# Patient Record
Sex: FEMALE | Race: WHITE | NOT HISPANIC OR LATINO | URBAN - METROPOLITAN AREA
[De-identification: names, ages, dates, MRNs, and addresses within clinical notes are randomized per-mention and may not be internally consistent; named-entity substitution may affect disease eponyms.]

---

## 2022-05-12 ENCOUNTER — INPATIENT (INPATIENT)
Facility: HOSPITAL | Age: 37
LOS: 7 days | Discharge: NURSING, REHAB OR LONG-TERM CARE | DRG: 178 | End: 2022-05-20
Attending: STUDENT IN AN ORGANIZED HEALTH CARE EDUCATION/TRAINING PROGRAM | Admitting: FAMILY MEDICINE
Payer: COMMERCIAL

## 2022-05-12 VITALS
HEIGHT: 62 IN | HEART RATE: 124 BPM | OXYGEN SATURATION: 97 % | DIASTOLIC BLOOD PRESSURE: 58 MMHG | RESPIRATION RATE: 20 BRPM | TEMPERATURE: 98 F | WEIGHT: 293 LBS | SYSTOLIC BLOOD PRESSURE: 102 MMHG

## 2022-05-12 LAB
ALBUMIN SERPL ELPH-MCNC: 2.7 G/DL — LOW (ref 3.3–5.2)
ALP SERPL-CCNC: 127 U/L — HIGH (ref 40–120)
ALT FLD-CCNC: 6 U/L — SIGNIFICANT CHANGE UP
ANION GAP SERPL CALC-SCNC: 17 MMOL/L — SIGNIFICANT CHANGE UP (ref 5–17)
AST SERPL-CCNC: 12 U/L — SIGNIFICANT CHANGE UP
BASOPHILS # BLD AUTO: 0.1 K/UL — SIGNIFICANT CHANGE UP (ref 0–0.2)
BASOPHILS NFR BLD AUTO: 0.9 % — SIGNIFICANT CHANGE UP (ref 0–2)
BILIRUB SERPL-MCNC: 1.3 MG/DL — SIGNIFICANT CHANGE UP (ref 0.4–2)
BUN SERPL-MCNC: 43.1 MG/DL — HIGH (ref 8–20)
CALCIUM SERPL-MCNC: 8.6 MG/DL — SIGNIFICANT CHANGE UP (ref 8.6–10.2)
CHLORIDE SERPL-SCNC: 90 MMOL/L — LOW (ref 98–107)
CO2 SERPL-SCNC: 25 MMOL/L — SIGNIFICANT CHANGE UP (ref 22–29)
CREAT SERPL-MCNC: 1.27 MG/DL — SIGNIFICANT CHANGE UP (ref 0.5–1.3)
EGFR: 57 ML/MIN/1.73M2 — LOW
EOSINOPHIL # BLD AUTO: 0.19 K/UL — SIGNIFICANT CHANGE UP (ref 0–0.5)
EOSINOPHIL NFR BLD AUTO: 1.7 % — SIGNIFICANT CHANGE UP (ref 0–6)
FLUAV AG NPH QL: SIGNIFICANT CHANGE UP
FLUBV AG NPH QL: SIGNIFICANT CHANGE UP
GLUCOSE SERPL-MCNC: 119 MG/DL — HIGH (ref 70–99)
HCG SERPL-ACNC: <4 MIU/ML — SIGNIFICANT CHANGE UP
HCT VFR BLD CALC: 32.8 % — LOW (ref 34.5–45)
HGB BLD-MCNC: 9.8 G/DL — LOW (ref 11.5–15.5)
LIDOCAIN IGE QN: 37 U/L — SIGNIFICANT CHANGE UP (ref 22–51)
LYMPHOCYTES # BLD AUTO: 0.48 K/UL — LOW (ref 1–3.3)
LYMPHOCYTES # BLD AUTO: 4.3 % — LOW (ref 13–44)
MCHC RBC-ENTMCNC: 23.7 PG — LOW (ref 27–34)
MCHC RBC-ENTMCNC: 29.9 GM/DL — LOW (ref 32–36)
MCV RBC AUTO: 79.2 FL — LOW (ref 80–100)
MONOCYTES # BLD AUTO: 0.29 K/UL — SIGNIFICANT CHANGE UP (ref 0–0.9)
MONOCYTES NFR BLD AUTO: 2.6 % — SIGNIFICANT CHANGE UP (ref 2–14)
NEUTROPHILS # BLD AUTO: 9.84 K/UL — HIGH (ref 1.8–7.4)
NEUTROPHILS NFR BLD AUTO: 87.8 % — HIGH (ref 43–77)
PLATELET # BLD AUTO: 453 K/UL — HIGH (ref 150–400)
POTASSIUM SERPL-MCNC: 3.6 MMOL/L — SIGNIFICANT CHANGE UP (ref 3.5–5.3)
POTASSIUM SERPL-SCNC: 3.6 MMOL/L — SIGNIFICANT CHANGE UP (ref 3.5–5.3)
PROT SERPL-MCNC: 7 G/DL — SIGNIFICANT CHANGE UP (ref 6.6–8.7)
RBC # BLD: 4.14 M/UL — SIGNIFICANT CHANGE UP (ref 3.8–5.2)
RBC # FLD: 17.6 % — HIGH (ref 10.3–14.5)
RSV RNA NPH QL NAA+NON-PROBE: SIGNIFICANT CHANGE UP
SARS-COV-2 RNA SPEC QL NAA+PROBE: DETECTED
SODIUM SERPL-SCNC: 132 MMOL/L — LOW (ref 135–145)
WBC # BLD: 11.09 K/UL — HIGH (ref 3.8–10.5)
WBC # FLD AUTO: 11.09 K/UL — HIGH (ref 3.8–10.5)

## 2022-05-12 PROCEDURE — 74177 CT ABD & PELVIS W/CONTRAST: CPT | Mod: 26,MA

## 2022-05-12 PROCEDURE — 99285 EMERGENCY DEPT VISIT HI MDM: CPT

## 2022-05-12 PROCEDURE — 71045 X-RAY EXAM CHEST 1 VIEW: CPT | Mod: 26

## 2022-05-12 RX ORDER — MORPHINE SULFATE 50 MG/1
4 CAPSULE, EXTENDED RELEASE ORAL ONCE
Refills: 0 | Status: DISCONTINUED | OUTPATIENT
Start: 2022-05-12 | End: 2022-05-12

## 2022-05-12 RX ORDER — SODIUM CHLORIDE 9 MG/ML
1000 INJECTION INTRAMUSCULAR; INTRAVENOUS; SUBCUTANEOUS ONCE
Refills: 0 | Status: COMPLETED | OUTPATIENT
Start: 2022-05-12 | End: 2022-05-12

## 2022-05-12 RX ADMIN — MORPHINE SULFATE 4 MILLIGRAM(S): 50 CAPSULE, EXTENDED RELEASE ORAL at 15:04

## 2022-05-12 RX ADMIN — SODIUM CHLORIDE 1000 MILLILITER(S): 9 INJECTION INTRAMUSCULAR; INTRAVENOUS; SUBCUTANEOUS at 16:33

## 2022-05-12 RX ADMIN — MORPHINE SULFATE 4 MILLIGRAM(S): 50 CAPSULE, EXTENDED RELEASE ORAL at 20:56

## 2022-05-12 RX ADMIN — SODIUM CHLORIDE 1000 MILLILITER(S): 9 INJECTION INTRAMUSCULAR; INTRAVENOUS; SUBCUTANEOUS at 15:04

## 2022-05-12 RX ADMIN — MORPHINE SULFATE 4 MILLIGRAM(S): 50 CAPSULE, EXTENDED RELEASE ORAL at 21:11

## 2022-05-12 NOTE — ED PROVIDER NOTE - PHYSICAL EXAMINATION
***GEN - NAD; well appearing; A+O x3 ***HEAD - NC/AT ***EYES/NOSE - PERRL, EOMI, mucous membranes moist, no discharge ***THROAT: Oral cavity and pharynx normal. No inflammation, swelling, exudate, or lesions.  ***NECK: Neck supple, non-tender without lymphadenopathy, no masses, no thyromegaly.   ***PULMONARY - CTA b/l, symmetric breath sounds. ***CARDIAC -s1s2, tachycardic, no M,G,R  ***ABDOMEN - obese soft tender areas of induration along abdominal pannus. fungal rash underneath abdominal pannus +BS,    ***EXTREMITIES - symmetric pulses, 2+ dp, capillary refill < 2 seconds,  no edema   ***NEUROLOGIC - alert, oriented, motor sensory intact all 4 extremities  , ***PSYCH - insight and judgment nl, memory nl, affect nl, thought nl
36.6

## 2022-05-12 NOTE — ED PROVIDER NOTE - PROGRESS NOTE DETAILS
In my clinical opinion the benefits of obtaining CT with IV contrast without premedicating the patient outweigh the risks due to pt stating the allergic reaction she gets is only a rash. JK - CT without any acute findings. CBC, CMP, lipase WNL. Patient found to be covid-19 positive. PT consulted for weakness and difficulty ambulating; upon ambulation patient HR elevated to 160s and oxygen saturation dropped to 88%; patient asymptomatic, denies CP/SOB. VSS at rest. CXR WNL. Patient ready and agreeable to admission to medicine for covid-19 and hypoxia. Currently stable.

## 2022-05-12 NOTE — ED PROVIDER NOTE - OBJECTIVE STATEMENT
36 F hx dvt/PE on eliquis asthma anxiety disorder p/w weakness,  abdominal pain progressively worsening unable to eat, nausea retching. feels tender painful masses in her abdominal skin, denies chest pain and shortness of breath

## 2022-05-12 NOTE — ED PROVIDER NOTE - CLINICAL SUMMARY MEDICAL DECISION MAKING FREE TEXT BOX
patient with weakness unable to get up from toilet today, worsening painful lumps to her abdomen , unable to eat,   will check labs ct a/p pain control reassess likely need admission

## 2022-05-12 NOTE — ED ADULT NURSE NOTE - CHIEF COMPLAINT QUOTE
pt c/o generalized weakness, decreased po intake, cough, and nausea x3 weeks, pt was dx with PNA 3 weeks ago, resp even and unlabored no distress noted,

## 2022-05-13 DIAGNOSIS — U07.1 COVID-19: ICD-10-CM

## 2022-05-13 LAB
ALBUMIN SERPL ELPH-MCNC: 2.6 G/DL — LOW (ref 3.3–5.2)
ALP SERPL-CCNC: 114 U/L — SIGNIFICANT CHANGE UP (ref 40–120)
ALT FLD-CCNC: <5 U/L — SIGNIFICANT CHANGE UP
ANION GAP SERPL CALC-SCNC: 17 MMOL/L — SIGNIFICANT CHANGE UP (ref 5–17)
APTT BLD: 35.3 SEC — SIGNIFICANT CHANGE UP (ref 27.5–35.5)
AST SERPL-CCNC: 12 U/L — SIGNIFICANT CHANGE UP
BILIRUB SERPL-MCNC: 1.1 MG/DL — SIGNIFICANT CHANGE UP (ref 0.4–2)
BUN SERPL-MCNC: 41.8 MG/DL — HIGH (ref 8–20)
CALCIUM SERPL-MCNC: 8.2 MG/DL — LOW (ref 8.6–10.2)
CHLORIDE SERPL-SCNC: 93 MMOL/L — LOW (ref 98–107)
CK SERPL-CCNC: 13 U/L — LOW (ref 25–170)
CO2 SERPL-SCNC: 22 MMOL/L — SIGNIFICANT CHANGE UP (ref 22–29)
CREAT SERPL-MCNC: 1.52 MG/DL — HIGH (ref 0.5–1.3)
D DIMER BLD IA.RAPID-MCNC: 171 NG/ML DDU — SIGNIFICANT CHANGE UP
EGFR: 46 ML/MIN/1.73M2 — LOW
FERRITIN SERPL-MCNC: 656 NG/ML — HIGH (ref 15–150)
GLUCOSE SERPL-MCNC: 143 MG/DL — HIGH (ref 70–99)
HCT VFR BLD CALC: 31.7 % — LOW (ref 34.5–45)
HGB BLD-MCNC: 9.3 G/DL — LOW (ref 11.5–15.5)
INR BLD: 1.7 RATIO — HIGH (ref 0.88–1.16)
MCHC RBC-ENTMCNC: 23.8 PG — LOW (ref 27–34)
MCHC RBC-ENTMCNC: 29.3 GM/DL — LOW (ref 32–36)
MCV RBC AUTO: 81.1 FL — SIGNIFICANT CHANGE UP (ref 80–100)
PLATELET # BLD AUTO: 472 K/UL — HIGH (ref 150–400)
POTASSIUM SERPL-MCNC: 3.5 MMOL/L — SIGNIFICANT CHANGE UP (ref 3.5–5.3)
POTASSIUM SERPL-SCNC: 3.5 MMOL/L — SIGNIFICANT CHANGE UP (ref 3.5–5.3)
PROT SERPL-MCNC: 6.6 G/DL — SIGNIFICANT CHANGE UP (ref 6.6–8.7)
PROTHROM AB SERPL-ACNC: 19.8 SEC — HIGH (ref 10.5–13.4)
RBC # BLD: 3.91 M/UL — SIGNIFICANT CHANGE UP (ref 3.8–5.2)
RBC # FLD: 18.1 % — HIGH (ref 10.3–14.5)
SODIUM SERPL-SCNC: 132 MMOL/L — LOW (ref 135–145)
WBC # BLD: 10 K/UL — SIGNIFICANT CHANGE UP (ref 3.8–10.5)
WBC # FLD AUTO: 10 K/UL — SIGNIFICANT CHANGE UP (ref 3.8–10.5)

## 2022-05-13 PROCEDURE — 99223 1ST HOSP IP/OBS HIGH 75: CPT

## 2022-05-13 RX ORDER — MONTELUKAST 4 MG/1
10 TABLET, CHEWABLE ORAL DAILY
Refills: 0 | Status: DISCONTINUED | OUTPATIENT
Start: 2022-05-13 | End: 2022-05-20

## 2022-05-13 RX ORDER — METOPROLOL TARTRATE 50 MG
25 TABLET ORAL EVERY 6 HOURS
Refills: 0 | Status: DISCONTINUED | OUTPATIENT
Start: 2022-05-13 | End: 2022-05-20

## 2022-05-13 RX ORDER — APIXABAN 2.5 MG/1
5 TABLET, FILM COATED ORAL EVERY 12 HOURS
Refills: 0 | Status: DISCONTINUED | OUTPATIENT
Start: 2022-05-13 | End: 2022-05-20

## 2022-05-13 RX ORDER — REMDESIVIR 5 MG/ML
100 INJECTION INTRAVENOUS EVERY 24 HOURS
Refills: 0 | Status: COMPLETED | OUTPATIENT
Start: 2022-05-14 | End: 2022-05-17

## 2022-05-13 RX ORDER — DEXAMETHASONE 0.5 MG/5ML
6 ELIXIR ORAL DAILY
Refills: 0 | Status: DISCONTINUED | OUTPATIENT
Start: 2022-05-13 | End: 2022-05-20

## 2022-05-13 RX ORDER — CHOLECALCIFEROL (VITAMIN D3) 125 MCG
1000 CAPSULE ORAL DAILY
Refills: 0 | Status: DISCONTINUED | OUTPATIENT
Start: 2022-05-13 | End: 2022-05-20

## 2022-05-13 RX ORDER — NYSTATIN CREAM 100000 [USP'U]/G
1 CREAM TOPICAL THREE TIMES A DAY
Refills: 0 | Status: COMPLETED | OUTPATIENT
Start: 2022-05-13 | End: 2022-05-20

## 2022-05-13 RX ORDER — ZINC SULFATE TAB 220 MG (50 MG ZINC EQUIVALENT) 220 (50 ZN) MG
220 TAB ORAL DAILY
Refills: 0 | Status: DISCONTINUED | OUTPATIENT
Start: 2022-05-13 | End: 2022-05-20

## 2022-05-13 RX ORDER — ALBUTEROL 90 UG/1
2 AEROSOL, METERED ORAL EVERY 6 HOURS
Refills: 0 | Status: DISCONTINUED | OUTPATIENT
Start: 2022-05-13 | End: 2022-05-20

## 2022-05-13 RX ORDER — ALPRAZOLAM 0.25 MG
0.25 TABLET ORAL AT BEDTIME
Refills: 0 | Status: DISCONTINUED | OUTPATIENT
Start: 2022-05-13 | End: 2022-05-20

## 2022-05-13 RX ORDER — SODIUM CHLORIDE 9 MG/ML
1000 INJECTION, SOLUTION INTRAVENOUS
Refills: 0 | Status: COMPLETED | OUTPATIENT
Start: 2022-05-13 | End: 2022-05-13

## 2022-05-13 RX ORDER — BUDESONIDE AND FORMOTEROL FUMARATE DIHYDRATE 160; 4.5 UG/1; UG/1
2 AEROSOL RESPIRATORY (INHALATION)
Refills: 0 | Status: DISCONTINUED | OUTPATIENT
Start: 2022-05-13 | End: 2022-05-20

## 2022-05-13 RX ORDER — FAMOTIDINE 10 MG/ML
20 INJECTION INTRAVENOUS
Refills: 0 | Status: DISCONTINUED | OUTPATIENT
Start: 2022-05-13 | End: 2022-05-20

## 2022-05-13 RX ORDER — ONDANSETRON 8 MG/1
4 TABLET, FILM COATED ORAL EVERY 6 HOURS
Refills: 0 | Status: DISCONTINUED | OUTPATIENT
Start: 2022-05-13 | End: 2022-05-20

## 2022-05-13 RX ORDER — OXYCODONE HYDROCHLORIDE 5 MG/1
5 TABLET ORAL EVERY 12 HOURS
Refills: 0 | Status: DISCONTINUED | OUTPATIENT
Start: 2022-05-13 | End: 2022-05-20

## 2022-05-13 RX ORDER — POTASSIUM CHLORIDE 20 MEQ
1 PACKET (EA) ORAL
Qty: 0 | Refills: 0 | DISCHARGE

## 2022-05-13 RX ORDER — REMDESIVIR 5 MG/ML
INJECTION INTRAVENOUS
Refills: 0 | Status: COMPLETED | OUTPATIENT
Start: 2022-05-13 | End: 2022-05-17

## 2022-05-13 RX ORDER — ACETAMINOPHEN 500 MG
650 TABLET ORAL EVERY 6 HOURS
Refills: 0 | Status: DISCONTINUED | OUTPATIENT
Start: 2022-05-13 | End: 2022-05-20

## 2022-05-13 RX ORDER — REMDESIVIR 5 MG/ML
200 INJECTION INTRAVENOUS EVERY 24 HOURS
Refills: 0 | Status: COMPLETED | OUTPATIENT
Start: 2022-05-13 | End: 2022-05-13

## 2022-05-13 RX ORDER — ASCORBIC ACID 60 MG
500 TABLET,CHEWABLE ORAL DAILY
Refills: 0 | Status: DISCONTINUED | OUTPATIENT
Start: 2022-05-13 | End: 2022-05-20

## 2022-05-13 RX ORDER — PANTOPRAZOLE SODIUM 20 MG/1
40 TABLET, DELAYED RELEASE ORAL DAILY
Refills: 0 | Status: DISCONTINUED | OUTPATIENT
Start: 2022-05-13 | End: 2022-05-13

## 2022-05-13 RX ORDER — POTASSIUM CHLORIDE 20 MEQ
40 PACKET (EA) ORAL ONCE
Refills: 0 | Status: COMPLETED | OUTPATIENT
Start: 2022-05-13 | End: 2022-05-13

## 2022-05-13 RX ORDER — KETOROLAC TROMETHAMINE 30 MG/ML
15 SYRINGE (ML) INJECTION ONCE
Refills: 0 | Status: DISCONTINUED | OUTPATIENT
Start: 2022-05-13 | End: 2022-05-13

## 2022-05-13 RX ADMIN — REMDESIVIR 500 MILLIGRAM(S): 5 INJECTION INTRAVENOUS at 13:47

## 2022-05-13 RX ADMIN — MONTELUKAST 10 MILLIGRAM(S): 4 TABLET, CHEWABLE ORAL at 18:03

## 2022-05-13 RX ADMIN — Medication 15 MILLIGRAM(S): at 03:41

## 2022-05-13 RX ADMIN — NYSTATIN CREAM 1 APPLICATION(S): 100000 CREAM TOPICAL at 23:31

## 2022-05-13 RX ADMIN — APIXABAN 5 MILLIGRAM(S): 2.5 TABLET, FILM COATED ORAL at 13:48

## 2022-05-13 RX ADMIN — Medication 15 MILLIGRAM(S): at 06:41

## 2022-05-13 RX ADMIN — BUDESONIDE AND FORMOTEROL FUMARATE DIHYDRATE 2 PUFF(S): 160; 4.5 AEROSOL RESPIRATORY (INHALATION) at 21:31

## 2022-05-13 RX ADMIN — OXYCODONE HYDROCHLORIDE 5 MILLIGRAM(S): 5 TABLET ORAL at 13:46

## 2022-05-13 RX ADMIN — Medication 0.25 MILLIGRAM(S): at 23:55

## 2022-05-13 RX ADMIN — FAMOTIDINE 20 MILLIGRAM(S): 10 INJECTION INTRAVENOUS at 18:03

## 2022-05-13 RX ADMIN — ZINC SULFATE TAB 220 MG (50 MG ZINC EQUIVALENT) 220 MILLIGRAM(S): 220 (50 ZN) TAB at 18:03

## 2022-05-13 RX ADMIN — SODIUM CHLORIDE 75 MILLILITER(S): 9 INJECTION, SOLUTION INTRAVENOUS at 13:53

## 2022-05-13 RX ADMIN — NYSTATIN CREAM 1 APPLICATION(S): 100000 CREAM TOPICAL at 18:04

## 2022-05-13 RX ADMIN — Medication 25 MILLIGRAM(S): at 13:47

## 2022-05-13 RX ADMIN — Medication 40 MILLIEQUIVALENT(S): at 13:47

## 2022-05-13 RX ADMIN — Medication 25 MILLIGRAM(S): at 18:04

## 2022-05-13 RX ADMIN — Medication 6 MILLIGRAM(S): at 13:49

## 2022-05-13 NOTE — H&P ADULT - NSHPLABSRESULTS_GEN_ALL_CORE
LABS:                        9.3    10.00 )-----------( 472      ( 13 May 2022 12:13 )             31.7     05-13    132<L>  |  93<L>  |  41.8<H>  ----------------------------<  143<H>  3.5   |  22.0  |  1.52<H>    Ca    8.2<L>      13 May 2022 12:13    TPro  6.6  /  Alb  2.6<L>  /  TBili  1.1  /  DBili  x   /  AST  12  /  ALT  <5  /  AlkPhos  114  05-13    PT/INR - ( 13 May 2022 12:13 )   PT: 19.8 sec;   INR: 1.70 ratio      PTT - ( 13 May 2022 12:13 )  PTT:35.3 sec  CARDIAC MARKERS ( 13 May 2022 12:13 )  x     / 0.04 ng/mL / 13 U/L / x     / x        Xray Chest 1 View- PORTABLE-Urgent (05.12.22 @ 15:53)     INTERPRETATION:  AP chest on May 12, 2022 at 3:34 PM. Patient is short of breath and has abdominal pain.    COMPARISON: None available.  Elevated diaphragms crowds the chest. Heart magnified by technique.  Heart obscures the left base.  No gross acute lung finding.    IMPRESSION: As above.    CT Abdomen and Pelvis w/ IV Cont (05.12.22 @ 23:40)     Splenomegaly.  No acute findings in the abdomen or pelvis.

## 2022-05-13 NOTE — ED ADULT NURSE REASSESSMENT NOTE - NS ED NURSE REASSESS COMMENT FT1
pt. assisted to commode with minimal difficulty, hygiene care provided. PT at bedside for evaluation

## 2022-05-13 NOTE — H&P ADULT - NSICDXFAMILYHX_GEN_ALL_CORE_FT
FAMILY HISTORY:  Father  Still living? Yes, Estimated age: Age Unknown  Family history of stroke, Age at diagnosis: Age Unknown    Mother  Still living? Yes, Estimated age: Age Unknown  Family history of colon cancer, Age at diagnosis: Age Unknown

## 2022-05-13 NOTE — PHYSICAL THERAPY INITIAL EVALUATION ADULT - ADDITIONAL COMMENTS
pt states she lives alone in a 1-story house with 7 steps to enter (+2 unreachable rails). uses RW for amb since rehab stay a few months ago. has shower chair. states she does not go out without someone to assist.

## 2022-05-13 NOTE — ED ADULT NURSE REASSESSMENT NOTE - NS ED NURSE REASSESS COMMENT FT1
Assumed care of pt at 0240 as stated in report from NOAH Blanc. Charting as noted. Patient A&O x4, pt denies CP/SOB. Updated on the plan of care. Call bell within reach, bed locked in lowest position. IV site flushed w/ NS. No redness, swelling or pain noted to site. No signs of acute distress noted, safety maintained.

## 2022-05-13 NOTE — H&P ADULT - NSHPPHYSICALEXAM_GEN_ALL_CORE
Vital Signs  T(C): 37 (13 May 2022 07:37), Max: 37 (13 May 2022 07:37)  T(F): 98.6 (13 May 2022 07:37), Max: 98.6 (13 May 2022 07:37)  HR: 112 (13 May 2022 11:35) (84 - 155)  BP: 123/72 (13 May 2022 07:37) (82/50 - 123/72)  RR: 20 (13 May 2022 11:35) (16 - 28)  SpO2: 96% (13 May 2022 11:35) (88% - 98%)  General: Young female sitting in bed comfortably. No acute distress  HEENT: EOMI. Clear conjunctivae. Moist mucus membrane  Neck: Supple.   Chest: Good air entry. No wheezing, rales or rhonchi.   Heart: S1 & S2 with tachycardia.   Abdomen: Obese. Soft. Multiple painful lipomas. No signs of infection. + BS. Fungal rash under abdominal folds.   Ext: Lymphedema. No calf tenderness. Chronic skin changes.   Neuro: AAO x 3. No focal deficit. No speech disorder.  Skin: Warm and Dry  Psychiatry: Normal mood and affect

## 2022-05-13 NOTE — H&P ADULT - NSHPSOCIALHISTORY_GEN_ALL_CORE
Works as a Director in a company.   Lives alone.  Uses walker at times.   Smoked cigarettes socially in her 20s.  Smokes marijuana socially, last use > 1 year ago.   No alcohol.

## 2022-05-13 NOTE — PHYSICAL THERAPY INITIAL EVALUATION ADULT - PERTINENT HX OF CURRENT PROBLEM, REHAB EVAL
36 year old female with history of Morbid Obesity, CHF, B/L LE DVT + PE on Eliquis, HTN, Asthma and Anxiety brought by EMS with generalized weakness.  +COVID

## 2022-05-13 NOTE — H&P ADULT - HISTORY OF PRESENT ILLNESS
INCOMPLETE 36 year old female with history of Morbid Obesity, CHF, B/L LE DVT + PE on Eliquis, HTN, Asthma and Anxiety brought by EMS with generalized weakness. As per patient, she was treated for bacterial pneumonia three weeks ago. Since then she has been feeling week but for the last few days it is getting worse. Yesterday, she went to bathroom and was unable to stand up from toilet bowl so her family called EMS. She is also complaining of nausea and her oral intake has decreased. She has cough for last few weeks and there is no change in it. She feels palpitations at times. Denies fever however she was exposed to COVID through her friend. She was vaccinated with Pfizer (2 doses) in May 2021.  She is also complaining of abdominal pain and has noticed lumps in her abdomen. CT Abdomen/Pelvis with splenomegaly but no acute findings.    In ER, she was monitored for > 20 hours. Initially did not require supplemental oxygen however when she started ambulating with PT, she became hypoxic and tachycardiac so ED Physician called for admission.

## 2022-05-13 NOTE — H&P ADULT - NSICDXPASTMEDICALHX_GEN_ALL_CORE_FT
PAST MEDICAL HISTORY:  Anxiety     Asthma     CHF (congestive heart failure)     DVT, lower extremity     Hypertension     Pulmonary embolism     Severe obesity (BMI >= 40)

## 2022-05-13 NOTE — ED ADULT NURSE REASSESSMENT NOTE - NS ED NURSE REASSESS COMMENT FT1
pt resting comfortably in bed showing no signs of respiratory distress or pain, pt calm and cooperative pt resting comfortably in bed showing no signs of respiratory distress or pain, pt calm and cooperative, pt has a BP of 82/50 and MD Borjas made aware, pt presents asymptomatic despite low BP

## 2022-05-13 NOTE — H&P ADULT - ASSESSMENT
INCOMPLETE 36 year old female with history of Morbid Obesity, CHF, B/L LE DVT + PE on Eliquis, HTN, Asthma and Anxiety brought by EMS with generalized weakness. As per patient, she was treated for bacterial pneumonia three weeks ago. Since then she has been feeling week but for the last few days it is getting worse. Yesterday, she went to bathroom and was unable to stand up from toilet bowl so her family called EMS. She is also complaining of nausea and her oral intake has decreased. She has cough for last few weeks and there is no change in it. She feels palpitations at times. Denies fever however she was exposed to COVID through her friend. She was vaccinated with Pfizer (2 doses) in May 2021.  She is also complaining of abdominal pain and has noticed lumps in her abdomen. CT Abdomen/Pelvis with splenomegaly but no acute findings.    In ER, she was monitored for > 20 hours. Initially did not require supplemental oxygen however when she started ambulating with PT, she became hypoxic and tachycardiac so ED Physician called for admission.     1) COVID-19  - Desatting on ambulation   - Start Remdesivir 200 mg x 1 then 100 mg x 4  - Start Dexamethasone 6 mg daily   - Albuterol PRN  - Incentive Spirometry  - Unable to prone     2) SARAH  - Unknown baseline  - Likely due to dehydration and IV contrast   - Gentle hydration   - Hold Metolazone   - Avoid nephrotoxic medications   - Monitor renal function     3) Generalized Weakness   - Likely due to COVID  - Encouraged for oral intake   - Plan as above (1)  - PT     4) History of B/L LE DVT / PE  - D-Dimer 171  - Resume Eliquis     5) Hyponatremia  - Mild   - Unknown baseline  - ? secondary to dehydration   - Monitor     6) Chronic Congestive Heart Failure  - Unknown systolic vs diastolic   - Hold Metolazone   - Resume Metoprolol     7) HTN  - Resume Metoprolol (will give in divided doses due to hypotension yesterday)    8) Asthma   - No exacerbation   - Symbicort and Albuterol (PRN)    9) Anxiety  - Continue Xanax and Paroxetine     10) Abdominal Pain   - Likely due to Panniculitis  - Will consider Surgery Consult     DVT Prophylaxis -- Patient is on Eliquis.    Dispo: Likely [facility] in 2-3 days.   36 year old female with history of Morbid Obesity, CHF, B/L LE DVT + PE on Eliquis, HTN, Asthma and Anxiety brought by EMS with generalized weakness. As per patient, she was treated for bacterial pneumonia three weeks ago. Since then she has been feeling week but for the last few days it is getting worse. Yesterday, she went to bathroom and was unable to stand up from toilet bowl so her family called EMS. She is also complaining of nausea and her oral intake has decreased. She has cough for last few weeks and there is no change in it. She feels palpitations at times. Denies fever however she was exposed to COVID through her friend. She was vaccinated with Pfizer (2 doses) in May 2021.  She is also complaining of abdominal pain and has noticed lumps in her abdomen. CT Abdomen/Pelvis with splenomegaly but no acute findings.    In ER, she was monitored for > 20 hours. Initially did not require supplemental oxygen however when she started ambulating with PT, she became hypoxic and tachycardiac so ED Physician called for admission.     1) COVID-19  - Desatting on ambulation   - Start Remdesivir 200 mg x 1 then 100 mg x 4  - Start Dexamethasone 6 mg daily   - Albuterol PRN  - Incentive Spirometry  - Unable to prone     2) SARAH  - Unknown baseline  - Likely due to dehydration and IV contrast   - Gentle hydration   - Hold Metolazone   - Avoid nephrotoxic medications   - Monitor renal function     3) Generalized Weakness   - Likely due to COVID  - Encouraged for oral intake   - Plan as above (1)  - PT     4) History of B/L LE DVT / PE  - D-Dimer 171  - Resume Eliquis     5) Hyponatremia  - Mild   - Unknown baseline  - ? secondary to dehydration   - Monitor     6) Chronic Congestive Heart Failure  - Unknown systolic vs diastolic   - Hold Metolazone   - Resume Metoprolol     7) HTN  - Resume Metoprolol (will give in divided doses due to hypotension yesterday)    8) Asthma   - No exacerbation   - Symbicort and Albuterol (PRN)    9) Anxiety  - Continue Xanax and Paroxetine     10) Abdominal Pain   - Secondary to Lipomas vs ? Panniculitis  - Will consider Surgery Consult     DVT Prophylaxis -- Patient is on Eliquis.    Dispo: Likely [facility] in 2-3 days.

## 2022-05-13 NOTE — ED ADULT NURSE REASSESSMENT NOTE - NS ED NURSE REASSESS COMMENT FT1
assumed patient care at this time. sleeping comfortably, awakens to voice, respirations even and unlabored, offering no complaints at this time. awaiting PT to eval for disposition

## 2022-05-14 LAB
ALBUMIN SERPL ELPH-MCNC: 2.6 G/DL — LOW (ref 3.3–5.2)
ALP SERPL-CCNC: 110 U/L — SIGNIFICANT CHANGE UP (ref 40–120)
ALT FLD-CCNC: <5 U/L — SIGNIFICANT CHANGE UP
ANION GAP SERPL CALC-SCNC: 20 MMOL/L — HIGH (ref 5–17)
AST SERPL-CCNC: 9 U/L — SIGNIFICANT CHANGE UP
BASOPHILS # BLD AUTO: 0.03 K/UL — SIGNIFICANT CHANGE UP (ref 0–0.2)
BASOPHILS NFR BLD AUTO: 0.3 % — SIGNIFICANT CHANGE UP (ref 0–2)
BILIRUB SERPL-MCNC: 0.7 MG/DL — SIGNIFICANT CHANGE UP (ref 0.4–2)
BUN SERPL-MCNC: 48.5 MG/DL — HIGH (ref 8–20)
CALCIUM SERPL-MCNC: 8.6 MG/DL — SIGNIFICANT CHANGE UP (ref 8.6–10.2)
CHLORIDE SERPL-SCNC: 94 MMOL/L — LOW (ref 98–107)
CO2 SERPL-SCNC: 22 MMOL/L — SIGNIFICANT CHANGE UP (ref 22–29)
CREAT SERPL-MCNC: 1.52 MG/DL — HIGH (ref 0.5–1.3)
EGFR: 46 ML/MIN/1.73M2 — LOW
EOSINOPHIL # BLD AUTO: 0.04 K/UL — SIGNIFICANT CHANGE UP (ref 0–0.5)
EOSINOPHIL NFR BLD AUTO: 0.4 % — SIGNIFICANT CHANGE UP (ref 0–6)
GLUCOSE SERPL-MCNC: 139 MG/DL — HIGH (ref 70–99)
HCT VFR BLD CALC: 30.4 % — LOW (ref 34.5–45)
HGB BLD-MCNC: 8.8 G/DL — LOW (ref 11.5–15.5)
IMM GRANULOCYTES NFR BLD AUTO: 1.6 % — HIGH (ref 0–1.5)
IRON SATN MFR SERPL: 23 % — SIGNIFICANT CHANGE UP (ref 14–50)
IRON SATN MFR SERPL: 44 UG/DL — SIGNIFICANT CHANGE UP (ref 37–145)
LYMPHOCYTES # BLD AUTO: 0.76 K/UL — LOW (ref 1–3.3)
LYMPHOCYTES # BLD AUTO: 8 % — LOW (ref 13–44)
MAGNESIUM SERPL-MCNC: 1.9 MG/DL — SIGNIFICANT CHANGE UP (ref 1.6–2.6)
MCHC RBC-ENTMCNC: 23.5 PG — LOW (ref 27–34)
MCHC RBC-ENTMCNC: 28.9 GM/DL — LOW (ref 32–36)
MCV RBC AUTO: 81.3 FL — SIGNIFICANT CHANGE UP (ref 80–100)
MONOCYTES # BLD AUTO: 0.57 K/UL — SIGNIFICANT CHANGE UP (ref 0–0.9)
MONOCYTES NFR BLD AUTO: 6 % — SIGNIFICANT CHANGE UP (ref 2–14)
NEUTROPHILS # BLD AUTO: 7.98 K/UL — HIGH (ref 1.8–7.4)
NEUTROPHILS NFR BLD AUTO: 83.7 % — HIGH (ref 43–77)
PLATELET # BLD AUTO: 472 K/UL — HIGH (ref 150–400)
POTASSIUM SERPL-MCNC: 4.1 MMOL/L — SIGNIFICANT CHANGE UP (ref 3.5–5.3)
POTASSIUM SERPL-SCNC: 4.1 MMOL/L — SIGNIFICANT CHANGE UP (ref 3.5–5.3)
PROT SERPL-MCNC: 6.6 G/DL — SIGNIFICANT CHANGE UP (ref 6.6–8.7)
RBC # BLD: 3.74 M/UL — LOW (ref 3.8–5.2)
RBC # FLD: 18 % — HIGH (ref 10.3–14.5)
SODIUM SERPL-SCNC: 136 MMOL/L — SIGNIFICANT CHANGE UP (ref 135–145)
TIBC SERPL-MCNC: 187 UG/DL — LOW (ref 220–430)
TRANSFERRIN SERPL-MCNC: 131 MG/DL — LOW (ref 192–382)
WBC # BLD: 9.53 K/UL — SIGNIFICANT CHANGE UP (ref 3.8–10.5)
WBC # FLD AUTO: 9.53 K/UL — SIGNIFICANT CHANGE UP (ref 3.8–10.5)

## 2022-05-14 PROCEDURE — 99232 SBSQ HOSP IP/OBS MODERATE 35: CPT

## 2022-05-14 RX ADMIN — NYSTATIN CREAM 1 APPLICATION(S): 100000 CREAM TOPICAL at 06:22

## 2022-05-14 RX ADMIN — NYSTATIN CREAM 1 APPLICATION(S): 100000 CREAM TOPICAL at 12:37

## 2022-05-14 RX ADMIN — NYSTATIN CREAM 1 APPLICATION(S): 100000 CREAM TOPICAL at 22:52

## 2022-05-14 RX ADMIN — BUDESONIDE AND FORMOTEROL FUMARATE DIHYDRATE 2 PUFF(S): 160; 4.5 AEROSOL RESPIRATORY (INHALATION) at 20:17

## 2022-05-14 RX ADMIN — APIXABAN 5 MILLIGRAM(S): 2.5 TABLET, FILM COATED ORAL at 17:38

## 2022-05-14 RX ADMIN — Medication 6 MILLIGRAM(S): at 06:21

## 2022-05-14 RX ADMIN — Medication 25 MILLIGRAM(S): at 06:22

## 2022-05-14 RX ADMIN — FAMOTIDINE 20 MILLIGRAM(S): 10 INJECTION INTRAVENOUS at 06:22

## 2022-05-14 RX ADMIN — FAMOTIDINE 20 MILLIGRAM(S): 10 INJECTION INTRAVENOUS at 17:38

## 2022-05-14 RX ADMIN — Medication 30 MILLIGRAM(S): at 22:48

## 2022-05-14 RX ADMIN — APIXABAN 5 MILLIGRAM(S): 2.5 TABLET, FILM COATED ORAL at 06:22

## 2022-05-14 RX ADMIN — ZINC SULFATE TAB 220 MG (50 MG ZINC EQUIVALENT) 220 MILLIGRAM(S): 220 (50 ZN) TAB at 12:40

## 2022-05-14 RX ADMIN — MONTELUKAST 10 MILLIGRAM(S): 4 TABLET, CHEWABLE ORAL at 12:37

## 2022-05-14 RX ADMIN — Medication 25 MILLIGRAM(S): at 12:39

## 2022-05-14 RX ADMIN — BUDESONIDE AND FORMOTEROL FUMARATE DIHYDRATE 2 PUFF(S): 160; 4.5 AEROSOL RESPIRATORY (INHALATION) at 07:43

## 2022-05-14 RX ADMIN — Medication 500 MILLIGRAM(S): at 12:40

## 2022-05-14 RX ADMIN — Medication 30 MILLIGRAM(S): at 00:03

## 2022-05-14 RX ADMIN — Medication 0.25 MILLIGRAM(S): at 22:49

## 2022-05-14 RX ADMIN — Medication 1000 UNIT(S): at 12:40

## 2022-05-14 RX ADMIN — Medication 25 MILLIGRAM(S): at 17:38

## 2022-05-14 RX ADMIN — REMDESIVIR 500 MILLIGRAM(S): 5 INJECTION INTRAVENOUS at 12:43

## 2022-05-15 PROCEDURE — 99232 SBSQ HOSP IP/OBS MODERATE 35: CPT

## 2022-05-15 RX ORDER — BENZOCAINE AND MENTHOL 5; 1 G/100ML; G/100ML
1 LIQUID ORAL ONCE
Refills: 0 | Status: COMPLETED | OUTPATIENT
Start: 2022-05-15 | End: 2022-05-19

## 2022-05-15 RX ADMIN — BUDESONIDE AND FORMOTEROL FUMARATE DIHYDRATE 2 PUFF(S): 160; 4.5 AEROSOL RESPIRATORY (INHALATION) at 21:43

## 2022-05-15 RX ADMIN — Medication 25 MILLIGRAM(S): at 06:08

## 2022-05-15 RX ADMIN — NYSTATIN CREAM 1 APPLICATION(S): 100000 CREAM TOPICAL at 21:49

## 2022-05-15 RX ADMIN — Medication 1000 UNIT(S): at 13:42

## 2022-05-15 RX ADMIN — OXYCODONE HYDROCHLORIDE 5 MILLIGRAM(S): 5 TABLET ORAL at 03:08

## 2022-05-15 RX ADMIN — APIXABAN 5 MILLIGRAM(S): 2.5 TABLET, FILM COATED ORAL at 06:10

## 2022-05-15 RX ADMIN — ZINC SULFATE TAB 220 MG (50 MG ZINC EQUIVALENT) 220 MILLIGRAM(S): 220 (50 ZN) TAB at 13:42

## 2022-05-15 RX ADMIN — Medication 25 MILLIGRAM(S): at 12:38

## 2022-05-15 RX ADMIN — APIXABAN 5 MILLIGRAM(S): 2.5 TABLET, FILM COATED ORAL at 17:37

## 2022-05-15 RX ADMIN — NYSTATIN CREAM 1 APPLICATION(S): 100000 CREAM TOPICAL at 13:41

## 2022-05-15 RX ADMIN — Medication 6 MILLIGRAM(S): at 06:08

## 2022-05-15 RX ADMIN — Medication 0.25 MILLIGRAM(S): at 21:49

## 2022-05-15 RX ADMIN — FAMOTIDINE 20 MILLIGRAM(S): 10 INJECTION INTRAVENOUS at 06:10

## 2022-05-15 RX ADMIN — Medication 500 MILLIGRAM(S): at 13:42

## 2022-05-15 RX ADMIN — MONTELUKAST 10 MILLIGRAM(S): 4 TABLET, CHEWABLE ORAL at 13:42

## 2022-05-15 RX ADMIN — REMDESIVIR 500 MILLIGRAM(S): 5 INJECTION INTRAVENOUS at 13:40

## 2022-05-15 RX ADMIN — Medication 25 MILLIGRAM(S): at 00:00

## 2022-05-15 RX ADMIN — Medication 30 MILLIGRAM(S): at 21:49

## 2022-05-15 RX ADMIN — NYSTATIN CREAM 1 APPLICATION(S): 100000 CREAM TOPICAL at 06:07

## 2022-05-15 RX ADMIN — Medication 25 MILLIGRAM(S): at 17:37

## 2022-05-15 RX ADMIN — FAMOTIDINE 20 MILLIGRAM(S): 10 INJECTION INTRAVENOUS at 17:37

## 2022-05-16 LAB
ALBUMIN SERPL ELPH-MCNC: 2.4 G/DL — LOW (ref 3.3–5.2)
ALP SERPL-CCNC: 84 U/L — SIGNIFICANT CHANGE UP (ref 40–120)
ALT FLD-CCNC: <5 U/L — SIGNIFICANT CHANGE UP
ANION GAP SERPL CALC-SCNC: 14 MMOL/L — SIGNIFICANT CHANGE UP (ref 5–17)
AST SERPL-CCNC: 8 U/L — SIGNIFICANT CHANGE UP
BILIRUB SERPL-MCNC: 0.3 MG/DL — LOW (ref 0.4–2)
BUN SERPL-MCNC: 43.7 MG/DL — HIGH (ref 8–20)
CALCIUM SERPL-MCNC: 9 MG/DL — SIGNIFICANT CHANGE UP (ref 8.6–10.2)
CHLORIDE SERPL-SCNC: 102 MMOL/L — SIGNIFICANT CHANGE UP (ref 98–107)
CO2 SERPL-SCNC: 25 MMOL/L — SIGNIFICANT CHANGE UP (ref 22–29)
CREAT SERPL-MCNC: 1.1 MG/DL — SIGNIFICANT CHANGE UP (ref 0.5–1.3)
EGFR: 68 ML/MIN/1.73M2 — SIGNIFICANT CHANGE UP
GLUCOSE SERPL-MCNC: 103 MG/DL — HIGH (ref 70–99)
HCT VFR BLD CALC: 30.7 % — LOW (ref 34.5–45)
HGB BLD-MCNC: 8.7 G/DL — LOW (ref 11.5–15.5)
MCHC RBC-ENTMCNC: 24.1 PG — LOW (ref 27–34)
MCHC RBC-ENTMCNC: 28.3 GM/DL — LOW (ref 32–36)
MCV RBC AUTO: 85 FL — SIGNIFICANT CHANGE UP (ref 80–100)
PLATELET # BLD AUTO: 523 K/UL — HIGH (ref 150–400)
POTASSIUM SERPL-MCNC: 3.8 MMOL/L — SIGNIFICANT CHANGE UP (ref 3.5–5.3)
POTASSIUM SERPL-SCNC: 3.8 MMOL/L — SIGNIFICANT CHANGE UP (ref 3.5–5.3)
PROT SERPL-MCNC: 6.3 G/DL — LOW (ref 6.6–8.7)
RBC # BLD: 3.61 M/UL — LOW (ref 3.8–5.2)
RBC # FLD: 18.6 % — HIGH (ref 10.3–14.5)
SODIUM SERPL-SCNC: 141 MMOL/L — SIGNIFICANT CHANGE UP (ref 135–145)
WBC # BLD: 8.77 K/UL — SIGNIFICANT CHANGE UP (ref 3.8–10.5)
WBC # FLD AUTO: 8.77 K/UL — SIGNIFICANT CHANGE UP (ref 3.8–10.5)

## 2022-05-16 PROCEDURE — 99232 SBSQ HOSP IP/OBS MODERATE 35: CPT

## 2022-05-16 RX ADMIN — Medication 6 MILLIGRAM(S): at 06:43

## 2022-05-16 RX ADMIN — Medication 0.25 MILLIGRAM(S): at 21:50

## 2022-05-16 RX ADMIN — NYSTATIN CREAM 1 APPLICATION(S): 100000 CREAM TOPICAL at 21:49

## 2022-05-16 RX ADMIN — Medication 1000 UNIT(S): at 11:30

## 2022-05-16 RX ADMIN — NYSTATIN CREAM 1 APPLICATION(S): 100000 CREAM TOPICAL at 06:44

## 2022-05-16 RX ADMIN — FAMOTIDINE 20 MILLIGRAM(S): 10 INJECTION INTRAVENOUS at 19:08

## 2022-05-16 RX ADMIN — REMDESIVIR 500 MILLIGRAM(S): 5 INJECTION INTRAVENOUS at 15:02

## 2022-05-16 RX ADMIN — APIXABAN 5 MILLIGRAM(S): 2.5 TABLET, FILM COATED ORAL at 19:08

## 2022-05-16 RX ADMIN — NYSTATIN CREAM 1 APPLICATION(S): 100000 CREAM TOPICAL at 14:53

## 2022-05-16 RX ADMIN — Medication 500 MILLIGRAM(S): at 11:30

## 2022-05-16 RX ADMIN — Medication 25 MILLIGRAM(S): at 06:42

## 2022-05-16 RX ADMIN — Medication 25 MILLIGRAM(S): at 11:31

## 2022-05-16 RX ADMIN — BUDESONIDE AND FORMOTEROL FUMARATE DIHYDRATE 2 PUFF(S): 160; 4.5 AEROSOL RESPIRATORY (INHALATION) at 21:49

## 2022-05-16 RX ADMIN — FAMOTIDINE 20 MILLIGRAM(S): 10 INJECTION INTRAVENOUS at 06:42

## 2022-05-16 RX ADMIN — MONTELUKAST 10 MILLIGRAM(S): 4 TABLET, CHEWABLE ORAL at 11:30

## 2022-05-16 RX ADMIN — Medication 30 MILLIGRAM(S): at 21:50

## 2022-05-16 RX ADMIN — ZINC SULFATE TAB 220 MG (50 MG ZINC EQUIVALENT) 220 MILLIGRAM(S): 220 (50 ZN) TAB at 11:30

## 2022-05-16 RX ADMIN — APIXABAN 5 MILLIGRAM(S): 2.5 TABLET, FILM COATED ORAL at 06:42

## 2022-05-17 LAB
ALBUMIN SERPL ELPH-MCNC: 2.3 G/DL — LOW (ref 3.3–5.2)
ALP SERPL-CCNC: 77 U/L — SIGNIFICANT CHANGE UP (ref 40–120)
ALT FLD-CCNC: <5 U/L — SIGNIFICANT CHANGE UP
ANION GAP SERPL CALC-SCNC: 15 MMOL/L — SIGNIFICANT CHANGE UP (ref 5–17)
AST SERPL-CCNC: 8 U/L — SIGNIFICANT CHANGE UP
BILIRUB SERPL-MCNC: 0.3 MG/DL — LOW (ref 0.4–2)
BUN SERPL-MCNC: 39.4 MG/DL — HIGH (ref 8–20)
CALCIUM SERPL-MCNC: 8.7 MG/DL — SIGNIFICANT CHANGE UP (ref 8.6–10.2)
CHLORIDE SERPL-SCNC: 102 MMOL/L — SIGNIFICANT CHANGE UP (ref 98–107)
CO2 SERPL-SCNC: 25 MMOL/L — SIGNIFICANT CHANGE UP (ref 22–29)
CREAT SERPL-MCNC: 0.91 MG/DL — SIGNIFICANT CHANGE UP (ref 0.5–1.3)
EGFR: 85 ML/MIN/1.73M2 — SIGNIFICANT CHANGE UP
GLUCOSE SERPL-MCNC: 128 MG/DL — HIGH (ref 70–99)
HCT VFR BLD CALC: 30.7 % — LOW (ref 34.5–45)
HGB BLD-MCNC: 8.7 G/DL — LOW (ref 11.5–15.5)
MCHC RBC-ENTMCNC: 24 PG — LOW (ref 27–34)
MCHC RBC-ENTMCNC: 28.3 GM/DL — LOW (ref 32–36)
MCV RBC AUTO: 84.6 FL — SIGNIFICANT CHANGE UP (ref 80–100)
PLATELET # BLD AUTO: 496 K/UL — HIGH (ref 150–400)
POTASSIUM SERPL-MCNC: 3.8 MMOL/L — SIGNIFICANT CHANGE UP (ref 3.5–5.3)
POTASSIUM SERPL-SCNC: 3.8 MMOL/L — SIGNIFICANT CHANGE UP (ref 3.5–5.3)
PROT SERPL-MCNC: 5.9 G/DL — LOW (ref 6.6–8.7)
RBC # BLD: 3.63 M/UL — LOW (ref 3.8–5.2)
RBC # FLD: 18.8 % — HIGH (ref 10.3–14.5)
SARS-COV-2 RNA SPEC QL NAA+PROBE: SIGNIFICANT CHANGE UP
SODIUM SERPL-SCNC: 141 MMOL/L — SIGNIFICANT CHANGE UP (ref 135–145)
WBC # BLD: 8.59 K/UL — SIGNIFICANT CHANGE UP (ref 3.8–10.5)
WBC # FLD AUTO: 8.59 K/UL — SIGNIFICANT CHANGE UP (ref 3.8–10.5)

## 2022-05-17 PROCEDURE — 99232 SBSQ HOSP IP/OBS MODERATE 35: CPT

## 2022-05-17 RX ADMIN — MONTELUKAST 10 MILLIGRAM(S): 4 TABLET, CHEWABLE ORAL at 12:09

## 2022-05-17 RX ADMIN — NYSTATIN CREAM 1 APPLICATION(S): 100000 CREAM TOPICAL at 07:05

## 2022-05-17 RX ADMIN — Medication 25 MILLIGRAM(S): at 07:03

## 2022-05-17 RX ADMIN — Medication 25 MILLIGRAM(S): at 12:09

## 2022-05-17 RX ADMIN — NYSTATIN CREAM 1 APPLICATION(S): 100000 CREAM TOPICAL at 22:28

## 2022-05-17 RX ADMIN — Medication 1000 UNIT(S): at 12:09

## 2022-05-17 RX ADMIN — Medication 500 MILLIGRAM(S): at 12:10

## 2022-05-17 RX ADMIN — Medication 6 MILLIGRAM(S): at 07:03

## 2022-05-17 RX ADMIN — FAMOTIDINE 20 MILLIGRAM(S): 10 INJECTION INTRAVENOUS at 17:54

## 2022-05-17 RX ADMIN — APIXABAN 5 MILLIGRAM(S): 2.5 TABLET, FILM COATED ORAL at 17:55

## 2022-05-17 RX ADMIN — ZINC SULFATE TAB 220 MG (50 MG ZINC EQUIVALENT) 220 MILLIGRAM(S): 220 (50 ZN) TAB at 12:09

## 2022-05-17 RX ADMIN — BUDESONIDE AND FORMOTEROL FUMARATE DIHYDRATE 2 PUFF(S): 160; 4.5 AEROSOL RESPIRATORY (INHALATION) at 22:28

## 2022-05-17 RX ADMIN — Medication 25 MILLIGRAM(S): at 18:00

## 2022-05-17 RX ADMIN — FAMOTIDINE 20 MILLIGRAM(S): 10 INJECTION INTRAVENOUS at 07:03

## 2022-05-17 RX ADMIN — Medication 25 MILLIGRAM(S): at 00:22

## 2022-05-17 RX ADMIN — Medication 0.25 MILLIGRAM(S): at 22:28

## 2022-05-17 RX ADMIN — REMDESIVIR 500 MILLIGRAM(S): 5 INJECTION INTRAVENOUS at 12:13

## 2022-05-17 RX ADMIN — NYSTATIN CREAM 1 APPLICATION(S): 100000 CREAM TOPICAL at 12:10

## 2022-05-17 RX ADMIN — APIXABAN 5 MILLIGRAM(S): 2.5 TABLET, FILM COATED ORAL at 07:03

## 2022-05-17 RX ADMIN — Medication 30 MILLIGRAM(S): at 22:28

## 2022-05-18 PROCEDURE — 99232 SBSQ HOSP IP/OBS MODERATE 35: CPT

## 2022-05-18 RX ADMIN — Medication 1000 UNIT(S): at 12:12

## 2022-05-18 RX ADMIN — FAMOTIDINE 20 MILLIGRAM(S): 10 INJECTION INTRAVENOUS at 06:01

## 2022-05-18 RX ADMIN — NYSTATIN CREAM 1 APPLICATION(S): 100000 CREAM TOPICAL at 12:13

## 2022-05-18 RX ADMIN — APIXABAN 5 MILLIGRAM(S): 2.5 TABLET, FILM COATED ORAL at 18:05

## 2022-05-18 RX ADMIN — APIXABAN 5 MILLIGRAM(S): 2.5 TABLET, FILM COATED ORAL at 06:01

## 2022-05-18 RX ADMIN — FAMOTIDINE 20 MILLIGRAM(S): 10 INJECTION INTRAVENOUS at 18:06

## 2022-05-18 RX ADMIN — Medication 650 MILLIGRAM(S): at 18:04

## 2022-05-18 RX ADMIN — Medication 25 MILLIGRAM(S): at 06:01

## 2022-05-18 RX ADMIN — MONTELUKAST 10 MILLIGRAM(S): 4 TABLET, CHEWABLE ORAL at 12:12

## 2022-05-18 RX ADMIN — Medication 0.25 MILLIGRAM(S): at 21:23

## 2022-05-18 RX ADMIN — Medication 30 MILLIGRAM(S): at 21:23

## 2022-05-18 RX ADMIN — ZINC SULFATE TAB 220 MG (50 MG ZINC EQUIVALENT) 220 MILLIGRAM(S): 220 (50 ZN) TAB at 12:12

## 2022-05-18 RX ADMIN — Medication 500 MILLIGRAM(S): at 12:13

## 2022-05-18 RX ADMIN — NYSTATIN CREAM 1 APPLICATION(S): 100000 CREAM TOPICAL at 06:01

## 2022-05-18 RX ADMIN — Medication 25 MILLIGRAM(S): at 18:05

## 2022-05-18 RX ADMIN — Medication 25 MILLIGRAM(S): at 12:11

## 2022-05-18 RX ADMIN — Medication 25 MILLIGRAM(S): at 00:12

## 2022-05-18 RX ADMIN — NYSTATIN CREAM 1 APPLICATION(S): 100000 CREAM TOPICAL at 21:23

## 2022-05-18 RX ADMIN — Medication 6 MILLIGRAM(S): at 06:01

## 2022-05-18 RX ADMIN — BUDESONIDE AND FORMOTEROL FUMARATE DIHYDRATE 2 PUFF(S): 160; 4.5 AEROSOL RESPIRATORY (INHALATION) at 21:23

## 2022-05-18 NOTE — DIETITIAN INITIAL EVALUATION ADULT - SIGNS/SYMPTOMS
as evidenced by covid+, CHF, hypoxia, chronic lymphedema as evidenced by poor po x 2 weeks, 1.6% weight loss since admission x 5 days

## 2022-05-18 NOTE — DIETITIAN INITIAL EVALUATION ADULT - ADD RECOMMEND
Encouraged weight loss, healthy diet. Rec Consistent CHO diet be added.  Continue Dash diet Encouraged weight loss, healthy diet. Rec Consistent CHO diet be added.  Continue Dash diet. Continue Vit C, znso4, Vit D. Rec MVI

## 2022-05-18 NOTE — DIETITIAN INITIAL EVALUATION ADULT - OTHER INFO
36 year old female with history of Morbid Obesity, CHF, B/L LE DVT + PE on Eliquis, HTN, Asthma and Anxiety brought by EMS with generalized weakness. As per patient, she was treated for bacterial pneumonia three weeks ago. Since then she has been feeling week but for the last few days it is getting worse. Yesterday, she went to bathroom and was unable to stand up from toilet bowl so her family called EMS. She is also complaining of nausea and her oral intake has decreased. She has cough for last few weeks and there is no change in it. She feels palpitations at times. Denies fever however she was exposed to COVID through her friend. She was vaccinated with Pfizer (2 doses) in May 2021. Pt with chronic lymphedema.

## 2022-05-18 NOTE — DIETITIAN INITIAL EVALUATION ADULT - PERTINENT MEDS FT
MEDICATIONS  (STANDING):  ALPRAZolam 0.25 milliGRAM(s) Oral at bedtime  apixaban 5 milliGRAM(s) Oral every 12 hours  ascorbic acid 500 milliGRAM(s) Oral daily  benzocaine 15 mG/menthol 3.6 mG Lozenge 1 Lozenge Oral once  budesonide 160 MICROgram(s)/formoterol 4.5 MICROgram(s) Inhaler 2 Puff(s) Inhalation two times a day  cholecalciferol 1000 Unit(s) Oral daily  dexAMETHasone  Injectable 6 milliGRAM(s) IV Push daily  famotidine    Tablet 20 milliGRAM(s) Oral two times a day  metoprolol tartrate 25 milliGRAM(s) Oral every 6 hours  montelukast 10 milliGRAM(s) Oral daily  nystatin Powder 1 Application(s) Topical three times a day  PARoxetine 30 milliGRAM(s) Oral at bedtime  zinc sulfate 220 milliGRAM(s) Oral daily    MEDICATIONS  (PRN):  acetaminophen     Tablet .. 650 milliGRAM(s) Oral every 6 hours PRN Temp greater or equal to 38C (100.4F), Mild Pain (1 - 3)  ALBUTerol    90 MICROgram(s) HFA Inhaler 2 Puff(s) Inhalation every 6 hours PRN Shortness of Breath and/or Wheezing  guaiFENesin Oral Liquid (Sugar-Free) 100 milliGRAM(s) Oral every 6 hours PRN Cough  ondansetron Injectable 4 milliGRAM(s) IV Push every 6 hours PRN Nausea and/or Vomiting  oxyCODONE    IR 5 milliGRAM(s) Oral every 12 hours PRN Severe Pain (7 - 10)

## 2022-05-18 NOTE — DIETITIAN INITIAL EVALUATION ADULT - ORAL INTAKE PTA/DIET HISTORY
Pt with improving po. Pt was nauseous with decreased po PTA and upon admission.  Pt with improving po. Pt was nauseous with decreased po PTA x 2 weeks. Pt reports 6# weight loss since admission with bedscale weight of 369# today. Pt follows low sodium diet at home.

## 2022-05-18 NOTE — DIETITIAN INITIAL EVALUATION ADULT - ETIOLOGY
related to increased physiological demand to promote healing related to inadequate energy intake in setting of covid +

## 2022-05-18 NOTE — DIETITIAN INITIAL EVALUATION ADULT - PERTINENT LABORATORY DATA
05-17    141  |  102  |  39.4<H>  ----------------------------<  128<H>  3.8   |  25.0  |  0.91    Ca    8.7      17 May 2022 07:05    TPro  5.9<L>  /  Alb  2.3<L>  /  TBili  0.3<L>  /  DBili  x   /  AST  8   /  ALT  <5  /  AlkPhos  77  05-17

## 2022-05-19 PROCEDURE — 99232 SBSQ HOSP IP/OBS MODERATE 35: CPT

## 2022-05-19 RX ADMIN — BUDESONIDE AND FORMOTEROL FUMARATE DIHYDRATE 2 PUFF(S): 160; 4.5 AEROSOL RESPIRATORY (INHALATION) at 22:01

## 2022-05-19 RX ADMIN — Medication 25 MILLIGRAM(S): at 23:17

## 2022-05-19 RX ADMIN — FAMOTIDINE 20 MILLIGRAM(S): 10 INJECTION INTRAVENOUS at 17:39

## 2022-05-19 RX ADMIN — BENZOCAINE AND MENTHOL 1 LOZENGE: 5; 1 LIQUID ORAL at 20:48

## 2022-05-19 RX ADMIN — NYSTATIN CREAM 1 APPLICATION(S): 100000 CREAM TOPICAL at 06:07

## 2022-05-19 RX ADMIN — NYSTATIN CREAM 1 APPLICATION(S): 100000 CREAM TOPICAL at 11:42

## 2022-05-19 RX ADMIN — Medication 6 MILLIGRAM(S): at 06:07

## 2022-05-19 RX ADMIN — Medication 1000 UNIT(S): at 11:38

## 2022-05-19 RX ADMIN — Medication 0.25 MILLIGRAM(S): at 22:02

## 2022-05-19 RX ADMIN — FAMOTIDINE 20 MILLIGRAM(S): 10 INJECTION INTRAVENOUS at 06:06

## 2022-05-19 RX ADMIN — MONTELUKAST 10 MILLIGRAM(S): 4 TABLET, CHEWABLE ORAL at 11:39

## 2022-05-19 RX ADMIN — ZINC SULFATE TAB 220 MG (50 MG ZINC EQUIVALENT) 220 MILLIGRAM(S): 220 (50 ZN) TAB at 11:33

## 2022-05-19 RX ADMIN — Medication 500 MILLIGRAM(S): at 11:34

## 2022-05-19 RX ADMIN — APIXABAN 5 MILLIGRAM(S): 2.5 TABLET, FILM COATED ORAL at 17:39

## 2022-05-19 RX ADMIN — NYSTATIN CREAM 1 APPLICATION(S): 100000 CREAM TOPICAL at 22:02

## 2022-05-19 RX ADMIN — Medication 25 MILLIGRAM(S): at 06:06

## 2022-05-19 RX ADMIN — Medication 30 MILLIGRAM(S): at 22:02

## 2022-05-19 RX ADMIN — BUDESONIDE AND FORMOTEROL FUMARATE DIHYDRATE 2 PUFF(S): 160; 4.5 AEROSOL RESPIRATORY (INHALATION) at 10:10

## 2022-05-19 RX ADMIN — APIXABAN 5 MILLIGRAM(S): 2.5 TABLET, FILM COATED ORAL at 06:07

## 2022-05-19 NOTE — PROGRESS NOTE ADULT - REASON FOR ADMISSION
Generalized Weakness

## 2022-05-19 NOTE — DISCHARGE NOTE PROVIDER - HOSPITAL COURSE
36 year old female with history of Morbid Obesity, CHF, B/L LE DVT + PE on Eliquis, HTN, Asthma and Anxiety brought by EMS with generalized weakness. As per patient, she was treated for bacterial pneumonia three weeks ago. Since then she has been feeling week but for the last few days it is getting worse. Yesterday, she went to bathroom and was unable to stand up from toilet bowl so her family called EMS. She is also complaining of nausea and her oral intake has decreased. She had cough for last few weeks and there is no change in it. Denies fever however she was exposed to COVID through her friend. She was vaccinated with Pfizer (2 doses) in May 2021. Pt also with c/o abdominal pain and has noticed lumps in her abdomen. CT Abdomen/Pelvis with splenomegaly but no acute findings.  Pt admitted to [facility] for COVID, requiring supplemental oxygen. Pt initiated on remdesivir and decadron with benefit. Pt was later weaned off O2, currently satting well on RA. Pt is now medically stable for discharge to [facility] pending auth.     All electrolyte abnormalities were monitored carefully and repleted as necessary during this hospitalization. At the time of discharge patient was hemodynamically stable and amenable to all terms of discharge. 36 year old female with history of Morbid Obesity, CHF, B/L LE DVT + PE on Eliquis, HTN, Asthma and Anxiety brought by EMS with generalized weakness. As per patient, she was treated for bacterial pneumonia three weeks ago. Since then she has been feeling week but for the last few days it is getting worse. Yesterday, she went to bathroom and was unable to stand up from toilet bowl so her family called EMS. She is also complaining of nausea and her oral intake has decreased. She had cough for last few weeks and there is no change in it. Denies fever however she was exposed to COVID through her friend. She was vaccinated with Pfizer (2 doses) in May 2021. Pt also with c/o abdominal pain and has noticed lumps in her abdomen. CT Abdomen/Pelvis with splenomegaly but no acute findings.  Pt admitted to [facility] for COVID, requiring supplemental oxygen. Pt initiated on remdesivir and decadron with benefit. Pt was later weaned off O2, currently satting well on RA. Pt is now medically stable for discharge to [facility].    All electrolyte abnormalities were monitored carefully and repleted as necessary during this hospitalization. At the time of discharge patient was hemodynamically stable and amenable to all terms of discharge. 26.3

## 2022-05-19 NOTE — PROGRESS NOTE ADULT - NUTRITIONAL ASSESSMENT
This patient has been assessed with a concern for Malnutrition and has been determined to have a diagnosis/diagnoses of Moderate protein-calorie malnutrition and Morbid obesity (BMI > 40).    This patient is being managed with:   Diet DASH/TLC-  Sodium & Cholesterol Restricted  Entered: May 13 2022 12:21PM

## 2022-05-19 NOTE — DISCHARGE NOTE PROVIDER - DETAILS OF MALNUTRITION DIAGNOSIS/DIAGNOSES
This patient has been assessed with a concern for Malnutrition and was treated during this hospitalization for the following Nutrition diagnosis/diagnoses:     -  05/18/2022: Moderate protein-calorie malnutrition   -  05/18/2022: Morbid obesity (BMI > 40)

## 2022-05-19 NOTE — DISCHARGE NOTE PROVIDER - NSDCMRMEDTOKEN_GEN_ALL_CORE_FT
albuterol 2.5 mg/3 mL (0.083%) inhalation solution: 3 milliliter(s) inhaled every 6 hours, As Needed  ALPRAZolam 0.25 mg oral tablet: 1 tab(s) orally once a day (at bedtime)  Breo Ellipta 200 mcg-25 mcg/inh inhalation powder: 1 puff(s) inhaled once a day  Eliquis 5 mg oral tablet: 1 tab(s) orally 2 times a day  famotidine 20 mg oral tablet: 1 tab(s) orally 2 times a day  metOLazone 10 mg oral tablet: 1 tab(s) orally once a day  metoprolol tartrate 100 mg oral tablet: 1 tab(s) orally once a day  montelukast 10 mg oral tablet: 1 tab(s) orally once a day  oxyCODONE 5 mg oral tablet: 1 tab(s) orally every 12 hours, As Needed  PARoxetine 30 mg oral tablet: 1 tab(s) orally once a day (at bedtime)  potassium chloride 20 mEq oral tablet, extended release: 2 tab(s) orally once a day   albuterol 2.5 mg/3 mL (0.083%) inhalation solution: 3 milliliter(s) inhaled every 6 hours, As Needed  ALPRAZolam 0.25 mg oral tablet: 1 tab(s) orally once a day (at bedtime)  ascorbic acid 500 mg oral tablet: 1 tab(s) orally once a day  Breo Ellipta 200 mcg-25 mcg/inh inhalation powder: 1 puff(s) inhaled once a day  cholecalciferol oral tablet: 1000 unit(s) orally once a day  famotidine 20 mg oral tablet: 1 tab(s) orally 2 times a day  metOLazone 10 mg oral tablet: 1 tab(s) orally once a day  metoprolol tartrate 100 mg oral tablet: 1 tab(s) orally once a day  montelukast 10 mg oral tablet: 1 tab(s) orally once a day  oxyCODONE 5 mg oral tablet: 1 tab(s) orally every 12 hours, As Needed  PARoxetine 30 mg oral tablet: 1 tab(s) orally once a day (at bedtime)  potassium chloride 20 mEq oral tablet, extended release: 2 tab(s) orally once a day  zinc sulfate 220 mg oral capsule: 1 cap(s) orally once a day

## 2022-05-19 NOTE — PROGRESS NOTE ADULT - ASSESSMENT
36 year old female with history of Morbid Obesity, CHF, B/L LE DVT + PE on Eliquis, HTN, Asthma and Anxiety brought by EMS with generalized weakness. As per patient, she was treated for bacterial pneumonia three weeks ago. Since then she has been feeling week but for the last few days it is getting worse. Yesterday, she went to bathroom and was unable to stand up from toilet bowl so her family called EMS. She is also complaining of nausea and her oral intake has decreased. She has cough for last few weeks and there is no change in it. She feels palpitations at times. Denies fever however she was exposed to COVID through her friend. She was vaccinated with Pfizer (2 doses) in May 2021.  She is also complaining of abdominal pain and has noticed lumps in her abdomen. CT Abdomen/Pelvis with splenomegaly but no acute findings.    In ER, she was monitored for > 20 hours. Initially did not require supplemental oxygen however when she started ambulating with PT, she became hypoxic and tachycardiac so ED Physician called for admission.     *COVID-19  Desatting on ambulation   Remdesivir 200 mg x 1 then 100 mg x 4  Dexamethasone 6 mg daily   Albuterol PRN  Incentive Spirometry  Unable to prone     *SARAH  Unknown baseline  Likely due to dehydration and IV contrast   Gentle hydration   Hold Metolazone   Avoid nephrotoxic medications   Monitor renal function     *Generalized Weakness   Likely due to COVID  Encouraged for oral intake   PT consult   pt agreeable to to [facility]     *History of B/L LE DVT / PE  D-Dimer 171  Resume Eliquis     *Hyponatremia  Mild   Unknown baseline  secondary to dehydration   Monitor     *Chronic Congestive Heart Failure  Unknown systolic vs diastolic   Hold Metolazone   Resume Metoprolol     *HTN  Resume Metoprolol (will give in divided doses due to hypotension yesterday)    *Asthma   No exacerbation   Symbicort and Albuterol (PRN)    *Anxiety  Continue Xanax and Paroxetine     *Abdominal Pain   CT abd neg   f/u Ultrasound of abdomen     DVT Prophylaxis -- Patient is on Eliquis.    Dispo likely [facility] tomorrow     Dispo: Likely [facility] in 2-3 days 
36 year old female with history of Morbid Obesity, CHF, B/L LE DVT + PE on Eliquis, HTN, Asthma and Anxiety brought by EMS with generalized weakness. As per patient, she was treated for bacterial pneumonia three weeks ago. Since then she has been feeling week but for the last few days it is getting worse. Yesterday, she went to bathroom and was unable to stand up from toilet bowl so her family called EMS. She is also complaining of nausea and her oral intake has decreased. She has cough for last few weeks and there is no change in it. She feels palpitations at times. Denies fever however she was exposed to COVID through her friend. She was vaccinated with Pfizer (2 doses) in May 2021.  She is also complaining of abdominal pain and has noticed lumps in her abdomen. CT Abdomen/Pelvis with splenomegaly but no acute findings.    In ER, she was monitored for > 20 hours. Initially did not require supplemental oxygen however when she started ambulating with PT, she became hypoxic and tachycardiac so ED Physician called for admission.     *COVID-19  Desatting on ambulation   Remdesivir 200 mg x 1 then 100 mg x 4  Dexamethasone 6 mg daily   Albuterol PRN  Incentive Spirometry  Unable to prone     *SARAH  Unknown baseline  Likely due to dehydration and IV contrast   Gentle hydration   Hold Metolazone   Avoid nephrotoxic medications   Monitor renal function     *Generalized Weakness   Likely due to COVID  Encouraged for oral intake   PT consult   pt agreeable to to [facility]     *History of B/L LE DVT / PE  D-Dimer 171  Resume Eliquis     *Hyponatremia  Mild   Unknown baseline  secondary to dehydration   Monitor     *Chronic Congestive Heart Failure  Unknown systolic vs diastolic   Hold Metolazone   Resume Metoprolol     *HTN  Resume Metoprolol (will give in divided doses due to hypotension yesterday)    *Asthma   No exacerbation   Symbicort and Albuterol (PRN)    *Anxiety  Continue Xanax and Paroxetine     *Abdominal Pain   CT abd neg   improving     DVT Prophylaxis -- Patient is on Eliquis.    Dispo: Likely [facility] in 2-3 days 
36 year old female with history of Morbid Obesity, CHF, B/L LE DVT + PE on Eliquis, HTN, Asthma and Anxiety brought by EMS with generalized weakness. As per patient, she was treated for bacterial pneumonia three weeks ago. Since then she has been feeling week but for the last few days it is getting worse. Yesterday, she went to bathroom and was unable to stand up from toilet bowl so her family called EMS. She is also complaining of nausea and her oral intake has decreased. She has cough for last few weeks and there is no change in it. She feels palpitations at times. Denies fever however she was exposed to COVID through her friend. She was vaccinated with Pfizer (2 doses) in May 2021.  She is also complaining of abdominal pain and has noticed lumps in her abdomen. CT Abdomen/Pelvis with splenomegaly but no acute findings.    In ER, she was monitored for > 20 hours. Initially did not require supplemental oxygen however when she started ambulating with PT, she became hypoxic and tachycardiac so ED Physician called for admission.     *COVID-19  Desatting on ambulation   Remdesivir 200 mg x 1 then 100 mg x 4  Dexamethasone 6 mg daily   Albuterol PRN  Incentive Spirometry  Unable to prone     *SARAH  Unknown baseline  Likely due to dehydration and IV contrast   Gentle hydration   Hold Metolazone   Avoid nephrotoxic medications   Monitor renal function     *Generalized Weakness   Likely due to COVID  Encouraged for oral intake   PT consult   pt agreeable to to [facility]     *History of B/L LE DVT / PE  D-Dimer 171  Resume Eliquis     *Hyponatremia  Mild   Unknown baseline  secondary to dehydration   Monitor     *Chronic Congestive Heart Failure  Unknown systolic vs diastolic   Hold Metolazone   Resume Metoprolol     *HTN  Resume Metoprolol (will give in divided doses due to hypotension yesterday)    *Asthma   No exacerbation   Symbicort and Albuterol (PRN)    *Anxiety  Continue Xanax and Paroxetine     *Abdominal Pain   CT abd neg   improving     DVT Prophylaxis -- Patient is on Eliquis.    Dispo: Likely [facility] in 2-3 days 
36 year old female with history of Morbid Obesity, CHF, B/L LE DVT + PE on Eliquis, HTN, Asthma and Anxiety brought by EMS with generalized weakness. As per patient, she was treated for bacterial pneumonia three weeks ago. Since then she has been feeling week but for the last few days it is getting worse. Yesterday, she went to bathroom and was unable to stand up from toilet bowl so her family called EMS. She is also complaining of nausea and her oral intake has decreased. She has cough for last few weeks and there is no change in it. She feels palpitations at times. Denies fever however she was exposed to COVID through her friend. She was vaccinated with Pfizer (2 doses) in May 2021.  She is also complaining of abdominal pain and has noticed lumps in her abdomen. CT Abdomen/Pelvis with splenomegaly but no acute findings.    In ER, she was monitored for > 20 hours. Initially did not require supplemental oxygen however when she started ambulating with PT, she became hypoxic and tachycardiac so ED Physician called for admission.     COVID-19  - Covid-19 isolation precautions  - Remdisivir, Decadron  - Albuterol PRN  - Incentive Spirometry  - Currently on RA    SARAH  - Resolved    Generalized Weakness   - Likely due to COVID  - Encouraged for oral intake   - PT consult - [facility]     History of B/L LE DVT / PE  - Resume Eliquis     Hyponatremia  - Resolved    Chronic Congestive Heart Failure  - Euvolemic  - Continue BB    HTN  - Resume Metoprolol (will give in divided doses due to hypotension yesterday)    Asthma   - No exacerbation   - Symbicort and Albuterol (PRN)    Anxiety  - Continue Xanax and Paroxetine     Abdominal Pain   - CT abd neg   - likely related to chronic lymphedema    DVT Prophylaxis - Eliquis    Medically optimized for discharge to [facility]
36 year old female with history of Morbid Obesity, CHF, B/L LE DVT + PE on Eliquis, HTN, Asthma and Anxiety brought by EMS with generalized weakness. As per patient, she was treated for bacterial pneumonia three weeks ago. Since then she has been feeling week but for the last few days it is getting worse. Yesterday, she went to bathroom and was unable to stand up from toilet bowl so her family called EMS. She is also complaining of nausea and her oral intake has decreased. She has cough for last few weeks and there is no change in it. She feels palpitations at times. Denies fever however she was exposed to COVID through her friend. She was vaccinated with Pfizer (2 doses) in May 2021.  She is also complaining of abdominal pain and has noticed lumps in her abdomen. CT Abdomen/Pelvis with splenomegaly but no acute findings.    In ER, she was monitored for > 20 hours. Initially did not require supplemental oxygen however when she started ambulating with PT, she became hypoxic and tachycardiac so ED Physician called for admission.     COVID-19  - Covid-19 isolation precautions  - Remdisivir, Decadron  - Albuterol PRN  - Incentive Spirometry  - Currently on RA    SARAH  - Resolved    Generalized Weakness   - Likely due to COVID  - Encouraged for oral intake   - PT consult - [facility]     History of B/L LE DVT / PE  - Resume Eliquis     Hyponatremia  - Resolved    Chronic Congestive Heart Failure  - Euvolemic  - Continue BB    HTN  - Resume Metoprolol (will give in divided doses due to hypotension yesterday)    Asthma   - No exacerbation   - Symbicort and Albuterol (PRN)    Anxiety  - Continue Xanax and Paroxetine     Abdominal Pain   - CT abd neg   - likely related to chronic lymphedema    DVT Prophylaxis - Eliquis    Medically optimized for discharge to [facility]  
36 year old female with history of Morbid Obesity, CHF, B/L LE DVT + PE on Eliquis, HTN, Asthma and Anxiety brought by EMS with generalized weakness. As per patient, she was treated for bacterial pneumonia three weeks ago. Since then she has been feeling week but for the last few days it is getting worse. Yesterday, she went to bathroom and was unable to stand up from toilet bowl so her family called EMS. She is also complaining of nausea and her oral intake has decreased. She has cough for last few weeks and there is no change in it. She feels palpitations at times. Denies fever however she was exposed to COVID through her friend. She was vaccinated with Pfizer (2 doses) in May 2021.  She is also complaining of abdominal pain and has noticed lumps in her abdomen. CT Abdomen/Pelvis with splenomegaly but no acute findings.    In ER, she was monitored for > 20 hours. Initially did not require supplemental oxygen however when she started ambulating with PT, she became hypoxic and tachycardiac so ED Physician called for admission.     COVID-19  - Covid-19 isolation precautions  - Remdisivir, Decadron  - Albuterol PRN  - Incentive Spirometry  - Currently on RA    SARAH  - Resolved    Generalized Weakness   - Likely due to COVID  - Encouraged for oral intake   - PT consult - [facility]     History of B/L LE DVT / PE  - Resume Eliquis     Hyponatremia  - Resolved    Chronic Congestive Heart Failure  - Euvolemic  - Continue BB    HTN  - Resume Metoprolol (will give in divided doses due to hypotension yesterday)    Asthma   - No exacerbation   - Symbicort and Albuterol (PRN)    Anxiety  - Continue Xanax and Paroxetine     Abdominal Pain   - CT abd neg   - likely related to chronic lymphedema    DVT Prophylaxis - Eliquis    Medically optimized for discharge to [facility]  
Fair

## 2022-05-19 NOTE — DISCHARGE NOTE PROVIDER - [id number]_ALL_CORE_FT
PRINCIPAL DISCHARGE DIAGNOSIS  Diagnosis: COVID-19  Assessment and Plan of Treatment: - Nebs as needed  - Supporitve care  - Follow up with PCP in 1 week        SECONDARY DISCHARGE DIAGNOSES  Diagnosis: History of DVT of lower extremity  Assessment and Plan of Treatment: - Resume Eliquis   - Follow up with PCP in 1 week    Diagnosis: Congestive heart failure (CHF)  Assessment and Plan of Treatment: - Continue metoprolol  - Follow up with PCP in 1 week

## 2022-05-19 NOTE — PROGRESS NOTE ADULT - SUBJECTIVE AND OBJECTIVE BOX
HPI   Pt is a 37yo F admitted to [facility] for weakness secondary to Covid  Pt was seen and examined at bedside. Pt states she is still feeling very weak. Abdomen pain has improved     Vital Signs Last 24 Hrs  T(C): 37.1 (14 May 2022 10:38), Max: 37.1 (14 May 2022 10:38)  T(F): 98.7 (14 May 2022 10:38), Max: 98.7 (14 May 2022 10:38)  HR: 60 (14 May 2022 12:38) (60 - 94)  BP: 119/76 (14 May 2022 12:38) (99/62 - 119/76)  BP(mean): --  RR: 18 (14 May 2022 10:38) (18 - 18)  SpO2: 99% (14 May 2022 10:38) (92% - 99%)    I&O's Summary      CAPILLARY BLOOD GLUCOSE          PHYSICAL EXAM:    Constitutional: morbid obese   HEENT: PERR, EOMI,    Neck: Soft and supple,   Respiratory: Breath sounds are clear bilaterally,   Cardiovascular: S1 and S2,   Gastrointestinal: Bowel Sounds present, soft,    Extremities: No peripheral edema  Vascular: 2+ peripheral pulses  Neurological: A/O x 3, no focal deficits    MEDICATIONS:  MEDICATIONS  (STANDING):  ALPRAZolam 0.25 milliGRAM(s) Oral at bedtime  apixaban 5 milliGRAM(s) Oral every 12 hours  ascorbic acid 500 milliGRAM(s) Oral daily  budesonide 160 MICROgram(s)/formoterol 4.5 MICROgram(s) Inhaler 2 Puff(s) Inhalation two times a day  cholecalciferol 1000 Unit(s) Oral daily  dexAMETHasone  Injectable 6 milliGRAM(s) IV Push daily  famotidine    Tablet 20 milliGRAM(s) Oral two times a day  metoprolol tartrate 25 milliGRAM(s) Oral every 6 hours  montelukast 10 milliGRAM(s) Oral daily  nystatin Powder 1 Application(s) Topical three times a day  PARoxetine 30 milliGRAM(s) Oral at bedtime  remdesivir  IVPB 100 milliGRAM(s) IV Intermittent every 24 hours  remdesivir  IVPB   IV Intermittent   zinc sulfate 220 milliGRAM(s) Oral daily      LABS: All Labs Reviewed:                        8.8    9.53  )-----------( 472      ( 14 May 2022 07:44 )             30.4     05-14    136  |  94<L>  |  48.5<H>  ----------------------------<  139<H>  4.1   |  22.0  |  1.52<H>    Ca    8.6      14 May 2022 07:44  Mg     1.9     05-14    TPro  6.6  /  Alb  2.6<L>  /  TBili  0.7  /  DBili  x   /  AST  9   /  ALT  <5  /  AlkPhos  110  05-14    PT/INR - ( 13 May 2022 12:13 )   PT: 19.8 sec;   INR: 1.70 ratio         PTT - ( 13 May 2022 12:13 )  PTT:35.3 sec  CARDIAC MARKERS ( 13 May 2022 12:13 )  x     / 0.04 ng/mL / 13 U/L / x     / x          Blood Culture:     RADIOLOGY/EKG:    DVT PPX:    ADVANCED DIRECTIVE:    DISPOSITION:
HPI   Pt is a 37yo F admitted to [facility] for weakness secondary to Covid  Pt was seen and examined at bedside. Pt states she is still feeling very weak.     Vital Signs Last 24 Hrs  T(C): 36.5 (15 May 2022 05:35), Max: 37.1 (14 May 2022 10:38)  T(F): 97.7 (15 May 2022 05:35), Max: 98.7 (14 May 2022 10:38)  HR: 68 (15 May 2022 05:35) (60 - 82)  BP: 97/65 (15 May 2022 05:35) (97/65 - 119/76)  BP(mean): --  RR: 16 (15 May 2022 05:35) (16 - 18)  SpO2: 98% (15 May 2022 05:35) (98% - 100%)    I&O's Summary      CAPILLARY BLOOD GLUCOSE          PHYSICAL EXAM:  Constitutional: morbid obese   HEENT: PERR, EOMI,    Neck: Soft and supple,   Respiratory: Breath sounds are clear bilaterally,   Cardiovascular: S1 and S2,   Gastrointestinal: Bowel Sounds present, soft,    Extremities: No peripheral edema  Vascular: 2+ peripheral pulses  Neurological: A/O x 3, no focal deficits      MEDICATIONS:  MEDICATIONS  (STANDING):  ALPRAZolam 0.25 milliGRAM(s) Oral at bedtime  apixaban 5 milliGRAM(s) Oral every 12 hours  ascorbic acid 500 milliGRAM(s) Oral daily  budesonide 160 MICROgram(s)/formoterol 4.5 MICROgram(s) Inhaler 2 Puff(s) Inhalation two times a day  cholecalciferol 1000 Unit(s) Oral daily  dexAMETHasone  Injectable 6 milliGRAM(s) IV Push daily  famotidine    Tablet 20 milliGRAM(s) Oral two times a day  metoprolol tartrate 25 milliGRAM(s) Oral every 6 hours  montelukast 10 milliGRAM(s) Oral daily  nystatin Powder 1 Application(s) Topical three times a day  PARoxetine 30 milliGRAM(s) Oral at bedtime  remdesivir  IVPB 100 milliGRAM(s) IV Intermittent every 24 hours  remdesivir  IVPB   IV Intermittent   zinc sulfate 220 milliGRAM(s) Oral daily      LABS: All Labs Reviewed:                        8.8    9.53  )-----------( 472      ( 14 May 2022 07:44 )             30.4     05-14    136  |  94<L>  |  48.5<H>  ----------------------------<  139<H>  4.1   |  22.0  |  1.52<H>    Ca    8.6      14 May 2022 07:44  Mg     1.9     05-14    TPro  6.6  /  Alb  2.6<L>  /  TBili  0.7  /  DBili  x   /  AST  9   /  ALT  <5  /  AlkPhos  110  05-14    PT/INR - ( 13 May 2022 12:13 )   PT: 19.8 sec;   INR: 1.70 ratio         PTT - ( 13 May 2022 12:13 )  PTT:35.3 sec  CARDIAC MARKERS ( 13 May 2022 12:13 )  x     / 0.04 ng/mL / 13 U/L / x     / x          Blood Culture:     RADIOLOGY/EKG:    DVT PPX:    ADVANCED DIRECTIVE:    DISPOSITION:
[facility] Division of Hospital Medicine  Clinton Carmona MD    Chief Complaint:  Patient is a 36y old  Female who presents with a chief complaint of Generalized Weakness (17 May 2022 10:50)      SUBJECTIVE / OVERNIGHT EVENTS:  Patient seen and examined at bedside. No acute events reported overnight. No new complaints.    Patient denies chest pain, SOB, abd pain, N/V, fever, chills, dysuria or any other complaints. All remainder ROS negative.     MEDICATIONS  (STANDING):  ALPRAZolam 0.25 milliGRAM(s) Oral at bedtime  apixaban 5 milliGRAM(s) Oral every 12 hours  ascorbic acid 500 milliGRAM(s) Oral daily  benzocaine 15 mG/menthol 3.6 mG Lozenge 1 Lozenge Oral once  budesonide 160 MICROgram(s)/formoterol 4.5 MICROgram(s) Inhaler 2 Puff(s) Inhalation two times a day  cholecalciferol 1000 Unit(s) Oral daily  dexAMETHasone  Injectable 6 milliGRAM(s) IV Push daily  famotidine    Tablet 20 milliGRAM(s) Oral two times a day  metoprolol tartrate 25 milliGRAM(s) Oral every 6 hours  montelukast 10 milliGRAM(s) Oral daily  nystatin Powder 1 Application(s) Topical three times a day  PARoxetine 30 milliGRAM(s) Oral at bedtime  zinc sulfate 220 milliGRAM(s) Oral daily    MEDICATIONS  (PRN):  acetaminophen     Tablet .. 650 milliGRAM(s) Oral every 6 hours PRN Temp greater or equal to 38C (100.4F), Mild Pain (1 - 3)  ALBUTerol    90 MICROgram(s) HFA Inhaler 2 Puff(s) Inhalation every 6 hours PRN Shortness of Breath and/or Wheezing  guaiFENesin Oral Liquid (Sugar-Free) 100 milliGRAM(s) Oral every 6 hours PRN Cough  ondansetron Injectable 4 milliGRAM(s) IV Push every 6 hours PRN Nausea and/or Vomiting  oxyCODONE    IR 5 milliGRAM(s) Oral every 12 hours PRN Severe Pain (7 - 10)        I&O's Summary      PHYSICAL EXAM:  Vital Signs Last 24 Hrs  T(C): 36.4 (18 May 2022 04:48), Max: 36.8 (17 May 2022 22:23)  T(F): 97.6 (18 May 2022 04:48), Max: 98.2 (17 May 2022 22:23)  HR: 68 (18 May 2022 04:48) (60 - 68)  BP: 126/73 (18 May 2022 04:48) (106/62 - 126/73)  BP(mean): --  RR: 18 (18 May 2022 04:48) (18 - 18)  SpO2: 98% (18 May 2022 04:48) (92% - 98%)        CONSTITUTIONAL: NAD  HEENT: NC/AT, PERRL, no JVD  RESPIRATORY: CTA bilaterally, normal effort  CARDIOVASCULAR: RRR, S1/S2+, no m/g/r  ABDOMEN: Nontender to palpation, normoactive bowel sounds, no rebound/guarding  MUSCULOSKELETAL: No edema, cyanosis or deformities.  PSYCH: Calm, affect appropriate.  NEUROLOGY: Awake, alert, no focal neurological deficits.   SKIN: No rashes; no palpable lesions  VASC: Distal pulses palpable    LABS:                        8.7    8.59  )-----------( 496      ( 17 May 2022 07:05 )             30.7     05-17    141  |  102  |  39.4<H>  ----------------------------<  128<H>  3.8   |  25.0  |  0.91    Ca    8.7      17 May 2022 07:05    TPro  5.9<L>  /  Alb  2.3<L>  /  TBili  0.3<L>  /  DBili  x   /  AST  8   /  ALT  <5  /  AlkPhos  77  05-17              CAPILLARY BLOOD GLUCOSE            RADIOLOGY & ADDITIONAL TESTS:  Results Reviewed:   Imaging Personally Reviewed:  Electrocardiogram Personally Reviewed:                                          
HPI   Pt is a 37yo F admitted to [facility] for weakness secondary to Covid  Pt was seen and examined at bedside. Pt states she is still feeling very weak. Complaints of superfical abd pain again     Vital Signs Last 24 Hrs  T(C): 36.5 (16 May 2022 10:50), Max: 37.1 (15 May 2022 21:45)  T(F): 97.7 (16 May 2022 10:50), Max: 98.7 (15 May 2022 21:45)  HR: 55 (16 May 2022 10:50) (55 - 71)  BP: 121/70 (16 May 2022 10:50) (103/62 - 121/70)  BP(mean): --  RR: 18 (16 May 2022 10:50) (16 - 19)  SpO2: 100% (16 May 2022 10:50) (93% - 100%)    I&O's Summary      CAPILLARY BLOOD GLUCOSE          PHYSICAL EXAM:    Constitutional: morbid obese   HEENT: PERR, EOMI,    Neck: Soft and supple,   Respiratory: Breath sounds are clear bilaterally,   Cardiovascular: S1 and S2,   Gastrointestinal: Bowel Sounds present, soft, multiple nodule noted superficial abd generalized with pain on palpation    Extremities: No peripheral edema  Vascular: 2+ peripheral pulses  Neurological: A/O x 3, no focal deficits    MEDICATIONS:  MEDICATIONS  (STANDING):  ALPRAZolam 0.25 milliGRAM(s) Oral at bedtime  apixaban 5 milliGRAM(s) Oral every 12 hours  ascorbic acid 500 milliGRAM(s) Oral daily  benzocaine 15 mG/menthol 3.6 mG Lozenge 1 Lozenge Oral once  budesonide 160 MICROgram(s)/formoterol 4.5 MICROgram(s) Inhaler 2 Puff(s) Inhalation two times a day  cholecalciferol 1000 Unit(s) Oral daily  dexAMETHasone  Injectable 6 milliGRAM(s) IV Push daily  famotidine    Tablet 20 milliGRAM(s) Oral two times a day  metoprolol tartrate 25 milliGRAM(s) Oral every 6 hours  montelukast 10 milliGRAM(s) Oral daily  nystatin Powder 1 Application(s) Topical three times a day  PARoxetine 30 milliGRAM(s) Oral at bedtime  remdesivir  IVPB 100 milliGRAM(s) IV Intermittent every 24 hours  remdesivir  IVPB   IV Intermittent   zinc sulfate 220 milliGRAM(s) Oral daily      LABS: All Labs Reviewed:                        8.7    8.77  )-----------( 523      ( 16 May 2022 07:13 )             30.7     05-16    141  |  102  |  43.7<H>  ----------------------------<  103<H>  3.8   |  25.0  |  1.10    Ca    9.0      16 May 2022 07:13    TPro  6.3<L>  /  Alb  2.4<L>  /  TBili  0.3<L>  /  DBili  x   /  AST  8   /  ALT  <5  /  AlkPhos  84  05-16          Blood Culture:     RADIOLOGY/EKG:    DVT PPX:    ADVANCED DIRECTIVE:    DISPOSITION:
[facility] Division of Hospital Medicine  Clinton Carmona MD    Chief Complaint:  Patient is a 36y old  Female who presents with a chief complaint of Generalized Weakness (16 May 2022 13:18)      SUBJECTIVE / OVERNIGHT EVENTS:  Patient seen and examined at bedside. No acute events reported overnight. No new complaints.    Patient denies chest pain, SOB, abd pain, N/V, fever, chills, dysuria or any other complaints. All remainder ROS negative.     MEDICATIONS  (STANDING):  ALPRAZolam 0.25 milliGRAM(s) Oral at bedtime  apixaban 5 milliGRAM(s) Oral every 12 hours  ascorbic acid 500 milliGRAM(s) Oral daily  benzocaine 15 mG/menthol 3.6 mG Lozenge 1 Lozenge Oral once  budesonide 160 MICROgram(s)/formoterol 4.5 MICROgram(s) Inhaler 2 Puff(s) Inhalation two times a day  cholecalciferol 1000 Unit(s) Oral daily  dexAMETHasone  Injectable 6 milliGRAM(s) IV Push daily  famotidine    Tablet 20 milliGRAM(s) Oral two times a day  metoprolol tartrate 25 milliGRAM(s) Oral every 6 hours  montelukast 10 milliGRAM(s) Oral daily  nystatin Powder 1 Application(s) Topical three times a day  PARoxetine 30 milliGRAM(s) Oral at bedtime  remdesivir  IVPB 100 milliGRAM(s) IV Intermittent every 24 hours  remdesivir  IVPB   IV Intermittent   zinc sulfate 220 milliGRAM(s) Oral daily    MEDICATIONS  (PRN):  acetaminophen     Tablet .. 650 milliGRAM(s) Oral every 6 hours PRN Temp greater or equal to 38C (100.4F), Mild Pain (1 - 3)  ALBUTerol    90 MICROgram(s) HFA Inhaler 2 Puff(s) Inhalation every 6 hours PRN Shortness of Breath and/or Wheezing  guaiFENesin Oral Liquid (Sugar-Free) 100 milliGRAM(s) Oral every 6 hours PRN Cough  ondansetron Injectable 4 milliGRAM(s) IV Push every 6 hours PRN Nausea and/or Vomiting  oxyCODONE    IR 5 milliGRAM(s) Oral every 12 hours PRN Severe Pain (7 - 10)        I&O's Summary      PHYSICAL EXAM:  Vital Signs Last 24 Hrs  T(C): 36.6 (17 May 2022 10:38), Max: 36.6 (17 May 2022 10:38)  T(F): 97.9 (17 May 2022 10:38), Max: 97.9 (17 May 2022 10:38)  HR: 61 (17 May 2022 10:38) (61 - 61)  BP: 107/66 (17 May 2022 10:38) (102/66 - 107/66)  BP(mean): --  RR: 18 (17 May 2022 10:38) (18 - 18)  SpO2: 95% (17 May 2022 10:38) (95% - 95%)      Constitutional: morbid obese, NAD  HEENT: PERR, EOMI,    Neck: Soft and supple,   Respiratory: Breath sounds are clear bilaterally,   Cardiovascular: S1 and S2,   Gastrointestinal: Bowel Sounds present, soft, multiple nodule noted superficial abd generalized with pain on palpation    Extremities: No peripheral edema  Vascular: 2+ peripheral pulses  Neurological: A/O x 3, no focal deficits    LABS:                        8.7    8.59  )-----------( 496      ( 17 May 2022 07:05 )             30.7     05-17    141  |  102  |  39.4<H>  ----------------------------<  128<H>  3.8   |  25.0  |  0.91    Ca    8.7      17 May 2022 07:05    TPro  5.9<L>  /  Alb  2.3<L>  /  TBili  0.3<L>  /  DBili  x   /  AST  8   /  ALT  <5  /  AlkPhos  77  05-17              CAPILLARY BLOOD GLUCOSE            RADIOLOGY & ADDITIONAL TESTS:  Results Reviewed:   Imaging Personally Reviewed:  Electrocardiogram Personally Reviewed:                                            
[facility] Division of Hospital Medicine  Clinton Carmona MD    Chief Complaint:  Patient is a 36y old  Female who presents with a chief complaint of Generalized Weakness (19 May 2022 10:50)      SUBJECTIVE / OVERNIGHT EVENTS:  Patient seen and examined at bedside. No acute events reported overnight. No new complaints.    Patient denies chest pain, SOB, abd pain, N/V, fever, chills, dysuria or any other complaints. All remainder ROS negative.     MEDICATIONS  (STANDING):  ALPRAZolam 0.25 milliGRAM(s) Oral at bedtime  apixaban 5 milliGRAM(s) Oral every 12 hours  ascorbic acid 500 milliGRAM(s) Oral daily  benzocaine 15 mG/menthol 3.6 mG Lozenge 1 Lozenge Oral once  budesonide 160 MICROgram(s)/formoterol 4.5 MICROgram(s) Inhaler 2 Puff(s) Inhalation two times a day  cholecalciferol 1000 Unit(s) Oral daily  dexAMETHasone  Injectable 6 milliGRAM(s) IV Push daily  famotidine    Tablet 20 milliGRAM(s) Oral two times a day  metoprolol tartrate 25 milliGRAM(s) Oral every 6 hours  montelukast 10 milliGRAM(s) Oral daily  nystatin Powder 1 Application(s) Topical three times a day  PARoxetine 30 milliGRAM(s) Oral at bedtime  zinc sulfate 220 milliGRAM(s) Oral daily    MEDICATIONS  (PRN):  acetaminophen     Tablet .. 650 milliGRAM(s) Oral every 6 hours PRN Temp greater or equal to 38C (100.4F), Mild Pain (1 - 3)  ALBUTerol    90 MICROgram(s) HFA Inhaler 2 Puff(s) Inhalation every 6 hours PRN Shortness of Breath and/or Wheezing  guaiFENesin Oral Liquid (Sugar-Free) 100 milliGRAM(s) Oral every 6 hours PRN Cough  ondansetron Injectable 4 milliGRAM(s) IV Push every 6 hours PRN Nausea and/or Vomiting  oxyCODONE    IR 5 milliGRAM(s) Oral every 12 hours PRN Severe Pain (7 - 10)        I&O's Summary      PHYSICAL EXAM:  Vital Signs Last 24 Hrs  T(C): 36.8 (19 May 2022 10:40), Max: 36.8 (19 May 2022 10:40)  T(F): 98.2 (19 May 2022 10:40), Max: 98.2 (19 May 2022 10:40)  HR: 51 (19 May 2022 10:40) (51 - 84)  BP: 100/65 (19 May 2022 10:40) (100/65 - 113/39)  BP(mean): --  RR: 18 (19 May 2022 10:40) (16 - 18)  SpO2: 95% (19 May 2022 10:40) (92% - 95%)        CONSTITUTIONAL: NAD  HEENT: NC/AT, PERRL, no JVD  RESPIRATORY: CTA bilaterally, normal effort  CARDIOVASCULAR: RRR, S1/S2+, no m/g/r  ABDOMEN: Nontender to palpation, normoactive bowel sounds, no rebound/guarding  MUSCULOSKELETAL: No edema, cyanosis or deformities.  PSYCH: Calm, affect appropriate.  NEUROLOGY: Awake, alert, no focal neurological deficits.   SKIN: No rashes; no palpable lesions  VASC: Distal pulses palpable    LABS:                    CAPILLARY BLOOD GLUCOSE            RADIOLOGY & ADDITIONAL TESTS:  Results Reviewed:   Imaging Personally Reviewed:  Electrocardiogram Personally Reviewed:

## 2022-05-19 NOTE — DISCHARGE NOTE PROVIDER - ATTENDING DISCHARGE PHYSICAL EXAMINATION:
Vital Signs Last 24 Hrs  T(F): 97.4 (20 May 2022 05:14), Max: 98.2 (19 May 2022 10:40)  HR: 59 (20 May 2022 05:14) (51 - 72)  BP: 114/67 (20 May 2022 05:14) (100/65 - 123/73)  RR: 18 (20 May 2022 05:14) (18 - 19)  SpO2: 93% (20 May 2022 05:14) (93% - 95%)    Physical Exam:  Constitutional: NAD  HEENT: NC/AT, PERRL, EOMI, trachea midline, no JVD  Respiratory: CTA bilaterally, symmetrical chest rise  Cardiovascular: RRR, no m/g/r  Gastrointestinal: Soft, NT/ND, BS+  Vascular: 2+ peripheral pulses  Neurological: A/O x 3, no focal neurological deficits  Psych: Fair mood/affect  Musculoskeletal: No edema, cyanosis, deformities. ROM normal  Skin: No obvious rash, lesions. No jaundice.

## 2022-05-20 PROCEDURE — 36415 COLL VENOUS BLD VENIPUNCTURE: CPT

## 2022-05-20 PROCEDURE — 96376 TX/PRO/DX INJ SAME DRUG ADON: CPT

## 2022-05-20 PROCEDURE — 87637 SARSCOV2&INF A&B&RSV AMP PRB: CPT

## 2022-05-20 PROCEDURE — 84702 CHORIONIC GONADOTROPIN TEST: CPT

## 2022-05-20 PROCEDURE — 74177 CT ABD & PELVIS W/CONTRAST: CPT | Mod: MA

## 2022-05-20 PROCEDURE — 85730 THROMBOPLASTIN TIME PARTIAL: CPT

## 2022-05-20 PROCEDURE — 96374 THER/PROPH/DIAG INJ IV PUSH: CPT

## 2022-05-20 PROCEDURE — 97163 PT EVAL HIGH COMPLEX 45 MIN: CPT

## 2022-05-20 PROCEDURE — 85610 PROTHROMBIN TIME: CPT

## 2022-05-20 PROCEDURE — 99239 HOSP IP/OBS DSCHRG MGMT >30: CPT

## 2022-05-20 PROCEDURE — 97116 GAIT TRAINING THERAPY: CPT

## 2022-05-20 PROCEDURE — 83540 ASSAY OF IRON: CPT

## 2022-05-20 PROCEDURE — 83550 IRON BINDING TEST: CPT

## 2022-05-20 PROCEDURE — U0003: CPT

## 2022-05-20 PROCEDURE — 83690 ASSAY OF LIPASE: CPT

## 2022-05-20 PROCEDURE — 94640 AIRWAY INHALATION TREATMENT: CPT

## 2022-05-20 PROCEDURE — 84484 ASSAY OF TROPONIN QUANT: CPT

## 2022-05-20 PROCEDURE — 85025 COMPLETE CBC W/AUTO DIFF WBC: CPT

## 2022-05-20 PROCEDURE — 85027 COMPLETE CBC AUTOMATED: CPT

## 2022-05-20 PROCEDURE — 83735 ASSAY OF MAGNESIUM: CPT

## 2022-05-20 PROCEDURE — 97530 THERAPEUTIC ACTIVITIES: CPT

## 2022-05-20 PROCEDURE — 71045 X-RAY EXAM CHEST 1 VIEW: CPT

## 2022-05-20 PROCEDURE — 99285 EMERGENCY DEPT VISIT HI MDM: CPT | Mod: 25

## 2022-05-20 PROCEDURE — 80053 COMPREHEN METABOLIC PANEL: CPT

## 2022-05-20 PROCEDURE — U0005: CPT

## 2022-05-20 PROCEDURE — 84466 ASSAY OF TRANSFERRIN: CPT

## 2022-05-20 PROCEDURE — 96375 TX/PRO/DX INJ NEW DRUG ADDON: CPT

## 2022-05-20 PROCEDURE — 82550 ASSAY OF CK (CPK): CPT

## 2022-05-20 PROCEDURE — 82728 ASSAY OF FERRITIN: CPT

## 2022-05-20 PROCEDURE — 85379 FIBRIN DEGRADATION QUANT: CPT

## 2022-05-20 RX ORDER — ASCORBIC ACID 60 MG
1 TABLET,CHEWABLE ORAL
Qty: 0 | Refills: 0 | DISCHARGE
Start: 2022-05-20

## 2022-05-20 RX ORDER — CHOLECALCIFEROL (VITAMIN D3) 125 MCG
1000 CAPSULE ORAL
Qty: 0 | Refills: 0 | DISCHARGE
Start: 2022-05-20

## 2022-05-20 RX ORDER — APIXABAN 2.5 MG/1
1 TABLET, FILM COATED ORAL
Qty: 0 | Refills: 0 | DISCHARGE

## 2022-05-20 RX ORDER — ZINC SULFATE TAB 220 MG (50 MG ZINC EQUIVALENT) 220 (50 ZN) MG
1 TAB ORAL
Qty: 0 | Refills: 0 | DISCHARGE
Start: 2022-05-20

## 2022-05-20 RX ADMIN — OXYCODONE HYDROCHLORIDE 5 MILLIGRAM(S): 5 TABLET ORAL at 01:37

## 2022-05-20 RX ADMIN — Medication 1000 UNIT(S): at 11:13

## 2022-05-20 RX ADMIN — ZINC SULFATE TAB 220 MG (50 MG ZINC EQUIVALENT) 220 MILLIGRAM(S): 220 (50 ZN) TAB at 11:13

## 2022-05-20 RX ADMIN — Medication 500 MILLIGRAM(S): at 11:13

## 2022-05-20 RX ADMIN — BUDESONIDE AND FORMOTEROL FUMARATE DIHYDRATE 2 PUFF(S): 160; 4.5 AEROSOL RESPIRATORY (INHALATION) at 09:30

## 2022-05-20 RX ADMIN — NYSTATIN CREAM 1 APPLICATION(S): 100000 CREAM TOPICAL at 05:44

## 2022-05-20 RX ADMIN — MONTELUKAST 10 MILLIGRAM(S): 4 TABLET, CHEWABLE ORAL at 11:13

## 2022-05-20 RX ADMIN — Medication 6 MILLIGRAM(S): at 05:43

## 2022-05-20 RX ADMIN — APIXABAN 5 MILLIGRAM(S): 2.5 TABLET, FILM COATED ORAL at 05:43

## 2022-05-20 RX ADMIN — OXYCODONE HYDROCHLORIDE 5 MILLIGRAM(S): 5 TABLET ORAL at 00:27

## 2022-05-20 RX ADMIN — FAMOTIDINE 20 MILLIGRAM(S): 10 INJECTION INTRAVENOUS at 05:43

## 2022-05-20 RX ADMIN — Medication 25 MILLIGRAM(S): at 05:43

## 2022-05-20 NOTE — DISCHARGE NOTE NURSING/CASE MANAGEMENT/SOCIAL WORK - PATIENT PORTAL LINK FT
You can access the FollowMyHealth Patient Portal offered by [facility] by registering at the following website: [email or web address]. By joining [employer]’s FollowMyHealth portal, you will also be able to view your health information using other applications (apps) compatible with our system.

## 2022-05-20 NOTE — DISCHARGE NOTE NURSING/CASE MANAGEMENT/SOCIAL WORK - NSDCPEFALRISK_GEN_ALL_CORE
For information on Fall & Injury Prevention, visit: [email or web address] OR  [email or web address] OR  https://www.cdc.gov/steadi/patient.html

## 2022-05-20 NOTE — PATIENT PROFILE ADULT - FALL HARM RISK - RISK INTERVENTIONS

## 2022-05-21 VITALS
RESPIRATION RATE: 18 BRPM | OXYGEN SATURATION: 95 % | SYSTOLIC BLOOD PRESSURE: 136 MMHG | DIASTOLIC BLOOD PRESSURE: 60 MMHG | HEART RATE: 94 BPM | TEMPERATURE: 98 F

## 2022-05-23 NOTE — ED PROVIDER NOTE - IV ALTEPLASE EXCL ABS HIDDEN
Hand in given to: Justin  Date:5/23/22  Reason for hand in: home visit for cm.  Date of next patient contact:n/a  Additional information: close case.     show

## 2022-09-26 NOTE — ED ADULT NURSE NOTE - NSFALLRSKINDICATORS_ED_ALL_ED
[employer] Counseling:  I discussed with the patient the risks of mycophenolate mofetil including but not limited to infection/immunosuppression, GI upset, hypokalemia, hypercholesterolemia, bone marrow suppression, lymphoproliferative disorders, malignancy, GI ulceration/bleed/perforation, colitis, interstitial lung disease, kidney failure, progressive multifocal leukoencephalopathy, and birth defects.  The patient understands that monitoring is required including a baseline creatinine and regular CBC testing. In addition, patient must alert us immediately if symptoms of infection or other concerning signs are noted. yes

## 2023-01-01 ENCOUNTER — INPATIENT (INPATIENT)
Facility: HOSPITAL | Age: 38
LOS: 9 days | Discharge: HOME | DRG: 291 | End: 2023-07-29
Attending: STUDENT IN AN ORGANIZED HEALTH CARE EDUCATION/TRAINING PROGRAM | Admitting: HOSPITALIST
Payer: COMMERCIAL

## 2023-01-01 ENCOUNTER — INPATIENT (INPATIENT)
Facility: HOSPITAL | Age: 38
LOS: 11 days | Discharge: ANOTHER HOSPITAL | DRG: 291 | End: 2023-10-30
Attending: INTERNAL MEDICINE | Admitting: STUDENT IN AN ORGANIZED HEALTH CARE EDUCATION/TRAINING PROGRAM
Payer: COMMERCIAL

## 2023-01-01 ENCOUNTER — INPATIENT (INPATIENT)
Facility: HOSPITAL | Age: 38
LOS: 7 days | Discharge: HOME | DRG: 291 | End: 2023-11-07
Attending: INTERNAL MEDICINE | Admitting: INTERNAL MEDICINE
Payer: COMMERCIAL

## 2023-01-01 VITALS
HEART RATE: 111 BPM | RESPIRATION RATE: 26 BRPM | TEMPERATURE: 98 F | OXYGEN SATURATION: 78 % | SYSTOLIC BLOOD PRESSURE: 137 MMHG | HEIGHT: 62 IN | WEIGHT: 293 LBS | DIASTOLIC BLOOD PRESSURE: 91 MMHG

## 2023-01-01 VITALS
HEART RATE: 75 BPM | DIASTOLIC BLOOD PRESSURE: 63 MMHG | OXYGEN SATURATION: 96 % | TEMPERATURE: 98 F | SYSTOLIC BLOOD PRESSURE: 118 MMHG | RESPIRATION RATE: 19 BRPM

## 2023-01-01 VITALS
RESPIRATION RATE: 14 BRPM | TEMPERATURE: 98 F | DIASTOLIC BLOOD PRESSURE: 67 MMHG | SYSTOLIC BLOOD PRESSURE: 140 MMHG | OXYGEN SATURATION: 95 % | HEART RATE: 85 BPM

## 2023-01-01 VITALS
OXYGEN SATURATION: 92 % | HEART RATE: 114 BPM | SYSTOLIC BLOOD PRESSURE: 128 MMHG | TEMPERATURE: 98 F | RESPIRATION RATE: 17 BRPM | DIASTOLIC BLOOD PRESSURE: 85 MMHG

## 2023-01-01 VITALS — OXYGEN SATURATION: 95 %

## 2023-01-01 DIAGNOSIS — M32.9 SYSTEMIC LUPUS ERYTHEMATOSUS, UNSPECIFIED: ICD-10-CM

## 2023-01-01 DIAGNOSIS — D69.6 THROMBOCYTOPENIA, UNSPECIFIED: ICD-10-CM

## 2023-01-01 DIAGNOSIS — I48.92 UNSPECIFIED ATRIAL FLUTTER: ICD-10-CM

## 2023-01-01 DIAGNOSIS — I27.20 PULMONARY HYPERTENSION, UNSPECIFIED: ICD-10-CM

## 2023-01-01 DIAGNOSIS — J96.01 ACUTE RESPIRATORY FAILURE WITH HYPOXIA: ICD-10-CM

## 2023-01-01 DIAGNOSIS — R74.01 ELEVATION OF LEVELS OF LIVER TRANSAMINASE LEVELS: ICD-10-CM

## 2023-01-01 DIAGNOSIS — N17.9 ACUTE KIDNEY FAILURE, UNSPECIFIED: ICD-10-CM

## 2023-01-01 DIAGNOSIS — I82.90 ACUTE EMBOLISM AND THROMBOSIS OF UNSPECIFIED VEIN: ICD-10-CM

## 2023-01-01 DIAGNOSIS — Z86.79 PERSONAL HISTORY OF OTHER DISEASES OF THE CIRCULATORY SYSTEM: ICD-10-CM

## 2023-01-01 DIAGNOSIS — E87.20 ACIDOSIS, UNSPECIFIED: ICD-10-CM

## 2023-01-01 DIAGNOSIS — F39 UNSPECIFIED MOOD [AFFECTIVE] DISORDER: ICD-10-CM

## 2023-01-01 DIAGNOSIS — R57.0 CARDIOGENIC SHOCK: ICD-10-CM

## 2023-01-01 DIAGNOSIS — E87.5 HYPERKALEMIA: ICD-10-CM

## 2023-01-01 DIAGNOSIS — T07.XXXA UNSPECIFIED MULTIPLE INJURIES, INITIAL ENCOUNTER: ICD-10-CM

## 2023-01-01 DIAGNOSIS — R73.9 HYPERGLYCEMIA, UNSPECIFIED: ICD-10-CM

## 2023-01-01 DIAGNOSIS — Z29.9 ENCOUNTER FOR PROPHYLACTIC MEASURES, UNSPECIFIED: ICD-10-CM

## 2023-01-01 DIAGNOSIS — R06.02 SHORTNESS OF BREATH: ICD-10-CM

## 2023-01-01 DIAGNOSIS — E87.3 ALKALOSIS: ICD-10-CM

## 2023-01-01 DIAGNOSIS — I26.99 OTHER PULMONARY EMBOLISM WITHOUT ACUTE COR PULMONALE: ICD-10-CM

## 2023-01-01 DIAGNOSIS — I50.33 ACUTE ON CHRONIC DIASTOLIC (CONGESTIVE) HEART FAILURE: ICD-10-CM

## 2023-01-01 DIAGNOSIS — I50.21 ACUTE SYSTOLIC (CONGESTIVE) HEART FAILURE: ICD-10-CM

## 2023-01-01 DIAGNOSIS — I50.9 HEART FAILURE, UNSPECIFIED: ICD-10-CM

## 2023-01-01 LAB
A1C WITH ESTIMATED AVERAGE GLUCOSE RESULT: 6 % — HIGH (ref 4–5.6)
A1C WITH ESTIMATED AVERAGE GLUCOSE RESULT: 6 % — HIGH (ref 4–5.6)
ALBUMIN SERPL ELPH-MCNC: 1.6 G/DL — LOW (ref 3.3–5)
ALBUMIN SERPL ELPH-MCNC: 1.6 G/DL — LOW (ref 3.3–5)
ALBUMIN SERPL ELPH-MCNC: 2 G/DL — LOW (ref 3.3–5)
ALBUMIN SERPL ELPH-MCNC: 2 G/DL — LOW (ref 3.3–5)
ALBUMIN SERPL ELPH-MCNC: 2.2 G/DL — LOW (ref 3.3–5)
ALBUMIN SERPL ELPH-MCNC: 2.2 G/DL — LOW (ref 3.3–5)
ALBUMIN SERPL ELPH-MCNC: 2.3 G/DL — LOW (ref 3.3–5.2)
ALBUMIN SERPL ELPH-MCNC: 2.5 G/DL — LOW (ref 3.3–5.2)
ALBUMIN SERPL ELPH-MCNC: 2.6 G/DL — LOW (ref 3.3–5.2)
ALBUMIN SERPL ELPH-MCNC: 2.7 G/DL — LOW (ref 3.3–5)
ALBUMIN SERPL ELPH-MCNC: 2.7 G/DL — LOW (ref 3.3–5)
ALBUMIN SERPL ELPH-MCNC: 2.7 G/DL — LOW (ref 3.3–5.2)
ALBUMIN SERPL ELPH-MCNC: 2.8 G/DL — LOW (ref 3.3–5.2)
ALBUMIN SERPL ELPH-MCNC: 2.9 G/DL — LOW (ref 3.3–5)
ALBUMIN SERPL ELPH-MCNC: 2.9 G/DL — LOW (ref 3.3–5)
ALBUMIN SERPL ELPH-MCNC: 2.9 G/DL — LOW (ref 3.3–5.2)
ALBUMIN SERPL ELPH-MCNC: 3 G/DL — LOW (ref 3.3–5)
ALBUMIN SERPL ELPH-MCNC: 3 G/DL — LOW (ref 3.3–5.2)
ALBUMIN SERPL ELPH-MCNC: 3.1 G/DL — LOW (ref 3.3–5)
ALBUMIN SERPL ELPH-MCNC: 3.1 G/DL — LOW (ref 3.3–5)
ALBUMIN SERPL ELPH-MCNC: 3.1 G/DL — LOW (ref 3.3–5.2)
ALBUMIN SERPL ELPH-MCNC: 3.2 G/DL — LOW (ref 3.3–5)
ALBUMIN SERPL ELPH-MCNC: 3.2 G/DL — LOW (ref 3.3–5.2)
ALBUMIN SERPL ELPH-MCNC: 3.2 G/DL — LOW (ref 3.3–5.2)
ALBUMIN SERPL ELPH-MCNC: 3.3 G/DL — SIGNIFICANT CHANGE UP (ref 3.3–5)
ALBUMIN SERPL ELPH-MCNC: 3.3 G/DL — SIGNIFICANT CHANGE UP (ref 3.3–5)
ALP SERPL-CCNC: 100 U/L — SIGNIFICANT CHANGE UP (ref 40–120)
ALP SERPL-CCNC: 100 U/L — SIGNIFICANT CHANGE UP (ref 40–120)
ALP SERPL-CCNC: 103 U/L — SIGNIFICANT CHANGE UP (ref 40–120)
ALP SERPL-CCNC: 103 U/L — SIGNIFICANT CHANGE UP (ref 40–120)
ALP SERPL-CCNC: 104 U/L — SIGNIFICANT CHANGE UP (ref 40–120)
ALP SERPL-CCNC: 104 U/L — SIGNIFICANT CHANGE UP (ref 40–120)
ALP SERPL-CCNC: 105 U/L — SIGNIFICANT CHANGE UP (ref 40–120)
ALP SERPL-CCNC: 105 U/L — SIGNIFICANT CHANGE UP (ref 40–120)
ALP SERPL-CCNC: 115 U/L — SIGNIFICANT CHANGE UP (ref 40–120)
ALP SERPL-CCNC: 115 U/L — SIGNIFICANT CHANGE UP (ref 40–120)
ALP SERPL-CCNC: 119 U/L — SIGNIFICANT CHANGE UP (ref 40–120)
ALP SERPL-CCNC: 119 U/L — SIGNIFICANT CHANGE UP (ref 40–120)
ALP SERPL-CCNC: 121 U/L — HIGH (ref 40–120)
ALP SERPL-CCNC: 121 U/L — HIGH (ref 40–120)
ALP SERPL-CCNC: 123 U/L — HIGH (ref 40–120)
ALP SERPL-CCNC: 123 U/L — HIGH (ref 40–120)
ALP SERPL-CCNC: 126 U/L — HIGH (ref 40–120)
ALP SERPL-CCNC: 126 U/L — HIGH (ref 40–120)
ALP SERPL-CCNC: 127 U/L — HIGH (ref 40–120)
ALP SERPL-CCNC: 127 U/L — HIGH (ref 40–120)
ALP SERPL-CCNC: 128 U/L — HIGH (ref 40–120)
ALP SERPL-CCNC: 129 U/L — HIGH (ref 40–120)
ALP SERPL-CCNC: 129 U/L — HIGH (ref 40–120)
ALP SERPL-CCNC: 131 U/L — HIGH (ref 40–120)
ALP SERPL-CCNC: 131 U/L — HIGH (ref 40–120)
ALP SERPL-CCNC: 133 U/L — HIGH (ref 40–120)
ALP SERPL-CCNC: 133 U/L — HIGH (ref 40–120)
ALP SERPL-CCNC: 134 U/L — HIGH (ref 40–120)
ALP SERPL-CCNC: 134 U/L — HIGH (ref 40–120)
ALP SERPL-CCNC: 135 U/L — HIGH (ref 40–120)
ALP SERPL-CCNC: 135 U/L — HIGH (ref 40–120)
ALP SERPL-CCNC: 136 U/L — HIGH (ref 40–120)
ALP SERPL-CCNC: 136 U/L — HIGH (ref 40–120)
ALP SERPL-CCNC: 137 U/L — HIGH (ref 40–120)
ALP SERPL-CCNC: 137 U/L — HIGH (ref 40–120)
ALP SERPL-CCNC: 139 U/L — HIGH (ref 40–120)
ALP SERPL-CCNC: 139 U/L — HIGH (ref 40–120)
ALP SERPL-CCNC: 143 U/L — HIGH (ref 40–120)
ALP SERPL-CCNC: 149 U/L — HIGH (ref 40–120)
ALP SERPL-CCNC: 151 U/L — HIGH (ref 40–120)
ALP SERPL-CCNC: 151 U/L — HIGH (ref 40–120)
ALP SERPL-CCNC: 156 U/L — HIGH (ref 40–120)
ALP SERPL-CCNC: 156 U/L — HIGH (ref 40–120)
ALP SERPL-CCNC: 160 U/L — HIGH (ref 40–120)
ALP SERPL-CCNC: 160 U/L — HIGH (ref 40–120)
ALP SERPL-CCNC: 163 U/L — HIGH (ref 40–120)
ALP SERPL-CCNC: 163 U/L — HIGH (ref 40–120)
ALP SERPL-CCNC: 165 U/L — HIGH (ref 40–120)
ALP SERPL-CCNC: 165 U/L — HIGH (ref 40–120)
ALP SERPL-CCNC: 67 U/L — SIGNIFICANT CHANGE UP (ref 40–120)
ALP SERPL-CCNC: 70 U/L — SIGNIFICANT CHANGE UP (ref 40–120)
ALP SERPL-CCNC: 71 U/L — SIGNIFICANT CHANGE UP (ref 40–120)
ALP SERPL-CCNC: 75 U/L — SIGNIFICANT CHANGE UP (ref 40–120)
ALP SERPL-CCNC: 75 U/L — SIGNIFICANT CHANGE UP (ref 40–120)
ALP SERPL-CCNC: 77 U/L — SIGNIFICANT CHANGE UP (ref 40–120)
ALP SERPL-CCNC: 77 U/L — SIGNIFICANT CHANGE UP (ref 40–120)
ALP SERPL-CCNC: 80 U/L — SIGNIFICANT CHANGE UP (ref 40–120)
ALP SERPL-CCNC: 80 U/L — SIGNIFICANT CHANGE UP (ref 40–120)
ALP SERPL-CCNC: 83 U/L — SIGNIFICANT CHANGE UP (ref 40–120)
ALP SERPL-CCNC: 86 U/L — SIGNIFICANT CHANGE UP (ref 40–120)
ALP SERPL-CCNC: 87 U/L — SIGNIFICANT CHANGE UP (ref 40–120)
ALP SERPL-CCNC: 87 U/L — SIGNIFICANT CHANGE UP (ref 40–120)
ALP SERPL-CCNC: 88 U/L — SIGNIFICANT CHANGE UP (ref 40–120)
ALP SERPL-CCNC: 90 U/L — SIGNIFICANT CHANGE UP (ref 40–120)
ALP SERPL-CCNC: 97 U/L — SIGNIFICANT CHANGE UP (ref 40–120)
ALP SERPL-CCNC: 97 U/L — SIGNIFICANT CHANGE UP (ref 40–120)
ALP SERPL-CCNC: 98 U/L — SIGNIFICANT CHANGE UP (ref 40–120)
ALP SERPL-CCNC: 98 U/L — SIGNIFICANT CHANGE UP (ref 40–120)
ALP SERPL-CCNC: 99 U/L — SIGNIFICANT CHANGE UP (ref 40–120)
ALP SERPL-CCNC: 99 U/L — SIGNIFICANT CHANGE UP (ref 40–120)
ALT FLD-CCNC: 10 U/L — SIGNIFICANT CHANGE UP
ALT FLD-CCNC: 10 U/L — SIGNIFICANT CHANGE UP
ALT FLD-CCNC: 102 U/L — HIGH
ALT FLD-CCNC: 102 U/L — HIGH
ALT FLD-CCNC: 1072 U/L — HIGH
ALT FLD-CCNC: 1072 U/L — HIGH
ALT FLD-CCNC: 1134 U/L — HIGH
ALT FLD-CCNC: 1134 U/L — HIGH
ALT FLD-CCNC: 1151 U/L — HIGH
ALT FLD-CCNC: 1151 U/L — HIGH
ALT FLD-CCNC: 1213 U/L — HIGH
ALT FLD-CCNC: 1213 U/L — HIGH
ALT FLD-CCNC: 123 U/L — HIGH (ref 10–45)
ALT FLD-CCNC: 123 U/L — HIGH (ref 10–45)
ALT FLD-CCNC: 124 U/L — HIGH
ALT FLD-CCNC: 124 U/L — HIGH
ALT FLD-CCNC: 1301 U/L — HIGH
ALT FLD-CCNC: 1301 U/L — HIGH
ALT FLD-CCNC: 1427 U/L — HIGH
ALT FLD-CCNC: 1427 U/L — HIGH
ALT FLD-CCNC: 1601 U/L — HIGH
ALT FLD-CCNC: 1601 U/L — HIGH
ALT FLD-CCNC: 1703 U/L — HIGH
ALT FLD-CCNC: 1703 U/L — HIGH
ALT FLD-CCNC: 171 U/L — HIGH (ref 10–45)
ALT FLD-CCNC: 171 U/L — HIGH (ref 10–45)
ALT FLD-CCNC: 1780 U/L — HIGH
ALT FLD-CCNC: 1780 U/L — HIGH
ALT FLD-CCNC: 1878 U/L — HIGH
ALT FLD-CCNC: 1878 U/L — HIGH
ALT FLD-CCNC: 212 U/L — HIGH (ref 10–45)
ALT FLD-CCNC: 212 U/L — HIGH (ref 10–45)
ALT FLD-CCNC: 2437 U/L — HIGH
ALT FLD-CCNC: 2437 U/L — HIGH
ALT FLD-CCNC: 2462 U/L — HIGH
ALT FLD-CCNC: 2462 U/L — HIGH
ALT FLD-CCNC: 250 U/L — HIGH (ref 10–45)
ALT FLD-CCNC: 250 U/L — HIGH (ref 10–45)
ALT FLD-CCNC: 2512 U/L — HIGH
ALT FLD-CCNC: 2512 U/L — HIGH
ALT FLD-CCNC: 2520 U/L — HIGH
ALT FLD-CCNC: 2520 U/L — HIGH
ALT FLD-CCNC: 2526 U/L — HIGH
ALT FLD-CCNC: 2526 U/L — HIGH
ALT FLD-CCNC: 282 U/L — HIGH (ref 10–45)
ALT FLD-CCNC: 282 U/L — HIGH (ref 10–45)
ALT FLD-CCNC: 305 U/L — HIGH
ALT FLD-CCNC: 305 U/L — HIGH
ALT FLD-CCNC: 311 U/L — HIGH
ALT FLD-CCNC: 311 U/L — HIGH
ALT FLD-CCNC: 3459 U/L — HIGH (ref 10–45)
ALT FLD-CCNC: 3459 U/L — HIGH (ref 10–45)
ALT FLD-CCNC: 445 U/L — HIGH
ALT FLD-CCNC: 445 U/L — HIGH
ALT FLD-CCNC: 506 U/L — HIGH (ref 10–45)
ALT FLD-CCNC: 506 U/L — HIGH (ref 10–45)
ALT FLD-CCNC: 52 U/L — HIGH (ref 10–45)
ALT FLD-CCNC: 52 U/L — HIGH (ref 10–45)
ALT FLD-CCNC: 5450 U/L — HIGH (ref 10–45)
ALT FLD-CCNC: 5450 U/L — HIGH (ref 10–45)
ALT FLD-CCNC: 584 U/L — HIGH
ALT FLD-CCNC: 584 U/L — HIGH
ALT FLD-CCNC: 6 U/L — SIGNIFICANT CHANGE UP
ALT FLD-CCNC: 616 U/L — HIGH
ALT FLD-CCNC: 616 U/L — HIGH
ALT FLD-CCNC: 67 U/L — HIGH (ref 10–45)
ALT FLD-CCNC: 67 U/L — HIGH (ref 10–45)
ALT FLD-CCNC: 674 U/L — HIGH
ALT FLD-CCNC: 674 U/L — HIGH
ALT FLD-CCNC: 7 U/L — SIGNIFICANT CHANGE UP
ALT FLD-CCNC: 718 U/L — HIGH
ALT FLD-CCNC: 718 U/L — HIGH
ALT FLD-CCNC: 723 U/L — HIGH
ALT FLD-CCNC: 723 U/L — HIGH
ALT FLD-CCNC: 794 U/L — HIGH
ALT FLD-CCNC: 794 U/L — HIGH
ALT FLD-CCNC: 8 U/L — SIGNIFICANT CHANGE UP
ALT FLD-CCNC: 8 U/L — SIGNIFICANT CHANGE UP
ALT FLD-CCNC: 866 U/L — HIGH
ALT FLD-CCNC: 866 U/L — HIGH
ALT FLD-CCNC: 90 U/L — HIGH (ref 10–45)
ALT FLD-CCNC: 90 U/L — HIGH (ref 10–45)
ALT FLD-CCNC: 944 U/L — HIGH
ALT FLD-CCNC: 944 U/L — HIGH
ALT FLD-CCNC: <5 U/L — SIGNIFICANT CHANGE UP
AMMONIA BLD-MCNC: 45 UMOL/L — SIGNIFICANT CHANGE UP (ref 11–55)
AMMONIA BLD-MCNC: 45 UMOL/L — SIGNIFICANT CHANGE UP (ref 11–55)
AMMONIA BLD-MCNC: 60 UMOL/L — HIGH (ref 11–55)
AMMONIA BLD-MCNC: 60 UMOL/L — HIGH (ref 11–55)
AMYLASE P1 CFR SERPL: 27 U/L — LOW (ref 36–128)
AMYLASE P1 CFR SERPL: 27 U/L — LOW (ref 36–128)
ANA PAT FLD IF-IMP: ABNORMAL
ANA PATTERN 2: ABNORMAL
ANA PATTERN 2: ABNORMAL
ANA TITR SER: ABNORMAL
ANION GAP SERPL CALC-SCNC: 10 MMOL/L — SIGNIFICANT CHANGE UP (ref 5–17)
ANION GAP SERPL CALC-SCNC: 11 MMOL/L — SIGNIFICANT CHANGE UP (ref 5–17)
ANION GAP SERPL CALC-SCNC: 12 MMOL/L — SIGNIFICANT CHANGE UP (ref 5–17)
ANION GAP SERPL CALC-SCNC: 13 MMOL/L — SIGNIFICANT CHANGE UP (ref 5–17)
ANION GAP SERPL CALC-SCNC: 14 MMOL/L — SIGNIFICANT CHANGE UP (ref 5–17)
ANION GAP SERPL CALC-SCNC: 15 MMOL/L — SIGNIFICANT CHANGE UP (ref 5–17)
ANION GAP SERPL CALC-SCNC: 16 MMOL/L — SIGNIFICANT CHANGE UP (ref 5–17)
ANION GAP SERPL CALC-SCNC: 17 MMOL/L — SIGNIFICANT CHANGE UP (ref 5–17)
ANION GAP SERPL CALC-SCNC: 18 MMOL/L — HIGH (ref 5–17)
ANION GAP SERPL CALC-SCNC: 19 MMOL/L — HIGH (ref 5–17)
ANION GAP SERPL CALC-SCNC: 20 MMOL/L — HIGH (ref 5–17)
ANION GAP SERPL CALC-SCNC: 35 MMOL/L — HIGH (ref 5–17)
ANION GAP SERPL CALC-SCNC: 37 MMOL/L — HIGH (ref 5–17)
ANION GAP SERPL CALC-SCNC: 37 MMOL/L — HIGH (ref 5–17)
ANION GAP SERPL CALC-SCNC: 41 MMOL/L — HIGH (ref 5–17)
ANION GAP SERPL CALC-SCNC: 41 MMOL/L — HIGH (ref 5–17)
ANION GAP SERPL CALC-SCNC: 7 MMOL/L — SIGNIFICANT CHANGE UP (ref 5–17)
ANION GAP SERPL CALC-SCNC: 8 MMOL/L — SIGNIFICANT CHANGE UP (ref 5–17)
ANION GAP SERPL CALC-SCNC: 9 MMOL/L — SIGNIFICANT CHANGE UP (ref 5–17)
ANISOCYTOSIS BLD QL: SIGNIFICANT CHANGE UP
ANISOCYTOSIS BLD QL: SIGNIFICANT CHANGE UP
ANISOCYTOSIS BLD QL: SLIGHT — SIGNIFICANT CHANGE UP
ANTI NUCLEAR FACTOR TITER 2: ABNORMAL
ANTI NUCLEAR FACTOR TITER 2: ABNORMAL
ANTI-RIBONUCLEAR PROTEIN: 0.4 AI — SIGNIFICANT CHANGE UP
ANTI-RIBONUCLEAR PROTEIN: 0.4 AI — SIGNIFICANT CHANGE UP
ANTI-RIBONUCLEAR PROTEIN: 0.5 AI — SIGNIFICANT CHANGE UP
APPEARANCE UR: ABNORMAL
APPEARANCE UR: ABNORMAL
APPEARANCE UR: CLEAR — SIGNIFICANT CHANGE UP
APPEARANCE UR: CLEAR — SIGNIFICANT CHANGE UP
APTT 50/50 2HOUR INCUB: 45.8 SEC — HIGH (ref 24.5–36.6)
APTT 50/50 2HOUR INCUB: 45.8 SEC — HIGH (ref 24.5–36.6)
APTT 50/50 2HOUR INCUB: 47 SEC — HIGH (ref 24.5–36.6)
APTT 50/50 2HOUR INCUB: 47 SEC — HIGH (ref 24.5–36.6)
APTT BLD: 100.1 SEC — HIGH (ref 24.5–35.6)
APTT BLD: 100.1 SEC — HIGH (ref 24.5–35.6)
APTT BLD: 102.7 SEC — HIGH (ref 24.5–35.6)
APTT BLD: 102.7 SEC — HIGH (ref 24.5–35.6)
APTT BLD: 105.7 SEC — HIGH (ref 24.5–35.6)
APTT BLD: 105.7 SEC — HIGH (ref 24.5–35.6)
APTT BLD: 106.3 SEC — HIGH (ref 24.5–35.6)
APTT BLD: 106.3 SEC — HIGH (ref 24.5–35.6)
APTT BLD: 108 SEC — HIGH (ref 24.5–35.6)
APTT BLD: 108 SEC — HIGH (ref 24.5–35.6)
APTT BLD: 110 SEC — HIGH (ref 24.5–35.6)
APTT BLD: 110 SEC — HIGH (ref 24.5–35.6)
APTT BLD: 142.3 SEC — CRITICAL HIGH (ref 24.5–35.6)
APTT BLD: 142.3 SEC — CRITICAL HIGH (ref 24.5–35.6)
APTT BLD: 30.3 SEC — SIGNIFICANT CHANGE UP (ref 24.5–35.6)
APTT BLD: 30.3 SEC — SIGNIFICANT CHANGE UP (ref 24.5–35.6)
APTT BLD: 33.7 SEC — SIGNIFICANT CHANGE UP (ref 24.5–35.6)
APTT BLD: 33.7 SEC — SIGNIFICANT CHANGE UP (ref 24.5–35.6)
APTT BLD: 34.3 SEC — SIGNIFICANT CHANGE UP (ref 27.5–35.5)
APTT BLD: 35.7 SEC — HIGH (ref 24.5–35.6)
APTT BLD: 35.7 SEC — HIGH (ref 24.5–35.6)
APTT BLD: 35.9 SEC — HIGH (ref 24.5–35.6)
APTT BLD: 35.9 SEC — HIGH (ref 24.5–35.6)
APTT BLD: 36.2 SEC — HIGH (ref 24.5–35.6)
APTT BLD: 36.2 SEC — HIGH (ref 24.5–35.6)
APTT BLD: 39.4 SEC — HIGH (ref 24.5–36.6)
APTT BLD: 39.4 SEC — HIGH (ref 24.5–36.6)
APTT BLD: 40.6 SEC — HIGH (ref 24.5–36.6)
APTT BLD: 40.6 SEC — HIGH (ref 24.5–36.6)
APTT BLD: 42.3 SEC — HIGH (ref 24.5–35.6)
APTT BLD: 42.3 SEC — HIGH (ref 24.5–35.6)
APTT BLD: 47.6 SEC — HIGH (ref 24.5–35.6)
APTT BLD: 47.6 SEC — HIGH (ref 24.5–35.6)
APTT BLD: 48.8 SEC — HIGH (ref 24.5–35.6)
APTT BLD: 48.8 SEC — HIGH (ref 24.5–35.6)
APTT BLD: 49.4 SEC — HIGH (ref 24.5–35.6)
APTT BLD: 49.4 SEC — HIGH (ref 24.5–35.6)
APTT BLD: 50.8 SEC — HIGH (ref 24.5–35.6)
APTT BLD: 50.8 SEC — HIGH (ref 24.5–35.6)
APTT BLD: 54.1 SEC — HIGH (ref 24.5–35.6)
APTT BLD: 54.1 SEC — HIGH (ref 24.5–35.6)
APTT BLD: 56.8 SEC — HIGH (ref 24.5–35.6)
APTT BLD: 56.8 SEC — HIGH (ref 24.5–35.6)
APTT BLD: 57.1 SEC — HIGH (ref 24.5–35.6)
APTT BLD: 57.1 SEC — HIGH (ref 24.5–35.6)
APTT BLD: 57.4 SEC — HIGH (ref 24.5–35.6)
APTT BLD: 57.4 SEC — HIGH (ref 24.5–35.6)
APTT BLD: 59.1 SEC — HIGH (ref 24.5–35.6)
APTT BLD: 59.1 SEC — HIGH (ref 24.5–35.6)
APTT BLD: 59.7 SEC — HIGH (ref 24.5–35.6)
APTT BLD: 59.7 SEC — HIGH (ref 24.5–35.6)
APTT BLD: 60.7 SEC — HIGH (ref 24.5–35.6)
APTT BLD: 60.7 SEC — HIGH (ref 24.5–35.6)
APTT BLD: 61.2 SEC — HIGH (ref 24.5–35.6)
APTT BLD: 61.2 SEC — HIGH (ref 24.5–35.6)
APTT BLD: 62.6 SEC — HIGH (ref 24.5–35.6)
APTT BLD: 62.6 SEC — HIGH (ref 24.5–35.6)
APTT BLD: 62.7 SEC — HIGH (ref 24.5–35.6)
APTT BLD: 62.7 SEC — HIGH (ref 24.5–35.6)
APTT BLD: 64.7 SEC — HIGH (ref 24.5–35.6)
APTT BLD: 64.7 SEC — HIGH (ref 24.5–35.6)
APTT BLD: 67.6 SEC — HIGH (ref 24.5–35.6)
APTT BLD: 67.6 SEC — HIGH (ref 24.5–35.6)
APTT BLD: 69.7 SEC — HIGH (ref 24.5–35.6)
APTT BLD: 69.7 SEC — HIGH (ref 24.5–35.6)
APTT BLD: 70.6 SEC — HIGH (ref 24.5–35.6)
APTT BLD: 70.6 SEC — HIGH (ref 24.5–35.6)
APTT BLD: 72.2 SEC — HIGH (ref 24.5–35.6)
APTT BLD: 72.2 SEC — HIGH (ref 24.5–35.6)
APTT BLD: 73.7 SEC — HIGH (ref 24.5–35.6)
APTT BLD: 73.7 SEC — HIGH (ref 24.5–35.6)
APTT BLD: 74.2 SEC — HIGH (ref 24.5–35.6)
APTT BLD: 74.2 SEC — HIGH (ref 24.5–35.6)
APTT BLD: 74.8 SEC — HIGH (ref 24.5–35.6)
APTT BLD: 74.8 SEC — HIGH (ref 24.5–35.6)
APTT BLD: 76.1 SEC — HIGH (ref 24.5–35.6)
APTT BLD: 76.1 SEC — HIGH (ref 24.5–35.6)
APTT BLD: 76.4 SEC — HIGH (ref 24.5–35.6)
APTT BLD: 76.4 SEC — HIGH (ref 24.5–35.6)
APTT BLD: 77.1 SEC — HIGH (ref 24.5–35.6)
APTT BLD: 77.1 SEC — HIGH (ref 24.5–35.6)
APTT BLD: 79.3 SEC — HIGH (ref 24.5–35.6)
APTT BLD: 79.3 SEC — HIGH (ref 24.5–35.6)
APTT BLD: 81 SEC — HIGH (ref 24.5–35.6)
APTT BLD: 81 SEC — HIGH (ref 24.5–35.6)
APTT BLD: 86.6 SEC — HIGH (ref 24.5–35.6)
APTT BLD: 86.6 SEC — HIGH (ref 24.5–35.6)
APTT BLD: 96.3 SEC — HIGH (ref 24.5–35.6)
APTT BLD: 96.3 SEC — HIGH (ref 24.5–35.6)
APTT BLD: 99.9 SEC — HIGH (ref 24.5–35.6)
APTT BLD: 99.9 SEC — HIGH (ref 24.5–35.6)
APTT BLD: >200 SEC — CRITICAL HIGH (ref 24.5–35.6)
APTT BLD: >200 SEC — CRITICAL HIGH (ref 24.5–35.6)
AST SERPL-CCNC: 110 U/L — HIGH
AST SERPL-CCNC: 110 U/L — HIGH
AST SERPL-CCNC: 1154 U/L — HIGH
AST SERPL-CCNC: 1154 U/L — HIGH
AST SERPL-CCNC: 121 U/L — HIGH
AST SERPL-CCNC: 121 U/L — HIGH
AST SERPL-CCNC: 1289 U/L — HIGH
AST SERPL-CCNC: 1289 U/L — HIGH
AST SERPL-CCNC: 136 U/L — HIGH
AST SERPL-CCNC: 136 U/L — HIGH
AST SERPL-CCNC: 14 U/L — SIGNIFICANT CHANGE UP
AST SERPL-CCNC: 15 U/L — SIGNIFICANT CHANGE UP
AST SERPL-CCNC: 15 U/L — SIGNIFICANT CHANGE UP
AST SERPL-CCNC: 1565 U/L — HIGH
AST SERPL-CCNC: 1565 U/L — HIGH
AST SERPL-CCNC: 16 U/L — SIGNIFICANT CHANGE UP
AST SERPL-CCNC: 16 U/L — SIGNIFICANT CHANGE UP
AST SERPL-CCNC: 160 U/L — HIGH
AST SERPL-CCNC: 160 U/L — HIGH
AST SERPL-CCNC: 1611 U/L — HIGH
AST SERPL-CCNC: 1611 U/L — HIGH
AST SERPL-CCNC: 17 U/L — SIGNIFICANT CHANGE UP
AST SERPL-CCNC: 184 U/L — HIGH (ref 10–40)
AST SERPL-CCNC: 184 U/L — HIGH (ref 10–40)
AST SERPL-CCNC: 19 U/L — SIGNIFICANT CHANGE UP (ref 10–40)
AST SERPL-CCNC: 199 U/L — HIGH
AST SERPL-CCNC: 199 U/L — HIGH
AST SERPL-CCNC: 23 U/L — SIGNIFICANT CHANGE UP
AST SERPL-CCNC: 23 U/L — SIGNIFICANT CHANGE UP
AST SERPL-CCNC: 23 U/L — SIGNIFICANT CHANGE UP (ref 10–40)
AST SERPL-CCNC: 230 U/L — HIGH
AST SERPL-CCNC: 230 U/L — HIGH
AST SERPL-CCNC: 25 U/L — SIGNIFICANT CHANGE UP
AST SERPL-CCNC: 2507 U/L — HIGH
AST SERPL-CCNC: 2507 U/L — HIGH
AST SERPL-CCNC: 2798 U/L — HIGH
AST SERPL-CCNC: 2798 U/L — HIGH
AST SERPL-CCNC: 2941 U/L — HIGH
AST SERPL-CCNC: 2941 U/L — HIGH
AST SERPL-CCNC: 3197 U/L — HIGH
AST SERPL-CCNC: 3197 U/L — HIGH
AST SERPL-CCNC: 327 U/L — HIGH
AST SERPL-CCNC: 327 U/L — HIGH
AST SERPL-CCNC: 3290 U/L — HIGH
AST SERPL-CCNC: 3290 U/L — HIGH
AST SERPL-CCNC: 33 U/L — HIGH
AST SERPL-CCNC: 33 U/L — HIGH
AST SERPL-CCNC: 33 U/L — SIGNIFICANT CHANGE UP (ref 10–40)
AST SERPL-CCNC: 33 U/L — SIGNIFICANT CHANGE UP (ref 10–40)
AST SERPL-CCNC: 34 U/L — SIGNIFICANT CHANGE UP (ref 10–40)
AST SERPL-CCNC: 3471 U/L — HIGH (ref 10–40)
AST SERPL-CCNC: 3471 U/L — HIGH (ref 10–40)
AST SERPL-CCNC: 36 U/L — HIGH
AST SERPL-CCNC: 36 U/L — HIGH
AST SERPL-CCNC: 392 U/L — HIGH
AST SERPL-CCNC: 392 U/L — HIGH
AST SERPL-CCNC: 470 U/L — HIGH
AST SERPL-CCNC: 470 U/L — HIGH
AST SERPL-CCNC: 483 U/L — HIGH (ref 10–40)
AST SERPL-CCNC: 483 U/L — HIGH (ref 10–40)
AST SERPL-CCNC: 52 U/L — HIGH
AST SERPL-CCNC: 52 U/L — HIGH
AST SERPL-CCNC: 53 U/L — HIGH
AST SERPL-CCNC: 53 U/L — HIGH
AST SERPL-CCNC: 594 U/L — HIGH
AST SERPL-CCNC: 594 U/L — HIGH
AST SERPL-CCNC: 681 U/L — HIGH
AST SERPL-CCNC: 681 U/L — HIGH
AST SERPL-CCNC: 6818 U/L — HIGH (ref 10–40)
AST SERPL-CCNC: 6818 U/L — HIGH (ref 10–40)
AST SERPL-CCNC: 76 U/L — HIGH
AST SERPL-CCNC: 76 U/L — HIGH
AST SERPL-CCNC: 79 U/L — HIGH
AST SERPL-CCNC: 79 U/L — HIGH
AST SERPL-CCNC: 863 U/L — HIGH
AST SERPL-CCNC: 863 U/L — HIGH
AST SERPL-CCNC: 91 U/L — HIGH
AST SERPL-CCNC: 91 U/L — HIGH
AUTO DIFF PNL BLD: ABNORMAL
AUTO DIFF PNL BLD: ABNORMAL
B2 GLYCOPROT1 AB SER QL: POSITIVE
B2 GLYCOPROT1 IGA SER QL: 5.4 SAU — SIGNIFICANT CHANGE UP
B2 GLYCOPROT1 IGG SER-ACNC: >150 SGU — HIGH
B2 GLYCOPROT1 IGM SER-ACNC: 6.7 SMU — SIGNIFICANT CHANGE UP
B2 GLYCOPROT1 IGM SER-ACNC: 6.7 SMU — SIGNIFICANT CHANGE UP
B2 GLYCOPROT1 IGM SER-ACNC: 7.4 SMU — SIGNIFICANT CHANGE UP
B2 GLYCOPROT1 IGM SER-ACNC: 7.4 SMU — SIGNIFICANT CHANGE UP
BACTERIA # UR AUTO: ABNORMAL
BASE EXCESS BLDA CALC-SCNC: -6.5 MMOL/L — LOW (ref -2–3)
BASE EXCESS BLDA CALC-SCNC: -6.5 MMOL/L — LOW (ref -2–3)
BASE EXCESS BLDV CALC-SCNC: -3.3 MMOL/L — LOW (ref -2–3)
BASE EXCESS BLDV CALC-SCNC: -3.3 MMOL/L — LOW (ref -2–3)
BASE EXCESS BLDV CALC-SCNC: -5.7 MMOL/L — LOW (ref -2–3)
BASE EXCESS BLDV CALC-SCNC: -5.7 MMOL/L — LOW (ref -2–3)
BASE EXCESS BLDV CALC-SCNC: 0.5 MMOL/L — SIGNIFICANT CHANGE UP (ref -2–3)
BASE EXCESS BLDV CALC-SCNC: 0.5 MMOL/L — SIGNIFICANT CHANGE UP (ref -2–3)
BASE EXCESS BLDV CALC-SCNC: 15.1 MMOL/L — HIGH (ref -2–3)
BASE EXCESS BLDV CALC-SCNC: 15.1 MMOL/L — HIGH (ref -2–3)
BASE EXCESS BLDV CALC-SCNC: 18 MMOL/L — HIGH (ref -2–3)
BASE EXCESS BLDV CALC-SCNC: 18 MMOL/L — HIGH (ref -2–3)
BASE EXCESS BLDV CALC-SCNC: 18.6 MMOL/L — HIGH (ref -2–3)
BASE EXCESS BLDV CALC-SCNC: 18.6 MMOL/L — HIGH (ref -2–3)
BASE EXCESS BLDV CALC-SCNC: 2.2 MMOL/L — SIGNIFICANT CHANGE UP (ref -2–3)
BASE EXCESS BLDV CALC-SCNC: 20.1 MMOL/L — HIGH (ref -2–3)
BASE EXCESS BLDV CALC-SCNC: 20.1 MMOL/L — HIGH (ref -2–3)
BASE EXCESS BLDV CALC-SCNC: 20.2 MMOL/L — HIGH (ref -2–3)
BASE EXCESS BLDV CALC-SCNC: 20.2 MMOL/L — HIGH (ref -2–3)
BASE EXCESS BLDV CALC-SCNC: 20.5 MMOL/L — HIGH (ref -2–3)
BASE EXCESS BLDV CALC-SCNC: 20.5 MMOL/L — HIGH (ref -2–3)
BASE EXCESS BLDV CALC-SCNC: 21.3 MMOL/L — HIGH (ref -2–3)
BASE EXCESS BLDV CALC-SCNC: 21.3 MMOL/L — HIGH (ref -2–3)
BASE EXCESS BLDV CALC-SCNC: 22 MMOL/L — HIGH (ref -2–3)
BASE EXCESS BLDV CALC-SCNC: 22 MMOL/L — HIGH (ref -2–3)
BASE EXCESS BLDV CALC-SCNC: 23 MMOL/L — HIGH (ref -2–3)
BASE EXCESS BLDV CALC-SCNC: 23 MMOL/L — HIGH (ref -2–3)
BASE EXCESS BLDV CALC-SCNC: 4.4 MMOL/L — HIGH (ref -2–3)
BASE EXCESS BLDV CALC-SCNC: 4.4 MMOL/L — HIGH (ref -2–3)
BASE EXCESS BLDV CALC-SCNC: 5.1 MMOL/L — HIGH (ref -2–3)
BASE EXCESS BLDV CALC-SCNC: 5.1 MMOL/L — HIGH (ref -2–3)
BASE EXCESS BLDV CALC-SCNC: 5.2 MMOL/L — HIGH (ref -2–3)
BASE EXCESS BLDV CALC-SCNC: 5.2 MMOL/L — HIGH (ref -2–3)
BASE EXCESS BLDV CALC-SCNC: 5.9 MMOL/L — HIGH (ref -2–3)
BASE EXCESS BLDV CALC-SCNC: 6.1 MMOL/L — HIGH (ref -2–3)
BASE EXCESS BLDV CALC-SCNC: 6.1 MMOL/L — HIGH (ref -2–3)
BASE EXCESS BLDV CALC-SCNC: 6.8 MMOL/L — HIGH (ref -2–3)
BASE EXCESS BLDV CALC-SCNC: 7 MMOL/L — HIGH (ref -2–3)
BASE EXCESS BLDV CALC-SCNC: 7 MMOL/L — HIGH (ref -2–3)
BASE EXCESS BLDV CALC-SCNC: 7.7 MMOL/L — HIGH (ref -2–3)
BASE EXCESS BLDV CALC-SCNC: 7.7 MMOL/L — HIGH (ref -2–3)
BASE EXCESS BLDV CALC-SCNC: 8.8 MMOL/L — HIGH (ref -2–3)
BASE EXCESS BLDV CALC-SCNC: 8.8 MMOL/L — HIGH (ref -2–3)
BASE EXCESS BLDV CALC-SCNC: 8.9 MMOL/L — HIGH (ref -2–3)
BASE EXCESS BLDV CALC-SCNC: 8.9 MMOL/L — HIGH (ref -2–3)
BASO STIPL BLD QL SMEAR: PRESENT — SIGNIFICANT CHANGE UP
BASO STIPL BLD QL SMEAR: PRESENT — SIGNIFICANT CHANGE UP
BASOPHILS # BLD AUTO: 0 K/UL — SIGNIFICANT CHANGE UP (ref 0–0.2)
BASOPHILS # BLD AUTO: 0.01 K/UL — SIGNIFICANT CHANGE UP (ref 0–0.2)
BASOPHILS # BLD AUTO: 0.02 K/UL — SIGNIFICANT CHANGE UP (ref 0–0.2)
BASOPHILS # BLD AUTO: 0.03 K/UL — SIGNIFICANT CHANGE UP (ref 0–0.2)
BASOPHILS # BLD AUTO: 0.03 K/UL — SIGNIFICANT CHANGE UP (ref 0–0.2)
BASOPHILS # BLD AUTO: 0.07 K/UL — SIGNIFICANT CHANGE UP (ref 0–0.2)
BASOPHILS # BLD AUTO: 0.07 K/UL — SIGNIFICANT CHANGE UP (ref 0–0.2)
BASOPHILS # BLD AUTO: 0.08 K/UL — SIGNIFICANT CHANGE UP (ref 0–0.2)
BASOPHILS # BLD AUTO: 0.08 K/UL — SIGNIFICANT CHANGE UP (ref 0–0.2)
BASOPHILS # BLD AUTO: 0.18 K/UL — SIGNIFICANT CHANGE UP (ref 0–0.2)
BASOPHILS # BLD AUTO: 0.18 K/UL — SIGNIFICANT CHANGE UP (ref 0–0.2)
BASOPHILS NFR BLD AUTO: 0 % — SIGNIFICANT CHANGE UP (ref 0–2)
BASOPHILS NFR BLD AUTO: 0.1 % — SIGNIFICANT CHANGE UP (ref 0–2)
BASOPHILS NFR BLD AUTO: 0.1 % — SIGNIFICANT CHANGE UP (ref 0–2)
BASOPHILS NFR BLD AUTO: 0.2 % — SIGNIFICANT CHANGE UP (ref 0–2)
BASOPHILS NFR BLD AUTO: 0.3 % — SIGNIFICANT CHANGE UP (ref 0–2)
BASOPHILS NFR BLD AUTO: 0.4 % — SIGNIFICANT CHANGE UP (ref 0–2)
BASOPHILS NFR BLD AUTO: 0.4 % — SIGNIFICANT CHANGE UP (ref 0–2)
BASOPHILS NFR BLD AUTO: 0.5 % — SIGNIFICANT CHANGE UP (ref 0–2)
BASOPHILS NFR BLD AUTO: 0.5 % — SIGNIFICANT CHANGE UP (ref 0–2)
BILIRUB DIRECT SERPL-MCNC: 0.9 MG/DL — HIGH (ref 0–0.3)
BILIRUB DIRECT SERPL-MCNC: 0.9 MG/DL — HIGH (ref 0–0.3)
BILIRUB INDIRECT FLD-MCNC: 1.2 MG/DL — HIGH (ref 0.2–1)
BILIRUB INDIRECT FLD-MCNC: 1.2 MG/DL — HIGH (ref 0.2–1)
BILIRUB SERPL-MCNC: 0.6 MG/DL — SIGNIFICANT CHANGE UP (ref 0.4–2)
BILIRUB SERPL-MCNC: 0.6 MG/DL — SIGNIFICANT CHANGE UP (ref 0.4–2)
BILIRUB SERPL-MCNC: 0.7 MG/DL — SIGNIFICANT CHANGE UP (ref 0.4–2)
BILIRUB SERPL-MCNC: 0.7 MG/DL — SIGNIFICANT CHANGE UP (ref 0.4–2)
BILIRUB SERPL-MCNC: 0.8 MG/DL — SIGNIFICANT CHANGE UP (ref 0.4–2)
BILIRUB SERPL-MCNC: 1.1 MG/DL — SIGNIFICANT CHANGE UP (ref 0.4–2)
BILIRUB SERPL-MCNC: 1.3 MG/DL — HIGH (ref 0.2–1.2)
BILIRUB SERPL-MCNC: 1.3 MG/DL — HIGH (ref 0.2–1.2)
BILIRUB SERPL-MCNC: 1.3 MG/DL — SIGNIFICANT CHANGE UP (ref 0.4–2)
BILIRUB SERPL-MCNC: 1.4 MG/DL — HIGH (ref 0.2–1.2)
BILIRUB SERPL-MCNC: 1.4 MG/DL — HIGH (ref 0.2–1.2)
BILIRUB SERPL-MCNC: 1.4 MG/DL — SIGNIFICANT CHANGE UP (ref 0.4–2)
BILIRUB SERPL-MCNC: 1.5 MG/DL — HIGH (ref 0.2–1.2)
BILIRUB SERPL-MCNC: 1.6 MG/DL — HIGH (ref 0.2–1.2)
BILIRUB SERPL-MCNC: 1.6 MG/DL — SIGNIFICANT CHANGE UP (ref 0.4–2)
BILIRUB SERPL-MCNC: 1.6 MG/DL — SIGNIFICANT CHANGE UP (ref 0.4–2)
BILIRUB SERPL-MCNC: 1.7 MG/DL — HIGH (ref 0.2–1.2)
BILIRUB SERPL-MCNC: 1.7 MG/DL — HIGH (ref 0.2–1.2)
BILIRUB SERPL-MCNC: 1.7 MG/DL — SIGNIFICANT CHANGE UP (ref 0.4–2)
BILIRUB SERPL-MCNC: 1.8 MG/DL — SIGNIFICANT CHANGE UP (ref 0.4–2)
BILIRUB SERPL-MCNC: 1.9 MG/DL — HIGH (ref 0.2–1.2)
BILIRUB SERPL-MCNC: 1.9 MG/DL — HIGH (ref 0.2–1.2)
BILIRUB SERPL-MCNC: 1.9 MG/DL — SIGNIFICANT CHANGE UP (ref 0.4–2)
BILIRUB SERPL-MCNC: 2 MG/DL — SIGNIFICANT CHANGE UP (ref 0.4–2)
BILIRUB SERPL-MCNC: 2.1 MG/DL — HIGH (ref 0.2–1.2)
BILIRUB SERPL-MCNC: 2.1 MG/DL — HIGH (ref 0.2–1.2)
BILIRUB SERPL-MCNC: 2.1 MG/DL — HIGH (ref 0.4–2)
BILIRUB SERPL-MCNC: 2.1 MG/DL — HIGH (ref 0.4–2)
BILIRUB SERPL-MCNC: 2.2 MG/DL — HIGH (ref 0.4–2)
BILIRUB SERPL-MCNC: 2.2 MG/DL — HIGH (ref 0.4–2)
BILIRUB SERPL-MCNC: 2.4 MG/DL — HIGH (ref 0.2–1.2)
BILIRUB SERPL-MCNC: 2.4 MG/DL — HIGH (ref 0.2–1.2)
BILIRUB SERPL-MCNC: 2.4 MG/DL — HIGH (ref 0.4–2)
BILIRUB SERPL-MCNC: 2.4 MG/DL — HIGH (ref 0.4–2)
BILIRUB SERPL-MCNC: 2.5 MG/DL — HIGH (ref 0.2–1.2)
BILIRUB SERPL-MCNC: 2.5 MG/DL — HIGH (ref 0.2–1.2)
BILIRUB SERPL-MCNC: 2.5 MG/DL — HIGH (ref 0.4–2)
BILIRUB SERPL-MCNC: 2.5 MG/DL — HIGH (ref 0.4–2)
BILIRUB SERPL-MCNC: 2.6 MG/DL — HIGH (ref 0.4–2)
BILIRUB SERPL-MCNC: 2.7 MG/DL — HIGH (ref 0.4–2)
BILIRUB SERPL-MCNC: 2.8 MG/DL — HIGH (ref 0.4–2)
BILIRUB SERPL-MCNC: 2.8 MG/DL — HIGH (ref 0.4–2)
BILIRUB UR-MCNC: NEGATIVE — SIGNIFICANT CHANGE UP
BLD GP AB SCN SERPL QL: NEGATIVE — SIGNIFICANT CHANGE UP
BLD GP AB SCN SERPL QL: NEGATIVE — SIGNIFICANT CHANGE UP
BLD GP AB SCN SERPL QL: SIGNIFICANT CHANGE UP
BLOOD GAS COMMENTS ARTERIAL: SIGNIFICANT CHANGE UP
BLOOD GAS COMMENTS ARTERIAL: SIGNIFICANT CHANGE UP
BLOOD GAS COMMENTS, VENOUS: SIGNIFICANT CHANGE UP
BLOOD GAS SOURCE: SIGNIFICANT CHANGE UP
BLOOD GAS SOURCE: SIGNIFICANT CHANGE UP
BUN SERPL-MCNC: 100 MG/DL — HIGH (ref 7–23)
BUN SERPL-MCNC: 100 MG/DL — HIGH (ref 7–23)
BUN SERPL-MCNC: 102.1 MG/DL — HIGH (ref 8–20)
BUN SERPL-MCNC: 102.1 MG/DL — HIGH (ref 8–20)
BUN SERPL-MCNC: 104 MG/DL — HIGH (ref 7–23)
BUN SERPL-MCNC: 104 MG/DL — HIGH (ref 7–23)
BUN SERPL-MCNC: 104.7 MG/DL — HIGH (ref 8–20)
BUN SERPL-MCNC: 104.7 MG/DL — HIGH (ref 8–20)
BUN SERPL-MCNC: 106 MG/DL — HIGH (ref 7–23)
BUN SERPL-MCNC: 106 MG/DL — HIGH (ref 7–23)
BUN SERPL-MCNC: 15.2 MG/DL — SIGNIFICANT CHANGE UP (ref 8–20)
BUN SERPL-MCNC: 16.5 MG/DL — SIGNIFICANT CHANGE UP (ref 8–20)
BUN SERPL-MCNC: 28 MG/DL — HIGH (ref 8–20)
BUN SERPL-MCNC: 32.6 MG/DL — HIGH (ref 8–20)
BUN SERPL-MCNC: 35.3 MG/DL — HIGH (ref 8–20)
BUN SERPL-MCNC: 35.3 MG/DL — HIGH (ref 8–20)
BUN SERPL-MCNC: 35.7 MG/DL — HIGH (ref 8–20)
BUN SERPL-MCNC: 35.9 MG/DL — HIGH (ref 8–20)
BUN SERPL-MCNC: 36.1 MG/DL — HIGH (ref 8–20)
BUN SERPL-MCNC: 36.9 MG/DL — HIGH (ref 8–20)
BUN SERPL-MCNC: 38.3 MG/DL — HIGH (ref 8–20)
BUN SERPL-MCNC: 38.5 MG/DL — HIGH (ref 8–20)
BUN SERPL-MCNC: 38.8 MG/DL — HIGH (ref 8–20)
BUN SERPL-MCNC: 42.2 MG/DL — HIGH (ref 8–20)
BUN SERPL-MCNC: 42.2 MG/DL — HIGH (ref 8–20)
BUN SERPL-MCNC: 44.3 MG/DL — HIGH (ref 8–20)
BUN SERPL-MCNC: 44.3 MG/DL — HIGH (ref 8–20)
BUN SERPL-MCNC: 44.4 MG/DL — HIGH (ref 8–20)
BUN SERPL-MCNC: 44.4 MG/DL — HIGH (ref 8–20)
BUN SERPL-MCNC: 44.5 MG/DL — HIGH (ref 8–20)
BUN SERPL-MCNC: 44.5 MG/DL — HIGH (ref 8–20)
BUN SERPL-MCNC: 49.1 MG/DL — HIGH (ref 8–20)
BUN SERPL-MCNC: 49.1 MG/DL — HIGH (ref 8–20)
BUN SERPL-MCNC: 49.9 MG/DL — HIGH (ref 8–20)
BUN SERPL-MCNC: 49.9 MG/DL — HIGH (ref 8–20)
BUN SERPL-MCNC: 51.1 MG/DL — HIGH (ref 8–20)
BUN SERPL-MCNC: 51.1 MG/DL — HIGH (ref 8–20)
BUN SERPL-MCNC: 55.1 MG/DL — HIGH (ref 8–20)
BUN SERPL-MCNC: 55.1 MG/DL — HIGH (ref 8–20)
BUN SERPL-MCNC: 56.1 MG/DL — HIGH (ref 8–20)
BUN SERPL-MCNC: 56.1 MG/DL — HIGH (ref 8–20)
BUN SERPL-MCNC: 58.3 MG/DL — HIGH (ref 8–20)
BUN SERPL-MCNC: 58.3 MG/DL — HIGH (ref 8–20)
BUN SERPL-MCNC: 60.4 MG/DL — HIGH (ref 8–20)
BUN SERPL-MCNC: 60.4 MG/DL — HIGH (ref 8–20)
BUN SERPL-MCNC: 63 MG/DL — HIGH (ref 7–23)
BUN SERPL-MCNC: 63 MG/DL — HIGH (ref 7–23)
BUN SERPL-MCNC: 63.9 MG/DL — HIGH (ref 8–20)
BUN SERPL-MCNC: 63.9 MG/DL — HIGH (ref 8–20)
BUN SERPL-MCNC: 65.6 MG/DL — HIGH (ref 8–20)
BUN SERPL-MCNC: 65.6 MG/DL — HIGH (ref 8–20)
BUN SERPL-MCNC: 66 MG/DL — HIGH (ref 7–23)
BUN SERPL-MCNC: 66 MG/DL — HIGH (ref 7–23)
BUN SERPL-MCNC: 66.1 MG/DL — HIGH (ref 8–20)
BUN SERPL-MCNC: 66.1 MG/DL — HIGH (ref 8–20)
BUN SERPL-MCNC: 67.3 MG/DL — HIGH (ref 8–20)
BUN SERPL-MCNC: 67.3 MG/DL — HIGH (ref 8–20)
BUN SERPL-MCNC: 68 MG/DL — HIGH (ref 7–23)
BUN SERPL-MCNC: 68 MG/DL — HIGH (ref 7–23)
BUN SERPL-MCNC: 69 MG/DL — HIGH (ref 7–23)
BUN SERPL-MCNC: 69 MG/DL — HIGH (ref 7–23)
BUN SERPL-MCNC: 71.7 MG/DL — HIGH (ref 8–20)
BUN SERPL-MCNC: 71.7 MG/DL — HIGH (ref 8–20)
BUN SERPL-MCNC: 74.7 MG/DL — HIGH (ref 8–20)
BUN SERPL-MCNC: 74.7 MG/DL — HIGH (ref 8–20)
BUN SERPL-MCNC: 74.9 MG/DL — HIGH (ref 8–20)
BUN SERPL-MCNC: 74.9 MG/DL — HIGH (ref 8–20)
BUN SERPL-MCNC: 75 MG/DL — HIGH (ref 7–23)
BUN SERPL-MCNC: 77.1 MG/DL — HIGH (ref 8–20)
BUN SERPL-MCNC: 77.1 MG/DL — HIGH (ref 8–20)
BUN SERPL-MCNC: 78.8 MG/DL — HIGH (ref 8–20)
BUN SERPL-MCNC: 78.8 MG/DL — HIGH (ref 8–20)
BUN SERPL-MCNC: 84.2 MG/DL — HIGH (ref 8–20)
BUN SERPL-MCNC: 84.2 MG/DL — HIGH (ref 8–20)
BUN SERPL-MCNC: 85 MG/DL — HIGH (ref 7–23)
BUN SERPL-MCNC: 85 MG/DL — HIGH (ref 7–23)
BUN SERPL-MCNC: 85.5 MG/DL — HIGH (ref 8–20)
BUN SERPL-MCNC: 85.5 MG/DL — HIGH (ref 8–20)
BUN SERPL-MCNC: 87.2 MG/DL — HIGH (ref 8–20)
BUN SERPL-MCNC: 87.2 MG/DL — HIGH (ref 8–20)
BUN SERPL-MCNC: 88.8 MG/DL — HIGH (ref 8–20)
BUN SERPL-MCNC: 88.8 MG/DL — HIGH (ref 8–20)
BUN SERPL-MCNC: 89 MG/DL — HIGH (ref 7–23)
BUN SERPL-MCNC: 89 MG/DL — HIGH (ref 7–23)
BUN SERPL-MCNC: 89 MG/DL — HIGH (ref 8–20)
BUN SERPL-MCNC: 89 MG/DL — HIGH (ref 8–20)
BUN SERPL-MCNC: 90 MG/DL — HIGH (ref 8–20)
BUN SERPL-MCNC: 90 MG/DL — HIGH (ref 8–20)
BUN SERPL-MCNC: 90.8 MG/DL — HIGH (ref 8–20)
BUN SERPL-MCNC: 90.8 MG/DL — HIGH (ref 8–20)
BUN SERPL-MCNC: 90.9 MG/DL — HIGH (ref 8–20)
BUN SERPL-MCNC: 90.9 MG/DL — HIGH (ref 8–20)
BUN SERPL-MCNC: 91.1 MG/DL — HIGH (ref 8–20)
BUN SERPL-MCNC: 91.1 MG/DL — HIGH (ref 8–20)
BUN SERPL-MCNC: 92.3 MG/DL — HIGH (ref 8–20)
BUN SERPL-MCNC: 93.5 MG/DL — HIGH (ref 8–20)
BUN SERPL-MCNC: 93.5 MG/DL — HIGH (ref 8–20)
BUN SERPL-MCNC: 97.3 MG/DL — HIGH (ref 8–20)
BUN SERPL-MCNC: 97.3 MG/DL — HIGH (ref 8–20)
BUN SERPL-MCNC: 98 MG/DL — HIGH (ref 7–23)
BUN SERPL-MCNC: 98 MG/DL — HIGH (ref 7–23)
BUN SERPL-MCNC: 98.5 MG/DL — HIGH (ref 8–20)
BUN SERPL-MCNC: 98.5 MG/DL — HIGH (ref 8–20)
BURR CELLS BLD QL SMEAR: PRESENT — SIGNIFICANT CHANGE UP
C-ANCA SER-ACNC: NEGATIVE — SIGNIFICANT CHANGE UP
C-ANCA SER-ACNC: NEGATIVE — SIGNIFICANT CHANGE UP
C3 SERPL-MCNC: 10 MG/DL — LOW (ref 81–157)
C3 SERPL-MCNC: 10 MG/DL — LOW (ref 81–157)
C3 SERPL-MCNC: 12 MG/DL — LOW (ref 81–157)
C3 SERPL-MCNC: 12 MG/DL — LOW (ref 81–157)
C3 SERPL-MCNC: 18 MG/DL — LOW (ref 81–157)
C3 SERPL-MCNC: 18 MG/DL — LOW (ref 81–157)
C4 SERPL-MCNC: 1 MG/DL — LOW (ref 13–39)
C4 SERPL-MCNC: 1 MG/DL — LOW (ref 13–39)
C4 SERPL-MCNC: 2 MG/DL — LOW (ref 13–39)
CA-I SERPL-SCNC: 0.98 MMOL/L — LOW (ref 1.15–1.33)
CA-I SERPL-SCNC: 1 MMOL/L — LOW (ref 1.15–1.33)
CA-I SERPL-SCNC: 1 MMOL/L — LOW (ref 1.15–1.33)
CA-I SERPL-SCNC: 1.01 MMOL/L — LOW (ref 1.15–1.33)
CA-I SERPL-SCNC: 1.01 MMOL/L — LOW (ref 1.15–1.33)
CA-I SERPL-SCNC: 1.03 MMOL/L — LOW (ref 1.15–1.33)
CA-I SERPL-SCNC: 1.03 MMOL/L — LOW (ref 1.15–1.33)
CA-I SERPL-SCNC: 1.06 MMOL/L — LOW (ref 1.15–1.33)
CA-I SERPL-SCNC: 1.06 MMOL/L — LOW (ref 1.15–1.33)
CA-I SERPL-SCNC: 1.07 MMOL/L — LOW (ref 1.15–1.33)
CA-I SERPL-SCNC: 1.07 MMOL/L — LOW (ref 1.15–1.33)
CA-I SERPL-SCNC: 1.1 MMOL/L — LOW (ref 1.15–1.33)
CA-I SERPL-SCNC: 1.11 MMOL/L — LOW (ref 1.15–1.33)
CA-I SERPL-SCNC: 1.11 MMOL/L — LOW (ref 1.15–1.33)
CA-I SERPL-SCNC: 1.14 MMOL/L — LOW (ref 1.15–1.33)
CA-I SERPL-SCNC: 1.14 MMOL/L — LOW (ref 1.15–1.33)
CA-I SERPL-SCNC: 1.15 MMOL/L — SIGNIFICANT CHANGE UP (ref 1.15–1.33)
CA-I SERPL-SCNC: 1.16 MMOL/L — SIGNIFICANT CHANGE UP (ref 1.15–1.33)
CA-I SERPL-SCNC: 1.17 MMOL/L — SIGNIFICANT CHANGE UP (ref 1.15–1.33)
CA-I SERPL-SCNC: 1.17 MMOL/L — SIGNIFICANT CHANGE UP (ref 1.15–1.33)
CA-I SERPL-SCNC: 1.18 MMOL/L — SIGNIFICANT CHANGE UP (ref 1.15–1.33)
CA-I SERPL-SCNC: 1.18 MMOL/L — SIGNIFICANT CHANGE UP (ref 1.15–1.33)
CA-I SERPL-SCNC: 1.19 MMOL/L — SIGNIFICANT CHANGE UP (ref 1.15–1.33)
CA-I SERPL-SCNC: 1.19 MMOL/L — SIGNIFICANT CHANGE UP (ref 1.15–1.33)
CA-I SERPL-SCNC: 1.21 MMOL/L — SIGNIFICANT CHANGE UP (ref 1.15–1.33)
CA-I SERPL-SCNC: 1.21 MMOL/L — SIGNIFICANT CHANGE UP (ref 1.15–1.33)
CA-I SERPL-SCNC: 1.22 MMOL/L — SIGNIFICANT CHANGE UP (ref 1.15–1.33)
CA-I SERPL-SCNC: 1.22 MMOL/L — SIGNIFICANT CHANGE UP (ref 1.15–1.33)
CALCIUM SERPL-MCNC: 7.3 MG/DL — LOW (ref 8.4–10.5)
CALCIUM SERPL-MCNC: 7.4 MG/DL — LOW (ref 8.4–10.5)
CALCIUM SERPL-MCNC: 7.4 MG/DL — LOW (ref 8.4–10.5)
CALCIUM SERPL-MCNC: 7.5 MG/DL — LOW (ref 8.4–10.5)
CALCIUM SERPL-MCNC: 7.6 MG/DL — LOW (ref 8.4–10.5)
CALCIUM SERPL-MCNC: 7.7 MG/DL — LOW (ref 8.4–10.5)
CALCIUM SERPL-MCNC: 7.7 MG/DL — LOW (ref 8.4–10.5)
CALCIUM SERPL-MCNC: 7.8 MG/DL — LOW (ref 8.4–10.5)
CALCIUM SERPL-MCNC: 7.9 MG/DL — LOW (ref 8.4–10.5)
CALCIUM SERPL-MCNC: 8.1 MG/DL — LOW (ref 8.4–10.5)
CALCIUM SERPL-MCNC: 8.1 MG/DL — LOW (ref 8.4–10.5)
CALCIUM SERPL-MCNC: 8.2 MG/DL — LOW (ref 8.4–10.5)
CALCIUM SERPL-MCNC: 8.3 MG/DL — LOW (ref 8.4–10.5)
CALCIUM SERPL-MCNC: 8.4 MG/DL — SIGNIFICANT CHANGE UP (ref 8.4–10.5)
CALCIUM SERPL-MCNC: 8.5 MG/DL — SIGNIFICANT CHANGE UP (ref 8.4–10.5)
CALCIUM SERPL-MCNC: 8.6 MG/DL — SIGNIFICANT CHANGE UP (ref 8.4–10.5)
CALCIUM SERPL-MCNC: 8.7 MG/DL — SIGNIFICANT CHANGE UP (ref 8.4–10.5)
CALCIUM SERPL-MCNC: 8.8 MG/DL — SIGNIFICANT CHANGE UP (ref 8.4–10.5)
CALCIUM SERPL-MCNC: 8.9 MG/DL — SIGNIFICANT CHANGE UP (ref 8.4–10.5)
CALCIUM SERPL-MCNC: 9 MG/DL — SIGNIFICANT CHANGE UP (ref 8.4–10.5)
CALCIUM SERPL-MCNC: 9.1 MG/DL — SIGNIFICANT CHANGE UP (ref 8.4–10.5)
CALCIUM SERPL-MCNC: 9.1 MG/DL — SIGNIFICANT CHANGE UP (ref 8.4–10.5)
CALCIUM SERPL-MCNC: 9.2 MG/DL — SIGNIFICANT CHANGE UP (ref 8.4–10.5)
CALCIUM SERPL-MCNC: 9.3 MG/DL — SIGNIFICANT CHANGE UP (ref 8.4–10.5)
CALCIUM SERPL-MCNC: 9.4 MG/DL — SIGNIFICANT CHANGE UP (ref 8.4–10.5)
CALCIUM SERPL-MCNC: 9.4 MG/DL — SIGNIFICANT CHANGE UP (ref 8.4–10.5)
CALCIUM SERPL-MCNC: 9.5 MG/DL — SIGNIFICANT CHANGE UP (ref 8.4–10.5)
CALCIUM SERPL-MCNC: 9.5 MG/DL — SIGNIFICANT CHANGE UP (ref 8.4–10.5)
CARDIOLIPIN AB SER-ACNC: POSITIVE
CARDIOLIPIN IGM SER-MCNC: 10 GPL — SIGNIFICANT CHANGE UP (ref 0–12.5)
CARDIOLIPIN IGM SER-MCNC: 25.5 MPL — HIGH (ref 0–12.5)
CARDIOLIPIN IGM SER-MCNC: 25.5 MPL — HIGH (ref 0–12.5)
CARDIOLIPIN IGM SER-MCNC: 26.4 MPL — HIGH (ref 0–12.5)
CARDIOLIPIN IGM SER-MCNC: 26.4 MPL — HIGH (ref 0–12.5)
CCP IGG SERPL-ACNC: <8 UNITS — SIGNIFICANT CHANGE UP
CCP IGG SERPL-ACNC: <8 UNITS — SIGNIFICANT CHANGE UP
CENTROMERE AB SER-ACNC: <0.2 AI — SIGNIFICANT CHANGE UP
CENTROMERE AB SER-ACNC: <0.2 AI — SIGNIFICANT CHANGE UP
CHLORIDE BLDV-SCNC: 101 MMOL/L — SIGNIFICANT CHANGE UP (ref 96–108)
CHLORIDE BLDV-SCNC: 101 MMOL/L — SIGNIFICANT CHANGE UP (ref 96–108)
CHLORIDE BLDV-SCNC: 90 MMOL/L — LOW (ref 96–108)
CHLORIDE BLDV-SCNC: 92 MMOL/L — LOW (ref 96–108)
CHLORIDE BLDV-SCNC: 93 MMOL/L — LOW (ref 96–108)
CHLORIDE BLDV-SCNC: 94 MMOL/L — LOW (ref 96–108)
CHLORIDE BLDV-SCNC: 95 MMOL/L — LOW (ref 96–108)
CHLORIDE BLDV-SCNC: 95 MMOL/L — LOW (ref 96–108)
CHLORIDE BLDV-SCNC: 96 MMOL/L — SIGNIFICANT CHANGE UP (ref 96–108)
CHLORIDE BLDV-SCNC: 97 MMOL/L — SIGNIFICANT CHANGE UP (ref 96–108)
CHLORIDE BLDV-SCNC: 97 MMOL/L — SIGNIFICANT CHANGE UP (ref 96–108)
CHLORIDE BLDV-SCNC: 98 MMOL/L — SIGNIFICANT CHANGE UP (ref 96–108)
CHLORIDE BLDV-SCNC: 98 MMOL/L — SIGNIFICANT CHANGE UP (ref 96–108)
CHLORIDE SERPL-SCNC: 100 MMOL/L — SIGNIFICANT CHANGE UP (ref 96–108)
CHLORIDE SERPL-SCNC: 101 MMOL/L — SIGNIFICANT CHANGE UP (ref 96–108)
CHLORIDE SERPL-SCNC: 102 MMOL/L — SIGNIFICANT CHANGE UP (ref 96–108)
CHLORIDE SERPL-SCNC: 85 MMOL/L — LOW (ref 96–108)
CHLORIDE SERPL-SCNC: 86 MMOL/L — LOW (ref 96–108)
CHLORIDE SERPL-SCNC: 86 MMOL/L — LOW (ref 96–108)
CHLORIDE SERPL-SCNC: 87 MMOL/L — LOW (ref 96–108)
CHLORIDE SERPL-SCNC: 87 MMOL/L — LOW (ref 96–108)
CHLORIDE SERPL-SCNC: 88 MMOL/L — LOW (ref 96–108)
CHLORIDE SERPL-SCNC: 89 MMOL/L — LOW (ref 96–108)
CHLORIDE SERPL-SCNC: 90 MMOL/L — LOW (ref 96–108)
CHLORIDE SERPL-SCNC: 90 MMOL/L — LOW (ref 96–108)
CHLORIDE SERPL-SCNC: 91 MMOL/L — LOW (ref 96–108)
CHLORIDE SERPL-SCNC: 92 MMOL/L — LOW (ref 96–108)
CHLORIDE SERPL-SCNC: 93 MMOL/L — LOW (ref 96–108)
CHLORIDE SERPL-SCNC: 94 MMOL/L — LOW (ref 96–108)
CHLORIDE SERPL-SCNC: 95 MMOL/L — LOW (ref 96–108)
CHLORIDE SERPL-SCNC: 96 MMOL/L — SIGNIFICANT CHANGE UP (ref 96–108)
CHLORIDE SERPL-SCNC: 97 MMOL/L — SIGNIFICANT CHANGE UP (ref 96–108)
CHLORIDE SERPL-SCNC: 98 MMOL/L — SIGNIFICANT CHANGE UP (ref 96–108)
CHLORIDE SERPL-SCNC: 99 MMOL/L — SIGNIFICANT CHANGE UP (ref 96–108)
CK SERPL-CCNC: 13 U/L — LOW (ref 25–170)
CK SERPL-CCNC: 13 U/L — LOW (ref 25–170)
CK SERPL-CCNC: 27 U/L — SIGNIFICANT CHANGE UP (ref 25–170)
CK SERPL-CCNC: 27 U/L — SIGNIFICANT CHANGE UP (ref 25–170)
CO2 BLDV-SCNC: 37 MMOL/L — HIGH (ref 22–26)
CO2 BLDV-SCNC: 37 MMOL/L — HIGH (ref 22–26)
CO2 BLDV-SCNC: 45 MMOL/L — HIGH (ref 22–26)
CO2 BLDV-SCNC: 45 MMOL/L — HIGH (ref 22–26)
CO2 BLDV-SCNC: 46 MMOL/L — HIGH (ref 22–26)
CO2 BLDV-SCNC: 46 MMOL/L — HIGH (ref 22–26)
CO2 BLDV-SCNC: 48 MMOL/L — HIGH (ref 22–26)
CO2 BLDV-SCNC: 48 MMOL/L — HIGH (ref 22–26)
CO2 BLDV-SCNC: 49 MMOL/L — HIGH (ref 22–26)
CO2 BLDV-SCNC: 50 MMOL/L — HIGH (ref 22–26)
CO2 BLDV-SCNC: 50 MMOL/L — HIGH (ref 22–26)
CO2 BLDV-SCNC: 51 MMOL/L — HIGH (ref 22–26)
CO2 BLDV-SCNC: 51 MMOL/L — HIGH (ref 22–26)
CO2 BLDV-SCNC: 53 MMOL/L — HIGH (ref 22–26)
CO2 BLDV-SCNC: 53 MMOL/L — HIGH (ref 22–26)
CO2 BLDV-SCNC: 54 MMOL/L — HIGH (ref 22–26)
CO2 BLDV-SCNC: 54 MMOL/L — HIGH (ref 22–26)
CO2 SERPL-SCNC: 10 MMOL/L — CRITICAL LOW (ref 22–31)
CO2 SERPL-SCNC: 10 MMOL/L — CRITICAL LOW (ref 22–31)
CO2 SERPL-SCNC: 12 MMOL/L — LOW (ref 22–31)
CO2 SERPL-SCNC: 12 MMOL/L — LOW (ref 22–31)
CO2 SERPL-SCNC: 13 MMOL/L — LOW (ref 22–29)
CO2 SERPL-SCNC: 13 MMOL/L — LOW (ref 22–29)
CO2 SERPL-SCNC: 16 MMOL/L — LOW (ref 22–29)
CO2 SERPL-SCNC: 16 MMOL/L — LOW (ref 22–29)
CO2 SERPL-SCNC: 16 MMOL/L — LOW (ref 22–31)
CO2 SERPL-SCNC: 17 MMOL/L — LOW (ref 22–29)
CO2 SERPL-SCNC: 17 MMOL/L — LOW (ref 22–29)
CO2 SERPL-SCNC: 18 MMOL/L — LOW (ref 22–29)
CO2 SERPL-SCNC: 21 MMOL/L — LOW (ref 22–29)
CO2 SERPL-SCNC: 21 MMOL/L — LOW (ref 22–29)
CO2 SERPL-SCNC: 22 MMOL/L — SIGNIFICANT CHANGE UP (ref 22–29)
CO2 SERPL-SCNC: 23 MMOL/L — SIGNIFICANT CHANGE UP (ref 22–29)
CO2 SERPL-SCNC: 24 MMOL/L — SIGNIFICANT CHANGE UP (ref 22–29)
CO2 SERPL-SCNC: 25 MMOL/L — SIGNIFICANT CHANGE UP (ref 22–29)
CO2 SERPL-SCNC: 26 MMOL/L — SIGNIFICANT CHANGE UP (ref 22–29)
CO2 SERPL-SCNC: 27 MMOL/L — SIGNIFICANT CHANGE UP (ref 22–29)
CO2 SERPL-SCNC: 28 MMOL/L — SIGNIFICANT CHANGE UP (ref 22–29)
CO2 SERPL-SCNC: 29 MMOL/L — SIGNIFICANT CHANGE UP (ref 22–29)
CO2 SERPL-SCNC: 30 MMOL/L — HIGH (ref 22–29)
CO2 SERPL-SCNC: 31 MMOL/L — HIGH (ref 22–29)
CO2 SERPL-SCNC: 34 MMOL/L — HIGH (ref 22–29)
CO2 SERPL-SCNC: 34 MMOL/L — HIGH (ref 22–29)
CO2 SERPL-SCNC: 35 MMOL/L — HIGH (ref 22–31)
CO2 SERPL-SCNC: 36 MMOL/L — HIGH (ref 22–29)
CO2 SERPL-SCNC: 36 MMOL/L — HIGH (ref 22–29)
CO2 SERPL-SCNC: 36 MMOL/L — HIGH (ref 22–31)
CO2 SERPL-SCNC: 39 MMOL/L — HIGH (ref 22–31)
CO2 SERPL-SCNC: 40 MMOL/L — HIGH (ref 22–31)
CO2 SERPL-SCNC: 40 MMOL/L — HIGH (ref 22–31)
CO2 SERPL-SCNC: 41 MMOL/L — HIGH (ref 22–31)
COLOR SPEC: YELLOW — SIGNIFICANT CHANGE UP
COMMENT - URINE: SIGNIFICANT CHANGE UP
CREAT ?TM UR-MCNC: 120 MG/DL — SIGNIFICANT CHANGE UP
CREAT ?TM UR-MCNC: 120 MG/DL — SIGNIFICANT CHANGE UP
CREAT ?TM UR-MCNC: 58 MG/DL — SIGNIFICANT CHANGE UP
CREAT ?TM UR-MCNC: 58 MG/DL — SIGNIFICANT CHANGE UP
CREAT SERPL-MCNC: 0.67 MG/DL — SIGNIFICANT CHANGE UP (ref 0.5–1.3)
CREAT SERPL-MCNC: 0.69 MG/DL — SIGNIFICANT CHANGE UP (ref 0.5–1.3)
CREAT SERPL-MCNC: 0.78 MG/DL — SIGNIFICANT CHANGE UP (ref 0.5–1.3)
CREAT SERPL-MCNC: 0.78 MG/DL — SIGNIFICANT CHANGE UP (ref 0.5–1.3)
CREAT SERPL-MCNC: 0.84 MG/DL — SIGNIFICANT CHANGE UP (ref 0.5–1.3)
CREAT SERPL-MCNC: 0.84 MG/DL — SIGNIFICANT CHANGE UP (ref 0.5–1.3)
CREAT SERPL-MCNC: 0.93 MG/DL — SIGNIFICANT CHANGE UP (ref 0.5–1.3)
CREAT SERPL-MCNC: 0.93 MG/DL — SIGNIFICANT CHANGE UP (ref 0.5–1.3)
CREAT SERPL-MCNC: 0.95 MG/DL — SIGNIFICANT CHANGE UP (ref 0.5–1.3)
CREAT SERPL-MCNC: 0.95 MG/DL — SIGNIFICANT CHANGE UP (ref 0.5–1.3)
CREAT SERPL-MCNC: 1.01 MG/DL — SIGNIFICANT CHANGE UP (ref 0.5–1.3)
CREAT SERPL-MCNC: 1.01 MG/DL — SIGNIFICANT CHANGE UP (ref 0.5–1.3)
CREAT SERPL-MCNC: 1.03 MG/DL — SIGNIFICANT CHANGE UP (ref 0.5–1.3)
CREAT SERPL-MCNC: 1.03 MG/DL — SIGNIFICANT CHANGE UP (ref 0.5–1.3)
CREAT SERPL-MCNC: 1.04 MG/DL — SIGNIFICANT CHANGE UP (ref 0.5–1.3)
CREAT SERPL-MCNC: 1.04 MG/DL — SIGNIFICANT CHANGE UP (ref 0.5–1.3)
CREAT SERPL-MCNC: 1.08 MG/DL — SIGNIFICANT CHANGE UP (ref 0.5–1.3)
CREAT SERPL-MCNC: 1.08 MG/DL — SIGNIFICANT CHANGE UP (ref 0.5–1.3)
CREAT SERPL-MCNC: 1.1 MG/DL — SIGNIFICANT CHANGE UP (ref 0.5–1.3)
CREAT SERPL-MCNC: 1.11 MG/DL — SIGNIFICANT CHANGE UP (ref 0.5–1.3)
CREAT SERPL-MCNC: 1.13 MG/DL — SIGNIFICANT CHANGE UP (ref 0.5–1.3)
CREAT SERPL-MCNC: 1.16 MG/DL — SIGNIFICANT CHANGE UP (ref 0.5–1.3)
CREAT SERPL-MCNC: 1.21 MG/DL — SIGNIFICANT CHANGE UP (ref 0.5–1.3)
CREAT SERPL-MCNC: 1.22 MG/DL — SIGNIFICANT CHANGE UP (ref 0.5–1.3)
CREAT SERPL-MCNC: 1.23 MG/DL — SIGNIFICANT CHANGE UP (ref 0.5–1.3)
CREAT SERPL-MCNC: 1.23 MG/DL — SIGNIFICANT CHANGE UP (ref 0.5–1.3)
CREAT SERPL-MCNC: 1.24 MG/DL — SIGNIFICANT CHANGE UP (ref 0.5–1.3)
CREAT SERPL-MCNC: 1.37 MG/DL — HIGH (ref 0.5–1.3)
CREAT SERPL-MCNC: 1.37 MG/DL — HIGH (ref 0.5–1.3)
CREAT SERPL-MCNC: 1.45 MG/DL — HIGH (ref 0.5–1.3)
CREAT SERPL-MCNC: 1.45 MG/DL — HIGH (ref 0.5–1.3)
CREAT SERPL-MCNC: 1.61 MG/DL — HIGH (ref 0.5–1.3)
CREAT SERPL-MCNC: 1.61 MG/DL — HIGH (ref 0.5–1.3)
CREAT SERPL-MCNC: 1.63 MG/DL — HIGH (ref 0.5–1.3)
CREAT SERPL-MCNC: 1.63 MG/DL — HIGH (ref 0.5–1.3)
CREAT SERPL-MCNC: 1.64 MG/DL — HIGH (ref 0.5–1.3)
CREAT SERPL-MCNC: 1.64 MG/DL — HIGH (ref 0.5–1.3)
CREAT SERPL-MCNC: 1.68 MG/DL — HIGH (ref 0.5–1.3)
CREAT SERPL-MCNC: 1.74 MG/DL — HIGH (ref 0.5–1.3)
CREAT SERPL-MCNC: 1.74 MG/DL — HIGH (ref 0.5–1.3)
CREAT SERPL-MCNC: 1.85 MG/DL — HIGH (ref 0.5–1.3)
CREAT SERPL-MCNC: 1.85 MG/DL — HIGH (ref 0.5–1.3)
CREAT SERPL-MCNC: 1.91 MG/DL — HIGH (ref 0.5–1.3)
CREAT SERPL-MCNC: 2.11 MG/DL — HIGH (ref 0.5–1.3)
CREAT SERPL-MCNC: 2.11 MG/DL — HIGH (ref 0.5–1.3)
CREAT SERPL-MCNC: 2.15 MG/DL — HIGH (ref 0.5–1.3)
CREAT SERPL-MCNC: 2.15 MG/DL — HIGH (ref 0.5–1.3)
CREAT SERPL-MCNC: 2.26 MG/DL — HIGH (ref 0.5–1.3)
CREAT SERPL-MCNC: 2.26 MG/DL — HIGH (ref 0.5–1.3)
CREAT SERPL-MCNC: 2.44 MG/DL — HIGH (ref 0.5–1.3)
CREAT SERPL-MCNC: 2.44 MG/DL — HIGH (ref 0.5–1.3)
CREAT SERPL-MCNC: 2.54 MG/DL — HIGH (ref 0.5–1.3)
CREAT SERPL-MCNC: 2.54 MG/DL — HIGH (ref 0.5–1.3)
CREAT SERPL-MCNC: 2.58 MG/DL — HIGH (ref 0.5–1.3)
CREAT SERPL-MCNC: 2.58 MG/DL — HIGH (ref 0.5–1.3)
CREAT SERPL-MCNC: 2.61 MG/DL — HIGH (ref 0.5–1.3)
CREAT SERPL-MCNC: 2.73 MG/DL — HIGH (ref 0.5–1.3)
CREAT SERPL-MCNC: 2.73 MG/DL — HIGH (ref 0.5–1.3)
CREAT SERPL-MCNC: 2.75 MG/DL — HIGH (ref 0.5–1.3)
CREAT SERPL-MCNC: 2.75 MG/DL — HIGH (ref 0.5–1.3)
CREAT SERPL-MCNC: 2.76 MG/DL — HIGH (ref 0.5–1.3)
CREAT SERPL-MCNC: 2.76 MG/DL — HIGH (ref 0.5–1.3)
CREAT SERPL-MCNC: 2.77 MG/DL — HIGH (ref 0.5–1.3)
CREAT SERPL-MCNC: 2.77 MG/DL — HIGH (ref 0.5–1.3)
CREAT SERPL-MCNC: 2.78 MG/DL — HIGH (ref 0.5–1.3)
CREAT SERPL-MCNC: 2.78 MG/DL — HIGH (ref 0.5–1.3)
CREAT SERPL-MCNC: 2.81 MG/DL — HIGH (ref 0.5–1.3)
CREAT SERPL-MCNC: 2.81 MG/DL — HIGH (ref 0.5–1.3)
CREAT SERPL-MCNC: 2.92 MG/DL — HIGH (ref 0.5–1.3)
CREAT SERPL-MCNC: 2.93 MG/DL — HIGH (ref 0.5–1.3)
CREAT SERPL-MCNC: 2.94 MG/DL — HIGH (ref 0.5–1.3)
CREAT SERPL-MCNC: 2.94 MG/DL — HIGH (ref 0.5–1.3)
CREAT SERPL-MCNC: 2.95 MG/DL — HIGH (ref 0.5–1.3)
CREAT SERPL-MCNC: 2.95 MG/DL — HIGH (ref 0.5–1.3)
CREAT SERPL-MCNC: 2.97 MG/DL — HIGH (ref 0.5–1.3)
CREAT SERPL-MCNC: 2.97 MG/DL — HIGH (ref 0.5–1.3)
CREAT SERPL-MCNC: 2.98 MG/DL — HIGH (ref 0.5–1.3)
CREAT SERPL-MCNC: 3.01 MG/DL — HIGH (ref 0.5–1.3)
CREAT SERPL-MCNC: 3.01 MG/DL — HIGH (ref 0.5–1.3)
CREAT SERPL-MCNC: 3.06 MG/DL — HIGH (ref 0.5–1.3)
CREAT SERPL-MCNC: 3.2 MG/DL — HIGH (ref 0.5–1.3)
CREAT SERPL-MCNC: 3.2 MG/DL — HIGH (ref 0.5–1.3)
CREAT SERPL-MCNC: 3.21 MG/DL — HIGH (ref 0.5–1.3)
CREAT SERPL-MCNC: 3.21 MG/DL — HIGH (ref 0.5–1.3)
CRP SERPL-MCNC: 6 MG/L — HIGH (ref 0–4)
CRP SERPL-MCNC: 6 MG/L — HIGH (ref 0–4)
CULTURE RESULTS: SIGNIFICANT CHANGE UP
D DIMER BLD IA.RAPID-MCNC: HIGH NG/ML DDU
D DIMER BLD IA.RAPID-MCNC: HIGH NG/ML DDU
DACRYOCYTES BLD QL SMEAR: SLIGHT — SIGNIFICANT CHANGE UP
DEPRECATED CARDIOLIPIN IGA SER: 5.8 APL — SIGNIFICANT CHANGE UP (ref 0–12.5)
DEPRECATED CARDIOLIPIN IGA SER: 5.8 APL — SIGNIFICANT CHANGE UP (ref 0–12.5)
DEPRECATED CARDIOLIPIN IGA SER: <5 APL — SIGNIFICANT CHANGE UP (ref 0–12.5)
DEPRECATED CARDIOLIPIN IGA SER: <5 APL — SIGNIFICANT CHANGE UP (ref 0–12.5)
DEPRECATED S PNEUM 1 IGG SER-MCNC: <0.1 MCG/ML — SIGNIFICANT CHANGE UP
DEPRECATED S PNEUM 1 IGG SER-MCNC: <0.1 MCG/ML — SIGNIFICANT CHANGE UP
DEPRECATED S PNEUM12 IGG SER-MCNC: <0.1 MCG/ML — SIGNIFICANT CHANGE UP
DEPRECATED S PNEUM12 IGG SER-MCNC: <0.1 MCG/ML — SIGNIFICANT CHANGE UP
DEPRECATED S PNEUM14 IGG SER-MCNC: <0.1 MCG/ML — SIGNIFICANT CHANGE UP
DEPRECATED S PNEUM14 IGG SER-MCNC: <0.1 MCG/ML — SIGNIFICANT CHANGE UP
DEPRECATED S PNEUM17 IGG SER-MCNC: 0.3 MCG/ML — SIGNIFICANT CHANGE UP
DEPRECATED S PNEUM17 IGG SER-MCNC: 0.3 MCG/ML — SIGNIFICANT CHANGE UP
DEPRECATED S PNEUM19 IGG SER-MCNC: 0.2 MCG/ML — SIGNIFICANT CHANGE UP
DEPRECATED S PNEUM19 IGG SER-MCNC: 0.2 MCG/ML — SIGNIFICANT CHANGE UP
DEPRECATED S PNEUM19 IGG SER-MCNC: 0.3 MCG/ML — SIGNIFICANT CHANGE UP
DEPRECATED S PNEUM19 IGG SER-MCNC: 0.3 MCG/ML — SIGNIFICANT CHANGE UP
DEPRECATED S PNEUM2 IGG SER-MCNC: 0.2 MCG/ML — SIGNIFICANT CHANGE UP
DEPRECATED S PNEUM2 IGG SER-MCNC: 0.2 MCG/ML — SIGNIFICANT CHANGE UP
DEPRECATED S PNEUM20 IGG SER-MCNC: 0.4 MCG/ML — SIGNIFICANT CHANGE UP
DEPRECATED S PNEUM20 IGG SER-MCNC: 0.4 MCG/ML — SIGNIFICANT CHANGE UP
DEPRECATED S PNEUM22 IGG SER-MCNC: 0.2 MCG/ML — SIGNIFICANT CHANGE UP
DEPRECATED S PNEUM22 IGG SER-MCNC: 0.2 MCG/ML — SIGNIFICANT CHANGE UP
DEPRECATED S PNEUM23 IGG SER-MCNC: 0.1 MCG/ML — SIGNIFICANT CHANGE UP
DEPRECATED S PNEUM23 IGG SER-MCNC: 0.1 MCG/ML — SIGNIFICANT CHANGE UP
DEPRECATED S PNEUM3 IGG SER-MCNC: <0.1 MCG/ML — SIGNIFICANT CHANGE UP
DEPRECATED S PNEUM3 IGG SER-MCNC: <0.1 MCG/ML — SIGNIFICANT CHANGE UP
DEPRECATED S PNEUM4 IGG SER-MCNC: <0.1 MCG/ML — SIGNIFICANT CHANGE UP
DEPRECATED S PNEUM4 IGG SER-MCNC: <0.1 MCG/ML — SIGNIFICANT CHANGE UP
DEPRECATED S PNEUM5 IGG SER-MCNC: <0.1 MCG/ML — SIGNIFICANT CHANGE UP
DEPRECATED S PNEUM5 IGG SER-MCNC: <0.1 MCG/ML — SIGNIFICANT CHANGE UP
DEPRECATED S PNEUM8 IGG SER-MCNC: 0.2 MCG/ML — SIGNIFICANT CHANGE UP
DEPRECATED S PNEUM8 IGG SER-MCNC: 0.2 MCG/ML — SIGNIFICANT CHANGE UP
DEPRECATED S PNEUM9 IGG SER-MCNC: <0.1 MCG/ML — SIGNIFICANT CHANGE UP
DIFF PNL FLD: ABNORMAL
DRVVT RATIO: 0.84 RATIO — SIGNIFICANT CHANGE UP (ref 0–1.21)
DRVVT RATIO: 0.84 RATIO — SIGNIFICANT CHANGE UP (ref 0–1.21)
DRVVT RATIO: 0.86 RATIO — SIGNIFICANT CHANGE UP (ref 0–1.21)
DRVVT RATIO: 0.86 RATIO — SIGNIFICANT CHANGE UP (ref 0–1.21)
DRVVT SCREEN TO CONFIRM RATIO: SIGNIFICANT CHANGE UP
DSDNA AB FLD-ACNC: >8 AI — HIGH
DSDNA AB SER-ACNC: 251 IU/ML — HIGH
DSDNA AB SER-ACNC: 251 IU/ML — HIGH
DSDNA AB SER-ACNC: 347 IU/ML — HIGH
DSDNA AB SER-ACNC: 347 IU/ML — HIGH
EGFR: 101 ML/MIN/1.73M2 — SIGNIFICANT CHANGE UP
EGFR: 101 ML/MIN/1.73M2 — SIGNIFICANT CHANGE UP
EGFR: 116 ML/MIN/1.73M2 — SIGNIFICANT CHANGE UP
EGFR: 117 ML/MIN/1.73M2 — SIGNIFICANT CHANGE UP
EGFR: 18 ML/MIN/1.73M2 — LOW
EGFR: 18 ML/MIN/1.73M2 — LOW
EGFR: 19 ML/MIN/1.73M2 — LOW
EGFR: 19 ML/MIN/1.73M2 — LOW
EGFR: 20 ML/MIN/1.73M2 — LOW
EGFR: 21 ML/MIN/1.73M2 — LOW
EGFR: 22 ML/MIN/1.73M2 — LOW
EGFR: 24 ML/MIN/1.73M2 — LOW
EGFR: 26 ML/MIN/1.73M2 — LOW
EGFR: 26 ML/MIN/1.73M2 — LOW
EGFR: 28 ML/MIN/1.73M2 — LOW
EGFR: 28 ML/MIN/1.73M2 — LOW
EGFR: 30 ML/MIN/1.73M2 — LOW
EGFR: 30 ML/MIN/1.73M2 — LOW
EGFR: 31 ML/MIN/1.73M2 — LOW
EGFR: 31 ML/MIN/1.73M2 — LOW
EGFR: 34 ML/MIN/1.73M2 — LOW
EGFR: 36 ML/MIN/1.73M2 — LOW
EGFR: 36 ML/MIN/1.73M2 — LOW
EGFR: 39 ML/MIN/1.73M2 — LOW
EGFR: 39 ML/MIN/1.73M2 — LOW
EGFR: 40 ML/MIN/1.73M2 — LOW
EGFR: 41 ML/MIN/1.73M2 — LOW
EGFR: 41 ML/MIN/1.73M2 — LOW
EGFR: 42 ML/MIN/1.73M2 — LOW
EGFR: 48 ML/MIN/1.73M2 — LOW
EGFR: 48 ML/MIN/1.73M2 — LOW
EGFR: 51 ML/MIN/1.73M2 — LOW
EGFR: 51 ML/MIN/1.73M2 — LOW
EGFR: 58 ML/MIN/1.73M2 — LOW
EGFR: 59 ML/MIN/1.73M2 — LOW
EGFR: 60 ML/MIN/1.73M2 — SIGNIFICANT CHANGE UP
EGFR: 63 ML/MIN/1.73M2 — SIGNIFICANT CHANGE UP
EGFR: 65 ML/MIN/1.73M2 — SIGNIFICANT CHANGE UP
EGFR: 66 ML/MIN/1.73M2 — SIGNIFICANT CHANGE UP
EGFR: 67 ML/MIN/1.73M2 — SIGNIFICANT CHANGE UP
EGFR: 68 ML/MIN/1.73M2 — SIGNIFICANT CHANGE UP
EGFR: 68 ML/MIN/1.73M2 — SIGNIFICANT CHANGE UP
EGFR: 71 ML/MIN/1.73M2 — SIGNIFICANT CHANGE UP
EGFR: 71 ML/MIN/1.73M2 — SIGNIFICANT CHANGE UP
EGFR: 72 ML/MIN/1.73M2 — SIGNIFICANT CHANGE UP
EGFR: 72 ML/MIN/1.73M2 — SIGNIFICANT CHANGE UP
EGFR: 74 ML/MIN/1.73M2 — SIGNIFICANT CHANGE UP
EGFR: 74 ML/MIN/1.73M2 — SIGNIFICANT CHANGE UP
EGFR: 80 ML/MIN/1.73M2 — SIGNIFICANT CHANGE UP
EGFR: 80 ML/MIN/1.73M2 — SIGNIFICANT CHANGE UP
EGFR: 82 ML/MIN/1.73M2 — SIGNIFICANT CHANGE UP
EGFR: 82 ML/MIN/1.73M2 — SIGNIFICANT CHANGE UP
EGFR: 92 ML/MIN/1.73M2 — SIGNIFICANT CHANGE UP
EGFR: 92 ML/MIN/1.73M2 — SIGNIFICANT CHANGE UP
ELLIPTOCYTES BLD QL SMEAR: SLIGHT — SIGNIFICANT CHANGE UP
ENA SCL70 AB SER-ACNC: 0.2 AI — SIGNIFICANT CHANGE UP
ENA SCL70 AB SER-ACNC: 0.2 AI — SIGNIFICANT CHANGE UP
ENA SCL70 AB SER-ACNC: 0.3 AI — SIGNIFICANT CHANGE UP
ENA SCL70 AB SER-ACNC: 0.3 AI — SIGNIFICANT CHANGE UP
ENA SM AB FLD QL: 0.6 AI — SIGNIFICANT CHANGE UP
ENA SM AB FLD QL: 0.6 AI — SIGNIFICANT CHANGE UP
ENA SM AB FLD QL: 0.7 AI — SIGNIFICANT CHANGE UP
ENA SM AB FLD QL: 0.7 AI — SIGNIFICANT CHANGE UP
ENA SS-A AB FLD IA-ACNC: >8 AI — HIGH
EOSINOPHIL # BLD AUTO: 0 K/UL — SIGNIFICANT CHANGE UP (ref 0–0.5)
EOSINOPHIL # BLD AUTO: 0.01 K/UL — SIGNIFICANT CHANGE UP (ref 0–0.5)
EOSINOPHIL # BLD AUTO: 0.02 K/UL — SIGNIFICANT CHANGE UP (ref 0–0.5)
EOSINOPHIL # BLD AUTO: 0.02 K/UL — SIGNIFICANT CHANGE UP (ref 0–0.5)
EOSINOPHIL # BLD AUTO: 0.09 K/UL — SIGNIFICANT CHANGE UP (ref 0–0.5)
EOSINOPHIL # BLD AUTO: 0.1 K/UL — SIGNIFICANT CHANGE UP (ref 0–0.5)
EOSINOPHIL # BLD AUTO: 0.1 K/UL — SIGNIFICANT CHANGE UP (ref 0–0.5)
EOSINOPHIL # BLD AUTO: 0.12 K/UL — SIGNIFICANT CHANGE UP (ref 0–0.5)
EOSINOPHIL # BLD AUTO: 0.14 K/UL — SIGNIFICANT CHANGE UP (ref 0–0.5)
EOSINOPHIL # BLD AUTO: 0.15 K/UL — SIGNIFICANT CHANGE UP (ref 0–0.5)
EOSINOPHIL # BLD AUTO: 0.2 K/UL — SIGNIFICANT CHANGE UP (ref 0–0.5)
EOSINOPHIL # BLD AUTO: 0.2 K/UL — SIGNIFICANT CHANGE UP (ref 0–0.5)
EOSINOPHIL NFR BLD AUTO: 0 % — SIGNIFICANT CHANGE UP (ref 0–6)
EOSINOPHIL NFR BLD AUTO: 0.1 % — SIGNIFICANT CHANGE UP (ref 0–6)
EOSINOPHIL NFR BLD AUTO: 0.1 % — SIGNIFICANT CHANGE UP (ref 0–6)
EOSINOPHIL NFR BLD AUTO: 0.2 % — SIGNIFICANT CHANGE UP (ref 0–6)
EOSINOPHIL NFR BLD AUTO: 0.2 % — SIGNIFICANT CHANGE UP (ref 0–6)
EOSINOPHIL NFR BLD AUTO: 0.3 % — SIGNIFICANT CHANGE UP (ref 0–6)
EOSINOPHIL NFR BLD AUTO: 0.3 % — SIGNIFICANT CHANGE UP (ref 0–6)
EOSINOPHIL NFR BLD AUTO: 1.4 % — SIGNIFICANT CHANGE UP (ref 0–6)
EOSINOPHIL NFR BLD AUTO: 1.4 % — SIGNIFICANT CHANGE UP (ref 0–6)
EOSINOPHIL NFR BLD AUTO: 2.2 % — SIGNIFICANT CHANGE UP (ref 0–6)
EOSINOPHIL NFR BLD AUTO: 2.2 % — SIGNIFICANT CHANGE UP (ref 0–6)
EOSINOPHIL NFR BLD AUTO: 2.6 % — SIGNIFICANT CHANGE UP (ref 0–6)
EOSINOPHIL NFR BLD AUTO: 2.9 % — SIGNIFICANT CHANGE UP (ref 0–6)
EOSINOPHIL NFR BLD AUTO: 3.6 % — SIGNIFICANT CHANGE UP (ref 0–6)
EOSINOPHIL NFR BLD AUTO: 3.7 % — SIGNIFICANT CHANGE UP (ref 0–6)
EOSINOPHIL NFR BLD AUTO: 3.8 % — SIGNIFICANT CHANGE UP (ref 0–6)
EOSINOPHIL NFR BLD AUTO: 4.6 % — SIGNIFICANT CHANGE UP (ref 0–6)
EPI CELLS # UR: SIGNIFICANT CHANGE UP
ERYTHROCYTE [SEDIMENTATION RATE] IN BLOOD: 45 MM/HR — HIGH (ref 0–20)
ERYTHROCYTE [SEDIMENTATION RATE] IN BLOOD: 45 MM/HR — HIGH (ref 0–20)
ERYTHROCYTE [SEDIMENTATION RATE] IN BLOOD: 96 MM/HR — HIGH (ref 0–15)
ERYTHROCYTE [SEDIMENTATION RATE] IN BLOOD: 96 MM/HR — HIGH (ref 0–15)
ESTIMATED AVERAGE GLUCOSE: 126 MG/DL — HIGH (ref 68–114)
ESTIMATED AVERAGE GLUCOSE: 126 MG/DL — HIGH (ref 68–114)
FERRITIN SERPL-MCNC: 1474 NG/ML — HIGH (ref 15–150)
FERRITIN SERPL-MCNC: 1474 NG/ML — HIGH (ref 15–150)
FERRITIN SERPL-MCNC: HIGH NG/ML (ref 15–150)
FERRITIN SERPL-MCNC: HIGH NG/ML (ref 15–150)
FOLATE SERPL-MCNC: >20 NG/ML — SIGNIFICANT CHANGE UP
FOLATE SERPL-MCNC: >20 NG/ML — SIGNIFICANT CHANGE UP
GAMMA INTERFERON BACKGROUND BLD IA-ACNC: 0.01 IU/ML — SIGNIFICANT CHANGE UP
GAMMA INTERFERON BACKGROUND BLD IA-ACNC: 0.01 IU/ML — SIGNIFICANT CHANGE UP
GAS PNL BLDA: SIGNIFICANT CHANGE UP
GAS PNL BLDV: 123 MMOL/L — LOW (ref 136–145)
GAS PNL BLDV: 123 MMOL/L — LOW (ref 136–145)
GAS PNL BLDV: 124 MMOL/L — LOW (ref 136–145)
GAS PNL BLDV: 124 MMOL/L — LOW (ref 136–145)
GAS PNL BLDV: 125 MMOL/L — LOW (ref 136–145)
GAS PNL BLDV: 125 MMOL/L — LOW (ref 136–145)
GAS PNL BLDV: 126 MMOL/L — LOW (ref 136–145)
GAS PNL BLDV: 127 MMOL/L — LOW (ref 136–145)
GAS PNL BLDV: 127 MMOL/L — LOW (ref 136–145)
GAS PNL BLDV: 128 MMOL/L — LOW (ref 136–145)
GAS PNL BLDV: 129 MMOL/L — LOW (ref 136–145)
GAS PNL BLDV: 132 MMOL/L — LOW (ref 136–145)
GAS PNL BLDV: 132 MMOL/L — LOW (ref 136–145)
GAS PNL BLDV: 133 MMOL/L — LOW (ref 136–145)
GAS PNL BLDV: 138 MMOL/L — SIGNIFICANT CHANGE UP (ref 136–145)
GAS PNL BLDV: 139 MMOL/L — SIGNIFICANT CHANGE UP (ref 136–145)
GAS PNL BLDV: 139 MMOL/L — SIGNIFICANT CHANGE UP (ref 136–145)
GAS PNL BLDV: 140 MMOL/L — SIGNIFICANT CHANGE UP (ref 136–145)
GAS PNL BLDV: 140 MMOL/L — SIGNIFICANT CHANGE UP (ref 136–145)
GAS PNL BLDV: 141 MMOL/L — SIGNIFICANT CHANGE UP (ref 136–145)
GAS PNL BLDV: 141 MMOL/L — SIGNIFICANT CHANGE UP (ref 136–145)
GAS PNL BLDV: 142 MMOL/L — SIGNIFICANT CHANGE UP (ref 136–145)
GAS PNL BLDV: 142 MMOL/L — SIGNIFICANT CHANGE UP (ref 136–145)
GAS PNL BLDV: 143 MMOL/L — SIGNIFICANT CHANGE UP (ref 136–145)
GAS PNL BLDV: 143 MMOL/L — SIGNIFICANT CHANGE UP (ref 136–145)
GAS PNL BLDV: SIGNIFICANT CHANGE UP
GIANT PLATELETS BLD QL SMEAR: PRESENT — SIGNIFICANT CHANGE UP
GLUCOSE BLDC GLUCOMTR-MCNC: 122 MG/DL — HIGH (ref 70–99)
GLUCOSE BLDC GLUCOMTR-MCNC: 122 MG/DL — HIGH (ref 70–99)
GLUCOSE BLDC GLUCOMTR-MCNC: 134 MG/DL — HIGH (ref 70–99)
GLUCOSE BLDC GLUCOMTR-MCNC: 136 MG/DL — HIGH (ref 70–99)
GLUCOSE BLDC GLUCOMTR-MCNC: 150 MG/DL — HIGH (ref 70–99)
GLUCOSE BLDC GLUCOMTR-MCNC: 150 MG/DL — HIGH (ref 70–99)
GLUCOSE BLDC GLUCOMTR-MCNC: 161 MG/DL — HIGH (ref 70–99)
GLUCOSE BLDC GLUCOMTR-MCNC: 161 MG/DL — HIGH (ref 70–99)
GLUCOSE BLDC GLUCOMTR-MCNC: 163 MG/DL — HIGH (ref 70–99)
GLUCOSE BLDC GLUCOMTR-MCNC: 171 MG/DL — HIGH (ref 70–99)
GLUCOSE BLDC GLUCOMTR-MCNC: 171 MG/DL — HIGH (ref 70–99)
GLUCOSE BLDC GLUCOMTR-MCNC: 181 MG/DL — HIGH (ref 70–99)
GLUCOSE BLDC GLUCOMTR-MCNC: 181 MG/DL — HIGH (ref 70–99)
GLUCOSE BLDC GLUCOMTR-MCNC: 184 MG/DL — HIGH (ref 70–99)
GLUCOSE BLDC GLUCOMTR-MCNC: 184 MG/DL — HIGH (ref 70–99)
GLUCOSE BLDC GLUCOMTR-MCNC: 185 MG/DL — HIGH (ref 70–99)
GLUCOSE BLDC GLUCOMTR-MCNC: 185 MG/DL — HIGH (ref 70–99)
GLUCOSE BLDC GLUCOMTR-MCNC: 187 MG/DL — HIGH (ref 70–99)
GLUCOSE BLDC GLUCOMTR-MCNC: 187 MG/DL — HIGH (ref 70–99)
GLUCOSE BLDC GLUCOMTR-MCNC: 189 MG/DL — HIGH (ref 70–99)
GLUCOSE BLDC GLUCOMTR-MCNC: 189 MG/DL — HIGH (ref 70–99)
GLUCOSE BLDC GLUCOMTR-MCNC: 190 MG/DL — HIGH (ref 70–99)
GLUCOSE BLDC GLUCOMTR-MCNC: 196 MG/DL — HIGH (ref 70–99)
GLUCOSE BLDC GLUCOMTR-MCNC: 196 MG/DL — HIGH (ref 70–99)
GLUCOSE BLDC GLUCOMTR-MCNC: 197 MG/DL — HIGH (ref 70–99)
GLUCOSE BLDC GLUCOMTR-MCNC: 198 MG/DL — HIGH (ref 70–99)
GLUCOSE BLDC GLUCOMTR-MCNC: 198 MG/DL — HIGH (ref 70–99)
GLUCOSE BLDC GLUCOMTR-MCNC: 200 MG/DL — HIGH (ref 70–99)
GLUCOSE BLDC GLUCOMTR-MCNC: 201 MG/DL — HIGH (ref 70–99)
GLUCOSE BLDC GLUCOMTR-MCNC: 201 MG/DL — HIGH (ref 70–99)
GLUCOSE BLDC GLUCOMTR-MCNC: 204 MG/DL — HIGH (ref 70–99)
GLUCOSE BLDC GLUCOMTR-MCNC: 204 MG/DL — HIGH (ref 70–99)
GLUCOSE BLDC GLUCOMTR-MCNC: 205 MG/DL — HIGH (ref 70–99)
GLUCOSE BLDC GLUCOMTR-MCNC: 207 MG/DL — HIGH (ref 70–99)
GLUCOSE BLDC GLUCOMTR-MCNC: 207 MG/DL — HIGH (ref 70–99)
GLUCOSE BLDC GLUCOMTR-MCNC: 208 MG/DL — HIGH (ref 70–99)
GLUCOSE BLDC GLUCOMTR-MCNC: 208 MG/DL — HIGH (ref 70–99)
GLUCOSE BLDC GLUCOMTR-MCNC: 209 MG/DL — HIGH (ref 70–99)
GLUCOSE BLDC GLUCOMTR-MCNC: 211 MG/DL — HIGH (ref 70–99)
GLUCOSE BLDC GLUCOMTR-MCNC: 212 MG/DL — HIGH (ref 70–99)
GLUCOSE BLDC GLUCOMTR-MCNC: 217 MG/DL — HIGH (ref 70–99)
GLUCOSE BLDC GLUCOMTR-MCNC: 219 MG/DL — HIGH (ref 70–99)
GLUCOSE BLDC GLUCOMTR-MCNC: 219 MG/DL — HIGH (ref 70–99)
GLUCOSE BLDC GLUCOMTR-MCNC: 225 MG/DL — HIGH (ref 70–99)
GLUCOSE BLDC GLUCOMTR-MCNC: 240 MG/DL — HIGH (ref 70–99)
GLUCOSE BLDC GLUCOMTR-MCNC: 240 MG/DL — HIGH (ref 70–99)
GLUCOSE BLDC GLUCOMTR-MCNC: 247 MG/DL — HIGH (ref 70–99)
GLUCOSE BLDC GLUCOMTR-MCNC: 247 MG/DL — HIGH (ref 70–99)
GLUCOSE BLDC GLUCOMTR-MCNC: 249 MG/DL — HIGH (ref 70–99)
GLUCOSE BLDC GLUCOMTR-MCNC: 249 MG/DL — HIGH (ref 70–99)
GLUCOSE BLDC GLUCOMTR-MCNC: 250 MG/DL — HIGH (ref 70–99)
GLUCOSE BLDC GLUCOMTR-MCNC: 251 MG/DL — HIGH (ref 70–99)
GLUCOSE BLDC GLUCOMTR-MCNC: 251 MG/DL — HIGH (ref 70–99)
GLUCOSE BLDC GLUCOMTR-MCNC: 252 MG/DL — HIGH (ref 70–99)
GLUCOSE BLDC GLUCOMTR-MCNC: 252 MG/DL — HIGH (ref 70–99)
GLUCOSE BLDC GLUCOMTR-MCNC: 253 MG/DL — HIGH (ref 70–99)
GLUCOSE BLDC GLUCOMTR-MCNC: 253 MG/DL — HIGH (ref 70–99)
GLUCOSE BLDC GLUCOMTR-MCNC: 260 MG/DL — HIGH (ref 70–99)
GLUCOSE BLDC GLUCOMTR-MCNC: 260 MG/DL — HIGH (ref 70–99)
GLUCOSE BLDC GLUCOMTR-MCNC: 273 MG/DL — HIGH (ref 70–99)
GLUCOSE BLDC GLUCOMTR-MCNC: 273 MG/DL — HIGH (ref 70–99)
GLUCOSE BLDC GLUCOMTR-MCNC: 276 MG/DL — HIGH (ref 70–99)
GLUCOSE BLDC GLUCOMTR-MCNC: 276 MG/DL — HIGH (ref 70–99)
GLUCOSE BLDC GLUCOMTR-MCNC: 326 MG/DL — HIGH (ref 70–99)
GLUCOSE BLDC GLUCOMTR-MCNC: 326 MG/DL — HIGH (ref 70–99)
GLUCOSE BLDC GLUCOMTR-MCNC: 334 MG/DL — HIGH (ref 70–99)
GLUCOSE BLDC GLUCOMTR-MCNC: 334 MG/DL — HIGH (ref 70–99)
GLUCOSE BLDC GLUCOMTR-MCNC: 53 MG/DL — CRITICAL LOW (ref 70–99)
GLUCOSE BLDC GLUCOMTR-MCNC: 53 MG/DL — CRITICAL LOW (ref 70–99)
GLUCOSE BLDC GLUCOMTR-MCNC: 77 MG/DL — SIGNIFICANT CHANGE UP (ref 70–99)
GLUCOSE BLDC GLUCOMTR-MCNC: 77 MG/DL — SIGNIFICANT CHANGE UP (ref 70–99)
GLUCOSE BLDV-MCNC: 116 MG/DL — HIGH (ref 70–99)
GLUCOSE BLDV-MCNC: 116 MG/DL — HIGH (ref 70–99)
GLUCOSE BLDV-MCNC: 117 MG/DL — HIGH (ref 70–99)
GLUCOSE BLDV-MCNC: 117 MG/DL — HIGH (ref 70–99)
GLUCOSE BLDV-MCNC: 121 MG/DL — HIGH (ref 70–99)
GLUCOSE BLDV-MCNC: 121 MG/DL — HIGH (ref 70–99)
GLUCOSE BLDV-MCNC: 123 MG/DL — HIGH (ref 70–99)
GLUCOSE BLDV-MCNC: 123 MG/DL — HIGH (ref 70–99)
GLUCOSE BLDV-MCNC: 134 MG/DL — HIGH (ref 70–99)
GLUCOSE BLDV-MCNC: 134 MG/DL — HIGH (ref 70–99)
GLUCOSE BLDV-MCNC: 136 MG/DL — HIGH (ref 70–99)
GLUCOSE BLDV-MCNC: 136 MG/DL — HIGH (ref 70–99)
GLUCOSE BLDV-MCNC: 144 MG/DL — HIGH (ref 70–99)
GLUCOSE BLDV-MCNC: 144 MG/DL — HIGH (ref 70–99)
GLUCOSE BLDV-MCNC: 160 MG/DL — HIGH (ref 70–99)
GLUCOSE BLDV-MCNC: 160 MG/DL — HIGH (ref 70–99)
GLUCOSE BLDV-MCNC: 193 MG/DL — HIGH (ref 70–99)
GLUCOSE BLDV-MCNC: 193 MG/DL — HIGH (ref 70–99)
GLUCOSE BLDV-MCNC: 194 MG/DL — HIGH (ref 70–99)
GLUCOSE BLDV-MCNC: 194 MG/DL — HIGH (ref 70–99)
GLUCOSE BLDV-MCNC: 197 MG/DL — HIGH (ref 70–99)
GLUCOSE BLDV-MCNC: 197 MG/DL — HIGH (ref 70–99)
GLUCOSE BLDV-MCNC: 198 MG/DL — HIGH (ref 70–99)
GLUCOSE BLDV-MCNC: 198 MG/DL — HIGH (ref 70–99)
GLUCOSE BLDV-MCNC: 201 MG/DL — HIGH (ref 70–99)
GLUCOSE BLDV-MCNC: 201 MG/DL — HIGH (ref 70–99)
GLUCOSE BLDV-MCNC: 203 MG/DL — HIGH (ref 70–99)
GLUCOSE BLDV-MCNC: 203 MG/DL — HIGH (ref 70–99)
GLUCOSE BLDV-MCNC: 208 MG/DL — HIGH (ref 70–99)
GLUCOSE BLDV-MCNC: 208 MG/DL — HIGH (ref 70–99)
GLUCOSE BLDV-MCNC: 212 MG/DL — HIGH (ref 70–99)
GLUCOSE BLDV-MCNC: 212 MG/DL — HIGH (ref 70–99)
GLUCOSE BLDV-MCNC: 226 MG/DL — HIGH (ref 70–99)
GLUCOSE BLDV-MCNC: 226 MG/DL — HIGH (ref 70–99)
GLUCOSE BLDV-MCNC: 231 MG/DL — HIGH (ref 70–99)
GLUCOSE BLDV-MCNC: 231 MG/DL — HIGH (ref 70–99)
GLUCOSE BLDV-MCNC: 240 MG/DL — HIGH (ref 70–99)
GLUCOSE BLDV-MCNC: 240 MG/DL — HIGH (ref 70–99)
GLUCOSE BLDV-MCNC: 242 MG/DL — HIGH (ref 70–99)
GLUCOSE BLDV-MCNC: 242 MG/DL — HIGH (ref 70–99)
GLUCOSE BLDV-MCNC: 280 MG/DL — HIGH (ref 70–99)
GLUCOSE BLDV-MCNC: 280 MG/DL — HIGH (ref 70–99)
GLUCOSE SERPL-MCNC: 105 MG/DL — HIGH (ref 70–99)
GLUCOSE SERPL-MCNC: 108 MG/DL — HIGH (ref 70–99)
GLUCOSE SERPL-MCNC: 108 MG/DL — HIGH (ref 70–99)
GLUCOSE SERPL-MCNC: 113 MG/DL — HIGH (ref 70–99)
GLUCOSE SERPL-MCNC: 115 MG/DL — HIGH (ref 70–99)
GLUCOSE SERPL-MCNC: 115 MG/DL — HIGH (ref 70–99)
GLUCOSE SERPL-MCNC: 119 MG/DL — HIGH (ref 70–99)
GLUCOSE SERPL-MCNC: 119 MG/DL — HIGH (ref 70–99)
GLUCOSE SERPL-MCNC: 120 MG/DL — HIGH (ref 70–99)
GLUCOSE SERPL-MCNC: 120 MG/DL — HIGH (ref 70–99)
GLUCOSE SERPL-MCNC: 123 MG/DL — HIGH (ref 70–99)
GLUCOSE SERPL-MCNC: 124 MG/DL — HIGH (ref 70–99)
GLUCOSE SERPL-MCNC: 124 MG/DL — HIGH (ref 70–99)
GLUCOSE SERPL-MCNC: 128 MG/DL — HIGH (ref 70–99)
GLUCOSE SERPL-MCNC: 128 MG/DL — HIGH (ref 70–99)
GLUCOSE SERPL-MCNC: 133 MG/DL — HIGH (ref 70–99)
GLUCOSE SERPL-MCNC: 133 MG/DL — HIGH (ref 70–99)
GLUCOSE SERPL-MCNC: 134 MG/DL — HIGH (ref 70–99)
GLUCOSE SERPL-MCNC: 134 MG/DL — HIGH (ref 70–99)
GLUCOSE SERPL-MCNC: 138 MG/DL — HIGH (ref 70–99)
GLUCOSE SERPL-MCNC: 141 MG/DL — HIGH (ref 70–99)
GLUCOSE SERPL-MCNC: 141 MG/DL — HIGH (ref 70–99)
GLUCOSE SERPL-MCNC: 142 MG/DL — HIGH (ref 70–99)
GLUCOSE SERPL-MCNC: 142 MG/DL — HIGH (ref 70–99)
GLUCOSE SERPL-MCNC: 144 MG/DL — HIGH (ref 70–99)
GLUCOSE SERPL-MCNC: 144 MG/DL — HIGH (ref 70–99)
GLUCOSE SERPL-MCNC: 148 MG/DL — HIGH (ref 70–99)
GLUCOSE SERPL-MCNC: 148 MG/DL — HIGH (ref 70–99)
GLUCOSE SERPL-MCNC: 150 MG/DL — HIGH (ref 70–99)
GLUCOSE SERPL-MCNC: 150 MG/DL — HIGH (ref 70–99)
GLUCOSE SERPL-MCNC: 159 MG/DL — HIGH (ref 70–99)
GLUCOSE SERPL-MCNC: 159 MG/DL — HIGH (ref 70–99)
GLUCOSE SERPL-MCNC: 160 MG/DL — HIGH (ref 70–99)
GLUCOSE SERPL-MCNC: 161 MG/DL — HIGH (ref 70–99)
GLUCOSE SERPL-MCNC: 161 MG/DL — HIGH (ref 70–99)
GLUCOSE SERPL-MCNC: 172 MG/DL — HIGH (ref 70–99)
GLUCOSE SERPL-MCNC: 172 MG/DL — HIGH (ref 70–99)
GLUCOSE SERPL-MCNC: 182 MG/DL — HIGH (ref 70–99)
GLUCOSE SERPL-MCNC: 182 MG/DL — HIGH (ref 70–99)
GLUCOSE SERPL-MCNC: 185 MG/DL — HIGH (ref 70–99)
GLUCOSE SERPL-MCNC: 185 MG/DL — HIGH (ref 70–99)
GLUCOSE SERPL-MCNC: 198 MG/DL — HIGH (ref 70–99)
GLUCOSE SERPL-MCNC: 198 MG/DL — HIGH (ref 70–99)
GLUCOSE SERPL-MCNC: 203 MG/DL — HIGH (ref 70–99)
GLUCOSE SERPL-MCNC: 204 MG/DL — HIGH (ref 70–99)
GLUCOSE SERPL-MCNC: 204 MG/DL — HIGH (ref 70–99)
GLUCOSE SERPL-MCNC: 206 MG/DL — HIGH (ref 70–99)
GLUCOSE SERPL-MCNC: 206 MG/DL — HIGH (ref 70–99)
GLUCOSE SERPL-MCNC: 208 MG/DL — HIGH (ref 70–99)
GLUCOSE SERPL-MCNC: 208 MG/DL — HIGH (ref 70–99)
GLUCOSE SERPL-MCNC: 209 MG/DL — HIGH (ref 70–99)
GLUCOSE SERPL-MCNC: 209 MG/DL — HIGH (ref 70–99)
GLUCOSE SERPL-MCNC: 210 MG/DL — HIGH (ref 70–99)
GLUCOSE SERPL-MCNC: 210 MG/DL — HIGH (ref 70–99)
GLUCOSE SERPL-MCNC: 212 MG/DL — HIGH (ref 70–99)
GLUCOSE SERPL-MCNC: 212 MG/DL — HIGH (ref 70–99)
GLUCOSE SERPL-MCNC: 213 MG/DL — HIGH (ref 70–99)
GLUCOSE SERPL-MCNC: 213 MG/DL — HIGH (ref 70–99)
GLUCOSE SERPL-MCNC: 219 MG/DL — HIGH (ref 70–99)
GLUCOSE SERPL-MCNC: 220 MG/DL — HIGH (ref 70–99)
GLUCOSE SERPL-MCNC: 220 MG/DL — HIGH (ref 70–99)
GLUCOSE SERPL-MCNC: 223 MG/DL — HIGH (ref 70–99)
GLUCOSE SERPL-MCNC: 223 MG/DL — HIGH (ref 70–99)
GLUCOSE SERPL-MCNC: 224 MG/DL — HIGH (ref 70–99)
GLUCOSE SERPL-MCNC: 224 MG/DL — HIGH (ref 70–99)
GLUCOSE SERPL-MCNC: 229 MG/DL — HIGH (ref 70–99)
GLUCOSE SERPL-MCNC: 229 MG/DL — HIGH (ref 70–99)
GLUCOSE SERPL-MCNC: 232 MG/DL — HIGH (ref 70–99)
GLUCOSE SERPL-MCNC: 232 MG/DL — HIGH (ref 70–99)
GLUCOSE SERPL-MCNC: 234 MG/DL — HIGH (ref 70–99)
GLUCOSE SERPL-MCNC: 234 MG/DL — HIGH (ref 70–99)
GLUCOSE SERPL-MCNC: 235 MG/DL — HIGH (ref 70–99)
GLUCOSE SERPL-MCNC: 235 MG/DL — HIGH (ref 70–99)
GLUCOSE SERPL-MCNC: 240 MG/DL — HIGH (ref 70–99)
GLUCOSE SERPL-MCNC: 240 MG/DL — HIGH (ref 70–99)
GLUCOSE SERPL-MCNC: 278 MG/DL — HIGH (ref 70–99)
GLUCOSE SERPL-MCNC: 278 MG/DL — HIGH (ref 70–99)
GLUCOSE SERPL-MCNC: 279 MG/DL — HIGH (ref 70–99)
GLUCOSE SERPL-MCNC: 279 MG/DL — HIGH (ref 70–99)
GLUCOSE SERPL-MCNC: 48 MG/DL — CRITICAL LOW (ref 70–99)
GLUCOSE SERPL-MCNC: 48 MG/DL — CRITICAL LOW (ref 70–99)
GLUCOSE SERPL-MCNC: 72 MG/DL — SIGNIFICANT CHANGE UP (ref 70–99)
GLUCOSE SERPL-MCNC: 73 MG/DL — SIGNIFICANT CHANGE UP (ref 70–99)
GLUCOSE SERPL-MCNC: 76 MG/DL — SIGNIFICANT CHANGE UP (ref 70–99)
GLUCOSE SERPL-MCNC: 79 MG/DL — SIGNIFICANT CHANGE UP (ref 70–99)
GLUCOSE SERPL-MCNC: 79 MG/DL — SIGNIFICANT CHANGE UP (ref 70–99)
GLUCOSE SERPL-MCNC: 82 MG/DL — SIGNIFICANT CHANGE UP (ref 70–99)
GLUCOSE SERPL-MCNC: 84 MG/DL — SIGNIFICANT CHANGE UP (ref 70–99)
GLUCOSE SERPL-MCNC: 84 MG/DL — SIGNIFICANT CHANGE UP (ref 70–99)
GLUCOSE SERPL-MCNC: 89 MG/DL — SIGNIFICANT CHANGE UP (ref 70–99)
GLUCOSE SERPL-MCNC: 91 MG/DL — SIGNIFICANT CHANGE UP (ref 70–99)
GLUCOSE SERPL-MCNC: 96 MG/DL — SIGNIFICANT CHANGE UP (ref 70–99)
GLUCOSE SERPL-MCNC: 96 MG/DL — SIGNIFICANT CHANGE UP (ref 70–99)
GLUCOSE SERPL-MCNC: 97 MG/DL — SIGNIFICANT CHANGE UP (ref 70–99)
GLUCOSE SERPL-MCNC: 97 MG/DL — SIGNIFICANT CHANGE UP (ref 70–99)
GLUCOSE UR QL: NEGATIVE MG/DL — SIGNIFICANT CHANGE UP
GRAN CASTS # UR COMP ASSIST: ABNORMAL /LPF
GRAN CASTS # UR COMP ASSIST: ABNORMAL /LPF
HAPTOGLOB SERPL-MCNC: 25 MG/DL — LOW (ref 34–200)
HAPTOGLOB SERPL-MCNC: 25 MG/DL — LOW (ref 34–200)
HAV IGM SER-ACNC: SIGNIFICANT CHANGE UP
HAV IGM SER-ACNC: SIGNIFICANT CHANGE UP
HBV CORE IGM SER-ACNC: SIGNIFICANT CHANGE UP
HBV CORE IGM SER-ACNC: SIGNIFICANT CHANGE UP
HBV SURFACE AG SER-ACNC: SIGNIFICANT CHANGE UP
HBV SURFACE AG SER-ACNC: SIGNIFICANT CHANGE UP
HCG SERPL-ACNC: <4 MIU/ML — SIGNIFICANT CHANGE UP
HCO3 BLDA-SCNC: 21 MMOL/L — SIGNIFICANT CHANGE UP (ref 21–28)
HCO3 BLDA-SCNC: 21 MMOL/L — SIGNIFICANT CHANGE UP (ref 21–28)
HCO3 BLDV-SCNC: 22 MMOL/L — SIGNIFICANT CHANGE UP (ref 22–29)
HCO3 BLDV-SCNC: 22 MMOL/L — SIGNIFICANT CHANGE UP (ref 22–29)
HCO3 BLDV-SCNC: 24 MMOL/L — SIGNIFICANT CHANGE UP (ref 22–29)
HCO3 BLDV-SCNC: 24 MMOL/L — SIGNIFICANT CHANGE UP (ref 22–29)
HCO3 BLDV-SCNC: 28 MMOL/L — SIGNIFICANT CHANGE UP (ref 22–29)
HCO3 BLDV-SCNC: 29 MMOL/L — SIGNIFICANT CHANGE UP (ref 22–29)
HCO3 BLDV-SCNC: 29 MMOL/L — SIGNIFICANT CHANGE UP (ref 22–29)
HCO3 BLDV-SCNC: 30 MMOL/L — HIGH (ref 22–29)
HCO3 BLDV-SCNC: 31 MMOL/L — HIGH (ref 22–29)
HCO3 BLDV-SCNC: 32 MMOL/L — HIGH (ref 22–29)
HCO3 BLDV-SCNC: 33 MMOL/L — HIGH (ref 22–29)
HCO3 BLDV-SCNC: 34 MMOL/L — HIGH (ref 22–29)
HCO3 BLDV-SCNC: 35 MMOL/L — HIGH (ref 22–29)
HCO3 BLDV-SCNC: 35 MMOL/L — HIGH (ref 22–29)
HCO3 BLDV-SCNC: 43 MMOL/L — HIGH (ref 22–29)
HCO3 BLDV-SCNC: 43 MMOL/L — HIGH (ref 22–29)
HCO3 BLDV-SCNC: 44 MMOL/L — HIGH (ref 22–29)
HCO3 BLDV-SCNC: 44 MMOL/L — HIGH (ref 22–29)
HCO3 BLDV-SCNC: 46 MMOL/L — CRITICAL HIGH (ref 22–29)
HCO3 BLDV-SCNC: 46 MMOL/L — CRITICAL HIGH (ref 22–29)
HCO3 BLDV-SCNC: 47 MMOL/L — CRITICAL HIGH (ref 22–29)
HCO3 BLDV-SCNC: 48 MMOL/L — CRITICAL HIGH (ref 22–29)
HCO3 BLDV-SCNC: 48 MMOL/L — CRITICAL HIGH (ref 22–29)
HCO3 BLDV-SCNC: 49 MMOL/L — CRITICAL HIGH (ref 22–29)
HCO3 BLDV-SCNC: 49 MMOL/L — CRITICAL HIGH (ref 22–29)
HCO3 BLDV-SCNC: 51 MMOL/L — CRITICAL HIGH (ref 22–29)
HCT VFR BLD CALC: 18.9 % — CRITICAL LOW (ref 34.5–45)
HCT VFR BLD CALC: 18.9 % — CRITICAL LOW (ref 34.5–45)
HCT VFR BLD CALC: 19.4 % — CRITICAL LOW (ref 34.5–45)
HCT VFR BLD CALC: 19.4 % — CRITICAL LOW (ref 34.5–45)
HCT VFR BLD CALC: 25.2 % — LOW (ref 34.5–45)
HCT VFR BLD CALC: 25.2 % — LOW (ref 34.5–45)
HCT VFR BLD CALC: 27.4 % — LOW (ref 34.5–45)
HCT VFR BLD CALC: 27.4 % — LOW (ref 34.5–45)
HCT VFR BLD CALC: 29.4 % — LOW (ref 34.5–45)
HCT VFR BLD CALC: 29.4 % — LOW (ref 34.5–45)
HCT VFR BLD CALC: 29.7 % — LOW (ref 34.5–45)
HCT VFR BLD CALC: 29.9 % — LOW (ref 34.5–45)
HCT VFR BLD CALC: 29.9 % — LOW (ref 34.5–45)
HCT VFR BLD CALC: 30.1 % — LOW (ref 34.5–45)
HCT VFR BLD CALC: 30.1 % — LOW (ref 34.5–45)
HCT VFR BLD CALC: 30.2 % — LOW (ref 34.5–45)
HCT VFR BLD CALC: 30.2 % — LOW (ref 34.5–45)
HCT VFR BLD CALC: 30.3 % — LOW (ref 34.5–45)
HCT VFR BLD CALC: 30.3 % — LOW (ref 34.5–45)
HCT VFR BLD CALC: 30.4 % — LOW (ref 34.5–45)
HCT VFR BLD CALC: 30.5 % — LOW (ref 34.5–45)
HCT VFR BLD CALC: 30.5 % — LOW (ref 34.5–45)
HCT VFR BLD CALC: 30.7 % — LOW (ref 34.5–45)
HCT VFR BLD CALC: 30.7 % — LOW (ref 34.5–45)
HCT VFR BLD CALC: 30.8 % — LOW (ref 34.5–45)
HCT VFR BLD CALC: 30.8 % — LOW (ref 34.5–45)
HCT VFR BLD CALC: 30.9 % — LOW (ref 34.5–45)
HCT VFR BLD CALC: 30.9 % — LOW (ref 34.5–45)
HCT VFR BLD CALC: 31.3 % — LOW (ref 34.5–45)
HCT VFR BLD CALC: 31.3 % — LOW (ref 34.5–45)
HCT VFR BLD CALC: 31.4 % — LOW (ref 34.5–45)
HCT VFR BLD CALC: 31.4 % — LOW (ref 34.5–45)
HCT VFR BLD CALC: 31.8 % — LOW (ref 34.5–45)
HCT VFR BLD CALC: 31.8 % — LOW (ref 34.5–45)
HCT VFR BLD CALC: 31.9 % — LOW (ref 34.5–45)
HCT VFR BLD CALC: 32 % — LOW (ref 34.5–45)
HCT VFR BLD CALC: 32.1 % — LOW (ref 34.5–45)
HCT VFR BLD CALC: 32.1 % — LOW (ref 34.5–45)
HCT VFR BLD CALC: 32.8 % — LOW (ref 34.5–45)
HCT VFR BLD CALC: 32.8 % — LOW (ref 34.5–45)
HCT VFR BLD CALC: 32.9 % — LOW (ref 34.5–45)
HCT VFR BLD CALC: 32.9 % — LOW (ref 34.5–45)
HCT VFR BLD CALC: 33.2 % — LOW (ref 34.5–45)
HCT VFR BLD CALC: 33.3 % — LOW (ref 34.5–45)
HCT VFR BLD CALC: 33.4 % — LOW (ref 34.5–45)
HCT VFR BLD CALC: 33.4 % — LOW (ref 34.5–45)
HCT VFR BLD CALC: 33.8 % — LOW (ref 34.5–45)
HCT VFR BLD CALC: 33.9 % — LOW (ref 34.5–45)
HCT VFR BLD CALC: 33.9 % — LOW (ref 34.5–45)
HCT VFR BLD CALC: 34.1 % — LOW (ref 34.5–45)
HCT VFR BLD CALC: 34.2 % — LOW (ref 34.5–45)
HCT VFR BLD CALC: 34.2 % — LOW (ref 34.5–45)
HCT VFR BLD CALC: 34.8 % — SIGNIFICANT CHANGE UP (ref 34.5–45)
HCT VFR BLD CALC: 35.3 % — SIGNIFICANT CHANGE UP (ref 34.5–45)
HCT VFR BLD CALC: 35.3 % — SIGNIFICANT CHANGE UP (ref 34.5–45)
HCT VFR BLD CALC: 35.7 % — SIGNIFICANT CHANGE UP (ref 34.5–45)
HCT VFR BLD CALC: 36.2 % — SIGNIFICANT CHANGE UP (ref 34.5–45)
HCT VFR BLD CALC: 36.4 % — SIGNIFICANT CHANGE UP (ref 34.5–45)
HCT VFR BLD CALC: 36.5 % — SIGNIFICANT CHANGE UP (ref 34.5–45)
HCT VFR BLD CALC: 36.7 % — SIGNIFICANT CHANGE UP (ref 34.5–45)
HCT VFR BLD CALC: 36.7 % — SIGNIFICANT CHANGE UP (ref 34.5–45)
HCT VFR BLD CALC: 36.8 % — SIGNIFICANT CHANGE UP (ref 34.5–45)
HCT VFR BLD CALC: 36.8 % — SIGNIFICANT CHANGE UP (ref 34.5–45)
HCT VFR BLD CALC: 36.9 % — SIGNIFICANT CHANGE UP (ref 34.5–45)
HCT VFR BLD CALC: 37.2 % — SIGNIFICANT CHANGE UP (ref 34.5–45)
HCT VFR BLD CALC: 37.2 % — SIGNIFICANT CHANGE UP (ref 34.5–45)
HCT VFR BLD CALC: 37.4 % — SIGNIFICANT CHANGE UP (ref 34.5–45)
HCT VFR BLD CALC: 37.4 % — SIGNIFICANT CHANGE UP (ref 34.5–45)
HCT VFR BLD CALC: 37.5 % — SIGNIFICANT CHANGE UP (ref 34.5–45)
HCT VFR BLD CALC: 37.6 % — SIGNIFICANT CHANGE UP (ref 34.5–45)
HCT VFR BLD CALC: 37.8 % — SIGNIFICANT CHANGE UP (ref 34.5–45)
HCT VFR BLD CALC: 37.8 % — SIGNIFICANT CHANGE UP (ref 34.5–45)
HCT VFR BLD CALC: 38.1 % — SIGNIFICANT CHANGE UP (ref 34.5–45)
HCT VFR BLD CALC: 39 % — SIGNIFICANT CHANGE UP (ref 34.5–45)
HCT VFR BLD CALC: 39 % — SIGNIFICANT CHANGE UP (ref 34.5–45)
HCT VFR BLD CALC: 41 % — SIGNIFICANT CHANGE UP (ref 34.5–45)
HCT VFR BLD CALC: 41 % — SIGNIFICANT CHANGE UP (ref 34.5–45)
HCT VFR BLDA CALC: 26 % — LOW (ref 34.5–46.5)
HCT VFR BLDA CALC: 27 % — SIGNIFICANT CHANGE UP
HCT VFR BLDA CALC: 27 % — SIGNIFICANT CHANGE UP
HCT VFR BLDA CALC: 28 % — SIGNIFICANT CHANGE UP
HCT VFR BLDA CALC: 29 % — SIGNIFICANT CHANGE UP
HCT VFR BLDA CALC: 30 % — LOW (ref 34.5–46.5)
HCT VFR BLDA CALC: 30 % — LOW (ref 34.5–46.5)
HCT VFR BLDA CALC: 30 % — SIGNIFICANT CHANGE UP
HCT VFR BLDA CALC: 31 % — LOW (ref 34.5–46.5)
HCT VFR BLDA CALC: 31 % — LOW (ref 34.5–46.5)
HCT VFR BLDA CALC: 32 % — LOW (ref 34.5–46.5)
HCT VFR BLDA CALC: 32 % — SIGNIFICANT CHANGE UP
HCT VFR BLDA CALC: 32 % — SIGNIFICANT CHANGE UP
HCT VFR BLDA CALC: 33 % — LOW (ref 34.5–46.5)
HCT VFR BLDA CALC: 33 % — LOW (ref 34.5–46.5)
HCT VFR BLDA CALC: 35 % — SIGNIFICANT CHANGE UP
HCT VFR BLDA CALC: 35 % — SIGNIFICANT CHANGE UP
HCV AB S/CO SERPL IA: 0.15 S/CO — SIGNIFICANT CHANGE UP (ref 0–0.99)
HCV AB S/CO SERPL IA: 0.15 S/CO — SIGNIFICANT CHANGE UP (ref 0–0.99)
HCV AB SERPL-IMP: SIGNIFICANT CHANGE UP
HCV AB SERPL-IMP: SIGNIFICANT CHANGE UP
HGB BLD CALC-MCNC: 10 G/DL — LOW (ref 11.7–16.1)
HGB BLD CALC-MCNC: 10.1 G/DL — LOW (ref 11.7–16.1)
HGB BLD CALC-MCNC: 10.1 G/DL — LOW (ref 11.7–16.1)
HGB BLD CALC-MCNC: 10.3 G/DL — LOW (ref 11.7–16.1)
HGB BLD CALC-MCNC: 10.3 G/DL — LOW (ref 11.7–16.1)
HGB BLD CALC-MCNC: 10.5 G/DL — LOW (ref 11.7–16.1)
HGB BLD CALC-MCNC: 10.6 G/DL — LOW (ref 11.7–16.1)
HGB BLD CALC-MCNC: 10.6 G/DL — LOW (ref 11.7–16.1)
HGB BLD CALC-MCNC: 10.7 G/DL — LOW (ref 11.7–16.1)
HGB BLD CALC-MCNC: 10.7 G/DL — LOW (ref 11.7–16.1)
HGB BLD CALC-MCNC: 10.8 G/DL — LOW (ref 11.7–16.1)
HGB BLD CALC-MCNC: 10.8 G/DL — LOW (ref 11.7–16.1)
HGB BLD CALC-MCNC: 10.9 G/DL — LOW (ref 11.7–16.1)
HGB BLD CALC-MCNC: 10.9 G/DL — LOW (ref 11.7–16.1)
HGB BLD CALC-MCNC: 11.4 G/DL — LOW (ref 11.7–16.1)
HGB BLD CALC-MCNC: 11.4 G/DL — LOW (ref 11.7–16.1)
HGB BLD CALC-MCNC: 11.5 G/DL — LOW (ref 11.7–16.1)
HGB BLD CALC-MCNC: 11.5 G/DL — LOW (ref 11.7–16.1)
HGB BLD CALC-MCNC: 8.5 G/DL — LOW (ref 11.7–16.1)
HGB BLD CALC-MCNC: 8.5 G/DL — LOW (ref 11.7–16.1)
HGB BLD CALC-MCNC: 8.8 G/DL — LOW (ref 11.7–16.1)
HGB BLD CALC-MCNC: 8.8 G/DL — LOW (ref 11.7–16.1)
HGB BLD CALC-MCNC: 9 G/DL — LOW (ref 11.7–16.1)
HGB BLD CALC-MCNC: 9 G/DL — LOW (ref 11.7–16.1)
HGB BLD CALC-MCNC: 9.2 G/DL — LOW (ref 11.7–16.1)
HGB BLD CALC-MCNC: 9.2 G/DL — LOW (ref 11.7–16.1)
HGB BLD CALC-MCNC: 9.3 G/DL — LOW (ref 11.7–16.1)
HGB BLD CALC-MCNC: 9.5 G/DL — LOW (ref 11.7–16.1)
HGB BLD CALC-MCNC: 9.5 G/DL — LOW (ref 11.7–16.1)
HGB BLD CALC-MCNC: 9.6 G/DL — LOW (ref 11.7–16.1)
HGB BLD CALC-MCNC: 9.8 G/DL — LOW (ref 11.7–16.1)
HGB BLD CALC-MCNC: 9.8 G/DL — LOW (ref 11.7–16.1)
HGB BLD CALC-MCNC: 9.9 G/DL — LOW (ref 11.7–16.1)
HGB BLD CALC-MCNC: 9.9 G/DL — LOW (ref 11.7–16.1)
HGB BLD-MCNC: 10 G/DL — LOW (ref 11.5–15.5)
HGB BLD-MCNC: 10.1 G/DL — LOW (ref 11.5–15.5)
HGB BLD-MCNC: 10.1 G/DL — LOW (ref 11.5–15.5)
HGB BLD-MCNC: 10.2 G/DL — LOW (ref 11.5–15.5)
HGB BLD-MCNC: 10.3 G/DL — LOW (ref 11.5–15.5)
HGB BLD-MCNC: 10.4 G/DL — LOW (ref 11.5–15.5)
HGB BLD-MCNC: 10.5 G/DL — LOW (ref 11.5–15.5)
HGB BLD-MCNC: 10.5 G/DL — LOW (ref 11.5–15.5)
HGB BLD-MCNC: 10.6 G/DL — LOW (ref 11.5–15.5)
HGB BLD-MCNC: 10.7 G/DL — LOW (ref 11.5–15.5)
HGB BLD-MCNC: 10.8 G/DL — LOW (ref 11.5–15.5)
HGB BLD-MCNC: 10.8 G/DL — LOW (ref 11.5–15.5)
HGB BLD-MCNC: 11 G/DL — LOW (ref 11.5–15.5)
HGB BLD-MCNC: 11.2 G/DL — LOW (ref 11.5–15.5)
HGB BLD-MCNC: 11.2 G/DL — LOW (ref 11.5–15.5)
HGB BLD-MCNC: 11.3 G/DL — LOW (ref 11.5–15.5)
HGB BLD-MCNC: 11.3 G/DL — LOW (ref 11.5–15.5)
HGB BLD-MCNC: 11.4 G/DL — LOW (ref 11.5–15.5)
HGB BLD-MCNC: 11.4 G/DL — LOW (ref 11.5–15.5)
HGB BLD-MCNC: 5.3 G/DL — CRITICAL LOW (ref 11.5–15.5)
HGB BLD-MCNC: 5.3 G/DL — CRITICAL LOW (ref 11.5–15.5)
HGB BLD-MCNC: 5.5 G/DL — CRITICAL LOW (ref 11.5–15.5)
HGB BLD-MCNC: 5.5 G/DL — CRITICAL LOW (ref 11.5–15.5)
HGB BLD-MCNC: 7 G/DL — CRITICAL LOW (ref 11.5–15.5)
HGB BLD-MCNC: 7 G/DL — CRITICAL LOW (ref 11.5–15.5)
HGB BLD-MCNC: 8.2 G/DL — LOW (ref 11.5–15.5)
HGB BLD-MCNC: 8.2 G/DL — LOW (ref 11.5–15.5)
HGB BLD-MCNC: 8.8 G/DL — LOW (ref 11.5–15.5)
HGB BLD-MCNC: 8.8 G/DL — LOW (ref 11.5–15.5)
HGB BLD-MCNC: 8.9 G/DL — LOW (ref 11.5–15.5)
HGB BLD-MCNC: 9 G/DL — LOW (ref 11.5–15.5)
HGB BLD-MCNC: 9.1 G/DL — LOW (ref 11.5–15.5)
HGB BLD-MCNC: 9.2 G/DL — LOW (ref 11.5–15.5)
HGB BLD-MCNC: 9.3 G/DL — LOW (ref 11.5–15.5)
HGB BLD-MCNC: 9.4 G/DL — LOW (ref 11.5–15.5)
HGB BLD-MCNC: 9.5 G/DL — LOW (ref 11.5–15.5)
HGB BLD-MCNC: 9.8 G/DL — LOW (ref 11.5–15.5)
HGB BLD-MCNC: 9.8 G/DL — LOW (ref 11.5–15.5)
HGB BLD-MCNC: 9.9 G/DL — LOW (ref 11.5–15.5)
HIV 2 PROVIRAL DNA SERPL QL NAA+PROBE: SIGNIFICANT CHANGE UP
HIV 2 PROVIRAL DNA SERPL QL NAA+PROBE: SIGNIFICANT CHANGE UP
HIV-1 VIRAL LOAD RESULT: SIGNIFICANT CHANGE UP
HIV-1 VIRAL LOAD RESULT: SIGNIFICANT CHANGE UP
HIV1 RNA # SERPL NAA+PROBE: SIGNIFICANT CHANGE UP COPIES/ML
HIV1 RNA # SERPL NAA+PROBE: SIGNIFICANT CHANGE UP COPIES/ML
HIV1 RNA SER-IMP: SIGNIFICANT CHANGE UP
HIV1 RNA SER-IMP: SIGNIFICANT CHANGE UP
HIV1 RNA SERPL NAA+PROBE-ACNC: SIGNIFICANT CHANGE UP
HIV1 RNA SERPL NAA+PROBE-ACNC: SIGNIFICANT CHANGE UP
HIV1 RNA SERPL NAA+PROBE-LOG#: SIGNIFICANT CHANGE UP LG COP/ML
HIV1 RNA SERPL NAA+PROBE-LOG#: SIGNIFICANT CHANGE UP LG COP/ML
HOROWITZ INDEX BLDA+IHG-RTO: SIGNIFICANT CHANGE UP
HOROWITZ INDEX BLDA+IHG-RTO: SIGNIFICANT CHANGE UP
HOROWITZ INDEX BLDV+IHG-RTO: 100 — SIGNIFICANT CHANGE UP
HOROWITZ INDEX BLDV+IHG-RTO: 100 — SIGNIFICANT CHANGE UP
HOROWITZ INDEX BLDV+IHG-RTO: 40 — SIGNIFICANT CHANGE UP
HOROWITZ INDEX BLDV+IHG-RTO: 65 — SIGNIFICANT CHANGE UP
HOROWITZ INDEX BLDV+IHG-RTO: 65 — SIGNIFICANT CHANGE UP
HYALINE CASTS # UR AUTO: ABNORMAL /LPF
HYALINE CASTS # UR AUTO: ABNORMAL /LPF
HYPOCHROMIA BLD QL: SLIGHT — SIGNIFICANT CHANGE UP
IMM GRANULOCYTES NFR BLD AUTO: 0.3 % — SIGNIFICANT CHANGE UP (ref 0–0.9)
IMM GRANULOCYTES NFR BLD AUTO: 0.5 % — SIGNIFICANT CHANGE UP (ref 0–0.9)
IMM GRANULOCYTES NFR BLD AUTO: 0.5 % — SIGNIFICANT CHANGE UP (ref 0–0.9)
IMM GRANULOCYTES NFR BLD AUTO: 0.6 % — SIGNIFICANT CHANGE UP (ref 0–0.9)
IMM GRANULOCYTES NFR BLD AUTO: 0.7 % — SIGNIFICANT CHANGE UP (ref 0–0.9)
IMM GRANULOCYTES NFR BLD AUTO: 0.8 % — SIGNIFICANT CHANGE UP (ref 0–0.9)
IMM GRANULOCYTES NFR BLD AUTO: 0.9 % — SIGNIFICANT CHANGE UP (ref 0–0.9)
IMM GRANULOCYTES NFR BLD AUTO: 1 % — HIGH (ref 0–0.9)
IMM GRANULOCYTES NFR BLD AUTO: 1 % — HIGH (ref 0–0.9)
IMM GRANULOCYTES NFR BLD AUTO: 1.1 % — HIGH (ref 0–0.9)
IMM GRANULOCYTES NFR BLD AUTO: 1.1 % — HIGH (ref 0–0.9)
IMM GRANULOCYTES NFR BLD AUTO: 1.4 % — HIGH (ref 0–0.9)
IMM GRANULOCYTES NFR BLD AUTO: 1.4 % — HIGH (ref 0–0.9)
IMM GRANULOCYTES NFR BLD AUTO: 4.3 % — HIGH (ref 0–0.9)
IMM GRANULOCYTES NFR BLD AUTO: 4.3 % — HIGH (ref 0–0.9)
IMM GRANULOCYTES NFR BLD AUTO: 5.7 % — HIGH (ref 0–0.9)
IMM GRANULOCYTES NFR BLD AUTO: 5.7 % — HIGH (ref 0–0.9)
IMM GRANULOCYTES NFR BLD AUTO: 8.1 % — HIGH (ref 0–0.9)
IMM GRANULOCYTES NFR BLD AUTO: 8.1 % — HIGH (ref 0–0.9)
IMMUNOLOGIST REVIEW: SIGNIFICANT CHANGE UP
IMMUNOLOGIST REVIEW: SIGNIFICANT CHANGE UP
INR BLD: 1.29 RATIO — HIGH (ref 0.85–1.18)
INR BLD: 1.29 RATIO — HIGH (ref 0.85–1.18)
INR BLD: 1.31 RATIO — HIGH (ref 0.85–1.18)
INR BLD: 1.31 RATIO — HIGH (ref 0.85–1.18)
INR BLD: 1.33 RATIO — HIGH (ref 0.85–1.18)
INR BLD: 1.34 RATIO — HIGH (ref 0.85–1.18)
INR BLD: 1.34 RATIO — HIGH (ref 0.85–1.18)
INR BLD: 1.65 RATIO — HIGH (ref 0.85–1.18)
INR BLD: 1.65 RATIO — HIGH (ref 0.85–1.18)
INR BLD: 1.66 RATIO — HIGH (ref 0.85–1.18)
INR BLD: 1.66 RATIO — HIGH (ref 0.85–1.18)
INR BLD: 1.66 RATIO — HIGH (ref 0.88–1.16)
INR BLD: 1.67 RATIO — HIGH (ref 0.85–1.18)
INR BLD: 1.67 RATIO — HIGH (ref 0.85–1.18)
INR BLD: 1.72 RATIO — HIGH (ref 0.85–1.18)
INR BLD: 1.72 RATIO — HIGH (ref 0.85–1.18)
INR BLD: 2.59 RATIO — HIGH (ref 0.85–1.18)
INR BLD: 2.59 RATIO — HIGH (ref 0.85–1.18)
INR BLD: 3.3 RATIO — HIGH (ref 0.85–1.18)
INR BLD: 3.3 RATIO — HIGH (ref 0.85–1.18)
INR BLD: 3.39 RATIO — HIGH (ref 0.85–1.18)
INR BLD: 3.39 RATIO — HIGH (ref 0.85–1.18)
INR BLD: 3.71 RATIO — HIGH (ref 0.85–1.18)
INR BLD: 3.71 RATIO — HIGH (ref 0.85–1.18)
INR BLD: 3.81 RATIO — HIGH (ref 0.85–1.18)
INR BLD: 3.81 RATIO — HIGH (ref 0.85–1.18)
INR BLD: ABNORMAL RATIO (ref 0.85–1.18)
INR BLD: ABNORMAL RATIO (ref 0.85–1.18)
IRON SATN MFR SERPL: 14 % — SIGNIFICANT CHANGE UP (ref 14–50)
IRON SATN MFR SERPL: 14 % — SIGNIFICANT CHANGE UP (ref 14–50)
IRON SATN MFR SERPL: 25 UG/DL — LOW (ref 37–145)
IRON SATN MFR SERPL: 25 UG/DL — LOW (ref 37–145)
KETONES UR-MCNC: ABNORMAL
KETONES UR-MCNC: ABNORMAL
KETONES UR-MCNC: NEGATIVE — SIGNIFICANT CHANGE UP
KETONES UR-MCNC: NEGATIVE — SIGNIFICANT CHANGE UP
LACTATE BLDV-MCNC: 0.9 MMOL/L — SIGNIFICANT CHANGE UP (ref 0.5–2)
LACTATE BLDV-MCNC: 1 MMOL/L — SIGNIFICANT CHANGE UP (ref 0.5–2)
LACTATE BLDV-MCNC: 1.2 MMOL/L — SIGNIFICANT CHANGE UP (ref 0.5–2)
LACTATE BLDV-MCNC: 1.3 MMOL/L — SIGNIFICANT CHANGE UP (ref 0.5–2)
LACTATE BLDV-MCNC: 1.5 MMOL/L — SIGNIFICANT CHANGE UP (ref 0.5–2)
LACTATE BLDV-MCNC: 1.5 MMOL/L — SIGNIFICANT CHANGE UP (ref 0.5–2)
LACTATE BLDV-MCNC: 1.6 MMOL/L — SIGNIFICANT CHANGE UP (ref 0.5–2)
LACTATE BLDV-MCNC: 1.6 MMOL/L — SIGNIFICANT CHANGE UP (ref 0.5–2)
LACTATE BLDV-MCNC: 1.9 MMOL/L — SIGNIFICANT CHANGE UP (ref 0.5–2)
LACTATE BLDV-MCNC: 1.9 MMOL/L — SIGNIFICANT CHANGE UP (ref 0.5–2)
LACTATE BLDV-MCNC: 11.4 MMOL/L — CRITICAL HIGH (ref 0.5–2)
LACTATE BLDV-MCNC: 11.4 MMOL/L — CRITICAL HIGH (ref 0.5–2)
LACTATE BLDV-MCNC: 2 MMOL/L — SIGNIFICANT CHANGE UP (ref 0.5–2)
LACTATE BLDV-MCNC: 2.1 MMOL/L — HIGH (ref 0.5–2)
LACTATE BLDV-MCNC: 2.1 MMOL/L — HIGH (ref 0.5–2)
LACTATE BLDV-MCNC: 2.3 MMOL/L — HIGH (ref 0.5–2)
LACTATE BLDV-MCNC: 2.3 MMOL/L — HIGH (ref 0.5–2)
LACTATE BLDV-MCNC: 2.6 MMOL/L — HIGH (ref 0.5–2)
LACTATE BLDV-MCNC: 2.6 MMOL/L — HIGH (ref 0.5–2)
LACTATE BLDV-MCNC: 3.1 MMOL/L — HIGH (ref 0.5–2)
LACTATE BLDV-MCNC: 3.1 MMOL/L — HIGH (ref 0.5–2)
LACTATE BLDV-MCNC: 3.2 MMOL/L — HIGH (ref 0.5–2)
LACTATE BLDV-MCNC: 3.9 MMOL/L — HIGH (ref 0.5–2)
LACTATE BLDV-MCNC: 3.9 MMOL/L — HIGH (ref 0.5–2)
LACTATE BLDV-MCNC: 4.3 MMOL/L — CRITICAL HIGH (ref 0.5–2)
LACTATE BLDV-MCNC: 4.3 MMOL/L — CRITICAL HIGH (ref 0.5–2)
LACTATE SERPL-SCNC: 1.4 MMOL/L — SIGNIFICANT CHANGE UP (ref 0.5–2)
LACTATE SERPL-SCNC: 1.4 MMOL/L — SIGNIFICANT CHANGE UP (ref 0.5–2)
LACTATE SERPL-SCNC: 1.5 MMOL/L — SIGNIFICANT CHANGE UP (ref 0.5–2)
LACTATE SERPL-SCNC: 1.5 MMOL/L — SIGNIFICANT CHANGE UP (ref 0.5–2)
LACTATE SERPL-SCNC: 1.8 MMOL/L — SIGNIFICANT CHANGE UP (ref 0.5–2)
LACTATE SERPL-SCNC: 2.4 MMOL/L — HIGH (ref 0.5–2)
LACTATE SERPL-SCNC: 2.4 MMOL/L — HIGH (ref 0.5–2)
LACTATE SERPL-SCNC: 4.1 MMOL/L — CRITICAL HIGH (ref 0.5–2)
LACTATE SERPL-SCNC: 4.1 MMOL/L — CRITICAL HIGH (ref 0.5–2)
LDH SERPL L TO P-CCNC: HIGH U/L (ref 50–242)
LDH SERPL L TO P-CCNC: HIGH U/L (ref 50–242)
LEGIONELLA AG UR QL: NEGATIVE — SIGNIFICANT CHANGE UP
LEUKOCYTE ESTERASE UR-ACNC: ABNORMAL
LIDOCAIN IGE QN: 19 U/L — LOW (ref 22–51)
LIDOCAIN IGE QN: 25 U/L — SIGNIFICANT CHANGE UP (ref 22–51)
LIDOCAIN IGE QN: 25 U/L — SIGNIFICANT CHANGE UP (ref 22–51)
LYMPHOCYTES # BLD AUTO: 0 % — LOW (ref 13–44)
LYMPHOCYTES # BLD AUTO: 0 % — LOW (ref 13–44)
LYMPHOCYTES # BLD AUTO: 0 K/UL — LOW (ref 1–3.3)
LYMPHOCYTES # BLD AUTO: 0 K/UL — LOW (ref 1–3.3)
LYMPHOCYTES # BLD AUTO: 0.09 K/UL — LOW (ref 1–3.3)
LYMPHOCYTES # BLD AUTO: 0.12 K/UL — LOW (ref 1–3.3)
LYMPHOCYTES # BLD AUTO: 0.13 K/UL — LOW (ref 1–3.3)
LYMPHOCYTES # BLD AUTO: 0.16 K/UL — LOW (ref 1–3.3)
LYMPHOCYTES # BLD AUTO: 0.16 K/UL — LOW (ref 1–3.3)
LYMPHOCYTES # BLD AUTO: 0.2 K/UL — LOW (ref 1–3.3)
LYMPHOCYTES # BLD AUTO: 0.2 K/UL — LOW (ref 1–3.3)
LYMPHOCYTES # BLD AUTO: 0.21 K/UL — LOW (ref 1–3.3)
LYMPHOCYTES # BLD AUTO: 0.21 K/UL — LOW (ref 1–3.3)
LYMPHOCYTES # BLD AUTO: 0.43 K/UL — LOW (ref 1–3.3)
LYMPHOCYTES # BLD AUTO: 0.44 K/UL — LOW (ref 1–3.3)
LYMPHOCYTES # BLD AUTO: 0.49 K/UL — LOW (ref 1–3.3)
LYMPHOCYTES # BLD AUTO: 0.53 K/UL — LOW (ref 1–3.3)
LYMPHOCYTES # BLD AUTO: 0.53 K/UL — LOW (ref 1–3.3)
LYMPHOCYTES # BLD AUTO: 0.55 K/UL — LOW (ref 1–3.3)
LYMPHOCYTES # BLD AUTO: 0.58 K/UL — LOW (ref 1–3.3)
LYMPHOCYTES # BLD AUTO: 0.58 K/UL — LOW (ref 1–3.3)
LYMPHOCYTES # BLD AUTO: 0.63 K/UL — LOW (ref 1–3.3)
LYMPHOCYTES # BLD AUTO: 0.63 K/UL — LOW (ref 1–3.3)
LYMPHOCYTES # BLD AUTO: 0.64 K/UL — LOW (ref 1–3.3)
LYMPHOCYTES # BLD AUTO: 0.76 K/UL — LOW (ref 1–3.3)
LYMPHOCYTES # BLD AUTO: 0.76 K/UL — LOW (ref 1–3.3)
LYMPHOCYTES # BLD AUTO: 0.88 K/UL — LOW (ref 1–3.3)
LYMPHOCYTES # BLD AUTO: 0.88 K/UL — LOW (ref 1–3.3)
LYMPHOCYTES # BLD AUTO: 0.9 % — LOW (ref 13–44)
LYMPHOCYTES # BLD AUTO: 0.94 K/UL — LOW (ref 1–3.3)
LYMPHOCYTES # BLD AUTO: 0.94 K/UL — LOW (ref 1–3.3)
LYMPHOCYTES # BLD AUTO: 1.53 K/UL — SIGNIFICANT CHANGE UP (ref 1–3.3)
LYMPHOCYTES # BLD AUTO: 1.53 K/UL — SIGNIFICANT CHANGE UP (ref 1–3.3)
LYMPHOCYTES # BLD AUTO: 1.88 K/UL — SIGNIFICANT CHANGE UP (ref 1–3.3)
LYMPHOCYTES # BLD AUTO: 1.88 K/UL — SIGNIFICANT CHANGE UP (ref 1–3.3)
LYMPHOCYTES # BLD AUTO: 11.1 % — LOW (ref 13–44)
LYMPHOCYTES # BLD AUTO: 11.1 % — LOW (ref 13–44)
LYMPHOCYTES # BLD AUTO: 11.6 % — LOW (ref 13–44)
LYMPHOCYTES # BLD AUTO: 11.7 % — LOW (ref 13–44)
LYMPHOCYTES # BLD AUTO: 13.3 % — SIGNIFICANT CHANGE UP (ref 13–44)
LYMPHOCYTES # BLD AUTO: 15.3 % — SIGNIFICANT CHANGE UP (ref 13–44)
LYMPHOCYTES # BLD AUTO: 16.2 % — SIGNIFICANT CHANGE UP (ref 13–44)
LYMPHOCYTES # BLD AUTO: 2.4 % — LOW (ref 13–44)
LYMPHOCYTES # BLD AUTO: 2.4 % — LOW (ref 13–44)
LYMPHOCYTES # BLD AUTO: 2.6 % — LOW (ref 13–44)
LYMPHOCYTES # BLD AUTO: 2.8 % — LOW (ref 13–44)
LYMPHOCYTES # BLD AUTO: 3.2 % — LOW (ref 13–44)
LYMPHOCYTES # BLD AUTO: 3.2 % — LOW (ref 13–44)
LYMPHOCYTES # BLD AUTO: 3.3 % — LOW (ref 13–44)
LYMPHOCYTES # BLD AUTO: 3.3 % — LOW (ref 13–44)
LYMPHOCYTES # BLD AUTO: 3.6 % — LOW (ref 13–44)
LYMPHOCYTES # BLD AUTO: 3.6 % — LOW (ref 13–44)
LYMPHOCYTES # BLD AUTO: 4.4 % — LOW (ref 13–44)
LYMPHOCYTES # BLD AUTO: 4.4 % — LOW (ref 13–44)
LYMPHOCYTES # BLD AUTO: 4.5 % — LOW (ref 13–44)
LYMPHOCYTES # BLD AUTO: 4.5 % — LOW (ref 13–44)
LYMPHOCYTES # BLD AUTO: 4.8 % — LOW (ref 13–44)
LYMPHOCYTES # BLD AUTO: 4.8 % — LOW (ref 13–44)
LYMPHOCYTES # BLD AUTO: 7.1 % — LOW (ref 13–44)
LYMPHOCYTES # BLD AUTO: 7.1 % — LOW (ref 13–44)
LYMPHOCYTES # BLD AUTO: 8.7 % — LOW (ref 13–44)
LYMPHOCYTES # BLD AUTO: 8.7 % — LOW (ref 13–44)
M PNEUMO IGG SER IA-ACNC: 0.69 INDEX — SIGNIFICANT CHANGE UP (ref 0–0.9)
M PNEUMO IGG SER IA-ACNC: 0.69 INDEX — SIGNIFICANT CHANGE UP (ref 0–0.9)
M PNEUMO IGG SER IA-ACNC: NEGATIVE — SIGNIFICANT CHANGE UP
M PNEUMO IGG SER IA-ACNC: NEGATIVE — SIGNIFICANT CHANGE UP
M PNEUMO IGM SER-ACNC: 0.19 INDEX — SIGNIFICANT CHANGE UP (ref 0–0.9)
M PNEUMO IGM SER-ACNC: 0.19 INDEX — SIGNIFICANT CHANGE UP (ref 0–0.9)
M TB IFN-G BLD-IMP: ABNORMAL
M TB IFN-G BLD-IMP: ABNORMAL
M TB IFN-G CD4+ BCKGRND COR BLD-ACNC: 0 IU/ML — SIGNIFICANT CHANGE UP
M TB IFN-G CD4+ BCKGRND COR BLD-ACNC: 0 IU/ML — SIGNIFICANT CHANGE UP
M TB IFN-G CD4+CD8+ BCKGRND COR BLD-ACNC: 0 IU/ML — SIGNIFICANT CHANGE UP
M TB IFN-G CD4+CD8+ BCKGRND COR BLD-ACNC: 0 IU/ML — SIGNIFICANT CHANGE UP
MACROCYTES BLD QL: SIGNIFICANT CHANGE UP
MACROCYTES BLD QL: SIGNIFICANT CHANGE UP
MACROCYTES BLD QL: SLIGHT — SIGNIFICANT CHANGE UP
MAGNESIUM SERPL-MCNC: 1.6 MG/DL — SIGNIFICANT CHANGE UP (ref 1.6–2.6)
MAGNESIUM SERPL-MCNC: 1.6 MG/DL — SIGNIFICANT CHANGE UP (ref 1.6–2.6)
MAGNESIUM SERPL-MCNC: 1.7 MG/DL — LOW (ref 1.8–2.6)
MAGNESIUM SERPL-MCNC: 1.7 MG/DL — SIGNIFICANT CHANGE UP (ref 1.6–2.6)
MAGNESIUM SERPL-MCNC: 1.7 MG/DL — SIGNIFICANT CHANGE UP (ref 1.6–2.6)
MAGNESIUM SERPL-MCNC: 1.8 MG/DL — SIGNIFICANT CHANGE UP (ref 1.6–2.6)
MAGNESIUM SERPL-MCNC: 1.9 MG/DL — SIGNIFICANT CHANGE UP (ref 1.6–2.6)
MAGNESIUM SERPL-MCNC: 1.9 MG/DL — SIGNIFICANT CHANGE UP (ref 1.8–2.6)
MAGNESIUM SERPL-MCNC: 2 MG/DL — SIGNIFICANT CHANGE UP (ref 1.6–2.6)
MAGNESIUM SERPL-MCNC: 2.1 MG/DL — SIGNIFICANT CHANGE UP (ref 1.6–2.6)
MAGNESIUM SERPL-MCNC: 2.1 MG/DL — SIGNIFICANT CHANGE UP (ref 1.8–2.6)
MAGNESIUM SERPL-MCNC: 2.1 MG/DL — SIGNIFICANT CHANGE UP (ref 1.8–2.6)
MAGNESIUM SERPL-MCNC: 2.2 MG/DL — SIGNIFICANT CHANGE UP (ref 1.6–2.6)
MAGNESIUM SERPL-MCNC: 2.3 MG/DL — SIGNIFICANT CHANGE UP (ref 1.6–2.6)
MAGNESIUM SERPL-MCNC: 2.3 MG/DL — SIGNIFICANT CHANGE UP (ref 1.8–2.6)
MAGNESIUM SERPL-MCNC: 2.3 MG/DL — SIGNIFICANT CHANGE UP (ref 1.8–2.6)
MAGNESIUM SERPL-MCNC: 2.4 MG/DL — SIGNIFICANT CHANGE UP (ref 1.6–2.6)
MAGNESIUM SERPL-MCNC: 2.5 MG/DL — SIGNIFICANT CHANGE UP (ref 1.6–2.6)
MANUAL SMEAR VERIFICATION: SIGNIFICANT CHANGE UP
MCHC RBC-ENTMCNC: 23.5 PG — LOW (ref 27–34)
MCHC RBC-ENTMCNC: 23.6 PG — LOW (ref 27–34)
MCHC RBC-ENTMCNC: 23.8 PG — LOW (ref 27–34)
MCHC RBC-ENTMCNC: 23.9 PG — LOW (ref 27–34)
MCHC RBC-ENTMCNC: 24 PG — LOW (ref 27–34)
MCHC RBC-ENTMCNC: 24 PG — LOW (ref 27–34)
MCHC RBC-ENTMCNC: 24.1 PG — LOW (ref 27–34)
MCHC RBC-ENTMCNC: 24.2 PG — LOW (ref 27–34)
MCHC RBC-ENTMCNC: 24.3 PG — LOW (ref 27–34)
MCHC RBC-ENTMCNC: 24.3 PG — LOW (ref 27–34)
MCHC RBC-ENTMCNC: 24.4 PG — LOW (ref 27–34)
MCHC RBC-ENTMCNC: 24.4 PG — LOW (ref 27–34)
MCHC RBC-ENTMCNC: 24.5 PG — LOW (ref 27–34)
MCHC RBC-ENTMCNC: 24.6 PG — LOW (ref 27–34)
MCHC RBC-ENTMCNC: 24.7 PG — LOW (ref 27–34)
MCHC RBC-ENTMCNC: 24.8 PG — LOW (ref 27–34)
MCHC RBC-ENTMCNC: 24.8 PG — LOW (ref 27–34)
MCHC RBC-ENTMCNC: 24.9 PG — LOW (ref 27–34)
MCHC RBC-ENTMCNC: 25 PG — LOW (ref 27–34)
MCHC RBC-ENTMCNC: 25.1 PG — LOW (ref 27–34)
MCHC RBC-ENTMCNC: 25.2 PG — LOW (ref 27–34)
MCHC RBC-ENTMCNC: 25.3 PG — LOW (ref 27–34)
MCHC RBC-ENTMCNC: 25.4 PG — LOW (ref 27–34)
MCHC RBC-ENTMCNC: 25.4 PG — LOW (ref 27–34)
MCHC RBC-ENTMCNC: 25.7 PG — LOW (ref 27–34)
MCHC RBC-ENTMCNC: 25.7 PG — LOW (ref 27–34)
MCHC RBC-ENTMCNC: 25.9 PG — LOW (ref 27–34)
MCHC RBC-ENTMCNC: 25.9 PG — LOW (ref 27–34)
MCHC RBC-ENTMCNC: 27.3 GM/DL — LOW (ref 32–36)
MCHC RBC-ENTMCNC: 27.5 GM/DL — LOW (ref 32–36)
MCHC RBC-ENTMCNC: 27.6 PG — SIGNIFICANT CHANGE UP (ref 27–34)
MCHC RBC-ENTMCNC: 27.6 PG — SIGNIFICANT CHANGE UP (ref 27–34)
MCHC RBC-ENTMCNC: 27.7 GM/DL — LOW (ref 32–36)
MCHC RBC-ENTMCNC: 27.8 GM/DL — LOW (ref 32–36)
MCHC RBC-ENTMCNC: 28 GM/DL — LOW (ref 32–36)
MCHC RBC-ENTMCNC: 28.1 GM/DL — LOW (ref 32–36)
MCHC RBC-ENTMCNC: 28.2 GM/DL — LOW (ref 32–36)
MCHC RBC-ENTMCNC: 28.3 GM/DL — LOW (ref 32–36)
MCHC RBC-ENTMCNC: 28.4 GM/DL — LOW (ref 32–36)
MCHC RBC-ENTMCNC: 28.4 PG — SIGNIFICANT CHANGE UP (ref 27–34)
MCHC RBC-ENTMCNC: 28.4 PG — SIGNIFICANT CHANGE UP (ref 27–34)
MCHC RBC-ENTMCNC: 28.5 GM/DL — LOW (ref 32–36)
MCHC RBC-ENTMCNC: 28.7 GM/DL — LOW (ref 32–36)
MCHC RBC-ENTMCNC: 28.8 GM/DL — LOW (ref 32–36)
MCHC RBC-ENTMCNC: 28.8 GM/DL — LOW (ref 32–36)
MCHC RBC-ENTMCNC: 29 GM/DL — LOW (ref 32–36)
MCHC RBC-ENTMCNC: 29.1 GM/DL — LOW (ref 32–36)
MCHC RBC-ENTMCNC: 29.2 GM/DL — LOW (ref 32–36)
MCHC RBC-ENTMCNC: 29.3 GM/DL — LOW (ref 32–36)
MCHC RBC-ENTMCNC: 29.3 GM/DL — LOW (ref 32–36)
MCHC RBC-ENTMCNC: 29.4 GM/DL — LOW (ref 32–36)
MCHC RBC-ENTMCNC: 29.5 GM/DL — LOW (ref 32–36)
MCHC RBC-ENTMCNC: 29.6 GM/DL — LOW (ref 32–36)
MCHC RBC-ENTMCNC: 29.7 GM/DL — LOW (ref 32–36)
MCHC RBC-ENTMCNC: 29.7 GM/DL — LOW (ref 32–36)
MCHC RBC-ENTMCNC: 29.8 GM/DL — LOW (ref 32–36)
MCHC RBC-ENTMCNC: 29.8 GM/DL — LOW (ref 32–36)
MCHC RBC-ENTMCNC: 29.9 GM/DL — LOW (ref 32–36)
MCHC RBC-ENTMCNC: 30.1 GM/DL — LOW (ref 32–36)
MCHC RBC-ENTMCNC: 30.2 GM/DL — LOW (ref 32–36)
MCHC RBC-ENTMCNC: 30.2 GM/DL — LOW (ref 32–36)
MCHC RBC-ENTMCNC: 30.3 GM/DL — LOW (ref 32–36)
MCHC RBC-ENTMCNC: 30.4 GM/DL — LOW (ref 32–36)
MCHC RBC-ENTMCNC: 30.4 GM/DL — LOW (ref 32–36)
MCHC RBC-ENTMCNC: 30.5 GM/DL — LOW (ref 32–36)
MCHC RBC-ENTMCNC: 30.5 GM/DL — LOW (ref 32–36)
MCHC RBC-ENTMCNC: 30.6 GM/DL — LOW (ref 32–36)
MCHC RBC-ENTMCNC: 30.6 GM/DL — LOW (ref 32–36)
MCV RBC AUTO: 82.6 FL — SIGNIFICANT CHANGE UP (ref 80–100)
MCV RBC AUTO: 82.6 FL — SIGNIFICANT CHANGE UP (ref 80–100)
MCV RBC AUTO: 82.7 FL — SIGNIFICANT CHANGE UP (ref 80–100)
MCV RBC AUTO: 83.1 FL — SIGNIFICANT CHANGE UP (ref 80–100)
MCV RBC AUTO: 83.3 FL — SIGNIFICANT CHANGE UP (ref 80–100)
MCV RBC AUTO: 83.4 FL — SIGNIFICANT CHANGE UP (ref 80–100)
MCV RBC AUTO: 83.4 FL — SIGNIFICANT CHANGE UP (ref 80–100)
MCV RBC AUTO: 83.5 FL — SIGNIFICANT CHANGE UP (ref 80–100)
MCV RBC AUTO: 83.6 FL — SIGNIFICANT CHANGE UP (ref 80–100)
MCV RBC AUTO: 83.6 FL — SIGNIFICANT CHANGE UP (ref 80–100)
MCV RBC AUTO: 83.7 FL — SIGNIFICANT CHANGE UP (ref 80–100)
MCV RBC AUTO: 83.9 FL — SIGNIFICANT CHANGE UP (ref 80–100)
MCV RBC AUTO: 84.1 FL — SIGNIFICANT CHANGE UP (ref 80–100)
MCV RBC AUTO: 84.3 FL — SIGNIFICANT CHANGE UP (ref 80–100)
MCV RBC AUTO: 84.4 FL — SIGNIFICANT CHANGE UP (ref 80–100)
MCV RBC AUTO: 84.4 FL — SIGNIFICANT CHANGE UP (ref 80–100)
MCV RBC AUTO: 84.8 FL — SIGNIFICANT CHANGE UP (ref 80–100)
MCV RBC AUTO: 84.9 FL — SIGNIFICANT CHANGE UP (ref 80–100)
MCV RBC AUTO: 85.1 FL — SIGNIFICANT CHANGE UP (ref 80–100)
MCV RBC AUTO: 85.3 FL — SIGNIFICANT CHANGE UP (ref 80–100)
MCV RBC AUTO: 85.6 FL — SIGNIFICANT CHANGE UP (ref 80–100)
MCV RBC AUTO: 85.7 FL — SIGNIFICANT CHANGE UP (ref 80–100)
MCV RBC AUTO: 85.7 FL — SIGNIFICANT CHANGE UP (ref 80–100)
MCV RBC AUTO: 85.8 FL — SIGNIFICANT CHANGE UP (ref 80–100)
MCV RBC AUTO: 85.8 FL — SIGNIFICANT CHANGE UP (ref 80–100)
MCV RBC AUTO: 85.9 FL — SIGNIFICANT CHANGE UP (ref 80–100)
MCV RBC AUTO: 85.9 FL — SIGNIFICANT CHANGE UP (ref 80–100)
MCV RBC AUTO: 86.3 FL — SIGNIFICANT CHANGE UP (ref 80–100)
MCV RBC AUTO: 86.3 FL — SIGNIFICANT CHANGE UP (ref 80–100)
MCV RBC AUTO: 86.4 FL — SIGNIFICANT CHANGE UP (ref 80–100)
MCV RBC AUTO: 86.5 FL — SIGNIFICANT CHANGE UP (ref 80–100)
MCV RBC AUTO: 86.5 FL — SIGNIFICANT CHANGE UP (ref 80–100)
MCV RBC AUTO: 86.6 FL — SIGNIFICANT CHANGE UP (ref 80–100)
MCV RBC AUTO: 86.6 FL — SIGNIFICANT CHANGE UP (ref 80–100)
MCV RBC AUTO: 86.8 FL — SIGNIFICANT CHANGE UP (ref 80–100)
MCV RBC AUTO: 86.8 FL — SIGNIFICANT CHANGE UP (ref 80–100)
MCV RBC AUTO: 87.1 FL — SIGNIFICANT CHANGE UP (ref 80–100)
MCV RBC AUTO: 87.2 FL — SIGNIFICANT CHANGE UP (ref 80–100)
MCV RBC AUTO: 87.2 FL — SIGNIFICANT CHANGE UP (ref 80–100)
MCV RBC AUTO: 87.4 FL — SIGNIFICANT CHANGE UP (ref 80–100)
MCV RBC AUTO: 87.4 FL — SIGNIFICANT CHANGE UP (ref 80–100)
MCV RBC AUTO: 87.7 FL — SIGNIFICANT CHANGE UP (ref 80–100)
MCV RBC AUTO: 87.7 FL — SIGNIFICANT CHANGE UP (ref 80–100)
MCV RBC AUTO: 88 FL — SIGNIFICANT CHANGE UP (ref 80–100)
MCV RBC AUTO: 88 FL — SIGNIFICANT CHANGE UP (ref 80–100)
MCV RBC AUTO: 88.1 FL — SIGNIFICANT CHANGE UP (ref 80–100)
MCV RBC AUTO: 88.1 FL — SIGNIFICANT CHANGE UP (ref 80–100)
MCV RBC AUTO: 88.4 FL — SIGNIFICANT CHANGE UP (ref 80–100)
MCV RBC AUTO: 88.4 FL — SIGNIFICANT CHANGE UP (ref 80–100)
MCV RBC AUTO: 88.6 FL — SIGNIFICANT CHANGE UP (ref 80–100)
MCV RBC AUTO: 88.6 FL — SIGNIFICANT CHANGE UP (ref 80–100)
MCV RBC AUTO: 92.6 FL — SIGNIFICANT CHANGE UP (ref 80–100)
MCV RBC AUTO: 92.6 FL — SIGNIFICANT CHANGE UP (ref 80–100)
MCV RBC AUTO: 93.7 FL — SIGNIFICANT CHANGE UP (ref 80–100)
MCV RBC AUTO: 93.7 FL — SIGNIFICANT CHANGE UP (ref 80–100)
MCV RBC AUTO: 94.6 FL — SIGNIFICANT CHANGE UP (ref 80–100)
MCV RBC AUTO: 94.6 FL — SIGNIFICANT CHANGE UP (ref 80–100)
MCV RBC AUTO: 95 FL — SIGNIFICANT CHANGE UP (ref 80–100)
MCV RBC AUTO: 95 FL — SIGNIFICANT CHANGE UP (ref 80–100)
METAMYELOCYTES # FLD: 2.7 % — HIGH (ref 0–0)
METAMYELOCYTES # FLD: 2.7 % — HIGH (ref 0–0)
METHGB MFR BLDV: 0 % — SIGNIFICANT CHANGE UP
MICROCYTES BLD QL: SIGNIFICANT CHANGE UP
MICROCYTES BLD QL: SIGNIFICANT CHANGE UP
MICROCYTES BLD QL: SLIGHT — SIGNIFICANT CHANGE UP
MONOCYTES # BLD AUTO: 0 K/UL — SIGNIFICANT CHANGE UP (ref 0–0.9)
MONOCYTES # BLD AUTO: 0 K/UL — SIGNIFICANT CHANGE UP (ref 0–0.9)
MONOCYTES # BLD AUTO: 0.04 K/UL — SIGNIFICANT CHANGE UP (ref 0–0.9)
MONOCYTES # BLD AUTO: 0.04 K/UL — SIGNIFICANT CHANGE UP (ref 0–0.9)
MONOCYTES # BLD AUTO: 0.1 K/UL — SIGNIFICANT CHANGE UP (ref 0–0.9)
MONOCYTES # BLD AUTO: 0.1 K/UL — SIGNIFICANT CHANGE UP (ref 0–0.9)
MONOCYTES # BLD AUTO: 0.12 K/UL — SIGNIFICANT CHANGE UP (ref 0–0.9)
MONOCYTES # BLD AUTO: 0.15 K/UL — SIGNIFICANT CHANGE UP (ref 0–0.9)
MONOCYTES # BLD AUTO: 0.16 K/UL — SIGNIFICANT CHANGE UP (ref 0–0.9)
MONOCYTES # BLD AUTO: 0.16 K/UL — SIGNIFICANT CHANGE UP (ref 0–0.9)
MONOCYTES # BLD AUTO: 0.17 K/UL — SIGNIFICANT CHANGE UP (ref 0–0.9)
MONOCYTES # BLD AUTO: 0.17 K/UL — SIGNIFICANT CHANGE UP (ref 0–0.9)
MONOCYTES # BLD AUTO: 0.2 K/UL — SIGNIFICANT CHANGE UP (ref 0–0.9)
MONOCYTES # BLD AUTO: 0.21 K/UL — SIGNIFICANT CHANGE UP (ref 0–0.9)
MONOCYTES # BLD AUTO: 0.23 K/UL — SIGNIFICANT CHANGE UP (ref 0–0.9)
MONOCYTES # BLD AUTO: 0.24 K/UL — SIGNIFICANT CHANGE UP (ref 0–0.9)
MONOCYTES # BLD AUTO: 0.24 K/UL — SIGNIFICANT CHANGE UP (ref 0–0.9)
MONOCYTES # BLD AUTO: 0.26 K/UL — SIGNIFICANT CHANGE UP (ref 0–0.9)
MONOCYTES # BLD AUTO: 0.27 K/UL — SIGNIFICANT CHANGE UP (ref 0–0.9)
MONOCYTES # BLD AUTO: 0.27 K/UL — SIGNIFICANT CHANGE UP (ref 0–0.9)
MONOCYTES # BLD AUTO: 0.34 K/UL — SIGNIFICANT CHANGE UP (ref 0–0.9)
MONOCYTES # BLD AUTO: 0.34 K/UL — SIGNIFICANT CHANGE UP (ref 0–0.9)
MONOCYTES # BLD AUTO: 0.37 K/UL — SIGNIFICANT CHANGE UP (ref 0–0.9)
MONOCYTES # BLD AUTO: 0.37 K/UL — SIGNIFICANT CHANGE UP (ref 0–0.9)
MONOCYTES # BLD AUTO: 0.39 K/UL — SIGNIFICANT CHANGE UP (ref 0–0.9)
MONOCYTES # BLD AUTO: 0.39 K/UL — SIGNIFICANT CHANGE UP (ref 0–0.9)
MONOCYTES # BLD AUTO: 0.46 K/UL — SIGNIFICANT CHANGE UP (ref 0–0.9)
MONOCYTES # BLD AUTO: 0.46 K/UL — SIGNIFICANT CHANGE UP (ref 0–0.9)
MONOCYTES # BLD AUTO: 0.9 K/UL — SIGNIFICANT CHANGE UP (ref 0–0.9)
MONOCYTES # BLD AUTO: 0.9 K/UL — SIGNIFICANT CHANGE UP (ref 0–0.9)
MONOCYTES # BLD AUTO: 0.91 K/UL — HIGH (ref 0–0.9)
MONOCYTES # BLD AUTO: 0.91 K/UL — HIGH (ref 0–0.9)
MONOCYTES # BLD AUTO: 0.95 K/UL — HIGH (ref 0–0.9)
MONOCYTES # BLD AUTO: 0.95 K/UL — HIGH (ref 0–0.9)
MONOCYTES # BLD AUTO: 1.11 K/UL — HIGH (ref 0–0.9)
MONOCYTES # BLD AUTO: 1.11 K/UL — HIGH (ref 0–0.9)
MONOCYTES NFR BLD AUTO: 0 % — LOW (ref 2–14)
MONOCYTES NFR BLD AUTO: 0 % — LOW (ref 2–14)
MONOCYTES NFR BLD AUTO: 0.9 % — LOW (ref 2–14)
MONOCYTES NFR BLD AUTO: 0.9 % — LOW (ref 2–14)
MONOCYTES NFR BLD AUTO: 1.7 % — LOW (ref 2–14)
MONOCYTES NFR BLD AUTO: 1.7 % — LOW (ref 2–14)
MONOCYTES NFR BLD AUTO: 2 % — SIGNIFICANT CHANGE UP (ref 2–14)
MONOCYTES NFR BLD AUTO: 2 % — SIGNIFICANT CHANGE UP (ref 2–14)
MONOCYTES NFR BLD AUTO: 2.4 % — SIGNIFICANT CHANGE UP (ref 2–14)
MONOCYTES NFR BLD AUTO: 2.6 % — SIGNIFICANT CHANGE UP (ref 2–14)
MONOCYTES NFR BLD AUTO: 2.7 % — SIGNIFICANT CHANGE UP (ref 2–14)
MONOCYTES NFR BLD AUTO: 2.7 % — SIGNIFICANT CHANGE UP (ref 2–14)
MONOCYTES NFR BLD AUTO: 2.8 % — SIGNIFICANT CHANGE UP (ref 2–14)
MONOCYTES NFR BLD AUTO: 2.8 % — SIGNIFICANT CHANGE UP (ref 2–14)
MONOCYTES NFR BLD AUTO: 3.1 % — SIGNIFICANT CHANGE UP (ref 2–14)
MONOCYTES NFR BLD AUTO: 3.1 % — SIGNIFICANT CHANGE UP (ref 2–14)
MONOCYTES NFR BLD AUTO: 3.4 % — SIGNIFICANT CHANGE UP (ref 2–14)
MONOCYTES NFR BLD AUTO: 3.7 % — SIGNIFICANT CHANGE UP (ref 2–14)
MONOCYTES NFR BLD AUTO: 3.7 % — SIGNIFICANT CHANGE UP (ref 2–14)
MONOCYTES NFR BLD AUTO: 4.1 % — SIGNIFICANT CHANGE UP (ref 2–14)
MONOCYTES NFR BLD AUTO: 4.4 % — SIGNIFICANT CHANGE UP (ref 2–14)
MONOCYTES NFR BLD AUTO: 4.5 % — SIGNIFICANT CHANGE UP (ref 2–14)
MONOCYTES NFR BLD AUTO: 6.2 % — SIGNIFICANT CHANGE UP (ref 2–14)
MONOCYTES NFR BLD AUTO: 6.2 % — SIGNIFICANT CHANGE UP (ref 2–14)
MONOCYTES NFR BLD AUTO: 6.6 % — SIGNIFICANT CHANGE UP (ref 2–14)
MONOCYTES NFR BLD AUTO: 6.7 % — SIGNIFICANT CHANGE UP (ref 2–14)
MONOCYTES NFR BLD AUTO: 6.7 % — SIGNIFICANT CHANGE UP (ref 2–14)
MONOCYTES NFR BLD AUTO: 6.9 % — SIGNIFICANT CHANGE UP (ref 2–14)
MONOCYTES NFR BLD AUTO: 6.9 % — SIGNIFICANT CHANGE UP (ref 2–14)
MONOCYTES NFR BLD AUTO: 7.7 % — SIGNIFICANT CHANGE UP (ref 2–14)
MONOCYTES NFR BLD AUTO: 7.7 % — SIGNIFICANT CHANGE UP (ref 2–14)
MPO AB + PR3 PNL SER: SIGNIFICANT CHANGE UP
MPO AB + PR3 PNL SER: SIGNIFICANT CHANGE UP
MRSA PCR RESULT.: DETECTED
MYCOPLASMA AG SPEC QL: NEGATIVE — SIGNIFICANT CHANGE UP
MYCOPLASMA AG SPEC QL: NEGATIVE — SIGNIFICANT CHANGE UP
NEUTROPHILS # BLD AUTO: 10.14 K/UL — HIGH (ref 1.8–7.4)
NEUTROPHILS # BLD AUTO: 10.14 K/UL — HIGH (ref 1.8–7.4)
NEUTROPHILS # BLD AUTO: 16.51 K/UL — HIGH (ref 1.8–7.4)
NEUTROPHILS # BLD AUTO: 16.51 K/UL — HIGH (ref 1.8–7.4)
NEUTROPHILS # BLD AUTO: 2.17 K/UL — SIGNIFICANT CHANGE UP (ref 1.8–7.4)
NEUTROPHILS # BLD AUTO: 2.85 K/UL — SIGNIFICANT CHANGE UP (ref 1.8–7.4)
NEUTROPHILS # BLD AUTO: 22.21 K/UL — HIGH (ref 1.8–7.4)
NEUTROPHILS # BLD AUTO: 22.21 K/UL — HIGH (ref 1.8–7.4)
NEUTROPHILS # BLD AUTO: 3.01 K/UL — SIGNIFICANT CHANGE UP (ref 1.8–7.4)
NEUTROPHILS # BLD AUTO: 3.11 K/UL — SIGNIFICANT CHANGE UP (ref 1.8–7.4)
NEUTROPHILS # BLD AUTO: 3.12 K/UL — SIGNIFICANT CHANGE UP (ref 1.8–7.4)
NEUTROPHILS # BLD AUTO: 3.2 K/UL — SIGNIFICANT CHANGE UP (ref 1.8–7.4)
NEUTROPHILS # BLD AUTO: 3.53 K/UL — SIGNIFICANT CHANGE UP (ref 1.8–7.4)
NEUTROPHILS # BLD AUTO: 3.53 K/UL — SIGNIFICANT CHANGE UP (ref 1.8–7.4)
NEUTROPHILS # BLD AUTO: 3.98 K/UL — SIGNIFICANT CHANGE UP (ref 1.8–7.4)
NEUTROPHILS # BLD AUTO: 3.98 K/UL — SIGNIFICANT CHANGE UP (ref 1.8–7.4)
NEUTROPHILS # BLD AUTO: 33.38 K/UL — HIGH (ref 1.8–7.4)
NEUTROPHILS # BLD AUTO: 33.38 K/UL — HIGH (ref 1.8–7.4)
NEUTROPHILS # BLD AUTO: 33.66 K/UL — HIGH (ref 1.8–7.4)
NEUTROPHILS # BLD AUTO: 33.66 K/UL — HIGH (ref 1.8–7.4)
NEUTROPHILS # BLD AUTO: 4.06 K/UL — SIGNIFICANT CHANGE UP (ref 1.8–7.4)
NEUTROPHILS # BLD AUTO: 4.06 K/UL — SIGNIFICANT CHANGE UP (ref 1.8–7.4)
NEUTROPHILS # BLD AUTO: 4.49 K/UL — SIGNIFICANT CHANGE UP (ref 1.8–7.4)
NEUTROPHILS # BLD AUTO: 4.51 K/UL — SIGNIFICANT CHANGE UP (ref 1.8–7.4)
NEUTROPHILS # BLD AUTO: 4.51 K/UL — SIGNIFICANT CHANGE UP (ref 1.8–7.4)
NEUTROPHILS # BLD AUTO: 4.54 K/UL — SIGNIFICANT CHANGE UP (ref 1.8–7.4)
NEUTROPHILS # BLD AUTO: 4.54 K/UL — SIGNIFICANT CHANGE UP (ref 1.8–7.4)
NEUTROPHILS # BLD AUTO: 49.12 K/UL — HIGH (ref 1.8–7.4)
NEUTROPHILS # BLD AUTO: 49.12 K/UL — HIGH (ref 1.8–7.4)
NEUTROPHILS # BLD AUTO: 5.17 K/UL — SIGNIFICANT CHANGE UP (ref 1.8–7.4)
NEUTROPHILS # BLD AUTO: 5.17 K/UL — SIGNIFICANT CHANGE UP (ref 1.8–7.4)
NEUTROPHILS # BLD AUTO: 5.53 K/UL — SIGNIFICANT CHANGE UP (ref 1.8–7.4)
NEUTROPHILS # BLD AUTO: 5.53 K/UL — SIGNIFICANT CHANGE UP (ref 1.8–7.4)
NEUTROPHILS # BLD AUTO: 7.08 K/UL — SIGNIFICANT CHANGE UP (ref 1.8–7.4)
NEUTROPHILS # BLD AUTO: 7.08 K/UL — SIGNIFICANT CHANGE UP (ref 1.8–7.4)
NEUTROPHILS # BLD AUTO: 7.63 K/UL — HIGH (ref 1.8–7.4)
NEUTROPHILS # BLD AUTO: 7.63 K/UL — HIGH (ref 1.8–7.4)
NEUTROPHILS # BLD AUTO: 9.66 K/UL — HIGH (ref 1.8–7.4)
NEUTROPHILS # BLD AUTO: 9.66 K/UL — HIGH (ref 1.8–7.4)
NEUTROPHILS NFR BLD AUTO: 72 % — SIGNIFICANT CHANGE UP (ref 43–77)
NEUTROPHILS NFR BLD AUTO: 74.6 % — SIGNIFICANT CHANGE UP (ref 43–77)
NEUTROPHILS NFR BLD AUTO: 77.2 % — HIGH (ref 43–77)
NEUTROPHILS NFR BLD AUTO: 77.8 % — HIGH (ref 43–77)
NEUTROPHILS NFR BLD AUTO: 79.4 % — HIGH (ref 43–77)
NEUTROPHILS NFR BLD AUTO: 83.4 % — HIGH (ref 43–77)
NEUTROPHILS NFR BLD AUTO: 83.4 % — HIGH (ref 43–77)
NEUTROPHILS NFR BLD AUTO: 83.5 % — HIGH (ref 43–77)
NEUTROPHILS NFR BLD AUTO: 83.5 % — HIGH (ref 43–77)
NEUTROPHILS NFR BLD AUTO: 84.5 % — HIGH (ref 43–77)
NEUTROPHILS NFR BLD AUTO: 84.5 % — HIGH (ref 43–77)
NEUTROPHILS NFR BLD AUTO: 85.6 % — HIGH (ref 43–77)
NEUTROPHILS NFR BLD AUTO: 85.6 % — HIGH (ref 43–77)
NEUTROPHILS NFR BLD AUTO: 86.2 % — HIGH (ref 43–77)
NEUTROPHILS NFR BLD AUTO: 86.2 % — HIGH (ref 43–77)
NEUTROPHILS NFR BLD AUTO: 89 % — HIGH (ref 43–77)
NEUTROPHILS NFR BLD AUTO: 89 % — HIGH (ref 43–77)
NEUTROPHILS NFR BLD AUTO: 89.7 % — HIGH (ref 43–77)
NEUTROPHILS NFR BLD AUTO: 90.1 % — HIGH (ref 43–77)
NEUTROPHILS NFR BLD AUTO: 90.1 % — HIGH (ref 43–77)
NEUTROPHILS NFR BLD AUTO: 90.7 % — HIGH (ref 43–77)
NEUTROPHILS NFR BLD AUTO: 90.7 % — HIGH (ref 43–77)
NEUTROPHILS NFR BLD AUTO: 91 % — HIGH (ref 43–77)
NEUTROPHILS NFR BLD AUTO: 91 % — HIGH (ref 43–77)
NEUTROPHILS NFR BLD AUTO: 92.6 % — HIGH (ref 43–77)
NEUTROPHILS NFR BLD AUTO: 93 % — HIGH (ref 43–77)
NEUTROPHILS NFR BLD AUTO: 93.1 % — HIGH (ref 43–77)
NEUTROPHILS NFR BLD AUTO: 93.1 % — HIGH (ref 43–77)
NEUTROPHILS NFR BLD AUTO: 93.8 % — HIGH (ref 43–77)
NEUTROPHILS NFR BLD AUTO: 93.8 % — HIGH (ref 43–77)
NEUTROPHILS NFR BLD AUTO: 94.8 % — HIGH (ref 43–77)
NEUTROPHILS NFR BLD AUTO: 94.8 % — HIGH (ref 43–77)
NEUTROPHILS NFR BLD AUTO: 95.6 % — HIGH (ref 43–77)
NEUTROPHILS NFR BLD AUTO: 95.6 % — HIGH (ref 43–77)
NEUTROPHILS NFR BLD AUTO: 99.1 % — HIGH (ref 43–77)
NEUTROPHILS NFR BLD AUTO: 99.1 % — HIGH (ref 43–77)
NEUTS BAND # BLD: 2.6 % — SIGNIFICANT CHANGE UP (ref 0–8)
NEUTS BAND # BLD: 2.6 % — SIGNIFICANT CHANGE UP (ref 0–8)
NEUTS BAND # BLD: 2.7 % — SIGNIFICANT CHANGE UP (ref 0–8)
NEUTS BAND # BLD: 2.7 % — SIGNIFICANT CHANGE UP (ref 0–8)
NITRITE UR-MCNC: NEGATIVE — SIGNIFICANT CHANGE UP
NORMALIZED SCT PPP-RTO: 0.64 RATIO — SIGNIFICANT CHANGE UP (ref 0–1.16)
NORMALIZED SCT PPP-RTO: 0.64 RATIO — SIGNIFICANT CHANGE UP (ref 0–1.16)
NORMALIZED SCT PPP-RTO: 0.69 RATIO — SIGNIFICANT CHANGE UP (ref 0–1.16)
NORMALIZED SCT PPP-RTO: 0.69 RATIO — SIGNIFICANT CHANGE UP (ref 0–1.16)
NORMALIZED SCT PPP-RTO: SIGNIFICANT CHANGE UP
NRBC # BLD: 0 /100 WBCS — SIGNIFICANT CHANGE UP (ref 0–0)
NRBC # BLD: 1 /100 WBCS — HIGH (ref 0–0)
NRBC # BLD: 1 /100 — HIGH (ref 0–0)
NRBC # BLD: 1 /100 — HIGH (ref 0–0)
NRBC # BLD: 2 /100 WBCS — HIGH (ref 0–0)
NRBC # BLD: 3 /100 — HIGH (ref 0–0)
NRBC # BLD: 3 /100 — HIGH (ref 0–0)
NT-PROBNP SERPL-SCNC: 2479 PG/ML — HIGH (ref 0–300)
NT-PROBNP SERPL-SCNC: 2479 PG/ML — HIGH (ref 0–300)
NT-PROBNP SERPL-SCNC: 2560 PG/ML — HIGH (ref 0–300)
NT-PROBNP SERPL-SCNC: 2560 PG/ML — HIGH (ref 0–300)
NT-PROBNP SERPL-SCNC: 5360 PG/ML — HIGH (ref 0–300)
NT-PROBNP SERPL-SCNC: 7712 PG/ML — HIGH (ref 0–300)
NT-PROBNP SERPL-SCNC: 7712 PG/ML — HIGH (ref 0–300)
NT-PROBNP SERPL-SCNC: HIGH PG/ML (ref 0–300)
OTHER CELLS CSF MANUAL: 11.8 ML/DL — LOW (ref 18–22)
OTHER CELLS CSF MANUAL: 11.8 ML/DL — LOW (ref 18–22)
OTHER CELLS CSF MANUAL: 13.9 ML/DL — LOW (ref 18–22)
OTHER CELLS CSF MANUAL: 13.9 ML/DL — LOW (ref 18–22)
OTHER CELLS CSF MANUAL: 3.7 ML/DL — LOW (ref 18–22)
OTHER CELLS CSF MANUAL: 3.7 ML/DL — LOW (ref 18–22)
OVALOCYTES BLD QL SMEAR: SLIGHT — SIGNIFICANT CHANGE UP
P-ANCA SER-ACNC: NEGATIVE — SIGNIFICANT CHANGE UP
P-ANCA SER-ACNC: NEGATIVE — SIGNIFICANT CHANGE UP
PAT CTL 2H: 43 SEC — HIGH (ref 24.5–36.6)
PAT CTL 2H: 43 SEC — HIGH (ref 24.5–36.6)
PAT CTL 2H: 47.6 SEC — HIGH (ref 24.5–36.6)
PAT CTL 2H: 47.6 SEC — HIGH (ref 24.5–36.6)
PCO2 BLDA: 46 MMHG — HIGH (ref 32–45)
PCO2 BLDA: 46 MMHG — HIGH (ref 32–45)
PCO2 BLDV: 49 MMHG — HIGH (ref 39–42)
PCO2 BLDV: 49 MMHG — HIGH (ref 39–42)
PCO2 BLDV: 51 MMHG — HIGH (ref 39–42)
PCO2 BLDV: 52 MMHG — HIGH (ref 39–42)
PCO2 BLDV: 52 MMHG — HIGH (ref 39–42)
PCO2 BLDV: 53 MMHG — HIGH (ref 39–42)
PCO2 BLDV: 53 MMHG — HIGH (ref 39–42)
PCO2 BLDV: 54 MMHG — HIGH (ref 39–42)
PCO2 BLDV: 54 MMHG — HIGH (ref 39–42)
PCO2 BLDV: 55 MMHG — HIGH (ref 39–42)
PCO2 BLDV: 55 MMHG — HIGH (ref 39–42)
PCO2 BLDV: 56 MMHG — HIGH (ref 39–42)
PCO2 BLDV: 57 MMHG — HIGH (ref 39–42)
PCO2 BLDV: 59 MMHG — HIGH (ref 39–42)
PCO2 BLDV: 63 MMHG — HIGH (ref 39–42)
PCO2 BLDV: 63 MMHG — HIGH (ref 39–42)
PCO2 BLDV: 67 MMHG — HIGH (ref 39–42)
PCO2 BLDV: 68 MMHG — HIGH (ref 39–42)
PCO2 BLDV: 68 MMHG — HIGH (ref 39–42)
PCO2 BLDV: 70 MMHG — HIGH (ref 39–42)
PCO2 BLDV: 71 MMHG — HIGH (ref 39–42)
PCO2 BLDV: 71 MMHG — HIGH (ref 39–42)
PCO2 BLDV: 75 MMHG — HIGH (ref 39–42)
PCO2 BLDV: 75 MMHG — HIGH (ref 39–42)
PCO2 BLDV: 77 MMHG — HIGH (ref 39–42)
PH BLDA: 7.26 — LOW (ref 7.35–7.45)
PH BLDA: 7.26 — LOW (ref 7.35–7.45)
PH BLDV: 7.23 — LOW (ref 7.32–7.43)
PH BLDV: 7.23 — LOW (ref 7.32–7.43)
PH BLDV: 7.25 — LOW (ref 7.32–7.43)
PH BLDV: 7.26 — LOW (ref 7.32–7.43)
PH BLDV: 7.32 — SIGNIFICANT CHANGE UP (ref 7.32–7.43)
PH BLDV: 7.32 — SIGNIFICANT CHANGE UP (ref 7.32–7.43)
PH BLDV: 7.33 — SIGNIFICANT CHANGE UP (ref 7.32–7.43)
PH BLDV: 7.33 — SIGNIFICANT CHANGE UP (ref 7.32–7.43)
PH BLDV: 7.34 — SIGNIFICANT CHANGE UP (ref 7.32–7.43)
PH BLDV: 7.34 — SIGNIFICANT CHANGE UP (ref 7.32–7.43)
PH BLDV: 7.35 — SIGNIFICANT CHANGE UP (ref 7.32–7.43)
PH BLDV: 7.35 — SIGNIFICANT CHANGE UP (ref 7.32–7.43)
PH BLDV: 7.36 — SIGNIFICANT CHANGE UP (ref 7.32–7.43)
PH BLDV: 7.37 — SIGNIFICANT CHANGE UP (ref 7.32–7.43)
PH BLDV: 7.39 — SIGNIFICANT CHANGE UP (ref 7.32–7.43)
PH BLDV: 7.4 — SIGNIFICANT CHANGE UP (ref 7.32–7.43)
PH BLDV: 7.4 — SIGNIFICANT CHANGE UP (ref 7.32–7.43)
PH BLDV: 7.42 — SIGNIFICANT CHANGE UP (ref 7.32–7.43)
PH BLDV: 7.42 — SIGNIFICANT CHANGE UP (ref 7.32–7.43)
PH BLDV: 7.43 — SIGNIFICANT CHANGE UP (ref 7.32–7.43)
PH BLDV: 7.43 — SIGNIFICANT CHANGE UP (ref 7.32–7.43)
PH BLDV: 7.44 — HIGH (ref 7.32–7.43)
PH BLDV: 7.45 — HIGH (ref 7.32–7.43)
PH BLDV: 7.48 — HIGH (ref 7.32–7.43)
PH BLDV: 7.48 — HIGH (ref 7.32–7.43)
PH BLDV: 7.53 — HIGH (ref 7.32–7.43)
PH BLDV: 7.53 — HIGH (ref 7.32–7.43)
PH UR: 5 — SIGNIFICANT CHANGE UP (ref 5–8)
PH UR: 5 — SIGNIFICANT CHANGE UP (ref 5–8)
PH UR: 6 — SIGNIFICANT CHANGE UP (ref 5–8)
PH UR: 6 — SIGNIFICANT CHANGE UP (ref 5–8)
PHOSPHATE SERPL-MCNC: 10.3 MG/DL — HIGH (ref 2.5–4.5)
PHOSPHATE SERPL-MCNC: 10.3 MG/DL — HIGH (ref 2.5–4.5)
PHOSPHATE SERPL-MCNC: 12.9 MG/DL — HIGH (ref 2.5–4.5)
PHOSPHATE SERPL-MCNC: 12.9 MG/DL — HIGH (ref 2.5–4.5)
PHOSPHATE SERPL-MCNC: 13.1 MG/DL — HIGH (ref 2.5–4.5)
PHOSPHATE SERPL-MCNC: 13.1 MG/DL — HIGH (ref 2.5–4.5)
PHOSPHATE SERPL-MCNC: 13.7 MG/DL — HIGH (ref 2.5–4.5)
PHOSPHATE SERPL-MCNC: 13.7 MG/DL — HIGH (ref 2.5–4.5)
PHOSPHATE SERPL-MCNC: 2.5 MG/DL — SIGNIFICANT CHANGE UP (ref 2.4–4.7)
PHOSPHATE SERPL-MCNC: 2.5 MG/DL — SIGNIFICANT CHANGE UP (ref 2.5–4.5)
PHOSPHATE SERPL-MCNC: 2.5 MG/DL — SIGNIFICANT CHANGE UP (ref 2.5–4.5)
PHOSPHATE SERPL-MCNC: 2.8 MG/DL — SIGNIFICANT CHANGE UP (ref 2.4–4.7)
PHOSPHATE SERPL-MCNC: 2.8 MG/DL — SIGNIFICANT CHANGE UP (ref 2.5–4.5)
PHOSPHATE SERPL-MCNC: 2.9 MG/DL — SIGNIFICANT CHANGE UP (ref 2.4–4.7)
PHOSPHATE SERPL-MCNC: 3 MG/DL — SIGNIFICANT CHANGE UP (ref 2.4–4.7)
PHOSPHATE SERPL-MCNC: 3 MG/DL — SIGNIFICANT CHANGE UP (ref 2.4–4.7)
PHOSPHATE SERPL-MCNC: 3.4 MG/DL — SIGNIFICANT CHANGE UP (ref 2.5–4.5)
PHOSPHATE SERPL-MCNC: 3.9 MG/DL — SIGNIFICANT CHANGE UP (ref 2.5–4.5)
PHOSPHATE SERPL-MCNC: 3.9 MG/DL — SIGNIFICANT CHANGE UP (ref 2.5–4.5)
PHOSPHATE SERPL-MCNC: 4.2 MG/DL — SIGNIFICANT CHANGE UP (ref 2.5–4.5)
PHOSPHATE SERPL-MCNC: 4.2 MG/DL — SIGNIFICANT CHANGE UP (ref 2.5–4.5)
PHOSPHATE SERPL-MCNC: 4.3 MG/DL — SIGNIFICANT CHANGE UP (ref 2.4–4.7)
PHOSPHATE SERPL-MCNC: 4.7 MG/DL — SIGNIFICANT CHANGE UP (ref 2.4–4.7)
PHOSPHATE SERPL-MCNC: 4.8 MG/DL — HIGH (ref 2.4–4.7)
PHOSPHATE SERPL-MCNC: 4.8 MG/DL — HIGH (ref 2.4–4.7)
PHOSPHATE SERPL-MCNC: 4.9 MG/DL — HIGH (ref 2.4–4.7)
PHOSPHATE SERPL-MCNC: 4.9 MG/DL — HIGH (ref 2.4–4.7)
PHOSPHATE SERPL-MCNC: 5 MG/DL — HIGH (ref 2.4–4.7)
PHOSPHATE SERPL-MCNC: 5 MG/DL — HIGH (ref 2.4–4.7)
PHOSPHATE SERPL-MCNC: 5.1 MG/DL — HIGH (ref 2.4–4.7)
PHOSPHATE SERPL-MCNC: 5.4 MG/DL — HIGH (ref 2.4–4.7)
PHOSPHATE SERPL-MCNC: 5.6 MG/DL — HIGH (ref 2.4–4.7)
PHOSPHATE SERPL-MCNC: 5.6 MG/DL — HIGH (ref 2.4–4.7)
PHOSPHATE SERPL-MCNC: 6.2 MG/DL — HIGH (ref 2.4–4.7)
PHOSPHATE SERPL-MCNC: 6.2 MG/DL — HIGH (ref 2.4–4.7)
PHOSPHATE SERPL-MCNC: 6.4 MG/DL — HIGH (ref 2.4–4.7)
PHOSPHATE SERPL-MCNC: 6.4 MG/DL — HIGH (ref 2.4–4.7)
PHOSPHATE SERPL-MCNC: 6.6 MG/DL — HIGH (ref 2.4–4.7)
PHOSPHATE SERPL-MCNC: 6.9 MG/DL — HIGH (ref 2.4–4.7)
PHOSPHATE SERPL-MCNC: 6.9 MG/DL — HIGH (ref 2.4–4.7)
PHOSPHATE SERPL-MCNC: 7.1 MG/DL — HIGH (ref 2.4–4.7)
PHOSPHATE SERPL-MCNC: 7.1 MG/DL — HIGH (ref 2.4–4.7)
PHOSPHATE SERPL-MCNC: 7.4 MG/DL — HIGH (ref 2.4–4.7)
PHOSPHATE SERPL-MCNC: 7.4 MG/DL — HIGH (ref 2.4–4.7)
PLAT MORPH BLD: ABNORMAL
PLAT MORPH BLD: NORMAL — SIGNIFICANT CHANGE UP
PLATELET # BLD AUTO: 122 K/UL — LOW (ref 150–400)
PLATELET # BLD AUTO: 123 K/UL — LOW (ref 150–400)
PLATELET # BLD AUTO: 123 K/UL — LOW (ref 150–400)
PLATELET # BLD AUTO: 124 K/UL — LOW (ref 150–400)
PLATELET # BLD AUTO: 124 K/UL — LOW (ref 150–400)
PLATELET # BLD AUTO: 128 K/UL — LOW (ref 150–400)
PLATELET # BLD AUTO: 136 K/UL — LOW (ref 150–400)
PLATELET # BLD AUTO: 136 K/UL — LOW (ref 150–400)
PLATELET # BLD AUTO: 138 K/UL — LOW (ref 150–400)
PLATELET # BLD AUTO: 138 K/UL — LOW (ref 150–400)
PLATELET # BLD AUTO: 139 K/UL — LOW (ref 150–400)
PLATELET # BLD AUTO: 139 K/UL — LOW (ref 150–400)
PLATELET # BLD AUTO: 141 K/UL — LOW (ref 150–400)
PLATELET # BLD AUTO: 141 K/UL — LOW (ref 150–400)
PLATELET # BLD AUTO: 143 K/UL — LOW (ref 150–400)
PLATELET # BLD AUTO: 143 K/UL — LOW (ref 150–400)
PLATELET # BLD AUTO: 145 K/UL — LOW (ref 150–400)
PLATELET # BLD AUTO: 145 K/UL — LOW (ref 150–400)
PLATELET # BLD AUTO: 146 K/UL — LOW (ref 150–400)
PLATELET # BLD AUTO: 146 K/UL — LOW (ref 150–400)
PLATELET # BLD AUTO: 148 K/UL — LOW (ref 150–400)
PLATELET # BLD AUTO: 148 K/UL — LOW (ref 150–400)
PLATELET # BLD AUTO: 149 K/UL — LOW (ref 150–400)
PLATELET # BLD AUTO: 149 K/UL — LOW (ref 150–400)
PLATELET # BLD AUTO: 152 K/UL — SIGNIFICANT CHANGE UP (ref 150–400)
PLATELET # BLD AUTO: 158 K/UL — SIGNIFICANT CHANGE UP (ref 150–400)
PLATELET # BLD AUTO: 159 K/UL — SIGNIFICANT CHANGE UP (ref 150–400)
PLATELET # BLD AUTO: 161 K/UL — SIGNIFICANT CHANGE UP (ref 150–400)
PLATELET # BLD AUTO: 161 K/UL — SIGNIFICANT CHANGE UP (ref 150–400)
PLATELET # BLD AUTO: 167 K/UL — SIGNIFICANT CHANGE UP (ref 150–400)
PLATELET # BLD AUTO: 167 K/UL — SIGNIFICANT CHANGE UP (ref 150–400)
PLATELET # BLD AUTO: 169 K/UL — SIGNIFICANT CHANGE UP (ref 150–400)
PLATELET # BLD AUTO: 169 K/UL — SIGNIFICANT CHANGE UP (ref 150–400)
PLATELET # BLD AUTO: 176 K/UL — SIGNIFICANT CHANGE UP (ref 150–400)
PLATELET # BLD AUTO: 176 K/UL — SIGNIFICANT CHANGE UP (ref 150–400)
PLATELET # BLD AUTO: 180 K/UL — SIGNIFICANT CHANGE UP (ref 150–400)
PLATELET # BLD AUTO: 180 K/UL — SIGNIFICANT CHANGE UP (ref 150–400)
PLATELET # BLD AUTO: 181 K/UL — SIGNIFICANT CHANGE UP (ref 150–400)
PLATELET # BLD AUTO: 181 K/UL — SIGNIFICANT CHANGE UP (ref 150–400)
PLATELET # BLD AUTO: 182 K/UL — SIGNIFICANT CHANGE UP (ref 150–400)
PLATELET # BLD AUTO: 182 K/UL — SIGNIFICANT CHANGE UP (ref 150–400)
PLATELET # BLD AUTO: 183 K/UL — SIGNIFICANT CHANGE UP (ref 150–400)
PLATELET # BLD AUTO: 183 K/UL — SIGNIFICANT CHANGE UP (ref 150–400)
PLATELET # BLD AUTO: 186 K/UL — SIGNIFICANT CHANGE UP (ref 150–400)
PLATELET # BLD AUTO: 186 K/UL — SIGNIFICANT CHANGE UP (ref 150–400)
PLATELET # BLD AUTO: 189 K/UL — SIGNIFICANT CHANGE UP (ref 150–400)
PLATELET # BLD AUTO: 192 K/UL — SIGNIFICANT CHANGE UP (ref 150–400)
PLATELET # BLD AUTO: 192 K/UL — SIGNIFICANT CHANGE UP (ref 150–400)
PLATELET # BLD AUTO: 193 K/UL — SIGNIFICANT CHANGE UP (ref 150–400)
PLATELET # BLD AUTO: 193 K/UL — SIGNIFICANT CHANGE UP (ref 150–400)
PLATELET # BLD AUTO: 199 K/UL — SIGNIFICANT CHANGE UP (ref 150–400)
PLATELET # BLD AUTO: 199 K/UL — SIGNIFICANT CHANGE UP (ref 150–400)
PLATELET # BLD AUTO: 205 K/UL — SIGNIFICANT CHANGE UP (ref 150–400)
PLATELET # BLD AUTO: 209 K/UL — SIGNIFICANT CHANGE UP (ref 150–400)
PLATELET # BLD AUTO: 209 K/UL — SIGNIFICANT CHANGE UP (ref 150–400)
PLATELET # BLD AUTO: 211 K/UL — SIGNIFICANT CHANGE UP (ref 150–400)
PLATELET # BLD AUTO: 211 K/UL — SIGNIFICANT CHANGE UP (ref 150–400)
PLATELET # BLD AUTO: 217 K/UL — SIGNIFICANT CHANGE UP (ref 150–400)
PLATELET # BLD AUTO: 217 K/UL — SIGNIFICANT CHANGE UP (ref 150–400)
PLATELET # BLD AUTO: 219 K/UL — SIGNIFICANT CHANGE UP (ref 150–400)
PLATELET # BLD AUTO: 220 K/UL — SIGNIFICANT CHANGE UP (ref 150–400)
PLATELET # BLD AUTO: 220 K/UL — SIGNIFICANT CHANGE UP (ref 150–400)
PLATELET # BLD AUTO: 221 K/UL — SIGNIFICANT CHANGE UP (ref 150–400)
PLATELET # BLD AUTO: 221 K/UL — SIGNIFICANT CHANGE UP (ref 150–400)
PLATELET # BLD AUTO: 228 K/UL — SIGNIFICANT CHANGE UP (ref 150–400)
PLATELET # BLD AUTO: 228 K/UL — SIGNIFICANT CHANGE UP (ref 150–400)
PLATELET # BLD AUTO: 236 K/UL — SIGNIFICANT CHANGE UP (ref 150–400)
PLATELET # BLD AUTO: 236 K/UL — SIGNIFICANT CHANGE UP (ref 150–400)
PLATELET # BLD AUTO: 61 K/UL — LOW (ref 150–400)
PLATELET # BLD AUTO: 61 K/UL — LOW (ref 150–400)
PLATELET # BLD AUTO: 67 K/UL — LOW (ref 150–400)
PLATELET # BLD AUTO: 68 K/UL — LOW (ref 150–400)
PLATELET # BLD AUTO: 69 K/UL — LOW (ref 150–400)
PLATELET # BLD AUTO: 70 K/UL — LOW (ref 150–400)
PLATELET # BLD AUTO: 71 K/UL — LOW (ref 150–400)
PLATELET # BLD AUTO: 76 K/UL — LOW (ref 150–400)
PLATELET # BLD AUTO: 76 K/UL — LOW (ref 150–400)
PLATELET # BLD AUTO: 78 K/UL — LOW (ref 150–400)
PLATELET # BLD AUTO: 93 K/UL — LOW (ref 150–400)
PO2 BLDA: 71 MMHG — LOW (ref 83–108)
PO2 BLDA: 71 MMHG — LOW (ref 83–108)
PO2 BLDV: 100 MMHG — HIGH (ref 25–45)
PO2 BLDV: 100 MMHG — HIGH (ref 25–45)
PO2 BLDV: 112 MMHG — HIGH (ref 25–45)
PO2 BLDV: 138 MMHG — HIGH (ref 25–45)
PO2 BLDV: 138 MMHG — HIGH (ref 25–45)
PO2 BLDV: 147 MMHG — HIGH (ref 25–45)
PO2 BLDV: 147 MMHG — HIGH (ref 25–45)
PO2 BLDV: 156 MMHG — HIGH (ref 25–45)
PO2 BLDV: 156 MMHG — HIGH (ref 25–45)
PO2 BLDV: 183 MMHG — HIGH (ref 25–45)
PO2 BLDV: 183 MMHG — HIGH (ref 25–45)
PO2 BLDV: 24 MMHG — LOW (ref 25–45)
PO2 BLDV: 24 MMHG — LOW (ref 25–45)
PO2 BLDV: 42 MMHG — SIGNIFICANT CHANGE UP (ref 25–45)
PO2 BLDV: 42 MMHG — SIGNIFICANT CHANGE UP (ref 25–45)
PO2 BLDV: 47 MMHG — HIGH (ref 25–45)
PO2 BLDV: 47 MMHG — HIGH (ref 25–45)
PO2 BLDV: 51 MMHG — HIGH (ref 25–45)
PO2 BLDV: 52 MMHG — HIGH (ref 25–45)
PO2 BLDV: 53 MMHG — HIGH (ref 25–45)
PO2 BLDV: 53 MMHG — HIGH (ref 25–45)
PO2 BLDV: 54 MMHG — HIGH (ref 25–45)
PO2 BLDV: 54 MMHG — HIGH (ref 25–45)
PO2 BLDV: 62 MMHG — HIGH (ref 25–45)
PO2 BLDV: 62 MMHG — HIGH (ref 25–45)
PO2 BLDV: 64 MMHG — HIGH (ref 25–45)
PO2 BLDV: 64 MMHG — HIGH (ref 25–45)
PO2 BLDV: 71 MMHG — HIGH (ref 25–45)
PO2 BLDV: 71 MMHG — HIGH (ref 25–45)
PO2 BLDV: 77 MMHG — HIGH (ref 25–45)
PO2 BLDV: 77 MMHG — HIGH (ref 25–45)
PO2 BLDV: <42 MMHG — SIGNIFICANT CHANGE UP (ref 25–45)
POIKILOCYTOSIS BLD QL AUTO: SLIGHT — SIGNIFICANT CHANGE UP
POLYCHROMASIA BLD QL SMEAR: SIGNIFICANT CHANGE UP
POLYCHROMASIA BLD QL SMEAR: SLIGHT — SIGNIFICANT CHANGE UP
POTASSIUM BLDV-SCNC: 3.3 MMOL/L — LOW (ref 3.5–5.1)
POTASSIUM BLDV-SCNC: 3.5 MMOL/L — SIGNIFICANT CHANGE UP (ref 3.5–5.1)
POTASSIUM BLDV-SCNC: 3.6 MMOL/L — SIGNIFICANT CHANGE UP (ref 3.5–5.1)
POTASSIUM BLDV-SCNC: 3.7 MMOL/L — SIGNIFICANT CHANGE UP (ref 3.5–5.1)
POTASSIUM BLDV-SCNC: 3.8 MMOL/L — SIGNIFICANT CHANGE UP (ref 3.5–5.1)
POTASSIUM BLDV-SCNC: 4 MMOL/L — SIGNIFICANT CHANGE UP (ref 3.5–5.1)
POTASSIUM BLDV-SCNC: 4 MMOL/L — SIGNIFICANT CHANGE UP (ref 3.5–5.1)
POTASSIUM BLDV-SCNC: 4.4 MMOL/L — SIGNIFICANT CHANGE UP (ref 3.5–5.1)
POTASSIUM BLDV-SCNC: 4.4 MMOL/L — SIGNIFICANT CHANGE UP (ref 3.5–5.1)
POTASSIUM BLDV-SCNC: 4.7 MMOL/L — SIGNIFICANT CHANGE UP (ref 3.5–5.1)
POTASSIUM BLDV-SCNC: 5.6 MMOL/L — HIGH (ref 3.5–5.1)
POTASSIUM BLDV-SCNC: 5.6 MMOL/L — HIGH (ref 3.5–5.1)
POTASSIUM BLDV-SCNC: 6.2 MMOL/L — CRITICAL HIGH (ref 3.5–5.1)
POTASSIUM BLDV-SCNC: 6.2 MMOL/L — CRITICAL HIGH (ref 3.5–5.1)
POTASSIUM SERPL-MCNC: 3.3 MMOL/L — LOW (ref 3.5–5.3)
POTASSIUM SERPL-MCNC: 3.3 MMOL/L — LOW (ref 3.5–5.3)
POTASSIUM SERPL-MCNC: 3.4 MMOL/L — LOW (ref 3.5–5.3)
POTASSIUM SERPL-MCNC: 3.5 MMOL/L — SIGNIFICANT CHANGE UP (ref 3.5–5.3)
POTASSIUM SERPL-MCNC: 3.6 MMOL/L — SIGNIFICANT CHANGE UP (ref 3.5–5.3)
POTASSIUM SERPL-MCNC: 3.7 MMOL/L — SIGNIFICANT CHANGE UP (ref 3.5–5.3)
POTASSIUM SERPL-MCNC: 3.8 MMOL/L — SIGNIFICANT CHANGE UP (ref 3.5–5.3)
POTASSIUM SERPL-MCNC: 3.9 MMOL/L — SIGNIFICANT CHANGE UP (ref 3.5–5.3)
POTASSIUM SERPL-MCNC: 4 MMOL/L — SIGNIFICANT CHANGE UP (ref 3.5–5.3)
POTASSIUM SERPL-MCNC: 4.1 MMOL/L — SIGNIFICANT CHANGE UP (ref 3.5–5.3)
POTASSIUM SERPL-MCNC: 4.2 MMOL/L — SIGNIFICANT CHANGE UP (ref 3.5–5.3)
POTASSIUM SERPL-MCNC: 4.3 MMOL/L — SIGNIFICANT CHANGE UP (ref 3.5–5.3)
POTASSIUM SERPL-MCNC: 4.4 MMOL/L — SIGNIFICANT CHANGE UP (ref 3.5–5.3)
POTASSIUM SERPL-MCNC: 4.5 MMOL/L — SIGNIFICANT CHANGE UP (ref 3.5–5.3)
POTASSIUM SERPL-MCNC: 4.6 MMOL/L — SIGNIFICANT CHANGE UP (ref 3.5–5.3)
POTASSIUM SERPL-MCNC: 4.8 MMOL/L — SIGNIFICANT CHANGE UP (ref 3.5–5.3)
POTASSIUM SERPL-MCNC: 4.8 MMOL/L — SIGNIFICANT CHANGE UP (ref 3.5–5.3)
POTASSIUM SERPL-MCNC: 5 MMOL/L — SIGNIFICANT CHANGE UP (ref 3.5–5.3)
POTASSIUM SERPL-MCNC: 5.1 MMOL/L — SIGNIFICANT CHANGE UP (ref 3.5–5.3)
POTASSIUM SERPL-MCNC: 5.5 MMOL/L — HIGH (ref 3.5–5.3)
POTASSIUM SERPL-MCNC: 5.5 MMOL/L — HIGH (ref 3.5–5.3)
POTASSIUM SERPL-MCNC: 5.6 MMOL/L — HIGH (ref 3.5–5.3)
POTASSIUM SERPL-MCNC: 5.7 MMOL/L — HIGH (ref 3.5–5.3)
POTASSIUM SERPL-MCNC: 5.7 MMOL/L — HIGH (ref 3.5–5.3)
POTASSIUM SERPL-MCNC: 6 MMOL/L — HIGH (ref 3.5–5.3)
POTASSIUM SERPL-MCNC: 6.6 MMOL/L — CRITICAL HIGH (ref 3.5–5.3)
POTASSIUM SERPL-MCNC: 6.6 MMOL/L — CRITICAL HIGH (ref 3.5–5.3)
POTASSIUM SERPL-MCNC: 6.7 MMOL/L — CRITICAL HIGH (ref 3.5–5.3)
POTASSIUM SERPL-MCNC: 6.7 MMOL/L — CRITICAL HIGH (ref 3.5–5.3)
POTASSIUM SERPL-MCNC: 6.8 MMOL/L — CRITICAL HIGH (ref 3.5–5.3)
POTASSIUM SERPL-MCNC: 6.8 MMOL/L — CRITICAL HIGH (ref 3.5–5.3)
POTASSIUM SERPL-MCNC: 7 MMOL/L — CRITICAL HIGH (ref 3.5–5.3)
POTASSIUM SERPL-MCNC: 7 MMOL/L — CRITICAL HIGH (ref 3.5–5.3)
POTASSIUM SERPL-MCNC: 7.2 MMOL/L — CRITICAL HIGH (ref 3.5–5.3)
POTASSIUM SERPL-SCNC: 3.3 MMOL/L — LOW (ref 3.5–5.3)
POTASSIUM SERPL-SCNC: 3.3 MMOL/L — LOW (ref 3.5–5.3)
POTASSIUM SERPL-SCNC: 3.4 MMOL/L — LOW (ref 3.5–5.3)
POTASSIUM SERPL-SCNC: 3.5 MMOL/L — SIGNIFICANT CHANGE UP (ref 3.5–5.3)
POTASSIUM SERPL-SCNC: 3.6 MMOL/L — SIGNIFICANT CHANGE UP (ref 3.5–5.3)
POTASSIUM SERPL-SCNC: 3.7 MMOL/L — SIGNIFICANT CHANGE UP (ref 3.5–5.3)
POTASSIUM SERPL-SCNC: 3.8 MMOL/L — SIGNIFICANT CHANGE UP (ref 3.5–5.3)
POTASSIUM SERPL-SCNC: 3.9 MMOL/L — SIGNIFICANT CHANGE UP (ref 3.5–5.3)
POTASSIUM SERPL-SCNC: 4 MMOL/L — SIGNIFICANT CHANGE UP (ref 3.5–5.3)
POTASSIUM SERPL-SCNC: 4.1 MMOL/L — SIGNIFICANT CHANGE UP (ref 3.5–5.3)
POTASSIUM SERPL-SCNC: 4.2 MMOL/L — SIGNIFICANT CHANGE UP (ref 3.5–5.3)
POTASSIUM SERPL-SCNC: 4.3 MMOL/L — SIGNIFICANT CHANGE UP (ref 3.5–5.3)
POTASSIUM SERPL-SCNC: 4.4 MMOL/L — SIGNIFICANT CHANGE UP (ref 3.5–5.3)
POTASSIUM SERPL-SCNC: 4.5 MMOL/L — SIGNIFICANT CHANGE UP (ref 3.5–5.3)
POTASSIUM SERPL-SCNC: 4.6 MMOL/L — SIGNIFICANT CHANGE UP (ref 3.5–5.3)
POTASSIUM SERPL-SCNC: 4.8 MMOL/L — SIGNIFICANT CHANGE UP (ref 3.5–5.3)
POTASSIUM SERPL-SCNC: 4.8 MMOL/L — SIGNIFICANT CHANGE UP (ref 3.5–5.3)
POTASSIUM SERPL-SCNC: 5 MMOL/L — SIGNIFICANT CHANGE UP (ref 3.5–5.3)
POTASSIUM SERPL-SCNC: 5.1 MMOL/L — SIGNIFICANT CHANGE UP (ref 3.5–5.3)
POTASSIUM SERPL-SCNC: 5.5 MMOL/L — HIGH (ref 3.5–5.3)
POTASSIUM SERPL-SCNC: 5.5 MMOL/L — HIGH (ref 3.5–5.3)
POTASSIUM SERPL-SCNC: 5.6 MMOL/L — HIGH (ref 3.5–5.3)
POTASSIUM SERPL-SCNC: 5.7 MMOL/L — HIGH (ref 3.5–5.3)
POTASSIUM SERPL-SCNC: 5.7 MMOL/L — HIGH (ref 3.5–5.3)
POTASSIUM SERPL-SCNC: 6 MMOL/L — HIGH (ref 3.5–5.3)
POTASSIUM SERPL-SCNC: 6.6 MMOL/L — CRITICAL HIGH (ref 3.5–5.3)
POTASSIUM SERPL-SCNC: 6.6 MMOL/L — CRITICAL HIGH (ref 3.5–5.3)
POTASSIUM SERPL-SCNC: 6.7 MMOL/L — CRITICAL HIGH (ref 3.5–5.3)
POTASSIUM SERPL-SCNC: 6.7 MMOL/L — CRITICAL HIGH (ref 3.5–5.3)
POTASSIUM SERPL-SCNC: 6.8 MMOL/L — CRITICAL HIGH (ref 3.5–5.3)
POTASSIUM SERPL-SCNC: 6.8 MMOL/L — CRITICAL HIGH (ref 3.5–5.3)
POTASSIUM SERPL-SCNC: 7 MMOL/L — CRITICAL HIGH (ref 3.5–5.3)
POTASSIUM SERPL-SCNC: 7 MMOL/L — CRITICAL HIGH (ref 3.5–5.3)
POTASSIUM SERPL-SCNC: 7.2 MMOL/L — CRITICAL HIGH (ref 3.5–5.3)
PROCALCITONIN SERPL-MCNC: 0.15 NG/ML — HIGH (ref 0.02–0.1)
PROCALCITONIN SERPL-MCNC: 3.2 NG/ML — HIGH (ref 0.02–0.1)
PROT ?TM UR-MCNC: 17 MG/DL — HIGH (ref 0–12)
PROT ?TM UR-MCNC: 17 MG/DL — HIGH (ref 0–12)
PROT SERPL-MCNC: 3.2 G/DL — LOW (ref 6–8.3)
PROT SERPL-MCNC: 3.2 G/DL — LOW (ref 6–8.3)
PROT SERPL-MCNC: 4 G/DL — LOW (ref 6–8.3)
PROT SERPL-MCNC: 4 G/DL — LOW (ref 6–8.3)
PROT SERPL-MCNC: 4.5 G/DL — LOW (ref 6–8.3)
PROT SERPL-MCNC: 4.5 G/DL — LOW (ref 6–8.3)
PROT SERPL-MCNC: 5.4 G/DL — LOW (ref 6–8.3)
PROT SERPL-MCNC: 5.4 G/DL — LOW (ref 6–8.3)
PROT SERPL-MCNC: 6 G/DL — SIGNIFICANT CHANGE UP (ref 6–8.3)
PROT SERPL-MCNC: 6 G/DL — SIGNIFICANT CHANGE UP (ref 6–8.3)
PROT SERPL-MCNC: 6.3 G/DL — SIGNIFICANT CHANGE UP (ref 6–8.3)
PROT SERPL-MCNC: 6.3 G/DL — SIGNIFICANT CHANGE UP (ref 6–8.3)
PROT SERPL-MCNC: 6.4 G/DL — SIGNIFICANT CHANGE UP (ref 6–8.3)
PROT SERPL-MCNC: 6.4 G/DL — SIGNIFICANT CHANGE UP (ref 6–8.3)
PROT SERPL-MCNC: 6.7 G/DL — SIGNIFICANT CHANGE UP (ref 6–8.3)
PROT SERPL-MCNC: 6.7 G/DL — SIGNIFICANT CHANGE UP (ref 6–8.3)
PROT SERPL-MCNC: 6.9 G/DL — SIGNIFICANT CHANGE UP (ref 6.6–8.7)
PROT SERPL-MCNC: 7 G/DL — SIGNIFICANT CHANGE UP (ref 6.6–8.7)
PROT SERPL-MCNC: 7 G/DL — SIGNIFICANT CHANGE UP (ref 6.6–8.7)
PROT SERPL-MCNC: 7.1 G/DL — SIGNIFICANT CHANGE UP (ref 6.6–8.7)
PROT SERPL-MCNC: 7.1 G/DL — SIGNIFICANT CHANGE UP (ref 6–8.3)
PROT SERPL-MCNC: 7.1 G/DL — SIGNIFICANT CHANGE UP (ref 6–8.3)
PROT SERPL-MCNC: 7.2 G/DL — SIGNIFICANT CHANGE UP (ref 6.6–8.7)
PROT SERPL-MCNC: 7.2 G/DL — SIGNIFICANT CHANGE UP (ref 6.6–8.7)
PROT SERPL-MCNC: 7.2 G/DL — SIGNIFICANT CHANGE UP (ref 6–8.3)
PROT SERPL-MCNC: 7.2 G/DL — SIGNIFICANT CHANGE UP (ref 6–8.3)
PROT SERPL-MCNC: 7.3 G/DL — SIGNIFICANT CHANGE UP (ref 6.6–8.7)
PROT SERPL-MCNC: 7.4 G/DL — SIGNIFICANT CHANGE UP (ref 6.6–8.7)
PROT SERPL-MCNC: 7.5 G/DL — SIGNIFICANT CHANGE UP (ref 6.6–8.7)
PROT SERPL-MCNC: 7.5 G/DL — SIGNIFICANT CHANGE UP (ref 6–8.3)
PROT SERPL-MCNC: 7.5 G/DL — SIGNIFICANT CHANGE UP (ref 6–8.3)
PROT SERPL-MCNC: 7.6 G/DL — SIGNIFICANT CHANGE UP (ref 6.6–8.7)
PROT SERPL-MCNC: 7.7 G/DL — SIGNIFICANT CHANGE UP (ref 6.6–8.7)
PROT SERPL-MCNC: 7.8 G/DL — SIGNIFICANT CHANGE UP (ref 6.6–8.7)
PROT SERPL-MCNC: 7.9 G/DL — SIGNIFICANT CHANGE UP (ref 6.6–8.7)
PROT SERPL-MCNC: 7.9 G/DL — SIGNIFICANT CHANGE UP (ref 6.6–8.7)
PROT SERPL-MCNC: 8 G/DL — SIGNIFICANT CHANGE UP (ref 6.6–8.7)
PROT SERPL-MCNC: 8.1 G/DL — SIGNIFICANT CHANGE UP (ref 6.6–8.7)
PROT SERPL-MCNC: 8.1 G/DL — SIGNIFICANT CHANGE UP (ref 6.6–8.7)
PROT SERPL-MCNC: 8.2 G/DL — SIGNIFICANT CHANGE UP (ref 6.6–8.7)
PROT SERPL-MCNC: 8.3 G/DL — SIGNIFICANT CHANGE UP (ref 6.6–8.7)
PROT SERPL-MCNC: 8.6 G/DL — SIGNIFICANT CHANGE UP (ref 6.6–8.7)
PROT SERPL-MCNC: 8.6 G/DL — SIGNIFICANT CHANGE UP (ref 6.6–8.7)
PROT UR-MCNC: 15
PROT/CREAT UR-RTO: 0.3 RATIO — HIGH
PROT/CREAT UR-RTO: 0.3 RATIO — HIGH
PROTHROM AB SERPL-ACNC: 13.4 SEC — HIGH (ref 9.5–13)
PROTHROM AB SERPL-ACNC: 13.4 SEC — HIGH (ref 9.5–13)
PROTHROM AB SERPL-ACNC: 13.8 SEC — HIGH (ref 9.5–13)
PROTHROM AB SERPL-ACNC: 13.8 SEC — HIGH (ref 9.5–13)
PROTHROM AB SERPL-ACNC: 14 SEC — HIGH (ref 9.5–13)
PROTHROM AB SERPL-ACNC: 14 SEC — HIGH (ref 9.5–13)
PROTHROM AB SERPL-ACNC: 14.4 SEC — HIGH (ref 9.5–13)
PROTHROM AB SERPL-ACNC: 14.4 SEC — HIGH (ref 9.5–13)
PROTHROM AB SERPL-ACNC: 14.6 SEC — HIGH (ref 9.5–13)
PROTHROM AB SERPL-ACNC: 14.6 SEC — HIGH (ref 9.5–13)
PROTHROM AB SERPL-ACNC: 17.2 SEC — HIGH (ref 9.5–13)
PROTHROM AB SERPL-ACNC: 17.2 SEC — HIGH (ref 9.5–13)
PROTHROM AB SERPL-ACNC: 18 SEC — HIGH (ref 9.5–13)
PROTHROM AB SERPL-ACNC: 18 SEC — HIGH (ref 9.5–13)
PROTHROM AB SERPL-ACNC: 18.3 SEC — HIGH (ref 9.5–13)
PROTHROM AB SERPL-ACNC: 18.3 SEC — HIGH (ref 9.5–13)
PROTHROM AB SERPL-ACNC: 18.8 SEC — HIGH (ref 9.5–13)
PROTHROM AB SERPL-ACNC: 18.8 SEC — HIGH (ref 9.5–13)
PROTHROM AB SERPL-ACNC: 19.4 SEC — HIGH (ref 10.5–13.4)
PROTHROM AB SERPL-ACNC: 26.5 SEC — HIGH (ref 9.5–13)
PROTHROM AB SERPL-ACNC: 26.5 SEC — HIGH (ref 9.5–13)
PROTHROM AB SERPL-ACNC: 35.4 SEC — HIGH (ref 9.5–13)
PROTHROM AB SERPL-ACNC: 35.4 SEC — HIGH (ref 9.5–13)
PROTHROM AB SERPL-ACNC: 36.3 SEC — HIGH (ref 9.5–13)
PROTHROM AB SERPL-ACNC: 36.3 SEC — HIGH (ref 9.5–13)
PROTHROM AB SERPL-ACNC: 39.6 SEC — HIGH (ref 9.5–13)
PROTHROM AB SERPL-ACNC: 39.6 SEC — HIGH (ref 9.5–13)
PROTHROM AB SERPL-ACNC: 40.7 SEC — HIGH (ref 9.5–13)
PROTHROM AB SERPL-ACNC: 40.7 SEC — HIGH (ref 9.5–13)
PROTHROM AB SERPL-ACNC: 72.1 SEC — HIGH (ref 9.5–13)
PROTHROM AB SERPL-ACNC: 72.1 SEC — HIGH (ref 9.5–13)
QUANT TB PLUS MITOGEN MINUS NIL: 0.01 IU/ML — SIGNIFICANT CHANGE UP
QUANT TB PLUS MITOGEN MINUS NIL: 0.01 IU/ML — SIGNIFICANT CHANGE UP
RAPID RVP RESULT: SIGNIFICANT CHANGE UP
RBC # BLD: 1.99 M/UL — LOW (ref 3.8–5.2)
RBC # BLD: 1.99 M/UL — LOW (ref 3.8–5.2)
RBC # BLD: 2.05 M/UL — LOW (ref 3.8–5.2)
RBC # BLD: 2.05 M/UL — LOW (ref 3.8–5.2)
RBC # BLD: 2.72 M/UL — LOW (ref 3.8–5.2)
RBC # BLD: 2.72 M/UL — LOW (ref 3.8–5.2)
RBC # BLD: 3.17 M/UL — LOW (ref 3.8–5.2)
RBC # BLD: 3.17 M/UL — LOW (ref 3.8–5.2)
RBC # BLD: 3.25 M/UL — LOW (ref 3.8–5.2)
RBC # BLD: 3.25 M/UL — LOW (ref 3.8–5.2)
RBC # BLD: 3.54 M/UL — LOW (ref 3.8–5.2)
RBC # BLD: 3.54 M/UL — LOW (ref 3.8–5.2)
RBC # BLD: 3.55 M/UL — LOW (ref 3.8–5.2)
RBC # BLD: 3.55 M/UL — LOW (ref 3.8–5.2)
RBC # BLD: 3.59 M/UL — LOW (ref 3.8–5.2)
RBC # BLD: 3.59 M/UL — LOW (ref 3.8–5.2)
RBC # BLD: 3.6 M/UL — LOW (ref 3.8–5.2)
RBC # BLD: 3.6 M/UL — LOW (ref 3.8–5.2)
RBC # BLD: 3.62 M/UL — LOW (ref 3.8–5.2)
RBC # BLD: 3.62 M/UL — LOW (ref 3.8–5.2)
RBC # BLD: 3.64 M/UL — LOW (ref 3.8–5.2)
RBC # BLD: 3.64 M/UL — LOW (ref 3.8–5.2)
RBC # BLD: 3.65 M/UL — LOW (ref 3.8–5.2)
RBC # BLD: 3.67 M/UL — LOW (ref 3.8–5.2)
RBC # BLD: 3.68 M/UL — LOW (ref 3.8–5.2)
RBC # BLD: 3.68 M/UL — LOW (ref 3.8–5.2)
RBC # BLD: 3.71 M/UL — LOW (ref 3.8–5.2)
RBC # BLD: 3.71 M/UL — LOW (ref 3.8–5.2)
RBC # BLD: 3.72 M/UL — LOW (ref 3.8–5.2)
RBC # BLD: 3.72 M/UL — LOW (ref 3.8–5.2)
RBC # BLD: 3.73 M/UL — LOW (ref 3.8–5.2)
RBC # BLD: 3.73 M/UL — LOW (ref 3.8–5.2)
RBC # BLD: 3.74 M/UL — LOW (ref 3.8–5.2)
RBC # BLD: 3.74 M/UL — LOW (ref 3.8–5.2)
RBC # BLD: 3.75 M/UL — LOW (ref 3.8–5.2)
RBC # BLD: 3.75 M/UL — LOW (ref 3.8–5.2)
RBC # BLD: 3.76 M/UL — LOW (ref 3.8–5.2)
RBC # BLD: 3.77 M/UL — LOW (ref 3.8–5.2)
RBC # BLD: 3.77 M/UL — LOW (ref 3.8–5.2)
RBC # BLD: 3.78 M/UL — LOW (ref 3.8–5.2)
RBC # BLD: 3.78 M/UL — LOW (ref 3.8–5.2)
RBC # BLD: 3.8 M/UL — SIGNIFICANT CHANGE UP (ref 3.8–5.2)
RBC # BLD: 3.8 M/UL — SIGNIFICANT CHANGE UP (ref 3.8–5.2)
RBC # BLD: 3.81 M/UL — SIGNIFICANT CHANGE UP (ref 3.8–5.2)
RBC # BLD: 3.83 M/UL — SIGNIFICANT CHANGE UP (ref 3.8–5.2)
RBC # BLD: 3.83 M/UL — SIGNIFICANT CHANGE UP (ref 3.8–5.2)
RBC # BLD: 3.92 M/UL — SIGNIFICANT CHANGE UP (ref 3.8–5.2)
RBC # BLD: 3.92 M/UL — SIGNIFICANT CHANGE UP (ref 3.8–5.2)
RBC # BLD: 3.94 M/UL — SIGNIFICANT CHANGE UP (ref 3.8–5.2)
RBC # BLD: 3.94 M/UL — SIGNIFICANT CHANGE UP (ref 3.8–5.2)
RBC # BLD: 3.96 M/UL — SIGNIFICANT CHANGE UP (ref 3.8–5.2)
RBC # BLD: 3.97 M/UL — SIGNIFICANT CHANGE UP (ref 3.8–5.2)
RBC # BLD: 4.04 M/UL — SIGNIFICANT CHANGE UP (ref 3.8–5.2)
RBC # BLD: 4.05 M/UL — SIGNIFICANT CHANGE UP (ref 3.8–5.2)
RBC # BLD: 4.05 M/UL — SIGNIFICANT CHANGE UP (ref 3.8–5.2)
RBC # BLD: 4.06 M/UL — SIGNIFICANT CHANGE UP (ref 3.8–5.2)
RBC # BLD: 4.06 M/UL — SIGNIFICANT CHANGE UP (ref 3.8–5.2)
RBC # BLD: 4.08 M/UL — SIGNIFICANT CHANGE UP (ref 3.8–5.2)
RBC # BLD: 4.12 M/UL — SIGNIFICANT CHANGE UP (ref 3.8–5.2)
RBC # BLD: 4.12 M/UL — SIGNIFICANT CHANGE UP (ref 3.8–5.2)
RBC # BLD: 4.13 M/UL — SIGNIFICANT CHANGE UP (ref 3.8–5.2)
RBC # BLD: 4.13 M/UL — SIGNIFICANT CHANGE UP (ref 3.8–5.2)
RBC # BLD: 4.16 M/UL — SIGNIFICANT CHANGE UP (ref 3.8–5.2)
RBC # BLD: 4.16 M/UL — SIGNIFICANT CHANGE UP (ref 3.8–5.2)
RBC # BLD: 4.18 M/UL — SIGNIFICANT CHANGE UP (ref 3.8–5.2)
RBC # BLD: 4.18 M/UL — SIGNIFICANT CHANGE UP (ref 3.8–5.2)
RBC # BLD: 4.24 M/UL — SIGNIFICANT CHANGE UP (ref 3.8–5.2)
RBC # BLD: 4.25 M/UL — SIGNIFICANT CHANGE UP (ref 3.8–5.2)
RBC # BLD: 4.25 M/UL — SIGNIFICANT CHANGE UP (ref 3.8–5.2)
RBC # BLD: 4.28 M/UL — SIGNIFICANT CHANGE UP (ref 3.8–5.2)
RBC # BLD: 4.28 M/UL — SIGNIFICANT CHANGE UP (ref 3.8–5.2)
RBC # BLD: 4.29 M/UL — SIGNIFICANT CHANGE UP (ref 3.8–5.2)
RBC # BLD: 4.29 M/UL — SIGNIFICANT CHANGE UP (ref 3.8–5.2)
RBC # BLD: 4.33 M/UL — SIGNIFICANT CHANGE UP (ref 3.8–5.2)
RBC # BLD: 4.33 M/UL — SIGNIFICANT CHANGE UP (ref 3.8–5.2)
RBC # BLD: 4.36 M/UL — SIGNIFICANT CHANGE UP (ref 3.8–5.2)
RBC # BLD: 4.38 M/UL — SIGNIFICANT CHANGE UP (ref 3.8–5.2)
RBC # BLD: 4.4 M/UL — SIGNIFICANT CHANGE UP (ref 3.8–5.2)
RBC # BLD: 4.4 M/UL — SIGNIFICANT CHANGE UP (ref 3.8–5.2)
RBC # BLD: 4.41 M/UL — SIGNIFICANT CHANGE UP (ref 3.8–5.2)
RBC # BLD: 4.43 M/UL — SIGNIFICANT CHANGE UP (ref 3.8–5.2)
RBC # BLD: 4.46 M/UL — SIGNIFICANT CHANGE UP (ref 3.8–5.2)
RBC # BLD: 4.46 M/UL — SIGNIFICANT CHANGE UP (ref 3.8–5.2)
RBC # BLD: 4.5 M/UL — SIGNIFICANT CHANGE UP (ref 3.8–5.2)
RBC # BLD: 4.5 M/UL — SIGNIFICANT CHANGE UP (ref 3.8–5.2)
RBC # BLD: 4.64 M/UL — SIGNIFICANT CHANGE UP (ref 3.8–5.2)
RBC # BLD: 4.64 M/UL — SIGNIFICANT CHANGE UP (ref 3.8–5.2)
RBC # FLD: 17.9 % — HIGH (ref 10.3–14.5)
RBC # FLD: 17.9 % — HIGH (ref 10.3–14.5)
RBC # FLD: 18.6 % — HIGH (ref 10.3–14.5)
RBC # FLD: 18.6 % — HIGH (ref 10.3–14.5)
RBC # FLD: 19.6 % — HIGH (ref 10.3–14.5)
RBC # FLD: 19.7 % — HIGH (ref 10.3–14.5)
RBC # FLD: 19.8 % — HIGH (ref 10.3–14.5)
RBC # FLD: 19.9 % — HIGH (ref 10.3–14.5)
RBC # FLD: 20 % — HIGH (ref 10.3–14.5)
RBC # FLD: 20.1 % — HIGH (ref 10.3–14.5)
RBC # FLD: 20.2 % — HIGH (ref 10.3–14.5)
RBC # FLD: 20.3 % — HIGH (ref 10.3–14.5)
RBC # FLD: 20.4 % — HIGH (ref 10.3–14.5)
RBC # FLD: 20.5 % — HIGH (ref 10.3–14.5)
RBC # FLD: 20.6 % — HIGH (ref 10.3–14.5)
RBC # FLD: 20.7 % — HIGH (ref 10.3–14.5)
RBC # FLD: 20.7 % — HIGH (ref 10.3–14.5)
RBC # FLD: 20.8 % — HIGH (ref 10.3–14.5)
RBC # FLD: 20.8 % — HIGH (ref 10.3–14.5)
RBC # FLD: 21.2 % — HIGH (ref 10.3–14.5)
RBC # FLD: 21.3 % — HIGH (ref 10.3–14.5)
RBC # FLD: 21.5 % — HIGH (ref 10.3–14.5)
RBC # FLD: 21.6 % — HIGH (ref 10.3–14.5)
RBC # FLD: 21.8 % — HIGH (ref 10.3–14.5)
RBC BLD AUTO: ABNORMAL
RBC BLD AUTO: NORMAL — SIGNIFICANT CHANGE UP
RBC BLD AUTO: SIGNIFICANT CHANGE UP
RBC BLD AUTO: SIGNIFICANT CHANGE UP
RBC CASTS # UR COMP ASSIST: ABNORMAL /HPF (ref 0–4)
RBC CASTS # UR COMP ASSIST: ABNORMAL /HPF (ref 0–4)
RBC CASTS # UR COMP ASSIST: SIGNIFICANT CHANGE UP /HPF (ref 0–4)
RBC CASTS # UR COMP ASSIST: SIGNIFICANT CHANGE UP /HPF (ref 0–4)
RF+CCP IGG SER-IMP: NEGATIVE — SIGNIFICANT CHANGE UP
RF+CCP IGG SER-IMP: NEGATIVE — SIGNIFICANT CHANGE UP
RH IG SCN BLD-IMP: POSITIVE — SIGNIFICANT CHANGE UP
RH IG SCN BLD-IMP: POSITIVE — SIGNIFICANT CHANGE UP
RHEUMATOID FACT SERPL-ACNC: 55 IU/ML — HIGH (ref 0–13)
RHEUMATOID FACT SERPL-ACNC: 55 IU/ML — HIGH (ref 0–13)
RHEUMATOID FACT SERPL-ACNC: 62 IU/ML — HIGH (ref 0–13)
RHEUMATOID FACT SERPL-ACNC: 62 IU/ML — HIGH (ref 0–13)
RNAP III AB SER-ACNC: 12 UNITS — SIGNIFICANT CHANGE UP (ref 0–19)
RNAP III AB SER-ACNC: 12 UNITS — SIGNIFICANT CHANGE UP (ref 0–19)
S AUREUS DNA NOSE QL NAA+PROBE: DETECTED
S PNEUM SEROTYPE IGG SER-IMP: 0.1 MCG/ML — SIGNIFICANT CHANGE UP
S PNEUM SEROTYPE IGG SER-IMP: 0.1 MCG/ML — SIGNIFICANT CHANGE UP
S PNEUM SEROTYPE IGG SER-IMP: 0.2 MCG/ML — SIGNIFICANT CHANGE UP
S PNEUM SEROTYPE IGG SER-IMP: 0.2 MCG/ML — SIGNIFICANT CHANGE UP
S PNEUM SEROTYPE IGG SER-IMP: 0.4 MCG/ML — SIGNIFICANT CHANGE UP
S PNEUM SEROTYPE IGG SER-IMP: 0.4 MCG/ML — SIGNIFICANT CHANGE UP
S PNEUM SEROTYPE IGG SER-IMP: 2 MCG/ML — SIGNIFICANT CHANGE UP
S PNEUM SEROTYPE IGG SER-IMP: 2 MCG/ML — SIGNIFICANT CHANGE UP
S PNEUM SEROTYPE IGG SER-IMP: <0.1 MCG/ML — SIGNIFICANT CHANGE UP
SAO2 % BLDA: 92.3 % — LOW (ref 94–98)
SAO2 % BLDA: 92.3 % — LOW (ref 94–98)
SAO2 % BLDV: 29.7 % — LOW (ref 67–88)
SAO2 % BLDV: 29.7 % — LOW (ref 67–88)
SAO2 % BLDV: 42.6 % — SIGNIFICANT CHANGE UP
SAO2 % BLDV: 42.6 % — SIGNIFICANT CHANGE UP
SAO2 % BLDV: 53.3 % — SIGNIFICANT CHANGE UP
SAO2 % BLDV: 53.3 % — SIGNIFICANT CHANGE UP
SAO2 % BLDV: 61.3 % — SIGNIFICANT CHANGE UP
SAO2 % BLDV: 61.3 % — SIGNIFICANT CHANGE UP
SAO2 % BLDV: 61.9 % — SIGNIFICANT CHANGE UP
SAO2 % BLDV: 61.9 % — SIGNIFICANT CHANGE UP
SAO2 % BLDV: 63 % — SIGNIFICANT CHANGE UP
SAO2 % BLDV: 63 % — SIGNIFICANT CHANGE UP
SAO2 % BLDV: 63.5 % — SIGNIFICANT CHANGE UP
SAO2 % BLDV: 63.5 % — SIGNIFICANT CHANGE UP
SAO2 % BLDV: 74.9 % — SIGNIFICANT CHANGE UP
SAO2 % BLDV: 74.9 % — SIGNIFICANT CHANGE UP
SAO2 % BLDV: 76.1 % — SIGNIFICANT CHANGE UP
SAO2 % BLDV: 76.1 % — SIGNIFICANT CHANGE UP
SAO2 % BLDV: 78.4 % — SIGNIFICANT CHANGE UP
SAO2 % BLDV: 78.4 % — SIGNIFICANT CHANGE UP
SAO2 % BLDV: 79.7 % — SIGNIFICANT CHANGE UP
SAO2 % BLDV: 79.7 % — SIGNIFICANT CHANGE UP
SAO2 % BLDV: 79.8 % — SIGNIFICANT CHANGE UP
SAO2 % BLDV: 79.8 % — SIGNIFICANT CHANGE UP
SAO2 % BLDV: 81.3 % — SIGNIFICANT CHANGE UP
SAO2 % BLDV: 81.3 % — SIGNIFICANT CHANGE UP
SAO2 % BLDV: 81.9 % — SIGNIFICANT CHANGE UP
SAO2 % BLDV: 81.9 % — SIGNIFICANT CHANGE UP
SAO2 % BLDV: 83.2 % — SIGNIFICANT CHANGE UP
SAO2 % BLDV: 83.2 % — SIGNIFICANT CHANGE UP
SAO2 % BLDV: 89.3 % — SIGNIFICANT CHANGE UP
SAO2 % BLDV: 89.3 % — SIGNIFICANT CHANGE UP
SAO2 % BLDV: 92.6 % — HIGH (ref 67–88)
SAO2 % BLDV: 92.6 % — HIGH (ref 67–88)
SAO2 % BLDV: 95.7 % — HIGH (ref 67–88)
SAO2 % BLDV: 95.7 % — HIGH (ref 67–88)
SAO2 % BLDV: 95.8 % — HIGH (ref 67–88)
SAO2 % BLDV: 95.8 % — HIGH (ref 67–88)
SAO2 % BLDV: 97.7 % — HIGH (ref 67–88)
SAO2 % BLDV: 97.7 % — HIGH (ref 67–88)
SAO2 % BLDV: 98.4 % — HIGH (ref 67–88)
SAO2 % BLDV: 98.4 % — HIGH (ref 67–88)
SAO2 % BLDV: 98.5 % — HIGH (ref 67–88)
SAO2 % BLDV: 98.5 % — HIGH (ref 67–88)
SAO2 % BLDV: 98.6 % — HIGH (ref 67–88)
SAO2 % BLDV: 98.7 % — HIGH (ref 67–88)
SAO2 % BLDV: 98.7 % — HIGH (ref 67–88)
SAO2 % BLDV: 98.7 % — SIGNIFICANT CHANGE UP
SAO2 % BLDV: 98.7 % — SIGNIFICANT CHANGE UP
SARS-COV-2 RNA SPEC QL NAA+PROBE: SIGNIFICANT CHANGE UP
SCHISTOCYTES BLD QL AUTO: SLIGHT — SIGNIFICANT CHANGE UP
SCHISTOCYTES BLD QL AUTO: SLIGHT — SIGNIFICANT CHANGE UP
SMUDGE CELLS # BLD: PRESENT — SIGNIFICANT CHANGE UP
SODIUM SERPL-SCNC: 123 MMOL/L — LOW (ref 135–145)
SODIUM SERPL-SCNC: 123 MMOL/L — LOW (ref 135–145)
SODIUM SERPL-SCNC: 126 MMOL/L — LOW (ref 135–145)
SODIUM SERPL-SCNC: 127 MMOL/L — LOW (ref 135–145)
SODIUM SERPL-SCNC: 128 MMOL/L — LOW (ref 135–145)
SODIUM SERPL-SCNC: 129 MMOL/L — LOW (ref 135–145)
SODIUM SERPL-SCNC: 130 MMOL/L — LOW (ref 135–145)
SODIUM SERPL-SCNC: 131 MMOL/L — LOW (ref 135–145)
SODIUM SERPL-SCNC: 132 MMOL/L — LOW (ref 135–145)
SODIUM SERPL-SCNC: 133 MMOL/L — LOW (ref 135–145)
SODIUM SERPL-SCNC: 134 MMOL/L — LOW (ref 135–145)
SODIUM SERPL-SCNC: 135 MMOL/L — SIGNIFICANT CHANGE UP (ref 135–145)
SODIUM SERPL-SCNC: 136 MMOL/L — SIGNIFICANT CHANGE UP (ref 135–145)
SODIUM SERPL-SCNC: 137 MMOL/L — SIGNIFICANT CHANGE UP (ref 135–145)
SODIUM SERPL-SCNC: 137 MMOL/L — SIGNIFICANT CHANGE UP (ref 135–145)
SODIUM SERPL-SCNC: 138 MMOL/L — SIGNIFICANT CHANGE UP (ref 135–145)
SODIUM SERPL-SCNC: 138 MMOL/L — SIGNIFICANT CHANGE UP (ref 135–145)
SODIUM SERPL-SCNC: 139 MMOL/L — SIGNIFICANT CHANGE UP (ref 135–145)
SODIUM SERPL-SCNC: 140 MMOL/L — SIGNIFICANT CHANGE UP (ref 135–145)
SODIUM SERPL-SCNC: 141 MMOL/L — SIGNIFICANT CHANGE UP (ref 135–145)
SODIUM SERPL-SCNC: 143 MMOL/L — SIGNIFICANT CHANGE UP (ref 135–145)
SODIUM SERPL-SCNC: 144 MMOL/L — SIGNIFICANT CHANGE UP (ref 135–145)
SODIUM SERPL-SCNC: 144 MMOL/L — SIGNIFICANT CHANGE UP (ref 135–145)
SODIUM SERPL-SCNC: 145 MMOL/L — SIGNIFICANT CHANGE UP (ref 135–145)
SODIUM SERPL-SCNC: 145 MMOL/L — SIGNIFICANT CHANGE UP (ref 135–145)
SODIUM SERPL-SCNC: 146 MMOL/L — HIGH (ref 135–145)
SODIUM SERPL-SCNC: 146 MMOL/L — HIGH (ref 135–145)
SODIUM SERPL-SCNC: 147 MMOL/L — HIGH (ref 135–145)
SODIUM SERPL-SCNC: 147 MMOL/L — HIGH (ref 135–145)
SODIUM UR-SCNC: <30 MMOL/L — SIGNIFICANT CHANGE UP
SODIUM UR-SCNC: <30 MMOL/L — SIGNIFICANT CHANGE UP
SP GR SPEC: 1.01 — SIGNIFICANT CHANGE UP (ref 1.01–1.02)
SP GR SPEC: 1.01 — SIGNIFICANT CHANGE UP (ref 1.01–1.02)
SP GR SPEC: 1.02 — SIGNIFICANT CHANGE UP (ref 1.01–1.02)
SP GR SPEC: 1.02 — SIGNIFICANT CHANGE UP (ref 1.01–1.02)
SPECIMEN SOURCE: SIGNIFICANT CHANGE UP
STREPTOCOCCUS PNEUMONIAE IGG SEROTYPE INTERPRETATION: SIGNIFICANT CHANGE UP
STREPTOCOCCUS PNEUMONIAE IGG SEROTYPE INTERPRETATION: SIGNIFICANT CHANGE UP
T4 FREE SERPL-MCNC: 1.2 NG/DL — SIGNIFICANT CHANGE UP (ref 0.9–1.8)
T4 FREE SERPL-MCNC: 1.2 NG/DL — SIGNIFICANT CHANGE UP (ref 0.9–1.8)
TARGETS BLD QL SMEAR: SLIGHT — SIGNIFICANT CHANGE UP
TIBC SERPL-MCNC: 173 UG/DL — LOW (ref 220–430)
TIBC SERPL-MCNC: 173 UG/DL — LOW (ref 220–430)
TRANSFERRIN SERPL-MCNC: 121 MG/DL — LOW (ref 192–382)
TRANSFERRIN SERPL-MCNC: 121 MG/DL — LOW (ref 192–382)
TROPONIN T SERPL-MCNC: 0.01 NG/ML — SIGNIFICANT CHANGE UP (ref 0–0.06)
TROPONIN T SERPL-MCNC: 0.01 NG/ML — SIGNIFICANT CHANGE UP (ref 0–0.06)
TROPONIN T SERPL-MCNC: 0.02 NG/ML — SIGNIFICANT CHANGE UP (ref 0–0.06)
TROPONIN T SERPL-MCNC: <0.01 NG/ML — SIGNIFICANT CHANGE UP (ref 0–0.06)
TSH SERPL-MCNC: 1.99 UIU/ML — SIGNIFICANT CHANGE UP (ref 0.27–4.2)
TSH SERPL-MCNC: 1.99 UIU/ML — SIGNIFICANT CHANGE UP (ref 0.27–4.2)
UROBILINOGEN FLD QL: 1 MG/DL
UROBILINOGEN FLD QL: 1 MG/DL
UROBILINOGEN FLD QL: NEGATIVE MG/DL — SIGNIFICANT CHANGE UP
UROBILINOGEN FLD QL: NEGATIVE MG/DL — SIGNIFICANT CHANGE UP
VANCOMYCIN FLD-MCNC: 14.1 UG/ML — SIGNIFICANT CHANGE UP
VANCOMYCIN FLD-MCNC: 14.1 UG/ML — SIGNIFICANT CHANGE UP
VANCOMYCIN FLD-MCNC: 22.8 UG/ML — SIGNIFICANT CHANGE UP
VANCOMYCIN FLD-MCNC: 22.8 UG/ML — SIGNIFICANT CHANGE UP
VANCOMYCIN TROUGH SERPL-MCNC: 30.8 UG/ML — CRITICAL HIGH (ref 10–20)
VANCOMYCIN TROUGH SERPL-MCNC: 30.8 UG/ML — CRITICAL HIGH (ref 10–20)
VARIANT LYMPHS # BLD: 1.7 % — SIGNIFICANT CHANGE UP (ref 0–6)
VIT B12 SERPL-MCNC: >2000 PG/ML — HIGH (ref 232–1245)
VIT B12 SERPL-MCNC: >2000 PG/ML — HIGH (ref 232–1245)
WBC # BLD: 10.3 K/UL — SIGNIFICANT CHANGE UP (ref 3.8–10.5)
WBC # BLD: 10.3 K/UL — SIGNIFICANT CHANGE UP (ref 3.8–10.5)
WBC # BLD: 10.44 K/UL — SIGNIFICANT CHANGE UP (ref 3.8–10.5)
WBC # BLD: 10.44 K/UL — SIGNIFICANT CHANGE UP (ref 3.8–10.5)
WBC # BLD: 11.1 K/UL — HIGH (ref 3.8–10.5)
WBC # BLD: 11.1 K/UL — HIGH (ref 3.8–10.5)
WBC # BLD: 11.55 K/UL — HIGH (ref 3.8–10.5)
WBC # BLD: 11.55 K/UL — HIGH (ref 3.8–10.5)
WBC # BLD: 11.84 K/UL — HIGH (ref 3.8–10.5)
WBC # BLD: 11.84 K/UL — HIGH (ref 3.8–10.5)
WBC # BLD: 13.2 K/UL — HIGH (ref 3.8–10.5)
WBC # BLD: 13.2 K/UL — HIGH (ref 3.8–10.5)
WBC # BLD: 13.28 K/UL — HIGH (ref 3.8–10.5)
WBC # BLD: 13.28 K/UL — HIGH (ref 3.8–10.5)
WBC # BLD: 17.27 K/UL — HIGH (ref 3.8–10.5)
WBC # BLD: 17.27 K/UL — HIGH (ref 3.8–10.5)
WBC # BLD: 2.39 K/UL — LOW (ref 3.8–10.5)
WBC # BLD: 22.8 K/UL — HIGH (ref 3.8–10.5)
WBC # BLD: 22.8 K/UL — HIGH (ref 3.8–10.5)
WBC # BLD: 3.02 K/UL — LOW (ref 3.8–10.5)
WBC # BLD: 3.48 K/UL — LOW (ref 3.8–10.5)
WBC # BLD: 3.68 K/UL — LOW (ref 3.8–10.5)
WBC # BLD: 3.68 K/UL — LOW (ref 3.8–10.5)
WBC # BLD: 3.69 K/UL — LOW (ref 3.8–10.5)
WBC # BLD: 3.79 K/UL — LOW (ref 3.8–10.5)
WBC # BLD: 3.87 K/UL — SIGNIFICANT CHANGE UP (ref 3.8–10.5)
WBC # BLD: 3.87 K/UL — SIGNIFICANT CHANGE UP (ref 3.8–10.5)
WBC # BLD: 37.11 K/UL — HIGH (ref 3.8–10.5)
WBC # BLD: 37.11 K/UL — HIGH (ref 3.8–10.5)
WBC # BLD: 39.49 K/UL — HIGH (ref 3.8–10.5)
WBC # BLD: 39.49 K/UL — HIGH (ref 3.8–10.5)
WBC # BLD: 4.07 K/UL — SIGNIFICANT CHANGE UP (ref 3.8–10.5)
WBC # BLD: 4.1 K/UL — SIGNIFICANT CHANGE UP (ref 3.8–10.5)
WBC # BLD: 4.1 K/UL — SIGNIFICANT CHANGE UP (ref 3.8–10.5)
WBC # BLD: 4.12 K/UL — SIGNIFICANT CHANGE UP (ref 3.8–10.5)
WBC # BLD: 4.17 K/UL — SIGNIFICANT CHANGE UP (ref 3.8–10.5)
WBC # BLD: 4.24 K/UL — SIGNIFICANT CHANGE UP (ref 3.8–10.5)
WBC # BLD: 4.24 K/UL — SIGNIFICANT CHANGE UP (ref 3.8–10.5)
WBC # BLD: 4.27 K/UL — SIGNIFICANT CHANGE UP (ref 3.8–10.5)
WBC # BLD: 4.3 K/UL — SIGNIFICANT CHANGE UP (ref 3.8–10.5)
WBC # BLD: 4.3 K/UL — SIGNIFICANT CHANGE UP (ref 3.8–10.5)
WBC # BLD: 4.71 K/UL — SIGNIFICANT CHANGE UP (ref 3.8–10.5)
WBC # BLD: 4.77 K/UL — SIGNIFICANT CHANGE UP (ref 3.8–10.5)
WBC # BLD: 4.77 K/UL — SIGNIFICANT CHANGE UP (ref 3.8–10.5)
WBC # BLD: 4.83 K/UL — SIGNIFICANT CHANGE UP (ref 3.8–10.5)
WBC # BLD: 4.87 K/UL — SIGNIFICANT CHANGE UP (ref 3.8–10.5)
WBC # BLD: 4.87 K/UL — SIGNIFICANT CHANGE UP (ref 3.8–10.5)
WBC # BLD: 4.96 K/UL — SIGNIFICANT CHANGE UP (ref 3.8–10.5)
WBC # BLD: 4.96 K/UL — SIGNIFICANT CHANGE UP (ref 3.8–10.5)
WBC # BLD: 5.01 K/UL — SIGNIFICANT CHANGE UP (ref 3.8–10.5)
WBC # BLD: 5.01 K/UL — SIGNIFICANT CHANGE UP (ref 3.8–10.5)
WBC # BLD: 5.04 K/UL — SIGNIFICANT CHANGE UP (ref 3.8–10.5)
WBC # BLD: 5.04 K/UL — SIGNIFICANT CHANGE UP (ref 3.8–10.5)
WBC # BLD: 5.34 K/UL — SIGNIFICANT CHANGE UP (ref 3.8–10.5)
WBC # BLD: 5.34 K/UL — SIGNIFICANT CHANGE UP (ref 3.8–10.5)
WBC # BLD: 5.36 K/UL — SIGNIFICANT CHANGE UP (ref 3.8–10.5)
WBC # BLD: 5.36 K/UL — SIGNIFICANT CHANGE UP (ref 3.8–10.5)
WBC # BLD: 5.52 K/UL — SIGNIFICANT CHANGE UP (ref 3.8–10.5)
WBC # BLD: 5.52 K/UL — SIGNIFICANT CHANGE UP (ref 3.8–10.5)
WBC # BLD: 5.9 K/UL — SIGNIFICANT CHANGE UP (ref 3.8–10.5)
WBC # BLD: 5.9 K/UL — SIGNIFICANT CHANGE UP (ref 3.8–10.5)
WBC # BLD: 5.99 K/UL — SIGNIFICANT CHANGE UP (ref 3.8–10.5)
WBC # BLD: 5.99 K/UL — SIGNIFICANT CHANGE UP (ref 3.8–10.5)
WBC # BLD: 55.21 K/UL — CRITICAL HIGH (ref 3.8–10.5)
WBC # BLD: 55.21 K/UL — CRITICAL HIGH (ref 3.8–10.5)
WBC # BLD: 6.39 K/UL — SIGNIFICANT CHANGE UP (ref 3.8–10.5)
WBC # BLD: 6.39 K/UL — SIGNIFICANT CHANGE UP (ref 3.8–10.5)
WBC # BLD: 6.49 K/UL — SIGNIFICANT CHANGE UP (ref 3.8–10.5)
WBC # BLD: 6.49 K/UL — SIGNIFICANT CHANGE UP (ref 3.8–10.5)
WBC # BLD: 6.63 K/UL — SIGNIFICANT CHANGE UP (ref 3.8–10.5)
WBC # BLD: 6.63 K/UL — SIGNIFICANT CHANGE UP (ref 3.8–10.5)
WBC # BLD: 6.8 K/UL — SIGNIFICANT CHANGE UP (ref 3.8–10.5)
WBC # BLD: 6.8 K/UL — SIGNIFICANT CHANGE UP (ref 3.8–10.5)
WBC # BLD: 6.86 K/UL — SIGNIFICANT CHANGE UP (ref 3.8–10.5)
WBC # BLD: 6.86 K/UL — SIGNIFICANT CHANGE UP (ref 3.8–10.5)
WBC # BLD: 6.9 K/UL — SIGNIFICANT CHANGE UP (ref 3.8–10.5)
WBC # BLD: 6.9 K/UL — SIGNIFICANT CHANGE UP (ref 3.8–10.5)
WBC # BLD: 6.94 K/UL — SIGNIFICANT CHANGE UP (ref 3.8–10.5)
WBC # BLD: 6.94 K/UL — SIGNIFICANT CHANGE UP (ref 3.8–10.5)
WBC # BLD: 6.95 K/UL — SIGNIFICANT CHANGE UP (ref 3.8–10.5)
WBC # BLD: 6.95 K/UL — SIGNIFICANT CHANGE UP (ref 3.8–10.5)
WBC # BLD: 6.97 K/UL — SIGNIFICANT CHANGE UP (ref 3.8–10.5)
WBC # BLD: 6.97 K/UL — SIGNIFICANT CHANGE UP (ref 3.8–10.5)
WBC # BLD: 7.19 K/UL — SIGNIFICANT CHANGE UP (ref 3.8–10.5)
WBC # BLD: 7.19 K/UL — SIGNIFICANT CHANGE UP (ref 3.8–10.5)
WBC # BLD: 7.62 K/UL — SIGNIFICANT CHANGE UP (ref 3.8–10.5)
WBC # BLD: 7.62 K/UL — SIGNIFICANT CHANGE UP (ref 3.8–10.5)
WBC # BLD: 7.74 K/UL — SIGNIFICANT CHANGE UP (ref 3.8–10.5)
WBC # BLD: 8 K/UL — SIGNIFICANT CHANGE UP (ref 3.8–10.5)
WBC # BLD: 8 K/UL — SIGNIFICANT CHANGE UP (ref 3.8–10.5)
WBC # BLD: 8.17 K/UL — SIGNIFICANT CHANGE UP (ref 3.8–10.5)
WBC # BLD: 8.17 K/UL — SIGNIFICANT CHANGE UP (ref 3.8–10.5)
WBC # BLD: 8.49 K/UL — SIGNIFICANT CHANGE UP (ref 3.8–10.5)
WBC # BLD: 8.49 K/UL — SIGNIFICANT CHANGE UP (ref 3.8–10.5)
WBC # BLD: 8.92 K/UL — SIGNIFICANT CHANGE UP (ref 3.8–10.5)
WBC # BLD: 8.92 K/UL — SIGNIFICANT CHANGE UP (ref 3.8–10.5)
WBC # BLD: 9.05 K/UL — SIGNIFICANT CHANGE UP (ref 3.8–10.5)
WBC # BLD: 9.05 K/UL — SIGNIFICANT CHANGE UP (ref 3.8–10.5)
WBC # FLD AUTO: 10.3 K/UL — SIGNIFICANT CHANGE UP (ref 3.8–10.5)
WBC # FLD AUTO: 10.3 K/UL — SIGNIFICANT CHANGE UP (ref 3.8–10.5)
WBC # FLD AUTO: 10.44 K/UL — SIGNIFICANT CHANGE UP (ref 3.8–10.5)
WBC # FLD AUTO: 10.44 K/UL — SIGNIFICANT CHANGE UP (ref 3.8–10.5)
WBC # FLD AUTO: 11.1 K/UL — HIGH (ref 3.8–10.5)
WBC # FLD AUTO: 11.1 K/UL — HIGH (ref 3.8–10.5)
WBC # FLD AUTO: 11.55 K/UL — HIGH (ref 3.8–10.5)
WBC # FLD AUTO: 11.55 K/UL — HIGH (ref 3.8–10.5)
WBC # FLD AUTO: 11.84 K/UL — HIGH (ref 3.8–10.5)
WBC # FLD AUTO: 11.84 K/UL — HIGH (ref 3.8–10.5)
WBC # FLD AUTO: 13.2 K/UL — HIGH (ref 3.8–10.5)
WBC # FLD AUTO: 13.2 K/UL — HIGH (ref 3.8–10.5)
WBC # FLD AUTO: 13.28 K/UL — HIGH (ref 3.8–10.5)
WBC # FLD AUTO: 13.28 K/UL — HIGH (ref 3.8–10.5)
WBC # FLD AUTO: 17.27 K/UL — HIGH (ref 3.8–10.5)
WBC # FLD AUTO: 17.27 K/UL — HIGH (ref 3.8–10.5)
WBC # FLD AUTO: 2.39 K/UL — LOW (ref 3.8–10.5)
WBC # FLD AUTO: 22.8 K/UL — HIGH (ref 3.8–10.5)
WBC # FLD AUTO: 22.8 K/UL — HIGH (ref 3.8–10.5)
WBC # FLD AUTO: 3.02 K/UL — LOW (ref 3.8–10.5)
WBC # FLD AUTO: 3.48 K/UL — LOW (ref 3.8–10.5)
WBC # FLD AUTO: 3.68 K/UL — LOW (ref 3.8–10.5)
WBC # FLD AUTO: 3.68 K/UL — LOW (ref 3.8–10.5)
WBC # FLD AUTO: 3.69 K/UL — LOW (ref 3.8–10.5)
WBC # FLD AUTO: 3.79 K/UL — LOW (ref 3.8–10.5)
WBC # FLD AUTO: 3.87 K/UL — SIGNIFICANT CHANGE UP (ref 3.8–10.5)
WBC # FLD AUTO: 3.87 K/UL — SIGNIFICANT CHANGE UP (ref 3.8–10.5)
WBC # FLD AUTO: 37.11 K/UL — HIGH (ref 3.8–10.5)
WBC # FLD AUTO: 37.11 K/UL — HIGH (ref 3.8–10.5)
WBC # FLD AUTO: 39.49 K/UL — HIGH (ref 3.8–10.5)
WBC # FLD AUTO: 39.49 K/UL — HIGH (ref 3.8–10.5)
WBC # FLD AUTO: 4.07 K/UL — SIGNIFICANT CHANGE UP (ref 3.8–10.5)
WBC # FLD AUTO: 4.1 K/UL — SIGNIFICANT CHANGE UP (ref 3.8–10.5)
WBC # FLD AUTO: 4.1 K/UL — SIGNIFICANT CHANGE UP (ref 3.8–10.5)
WBC # FLD AUTO: 4.12 K/UL — SIGNIFICANT CHANGE UP (ref 3.8–10.5)
WBC # FLD AUTO: 4.17 K/UL — SIGNIFICANT CHANGE UP (ref 3.8–10.5)
WBC # FLD AUTO: 4.24 K/UL — SIGNIFICANT CHANGE UP (ref 3.8–10.5)
WBC # FLD AUTO: 4.24 K/UL — SIGNIFICANT CHANGE UP (ref 3.8–10.5)
WBC # FLD AUTO: 4.27 K/UL — SIGNIFICANT CHANGE UP (ref 3.8–10.5)
WBC # FLD AUTO: 4.3 K/UL — SIGNIFICANT CHANGE UP (ref 3.8–10.5)
WBC # FLD AUTO: 4.3 K/UL — SIGNIFICANT CHANGE UP (ref 3.8–10.5)
WBC # FLD AUTO: 4.71 K/UL — SIGNIFICANT CHANGE UP (ref 3.8–10.5)
WBC # FLD AUTO: 4.77 K/UL — SIGNIFICANT CHANGE UP (ref 3.8–10.5)
WBC # FLD AUTO: 4.77 K/UL — SIGNIFICANT CHANGE UP (ref 3.8–10.5)
WBC # FLD AUTO: 4.83 K/UL — SIGNIFICANT CHANGE UP (ref 3.8–10.5)
WBC # FLD AUTO: 4.87 K/UL — SIGNIFICANT CHANGE UP (ref 3.8–10.5)
WBC # FLD AUTO: 4.87 K/UL — SIGNIFICANT CHANGE UP (ref 3.8–10.5)
WBC # FLD AUTO: 4.96 K/UL — SIGNIFICANT CHANGE UP (ref 3.8–10.5)
WBC # FLD AUTO: 4.96 K/UL — SIGNIFICANT CHANGE UP (ref 3.8–10.5)
WBC # FLD AUTO: 5.01 K/UL — SIGNIFICANT CHANGE UP (ref 3.8–10.5)
WBC # FLD AUTO: 5.01 K/UL — SIGNIFICANT CHANGE UP (ref 3.8–10.5)
WBC # FLD AUTO: 5.04 K/UL — SIGNIFICANT CHANGE UP (ref 3.8–10.5)
WBC # FLD AUTO: 5.04 K/UL — SIGNIFICANT CHANGE UP (ref 3.8–10.5)
WBC # FLD AUTO: 5.34 K/UL — SIGNIFICANT CHANGE UP (ref 3.8–10.5)
WBC # FLD AUTO: 5.34 K/UL — SIGNIFICANT CHANGE UP (ref 3.8–10.5)
WBC # FLD AUTO: 5.36 K/UL — SIGNIFICANT CHANGE UP (ref 3.8–10.5)
WBC # FLD AUTO: 5.36 K/UL — SIGNIFICANT CHANGE UP (ref 3.8–10.5)
WBC # FLD AUTO: 5.52 K/UL — SIGNIFICANT CHANGE UP (ref 3.8–10.5)
WBC # FLD AUTO: 5.52 K/UL — SIGNIFICANT CHANGE UP (ref 3.8–10.5)
WBC # FLD AUTO: 5.9 K/UL — SIGNIFICANT CHANGE UP (ref 3.8–10.5)
WBC # FLD AUTO: 5.9 K/UL — SIGNIFICANT CHANGE UP (ref 3.8–10.5)
WBC # FLD AUTO: 5.99 K/UL — SIGNIFICANT CHANGE UP (ref 3.8–10.5)
WBC # FLD AUTO: 5.99 K/UL — SIGNIFICANT CHANGE UP (ref 3.8–10.5)
WBC # FLD AUTO: 55.21 K/UL — CRITICAL HIGH (ref 3.8–10.5)
WBC # FLD AUTO: 55.21 K/UL — CRITICAL HIGH (ref 3.8–10.5)
WBC # FLD AUTO: 6.39 K/UL — SIGNIFICANT CHANGE UP (ref 3.8–10.5)
WBC # FLD AUTO: 6.39 K/UL — SIGNIFICANT CHANGE UP (ref 3.8–10.5)
WBC # FLD AUTO: 6.49 K/UL — SIGNIFICANT CHANGE UP (ref 3.8–10.5)
WBC # FLD AUTO: 6.49 K/UL — SIGNIFICANT CHANGE UP (ref 3.8–10.5)
WBC # FLD AUTO: 6.63 K/UL — SIGNIFICANT CHANGE UP (ref 3.8–10.5)
WBC # FLD AUTO: 6.63 K/UL — SIGNIFICANT CHANGE UP (ref 3.8–10.5)
WBC # FLD AUTO: 6.8 K/UL — SIGNIFICANT CHANGE UP (ref 3.8–10.5)
WBC # FLD AUTO: 6.8 K/UL — SIGNIFICANT CHANGE UP (ref 3.8–10.5)
WBC # FLD AUTO: 6.86 K/UL — SIGNIFICANT CHANGE UP (ref 3.8–10.5)
WBC # FLD AUTO: 6.86 K/UL — SIGNIFICANT CHANGE UP (ref 3.8–10.5)
WBC # FLD AUTO: 6.9 K/UL — SIGNIFICANT CHANGE UP (ref 3.8–10.5)
WBC # FLD AUTO: 6.9 K/UL — SIGNIFICANT CHANGE UP (ref 3.8–10.5)
WBC # FLD AUTO: 6.94 K/UL — SIGNIFICANT CHANGE UP (ref 3.8–10.5)
WBC # FLD AUTO: 6.94 K/UL — SIGNIFICANT CHANGE UP (ref 3.8–10.5)
WBC # FLD AUTO: 6.95 K/UL — SIGNIFICANT CHANGE UP (ref 3.8–10.5)
WBC # FLD AUTO: 6.95 K/UL — SIGNIFICANT CHANGE UP (ref 3.8–10.5)
WBC # FLD AUTO: 6.97 K/UL — SIGNIFICANT CHANGE UP (ref 3.8–10.5)
WBC # FLD AUTO: 6.97 K/UL — SIGNIFICANT CHANGE UP (ref 3.8–10.5)
WBC # FLD AUTO: 7.19 K/UL — SIGNIFICANT CHANGE UP (ref 3.8–10.5)
WBC # FLD AUTO: 7.19 K/UL — SIGNIFICANT CHANGE UP (ref 3.8–10.5)
WBC # FLD AUTO: 7.62 K/UL — SIGNIFICANT CHANGE UP (ref 3.8–10.5)
WBC # FLD AUTO: 7.62 K/UL — SIGNIFICANT CHANGE UP (ref 3.8–10.5)
WBC # FLD AUTO: 7.74 K/UL — SIGNIFICANT CHANGE UP (ref 3.8–10.5)
WBC # FLD AUTO: 8 K/UL — SIGNIFICANT CHANGE UP (ref 3.8–10.5)
WBC # FLD AUTO: 8 K/UL — SIGNIFICANT CHANGE UP (ref 3.8–10.5)
WBC # FLD AUTO: 8.17 K/UL — SIGNIFICANT CHANGE UP (ref 3.8–10.5)
WBC # FLD AUTO: 8.17 K/UL — SIGNIFICANT CHANGE UP (ref 3.8–10.5)
WBC # FLD AUTO: 8.49 K/UL — SIGNIFICANT CHANGE UP (ref 3.8–10.5)
WBC # FLD AUTO: 8.49 K/UL — SIGNIFICANT CHANGE UP (ref 3.8–10.5)
WBC # FLD AUTO: 8.92 K/UL — SIGNIFICANT CHANGE UP (ref 3.8–10.5)
WBC # FLD AUTO: 8.92 K/UL — SIGNIFICANT CHANGE UP (ref 3.8–10.5)
WBC # FLD AUTO: 9.05 K/UL — SIGNIFICANT CHANGE UP (ref 3.8–10.5)
WBC # FLD AUTO: 9.05 K/UL — SIGNIFICANT CHANGE UP (ref 3.8–10.5)
WBC UR QL: SIGNIFICANT CHANGE UP /HPF (ref 0–5)

## 2023-01-01 PROCEDURE — C8929: CPT

## 2023-01-01 PROCEDURE — 99223 1ST HOSP IP/OBS HIGH 75: CPT

## 2023-01-01 PROCEDURE — 73502 X-RAY EXAM HIP UNI 2-3 VIEWS: CPT

## 2023-01-01 PROCEDURE — 82550 ASSAY OF CK (CPK): CPT

## 2023-01-01 PROCEDURE — 93306 TTE W/DOPPLER COMPLETE: CPT | Mod: 26

## 2023-01-01 PROCEDURE — 36592 COLLECT BLOOD FROM PICC: CPT

## 2023-01-01 PROCEDURE — 83735 ASSAY OF MAGNESIUM: CPT

## 2023-01-01 PROCEDURE — 83540 ASSAY OF IRON: CPT

## 2023-01-01 PROCEDURE — 93975 VASCULAR STUDY: CPT | Mod: 26

## 2023-01-01 PROCEDURE — 99291 CRITICAL CARE FIRST HOUR: CPT

## 2023-01-01 PROCEDURE — 82150 ASSAY OF AMYLASE: CPT

## 2023-01-01 PROCEDURE — 85652 RBC SED RATE AUTOMATED: CPT

## 2023-01-01 PROCEDURE — 99222 1ST HOSP IP/OBS MODERATE 55: CPT | Mod: GC

## 2023-01-01 PROCEDURE — 86900 BLOOD TYPING SEROLOGIC ABO: CPT

## 2023-01-01 PROCEDURE — 82962 GLUCOSE BLOOD TEST: CPT

## 2023-01-01 PROCEDURE — 87640 STAPH A DNA AMP PROBE: CPT

## 2023-01-01 PROCEDURE — 71275 CT ANGIOGRAPHY CHEST: CPT | Mod: 26,MA

## 2023-01-01 PROCEDURE — 85014 HEMATOCRIT: CPT

## 2023-01-01 PROCEDURE — 85379 FIBRIN DEGRADATION QUANT: CPT

## 2023-01-01 PROCEDURE — 99233 SBSQ HOSP IP/OBS HIGH 50: CPT

## 2023-01-01 PROCEDURE — 85610 PROTHROMBIN TIME: CPT

## 2023-01-01 PROCEDURE — 99232 SBSQ HOSP IP/OBS MODERATE 35: CPT

## 2023-01-01 PROCEDURE — 86235 NUCLEAR ANTIGEN ANTIBODY: CPT

## 2023-01-01 PROCEDURE — 71045 X-RAY EXAM CHEST 1 VIEW: CPT

## 2023-01-01 PROCEDURE — 93005 ELECTROCARDIOGRAM TRACING: CPT

## 2023-01-01 PROCEDURE — 82947 ASSAY GLUCOSE BLOOD QUANT: CPT

## 2023-01-01 PROCEDURE — 36415 COLL VENOUS BLD VENIPUNCTURE: CPT

## 2023-01-01 PROCEDURE — 71045 X-RAY EXAM CHEST 1 VIEW: CPT | Mod: 26

## 2023-01-01 PROCEDURE — 96365 THER/PROPH/DIAG IV INF INIT: CPT

## 2023-01-01 PROCEDURE — 93970 EXTREMITY STUDY: CPT | Mod: 26

## 2023-01-01 PROCEDURE — 80202 ASSAY OF VANCOMYCIN: CPT

## 2023-01-01 PROCEDURE — 94760 N-INVAS EAR/PLS OXIMETRY 1: CPT

## 2023-01-01 PROCEDURE — 80048 BASIC METABOLIC PNL TOTAL CA: CPT

## 2023-01-01 PROCEDURE — 86225 DNA ANTIBODY NATIVE: CPT

## 2023-01-01 PROCEDURE — 99233 SBSQ HOSP IP/OBS HIGH 50: CPT | Mod: GC

## 2023-01-01 PROCEDURE — 99232 SBSQ HOSP IP/OBS MODERATE 35: CPT | Mod: GC

## 2023-01-01 PROCEDURE — 84466 ASSAY OF TRANSFERRIN: CPT

## 2023-01-01 PROCEDURE — 82803 BLOOD GASES ANY COMBINATION: CPT

## 2023-01-01 PROCEDURE — 99239 HOSP IP/OBS DSCHRG MGMT >30: CPT

## 2023-01-01 PROCEDURE — 96367 TX/PROPH/DG ADDL SEQ IV INF: CPT

## 2023-01-01 PROCEDURE — 86901 BLOOD TYPING SEROLOGIC RH(D): CPT

## 2023-01-01 PROCEDURE — 96375 TX/PRO/DX INJ NEW DRUG ADDON: CPT

## 2023-01-01 PROCEDURE — 83605 ASSAY OF LACTIC ACID: CPT

## 2023-01-01 PROCEDURE — 84132 ASSAY OF SERUM POTASSIUM: CPT

## 2023-01-01 PROCEDURE — 82040 ASSAY OF SERUM ALBUMIN: CPT

## 2023-01-01 PROCEDURE — 93010 ELECTROCARDIOGRAM REPORT: CPT

## 2023-01-01 PROCEDURE — 0225U NFCT DS DNA&RNA 21 SARSCOV2: CPT

## 2023-01-01 PROCEDURE — 36430 TRANSFUSION BLD/BLD COMPNT: CPT

## 2023-01-01 PROCEDURE — 86923 COMPATIBILITY TEST ELECTRIC: CPT

## 2023-01-01 PROCEDURE — 85025 COMPLETE CBC W/AUTO DIFF WBC: CPT

## 2023-01-01 PROCEDURE — 80053 COMPREHEN METABOLIC PANEL: CPT

## 2023-01-01 PROCEDURE — 99285 EMERGENCY DEPT VISIT HI MDM: CPT

## 2023-01-01 PROCEDURE — 99285 EMERGENCY DEPT VISIT HI MDM: CPT | Mod: 25

## 2023-01-01 PROCEDURE — 83550 IRON BINDING TEST: CPT

## 2023-01-01 PROCEDURE — 86738 MYCOPLASMA ANTIBODY: CPT

## 2023-01-01 PROCEDURE — 94640 AIRWAY INHALATION TREATMENT: CPT

## 2023-01-01 PROCEDURE — 84484 ASSAY OF TROPONIN QUANT: CPT

## 2023-01-01 PROCEDURE — 76604 US EXAM CHEST: CPT | Mod: 26,GC

## 2023-01-01 PROCEDURE — 85027 COMPLETE CBC AUTOMATED: CPT

## 2023-01-01 PROCEDURE — 84702 CHORIONIC GONADOTROPIN TEST: CPT

## 2023-01-01 PROCEDURE — 83050 HGB METHEMOGLOBIN QUAN: CPT

## 2023-01-01 PROCEDURE — 86038 ANTINUCLEAR ANTIBODIES: CPT

## 2023-01-01 PROCEDURE — 85018 HEMOGLOBIN: CPT

## 2023-01-01 PROCEDURE — 93970 EXTREMITY STUDY: CPT

## 2023-01-01 PROCEDURE — 83880 ASSAY OF NATRIURETIC PEPTIDE: CPT

## 2023-01-01 PROCEDURE — 81001 URINALYSIS AUTO W/SCOPE: CPT

## 2023-01-01 PROCEDURE — 86255 FLUORESCENT ANTIBODY SCREEN: CPT

## 2023-01-01 PROCEDURE — 93010 ELECTROCARDIOGRAM REPORT: CPT | Mod: 76

## 2023-01-01 PROCEDURE — 99291 CRITICAL CARE FIRST HOUR: CPT | Mod: 25

## 2023-01-01 PROCEDURE — 93308 TTE F-UP OR LMTD: CPT | Mod: 26,GC

## 2023-01-01 PROCEDURE — 84100 ASSAY OF PHOSPHORUS: CPT

## 2023-01-01 PROCEDURE — 99291 CRITICAL CARE FIRST HOUR: CPT | Mod: GC

## 2023-01-01 PROCEDURE — 87536 HIV-1 QUANT&REVRSE TRNSCRPJ: CPT

## 2023-01-01 PROCEDURE — 83690 ASSAY OF LIPASE: CPT

## 2023-01-01 PROCEDURE — 86147 CARDIOLIPIN ANTIBODY EA IG: CPT

## 2023-01-01 PROCEDURE — 87040 BLOOD CULTURE FOR BACTERIA: CPT

## 2023-01-01 PROCEDURE — 86431 RHEUMATOID FACTOR QUANT: CPT

## 2023-01-01 PROCEDURE — 71045 X-RAY EXAM CHEST 1 VIEW: CPT | Mod: 26,77

## 2023-01-01 PROCEDURE — 86140 C-REACTIVE PROTEIN: CPT

## 2023-01-01 PROCEDURE — 85730 THROMBOPLASTIN TIME PARTIAL: CPT

## 2023-01-01 PROCEDURE — P9047: CPT

## 2023-01-01 PROCEDURE — 99222 1ST HOSP IP/OBS MODERATE 55: CPT

## 2023-01-01 PROCEDURE — 87641 MR-STAPH DNA AMP PROBE: CPT

## 2023-01-01 PROCEDURE — 86160 COMPLEMENT ANTIGEN: CPT

## 2023-01-01 PROCEDURE — 93306 TTE W/DOPPLER COMPLETE: CPT

## 2023-01-01 PROCEDURE — 83516 IMMUNOASSAY NONANTIBODY: CPT

## 2023-01-01 PROCEDURE — 82728 ASSAY OF FERRITIN: CPT

## 2023-01-01 PROCEDURE — 82247 BILIRUBIN TOTAL: CPT

## 2023-01-01 PROCEDURE — 86850 RBC ANTIBODY SCREEN: CPT

## 2023-01-01 PROCEDURE — 86036 ANCA SCREEN EACH ANTIBODY: CPT

## 2023-01-01 PROCEDURE — 82565 ASSAY OF CREATININE: CPT

## 2023-01-01 PROCEDURE — 94002 VENT MGMT INPAT INIT DAY: CPT

## 2023-01-01 PROCEDURE — 31500 INSERT EMERGENCY AIRWAY: CPT | Mod: GC

## 2023-01-01 PROCEDURE — 87449 NOS EACH ORGANISM AG IA: CPT

## 2023-01-01 PROCEDURE — P9016: CPT

## 2023-01-01 PROCEDURE — 84295 ASSAY OF SERUM SODIUM: CPT

## 2023-01-01 PROCEDURE — 99255 IP/OBS CONSLTJ NEW/EST HI 80: CPT | Mod: GC

## 2023-01-01 PROCEDURE — 71275 CT ANGIOGRAPHY CHEST: CPT | Mod: MA

## 2023-01-01 PROCEDURE — 82570 ASSAY OF URINE CREATININE: CPT

## 2023-01-01 PROCEDURE — 86146 BETA-2 GLYCOPROTEIN ANTIBODY: CPT

## 2023-01-01 PROCEDURE — 73501 X-RAY EXAM HIP UNI 1 VIEW: CPT | Mod: 26,RT

## 2023-01-01 PROCEDURE — 36569 INSJ PICC 5 YR+ W/O IMAGING: CPT

## 2023-01-01 PROCEDURE — 86200 CCP ANTIBODY: CPT

## 2023-01-01 PROCEDURE — 85732 THROMBOPLASTIN TIME PARTIAL: CPT

## 2023-01-01 PROCEDURE — 96366 THER/PROPH/DIAG IV INF ADDON: CPT

## 2023-01-01 PROCEDURE — 94660 CPAP INITIATION&MGMT: CPT

## 2023-01-01 PROCEDURE — 99238 HOSP IP/OBS DSCHRG MGMT 30/<: CPT | Mod: GC

## 2023-01-01 PROCEDURE — 84145 PROCALCITONIN (PCT): CPT

## 2023-01-01 PROCEDURE — 84300 ASSAY OF URINE SODIUM: CPT

## 2023-01-01 PROCEDURE — 73501 X-RAY EXAM HIP UNI 1 VIEW: CPT

## 2023-01-01 PROCEDURE — 82330 ASSAY OF CALCIUM: CPT

## 2023-01-01 PROCEDURE — 82607 VITAMIN B-12: CPT

## 2023-01-01 PROCEDURE — 86480 TB TEST CELL IMMUN MEASURE: CPT

## 2023-01-01 PROCEDURE — 76882 US LMTD JT/FCL EVL NVASC XTR: CPT | Mod: 26,RT

## 2023-01-01 PROCEDURE — 83010 ASSAY OF HAPTOGLOBIN QUANT: CPT

## 2023-01-01 PROCEDURE — 85613 RUSSELL VIPER VENOM DILUTED: CPT

## 2023-01-01 PROCEDURE — 84443 ASSAY THYROID STIM HORMONE: CPT

## 2023-01-01 PROCEDURE — 76882 US LMTD JT/FCL EVL NVASC XTR: CPT

## 2023-01-01 PROCEDURE — 86317 IMMUNOASSAY INFECTIOUS AGENT: CPT

## 2023-01-01 PROCEDURE — 36556 INSERT NON-TUNNEL CV CATH: CPT

## 2023-01-01 PROCEDURE — 82435 ASSAY OF BLOOD CHLORIDE: CPT

## 2023-01-01 PROCEDURE — 71045 X-RAY EXAM CHEST 1 VIEW: CPT | Mod: 26,76

## 2023-01-01 PROCEDURE — 80074 ACUTE HEPATITIS PANEL: CPT

## 2023-01-01 PROCEDURE — 36620 INSERTION CATHETER ARTERY: CPT

## 2023-01-01 PROCEDURE — 83615 LACTATE (LD) (LDH) ENZYME: CPT

## 2023-01-01 PROCEDURE — 94799 UNLISTED PULMONARY SVC/PX: CPT

## 2023-01-01 PROCEDURE — 82248 BILIRUBIN DIRECT: CPT

## 2023-01-01 PROCEDURE — 87538 HIV-2 PROBE&REVRSE TRNSCRIPJ: CPT

## 2023-01-01 PROCEDURE — 93975 VASCULAR STUDY: CPT

## 2023-01-01 PROCEDURE — 71275 CT ANGIOGRAPHY CHEST: CPT

## 2023-01-01 PROCEDURE — 83036 HEMOGLOBIN GLYCOSYLATED A1C: CPT

## 2023-01-01 PROCEDURE — 71275 CT ANGIOGRAPHY CHEST: CPT | Mod: 26

## 2023-01-01 PROCEDURE — 82746 ASSAY OF FOLIC ACID SERUM: CPT

## 2023-01-01 PROCEDURE — 96374 THER/PROPH/DIAG INJ IV PUSH: CPT

## 2023-01-01 PROCEDURE — 82140 ASSAY OF AMMONIA: CPT

## 2023-01-01 PROCEDURE — 84156 ASSAY OF PROTEIN URINE: CPT

## 2023-01-01 PROCEDURE — 84439 ASSAY OF FREE THYROXINE: CPT

## 2023-01-01 PROCEDURE — 86581 STRPTCS PNEUM ANTB SEROT IA: CPT

## 2023-01-01 RX ORDER — INSULIN LISPRO 100/ML
VIAL (ML) SUBCUTANEOUS
Refills: 0 | Status: DISCONTINUED | OUTPATIENT
Start: 2023-01-01 | End: 2023-01-01

## 2023-01-01 RX ORDER — BUMETANIDE 0.25 MG/ML
1 INJECTION INTRAMUSCULAR; INTRAVENOUS ONCE
Refills: 0 | Status: COMPLETED | OUTPATIENT
Start: 2023-01-01 | End: 2023-01-01

## 2023-01-01 RX ORDER — CALCIUM GLUCONATE 100 MG/ML
2 VIAL (ML) INTRAVENOUS ONCE
Refills: 0 | Status: COMPLETED | OUTPATIENT
Start: 2023-01-01 | End: 2023-01-01

## 2023-01-01 RX ORDER — DIPHENHYDRAMINE HCL 50 MG
50 CAPSULE ORAL ONCE
Refills: 0 | Status: COMPLETED | OUTPATIENT
Start: 2023-01-01 | End: 2023-01-01

## 2023-01-01 RX ORDER — FENTANYL CITRATE 50 UG/ML
50 INJECTION INTRAVENOUS
Refills: 0 | Status: DISCONTINUED | OUTPATIENT
Start: 2023-01-01 | End: 2023-01-01

## 2023-01-01 RX ORDER — SODIUM BICARBONATE 1 MEQ/ML
0.1 SYRINGE (ML) INTRAVENOUS
Qty: 150 | Refills: 0 | Status: DISCONTINUED | OUTPATIENT
Start: 2023-01-01 | End: 2023-01-01

## 2023-01-01 RX ORDER — INSULIN GLARGINE 100 [IU]/ML
10 INJECTION, SOLUTION SUBCUTANEOUS AT BEDTIME
Refills: 0 | Status: DISCONTINUED | OUTPATIENT
Start: 2023-01-01 | End: 2023-01-01

## 2023-01-01 RX ORDER — AMIODARONE HYDROCHLORIDE 400 MG/1
200 TABLET ORAL DAILY
Refills: 0 | Status: DISCONTINUED | OUTPATIENT
Start: 2023-01-01 | End: 2023-01-01

## 2023-01-01 RX ORDER — SODIUM BICARBONATE 1 MEQ/ML
50 SYRINGE (ML) INTRAVENOUS ONCE
Refills: 0 | Status: COMPLETED | OUTPATIENT
Start: 2023-01-01 | End: 2023-01-01

## 2023-01-01 RX ORDER — METOCLOPRAMIDE HCL 10 MG
10 TABLET ORAL ONCE
Refills: 0 | Status: COMPLETED | OUTPATIENT
Start: 2023-01-01 | End: 2023-01-01

## 2023-01-01 RX ORDER — BUMETANIDE 0.25 MG/ML
0.5 INJECTION INTRAMUSCULAR; INTRAVENOUS
Qty: 20 | Refills: 0 | Status: DISCONTINUED | OUTPATIENT
Start: 2023-01-01 | End: 2023-01-01

## 2023-01-01 RX ORDER — ACETAMINOPHEN 500 MG
1000 TABLET ORAL ONCE
Refills: 0 | Status: COMPLETED | OUTPATIENT
Start: 2023-01-01 | End: 2023-01-01

## 2023-01-01 RX ORDER — CHLORHEXIDINE GLUCONATE 213 G/1000ML
15 SOLUTION TOPICAL EVERY 12 HOURS
Refills: 0 | Status: DISCONTINUED | OUTPATIENT
Start: 2023-01-01 | End: 2023-01-01

## 2023-01-01 RX ORDER — EPOPROSTENOL 0.5 MG/10ML
1 INJECTION, POWDER, LYOPHILIZED, FOR SOLUTION INTRAVENOUS
Qty: 500 | Refills: 0 | Status: DISCONTINUED | OUTPATIENT
Start: 2023-01-01 | End: 2023-01-01

## 2023-01-01 RX ORDER — VASOPRESSIN 20 [USP'U]/ML
0.1 INJECTION INTRAVENOUS
Qty: 40 | Refills: 0 | Status: DISCONTINUED | OUTPATIENT
Start: 2023-01-01 | End: 2023-01-01

## 2023-01-01 RX ORDER — BUDESONIDE AND FORMOTEROL FUMARATE DIHYDRATE 160; 4.5 UG/1; UG/1
2 AEROSOL RESPIRATORY (INHALATION)
Refills: 0 | Status: DISCONTINUED | OUTPATIENT
Start: 2023-01-01 | End: 2023-01-01

## 2023-01-01 RX ORDER — DEXTROSE 50 % IN WATER 50 %
50 SYRINGE (ML) INTRAVENOUS ONCE
Refills: 0 | Status: COMPLETED | OUTPATIENT
Start: 2023-01-01 | End: 2023-01-01

## 2023-01-01 RX ORDER — AMIODARONE HYDROCHLORIDE 400 MG/1
1 TABLET ORAL
Qty: 450 | Refills: 0 | Status: DISCONTINUED | OUTPATIENT
Start: 2023-01-01 | End: 2023-01-01

## 2023-01-01 RX ORDER — INSULIN HUMAN 100 [IU]/ML
10 INJECTION, SOLUTION SUBCUTANEOUS ONCE
Refills: 0 | Status: DISCONTINUED | OUTPATIENT
Start: 2023-01-01 | End: 2023-01-01

## 2023-01-01 RX ORDER — APIXABAN 2.5 MG/1
2.5 TABLET, FILM COATED ORAL EVERY 12 HOURS
Refills: 0 | Status: DISCONTINUED | OUTPATIENT
Start: 2023-01-01 | End: 2023-01-01

## 2023-01-01 RX ORDER — ONDANSETRON 8 MG/1
4 TABLET, FILM COATED ORAL EVERY 8 HOURS
Refills: 0 | Status: DISCONTINUED | OUTPATIENT
Start: 2023-01-01 | End: 2023-01-01

## 2023-01-01 RX ORDER — VASOPRESSIN 20 [USP'U]/ML
0.03 INJECTION INTRAVENOUS
Qty: 40 | Refills: 0 | Status: DISCONTINUED | OUTPATIENT
Start: 2023-01-01 | End: 2023-01-01

## 2023-01-01 RX ORDER — DEXTROSE 50 % IN WATER 50 %
25 SYRINGE (ML) INTRAVENOUS ONCE
Refills: 0 | Status: DISCONTINUED | OUTPATIENT
Start: 2023-01-01 | End: 2023-01-01

## 2023-01-01 RX ORDER — BUMETANIDE 0.25 MG/ML
2 INJECTION INTRAMUSCULAR; INTRAVENOUS ONCE
Refills: 0 | Status: COMPLETED | OUTPATIENT
Start: 2023-01-01 | End: 2023-01-01

## 2023-01-01 RX ORDER — POTASSIUM CHLORIDE 20 MEQ
40 PACKET (EA) ORAL ONCE
Refills: 0 | Status: COMPLETED | OUTPATIENT
Start: 2023-01-01 | End: 2023-01-01

## 2023-01-01 RX ORDER — INSULIN GLARGINE 100 [IU]/ML
15 INJECTION, SOLUTION SUBCUTANEOUS
Qty: 0 | Refills: 0 | DISCHARGE
Start: 2023-01-01

## 2023-01-01 RX ORDER — METOPROLOL TARTRATE 50 MG
1 TABLET ORAL
Refills: 0 | DISCHARGE

## 2023-01-01 RX ORDER — OXYCODONE HYDROCHLORIDE 5 MG/1
1 TABLET ORAL
Qty: 0 | Refills: 0 | DISCHARGE

## 2023-01-01 RX ORDER — SODIUM CHLORIDE 9 MG/ML
250 INJECTION INTRAMUSCULAR; INTRAVENOUS; SUBCUTANEOUS ONCE
Refills: 0 | Status: COMPLETED | OUTPATIENT
Start: 2023-01-01 | End: 2023-01-01

## 2023-01-01 RX ORDER — NYSTATIN CREAM 100000 [USP'U]/G
1 CREAM TOPICAL EVERY 12 HOURS
Refills: 0 | Status: DISCONTINUED | OUTPATIENT
Start: 2023-01-01 | End: 2023-01-01

## 2023-01-01 RX ORDER — ALBUTEROL 90 UG/1
2.5 AEROSOL, METERED ORAL ONCE
Refills: 0 | Status: DISCONTINUED | OUTPATIENT
Start: 2023-01-01 | End: 2023-01-01

## 2023-01-01 RX ORDER — SENNA PLUS 8.6 MG/1
2 TABLET ORAL AT BEDTIME
Refills: 0 | Status: DISCONTINUED | OUTPATIENT
Start: 2023-01-01 | End: 2023-01-01

## 2023-01-01 RX ORDER — PIPERACILLIN AND TAZOBACTAM 4; .5 G/20ML; G/20ML
3.38 INJECTION, POWDER, LYOPHILIZED, FOR SOLUTION INTRAVENOUS ONCE
Refills: 0 | Status: COMPLETED | OUTPATIENT
Start: 2023-01-01 | End: 2023-01-01

## 2023-01-01 RX ORDER — HEPARIN SODIUM 5000 [USP'U]/ML
10000 INJECTION INTRAVENOUS; SUBCUTANEOUS ONCE
Refills: 0 | Status: DISCONTINUED | OUTPATIENT
Start: 2023-01-01 | End: 2023-01-01

## 2023-01-01 RX ORDER — ACETAZOLAMIDE 250 MG/1
250 TABLET ORAL ONCE
Refills: 0 | Status: COMPLETED | OUTPATIENT
Start: 2023-01-01 | End: 2023-01-01

## 2023-01-01 RX ORDER — BUMETANIDE 0.25 MG/ML
2 INJECTION INTRAMUSCULAR; INTRAVENOUS DAILY
Refills: 0 | Status: DISCONTINUED | OUTPATIENT
Start: 2023-01-01 | End: 2023-01-01

## 2023-01-01 RX ORDER — METOPROLOL TARTRATE 50 MG
1 TABLET ORAL
Qty: 0 | Refills: 0 | DISCHARGE

## 2023-01-01 RX ORDER — HYDROCORTISONE 20 MG
200 TABLET ORAL
Refills: 0 | Status: COMPLETED | OUTPATIENT
Start: 2023-01-01 | End: 2023-01-01

## 2023-01-01 RX ORDER — HEPARIN SODIUM 5000 [USP'U]/ML
1200 INJECTION INTRAVENOUS; SUBCUTANEOUS
Qty: 25000 | Refills: 0 | Status: DISCONTINUED | OUTPATIENT
Start: 2023-01-01 | End: 2023-01-01

## 2023-01-01 RX ORDER — ONDANSETRON 8 MG/1
4 TABLET, FILM COATED ORAL ONCE
Refills: 0 | Status: COMPLETED | OUTPATIENT
Start: 2023-01-01 | End: 2023-01-01

## 2023-01-01 RX ORDER — SODIUM ZIRCONIUM CYCLOSILICATE 10 G/10G
10 POWDER, FOR SUSPENSION ORAL ONCE
Refills: 0 | Status: COMPLETED | OUTPATIENT
Start: 2023-01-01 | End: 2023-01-01

## 2023-01-01 RX ORDER — TRANEXAMIC ACID 100 MG/ML
5 INJECTION, SOLUTION INTRAVENOUS ONCE
Refills: 0 | Status: COMPLETED | OUTPATIENT
Start: 2023-01-01 | End: 2023-01-01

## 2023-01-01 RX ORDER — MILRINONE LACTATE 1 MG/ML
0.12 INJECTION, SOLUTION INTRAVENOUS
Qty: 20 | Refills: 0 | Status: DISCONTINUED | OUTPATIENT
Start: 2023-01-01 | End: 2023-01-01

## 2023-01-01 RX ORDER — VANCOMYCIN HCL 1 G
1000 VIAL (EA) INTRAVENOUS ONCE
Refills: 0 | Status: COMPLETED | OUTPATIENT
Start: 2023-01-01 | End: 2023-01-01

## 2023-01-01 RX ORDER — SPIRONOLACTONE 25 MG/1
50 TABLET, FILM COATED ORAL DAILY
Refills: 0 | Status: DISCONTINUED | OUTPATIENT
Start: 2023-01-01 | End: 2023-01-01

## 2023-01-01 RX ORDER — ALPRAZOLAM 0.25 MG
1 TABLET ORAL
Qty: 0 | Refills: 0 | DISCHARGE

## 2023-01-01 RX ORDER — FENTANYL CITRATE 50 UG/ML
0.5 INJECTION INTRAVENOUS
Qty: 5000 | Refills: 0 | Status: DISCONTINUED | OUTPATIENT
Start: 2023-01-01 | End: 2023-01-01

## 2023-01-01 RX ORDER — CALCIUM CHLORIDE
1000 POWDER (GRAM) MISCELLANEOUS ONCE
Refills: 0 | Status: COMPLETED | OUTPATIENT
Start: 2023-01-01 | End: 2023-01-01

## 2023-01-01 RX ORDER — HYDROMORPHONE HYDROCHLORIDE 2 MG/ML
0.5 INJECTION INTRAMUSCULAR; INTRAVENOUS; SUBCUTANEOUS ONCE
Refills: 0 | Status: DISCONTINUED | OUTPATIENT
Start: 2023-01-01 | End: 2023-01-01

## 2023-01-01 RX ORDER — SODIUM ZIRCONIUM CYCLOSILICATE 10 G/10G
10 POWDER, FOR SUSPENSION ORAL DAILY
Refills: 0 | Status: DISCONTINUED | OUTPATIENT
Start: 2023-01-01 | End: 2023-01-01

## 2023-01-01 RX ORDER — METOPROLOL TARTRATE 50 MG
25 TABLET ORAL
Refills: 0 | Status: DISCONTINUED | OUTPATIENT
Start: 2023-01-01 | End: 2023-01-01

## 2023-01-01 RX ORDER — ETOMIDATE 2 MG/ML
20 INJECTION INTRAVENOUS ONCE
Refills: 0 | Status: COMPLETED | OUTPATIENT
Start: 2023-01-01 | End: 2023-01-01

## 2023-01-01 RX ORDER — HYDROCORTISONE 20 MG
40 TABLET ORAL EVERY 6 HOURS
Refills: 0 | Status: DISCONTINUED | OUTPATIENT
Start: 2023-01-01 | End: 2023-01-01

## 2023-01-01 RX ORDER — INSULIN HUMAN 100 [IU]/ML
10 INJECTION, SOLUTION SUBCUTANEOUS ONCE
Refills: 0 | Status: COMPLETED | OUTPATIENT
Start: 2023-01-01 | End: 2023-01-01

## 2023-01-01 RX ORDER — POTASSIUM CHLORIDE 20 MEQ
40 PACKET (EA) ORAL EVERY 4 HOURS
Refills: 0 | Status: COMPLETED | OUTPATIENT
Start: 2023-01-01 | End: 2023-01-01

## 2023-01-01 RX ORDER — SODIUM CHLORIDE 9 MG/ML
10 INJECTION INTRAMUSCULAR; INTRAVENOUS; SUBCUTANEOUS
Refills: 0 | Status: DISCONTINUED | OUTPATIENT
Start: 2023-01-01 | End: 2023-01-01

## 2023-01-01 RX ORDER — ACETAMINOPHEN 500 MG
650 TABLET ORAL EVERY 6 HOURS
Refills: 0 | Status: DISCONTINUED | OUTPATIENT
Start: 2023-01-01 | End: 2023-01-01

## 2023-01-01 RX ORDER — METOPROLOL TARTRATE 50 MG
50 TABLET ORAL ONCE
Refills: 0 | Status: COMPLETED | OUTPATIENT
Start: 2023-01-01 | End: 2023-01-01

## 2023-01-01 RX ORDER — FENTANYL CITRATE 50 UG/ML
100 INJECTION INTRAVENOUS ONCE
Refills: 0 | Status: DISCONTINUED | OUTPATIENT
Start: 2023-01-01 | End: 2023-01-01

## 2023-01-01 RX ORDER — SODIUM CHLORIDE 9 MG/ML
1000 INJECTION, SOLUTION INTRAVENOUS
Refills: 0 | Status: DISCONTINUED | OUTPATIENT
Start: 2023-01-01 | End: 2023-01-01

## 2023-01-01 RX ORDER — MAGNESIUM SULFATE 500 MG/ML
2 VIAL (ML) INJECTION ONCE
Refills: 0 | Status: COMPLETED | OUTPATIENT
Start: 2023-01-01 | End: 2023-01-01

## 2023-01-01 RX ORDER — POTASSIUM CHLORIDE 20 MEQ
40 PACKET (EA) ORAL DAILY
Refills: 0 | Status: DISCONTINUED | OUTPATIENT
Start: 2023-01-01 | End: 2023-01-01

## 2023-01-01 RX ORDER — SODIUM POLYSTYRENE SULFONATE 4.1 MEQ/G
30 POWDER, FOR SUSPENSION ORAL ONCE
Refills: 0 | Status: DISCONTINUED | OUTPATIENT
Start: 2023-01-01 | End: 2023-01-01

## 2023-01-01 RX ORDER — FUROSEMIDE 40 MG
40 TABLET ORAL ONCE
Refills: 0 | Status: COMPLETED | OUTPATIENT
Start: 2023-01-01 | End: 2023-01-01

## 2023-01-01 RX ORDER — HYDROCORTISONE 20 MG
50 TABLET ORAL ONCE
Refills: 0 | Status: DISCONTINUED | OUTPATIENT
Start: 2023-01-01 | End: 2023-01-01

## 2023-01-01 RX ORDER — NYSTATIN CREAM 100000 [USP'U]/G
1 CREAM TOPICAL
Qty: 0 | Refills: 0 | DISCHARGE
Start: 2023-01-01

## 2023-01-01 RX ORDER — ACETAMINOPHEN 500 MG
650 TABLET ORAL ONCE
Refills: 0 | Status: COMPLETED | OUTPATIENT
Start: 2023-01-01 | End: 2023-01-01

## 2023-01-01 RX ORDER — POLYETHYLENE GLYCOL 3350 17 G/17G
17 POWDER, FOR SOLUTION ORAL
Qty: 0 | Refills: 0 | DISCHARGE
Start: 2023-01-01

## 2023-01-01 RX ORDER — FUROSEMIDE 40 MG
1 TABLET ORAL
Qty: 30 | Refills: 0
Start: 2023-01-01 | End: 2023-01-01

## 2023-01-01 RX ORDER — POLYETHYLENE GLYCOL 3350 17 G/17G
17 POWDER, FOR SOLUTION ORAL DAILY
Refills: 0 | Status: DISCONTINUED | OUTPATIENT
Start: 2023-01-01 | End: 2023-01-01

## 2023-01-01 RX ORDER — IPRATROPIUM/ALBUTEROL SULFATE 18-103MCG
3 AEROSOL WITH ADAPTER (GRAM) INHALATION EVERY 6 HOURS
Refills: 0 | Status: DISCONTINUED | OUTPATIENT
Start: 2023-01-01 | End: 2023-01-01

## 2023-01-01 RX ORDER — ALBUTEROL 90 UG/1
3 AEROSOL, METERED ORAL
Qty: 0 | Refills: 0 | DISCHARGE

## 2023-01-01 RX ORDER — MONTELUKAST 4 MG/1
10 TABLET, CHEWABLE ORAL DAILY
Refills: 0 | Status: DISCONTINUED | OUTPATIENT
Start: 2023-01-01 | End: 2023-01-01

## 2023-01-01 RX ORDER — HEPARIN SODIUM 5000 [USP'U]/ML
10000 INJECTION INTRAVENOUS; SUBCUTANEOUS EVERY 6 HOURS
Refills: 0 | Status: DISCONTINUED | OUTPATIENT
Start: 2023-01-01 | End: 2023-01-01

## 2023-01-01 RX ORDER — OXYCODONE HYDROCHLORIDE 5 MG/1
5 TABLET ORAL ONCE
Refills: 0 | Status: DISCONTINUED | OUTPATIENT
Start: 2023-01-01 | End: 2023-01-01

## 2023-01-01 RX ORDER — BUMETANIDE 0.25 MG/ML
4 INJECTION INTRAMUSCULAR; INTRAVENOUS
Qty: 20 | Refills: 0 | Status: DISCONTINUED | OUTPATIENT
Start: 2023-01-01 | End: 2023-01-01

## 2023-01-01 RX ORDER — DEXTROSE 50 % IN WATER 50 %
50 SYRINGE (ML) INTRAVENOUS ONCE
Refills: 0 | Status: DISCONTINUED | OUTPATIENT
Start: 2023-01-01 | End: 2023-01-01

## 2023-01-01 RX ORDER — DIGOXIN 250 MCG
500 TABLET ORAL ONCE
Refills: 0 | Status: COMPLETED | OUTPATIENT
Start: 2023-01-01 | End: 2023-01-01

## 2023-01-01 RX ORDER — AMIODARONE HYDROCHLORIDE 400 MG/1
0.5 TABLET ORAL
Qty: 450 | Refills: 0 | Status: DISCONTINUED | OUTPATIENT
Start: 2023-01-01 | End: 2023-01-01

## 2023-01-01 RX ORDER — POLYETHYLENE GLYCOL 3350 17 G/17G
17 POWDER, FOR SOLUTION ORAL
Refills: 0 | Status: DISCONTINUED | OUTPATIENT
Start: 2023-01-01 | End: 2023-01-01

## 2023-01-01 RX ORDER — SIMETHICONE 80 MG/1
80 TABLET, CHEWABLE ORAL DAILY
Refills: 0 | Status: DISCONTINUED | OUTPATIENT
Start: 2023-01-01 | End: 2023-01-01

## 2023-01-01 RX ORDER — DEXTROSE 50 % IN WATER 50 %
15 SYRINGE (ML) INTRAVENOUS ONCE
Refills: 0 | Status: DISCONTINUED | OUTPATIENT
Start: 2023-01-01 | End: 2023-01-01

## 2023-01-01 RX ORDER — FLUOCINONIDE/EMOLLIENT BASE 0.05 %
1 CREAM (GRAM) TOPICAL DAILY
Refills: 0 | Status: DISCONTINUED | OUTPATIENT
Start: 2023-01-01 | End: 2023-01-01

## 2023-01-01 RX ORDER — HYDROCORTISONE 20 MG
50 TABLET ORAL EVERY 6 HOURS
Refills: 0 | Status: DISCONTINUED | OUTPATIENT
Start: 2023-01-01 | End: 2023-01-01

## 2023-01-01 RX ORDER — FUROSEMIDE 40 MG
40 TABLET ORAL
Refills: 0 | Status: DISCONTINUED | OUTPATIENT
Start: 2023-01-01 | End: 2023-01-01

## 2023-01-01 RX ORDER — ALBUMIN HUMAN 25 %
50 VIAL (ML) INTRAVENOUS ONCE
Refills: 0 | Status: COMPLETED | OUTPATIENT
Start: 2023-01-01 | End: 2023-01-01

## 2023-01-01 RX ORDER — FUROSEMIDE 40 MG
10 TABLET ORAL
Qty: 500 | Refills: 0 | Status: DISCONTINUED | OUTPATIENT
Start: 2023-01-01 | End: 2023-01-01

## 2023-01-01 RX ORDER — SODIUM ZIRCONIUM CYCLOSILICATE 10 G/10G
10 POWDER, FOR SUSPENSION ORAL
Refills: 0 | Status: DISCONTINUED | OUTPATIENT
Start: 2023-01-01 | End: 2023-01-01

## 2023-01-01 RX ORDER — HEPARIN SODIUM 5000 [USP'U]/ML
5000 INJECTION INTRAVENOUS; SUBCUTANEOUS EVERY 6 HOURS
Refills: 0 | Status: DISCONTINUED | OUTPATIENT
Start: 2023-01-01 | End: 2023-01-01

## 2023-01-01 RX ORDER — MORPHINE SULFATE 50 MG/1
2 CAPSULE, EXTENDED RELEASE ORAL ONCE
Refills: 0 | Status: DISCONTINUED | OUTPATIENT
Start: 2023-01-01 | End: 2023-01-01

## 2023-01-01 RX ORDER — IPRATROPIUM/ALBUTEROL SULFATE 18-103MCG
3 AEROSOL WITH ADAPTER (GRAM) INHALATION
Qty: 0 | Refills: 0 | DISCHARGE
Start: 2023-01-01

## 2023-01-01 RX ORDER — LANOLIN ALCOHOL/MO/W.PET/CERES
3 CREAM (GRAM) TOPICAL AT BEDTIME
Refills: 0 | Status: DISCONTINUED | OUTPATIENT
Start: 2023-01-01 | End: 2023-01-01

## 2023-01-01 RX ORDER — INSULIN GLARGINE 100 [IU]/ML
8 INJECTION, SOLUTION SUBCUTANEOUS AT BEDTIME
Refills: 0 | Status: DISCONTINUED | OUTPATIENT
Start: 2023-01-01 | End: 2023-01-01

## 2023-01-01 RX ORDER — CHLOROTHIAZIDE 500 MG
500 TABLET ORAL ONCE
Refills: 0 | Status: COMPLETED | OUTPATIENT
Start: 2023-01-01 | End: 2023-01-01

## 2023-01-01 RX ORDER — FLUDROCORTISONE ACETATE 0.1 MG/1
0.1 TABLET ORAL ONCE
Refills: 0 | Status: COMPLETED | OUTPATIENT
Start: 2023-01-01 | End: 2023-01-01

## 2023-01-01 RX ORDER — NOREPINEPHRINE BITARTRATE/D5W 8 MG/250ML
0.05 PLASTIC BAG, INJECTION (ML) INTRAVENOUS
Qty: 8 | Refills: 0 | Status: DISCONTINUED | OUTPATIENT
Start: 2023-01-01 | End: 2023-01-01

## 2023-01-01 RX ORDER — HYDROXYCHLOROQUINE SULFATE 200 MG
200 TABLET ORAL
Refills: 0 | Status: DISCONTINUED | OUTPATIENT
Start: 2023-01-01 | End: 2023-01-01

## 2023-01-01 RX ORDER — APIXABAN 2.5 MG/1
5 TABLET, FILM COATED ORAL EVERY 12 HOURS
Refills: 0 | Status: DISCONTINUED | OUTPATIENT
Start: 2023-01-01 | End: 2023-01-01

## 2023-01-01 RX ORDER — INFLUENZA VIRUS VACCINE 15; 15; 15; 15 UG/.5ML; UG/.5ML; UG/.5ML; UG/.5ML
0.5 SUSPENSION INTRAMUSCULAR ONCE
Refills: 0 | Status: DISCONTINUED | OUTPATIENT
Start: 2023-01-01 | End: 2023-01-01

## 2023-01-01 RX ORDER — SODIUM CHLORIDE 9 MG/ML
500 INJECTION, SOLUTION INTRAVENOUS ONCE
Refills: 0 | Status: COMPLETED | OUTPATIENT
Start: 2023-01-01 | End: 2023-01-01

## 2023-01-01 RX ORDER — METOPROLOL TARTRATE 50 MG
100 TABLET ORAL DAILY
Refills: 0 | Status: DISCONTINUED | OUTPATIENT
Start: 2023-01-01 | End: 2023-01-01

## 2023-01-01 RX ORDER — APIXABAN 2.5 MG/1
2.5 TABLET, FILM COATED ORAL ONCE
Refills: 0 | Status: COMPLETED | OUTPATIENT
Start: 2023-01-01 | End: 2023-01-01

## 2023-01-01 RX ORDER — POTASSIUM CHLORIDE 20 MEQ
2 PACKET (EA) ORAL
Qty: 0 | Refills: 0 | DISCHARGE

## 2023-01-01 RX ORDER — INSULIN HUMAN 100 [IU]/ML
5 INJECTION, SOLUTION SUBCUTANEOUS ONCE
Refills: 0 | Status: DISCONTINUED | OUTPATIENT
Start: 2023-01-01 | End: 2023-01-01

## 2023-01-01 RX ORDER — FUROSEMIDE 40 MG
40 TABLET ORAL DAILY
Refills: 0 | Status: DISCONTINUED | OUTPATIENT
Start: 2023-01-01 | End: 2023-01-01

## 2023-01-01 RX ORDER — APIXABAN 2.5 MG/1
1 TABLET, FILM COATED ORAL
Refills: 0 | DISCHARGE

## 2023-01-01 RX ORDER — FUROSEMIDE 40 MG
80 TABLET ORAL ONCE
Refills: 0 | Status: DISCONTINUED | OUTPATIENT
Start: 2023-01-01 | End: 2023-01-01

## 2023-01-01 RX ORDER — PHENYLEPHRINE HYDROCHLORIDE 10 MG/ML
2 INJECTION INTRAVENOUS
Qty: 160 | Refills: 0 | Status: DISCONTINUED | OUTPATIENT
Start: 2023-01-01 | End: 2023-01-01

## 2023-01-01 RX ORDER — AMIODARONE HYDROCHLORIDE 400 MG/1
400 TABLET ORAL EVERY 8 HOURS
Refills: 0 | Status: DISCONTINUED | OUTPATIENT
Start: 2023-01-01 | End: 2023-01-01

## 2023-01-01 RX ORDER — AMIODARONE HYDROCHLORIDE 400 MG/1
150 TABLET ORAL ONCE
Refills: 0 | Status: COMPLETED | OUTPATIENT
Start: 2023-01-01 | End: 2023-01-01

## 2023-01-01 RX ORDER — MIDAZOLAM HYDROCHLORIDE 1 MG/ML
4 INJECTION, SOLUTION INTRAMUSCULAR; INTRAVENOUS ONCE
Refills: 0 | Status: DISCONTINUED | OUTPATIENT
Start: 2023-01-01 | End: 2023-01-01

## 2023-01-01 RX ORDER — VASOPRESSIN 20 [USP'U]/ML
0.04 INJECTION INTRAVENOUS
Qty: 40 | Refills: 0 | Status: DISCONTINUED | OUTPATIENT
Start: 2023-01-01 | End: 2023-01-01

## 2023-01-01 RX ORDER — BUMETANIDE 0.25 MG/ML
1 INJECTION INTRAMUSCULAR; INTRAVENOUS EVERY 12 HOURS
Refills: 0 | Status: DISCONTINUED | OUTPATIENT
Start: 2023-01-01 | End: 2023-01-01

## 2023-01-01 RX ORDER — PIPERACILLIN AND TAZOBACTAM 4; .5 G/20ML; G/20ML
3.38 INJECTION, POWDER, LYOPHILIZED, FOR SOLUTION INTRAVENOUS EVERY 8 HOURS
Refills: 0 | Status: DISCONTINUED | OUTPATIENT
Start: 2023-01-01 | End: 2023-01-01

## 2023-01-01 RX ORDER — DILTIAZEM HCL 120 MG
10 CAPSULE, EXT RELEASE 24 HR ORAL EVERY 8 HOURS
Refills: 0 | Status: DISCONTINUED | OUTPATIENT
Start: 2023-01-01 | End: 2023-01-01

## 2023-01-01 RX ORDER — INSULIN LISPRO 100/ML
VIAL (ML) SUBCUTANEOUS AT BEDTIME
Refills: 0 | Status: DISCONTINUED | OUTPATIENT
Start: 2023-01-01 | End: 2023-01-01

## 2023-01-01 RX ORDER — SODIUM ZIRCONIUM CYCLOSILICATE 10 G/10G
10 POWDER, FOR SUSPENSION ORAL ONCE
Refills: 0 | Status: DISCONTINUED | OUTPATIENT
Start: 2023-01-01 | End: 2023-01-01

## 2023-01-01 RX ORDER — LANOLIN ALCOHOL/MO/W.PET/CERES
5 CREAM (GRAM) TOPICAL ONCE
Refills: 0 | Status: COMPLETED | OUTPATIENT
Start: 2023-01-01 | End: 2023-01-01

## 2023-01-01 RX ORDER — METOPROLOL TARTRATE 50 MG
5 TABLET ORAL ONCE
Refills: 0 | Status: COMPLETED | OUTPATIENT
Start: 2023-01-01 | End: 2023-01-01

## 2023-01-01 RX ORDER — LANOLIN ALCOHOL/MO/W.PET/CERES
6 CREAM (GRAM) TOPICAL ONCE
Refills: 0 | Status: COMPLETED | OUTPATIENT
Start: 2023-01-01 | End: 2023-01-01

## 2023-01-01 RX ORDER — AMIODARONE HYDROCHLORIDE 400 MG/1
TABLET ORAL
Refills: 0 | Status: DISCONTINUED | OUTPATIENT
Start: 2023-01-01 | End: 2023-01-01

## 2023-01-01 RX ORDER — INSULIN LISPRO 100/ML
VIAL (ML) SUBCUTANEOUS EVERY 6 HOURS
Refills: 0 | Status: DISCONTINUED | OUTPATIENT
Start: 2023-01-01 | End: 2023-01-01

## 2023-01-01 RX ORDER — CALCIUM CHLORIDE
1 POWDER (GRAM) MISCELLANEOUS ONCE
Refills: 0 | Status: COMPLETED | OUTPATIENT
Start: 2023-01-01 | End: 2023-01-01

## 2023-01-01 RX ORDER — PIPERACILLIN AND TAZOBACTAM 4; .5 G/20ML; G/20ML
3.38 INJECTION, POWDER, LYOPHILIZED, FOR SOLUTION INTRAVENOUS EVERY 8 HOURS
Refills: 0 | Status: COMPLETED | OUTPATIENT
Start: 2023-01-01 | End: 2023-01-01

## 2023-01-01 RX ORDER — ALBUTEROL 90 UG/1
2.5 AEROSOL, METERED ORAL ONCE
Refills: 0 | Status: COMPLETED | OUTPATIENT
Start: 2023-01-01 | End: 2023-01-01

## 2023-01-01 RX ORDER — BUMETANIDE 0.25 MG/ML
4 INJECTION INTRAMUSCULAR; INTRAVENOUS ONCE
Refills: 0 | Status: COMPLETED | OUTPATIENT
Start: 2023-01-01 | End: 2023-01-01

## 2023-01-01 RX ORDER — HEPARIN SODIUM 5000 [USP'U]/ML
1600 INJECTION INTRAVENOUS; SUBCUTANEOUS
Qty: 25000 | Refills: 0 | Status: DISCONTINUED | OUTPATIENT
Start: 2023-01-01 | End: 2023-01-01

## 2023-01-01 RX ORDER — CALCIUM GLUCONATE 100 MG/ML
2 VIAL (ML) INTRAVENOUS ONCE
Refills: 0 | Status: DISCONTINUED | OUTPATIENT
Start: 2023-01-01 | End: 2023-01-01

## 2023-01-01 RX ORDER — SPIRONOLACTONE 25 MG/1
1 TABLET, FILM COATED ORAL
Refills: 0 | DISCHARGE

## 2023-01-01 RX ORDER — SODIUM CHLORIDE 9 MG/ML
500 INJECTION INTRAMUSCULAR; INTRAVENOUS; SUBCUTANEOUS ONCE
Refills: 0 | Status: COMPLETED | OUTPATIENT
Start: 2023-01-01 | End: 2023-01-01

## 2023-01-01 RX ORDER — NOREPINEPHRINE BITARTRATE/D5W 8 MG/250ML
1 PLASTIC BAG, INJECTION (ML) INTRAVENOUS
Qty: 32 | Refills: 0 | Status: DISCONTINUED | OUTPATIENT
Start: 2023-01-01 | End: 2023-01-01

## 2023-01-01 RX ORDER — FUROSEMIDE 40 MG
80 TABLET ORAL ONCE
Refills: 0 | Status: COMPLETED | OUTPATIENT
Start: 2023-01-01 | End: 2023-01-01

## 2023-01-01 RX ORDER — METOPROLOL TARTRATE 50 MG
2.5 TABLET ORAL ONCE
Refills: 0 | Status: COMPLETED | OUTPATIENT
Start: 2023-01-01 | End: 2023-01-01

## 2023-01-01 RX ORDER — DEXTROSE 50 % IN WATER 50 %
12.5 SYRINGE (ML) INTRAVENOUS ONCE
Refills: 0 | Status: DISCONTINUED | OUTPATIENT
Start: 2023-01-01 | End: 2023-01-01

## 2023-01-01 RX ORDER — SIMETHICONE 80 MG/1
80 TABLET, CHEWABLE ORAL ONCE
Refills: 0 | Status: COMPLETED | OUTPATIENT
Start: 2023-01-01 | End: 2023-01-01

## 2023-01-01 RX ORDER — ACETAZOLAMIDE 250 MG/1
250 TABLET ORAL ONCE
Refills: 0 | Status: DISCONTINUED | OUTPATIENT
Start: 2023-01-01 | End: 2023-01-01

## 2023-01-01 RX ORDER — METOLAZONE 5 MG/1
1 TABLET ORAL
Qty: 0 | Refills: 0 | DISCHARGE

## 2023-01-01 RX ORDER — SODIUM BICARBONATE 1 MEQ/ML
0.01 SYRINGE (ML) INTRAVENOUS
Qty: 75 | Refills: 0 | Status: DISCONTINUED | OUTPATIENT
Start: 2023-01-01 | End: 2023-01-01

## 2023-01-01 RX ORDER — MONTELUKAST 4 MG/1
1 TABLET, CHEWABLE ORAL
Qty: 0 | Refills: 0 | DISCHARGE

## 2023-01-01 RX ORDER — INSULIN GLARGINE 100 [IU]/ML
5 INJECTION, SOLUTION SUBCUTANEOUS AT BEDTIME
Refills: 0 | Status: DISCONTINUED | OUTPATIENT
Start: 2023-01-01 | End: 2023-01-01

## 2023-01-01 RX ORDER — CALCIUM CHLORIDE
1 POWDER (GRAM) MISCELLANEOUS ONCE
Refills: 0 | Status: DISCONTINUED | OUTPATIENT
Start: 2023-01-01 | End: 2023-01-01

## 2023-01-01 RX ORDER — FAMOTIDINE 10 MG/ML
1 INJECTION INTRAVENOUS
Qty: 0 | Refills: 0 | DISCHARGE

## 2023-01-01 RX ORDER — LACTOBACILLUS ACIDOPHILUS 100MM CELL
1 CAPSULE ORAL DAILY
Refills: 0 | Status: DISCONTINUED | OUTPATIENT
Start: 2023-01-01 | End: 2023-01-01

## 2023-01-01 RX ORDER — VANCOMYCIN HCL 1 G
1250 VIAL (EA) INTRAVENOUS EVERY 12 HOURS
Refills: 0 | Status: DISCONTINUED | OUTPATIENT
Start: 2023-01-01 | End: 2023-01-01

## 2023-01-01 RX ORDER — OXYCODONE HYDROCHLORIDE 5 MG/1
5 TABLET ORAL EVERY 6 HOURS
Refills: 0 | Status: DISCONTINUED | OUTPATIENT
Start: 2023-01-01 | End: 2023-01-01

## 2023-01-01 RX ORDER — NYSTATIN CREAM 100000 [USP'U]/G
1 CREAM TOPICAL
Refills: 0 | Status: DISCONTINUED | OUTPATIENT
Start: 2023-01-01 | End: 2023-01-01

## 2023-01-01 RX ORDER — DIPHENHYDRAMINE HCL 50 MG
50 CAPSULE ORAL ONCE
Refills: 0 | Status: COMPLETED | OUTPATIENT
Start: 2023-01-01 | End: 2024-09-28

## 2023-01-01 RX ORDER — SODIUM CHLORIDE 0.65 %
1 AEROSOL, SPRAY (ML) NASAL THREE TIMES A DAY
Refills: 0 | Status: DISCONTINUED | OUTPATIENT
Start: 2023-01-01 | End: 2023-01-01

## 2023-01-01 RX ORDER — DEXTROSE 10 % IN WATER 10 %
1000 INTRAVENOUS SOLUTION INTRAVENOUS
Refills: 0 | Status: DISCONTINUED | OUTPATIENT
Start: 2023-01-01 | End: 2023-01-01

## 2023-01-01 RX ORDER — LANOLIN ALCOHOL/MO/W.PET/CERES
1 CREAM (GRAM) TOPICAL
Qty: 0 | Refills: 0 | DISCHARGE
Start: 2023-01-01

## 2023-01-01 RX ORDER — AMIODARONE HYDROCHLORIDE 400 MG/1
1 TABLET ORAL
Qty: 0 | Refills: 0 | DISCHARGE
Start: 2023-01-01

## 2023-01-01 RX ORDER — AZITHROMYCIN 500 MG/1
500 TABLET, FILM COATED ORAL DAILY
Refills: 0 | Status: COMPLETED | OUTPATIENT
Start: 2023-01-01 | End: 2023-01-01

## 2023-01-01 RX ORDER — OXYCODONE HYDROCHLORIDE 5 MG/1
7.5 TABLET ORAL EVERY 6 HOURS
Refills: 0 | Status: DISCONTINUED | OUTPATIENT
Start: 2023-01-01 | End: 2023-01-01

## 2023-01-01 RX ORDER — CALCIUM GLUCONATE 100 MG/ML
1 VIAL (ML) INTRAVENOUS ONCE
Refills: 0 | Status: COMPLETED | OUTPATIENT
Start: 2023-01-01 | End: 2023-01-01

## 2023-01-01 RX ORDER — PHENYLEPHRINE HYDROCHLORIDE 10 MG/ML
0.1 INJECTION INTRAVENOUS
Qty: 40 | Refills: 0 | Status: DISCONTINUED | OUTPATIENT
Start: 2023-01-01 | End: 2023-01-01

## 2023-01-01 RX ORDER — FLUTICASONE FUROATE AND VILANTEROL TRIFENATATE 100; 25 UG/1; UG/1
1 POWDER RESPIRATORY (INHALATION)
Qty: 0 | Refills: 0 | DISCHARGE

## 2023-01-01 RX ORDER — GLUCAGON INJECTION, SOLUTION 0.5 MG/.1ML
1 INJECTION, SOLUTION SUBCUTANEOUS ONCE
Refills: 0 | Status: DISCONTINUED | OUTPATIENT
Start: 2023-01-01 | End: 2023-01-01

## 2023-01-01 RX ORDER — DIPHENHYDRAMINE HCL 50 MG
50 CAPSULE ORAL ONCE
Refills: 0 | Status: DISCONTINUED | OUTPATIENT
Start: 2023-01-01 | End: 2023-01-01

## 2023-01-01 RX ORDER — METOPROLOL TARTRATE 50 MG
50 TABLET ORAL
Refills: 0 | Status: DISCONTINUED | OUTPATIENT
Start: 2023-01-01 | End: 2023-01-01

## 2023-01-01 RX ORDER — MAGNESIUM SULFATE 500 MG/ML
2 VIAL (ML) INJECTION ONCE
Refills: 0 | Status: DISCONTINUED | OUTPATIENT
Start: 2023-01-01 | End: 2023-01-01

## 2023-01-01 RX ORDER — CHLORHEXIDINE GLUCONATE 213 G/1000ML
1 SOLUTION TOPICAL
Refills: 0 | Status: DISCONTINUED | OUTPATIENT
Start: 2023-01-01 | End: 2023-01-01

## 2023-01-01 RX ORDER — MUPIROCIN 20 MG/G
1 OINTMENT TOPICAL
Refills: 0 | Status: COMPLETED | OUTPATIENT
Start: 2023-01-01 | End: 2023-01-01

## 2023-01-01 RX ORDER — HYDROXYCHLOROQUINE SULFATE 200 MG
1 TABLET ORAL
Qty: 0 | Refills: 0 | DISCHARGE
Start: 2023-01-01

## 2023-01-01 RX ORDER — EPINEPHRINE 0.3 MG/.3ML
2 INJECTION INTRAMUSCULAR; SUBCUTANEOUS
Qty: 8 | Refills: 0 | Status: DISCONTINUED | OUTPATIENT
Start: 2023-01-01 | End: 2023-01-01

## 2023-01-01 RX ORDER — EPINEPHRINE 0.3 MG/.3ML
0.3 INJECTION INTRAMUSCULAR; SUBCUTANEOUS
Qty: 8 | Refills: 0 | Status: DISCONTINUED | OUTPATIENT
Start: 2023-01-01 | End: 2023-01-01

## 2023-01-01 RX ORDER — SODIUM BICARBONATE 1 MEQ/ML
0.06 SYRINGE (ML) INTRAVENOUS
Qty: 150 | Refills: 0 | Status: DISCONTINUED | OUTPATIENT
Start: 2023-01-01 | End: 2023-01-01

## 2023-01-01 RX ORDER — INSULIN LISPRO 100/ML
4 VIAL (ML) SUBCUTANEOUS
Refills: 0 | Status: DISCONTINUED | OUTPATIENT
Start: 2023-01-01 | End: 2023-01-01

## 2023-01-01 RX ORDER — SODIUM BICARBONATE 1 MEQ/ML
45 SYRINGE (ML) INTRAVENOUS ONCE
Refills: 0 | Status: DISCONTINUED | OUTPATIENT
Start: 2023-01-01 | End: 2023-01-01

## 2023-01-01 RX ORDER — PREDNISOLONE 5 MG
2 TABLET ORAL
Refills: 0 | DISCHARGE

## 2023-01-01 RX ORDER — IPRATROPIUM/ALBUTEROL SULFATE 18-103MCG
3 AEROSOL WITH ADAPTER (GRAM) INHALATION ONCE
Refills: 0 | Status: COMPLETED | OUTPATIENT
Start: 2023-01-01 | End: 2023-01-01

## 2023-01-01 RX ORDER — INSULIN GLARGINE 100 [IU]/ML
15 INJECTION, SOLUTION SUBCUTANEOUS AT BEDTIME
Refills: 0 | Status: DISCONTINUED | OUTPATIENT
Start: 2023-01-01 | End: 2023-01-01

## 2023-01-01 RX ORDER — INSULIN LISPRO 100/ML
2 VIAL (ML) SUBCUTANEOUS
Refills: 0 | Status: DISCONTINUED | OUTPATIENT
Start: 2023-01-01 | End: 2023-01-01

## 2023-01-01 RX ORDER — BUMETANIDE 0.25 MG/ML
2 INJECTION INTRAMUSCULAR; INTRAVENOUS EVERY 12 HOURS
Refills: 0 | Status: DISCONTINUED | OUTPATIENT
Start: 2023-01-01 | End: 2023-01-01

## 2023-01-01 RX ORDER — MAGNESIUM SULFATE 500 MG/ML
1 VIAL (ML) INJECTION ONCE
Refills: 0 | Status: COMPLETED | OUTPATIENT
Start: 2023-01-01 | End: 2023-01-01

## 2023-01-01 RX ORDER — OXYCODONE HYDROCHLORIDE 5 MG/1
2.5 TABLET ORAL EVERY 6 HOURS
Refills: 0 | Status: DISCONTINUED | OUTPATIENT
Start: 2023-01-01 | End: 2023-01-01

## 2023-01-01 RX ORDER — HYDROCORTISONE 20 MG
100 TABLET ORAL EVERY 8 HOURS
Refills: 0 | Status: DISCONTINUED | OUTPATIENT
Start: 2023-01-01 | End: 2023-01-01

## 2023-01-01 RX ORDER — POTASSIUM CHLORIDE 20 MEQ
20 PACKET (EA) ORAL ONCE
Refills: 0 | Status: COMPLETED | OUTPATIENT
Start: 2023-01-01 | End: 2023-01-01

## 2023-01-01 RX ORDER — ACETAMINOPHEN 500 MG
1000 TABLET ORAL ONCE
Refills: 0 | Status: DISCONTINUED | OUTPATIENT
Start: 2023-01-01 | End: 2023-01-01

## 2023-01-01 RX ORDER — APIXABAN 2.5 MG/1
5 TABLET, FILM COATED ORAL ONCE
Refills: 0 | Status: COMPLETED | OUTPATIENT
Start: 2023-01-01 | End: 2023-01-01

## 2023-01-01 RX ORDER — IPRATROPIUM/ALBUTEROL SULFATE 18-103MCG
3 AEROSOL WITH ADAPTER (GRAM) INHALATION EVERY 4 HOURS
Refills: 0 | Status: DISCONTINUED | OUTPATIENT
Start: 2023-01-01 | End: 2023-01-01

## 2023-01-01 RX ORDER — HEPARIN SODIUM 5000 [USP'U]/ML
5000 INJECTION INTRAVENOUS; SUBCUTANEOUS EVERY 8 HOURS
Refills: 0 | Status: DISCONTINUED | OUTPATIENT
Start: 2023-01-01 | End: 2023-01-01

## 2023-01-01 RX ORDER — NOREPINEPHRINE BITARTRATE/D5W 8 MG/250ML
0.05 PLASTIC BAG, INJECTION (ML) INTRAVENOUS
Qty: 16 | Refills: 0 | Status: DISCONTINUED | OUTPATIENT
Start: 2023-01-01 | End: 2023-01-01

## 2023-01-01 RX ORDER — PANTOPRAZOLE SODIUM 20 MG/1
40 TABLET, DELAYED RELEASE ORAL EVERY 12 HOURS
Refills: 0 | Status: DISCONTINUED | OUTPATIENT
Start: 2023-01-01 | End: 2023-01-01

## 2023-01-01 RX ADMIN — HEPARIN SODIUM 1600 UNIT(S)/HR: 5000 INJECTION INTRAVENOUS; SUBCUTANEOUS at 06:15

## 2023-01-01 RX ADMIN — HEPARIN SODIUM 1600 UNIT(S)/HR: 5000 INJECTION INTRAVENOUS; SUBCUTANEOUS at 15:26

## 2023-01-01 RX ADMIN — OXYCODONE HYDROCHLORIDE 5 MILLIGRAM(S): 5 TABLET ORAL at 21:13

## 2023-01-01 RX ADMIN — HEPARIN SODIUM 1300 UNIT(S)/HR: 5000 INJECTION INTRAVENOUS; SUBCUTANEOUS at 06:38

## 2023-01-01 RX ADMIN — Medication 200 MILLIGRAM(S): at 17:42

## 2023-01-01 RX ADMIN — OXYCODONE HYDROCHLORIDE 5 MILLIGRAM(S): 5 TABLET ORAL at 22:11

## 2023-01-01 RX ADMIN — MONTELUKAST 10 MILLIGRAM(S): 4 TABLET, CHEWABLE ORAL at 11:43

## 2023-01-01 RX ADMIN — OXYCODONE HYDROCHLORIDE 5 MILLIGRAM(S): 5 TABLET ORAL at 04:45

## 2023-01-01 RX ADMIN — SIMETHICONE 80 MILLIGRAM(S): 80 TABLET, CHEWABLE ORAL at 21:42

## 2023-01-01 RX ADMIN — Medication 650 MILLIGRAM(S): at 12:02

## 2023-01-01 RX ADMIN — BUMETANIDE 1 MILLIGRAM(S): 0.25 INJECTION INTRAMUSCULAR; INTRAVENOUS at 05:36

## 2023-01-01 RX ADMIN — Medication 650 MILLIGRAM(S): at 23:15

## 2023-01-01 RX ADMIN — HEPARIN SODIUM 1600 UNIT(S)/HR: 5000 INJECTION INTRAVENOUS; SUBCUTANEOUS at 07:48

## 2023-01-01 RX ADMIN — POLYETHYLENE GLYCOL 3350 17 GRAM(S): 17 POWDER, FOR SOLUTION ORAL at 17:25

## 2023-01-01 RX ADMIN — ONDANSETRON 4 MILLIGRAM(S): 8 TABLET, FILM COATED ORAL at 17:55

## 2023-01-01 RX ADMIN — Medication 20 MILLIGRAM(S): at 12:59

## 2023-01-01 RX ADMIN — MILRINONE LACTATE 11.2 MICROGRAM(S)/KG/MIN: 1 INJECTION, SOLUTION INTRAVENOUS at 19:08

## 2023-01-01 RX ADMIN — NYSTATIN CREAM 1 APPLICATION(S): 100000 CREAM TOPICAL at 06:01

## 2023-01-01 RX ADMIN — MONTELUKAST 10 MILLIGRAM(S): 4 TABLET, CHEWABLE ORAL at 11:46

## 2023-01-01 RX ADMIN — Medication 650 MILLIGRAM(S): at 23:10

## 2023-01-01 RX ADMIN — AMIODARONE HYDROCHLORIDE 200 MILLIGRAM(S): 400 TABLET ORAL at 05:40

## 2023-01-01 RX ADMIN — HEPARIN SODIUM 1500 UNIT(S)/HR: 5000 INJECTION INTRAVENOUS; SUBCUTANEOUS at 06:43

## 2023-01-01 RX ADMIN — Medication 2: at 11:44

## 2023-01-01 RX ADMIN — Medication 60 MEQ/KG/HR: at 10:14

## 2023-01-01 RX ADMIN — BUDESONIDE AND FORMOTEROL FUMARATE DIHYDRATE 2 PUFF(S): 160; 4.5 AEROSOL RESPIRATORY (INHALATION) at 08:45

## 2023-01-01 RX ADMIN — Medication 6.98 MICROGRAM(S)/KG/MIN: at 15:45

## 2023-01-01 RX ADMIN — EPOPROSTENOL 1.84 NANOGRAM(S)/KG/MIN: 0.5 INJECTION, POWDER, LYOPHILIZED, FOR SOLUTION INTRAVENOUS at 00:09

## 2023-01-01 RX ADMIN — OXYCODONE HYDROCHLORIDE 5 MILLIGRAM(S): 5 TABLET ORAL at 22:18

## 2023-01-01 RX ADMIN — SODIUM ZIRCONIUM CYCLOSILICATE 10 GRAM(S): 10 POWDER, FOR SUSPENSION ORAL at 11:31

## 2023-01-01 RX ADMIN — Medication 100 MILLIGRAM(S): at 15:15

## 2023-01-01 RX ADMIN — Medication 650 MILLIGRAM(S): at 05:42

## 2023-01-01 RX ADMIN — MILRINONE LACTATE 11.2 MICROGRAM(S)/KG/MIN: 1 INJECTION, SOLUTION INTRAVENOUS at 23:32

## 2023-01-01 RX ADMIN — ACETAZOLAMIDE 250 MILLIGRAM(S): 250 TABLET ORAL at 13:31

## 2023-01-01 RX ADMIN — PIPERACILLIN AND TAZOBACTAM 25 GRAM(S): 4; .5 INJECTION, POWDER, LYOPHILIZED, FOR SOLUTION INTRAVENOUS at 15:14

## 2023-01-01 RX ADMIN — MONTELUKAST 10 MILLIGRAM(S): 4 TABLET, CHEWABLE ORAL at 12:26

## 2023-01-01 RX ADMIN — BUDESONIDE AND FORMOTEROL FUMARATE DIHYDRATE 2 PUFF(S): 160; 4.5 AEROSOL RESPIRATORY (INHALATION) at 20:02

## 2023-01-01 RX ADMIN — SODIUM CHLORIDE 1000 MILLILITER(S): 9 INJECTION INTRAMUSCULAR; INTRAVENOUS; SUBCUTANEOUS at 03:37

## 2023-01-01 RX ADMIN — MORPHINE SULFATE 2 MILLIGRAM(S): 50 CAPSULE, EXTENDED RELEASE ORAL at 05:10

## 2023-01-01 RX ADMIN — SPIRONOLACTONE 50 MILLIGRAM(S): 25 TABLET, FILM COATED ORAL at 05:47

## 2023-01-01 RX ADMIN — Medication 3 MILLILITER(S): at 07:07

## 2023-01-01 RX ADMIN — VASOPRESSIN 6 UNIT(S)/MIN: 20 INJECTION INTRAVENOUS at 17:24

## 2023-01-01 RX ADMIN — Medication 4: at 22:02

## 2023-01-01 RX ADMIN — Medication 500 MICROGRAM(S): at 22:36

## 2023-01-01 RX ADMIN — HEPARIN SODIUM 1800 UNIT(S)/HR: 5000 INJECTION INTRAVENOUS; SUBCUTANEOUS at 07:09

## 2023-01-01 RX ADMIN — BUDESONIDE AND FORMOTEROL FUMARATE DIHYDRATE 2 PUFF(S): 160; 4.5 AEROSOL RESPIRATORY (INHALATION) at 10:57

## 2023-01-01 RX ADMIN — Medication 20 MILLIGRAM(S): at 14:35

## 2023-01-01 RX ADMIN — Medication 20 MILLIGRAM(S): at 21:03

## 2023-01-01 RX ADMIN — Medication 4 UNIT(S): at 08:24

## 2023-01-01 RX ADMIN — Medication 10 MILLIGRAM(S): at 15:15

## 2023-01-01 RX ADMIN — Medication 20 MILLIGRAM(S): at 21:23

## 2023-01-01 RX ADMIN — SPIRONOLACTONE 50 MILLIGRAM(S): 25 TABLET, FILM COATED ORAL at 04:54

## 2023-01-01 RX ADMIN — Medication 1: at 17:55

## 2023-01-01 RX ADMIN — Medication 20 MILLIGRAM(S): at 10:49

## 2023-01-01 RX ADMIN — BUMETANIDE 2 MILLIGRAM(S): 0.25 INJECTION INTRAMUSCULAR; INTRAVENOUS at 21:27

## 2023-01-01 RX ADMIN — NYSTATIN CREAM 1 APPLICATION(S): 100000 CREAM TOPICAL at 12:42

## 2023-01-01 RX ADMIN — BUDESONIDE AND FORMOTEROL FUMARATE DIHYDRATE 2 PUFF(S): 160; 4.5 AEROSOL RESPIRATORY (INHALATION) at 20:19

## 2023-01-01 RX ADMIN — Medication 650 MILLIGRAM(S): at 21:47

## 2023-01-01 RX ADMIN — AMIODARONE HYDROCHLORIDE 200 MILLIGRAM(S): 400 TABLET ORAL at 05:59

## 2023-01-01 RX ADMIN — FENTANYL CITRATE 3.83 MICROGRAM(S)/KG/HR: 50 INJECTION INTRAVENOUS at 18:11

## 2023-01-01 RX ADMIN — CHLORHEXIDINE GLUCONATE 1 APPLICATION(S): 213 SOLUTION TOPICAL at 05:35

## 2023-01-01 RX ADMIN — OXYCODONE HYDROCHLORIDE 5 MILLIGRAM(S): 5 TABLET ORAL at 04:54

## 2023-01-01 RX ADMIN — SIMETHICONE 80 MILLIGRAM(S): 80 TABLET, CHEWABLE ORAL at 11:40

## 2023-01-01 RX ADMIN — Medication 50 MILLIGRAM(S): at 11:21

## 2023-01-01 RX ADMIN — HEPARIN SODIUM 1300 UNIT(S)/HR: 5000 INJECTION INTRAVENOUS; SUBCUTANEOUS at 19:01

## 2023-01-01 RX ADMIN — Medication 650 MILLIGRAM(S): at 11:02

## 2023-01-01 RX ADMIN — Medication 25 MILLIGRAM(S): at 17:20

## 2023-01-01 RX ADMIN — OXYCODONE HYDROCHLORIDE 5 MILLIGRAM(S): 5 TABLET ORAL at 09:56

## 2023-01-01 RX ADMIN — BUDESONIDE AND FORMOTEROL FUMARATE DIHYDRATE 2 PUFF(S): 160; 4.5 AEROSOL RESPIRATORY (INHALATION) at 21:50

## 2023-01-01 RX ADMIN — MILRINONE LACTATE 11.2 MICROGRAM(S)/KG/MIN: 1 INJECTION, SOLUTION INTRAVENOUS at 11:21

## 2023-01-01 RX ADMIN — MONTELUKAST 10 MILLIGRAM(S): 4 TABLET, CHEWABLE ORAL at 09:50

## 2023-01-01 RX ADMIN — AMIODARONE HYDROCHLORIDE 200 MILLIGRAM(S): 400 TABLET ORAL at 06:23

## 2023-01-01 RX ADMIN — Medication 20 MILLIGRAM(S): at 11:40

## 2023-01-01 RX ADMIN — ONDANSETRON 4 MILLIGRAM(S): 8 TABLET, FILM COATED ORAL at 02:46

## 2023-01-01 RX ADMIN — HEPARIN SODIUM 2100 UNIT(S)/HR: 5000 INJECTION INTRAVENOUS; SUBCUTANEOUS at 19:12

## 2023-01-01 RX ADMIN — OXYCODONE HYDROCHLORIDE 5 MILLIGRAM(S): 5 TABLET ORAL at 12:09

## 2023-01-01 RX ADMIN — POLYETHYLENE GLYCOL 3350 17 GRAM(S): 17 POWDER, FOR SOLUTION ORAL at 05:17

## 2023-01-01 RX ADMIN — Medication 3 MILLIGRAM(S): at 21:42

## 2023-01-01 RX ADMIN — POLYETHYLENE GLYCOL 3350 17 GRAM(S): 17 POWDER, FOR SOLUTION ORAL at 05:42

## 2023-01-01 RX ADMIN — Medication 20 MILLIGRAM(S): at 11:54

## 2023-01-01 RX ADMIN — HEPARIN SODIUM 2100 UNIT(S)/HR: 5000 INJECTION INTRAVENOUS; SUBCUTANEOUS at 01:55

## 2023-01-01 RX ADMIN — PIPERACILLIN AND TAZOBACTAM 25 GRAM(S): 4; .5 INJECTION, POWDER, LYOPHILIZED, FOR SOLUTION INTRAVENOUS at 17:42

## 2023-01-01 RX ADMIN — OXYCODONE HYDROCHLORIDE 5 MILLIGRAM(S): 5 TABLET ORAL at 21:59

## 2023-01-01 RX ADMIN — APIXABAN 2.5 MILLIGRAM(S): 2.5 TABLET, FILM COATED ORAL at 17:18

## 2023-01-01 RX ADMIN — Medication 650 MILLIGRAM(S): at 13:13

## 2023-01-01 RX ADMIN — BUMETANIDE 2 MILLIGRAM(S): 0.25 INJECTION INTRAMUSCULAR; INTRAVENOUS at 17:15

## 2023-01-01 RX ADMIN — Medication 50 MILLIEQUIVALENT(S): at 14:30

## 2023-01-01 RX ADMIN — Medication 3 MILLIGRAM(S): at 22:13

## 2023-01-01 RX ADMIN — NYSTATIN CREAM 1 APPLICATION(S): 100000 CREAM TOPICAL at 05:47

## 2023-01-01 RX ADMIN — Medication 50 MILLIGRAM(S): at 17:24

## 2023-01-01 RX ADMIN — OXYCODONE HYDROCHLORIDE 5 MILLIGRAM(S): 5 TABLET ORAL at 08:03

## 2023-01-01 RX ADMIN — SENNA PLUS 2 TABLET(S): 8.6 TABLET ORAL at 22:08

## 2023-01-01 RX ADMIN — Medication 50 MILLIGRAM(S): at 19:14

## 2023-01-01 RX ADMIN — Medication 50 MILLIGRAM(S): at 13:11

## 2023-01-01 RX ADMIN — Medication 50 MILLIGRAM(S): at 06:35

## 2023-01-01 RX ADMIN — Medication 40 MILLIGRAM(S): at 05:37

## 2023-01-01 RX ADMIN — MONTELUKAST 10 MILLIGRAM(S): 4 TABLET, CHEWABLE ORAL at 12:54

## 2023-01-01 RX ADMIN — MILRINONE LACTATE 5.58 MICROGRAM(S)/KG/MIN: 1 INJECTION, SOLUTION INTRAVENOUS at 18:20

## 2023-01-01 RX ADMIN — INSULIN GLARGINE 8 UNIT(S): 100 INJECTION, SOLUTION SUBCUTANEOUS at 21:33

## 2023-01-01 RX ADMIN — Medication 5 MILLIGRAM(S): at 23:16

## 2023-01-01 RX ADMIN — OXYCODONE HYDROCHLORIDE 5 MILLIGRAM(S): 5 TABLET ORAL at 12:59

## 2023-01-01 RX ADMIN — BUDESONIDE AND FORMOTEROL FUMARATE DIHYDRATE 2 PUFF(S): 160; 4.5 AEROSOL RESPIRATORY (INHALATION) at 20:34

## 2023-01-01 RX ADMIN — BUDESONIDE AND FORMOTEROL FUMARATE DIHYDRATE 2 PUFF(S): 160; 4.5 AEROSOL RESPIRATORY (INHALATION) at 20:58

## 2023-01-01 RX ADMIN — NYSTATIN CREAM 1 APPLICATION(S): 100000 CREAM TOPICAL at 04:40

## 2023-01-01 RX ADMIN — BUDESONIDE AND FORMOTEROL FUMARATE DIHYDRATE 2 PUFF(S): 160; 4.5 AEROSOL RESPIRATORY (INHALATION) at 11:40

## 2023-01-01 RX ADMIN — Medication 250 MILLIGRAM(S): at 11:58

## 2023-01-01 RX ADMIN — Medication 400 MILLIGRAM(S): at 18:02

## 2023-01-01 RX ADMIN — SODIUM ZIRCONIUM CYCLOSILICATE 10 GRAM(S): 10 POWDER, FOR SUSPENSION ORAL at 21:35

## 2023-01-01 RX ADMIN — Medication 40 MILLIGRAM(S): at 05:43

## 2023-01-01 RX ADMIN — VASOPRESSIN 4.5 UNIT(S)/MIN: 20 INJECTION INTRAVENOUS at 05:34

## 2023-01-01 RX ADMIN — AZITHROMYCIN 500 MILLIGRAM(S): 500 TABLET, FILM COATED ORAL at 15:13

## 2023-01-01 RX ADMIN — Medication 60 MEQ/KG/HR: at 13:50

## 2023-01-01 RX ADMIN — Medication 650 MILLIGRAM(S): at 13:14

## 2023-01-01 RX ADMIN — NYSTATIN CREAM 1 APPLICATION(S): 100000 CREAM TOPICAL at 16:21

## 2023-01-01 RX ADMIN — HEPARIN SODIUM 800 UNIT(S)/HR: 5000 INJECTION INTRAVENOUS; SUBCUTANEOUS at 11:17

## 2023-01-01 RX ADMIN — OXYCODONE HYDROCHLORIDE 5 MILLIGRAM(S): 5 TABLET ORAL at 17:56

## 2023-01-01 RX ADMIN — CHLORHEXIDINE GLUCONATE 1 APPLICATION(S): 213 SOLUTION TOPICAL at 05:54

## 2023-01-01 RX ADMIN — SPIRONOLACTONE 50 MILLIGRAM(S): 25 TABLET, FILM COATED ORAL at 08:58

## 2023-01-01 RX ADMIN — Medication 1 TABLET(S): at 12:51

## 2023-01-01 RX ADMIN — Medication 125 MILLIGRAM(S): at 14:59

## 2023-01-01 RX ADMIN — APIXABAN 2.5 MILLIGRAM(S): 2.5 TABLET, FILM COATED ORAL at 05:20

## 2023-01-01 RX ADMIN — Medication 40 MILLIGRAM(S): at 05:50

## 2023-01-01 RX ADMIN — Medication 200 MILLIGRAM(S): at 05:22

## 2023-01-01 RX ADMIN — Medication 200 MILLIGRAM(S): at 17:15

## 2023-01-01 RX ADMIN — Medication 60 MEQ/KG/HR: at 11:31

## 2023-01-01 RX ADMIN — Medication 40 MILLIGRAM(S): at 15:59

## 2023-01-01 RX ADMIN — MONTELUKAST 10 MILLIGRAM(S): 4 TABLET, CHEWABLE ORAL at 11:10

## 2023-01-01 RX ADMIN — Medication 400 MILLIGRAM(S): at 02:25

## 2023-01-01 RX ADMIN — MILRINONE LACTATE 11.2 MICROGRAM(S)/KG/MIN: 1 INJECTION, SOLUTION INTRAVENOUS at 03:24

## 2023-01-01 RX ADMIN — Medication 200 GRAM(S): at 23:49

## 2023-01-01 RX ADMIN — CHLORHEXIDINE GLUCONATE 1 APPLICATION(S): 213 SOLUTION TOPICAL at 05:11

## 2023-01-01 RX ADMIN — SODIUM ZIRCONIUM CYCLOSILICATE 10 GRAM(S): 10 POWDER, FOR SUSPENSION ORAL at 00:34

## 2023-01-01 RX ADMIN — Medication 20 MILLIGRAM(S): at 11:52

## 2023-01-01 RX ADMIN — Medication 650 MILLIGRAM(S): at 06:21

## 2023-01-01 RX ADMIN — BUDESONIDE AND FORMOTEROL FUMARATE DIHYDRATE 2 PUFF(S): 160; 4.5 AEROSOL RESPIRATORY (INHALATION) at 09:50

## 2023-01-01 RX ADMIN — ONDANSETRON 4 MILLIGRAM(S): 8 TABLET, FILM COATED ORAL at 18:48

## 2023-01-01 RX ADMIN — INSULIN GLARGINE 8 UNIT(S): 100 INJECTION, SOLUTION SUBCUTANEOUS at 21:24

## 2023-01-01 RX ADMIN — APIXABAN 2.5 MILLIGRAM(S): 2.5 TABLET, FILM COATED ORAL at 05:45

## 2023-01-01 RX ADMIN — ALBUTEROL 2.5 MILLIGRAM(S): 90 AEROSOL, METERED ORAL at 19:44

## 2023-01-01 RX ADMIN — MONTELUKAST 10 MILLIGRAM(S): 4 TABLET, CHEWABLE ORAL at 12:40

## 2023-01-01 RX ADMIN — BUDESONIDE AND FORMOTEROL FUMARATE DIHYDRATE 2 PUFF(S): 160; 4.5 AEROSOL RESPIRATORY (INHALATION) at 20:59

## 2023-01-01 RX ADMIN — Medication 650 MILLIGRAM(S): at 16:48

## 2023-01-01 RX ADMIN — Medication 650 MILLIGRAM(S): at 17:16

## 2023-01-01 RX ADMIN — BUDESONIDE AND FORMOTEROL FUMARATE DIHYDRATE 2 PUFF(S): 160; 4.5 AEROSOL RESPIRATORY (INHALATION) at 17:07

## 2023-01-01 RX ADMIN — AMIODARONE HYDROCHLORIDE 400 MILLIGRAM(S): 400 TABLET ORAL at 11:19

## 2023-01-01 RX ADMIN — MUPIROCIN 1 APPLICATION(S): 20 OINTMENT TOPICAL at 05:39

## 2023-01-01 RX ADMIN — BUDESONIDE AND FORMOTEROL FUMARATE DIHYDRATE 2 PUFF(S): 160; 4.5 AEROSOL RESPIRATORY (INHALATION) at 08:47

## 2023-01-01 RX ADMIN — HEPARIN SODIUM 1800 UNIT(S)/HR: 5000 INJECTION INTRAVENOUS; SUBCUTANEOUS at 13:37

## 2023-01-01 RX ADMIN — OXYCODONE HYDROCHLORIDE 7.5 MILLIGRAM(S): 5 TABLET ORAL at 20:23

## 2023-01-01 RX ADMIN — MONTELUKAST 10 MILLIGRAM(S): 4 TABLET, CHEWABLE ORAL at 11:02

## 2023-01-01 RX ADMIN — Medication 1 TABLET(S): at 08:38

## 2023-01-01 RX ADMIN — Medication 166.67 MILLIGRAM(S): at 10:43

## 2023-01-01 RX ADMIN — ONDANSETRON 4 MILLIGRAM(S): 8 TABLET, FILM COATED ORAL at 23:28

## 2023-01-01 RX ADMIN — Medication 4 UNIT(S): at 17:55

## 2023-01-01 RX ADMIN — HEPARIN SODIUM 1600 UNIT(S)/HR: 5000 INJECTION INTRAVENOUS; SUBCUTANEOUS at 16:28

## 2023-01-01 RX ADMIN — BUDESONIDE AND FORMOTEROL FUMARATE DIHYDRATE 2 PUFF(S): 160; 4.5 AEROSOL RESPIRATORY (INHALATION) at 10:30

## 2023-01-01 RX ADMIN — PIPERACILLIN AND TAZOBACTAM 25 GRAM(S): 4; .5 INJECTION, POWDER, LYOPHILIZED, FOR SOLUTION INTRAVENOUS at 05:36

## 2023-01-01 RX ADMIN — Medication 4: at 13:50

## 2023-01-01 RX ADMIN — PIPERACILLIN AND TAZOBACTAM 200 GRAM(S): 4; .5 INJECTION, POWDER, LYOPHILIZED, FOR SOLUTION INTRAVENOUS at 12:06

## 2023-01-01 RX ADMIN — CHLORHEXIDINE GLUCONATE 1 APPLICATION(S): 213 SOLUTION TOPICAL at 06:14

## 2023-01-01 RX ADMIN — Medication 3 MILLIGRAM(S): at 21:52

## 2023-01-01 RX ADMIN — Medication 40 MILLIGRAM(S): at 05:11

## 2023-01-01 RX ADMIN — Medication 400 MILLIGRAM(S): at 19:59

## 2023-01-01 RX ADMIN — BUDESONIDE AND FORMOTEROL FUMARATE DIHYDRATE 2 PUFF(S): 160; 4.5 AEROSOL RESPIRATORY (INHALATION) at 05:41

## 2023-01-01 RX ADMIN — Medication 10 MILLIGRAM(S): at 06:29

## 2023-01-01 RX ADMIN — HYDROMORPHONE HYDROCHLORIDE 0.5 MILLIGRAM(S): 2 INJECTION INTRAMUSCULAR; INTRAVENOUS; SUBCUTANEOUS at 13:26

## 2023-01-01 RX ADMIN — MILRINONE LACTATE 11.2 MICROGRAM(S)/KG/MIN: 1 INJECTION, SOLUTION INTRAVENOUS at 17:24

## 2023-01-01 RX ADMIN — Medication 20 MILLIGRAM(S): at 11:46

## 2023-01-01 RX ADMIN — HEPARIN SODIUM 1600 UNIT(S)/HR: 5000 INJECTION INTRAVENOUS; SUBCUTANEOUS at 00:27

## 2023-01-01 RX ADMIN — OXYCODONE HYDROCHLORIDE 5 MILLIGRAM(S): 5 TABLET ORAL at 22:54

## 2023-01-01 RX ADMIN — HEPARIN SODIUM 1600 UNIT(S)/HR: 5000 INJECTION INTRAVENOUS; SUBCUTANEOUS at 13:25

## 2023-01-01 RX ADMIN — Medication 20 MILLIGRAM(S): at 08:39

## 2023-01-01 RX ADMIN — Medication 100 MILLIGRAM(S): at 05:45

## 2023-01-01 RX ADMIN — Medication 650 MILLIGRAM(S): at 12:41

## 2023-01-01 RX ADMIN — Medication 20 MILLIGRAM(S): at 06:21

## 2023-01-01 RX ADMIN — SPIRONOLACTONE 50 MILLIGRAM(S): 25 TABLET, FILM COATED ORAL at 05:43

## 2023-01-01 RX ADMIN — BUDESONIDE AND FORMOTEROL FUMARATE DIHYDRATE 2 PUFF(S): 160; 4.5 AEROSOL RESPIRATORY (INHALATION) at 06:29

## 2023-01-01 RX ADMIN — Medication 2: at 16:45

## 2023-01-01 RX ADMIN — Medication 40 MILLIGRAM(S): at 18:14

## 2023-01-01 RX ADMIN — Medication 650 MILLIGRAM(S): at 12:14

## 2023-01-01 RX ADMIN — APIXABAN 2.5 MILLIGRAM(S): 2.5 TABLET, FILM COATED ORAL at 17:29

## 2023-01-01 RX ADMIN — ALBUTEROL 2.5 MILLIGRAM(S): 90 AEROSOL, METERED ORAL at 19:45

## 2023-01-01 RX ADMIN — HEPARIN SODIUM 1600 UNIT(S)/HR: 5000 INJECTION INTRAVENOUS; SUBCUTANEOUS at 08:08

## 2023-01-01 RX ADMIN — FENTANYL CITRATE 100 MICROGRAM(S): 50 INJECTION INTRAVENOUS at 13:30

## 2023-01-01 RX ADMIN — BUDESONIDE AND FORMOTEROL FUMARATE DIHYDRATE 2 PUFF(S): 160; 4.5 AEROSOL RESPIRATORY (INHALATION) at 21:41

## 2023-01-01 RX ADMIN — MUPIROCIN 1 APPLICATION(S): 20 OINTMENT TOPICAL at 18:12

## 2023-01-01 RX ADMIN — SPIRONOLACTONE 50 MILLIGRAM(S): 25 TABLET, FILM COATED ORAL at 05:20

## 2023-01-01 RX ADMIN — HYDROMORPHONE HYDROCHLORIDE 0.5 MILLIGRAM(S): 2 INJECTION INTRAMUSCULAR; INTRAVENOUS; SUBCUTANEOUS at 11:20

## 2023-01-01 RX ADMIN — FENTANYL CITRATE 50 MICROGRAM(S): 50 INJECTION INTRAVENOUS at 21:19

## 2023-01-01 RX ADMIN — Medication 40 MILLIGRAM(S): at 17:44

## 2023-01-01 RX ADMIN — Medication 25 MILLIGRAM(S): at 18:27

## 2023-01-01 RX ADMIN — Medication 1000 MILLIGRAM(S): at 10:37

## 2023-01-01 RX ADMIN — Medication 20 MILLIGRAM(S): at 12:02

## 2023-01-01 RX ADMIN — NYSTATIN CREAM 1 APPLICATION(S): 100000 CREAM TOPICAL at 05:25

## 2023-01-01 RX ADMIN — FENTANYL CITRATE 100 MICROGRAM(S): 50 INJECTION INTRAVENOUS at 16:00

## 2023-01-01 RX ADMIN — Medication 20 MILLIGRAM(S): at 04:39

## 2023-01-01 RX ADMIN — PIPERACILLIN AND TAZOBACTAM 25 GRAM(S): 4; .5 INJECTION, POWDER, LYOPHILIZED, FOR SOLUTION INTRAVENOUS at 17:47

## 2023-01-01 RX ADMIN — Medication 40 MILLIGRAM(S): at 11:57

## 2023-01-01 RX ADMIN — OXYCODONE HYDROCHLORIDE 5 MILLIGRAM(S): 5 TABLET ORAL at 21:48

## 2023-01-01 RX ADMIN — HEPARIN SODIUM 1500 UNIT(S)/HR: 5000 INJECTION INTRAVENOUS; SUBCUTANEOUS at 14:36

## 2023-01-01 RX ADMIN — BUDESONIDE AND FORMOTEROL FUMARATE DIHYDRATE 2 PUFF(S): 160; 4.5 AEROSOL RESPIRATORY (INHALATION) at 20:37

## 2023-01-01 RX ADMIN — HEPARIN SODIUM 1300 UNIT(S)/HR: 5000 INJECTION INTRAVENOUS; SUBCUTANEOUS at 14:31

## 2023-01-01 RX ADMIN — Medication 3 MILLIGRAM(S): at 21:46

## 2023-01-01 RX ADMIN — APIXABAN 2.5 MILLIGRAM(S): 2.5 TABLET, FILM COATED ORAL at 05:34

## 2023-01-01 RX ADMIN — Medication 40 MILLIGRAM(S): at 05:22

## 2023-01-01 RX ADMIN — MILRINONE LACTATE 5.58 MICROGRAM(S)/KG/MIN: 1 INJECTION, SOLUTION INTRAVENOUS at 03:16

## 2023-01-01 RX ADMIN — MUPIROCIN 1 APPLICATION(S): 20 OINTMENT TOPICAL at 19:01

## 2023-01-01 RX ADMIN — AMIODARONE HYDROCHLORIDE 400 MILLIGRAM(S): 400 TABLET ORAL at 21:41

## 2023-01-01 RX ADMIN — NYSTATIN CREAM 1 APPLICATION(S): 100000 CREAM TOPICAL at 18:16

## 2023-01-01 RX ADMIN — APIXABAN 2.5 MILLIGRAM(S): 2.5 TABLET, FILM COATED ORAL at 16:48

## 2023-01-01 RX ADMIN — MUPIROCIN 1 APPLICATION(S): 20 OINTMENT TOPICAL at 17:49

## 2023-01-01 RX ADMIN — Medication 40 MILLIGRAM(S): at 05:20

## 2023-01-01 RX ADMIN — CHLORHEXIDINE GLUCONATE 1 APPLICATION(S): 213 SOLUTION TOPICAL at 06:01

## 2023-01-01 RX ADMIN — Medication 40 MILLIGRAM(S): at 17:10

## 2023-01-01 RX ADMIN — Medication 650 MILLIGRAM(S): at 17:29

## 2023-01-01 RX ADMIN — BUMETANIDE 1 MILLIGRAM(S): 0.25 INJECTION INTRAMUSCULAR; INTRAVENOUS at 05:22

## 2023-01-01 RX ADMIN — Medication 4: at 21:48

## 2023-01-01 RX ADMIN — Medication 60 MILLIGRAM(S): at 11:48

## 2023-01-01 RX ADMIN — Medication 200 GRAM(S): at 16:00

## 2023-01-01 RX ADMIN — Medication 650 MILLIGRAM(S): at 22:30

## 2023-01-01 RX ADMIN — OXYCODONE HYDROCHLORIDE 7.5 MILLIGRAM(S): 5 TABLET ORAL at 21:23

## 2023-01-01 RX ADMIN — Medication 20 MILLIGRAM(S): at 12:24

## 2023-01-01 RX ADMIN — Medication 3 MILLIGRAM(S): at 21:37

## 2023-01-01 RX ADMIN — Medication 20 MILLIGRAM(S): at 11:11

## 2023-01-01 RX ADMIN — SIMETHICONE 80 MILLIGRAM(S): 80 TABLET, CHEWABLE ORAL at 22:13

## 2023-01-01 RX ADMIN — APIXABAN 2.5 MILLIGRAM(S): 2.5 TABLET, FILM COATED ORAL at 17:20

## 2023-01-01 RX ADMIN — Medication 25 MILLIGRAM(S): at 17:15

## 2023-01-01 RX ADMIN — ACETAZOLAMIDE 250 MILLIGRAM(S): 250 TABLET ORAL at 11:52

## 2023-01-01 RX ADMIN — BUMETANIDE 2 MILLIGRAM(S): 0.25 INJECTION INTRAMUSCULAR; INTRAVENOUS at 05:11

## 2023-01-01 RX ADMIN — BUMETANIDE 2 MILLIGRAM(S): 0.25 INJECTION INTRAMUSCULAR; INTRAVENOUS at 22:01

## 2023-01-01 RX ADMIN — BUDESONIDE AND FORMOTEROL FUMARATE DIHYDRATE 2 PUFF(S): 160; 4.5 AEROSOL RESPIRATORY (INHALATION) at 05:13

## 2023-01-01 RX ADMIN — Medication 50 MILLILITER(S): at 00:48

## 2023-01-01 RX ADMIN — FLUDROCORTISONE ACETATE 0.1 MILLIGRAM(S): 0.1 TABLET ORAL at 02:52

## 2023-01-01 RX ADMIN — BUDESONIDE AND FORMOTEROL FUMARATE DIHYDRATE 2 PUFF(S): 160; 4.5 AEROSOL RESPIRATORY (INHALATION) at 08:51

## 2023-01-01 RX ADMIN — ACETAZOLAMIDE 250 MILLIGRAM(S): 250 TABLET ORAL at 15:47

## 2023-01-01 RX ADMIN — Medication 60 MILLIGRAM(S): at 06:04

## 2023-01-01 RX ADMIN — FENTANYL CITRATE 50 MICROGRAM(S): 50 INJECTION INTRAVENOUS at 02:34

## 2023-01-01 RX ADMIN — OXYCODONE HYDROCHLORIDE 5 MILLIGRAM(S): 5 TABLET ORAL at 21:26

## 2023-01-01 RX ADMIN — BUDESONIDE AND FORMOTEROL FUMARATE DIHYDRATE 2 PUFF(S): 160; 4.5 AEROSOL RESPIRATORY (INHALATION) at 05:23

## 2023-01-01 RX ADMIN — Medication 25 MILLIGRAM(S): at 18:16

## 2023-01-01 RX ADMIN — OXYCODONE HYDROCHLORIDE 5 MILLIGRAM(S): 5 TABLET ORAL at 06:26

## 2023-01-01 RX ADMIN — Medication 650 MILLIGRAM(S): at 21:37

## 2023-01-01 RX ADMIN — FENTANYL CITRATE 50 MICROGRAM(S): 50 INJECTION INTRAVENOUS at 03:57

## 2023-01-01 RX ADMIN — Medication 200 MILLIGRAM(S): at 17:06

## 2023-01-01 RX ADMIN — OXYCODONE HYDROCHLORIDE 5 MILLIGRAM(S): 5 TABLET ORAL at 04:38

## 2023-01-01 RX ADMIN — APIXABAN 2.5 MILLIGRAM(S): 2.5 TABLET, FILM COATED ORAL at 05:57

## 2023-01-01 RX ADMIN — FENTANYL CITRATE 50 MICROGRAM(S): 50 INJECTION INTRAVENOUS at 18:00

## 2023-01-01 RX ADMIN — MIDAZOLAM HYDROCHLORIDE 4 MILLIGRAM(S): 1 INJECTION, SOLUTION INTRAMUSCULAR; INTRAVENOUS at 14:30

## 2023-01-01 RX ADMIN — MILRINONE LACTATE 11.2 MICROGRAM(S)/KG/MIN: 1 INJECTION, SOLUTION INTRAVENOUS at 23:40

## 2023-01-01 RX ADMIN — MUPIROCIN 1 APPLICATION(S): 20 OINTMENT TOPICAL at 20:26

## 2023-01-01 RX ADMIN — BUMETANIDE 5 MG/HR: 0.25 INJECTION INTRAMUSCULAR; INTRAVENOUS at 22:40

## 2023-01-01 RX ADMIN — Medication 80 MILLIGRAM(S): at 21:21

## 2023-01-01 RX ADMIN — Medication 20 MILLIGRAM(S): at 11:18

## 2023-01-01 RX ADMIN — Medication 4: at 11:51

## 2023-01-01 RX ADMIN — Medication 4: at 21:22

## 2023-01-01 RX ADMIN — BUDESONIDE AND FORMOTEROL FUMARATE DIHYDRATE 2 PUFF(S): 160; 4.5 AEROSOL RESPIRATORY (INHALATION) at 07:50

## 2023-01-01 RX ADMIN — CHLORHEXIDINE GLUCONATE 1 APPLICATION(S): 213 SOLUTION TOPICAL at 13:48

## 2023-01-01 RX ADMIN — Medication 20 MILLIGRAM(S): at 05:50

## 2023-01-01 RX ADMIN — MUPIROCIN 1 APPLICATION(S): 20 OINTMENT TOPICAL at 17:58

## 2023-01-01 RX ADMIN — Medication 20 MILLIGRAM(S): at 13:36

## 2023-01-01 RX ADMIN — Medication 50 MILLIEQUIVALENT(S): at 17:00

## 2023-01-01 RX ADMIN — APIXABAN 2.5 MILLIGRAM(S): 2.5 TABLET, FILM COATED ORAL at 05:41

## 2023-01-01 RX ADMIN — BUDESONIDE AND FORMOTEROL FUMARATE DIHYDRATE 2 PUFF(S): 160; 4.5 AEROSOL RESPIRATORY (INHALATION) at 09:05

## 2023-01-01 RX ADMIN — AZITHROMYCIN 500 MILLIGRAM(S): 500 TABLET, FILM COATED ORAL at 12:05

## 2023-01-01 RX ADMIN — Medication 650 MILLIGRAM(S): at 17:14

## 2023-01-01 RX ADMIN — Medication 200 MILLIGRAM(S): at 17:52

## 2023-01-01 RX ADMIN — Medication 50 MILLIGRAM(S): at 23:32

## 2023-01-01 RX ADMIN — INSULIN GLARGINE 15 UNIT(S): 100 INJECTION, SOLUTION SUBCUTANEOUS at 00:00

## 2023-01-01 RX ADMIN — Medication 1 TABLET(S): at 11:27

## 2023-01-01 RX ADMIN — Medication 50 MILLIGRAM(S): at 11:01

## 2023-01-01 RX ADMIN — Medication 650 MILLIGRAM(S): at 11:43

## 2023-01-01 RX ADMIN — INSULIN GLARGINE 8 UNIT(S): 100 INJECTION, SOLUTION SUBCUTANEOUS at 22:08

## 2023-01-01 RX ADMIN — INSULIN HUMAN 10 UNIT(S): 100 INJECTION, SOLUTION SUBCUTANEOUS at 15:30

## 2023-01-01 RX ADMIN — SENNA PLUS 2 TABLET(S): 8.6 TABLET ORAL at 22:05

## 2023-01-01 RX ADMIN — MONTELUKAST 10 MILLIGRAM(S): 4 TABLET, CHEWABLE ORAL at 13:28

## 2023-01-01 RX ADMIN — Medication 650 MILLIGRAM(S): at 13:02

## 2023-01-01 RX ADMIN — Medication 40 MILLIGRAM(S): at 05:24

## 2023-01-01 RX ADMIN — Medication 10 MILLIGRAM(S): at 17:49

## 2023-01-01 RX ADMIN — Medication 50 MILLIGRAM(S): at 17:47

## 2023-01-01 RX ADMIN — BUMETANIDE 1 MILLIGRAM(S): 0.25 INJECTION INTRAMUSCULAR; INTRAVENOUS at 05:15

## 2023-01-01 RX ADMIN — HEPARIN SODIUM 1500 UNIT(S)/HR: 5000 INJECTION INTRAVENOUS; SUBCUTANEOUS at 13:17

## 2023-01-01 RX ADMIN — Medication 25 MILLIGRAM(S): at 05:19

## 2023-01-01 RX ADMIN — ONDANSETRON 4 MILLIGRAM(S): 8 TABLET, FILM COATED ORAL at 19:54

## 2023-01-01 RX ADMIN — INSULIN HUMAN 10 UNIT(S): 100 INJECTION, SOLUTION SUBCUTANEOUS at 00:48

## 2023-01-01 RX ADMIN — PIPERACILLIN AND TAZOBACTAM 25 GRAM(S): 4; .5 INJECTION, POWDER, LYOPHILIZED, FOR SOLUTION INTRAVENOUS at 17:24

## 2023-01-01 RX ADMIN — SENNA PLUS 2 TABLET(S): 8.6 TABLET ORAL at 21:14

## 2023-01-01 RX ADMIN — Medication 40 MILLIEQUIVALENT(S): at 08:15

## 2023-01-01 RX ADMIN — Medication 166.67 MILLIGRAM(S): at 21:08

## 2023-01-01 RX ADMIN — BUDESONIDE AND FORMOTEROL FUMARATE DIHYDRATE 2 PUFF(S): 160; 4.5 AEROSOL RESPIRATORY (INHALATION) at 08:12

## 2023-01-01 RX ADMIN — BUDESONIDE AND FORMOTEROL FUMARATE DIHYDRATE 2 PUFF(S): 160; 4.5 AEROSOL RESPIRATORY (INHALATION) at 17:45

## 2023-01-01 RX ADMIN — Medication 2: at 12:25

## 2023-01-01 RX ADMIN — ACETAZOLAMIDE 250 MILLIGRAM(S): 250 TABLET ORAL at 09:17

## 2023-01-01 RX ADMIN — HEPARIN SODIUM 1400 UNIT(S)/HR: 5000 INJECTION INTRAVENOUS; SUBCUTANEOUS at 19:08

## 2023-01-01 RX ADMIN — OXYCODONE HYDROCHLORIDE 5 MILLIGRAM(S): 5 TABLET ORAL at 03:15

## 2023-01-01 RX ADMIN — FENTANYL CITRATE 100 MICROGRAM(S): 50 INJECTION INTRAVENOUS at 16:30

## 2023-01-01 RX ADMIN — HEPARIN SODIUM 1500 UNIT(S)/HR: 5000 INJECTION INTRAVENOUS; SUBCUTANEOUS at 22:41

## 2023-01-01 RX ADMIN — ETOMIDATE 20 MILLIGRAM(S): 2 INJECTION INTRAVENOUS at 13:00

## 2023-01-01 RX ADMIN — Medication 1 TABLET(S): at 15:59

## 2023-01-01 RX ADMIN — Medication 25 MILLIGRAM(S): at 05:23

## 2023-01-01 RX ADMIN — Medication 650 MILLIGRAM(S): at 12:10

## 2023-01-01 RX ADMIN — Medication 50 MILLIEQUIVALENT(S): at 15:00

## 2023-01-01 RX ADMIN — Medication 4: at 08:11

## 2023-01-01 RX ADMIN — OXYCODONE HYDROCHLORIDE 7.5 MILLIGRAM(S): 5 TABLET ORAL at 13:03

## 2023-01-01 RX ADMIN — BUDESONIDE AND FORMOTEROL FUMARATE DIHYDRATE 2 PUFF(S): 160; 4.5 AEROSOL RESPIRATORY (INHALATION) at 08:00

## 2023-01-01 RX ADMIN — Medication 40 MILLIGRAM(S): at 05:15

## 2023-01-01 RX ADMIN — BUMETANIDE 2 MILLIGRAM(S): 0.25 INJECTION INTRAMUSCULAR; INTRAVENOUS at 06:22

## 2023-01-01 RX ADMIN — OXYCODONE HYDROCHLORIDE 7.5 MILLIGRAM(S): 5 TABLET ORAL at 11:21

## 2023-01-01 RX ADMIN — OXYCODONE HYDROCHLORIDE 7.5 MILLIGRAM(S): 5 TABLET ORAL at 04:39

## 2023-01-01 RX ADMIN — Medication 40 MILLIGRAM(S): at 05:04

## 2023-01-01 RX ADMIN — BUMETANIDE 2 MILLIGRAM(S): 0.25 INJECTION INTRAMUSCULAR; INTRAVENOUS at 04:40

## 2023-01-01 RX ADMIN — Medication 50 MILLILITER(S): at 19:00

## 2023-01-01 RX ADMIN — Medication 20 MILLIGRAM(S): at 21:58

## 2023-01-01 RX ADMIN — Medication 4: at 11:54

## 2023-01-01 RX ADMIN — PIPERACILLIN AND TAZOBACTAM 25 GRAM(S): 4; .5 INJECTION, POWDER, LYOPHILIZED, FOR SOLUTION INTRAVENOUS at 21:08

## 2023-01-01 RX ADMIN — BUDESONIDE AND FORMOTEROL FUMARATE DIHYDRATE 2 PUFF(S): 160; 4.5 AEROSOL RESPIRATORY (INHALATION) at 20:27

## 2023-01-01 RX ADMIN — OXYCODONE HYDROCHLORIDE 5 MILLIGRAM(S): 5 TABLET ORAL at 22:24

## 2023-01-01 RX ADMIN — HEPARIN SODIUM 1600 UNIT(S)/HR: 5000 INJECTION INTRAVENOUS; SUBCUTANEOUS at 21:31

## 2023-01-01 RX ADMIN — SODIUM CHLORIDE 500 MILLILITER(S): 9 INJECTION INTRAMUSCULAR; INTRAVENOUS; SUBCUTANEOUS at 20:15

## 2023-01-01 RX ADMIN — Medication 166.67 MILLIGRAM(S): at 15:38

## 2023-01-01 RX ADMIN — FENTANYL CITRATE 100 MICROGRAM(S): 50 INJECTION INTRAVENOUS at 14:30

## 2023-01-01 RX ADMIN — SPIRONOLACTONE 50 MILLIGRAM(S): 25 TABLET, FILM COATED ORAL at 08:39

## 2023-01-01 RX ADMIN — Medication 650 MILLIGRAM(S): at 00:59

## 2023-01-01 RX ADMIN — Medication 20 MILLIGRAM(S): at 14:49

## 2023-01-01 RX ADMIN — Medication 20 MILLIGRAM(S): at 12:26

## 2023-01-01 RX ADMIN — OXYCODONE HYDROCHLORIDE 5 MILLIGRAM(S): 5 TABLET ORAL at 10:15

## 2023-01-01 RX ADMIN — CHLORHEXIDINE GLUCONATE 1 APPLICATION(S): 213 SOLUTION TOPICAL at 06:20

## 2023-01-01 RX ADMIN — ONDANSETRON 4 MILLIGRAM(S): 8 TABLET, FILM COATED ORAL at 14:39

## 2023-01-01 RX ADMIN — OXYCODONE HYDROCHLORIDE 2.5 MILLIGRAM(S): 5 TABLET ORAL at 11:15

## 2023-01-01 RX ADMIN — BUDESONIDE AND FORMOTEROL FUMARATE DIHYDRATE 2 PUFF(S): 160; 4.5 AEROSOL RESPIRATORY (INHALATION) at 17:53

## 2023-01-01 RX ADMIN — OXYCODONE HYDROCHLORIDE 5 MILLIGRAM(S): 5 TABLET ORAL at 22:13

## 2023-01-01 RX ADMIN — Medication 650 MILLIGRAM(S): at 03:39

## 2023-01-01 RX ADMIN — MUPIROCIN 1 APPLICATION(S): 20 OINTMENT TOPICAL at 05:18

## 2023-01-01 RX ADMIN — AMIODARONE HYDROCHLORIDE 200 MILLIGRAM(S): 400 TABLET ORAL at 05:37

## 2023-01-01 RX ADMIN — Medication 50 MILLIGRAM(S): at 13:27

## 2023-01-01 RX ADMIN — MONTELUKAST 10 MILLIGRAM(S): 4 TABLET, CHEWABLE ORAL at 10:49

## 2023-01-01 RX ADMIN — POLYETHYLENE GLYCOL 3350 17 GRAM(S): 17 POWDER, FOR SOLUTION ORAL at 05:37

## 2023-01-01 RX ADMIN — Medication 20 MILLIGRAM(S): at 11:10

## 2023-01-01 RX ADMIN — Medication 2: at 22:16

## 2023-01-01 RX ADMIN — Medication 650 MILLIGRAM(S): at 06:42

## 2023-01-01 RX ADMIN — HEPARIN SODIUM 1900 UNIT(S)/HR: 5000 INJECTION INTRAVENOUS; SUBCUTANEOUS at 10:49

## 2023-01-01 RX ADMIN — Medication 4 UNIT(S): at 17:13

## 2023-01-01 RX ADMIN — Medication 40 MILLIGRAM(S): at 13:28

## 2023-01-01 RX ADMIN — BUMETANIDE 1 MILLIGRAM(S): 0.25 INJECTION INTRAMUSCULAR; INTRAVENOUS at 22:37

## 2023-01-01 RX ADMIN — MONTELUKAST 10 MILLIGRAM(S): 4 TABLET, CHEWABLE ORAL at 11:55

## 2023-01-01 RX ADMIN — MONTELUKAST 10 MILLIGRAM(S): 4 TABLET, CHEWABLE ORAL at 11:40

## 2023-01-01 RX ADMIN — EPOPROSTENOL 1.84 NANOGRAM(S)/KG/MIN: 0.5 INJECTION, POWDER, LYOPHILIZED, FOR SOLUTION INTRAVENOUS at 19:00

## 2023-01-01 RX ADMIN — Medication 4: at 12:39

## 2023-01-01 RX ADMIN — Medication 3 MILLIGRAM(S): at 21:47

## 2023-01-01 RX ADMIN — Medication 200 MILLIGRAM(S): at 17:57

## 2023-01-01 RX ADMIN — Medication 50 MILLIGRAM(S): at 05:55

## 2023-01-01 RX ADMIN — BUDESONIDE AND FORMOTEROL FUMARATE DIHYDRATE 2 PUFF(S): 160; 4.5 AEROSOL RESPIRATORY (INHALATION) at 19:46

## 2023-01-01 RX ADMIN — AMIODARONE HYDROCHLORIDE 200 MILLIGRAM(S): 400 TABLET ORAL at 05:11

## 2023-01-01 RX ADMIN — Medication 40 MILLIEQUIVALENT(S): at 01:47

## 2023-01-01 RX ADMIN — CHLORHEXIDINE GLUCONATE 1 APPLICATION(S): 213 SOLUTION TOPICAL at 04:39

## 2023-01-01 RX ADMIN — Medication 40 MILLIGRAM(S): at 05:40

## 2023-01-01 RX ADMIN — Medication 20 MILLIGRAM(S): at 22:08

## 2023-01-01 RX ADMIN — Medication 400 MILLIGRAM(S): at 08:56

## 2023-01-01 RX ADMIN — SENNA PLUS 2 TABLET(S): 8.6 TABLET ORAL at 22:00

## 2023-01-01 RX ADMIN — CHLORHEXIDINE GLUCONATE 1 APPLICATION(S): 213 SOLUTION TOPICAL at 06:34

## 2023-01-01 RX ADMIN — APIXABAN 2.5 MILLIGRAM(S): 2.5 TABLET, FILM COATED ORAL at 18:16

## 2023-01-01 RX ADMIN — Medication 20 MILLIGRAM(S): at 13:41

## 2023-01-01 RX ADMIN — BUMETANIDE 1 MILLIGRAM(S): 0.25 INJECTION INTRAMUSCULAR; INTRAVENOUS at 05:12

## 2023-01-01 RX ADMIN — NYSTATIN CREAM 1 APPLICATION(S): 100000 CREAM TOPICAL at 17:56

## 2023-01-01 RX ADMIN — Medication 25 MILLIGRAM(S): at 05:24

## 2023-01-01 RX ADMIN — Medication 100 MILLIGRAM(S): at 05:47

## 2023-01-01 RX ADMIN — Medication 2 UNIT(S): at 17:39

## 2023-01-01 RX ADMIN — CHLORHEXIDINE GLUCONATE 1 APPLICATION(S): 213 SOLUTION TOPICAL at 05:17

## 2023-01-01 RX ADMIN — MUPIROCIN 1 APPLICATION(S): 20 OINTMENT TOPICAL at 17:56

## 2023-01-01 RX ADMIN — PIPERACILLIN AND TAZOBACTAM 25 GRAM(S): 4; .5 INJECTION, POWDER, LYOPHILIZED, FOR SOLUTION INTRAVENOUS at 06:12

## 2023-01-01 RX ADMIN — Medication 60 MEQ/KG/HR: at 07:34

## 2023-01-01 RX ADMIN — APIXABAN 2.5 MILLIGRAM(S): 2.5 TABLET, FILM COATED ORAL at 05:24

## 2023-01-01 RX ADMIN — MONTELUKAST 10 MILLIGRAM(S): 4 TABLET, CHEWABLE ORAL at 12:04

## 2023-01-01 RX ADMIN — Medication 20 MILLIGRAM(S): at 10:56

## 2023-01-01 RX ADMIN — VASOPRESSIN 15 UNIT(S)/MIN: 20 INJECTION INTRAVENOUS at 17:26

## 2023-01-01 RX ADMIN — AMIODARONE HYDROCHLORIDE 33.3 MG/MIN: 400 TABLET ORAL at 11:01

## 2023-01-01 RX ADMIN — Medication 20 MILLIEQUIVALENT(S): at 09:29

## 2023-01-01 RX ADMIN — Medication 20 MILLIGRAM(S): at 22:07

## 2023-01-01 RX ADMIN — HEPARIN SODIUM 1600 UNIT(S)/HR: 5000 INJECTION INTRAVENOUS; SUBCUTANEOUS at 01:07

## 2023-01-01 RX ADMIN — Medication 200 MILLIGRAM(S): at 05:11

## 2023-01-01 RX ADMIN — Medication 1000 MILLIGRAM(S): at 00:48

## 2023-01-01 RX ADMIN — Medication 3 MILLIGRAM(S): at 20:24

## 2023-01-01 RX ADMIN — Medication 6: at 12:04

## 2023-01-01 RX ADMIN — Medication 25 MILLIGRAM(S): at 17:14

## 2023-01-01 RX ADMIN — SPIRONOLACTONE 50 MILLIGRAM(S): 25 TABLET, FILM COATED ORAL at 05:45

## 2023-01-01 RX ADMIN — HEPARIN SODIUM 1600 UNIT(S)/HR: 5000 INJECTION INTRAVENOUS; SUBCUTANEOUS at 07:04

## 2023-01-01 RX ADMIN — MONTELUKAST 10 MILLIGRAM(S): 4 TABLET, CHEWABLE ORAL at 14:35

## 2023-01-01 RX ADMIN — BUDESONIDE AND FORMOTEROL FUMARATE DIHYDRATE 2 PUFF(S): 160; 4.5 AEROSOL RESPIRATORY (INHALATION) at 17:56

## 2023-01-01 RX ADMIN — Medication 50 MILLIGRAM(S): at 17:22

## 2023-01-01 RX ADMIN — Medication 40 MILLIGRAM(S): at 05:34

## 2023-01-01 RX ADMIN — BUDESONIDE AND FORMOTEROL FUMARATE DIHYDRATE 2 PUFF(S): 160; 4.5 AEROSOL RESPIRATORY (INHALATION) at 05:12

## 2023-01-01 RX ADMIN — Medication 1 TABLET(S): at 12:54

## 2023-01-01 RX ADMIN — PHENYLEPHRINE HYDROCHLORIDE 57.4 MICROGRAM(S)/KG/MIN: 10 INJECTION INTRAVENOUS at 17:26

## 2023-01-01 RX ADMIN — PIPERACILLIN AND TAZOBACTAM 25 GRAM(S): 4; .5 INJECTION, POWDER, LYOPHILIZED, FOR SOLUTION INTRAVENOUS at 06:33

## 2023-01-01 RX ADMIN — Medication 650 MILLIGRAM(S): at 11:40

## 2023-01-01 RX ADMIN — Medication 2: at 08:14

## 2023-01-01 RX ADMIN — PIPERACILLIN AND TAZOBACTAM 200 GRAM(S): 4; .5 INJECTION, POWDER, LYOPHILIZED, FOR SOLUTION INTRAVENOUS at 12:00

## 2023-01-01 RX ADMIN — Medication 1000 MILLIGRAM(S): at 20:59

## 2023-01-01 RX ADMIN — ACETAZOLAMIDE 250 MILLIGRAM(S): 250 TABLET ORAL at 17:42

## 2023-01-01 RX ADMIN — HYDROMORPHONE HYDROCHLORIDE 0.5 MILLIGRAM(S): 2 INJECTION INTRAMUSCULAR; INTRAVENOUS; SUBCUTANEOUS at 12:26

## 2023-01-01 RX ADMIN — BUMETANIDE 2 MILLIGRAM(S): 0.25 INJECTION INTRAMUSCULAR; INTRAVENOUS at 21:11

## 2023-01-01 RX ADMIN — Medication 40 MILLIGRAM(S): at 05:42

## 2023-01-01 RX ADMIN — ONDANSETRON 4 MILLIGRAM(S): 8 TABLET, FILM COATED ORAL at 06:58

## 2023-01-01 RX ADMIN — BUDESONIDE AND FORMOTEROL FUMARATE DIHYDRATE 2 PUFF(S): 160; 4.5 AEROSOL RESPIRATORY (INHALATION) at 05:42

## 2023-01-01 RX ADMIN — Medication 2 UNIT(S): at 08:18

## 2023-01-01 RX ADMIN — Medication 10 MILLIGRAM(S): at 16:34

## 2023-01-01 RX ADMIN — MILRINONE LACTATE 11.2 MICROGRAM(S)/KG/MIN: 1 INJECTION, SOLUTION INTRAVENOUS at 07:36

## 2023-01-01 RX ADMIN — FENTANYL CITRATE 50 MICROGRAM(S): 50 INJECTION INTRAVENOUS at 03:42

## 2023-01-01 RX ADMIN — NYSTATIN CREAM 1 APPLICATION(S): 100000 CREAM TOPICAL at 17:27

## 2023-01-01 RX ADMIN — NYSTATIN CREAM 1 APPLICATION(S): 100000 CREAM TOPICAL at 05:45

## 2023-01-01 RX ADMIN — AMIODARONE HYDROCHLORIDE 200 MILLIGRAM(S): 400 TABLET ORAL at 05:16

## 2023-01-01 RX ADMIN — Medication 3 MILLIGRAM(S): at 23:45

## 2023-01-01 RX ADMIN — HEPARIN SODIUM 0 UNIT(S)/HR: 5000 INJECTION INTRAVENOUS; SUBCUTANEOUS at 06:09

## 2023-01-01 RX ADMIN — Medication 650 MILLIGRAM(S): at 22:47

## 2023-01-01 RX ADMIN — PIPERACILLIN AND TAZOBACTAM 25 GRAM(S): 4; .5 INJECTION, POWDER, LYOPHILIZED, FOR SOLUTION INTRAVENOUS at 22:24

## 2023-01-01 RX ADMIN — PIPERACILLIN AND TAZOBACTAM 25 GRAM(S): 4; .5 INJECTION, POWDER, LYOPHILIZED, FOR SOLUTION INTRAVENOUS at 14:33

## 2023-01-01 RX ADMIN — Medication 650 MILLIGRAM(S): at 16:25

## 2023-01-01 RX ADMIN — NYSTATIN CREAM 1 APPLICATION(S): 100000 CREAM TOPICAL at 17:49

## 2023-01-01 RX ADMIN — Medication 650 MILLIGRAM(S): at 14:02

## 2023-01-01 RX ADMIN — Medication 20 MILLIGRAM(S): at 05:22

## 2023-01-01 RX ADMIN — Medication 100 MILLIGRAM(S): at 04:53

## 2023-01-01 RX ADMIN — Medication 650 MILLIGRAM(S): at 21:32

## 2023-01-01 RX ADMIN — Medication 1: at 21:13

## 2023-01-01 RX ADMIN — OXYCODONE HYDROCHLORIDE 5 MILLIGRAM(S): 5 TABLET ORAL at 18:43

## 2023-01-01 RX ADMIN — Medication 100 MILLIGRAM(S): at 01:35

## 2023-01-01 RX ADMIN — PIPERACILLIN AND TAZOBACTAM 25 GRAM(S): 4; .5 INJECTION, POWDER, LYOPHILIZED, FOR SOLUTION INTRAVENOUS at 05:57

## 2023-01-01 RX ADMIN — Medication 50 MILLIGRAM(S): at 17:51

## 2023-01-01 RX ADMIN — INSULIN GLARGINE 5 UNIT(S): 100 INJECTION, SOLUTION SUBCUTANEOUS at 21:13

## 2023-01-01 RX ADMIN — SPIRONOLACTONE 50 MILLIGRAM(S): 25 TABLET, FILM COATED ORAL at 05:34

## 2023-01-01 RX ADMIN — APIXABAN 2.5 MILLIGRAM(S): 2.5 TABLET, FILM COATED ORAL at 05:23

## 2023-01-01 RX ADMIN — HEPARIN SODIUM 1500 UNIT(S)/HR: 5000 INJECTION INTRAVENOUS; SUBCUTANEOUS at 19:03

## 2023-01-01 RX ADMIN — MONTELUKAST 10 MILLIGRAM(S): 4 TABLET, CHEWABLE ORAL at 11:20

## 2023-01-01 RX ADMIN — MUPIROCIN 1 APPLICATION(S): 20 OINTMENT TOPICAL at 05:37

## 2023-01-01 RX ADMIN — BUDESONIDE AND FORMOTEROL FUMARATE DIHYDRATE 2 PUFF(S): 160; 4.5 AEROSOL RESPIRATORY (INHALATION) at 20:55

## 2023-01-01 RX ADMIN — Medication 50 MILLIGRAM(S): at 11:16

## 2023-01-01 RX ADMIN — Medication 2: at 08:17

## 2023-01-01 RX ADMIN — Medication 40 MILLIGRAM(S): at 22:29

## 2023-01-01 RX ADMIN — MONTELUKAST 10 MILLIGRAM(S): 4 TABLET, CHEWABLE ORAL at 11:12

## 2023-01-01 RX ADMIN — MILRINONE LACTATE 11.2 MICROGRAM(S)/KG/MIN: 1 INJECTION, SOLUTION INTRAVENOUS at 12:43

## 2023-01-01 RX ADMIN — SODIUM ZIRCONIUM CYCLOSILICATE 10 GRAM(S): 10 POWDER, FOR SUSPENSION ORAL at 12:25

## 2023-01-01 RX ADMIN — Medication 100 MEQ/KG/HR: at 17:28

## 2023-01-01 RX ADMIN — Medication 2: at 08:29

## 2023-01-01 RX ADMIN — NYSTATIN CREAM 1 APPLICATION(S): 100000 CREAM TOPICAL at 17:44

## 2023-01-01 RX ADMIN — BUDESONIDE AND FORMOTEROL FUMARATE DIHYDRATE 2 PUFF(S): 160; 4.5 AEROSOL RESPIRATORY (INHALATION) at 08:24

## 2023-01-01 RX ADMIN — Medication 4: at 17:25

## 2023-01-01 RX ADMIN — BUDESONIDE AND FORMOTEROL FUMARATE DIHYDRATE 2 PUFF(S): 160; 4.5 AEROSOL RESPIRATORY (INHALATION) at 17:58

## 2023-01-01 RX ADMIN — Medication 20 MILLIGRAM(S): at 21:47

## 2023-01-01 RX ADMIN — Medication 2: at 17:39

## 2023-01-01 RX ADMIN — NYSTATIN CREAM 1 APPLICATION(S): 100000 CREAM TOPICAL at 05:35

## 2023-01-01 RX ADMIN — MILRINONE LACTATE 11.2 MICROGRAM(S)/KG/MIN: 1 INJECTION, SOLUTION INTRAVENOUS at 15:47

## 2023-01-01 RX ADMIN — AMIODARONE HYDROCHLORIDE 200 MILLIGRAM(S): 400 TABLET ORAL at 05:22

## 2023-01-01 RX ADMIN — Medication 650 MILLIGRAM(S): at 23:30

## 2023-01-01 RX ADMIN — MONTELUKAST 10 MILLIGRAM(S): 4 TABLET, CHEWABLE ORAL at 10:56

## 2023-01-01 RX ADMIN — Medication 20 MILLIGRAM(S): at 14:32

## 2023-01-01 RX ADMIN — Medication 50 MILLIGRAM(S): at 17:31

## 2023-01-01 RX ADMIN — Medication 400 MILLIGRAM(S): at 15:15

## 2023-01-01 RX ADMIN — Medication 650 MILLIGRAM(S): at 12:13

## 2023-01-01 RX ADMIN — SPIRONOLACTONE 50 MILLIGRAM(S): 25 TABLET, FILM COATED ORAL at 05:04

## 2023-01-01 RX ADMIN — OXYCODONE HYDROCHLORIDE 2.5 MILLIGRAM(S): 5 TABLET ORAL at 12:15

## 2023-01-01 RX ADMIN — EPINEPHRINE 86.1 MICROGRAM(S)/KG/MIN: 0.3 INJECTION INTRAMUSCULAR; SUBCUTANEOUS at 17:26

## 2023-01-01 RX ADMIN — HEPARIN SODIUM 1600 UNIT(S)/HR: 5000 INJECTION INTRAVENOUS; SUBCUTANEOUS at 16:31

## 2023-01-01 RX ADMIN — SPIRONOLACTONE 50 MILLIGRAM(S): 25 TABLET, FILM COATED ORAL at 05:57

## 2023-01-01 RX ADMIN — MILRINONE LACTATE 11.2 MICROGRAM(S)/KG/MIN: 1 INJECTION, SOLUTION INTRAVENOUS at 00:38

## 2023-01-01 RX ADMIN — Medication 40 MILLIGRAM(S): at 05:19

## 2023-01-01 RX ADMIN — AMIODARONE HYDROCHLORIDE 200 MILLIGRAM(S): 400 TABLET ORAL at 06:20

## 2023-01-01 RX ADMIN — AMIODARONE HYDROCHLORIDE 16.7 MG/MIN: 400 TABLET ORAL at 17:52

## 2023-01-01 RX ADMIN — INSULIN GLARGINE 5 UNIT(S): 100 INJECTION, SOLUTION SUBCUTANEOUS at 22:05

## 2023-01-01 RX ADMIN — Medication 20 MILLIGRAM(S): at 20:24

## 2023-01-01 RX ADMIN — MUPIROCIN 1 APPLICATION(S): 20 OINTMENT TOPICAL at 06:38

## 2023-01-01 RX ADMIN — MONTELUKAST 10 MILLIGRAM(S): 4 TABLET, CHEWABLE ORAL at 11:56

## 2023-01-01 RX ADMIN — Medication 50 MILLIGRAM(S): at 02:07

## 2023-01-01 RX ADMIN — SODIUM CHLORIDE 250 MILLILITER(S): 9 INJECTION INTRAMUSCULAR; INTRAVENOUS; SUBCUTANEOUS at 01:26

## 2023-01-01 RX ADMIN — APIXABAN 2.5 MILLIGRAM(S): 2.5 TABLET, FILM COATED ORAL at 17:15

## 2023-01-01 RX ADMIN — APIXABAN 2.5 MILLIGRAM(S): 2.5 TABLET, FILM COATED ORAL at 17:14

## 2023-01-01 RX ADMIN — MORPHINE SULFATE 2 MILLIGRAM(S): 50 CAPSULE, EXTENDED RELEASE ORAL at 06:34

## 2023-01-01 RX ADMIN — POLYETHYLENE GLYCOL 3350 17 GRAM(S): 17 POWDER, FOR SOLUTION ORAL at 17:07

## 2023-01-01 RX ADMIN — Medication 40 MILLIGRAM(S): at 14:01

## 2023-01-01 RX ADMIN — FENTANYL CITRATE 50 MICROGRAM(S): 50 INJECTION INTRAVENOUS at 18:30

## 2023-01-01 RX ADMIN — OXYCODONE HYDROCHLORIDE 5 MILLIGRAM(S): 5 TABLET ORAL at 22:14

## 2023-01-01 RX ADMIN — Medication 1: at 07:59

## 2023-01-01 RX ADMIN — Medication 4: at 21:08

## 2023-01-01 RX ADMIN — SPIRONOLACTONE 50 MILLIGRAM(S): 25 TABLET, FILM COATED ORAL at 05:24

## 2023-01-01 RX ADMIN — FENTANYL CITRATE 50 MICROGRAM(S): 50 INJECTION INTRAVENOUS at 21:04

## 2023-01-01 RX ADMIN — HEPARIN SODIUM 1300 UNIT(S)/HR: 5000 INJECTION INTRAVENOUS; SUBCUTANEOUS at 20:09

## 2023-01-01 RX ADMIN — MONTELUKAST 10 MILLIGRAM(S): 4 TABLET, CHEWABLE ORAL at 12:05

## 2023-01-01 RX ADMIN — Medication 10 MILLIGRAM(S): at 22:17

## 2023-01-01 RX ADMIN — AMIODARONE HYDROCHLORIDE 400 MILLIGRAM(S): 400 TABLET ORAL at 06:13

## 2023-01-01 RX ADMIN — Medication 20 MILLIGRAM(S): at 09:51

## 2023-01-01 RX ADMIN — Medication 50 MILLIEQUIVALENT(S): at 19:00

## 2023-01-01 RX ADMIN — Medication 650 MILLIGRAM(S): at 14:13

## 2023-01-01 RX ADMIN — Medication 20 MILLIGRAM(S): at 21:07

## 2023-01-01 RX ADMIN — HEPARIN SODIUM 1600 UNIT(S)/HR: 5000 INJECTION INTRAVENOUS; SUBCUTANEOUS at 07:26

## 2023-01-01 RX ADMIN — Medication 50 MILLIGRAM(S): at 10:52

## 2023-01-01 RX ADMIN — APIXABAN 2.5 MILLIGRAM(S): 2.5 TABLET, FILM COATED ORAL at 05:19

## 2023-01-01 RX ADMIN — Medication 200 MILLIGRAM(S): at 21:14

## 2023-01-01 RX ADMIN — Medication 50 MILLIGRAM(S): at 16:52

## 2023-01-01 RX ADMIN — AMIODARONE HYDROCHLORIDE 16.7 MG/MIN: 400 TABLET ORAL at 08:22

## 2023-01-01 RX ADMIN — Medication 40 MILLIGRAM(S): at 14:53

## 2023-01-01 RX ADMIN — Medication 200 MILLIGRAM(S): at 05:47

## 2023-01-01 RX ADMIN — Medication 6 MILLIGRAM(S): at 21:32

## 2023-01-01 RX ADMIN — Medication 1: at 17:54

## 2023-01-01 RX ADMIN — FENTANYL CITRATE 100 MICROGRAM(S): 50 INJECTION INTRAVENOUS at 15:00

## 2023-01-01 RX ADMIN — ONDANSETRON 4 MILLIGRAM(S): 8 TABLET, FILM COATED ORAL at 05:05

## 2023-01-01 RX ADMIN — BUDESONIDE AND FORMOTEROL FUMARATE DIHYDRATE 2 PUFF(S): 160; 4.5 AEROSOL RESPIRATORY (INHALATION) at 05:18

## 2023-01-01 RX ADMIN — Medication 20 MILLIGRAM(S): at 21:33

## 2023-01-01 RX ADMIN — Medication 20 MILLIGRAM(S): at 05:40

## 2023-01-01 RX ADMIN — Medication 50 MILLIGRAM(S): at 06:11

## 2023-01-01 RX ADMIN — Medication 50 MILLIGRAM(S): at 23:50

## 2023-01-01 RX ADMIN — MONTELUKAST 10 MILLIGRAM(S): 4 TABLET, CHEWABLE ORAL at 11:52

## 2023-01-01 RX ADMIN — Medication 20 MILLIGRAM(S): at 12:05

## 2023-01-01 RX ADMIN — POLYETHYLENE GLYCOL 3350 17 GRAM(S): 17 POWDER, FOR SOLUTION ORAL at 17:56

## 2023-01-01 RX ADMIN — Medication 3 MILLIGRAM(S): at 22:29

## 2023-01-01 RX ADMIN — INSULIN HUMAN 10 UNIT(S): 100 INJECTION, SOLUTION SUBCUTANEOUS at 16:21

## 2023-01-01 RX ADMIN — Medication 1 TABLET(S): at 11:49

## 2023-01-01 RX ADMIN — Medication 50 MILLIGRAM(S): at 00:38

## 2023-01-01 RX ADMIN — BUMETANIDE 2 MILLIGRAM(S): 0.25 INJECTION INTRAMUSCULAR; INTRAVENOUS at 17:09

## 2023-01-01 RX ADMIN — FENTANYL CITRATE 100 MICROGRAM(S): 50 INJECTION INTRAVENOUS at 13:00

## 2023-01-01 RX ADMIN — MONTELUKAST 10 MILLIGRAM(S): 4 TABLET, CHEWABLE ORAL at 11:28

## 2023-01-01 RX ADMIN — BUDESONIDE AND FORMOTEROL FUMARATE DIHYDRATE 2 PUFF(S): 160; 4.5 AEROSOL RESPIRATORY (INHALATION) at 21:46

## 2023-01-01 RX ADMIN — BUDESONIDE AND FORMOTEROL FUMARATE DIHYDRATE 2 PUFF(S): 160; 4.5 AEROSOL RESPIRATORY (INHALATION) at 20:25

## 2023-01-01 RX ADMIN — Medication 50 MILLILITER(S): at 06:16

## 2023-01-01 RX ADMIN — SODIUM ZIRCONIUM CYCLOSILICATE 10 GRAM(S): 10 POWDER, FOR SUSPENSION ORAL at 23:49

## 2023-01-01 RX ADMIN — MUPIROCIN 1 APPLICATION(S): 20 OINTMENT TOPICAL at 05:35

## 2023-01-01 RX ADMIN — OXYCODONE HYDROCHLORIDE 5 MILLIGRAM(S): 5 TABLET ORAL at 08:10

## 2023-01-01 RX ADMIN — Medication 40 MILLIGRAM(S): at 14:40

## 2023-01-01 RX ADMIN — APIXABAN 2.5 MILLIGRAM(S): 2.5 TABLET, FILM COATED ORAL at 17:47

## 2023-01-01 RX ADMIN — Medication 25 MILLIGRAM(S): at 05:34

## 2023-01-01 RX ADMIN — HEPARIN SODIUM 1100 UNIT(S)/HR: 5000 INJECTION INTRAVENOUS; SUBCUTANEOUS at 20:55

## 2023-01-01 RX ADMIN — Medication 650 MILLIGRAM(S): at 14:23

## 2023-01-01 RX ADMIN — Medication 50 MILLILITER(S): at 17:00

## 2023-01-01 RX ADMIN — Medication 20 MILLIGRAM(S): at 14:51

## 2023-01-01 RX ADMIN — HEPARIN SODIUM 1200 UNIT(S)/HR: 5000 INJECTION INTRAVENOUS; SUBCUTANEOUS at 01:25

## 2023-01-01 RX ADMIN — MILRINONE LACTATE 11.2 MICROGRAM(S)/KG/MIN: 1 INJECTION, SOLUTION INTRAVENOUS at 15:40

## 2023-01-01 RX ADMIN — Medication 25 MILLIGRAM(S): at 17:47

## 2023-01-01 RX ADMIN — PIPERACILLIN AND TAZOBACTAM 25 GRAM(S): 4; .5 INJECTION, POWDER, LYOPHILIZED, FOR SOLUTION INTRAVENOUS at 13:45

## 2023-01-01 RX ADMIN — PIPERACILLIN AND TAZOBACTAM 25 GRAM(S): 4; .5 INJECTION, POWDER, LYOPHILIZED, FOR SOLUTION INTRAVENOUS at 05:04

## 2023-01-01 RX ADMIN — AMIODARONE HYDROCHLORIDE 200 MILLIGRAM(S): 400 TABLET ORAL at 04:40

## 2023-01-01 RX ADMIN — ACETAZOLAMIDE 250 MILLIGRAM(S): 250 TABLET ORAL at 00:59

## 2023-01-01 RX ADMIN — Medication 40 MILLIGRAM(S): at 13:30

## 2023-01-01 RX ADMIN — BUMETANIDE 1 MILLIGRAM(S): 0.25 INJECTION INTRAMUSCULAR; INTRAVENOUS at 17:51

## 2023-01-01 RX ADMIN — INSULIN GLARGINE 15 UNIT(S): 100 INJECTION, SOLUTION SUBCUTANEOUS at 21:31

## 2023-01-01 RX ADMIN — HEPARIN SODIUM 0 UNIT(S)/HR: 5000 INJECTION INTRAVENOUS; SUBCUTANEOUS at 12:03

## 2023-01-01 RX ADMIN — SODIUM ZIRCONIUM CYCLOSILICATE 10 GRAM(S): 10 POWDER, FOR SUSPENSION ORAL at 17:25

## 2023-01-01 RX ADMIN — Medication 200 MILLIGRAM(S): at 05:40

## 2023-01-01 RX ADMIN — HEPARIN SODIUM 1800 UNIT(S)/HR: 5000 INJECTION INTRAVENOUS; SUBCUTANEOUS at 21:35

## 2023-01-01 RX ADMIN — Medication 50 MILLILITER(S): at 15:00

## 2023-01-01 RX ADMIN — MONTELUKAST 10 MILLIGRAM(S): 4 TABLET, CHEWABLE ORAL at 12:02

## 2023-01-01 RX ADMIN — MONTELUKAST 10 MILLIGRAM(S): 4 TABLET, CHEWABLE ORAL at 08:38

## 2023-01-01 RX ADMIN — BUDESONIDE AND FORMOTEROL FUMARATE DIHYDRATE 2 PUFF(S): 160; 4.5 AEROSOL RESPIRATORY (INHALATION) at 17:14

## 2023-01-01 RX ADMIN — BUMETANIDE 2 MILLIGRAM(S): 0.25 INJECTION INTRAMUSCULAR; INTRAVENOUS at 13:07

## 2023-01-01 RX ADMIN — INSULIN HUMAN 10 UNIT(S): 100 INJECTION, SOLUTION SUBCUTANEOUS at 17:30

## 2023-01-01 RX ADMIN — HEPARIN SODIUM 1700 UNIT(S)/HR: 5000 INJECTION INTRAVENOUS; SUBCUTANEOUS at 09:29

## 2023-01-01 RX ADMIN — Medication 2 UNIT(S): at 11:52

## 2023-01-01 RX ADMIN — SODIUM CHLORIDE 1000 MILLILITER(S): 9 INJECTION, SOLUTION INTRAVENOUS at 11:01

## 2023-01-01 RX ADMIN — MUPIROCIN 1 APPLICATION(S): 20 OINTMENT TOPICAL at 17:46

## 2023-01-01 RX ADMIN — Medication 20 MILLIGRAM(S): at 11:28

## 2023-01-01 RX ADMIN — Medication 2: at 11:49

## 2023-01-01 RX ADMIN — Medication 650 MILLIGRAM(S): at 15:24

## 2023-01-01 RX ADMIN — Medication 650 MILLIGRAM(S): at 15:25

## 2023-01-01 RX ADMIN — Medication 5 MG/HR: at 13:50

## 2023-01-01 RX ADMIN — PIPERACILLIN AND TAZOBACTAM 25 GRAM(S): 4; .5 INJECTION, POWDER, LYOPHILIZED, FOR SOLUTION INTRAVENOUS at 05:55

## 2023-01-01 RX ADMIN — Medication 650 MILLIGRAM(S): at 21:46

## 2023-01-01 RX ADMIN — SIMETHICONE 80 MILLIGRAM(S): 80 TABLET, CHEWABLE ORAL at 21:52

## 2023-01-01 RX ADMIN — BUDESONIDE AND FORMOTEROL FUMARATE DIHYDRATE 2 PUFF(S): 160; 4.5 AEROSOL RESPIRATORY (INHALATION) at 09:21

## 2023-01-01 RX ADMIN — Medication 20 MILLIGRAM(S): at 21:14

## 2023-01-01 RX ADMIN — HEPARIN SODIUM 1500 UNIT(S)/HR: 5000 INJECTION INTRAVENOUS; SUBCUTANEOUS at 19:04

## 2023-01-01 RX ADMIN — Medication 20 MILLIGRAM(S): at 13:28

## 2023-01-01 RX ADMIN — Medication 40 MILLIGRAM(S): at 10:01

## 2023-01-01 RX ADMIN — BUDESONIDE AND FORMOTEROL FUMARATE DIHYDRATE 2 PUFF(S): 160; 4.5 AEROSOL RESPIRATORY (INHALATION) at 08:35

## 2023-01-01 RX ADMIN — Medication 25 MILLIGRAM(S): at 05:41

## 2023-01-01 RX ADMIN — OXYCODONE HYDROCHLORIDE 5 MILLIGRAM(S): 5 TABLET ORAL at 22:30

## 2023-01-01 RX ADMIN — Medication 1: at 10:48

## 2023-01-01 RX ADMIN — HEPARIN SODIUM 1700 UNIT(S)/HR: 5000 INJECTION INTRAVENOUS; SUBCUTANEOUS at 07:14

## 2023-01-01 RX ADMIN — SIMETHICONE 80 MILLIGRAM(S): 80 TABLET, CHEWABLE ORAL at 18:34

## 2023-01-01 RX ADMIN — Medication 40 MILLIGRAM(S): at 06:17

## 2023-01-01 RX ADMIN — Medication 4: at 17:42

## 2023-01-01 RX ADMIN — HEPARIN SODIUM 5000 UNIT(S): 5000 INJECTION INTRAVENOUS; SUBCUTANEOUS at 05:38

## 2023-01-01 RX ADMIN — MILRINONE LACTATE 5.58 MICROGRAM(S)/KG/MIN: 1 INJECTION, SOLUTION INTRAVENOUS at 21:31

## 2023-01-01 RX ADMIN — NYSTATIN CREAM 1 APPLICATION(S): 100000 CREAM TOPICAL at 17:16

## 2023-01-01 RX ADMIN — OXYCODONE HYDROCHLORIDE 5 MILLIGRAM(S): 5 TABLET ORAL at 11:26

## 2023-01-01 RX ADMIN — BUMETANIDE 2 MILLIGRAM(S): 0.25 INJECTION INTRAMUSCULAR; INTRAVENOUS at 12:06

## 2023-01-01 RX ADMIN — SIMETHICONE 80 MILLIGRAM(S): 80 TABLET, CHEWABLE ORAL at 21:37

## 2023-01-01 RX ADMIN — PIPERACILLIN AND TAZOBACTAM 25 GRAM(S): 4; .5 INJECTION, POWDER, LYOPHILIZED, FOR SOLUTION INTRAVENOUS at 22:54

## 2023-01-01 RX ADMIN — Medication 4 UNIT(S): at 17:54

## 2023-01-01 RX ADMIN — MUPIROCIN 1 APPLICATION(S): 20 OINTMENT TOPICAL at 05:43

## 2023-01-01 RX ADMIN — Medication 1 TABLET(S): at 12:02

## 2023-01-01 RX ADMIN — CHLORHEXIDINE GLUCONATE 1 APPLICATION(S): 213 SOLUTION TOPICAL at 06:35

## 2023-01-01 RX ADMIN — Medication 25 MILLIGRAM(S): at 17:29

## 2023-01-01 RX ADMIN — Medication 20 MILLIGRAM(S): at 12:54

## 2023-01-01 RX ADMIN — Medication 40 MILLIEQUIVALENT(S): at 21:58

## 2023-01-01 RX ADMIN — MUPIROCIN 1 APPLICATION(S): 20 OINTMENT TOPICAL at 17:43

## 2023-01-01 RX ADMIN — BUMETANIDE 2 MILLIGRAM(S): 0.25 INJECTION INTRAMUSCULAR; INTRAVENOUS at 05:42

## 2023-01-01 RX ADMIN — OXYCODONE HYDROCHLORIDE 5 MILLIGRAM(S): 5 TABLET ORAL at 05:54

## 2023-01-01 RX ADMIN — Medication 20 MILLIGRAM(S): at 11:56

## 2023-01-01 RX ADMIN — PIPERACILLIN AND TAZOBACTAM 25 GRAM(S): 4; .5 INJECTION, POWDER, LYOPHILIZED, FOR SOLUTION INTRAVENOUS at 19:00

## 2023-01-01 RX ADMIN — Medication 4 UNIT(S): at 10:48

## 2023-01-01 RX ADMIN — Medication 40 MILLIGRAM(S): at 14:25

## 2023-01-01 RX ADMIN — OXYCODONE HYDROCHLORIDE 5 MILLIGRAM(S): 5 TABLET ORAL at 13:35

## 2023-01-01 RX ADMIN — Medication 20 MILLIGRAM(S): at 11:59

## 2023-01-01 RX ADMIN — SPIRONOLACTONE 50 MILLIGRAM(S): 25 TABLET, FILM COATED ORAL at 05:22

## 2023-01-01 RX ADMIN — AMIODARONE HYDROCHLORIDE 618 MILLIGRAM(S): 400 TABLET ORAL at 12:08

## 2023-01-01 RX ADMIN — BUDESONIDE AND FORMOTEROL FUMARATE DIHYDRATE 2 PUFF(S): 160; 4.5 AEROSOL RESPIRATORY (INHALATION) at 09:14

## 2023-01-01 RX ADMIN — Medication 20 MILLIGRAM(S): at 15:46

## 2023-01-01 RX ADMIN — Medication 100 MILLIGRAM(S): at 22:14

## 2023-01-01 RX ADMIN — Medication 50 MILLIGRAM(S): at 00:43

## 2023-01-01 RX ADMIN — HEPARIN SODIUM 1600 UNIT(S)/HR: 5000 INJECTION INTRAVENOUS; SUBCUTANEOUS at 21:46

## 2023-01-01 RX ADMIN — OXYCODONE HYDROCHLORIDE 5 MILLIGRAM(S): 5 TABLET ORAL at 06:02

## 2023-01-01 RX ADMIN — APIXABAN 2.5 MILLIGRAM(S): 2.5 TABLET, FILM COATED ORAL at 18:27

## 2023-01-01 RX ADMIN — Medication 4 UNIT(S): at 11:43

## 2023-01-01 RX ADMIN — MUPIROCIN 1 APPLICATION(S): 20 OINTMENT TOPICAL at 17:14

## 2023-01-01 RX ADMIN — Medication 1 MILLIGRAM(S): at 19:20

## 2023-01-01 RX ADMIN — FENTANYL CITRATE 50 MICROGRAM(S): 50 INJECTION INTRAVENOUS at 02:19

## 2023-01-01 RX ADMIN — HEPARIN SODIUM 2100 UNIT(S)/HR: 5000 INJECTION INTRAVENOUS; SUBCUTANEOUS at 08:43

## 2023-01-01 RX ADMIN — CHLORHEXIDINE GLUCONATE 1 APPLICATION(S): 213 SOLUTION TOPICAL at 05:36

## 2023-01-01 RX ADMIN — MUPIROCIN 1 APPLICATION(S): 20 OINTMENT TOPICAL at 05:29

## 2023-01-01 RX ADMIN — BUMETANIDE 2 MILLIGRAM(S): 0.25 INJECTION INTRAMUSCULAR; INTRAVENOUS at 05:50

## 2023-01-01 RX ADMIN — HEPARIN SODIUM 1600 UNIT(S)/HR: 5000 INJECTION INTRAVENOUS; SUBCUTANEOUS at 02:35

## 2023-01-01 RX ADMIN — Medication 3 MILLILITER(S): at 01:15

## 2023-01-01 RX ADMIN — HYDROMORPHONE HYDROCHLORIDE 0.5 MILLIGRAM(S): 2 INJECTION INTRAMUSCULAR; INTRAVENOUS; SUBCUTANEOUS at 10:24

## 2023-01-01 RX ADMIN — Medication 650 MILLIGRAM(S): at 05:21

## 2023-01-01 RX ADMIN — Medication 40 MILLIGRAM(S): at 16:33

## 2023-01-01 RX ADMIN — MONTELUKAST 10 MILLIGRAM(S): 4 TABLET, CHEWABLE ORAL at 11:59

## 2023-01-01 RX ADMIN — APIXABAN 2.5 MILLIGRAM(S): 2.5 TABLET, FILM COATED ORAL at 08:41

## 2023-01-01 RX ADMIN — Medication 650 MILLIGRAM(S): at 18:13

## 2023-01-01 RX ADMIN — Medication 2: at 11:43

## 2023-01-01 RX ADMIN — NYSTATIN CREAM 1 APPLICATION(S): 100000 CREAM TOPICAL at 17:26

## 2023-01-01 RX ADMIN — Medication 1000 MILLIGRAM(S): at 16:48

## 2023-01-01 RX ADMIN — Medication 30 MILLIGRAM(S): at 05:12

## 2023-01-01 RX ADMIN — Medication 3 MILLILITER(S): at 17:53

## 2023-01-01 RX ADMIN — AMIODARONE HYDROCHLORIDE 200 MILLIGRAM(S): 400 TABLET ORAL at 05:50

## 2023-01-01 RX ADMIN — Medication 40 MILLIGRAM(S): at 05:46

## 2023-01-01 RX ADMIN — MILRINONE LACTATE 11.2 MICROGRAM(S)/KG/MIN: 1 INJECTION, SOLUTION INTRAVENOUS at 08:22

## 2023-01-01 RX ADMIN — Medication 50 MILLIEQUIVALENT(S): at 00:48

## 2023-01-01 RX ADMIN — HEPARIN SODIUM 1800 UNIT(S)/HR: 5000 INJECTION INTRAVENOUS; SUBCUTANEOUS at 07:24

## 2023-01-01 RX ADMIN — Medication 20 MILLIGRAM(S): at 12:40

## 2023-01-01 RX ADMIN — VASOPRESSIN 6 UNIT(S)/MIN: 20 INJECTION INTRAVENOUS at 13:36

## 2023-01-01 RX ADMIN — Medication 25 GRAM(S): at 09:21

## 2023-01-01 RX ADMIN — Medication 1 TABLET(S): at 13:28

## 2023-01-01 RX ADMIN — Medication 1 TABLET(S): at 09:49

## 2023-01-01 RX ADMIN — AMIODARONE HYDROCHLORIDE 150 MILLIGRAM(S): 400 TABLET ORAL at 17:10

## 2023-01-01 RX ADMIN — HEPARIN SODIUM 2100 UNIT(S)/HR: 5000 INJECTION INTRAVENOUS; SUBCUTANEOUS at 17:23

## 2023-01-01 RX ADMIN — SIMETHICONE 80 MILLIGRAM(S): 80 TABLET, CHEWABLE ORAL at 22:30

## 2023-01-01 RX ADMIN — PHENYLEPHRINE HYDROCHLORIDE 5.58 MICROGRAM(S)/KG/MIN: 10 INJECTION INTRAVENOUS at 22:37

## 2023-01-01 RX ADMIN — Medication 50 MILLIGRAM(S): at 17:25

## 2023-01-01 RX ADMIN — EPOPROSTENOL 1.84 NANOGRAM(S)/KG/MIN: 0.5 INJECTION, POWDER, LYOPHILIZED, FOR SOLUTION INTRAVENOUS at 16:00

## 2023-01-01 RX ADMIN — BUMETANIDE 1 MILLIGRAM(S): 0.25 INJECTION INTRAMUSCULAR; INTRAVENOUS at 17:52

## 2023-01-01 RX ADMIN — PIPERACILLIN AND TAZOBACTAM 25 GRAM(S): 4; .5 INJECTION, POWDER, LYOPHILIZED, FOR SOLUTION INTRAVENOUS at 21:27

## 2023-01-01 RX ADMIN — NYSTATIN CREAM 1 APPLICATION(S): 100000 CREAM TOPICAL at 08:45

## 2023-01-01 RX ADMIN — Medication 1000 MILLIGRAM(S): at 03:04

## 2023-01-01 RX ADMIN — Medication 6: at 12:02

## 2023-01-01 RX ADMIN — OXYCODONE HYDROCHLORIDE 5 MILLIGRAM(S): 5 TABLET ORAL at 23:10

## 2023-01-01 RX ADMIN — CHLORHEXIDINE GLUCONATE 1 APPLICATION(S): 213 SOLUTION TOPICAL at 05:45

## 2023-01-01 RX ADMIN — APIXABAN 5 MILLIGRAM(S): 2.5 TABLET, FILM COATED ORAL at 19:36

## 2023-01-01 RX ADMIN — Medication 650 MILLIGRAM(S): at 22:50

## 2023-01-01 RX ADMIN — Medication 6.98 MICROGRAM(S)/KG/MIN: at 07:37

## 2023-01-01 RX ADMIN — Medication 650 MILLIGRAM(S): at 23:42

## 2023-01-01 RX ADMIN — Medication 20 MILLIGRAM(S): at 11:21

## 2023-01-01 RX ADMIN — Medication 100 GRAM(S): at 10:25

## 2023-01-01 RX ADMIN — Medication 40 MILLIGRAM(S): at 05:23

## 2023-01-01 RX ADMIN — POLYETHYLENE GLYCOL 3350 17 GRAM(S): 17 POWDER, FOR SOLUTION ORAL at 13:31

## 2023-01-01 RX ADMIN — BUMETANIDE 1 MILLIGRAM(S): 0.25 INJECTION INTRAMUSCULAR; INTRAVENOUS at 18:08

## 2023-01-01 RX ADMIN — POLYETHYLENE GLYCOL 3350 17 GRAM(S): 17 POWDER, FOR SOLUTION ORAL at 05:12

## 2023-01-01 RX ADMIN — NYSTATIN CREAM 1 APPLICATION(S): 100000 CREAM TOPICAL at 05:20

## 2023-01-01 RX ADMIN — APIXABAN 2.5 MILLIGRAM(S): 2.5 TABLET, FILM COATED ORAL at 17:10

## 2023-01-01 RX ADMIN — Medication 2 UNIT(S): at 11:50

## 2023-01-01 RX ADMIN — Medication 20 MILLIGRAM(S): at 11:43

## 2023-01-01 RX ADMIN — Medication 25 MILLIGRAM(S): at 05:21

## 2023-01-01 RX ADMIN — Medication 25 GRAM(S): at 22:14

## 2023-01-01 RX ADMIN — APIXABAN 2.5 MILLIGRAM(S): 2.5 TABLET, FILM COATED ORAL at 13:45

## 2023-01-01 RX ADMIN — Medication 143 MICROGRAM(S)/KG/MIN: at 17:26

## 2023-01-01 RX ADMIN — Medication 40 MILLIEQUIVALENT(S): at 12:13

## 2023-01-01 RX ADMIN — Medication 20 MILLIGRAM(S): at 05:36

## 2023-01-01 RX ADMIN — MUPIROCIN 1 APPLICATION(S): 20 OINTMENT TOPICAL at 06:26

## 2023-01-01 RX ADMIN — ONDANSETRON 4 MILLIGRAM(S): 8 TABLET, FILM COATED ORAL at 11:31

## 2023-01-01 RX ADMIN — HEPARIN SODIUM 1900 UNIT(S)/HR: 5000 INJECTION INTRAVENOUS; SUBCUTANEOUS at 04:56

## 2023-01-01 RX ADMIN — Medication 20 MILLIGRAM(S): at 05:59

## 2023-01-01 RX ADMIN — APIXABAN 2.5 MILLIGRAM(S): 2.5 TABLET, FILM COATED ORAL at 06:17

## 2023-01-01 RX ADMIN — HEPARIN SODIUM 1500 UNIT(S)/HR: 5000 INJECTION INTRAVENOUS; SUBCUTANEOUS at 07:14

## 2023-01-01 RX ADMIN — Medication 200 MILLIGRAM(S): at 17:07

## 2023-01-01 RX ADMIN — AMIODARONE HYDROCHLORIDE 200 MILLIGRAM(S): 400 TABLET ORAL at 05:12

## 2023-01-01 RX ADMIN — HEPARIN SODIUM 1900 UNIT(S)/HR: 5000 INJECTION INTRAVENOUS; SUBCUTANEOUS at 07:27

## 2023-01-01 RX ADMIN — Medication 50 MILLILITER(S): at 15:48

## 2023-01-01 RX ADMIN — Medication 25 MILLIGRAM(S): at 05:20

## 2023-01-01 RX ADMIN — Medication 650 MILLIGRAM(S): at 15:13

## 2023-01-01 RX ADMIN — Medication 100 GRAM(S): at 15:45

## 2023-01-01 RX ADMIN — Medication 20 MILLIGRAM(S): at 22:01

## 2023-01-01 RX ADMIN — MILRINONE LACTATE 5.58 MICROGRAM(S)/KG/MIN: 1 INJECTION, SOLUTION INTRAVENOUS at 06:20

## 2023-01-01 RX ADMIN — ONDANSETRON 4 MILLIGRAM(S): 8 TABLET, FILM COATED ORAL at 20:24

## 2023-01-01 RX ADMIN — Medication 5 MILLIGRAM(S): at 12:08

## 2023-01-01 RX ADMIN — NYSTATIN CREAM 1 APPLICATION(S): 100000 CREAM TOPICAL at 05:54

## 2023-01-01 RX ADMIN — PANTOPRAZOLE SODIUM 40 MILLIGRAM(S): 20 TABLET, DELAYED RELEASE ORAL at 17:25

## 2023-01-01 RX ADMIN — MONTELUKAST 10 MILLIGRAM(S): 4 TABLET, CHEWABLE ORAL at 11:18

## 2023-01-01 RX ADMIN — Medication 200 MILLIGRAM(S): at 05:16

## 2023-01-01 RX ADMIN — Medication 40 MILLIGRAM(S): at 05:57

## 2023-01-01 RX ADMIN — VASOPRESSIN 6 UNIT(S)/MIN: 20 INJECTION INTRAVENOUS at 23:32

## 2023-01-01 RX ADMIN — HEPARIN SODIUM 1600 UNIT(S)/HR: 5000 INJECTION INTRAVENOUS; SUBCUTANEOUS at 18:28

## 2023-01-01 RX ADMIN — Medication 1: at 08:24

## 2023-01-01 RX ADMIN — SODIUM CHLORIDE 500 MILLILITER(S): 9 INJECTION INTRAMUSCULAR; INTRAVENOUS; SUBCUTANEOUS at 04:23

## 2023-01-01 RX ADMIN — Medication 40 MILLIEQUIVALENT(S): at 05:50

## 2023-01-01 RX ADMIN — Medication 40 MILLIGRAM(S): at 04:54

## 2023-01-01 RX ADMIN — Medication 650 MILLIGRAM(S): at 13:50

## 2023-01-01 RX ADMIN — Medication 20 MILLIGRAM(S): at 11:02

## 2023-01-01 RX ADMIN — APIXABAN 2.5 MILLIGRAM(S): 2.5 TABLET, FILM COATED ORAL at 05:47

## 2023-01-01 RX ADMIN — HEPARIN SODIUM 2100 UNIT(S)/HR: 5000 INJECTION INTRAVENOUS; SUBCUTANEOUS at 08:21

## 2023-01-01 RX ADMIN — Medication 650 MILLIGRAM(S): at 12:50

## 2023-01-01 RX ADMIN — Medication 20 MILLIGRAM(S): at 13:14

## 2023-01-01 RX ADMIN — Medication 650 MILLIGRAM(S): at 17:13

## 2023-01-01 RX ADMIN — AMIODARONE HYDROCHLORIDE 200 MILLIGRAM(S): 400 TABLET ORAL at 05:36

## 2023-01-01 RX ADMIN — HEPARIN SODIUM 1400 UNIT(S)/HR: 5000 INJECTION INTRAVENOUS; SUBCUTANEOUS at 13:34

## 2023-01-01 RX ADMIN — Medication 650 MILLIGRAM(S): at 18:37

## 2023-01-01 RX ADMIN — MONTELUKAST 10 MILLIGRAM(S): 4 TABLET, CHEWABLE ORAL at 12:25

## 2023-01-01 RX ADMIN — SIMETHICONE 80 MILLIGRAM(S): 80 TABLET, CHEWABLE ORAL at 21:47

## 2023-01-01 RX ADMIN — ONDANSETRON 4 MILLIGRAM(S): 8 TABLET, FILM COATED ORAL at 15:13

## 2023-01-01 RX ADMIN — PIPERACILLIN AND TAZOBACTAM 25 GRAM(S): 4; .5 INJECTION, POWDER, LYOPHILIZED, FOR SOLUTION INTRAVENOUS at 18:15

## 2023-01-01 RX ADMIN — Medication 650 MILLIGRAM(S): at 21:42

## 2023-01-01 RX ADMIN — Medication 20 MILLIGRAM(S): at 05:12

## 2023-01-01 RX ADMIN — HEPARIN SODIUM 1300 UNIT(S)/HR: 5000 INJECTION INTRAVENOUS; SUBCUTANEOUS at 07:20

## 2023-01-01 RX ADMIN — Medication 25 MILLIGRAM(S): at 05:45

## 2023-01-01 RX ADMIN — BUDESONIDE AND FORMOTEROL FUMARATE DIHYDRATE 2 PUFF(S): 160; 4.5 AEROSOL RESPIRATORY (INHALATION) at 05:37

## 2023-01-01 RX ADMIN — BUDESONIDE AND FORMOTEROL FUMARATE DIHYDRATE 2 PUFF(S): 160; 4.5 AEROSOL RESPIRATORY (INHALATION) at 17:55

## 2023-01-01 RX ADMIN — Medication 50 MILLIEQUIVALENT(S): at 15:30

## 2023-01-01 RX ADMIN — Medication 200 GRAM(S): at 14:35

## 2023-01-01 RX ADMIN — HEPARIN SODIUM 1100 UNIT(S)/HR: 5000 INJECTION INTRAVENOUS; SUBCUTANEOUS at 03:25

## 2023-01-01 RX ADMIN — Medication 20 MILLIGRAM(S): at 12:55

## 2023-01-01 RX ADMIN — Medication 4: at 22:00

## 2023-01-01 RX ADMIN — Medication 1: at 17:34

## 2023-01-01 RX ADMIN — MUPIROCIN 1 APPLICATION(S): 20 OINTMENT TOPICAL at 17:53

## 2023-01-01 RX ADMIN — SIMETHICONE 80 MILLIGRAM(S): 80 TABLET, CHEWABLE ORAL at 13:02

## 2023-01-01 RX ADMIN — Medication 250 MILLIGRAM(S): at 22:15

## 2023-01-01 RX ADMIN — ONDANSETRON 4 MILLIGRAM(S): 8 TABLET, FILM COATED ORAL at 21:52

## 2023-01-01 RX ADMIN — OXYCODONE HYDROCHLORIDE 7.5 MILLIGRAM(S): 5 TABLET ORAL at 05:39

## 2023-01-01 RX ADMIN — PIPERACILLIN AND TAZOBACTAM 25 GRAM(S): 4; .5 INJECTION, POWDER, LYOPHILIZED, FOR SOLUTION INTRAVENOUS at 05:33

## 2023-01-01 RX ADMIN — NYSTATIN CREAM 1 APPLICATION(S): 100000 CREAM TOPICAL at 17:53

## 2023-01-01 RX ADMIN — MONTELUKAST 10 MILLIGRAM(S): 4 TABLET, CHEWABLE ORAL at 12:51

## 2023-01-01 RX ADMIN — Medication 4: at 08:08

## 2023-01-01 RX ADMIN — Medication 1 TABLET(S): at 10:56

## 2023-01-01 RX ADMIN — BUMETANIDE 132 MILLIGRAM(S): 0.25 INJECTION INTRAMUSCULAR; INTRAVENOUS at 02:02

## 2023-01-01 RX ADMIN — Medication 50 MILLIGRAM(S): at 21:21

## 2023-01-01 RX ADMIN — PIPERACILLIN AND TAZOBACTAM 25 GRAM(S): 4; .5 INJECTION, POWDER, LYOPHILIZED, FOR SOLUTION INTRAVENOUS at 17:25

## 2023-01-01 RX ADMIN — Medication 10 MILLIGRAM(S): at 10:14

## 2023-01-01 RX ADMIN — Medication 3: at 08:08

## 2023-01-01 RX ADMIN — Medication 10 MILLIGRAM(S): at 06:22

## 2023-01-01 RX ADMIN — BUMETANIDE 1 MILLIGRAM(S): 0.25 INJECTION INTRAMUSCULAR; INTRAVENOUS at 17:15

## 2023-01-01 RX ADMIN — APIXABAN 2.5 MILLIGRAM(S): 2.5 TABLET, FILM COATED ORAL at 02:51

## 2023-01-01 RX ADMIN — Medication 2: at 08:30

## 2023-01-01 RX ADMIN — HEPARIN SODIUM 1600 UNIT(S)/HR: 5000 INJECTION INTRAVENOUS; SUBCUTANEOUS at 19:53

## 2023-01-01 RX ADMIN — Medication 100 MILLIGRAM(S): at 05:57

## 2023-01-01 RX ADMIN — Medication 3 MILLIGRAM(S): at 23:20

## 2023-01-01 RX ADMIN — SENNA PLUS 2 TABLET(S): 8.6 TABLET ORAL at 22:01

## 2023-01-01 RX ADMIN — MUPIROCIN 1 APPLICATION(S): 20 OINTMENT TOPICAL at 07:46

## 2023-01-01 RX ADMIN — APIXABAN 2.5 MILLIGRAM(S): 2.5 TABLET, FILM COATED ORAL at 04:54

## 2023-01-01 RX ADMIN — HEPARIN SODIUM 1800 UNIT(S)/HR: 5000 INJECTION INTRAVENOUS; SUBCUTANEOUS at 15:41

## 2023-01-01 RX ADMIN — Medication 40 MILLIEQUIVALENT(S): at 11:33

## 2023-01-01 RX ADMIN — Medication 20 MILLIGRAM(S): at 05:11

## 2023-01-01 RX ADMIN — Medication 4: at 16:20

## 2023-01-01 RX ADMIN — APIXABAN 2.5 MILLIGRAM(S): 2.5 TABLET, FILM COATED ORAL at 14:35

## 2023-01-01 RX ADMIN — Medication 1: at 17:13

## 2023-01-01 RX ADMIN — Medication 650 MILLIGRAM(S): at 13:41

## 2023-01-01 RX ADMIN — Medication 4: at 08:10

## 2023-01-01 RX ADMIN — Medication 3 MILLILITER(S): at 20:48

## 2023-01-01 RX ADMIN — Medication 4: at 16:53

## 2023-01-01 RX ADMIN — Medication 25 MILLIGRAM(S): at 17:18

## 2023-01-01 RX ADMIN — Medication 50 MILLIEQUIVALENT(S): at 16:00

## 2023-01-01 RX ADMIN — NYSTATIN CREAM 1 APPLICATION(S): 100000 CREAM TOPICAL at 06:22

## 2023-01-01 RX ADMIN — Medication 20 MILLIGRAM(S): at 14:13

## 2023-01-01 RX ADMIN — Medication 4 UNIT(S): at 08:09

## 2023-01-01 RX ADMIN — VASOPRESSIN 6 UNIT(S)/MIN: 20 INJECTION INTRAVENOUS at 11:21

## 2023-01-01 RX ADMIN — HEPARIN SODIUM 2100 UNIT(S)/HR: 5000 INJECTION INTRAVENOUS; SUBCUTANEOUS at 19:38

## 2023-07-19 PROBLEM — I26.99 OTHER PULMONARY EMBOLISM WITHOUT ACUTE COR PULMONALE: Chronic | Status: ACTIVE | Noted: 2022-05-12

## 2023-07-19 PROBLEM — I82.409 ACUTE EMBOLISM AND THROMBOSIS OF UNSPECIFIED DEEP VEINS OF UNSPECIFIED LOWER EXTREMITY: Chronic | Status: ACTIVE | Noted: 2022-05-13

## 2023-07-19 PROBLEM — J45.909 UNSPECIFIED ASTHMA, UNCOMPLICATED: Chronic | Status: ACTIVE | Noted: 2022-05-13

## 2023-07-19 PROBLEM — I10 ESSENTIAL (PRIMARY) HYPERTENSION: Chronic | Status: ACTIVE | Noted: 2022-05-13

## 2023-07-19 PROBLEM — E66.01 MORBID (SEVERE) OBESITY DUE TO EXCESS CALORIES: Chronic | Status: ACTIVE | Noted: 2022-05-13

## 2023-07-19 PROBLEM — F41.9 ANXIETY DISORDER, UNSPECIFIED: Chronic | Status: ACTIVE | Noted: 2022-05-13

## 2023-07-19 PROBLEM — I50.9 HEART FAILURE, UNSPECIFIED: Chronic | Status: ACTIVE | Noted: 2022-05-13

## 2023-07-19 NOTE — ED PROVIDER NOTE - PHYSICAL EXAMINATION
Const: Awake, alert and oriented. In no acute distress.  Eyes: No scleral icterus. EOMI.  Neck:. Soft and supple. Full ROM without pain.  Cardiac: Regular rate and regular rhythm. +S1/S2. Peripheral pulses 2+ and symmetric. +LE edema  Resp: Speaking in full sentences. No evidence of respiratory distress. No wheezes, rales or rhonchi.  Abd: Soft, non-tender, non-distended. Normal bowel sounds in all 4 quadrants. No guarding or rebound.  Back: Spine midline and non-tender. No CVAT.  Skin: No rashes, abrasions or lacerations.  Lymph: No cervical lymphadenopathy.  Neuro: Awake, alert & oriented x 3. Moves all extremities symmetrically.

## 2023-07-19 NOTE — ED PROVIDER NOTE - OBJECTIVE STATEMENT
36yo female with pmh of BMI > 40, CHF, B/L LE DVT + PE on Eliquis, HTN, Asthma and Anxiety presents with chest pain and sob. Pt states for the past 2 weeks with sob and increasing LE edema. Pt also states for the past couple of days with chest pain and cough (non productive) and today came in because she couldn't get up from bed. Pt denies fevers/chills, ha, loc, focal neuro deficits, palp, abd pain/n/v/d, urinary symptoms, recent travel and sick contacts.

## 2023-07-19 NOTE — ED ADULT TRIAGE NOTE - CHIEF COMPLAINT QUOTE
biba home c/o worsening BLE edema ; right leg worse than left (hx CHF, on aldactone) , + SOB x 2-3 weeks. + morbid obesity. pt reports fungal infections along abdominal folds, foul odor to areas. uses PRN o2 at home. minimally ambulatory @ baseline.

## 2023-07-19 NOTE — ED PROVIDER NOTE - CLINICAL SUMMARY MEDICAL DECISION MAKING FREE TEXT BOX
38yo female with pmh of BMI > 40, CHF, B/L LE DVT + PE on Eliquis, HTN, Asthma and Anxiety presents with chest pain and sob. Pt high risk for PE will do CTA and US duplex, pt needing pretreatment for CTA, also eval for ACS and CHF

## 2023-07-19 NOTE — ED ADULT NURSE REASSESSMENT NOTE - NS ED NURSE REASSESS COMMENT FT1
recvd pt from day rn Pt in no apparent distress at this time. Airway patent, breathing spontaneous and nonlabored. Pt A&Ox3 resting in stretcher. Pt no complaints at this time, pt on monitor

## 2023-07-19 NOTE — ED ADULT NURSE NOTE - NSFALLHARMRISKINTERV_ED_ALL_ED
Assistance OOB with selected safe patient handling equipment if applicable/Assistance with ambulation/Communicate risk of Fall with Harm to all staff, patient, and family/Monitor gait and stability/Provide patient with walking aids/Provide visual cue: red socks, yellow wristband, yellow gown, etc/Reinforce activity limits and safety measures with patient and family/Use of alarms - bed, stretcher, chair and/or video monitoring/Bed in lowest position, wheels locked, appropriate side rails in place/Call bell, personal items and telephone in reach/Instruct patient to call for assistance before getting out of bed/chair/stretcher/Non-slip footwear applied when patient is off stretcher/[place] to call system/Physically safe environment - no spills, clutter or unnecessary equipment/Purposeful Proactive Rounding/Room/bathroom lighting operational, light cord in reach

## 2023-07-19 NOTE — ED ADULT NURSE NOTE - OBJECTIVE STATEMENT
Pt presents with chest pain and sob. Pt states for the past 2 weeks she has had progressively worsening sob and increasing LE edema. Pt also states for the past couple of days with chest pain and cough (non productive) and today came in because she couldn't get up from bed. Pt denies fevers/chills, ha, loc, focal neuro deficits, palp, abd pain/n/v/d, urinary symptom. speaking full sentences. llungs clear

## 2023-07-19 NOTE — ED ADULT NURSE REASSESSMENT NOTE - NS ED NURSE REASSESS COMMENT FT1
Medicated with IV benadryl per protocol for ct scan, pt al it well, to have ct scan at 2000, pt updated on plan of care. speaking full sentences denies any new c/o at this time.

## 2023-07-19 NOTE — H&P ADULT - NSHPPHYSICALEXAM_GEN_ALL_CORE
T(C): 36.9 (07-19-23 @ 19:06), Max: 36.9 (07-19-23 @ 14:45)  HR: 88 (07-19-23 @ 19:06) (88 - 114)  BP: 125/64 (07-19-23 @ 19:06) (106/73 - 128/85)  RR: 20 (07-19-23 @ 19:51) (17 - 20)  SpO2: 94% (07-19-23 @ 19:51) (85% - 98%)    GENERAL: patient appears well, no acute distress, appropriate, pleasant  EYES: sclera clear, no exudates  ENMT: oropharynx clear without erythema, no exudates, moist mucous membranes  NECK: supple, soft, no thyromegaly noted  LUNGS: good air entry bilaterally, clear to auscultation, symmetric breath sounds, no wheezing or rhonchi appreciated  HEART: soft S1/S2, regular rate and rhythm, no murmurs noted, no lower extremity edema  GASTROINTESTINAL: abdomen is soft, nontender, nondistended, normoactive bowel sounds, no palpable masses  INTEGUMENT: good skin turgor, warm skin, appears well perfused  MUSCULOSKELETAL: no clubbing or cyanosis, no obvious deformity  NEUROLOGIC: awake, alert, oriented x3, good muscle tone in 4 extremities, no obvious sensory deficits  PSYCHIATRIC: mood is good, affect is congruent, linear and logical thought process  HEME/LYMPH: no palpable supraclavicular nodules, no obvious ecchymosis or petechiae T(C): 36.9 (07-19-23 @ 19:06), Max: 36.9 (07-19-23 @ 14:45)  HR: 88 (07-19-23 @ 19:06) (88 - 114)  BP: 125/64 (07-19-23 @ 19:06) (106/73 - 128/85)  RR: 20 (07-19-23 @ 19:51) (17 - 20)  SpO2: 94% (07-19-23 @ 19:51) (85% - 98%)    GENERAL: morbidly obese, patient appears well, no acute distress, appropriate, pleasant  EYES: sclera clear, no exudates  ENMT: oropharynx clear without erythema, no exudates, moist mucous membranes  NECK: supple, soft, no thyromegaly noted  LUNGS: good air entry bilaterally, clear to auscultation, symmetric breath sounds, no wheezing or rhonchi appreciated  HEART: soft S1/S2, regular rate and rhythm, no murmurs noted, no lower extremity edema  GASTROINTESTINAL: abdomen is soft, nontender, nondistended, normoactive bowel sounds, no palpable masses  INTEGUMENT: good skin turgor, warm skin, appears well perfused  MUSCULOSKELETAL: no clubbing or cyanosis, no obvious deformity  NEUROLOGIC: awake, alert, oriented x3, good muscle tone in 4 extremities, no obvious sensory deficits  PSYCHIATRIC: mood is good, affect is congruent, linear and logical thought process  HEME/LYMPH: no palpable supraclavicular nodules, no obvious ecchymosis or petechiae

## 2023-07-19 NOTE — ED ADULT NURSE REASSESSMENT NOTE - NS ED NURSE REASSESS COMMENT FT1
Pt c/o headache like symptoms medicated with IV Tylenol. la it well. aware of getting benadryl at 7pm for ct scan premedication protocol.

## 2023-07-19 NOTE — H&P ADULT - ASSESSMENT
38yo female with pmh of BMI > 40, CHF, B/L LE DVT + PE on Eliquis, HTN, Asthma and Anxiety presents with chest pain and sob.    Acute on Chronic HF   -CT with moderated pericardial effusion, BNP 5360  -hypoxia required 2l NC in the ED   -exam with b/L LE edema R>L  -received lasix 40mg IVP in the ED   -cont with Lasix 40mg IV BID   -daily weight, strict I/O  -Check TTE   -ED spoke with [facility] card, pt will be seen in the am   -cont with spironolactone, BB    Thrombocytopenia  -unclear etiology  -plts 76, no acute bleeding reported   -monitor closely while on AC     DVT/PE  -cont with Eliquis 2.5mg BID     HTN  -cont Metoprolol    Asthma  -cont inhalers     Anxiety/depression   -cont Paxil     DVT ppx  -Eliquis        36yo female with pmh of BMI > 40, CHF, B/L LE DVT + PE on Eliquis, HTN, Asthma and Anxiety presents with chest pain and sob.    Acute on Chronic HF   -CT with moderated pericardial effusion, BNP 5360  -hypoxia required 2l NC in the ED   -exam with b/L LE edema R>L  -received lasix 40mg IVP in the ED   -cont with Lasix 40mg IV BID   -daily weight, strict I/O  -Check TTE   -ED spoke with [facility] card, pt will be seen in the am   -cont with spironolactone, BB    Thrombocytopenia  -unclear etiology  -plts 76, no acute bleeding reported   -monitor closely while on AC     DVT/PE  -cont with Eliquis 2.5mg BID     HTN  -cont Metoprolol    Asthma  -cont inhalers     Anxiety/depression   -cont Paxil     DVT ppx  -Eliquis           I spent 75 minutes in patient encounter, greater than 50% of the time was spent in counseling/coordination of care.

## 2023-07-19 NOTE — ED PROVIDER NOTE - PROGRESS NOTE DETAILS
Patient received in sign-out from Dr. Becerra pending CT angio in evaluation of patient with significant cardiovascular disease concerning for possible PE, ACS, or worsening CHF. Patient noted to have O2 pulse ox of 84% on routine eval after it was noted portable tank was empty. Patient states she uses home 02 sporadically. JK - CTA without signs of PE; showing pericardial effusion. VSS resting comfortably. Cardiology consulted. BNP elevated. Symptoms secondary to HF. IV lasix given. Ready for admission to medicine. Currently stable.

## 2023-07-19 NOTE — H&P ADULT - HISTORY OF PRESENT ILLNESS
pt seen before midnight.    38yo female with pmh of BMI > 40, CHF, B/L LE DVT + PE on Eliquis, HTN, Asthma and Anxiety presents with chest pain and sob. Pt states for the past 2 weeks with sob and increasing LE edema. pt seen before midnight.    38yo female with pmh of BMI > 40, CHF, B/L LE DVT + PE on Eliquis, HTN, Asthma and Anxiety presents with chest pain and sob. Pt states for the past 2 weeks with sob and increasing LE edema. Pt states that in the past 5days she been having sob and worsening leg edema R>L came to ED for evaluation. Denies cp, palpitations, fever, chills, dysuria, n/v. In the ED pt found to be hypoxic 85% RA placed on 2L NC. labs pertinent for pro BNP 5360, Plts 76, CTA chest pertinent for moderate pericardial effusion, no PE. Pt was given Lasix 40mg IVP, neb and solu medrol in the ED.

## 2023-07-20 NOTE — CONSULT NOTE ADULT - SUBJECTIVE AND OBJECTIVE BOX
[facility], [facility]                                   [address] [place] [address]                                                      PHONE: [phone]                                                         FAX: [phone]  [email or web address]  ---------------------------------------------------------------------------------------------------------------------------------    Reason for Consult:    HPI:  MITCHEL GUNN is an 37y Female    PAST MEDICAL & SURGICAL HISTORY:  Pulmonary embolism      DVT, lower extremity      CHF (congestive heart failure)      Asthma      Anxiety      Severe obesity (BMI >= 40)      Hypertension      No significant past surgical history          iodine (Hives)  shellfish (Hives)  ceftriaxone (Hives)      MEDICATIONS  (STANDING):  apixaban 2.5 milliGRAM(s) Oral every 12 hours  budesonide 160 MICROgram(s)/formoterol 4.5 MICROgram(s) Inhaler 2 Puff(s) Inhalation two times a day  furosemide   Injectable 40 milliGRAM(s) IV Push two times a day  metoprolol tartrate 100 milliGRAM(s) Oral daily  montelukast 10 milliGRAM(s) Oral daily  PARoxetine 20 milliGRAM(s) Oral daily  spironolactone 50 milliGRAM(s) Oral daily    MEDICATIONS  (PRN):  acetaminophen     Tablet .. 650 milliGRAM(s) Oral every 6 hours PRN Temp greater or equal to 38C (100.4F), Mild Pain (1 - 3)  aluminum hydroxide/magnesium hydroxide/simethicone Suspension 30 milliLiter(s) Oral every 4 hours PRN Dyspepsia  melatonin 3 milliGRAM(s) Oral at bedtime PRN Insomnia  ondansetron Injectable 4 milliGRAM(s) IV Push every 8 hours PRN Nausea and/or Vomiting      Social History:  Cigarettes:  Denies current tobacco use                  Alcohol:  Denies daily etoh            Illicit Drug Abuse:  Denies    Family History:  denies CAD, MI, CVA, or sudden death.    ROS: Negative other than as mentioned in HPI.    Vital Signs Last 24 Hrs  T(C): 36.4 (20 Jul 2023 07:55), Max: 36.9 (19 Jul 2023 14:45)  T(F): 97.5 (20 Jul 2023 07:55), Max: 98.5 (19 Jul 2023 14:45)  HR: 62 (20 Jul 2023 07:55) (62 - 114)  BP: 118/72 (20 Jul 2023 07:55) (106/73 - 128/85)  BP(mean): --  RR: 20 (20 Jul 2023 07:55) (17 - 20)  SpO2: 98% (20 Jul 2023 07:55) (85% - 98%)    Parameters below as of 20 Jul 2023 07:55  Patient On (Oxygen Delivery Method): nasal cannula  O2 Flow (L/min): 3    ICU Vital Signs Last 24 Hrs  MITCHEL GUNN  I&O's Detail    I&O's Summary    Drug Dosing Weight  MITCHEL VELIA      PHYSICAL EXAM:  General: NAD  HEENT: Head; normocephalic, atraumatic.  Eyes: EOMI, conjunctiva normal  Neck: Supple, no JVD noted  CARDIOVASCULAR: RRR, S1 S2, No murmurs or gallops  LUNGS: Clear to auscultation b/l, No rales, rhonchi or wheeze, normal inspiratory effort  ABDOMEN: Soft, nontender, non-distended, +bowel sounds  EXTREMITIES: No edema b/l, no cyanosis   SKIN: warm and dry  NEURO: Alert/oriented x 3  PSYCH: normal affect.        LABS:                        10.2   2.39  )-----------( 70       ( 20 Jul 2023 02:25 )             36.2     07-20    136  |  101  |  16.5  ----------------------------<  160<H>  3.7   |  27.0  |  0.67    Ca    7.9<L>      20 Jul 2023 02:25  Mg     1.6     07-19    TPro  7.8  /  Alb  2.3<L>  /  TBili  1.3  /  DBili  x   /  AST  16  /  ALT  <5  /  AlkPhos  86  07-19    MITCHEL GUNN  CARDIAC MARKERS ( 19 Jul 2023 14:59 )  x     / <0.01 ng/mL / x     / x     / x          PT/INR - ( 19 Jul 2023 14:59 )   PT: 19.4 sec;   INR: 1.66 ratio         PTT - ( 19 Jul 2023 14:59 )  PTT:34.3 sec  Urinalysis Basic - ( 20 Jul 2023 02:25 )    Color: x / Appearance: x / SG: x / pH: x  Gluc: 160 mg/dL / Ketone: x  / Bili: x / Urobili: x   Blood: x / Protein: x / Nitrite: x   Leuk Esterase: x / RBC: x / WBC x   Sq Epi: x / Non Sq Epi: x / Bacteria: x        RADIOLOGY & ADDITIONAL STUDIES:    INTERPRETATION OF TELEMETRY (personally reviewed):    ECG:    ECHO:    STRESS TEST:    CARDIAC CATHETERIZATION:    Assessment and Plan:  In summary, MITCHEL GUNN is an 37y Female with past medical history significant for PAST MEDICAL & SURGICAL HISTORY:  Pulmonary embolism      DVT, lower extremity      CHF (congestive heart failure)      Asthma      Anxiety      Severe obesity (BMI >= 40)      Hypertension      No significant past surgical history       who presents       Thank you for letting [facility] to assist in the management of this patient. Please call with any questions.                 [facility], [facility]                                   [address] [place] [address]                                                      PHONE: [phone]                                                         FAX: [phone]  [email or web address]  ---------------------------------------------------------------------------------------------------------------------------------    Reason for Consult: SOB    HPI:  MITCHEL GUNN is an 37y Female with a PMHx of asthma, recurrent PE, morbid obesity, pulmonary HTN, HFpEF, HTN who presents with SOB. Patient states that about 5 days ago she started to not feel well and noticed that her weight increased about 4-5 pounds. Patient also states that she started to feel SOB. Over the next few days she noted her breathing getting worse and her legs significantly swelling which prompted her to come to the ER. Patient found to be volume overloaded. Cardiology consulted for further evaluation.  Patient denies any chest pain, palpitations, diaphoresis, near syncope, or syncope.     PAST MEDICAL & SURGICAL HISTORY:  Pulmonary embolism      DVT, lower extremity      CHF (congestive heart failure)      Asthma      Anxiety      Severe obesity (BMI >= 40)      Hypertension      No significant past surgical history          iodine (Hives)  shellfish (Hives)  ceftriaxone (Hives)      MEDICATIONS  (STANDING):  apixaban 2.5 milliGRAM(s) Oral every 12 hours  budesonide 160 MICROgram(s)/formoterol 4.5 MICROgram(s) Inhaler 2 Puff(s) Inhalation two times a day  furosemide   Injectable 40 milliGRAM(s) IV Push two times a day  metoprolol tartrate 100 milliGRAM(s) Oral daily  montelukast 10 milliGRAM(s) Oral daily  PARoxetine 20 milliGRAM(s) Oral daily  spironolactone 50 milliGRAM(s) Oral daily    MEDICATIONS  (PRN):  acetaminophen     Tablet .. 650 milliGRAM(s) Oral every 6 hours PRN Temp greater or equal to 38C (100.4F), Mild Pain (1 - 3)  aluminum hydroxide/magnesium hydroxide/simethicone Suspension 30 milliLiter(s) Oral every 4 hours PRN Dyspepsia  melatonin 3 milliGRAM(s) Oral at bedtime PRN Insomnia  ondansetron Injectable 4 milliGRAM(s) IV Push every 8 hours PRN Nausea and/or Vomiting      Social History:  Cigarettes:  Denies current tobacco use                  Alcohol:  Denies daily etoh            Illicit Drug Abuse:  Denies    Family History:  denies CAD, MI, CVA, or sudden death.    ROS: Negative other than as mentioned in HPI.    Vital Signs Last 24 Hrs  T(C): 36.4 (20 Jul 2023 07:55), Max: 36.9 (19 Jul 2023 14:45)  T(F): 97.5 (20 Jul 2023 07:55), Max: 98.5 (19 Jul 2023 14:45)  HR: 62 (20 Jul 2023 07:55) (62 - 114)  BP: 118/72 (20 Jul 2023 07:55) (106/73 - 128/85)  BP(mean): --  RR: 20 (20 Jul 2023 07:55) (17 - 20)  SpO2: 98% (20 Jul 2023 07:55) (85% - 98%)    Parameters below as of 20 Jul 2023 07:55  Patient On (Oxygen Delivery Method): nasal cannula  O2 Flow (L/min): 3    ICU Vital Signs Last 24 Hrs  MITCHEL GUNN  I&O's Detail    I&O's Summary    Drug Dosing Weight  MITCHELPHI GUNN      PHYSICAL EXAM:  General: NAD  HEENT: Head; normocephalic, atraumatic.  Eyes: EOMI, conjunctiva normal  Neck: Supple, no JVD noted  CARDIOVASCULAR: RRR, S1 S2, No murmurs or gallops  LUNGS: Clear to auscultation b/l, No rales, rhonchi or wheeze, normal inspiratory effort  ABDOMEN: Soft, nontender, non-distended, +bowel sounds  EXTREMITIES: No edema b/l, no cyanosis   SKIN: warm and dry  NEURO: Alert/oriented x 3  PSYCH: normal affect.        LABS:                        10.2   2.39  )-----------( 70       ( 20 Jul 2023 02:25 )             36.2     07-20    136  |  101  |  16.5  ----------------------------<  160<H>  3.7   |  27.0  |  0.67    Ca    7.9<L>      20 Jul 2023 02:25  Mg     1.6     07-19    TPro  7.8  /  Alb  2.3<L>  /  TBili  1.3  /  DBili  x   /  AST  16  /  ALT  <5  /  AlkPhos  86  07-19    MITCHEL GUNN  CARDIAC MARKERS ( 19 Jul 2023 14:59 )  x     / <0.01 ng/mL / x     / x     / x          PT/INR - ( 19 Jul 2023 14:59 )   PT: 19.4 sec;   INR: 1.66 ratio         PTT - ( 19 Jul 2023 14:59 )  PTT:34.3 sec  Urinalysis Basic - ( 20 Jul 2023 02:25 )    Color: x / Appearance: x / SG: x / pH: x  Gluc: 160 mg/dL / Ketone: x  / Bili: x / Urobili: x   Blood: x / Protein: x / Nitrite: x   Leuk Esterase: x / RBC: x / WBC x   Sq Epi: x / Non Sq Epi: x / Bacteria: x        RADIOLOGY & ADDITIONAL STUDIES:    INTERPRETATION OF TELEMETRY (personally reviewed):    ECG:    ECHO:    STRESS TEST:    CARDIAC CATHETERIZATION:    Assessment and Plan:  In summary, MITCHEL GUNN is an 37y Female with past medical history significant for PAST MEDICAL & SURGICAL HISTORY:  Pulmonary embolism      DVT, lower extremity      CHF (congestive heart failure)      Asthma      Anxiety      Severe obesity (BMI >= 40)      Hypertension      No significant past surgical history       who presents       Thank you for letting [facility] to assist in the management of this patient. Please call with any questions.                 [facility]                                   [address] [place] [address]                                                      PHONE: [phone]                                                         FAX: [phone]  [email or web address]  ---------------------------------------------------------------------------------------------------------------------------------    Reason for Consult: SOB    HPI:  MITCHEL GUNN is an 37y Female with a PMHx of asthma, recurrent PE, morbid obesity, pulmonary HTN, HFpEF, HTN who presents with SOB. Patient states that about 5 days ago she started to not feel well and noticed that her weight increased about 4-5 pounds. Patient also states that she started to feel SOB. Over the next few days she noted her breathing getting worse and her legs significantly swelling which prompted her to come to the ER. Patient found to be volume overloaded. Cardiology consulted for further evaluation.  Patient denies any chest pain, palpitations, diaphoresis, near syncope, or syncope.     PAST MEDICAL & SURGICAL HISTORY:  Pulmonary embolism      DVT, lower extremity      CHF (congestive heart failure)      Asthma      Anxiety      Severe obesity (BMI >= 40)      Hypertension      No significant past surgical history          iodine (Hives)  shellfish (Hives)  ceftriaxone (Hives)      MEDICATIONS  (STANDING):  apixaban 2.5 milliGRAM(s) Oral every 12 hours  budesonide 160 MICROgram(s)/formoterol 4.5 MICROgram(s) Inhaler 2 Puff(s) Inhalation two times a day  furosemide   Injectable 40 milliGRAM(s) IV Push two times a day  metoprolol tartrate 100 milliGRAM(s) Oral daily  montelukast 10 milliGRAM(s) Oral daily  PARoxetine 20 milliGRAM(s) Oral daily  spironolactone 50 milliGRAM(s) Oral daily    MEDICATIONS  (PRN):  acetaminophen     Tablet .. 650 milliGRAM(s) Oral every 6 hours PRN Temp greater or equal to 38C (100.4F), Mild Pain (1 - 3)  aluminum hydroxide/magnesium hydroxide/simethicone Suspension 30 milliLiter(s) Oral every 4 hours PRN Dyspepsia  melatonin 3 milliGRAM(s) Oral at bedtime PRN Insomnia  ondansetron Injectable 4 milliGRAM(s) IV Push every 8 hours PRN Nausea and/or Vomiting      Social History:  Cigarettes:  Denies current tobacco use                  Alcohol:  Denies daily etoh            Illicit Drug Abuse:  Denies    Family History:  denies sudden cardiac death.    ROS: Negative other than as mentioned in HPI.    Vital Signs Last 24 Hrs  T(C): 36.4 (20 Jul 2023 07:55), Max: 36.9 (19 Jul 2023 14:45)  T(F): 97.5 (20 Jul 2023 07:55), Max: 98.5 (19 Jul 2023 14:45)  HR: 62 (20 Jul 2023 07:55) (62 - 114)  BP: 118/72 (20 Jul 2023 07:55) (106/73 - 128/85)  BP(mean): --  RR: 20 (20 Jul 2023 07:55) (17 - 20)  SpO2: 98% (20 Jul 2023 07:55) (85% - 98%)    Parameters below as of 20 Jul 2023 07:55  Patient On (Oxygen Delivery Method): nasal cannula  O2 Flow (L/min): 3    ICU Vital Signs Last 24 Hrs  MITCHEL GUNN  I&O's Detail    I&O's Summary    Drug Dosing Weight  MITCHEL GUNN      PHYSICAL EXAM:  General: NAD  HEENT: Head; normocephalic, atraumatic.  Eyes: EOMI, conjunctiva normal  Neck: Supple, unable to assess JVD due to body habitus  CARDIOVASCULAR: RRR, S1 S2, No murmurs or gallops  LUNGS: Decreased breath sounds  ABDOMEN: Soft, nontender  EXTREMITIES: 3+edema b/l   SKIN: warm and dry  NEURO: Alert/oriented x 3  PSYCH: normal affect.        LABS:                        10.2   2.39  )-----------( 70       ( 20 Jul 2023 02:25 )             36.2     07-20    136  |  101  |  16.5  ----------------------------<  160<H>  3.7   |  27.0  |  0.67    Ca    7.9<L>      20 Jul 2023 02:25  Mg     1.6     07-19    TPro  7.8  /  Alb  2.3<L>  /  TBili  1.3  /  DBili  x   /  AST  16  /  ALT  <5  /  AlkPhos  86  07-19    MITCHEL GUNN  CARDIAC MARKERS ( 19 Jul 2023 14:59 )  x     / <0.01 ng/mL / x     / x     / x          PT/INR - ( 19 Jul 2023 14:59 )   PT: 19.4 sec;   INR: 1.66 ratio         PTT - ( 19 Jul 2023 14:59 )  PTT:34.3 sec  Urinalysis Basic - ( 20 Jul 2023 02:25 )    Color: x / Appearance: x / SG: x / pH: x  Gluc: 160 mg/dL / Ketone: x  / Bili: x / Urobili: x   Blood: x / Protein: x / Nitrite: x   Leuk Esterase: x / RBC: x / WBC x   Sq Epi: x / Non Sq Epi: x / Bacteria: x        RADIOLOGY & ADDITIONAL STUDIES:    INTERPRETATION OF TELEMETRY (personally reviewed): No significant events.    ECG: Please obtain    Assessment and Plan:  37y Female with a PMHx of asthma, recurrent PE, morbid obesity, pulmonary HTN, HFpEF, HTN who presents with SOB found to have acute on chronic diastolic heart failure exacerbation.    Acute on Chronic Diastolic Heart Failure Exacerbation  -patient significantly volume overloaded  -patient with good response to lasix 40mg IV BID  -c/w lasix 40mg IV BID  -daily weights  -strict I/O  -daily chemistry to evaluate renal function and electrolytes  -echo pending    Will continue to follow.    Thank you for letting [facility] to assist in the management of this patient. Please call with any questions.

## 2023-07-20 NOTE — PROGRESS NOTE ADULT - ASSESSMENT
38yo female with pmh of BMI > 40, CHF, B/L LE DVT + PE on Eliquis, HTN, Asthma and Anxiety presents with chest pain and sob.    Acute on Chronic diastolic  HF   -CT with moderated pericardial effusion, BNP 5360  -hypoxia required 2l NC in the ED   -exam with b/L LE edema R>L  -received lasix 40mg IVP in the ED   -cont with Lasix 40mg IV BID   -daily weight, strict I/O  -Check TTE   -ED spoke with [facility] card, pt will be seen in the am   -cont with spironolactone, BB    Suspected PNA  -    Thrombocytopenia  -unclear etiology  -plts 76, no acute bleeding reported   -monitor closely while on AC     DVT/PE  -cont with Eliquis 2.5mg BID     HTN  -cont Metoprolol    Asthma  -cont inhalers        Aczema    Anxiety/depression   -cont Paxil     DVT ppx  -Eliquis           I spent 75 minutes in patient encounter, greater than 50% of the time was spent in counseling/coordination of care.  38yo female with pmh of BMI > 40, CHF-diastolic, B/L LE DVT + PE on Eliquis, HTN, Asthma,eczema and Anxiety presents with chest pain and sob.    Acute hypoxic respiratory failure likely sec to Acute on Chronic diastolic  HF   Patchy infiltrate suspected PNA lottie gram negative  -CTA with moderated pericardial effusion, patchy infiltrate ,, BNP 5360  -2l NC,Taper off as tolerate  -IV  LQ . Afebrile,leukopenia.   -b/L LE edema,neg doppler for dvt  -received lasix 40mg IVP in the ED   -cont with Lasix 40mg IV BID, cont with spironolactone, BB  -daily weight, strict I/O  -Check TTE   -f/u cardiology rec  -    Thrombocytopenia/leukopenia  -unclear etiology  -plts 70, no acute bleeding reported   -monitor closely     HX DVT/PE  -cont with Eliquis 2.5mg BID  -cta chest no acute PE.Doppler neg dvt   -rec f/u hematology out pt    HTN  -cont Metoprolol    Asthma  -cont inhalers        Eczema-chronic   -Rec f/u dermatology out pt    Anxiety/depression   -cont Paxil     DVT ppx  -Eliquis           plan of care dw pt.

## 2023-07-20 NOTE — PROGRESS NOTE ADULT - SUBJECTIVE AND OBJECTIVE BOX
Patient is a 37y old  Female who presents with a chief complaint of Acute chronic HF (20 Jul 2023 10:36)      SUBJECTIVE / OVERNIGHT EVENTS:  REVIEW OF SYSTEMS: All systems are reviewed and found to be negative except above    MEDICATIONS  (STANDING):  apixaban 2.5 milliGRAM(s) Oral every 12 hours  budesonide 160 MICROgram(s)/formoterol 4.5 MICROgram(s) Inhaler 2 Puff(s) Inhalation two times a day  furosemide   Injectable 40 milliGRAM(s) IV Push two times a day  lactobacillus acidophilus 1 Tablet(s) Oral daily  levoFLOXacin IVPB      metoprolol tartrate 100 milliGRAM(s) Oral daily  montelukast 10 milliGRAM(s) Oral daily  PARoxetine 20 milliGRAM(s) Oral daily  spironolactone 50 milliGRAM(s) Oral daily    MEDICATIONS  (PRN):  acetaminophen     Tablet .. 650 milliGRAM(s) Oral every 6 hours PRN Temp greater or equal to 38C (100.4F), Mild Pain (1 - 3)  aluminum hydroxide/magnesium hydroxide/simethicone Suspension 30 milliLiter(s) Oral every 4 hours PRN Dyspepsia  melatonin 3 milliGRAM(s) Oral at bedtime PRN Insomnia  ondansetron Injectable 4 milliGRAM(s) IV Push every 8 hours PRN Nausea and/or Vomiting      CAPILLARY BLOOD GLUCOSE        I&O's Summary      PHYSICAL EXAM:  Vital Signs Last 24 Hrs  T(C): 36.4 (20 Jul 2023 07:55), Max: 36.9 (19 Jul 2023 14:45)  T(F): 97.5 (20 Jul 2023 07:55), Max: 98.5 (19 Jul 2023 14:45)  HR: 62 (20 Jul 2023 07:55) (62 - 114)  BP: 118/72 (20 Jul 2023 07:55) (106/73 - 128/85)  BP(mean): --  RR: 20 (20 Jul 2023 07:55) (17 - 20)  SpO2: 98% (20 Jul 2023 07:55) (85% - 98%)    Parameters below as of 20 Jul 2023 07:55  Patient On (Oxygen Delivery Method): nasal cannula  O2 Flow (L/min): 3      CONSTITUTIONAL: NAD,  EYES: PERRLA; conjunctiva and sclera clear  ENMT: Moist oral mucosa,   RESPIRATORY: Normal respiratory effort; lungs are clear to auscultation bilaterally  CARDIOVASCULAR: Regular rate and rhythm, normal S1 and S2, no murmur   EXTS: No lower extremity edema; Peripheral pulses are 2+ bilaterally  ABDOMEN: Nontender to palpation, normoactive bowel sounds, no rebound/guarding;   MUSCLOSKELETAL:   no clubbing or cyanosis of digits; no joint swelling or tenderness to palpation  PSYCH: affect appropriate  NEUROLOGY: A+O to person, place, and time; CN 2-12 are intact and symmetric; no gross sensory deficits;       LABS:                        10.2   2.39  )-----------( 70       ( 20 Jul 2023 02:25 )             36.2     07-20    136  |  101  |  16.5  ----------------------------<  160<H>  3.7   |  27.0  |  0.67    Ca    7.9<L>      20 Jul 2023 02:25  Mg     1.6     07-19    TPro  7.8  /  Alb  2.3<L>  /  TBili  1.3  /  DBili  x   /  AST  16  /  ALT  <5  /  AlkPhos  86  07-19    PT/INR - ( 19 Jul 2023 14:59 )   PT: 19.4 sec;   INR: 1.66 ratio         PTT - ( 19 Jul 2023 14:59 )  PTT:34.3 sec  CARDIAC MARKERS ( 19 Jul 2023 14:59 )  x     / <0.01 ng/mL / x     / x     / x          Urinalysis Basic - ( 20 Jul 2023 02:25 )    Color: x / Appearance: x / SG: x / pH: x  Gluc: 160 mg/dL / Ketone: x  / Bili: x / Urobili: x   Blood: x / Protein: x / Nitrite: x   Leuk Esterase: x / RBC: x / WBC x   Sq Epi: x / Non Sq Epi: x / Bacteria: x          RADIOLOGY & ADDITIONAL TESTS:  Results Reviewed:   c< from: CT Angio Chest PE Protocol w/ IV Cont (07.19.23 @ 20:30) >  IMPRESSION:  Limited evaluation for lobar, segmental and subsegmental pulmonary   embolism. No main, left or right pulmonary embolism.    Moderate pericardial effusion.    Mosaic attenuation may be in part due to expiratory phase of imaging.   Suggestion of superimposed patchy lung opacities that may be infectious   or inflammatory.    < end of copied text >   Patient is a 37y old  Female who presents with a chief complaint of Acute chronic HF (20 Jul 2023 10:36)    c/o cough,sob,legs swelling. Denies fever,chill,cp,n/v/d/dysuria,sick contacts  REVIEW OF SYSTEMS: All systems are reviewed and found to be negative except above    MEDICATIONS  (STANDING):  apixaban 2.5 milliGRAM(s) Oral every 12 hours  budesonide 160 MICROgram(s)/formoterol 4.5 MICROgram(s) Inhaler 2 Puff(s) Inhalation two times a day  furosemide   Injectable 40 milliGRAM(s) IV Push two times a day  lactobacillus acidophilus 1 Tablet(s) Oral daily  levoFLOXacin IVPB      metoprolol tartrate 100 milliGRAM(s) Oral daily  montelukast 10 milliGRAM(s) Oral daily  PARoxetine 20 milliGRAM(s) Oral daily  spironolactone 50 milliGRAM(s) Oral daily    MEDICATIONS  (PRN):  acetaminophen     Tablet .. 650 milliGRAM(s) Oral every 6 hours PRN Temp greater or equal to 38C (100.4F), Mild Pain (1 - 3)  aluminum hydroxide/magnesium hydroxide/simethicone Suspension 30 milliLiter(s) Oral every 4 hours PRN Dyspepsia  melatonin 3 milliGRAM(s) Oral at bedtime PRN Insomnia  ondansetron Injectable 4 milliGRAM(s) IV Push every 8 hours PRN Nausea and/or Vomiting      CAPILLARY BLOOD GLUCOSE        I&O's Summary      PHYSICAL EXAM:  Vital Signs Last 24 Hrs  T(C): 36.4 (20 Jul 2023 07:55), Max: 36.9 (19 Jul 2023 14:45)  T(F): 97.5 (20 Jul 2023 07:55), Max: 98.5 (19 Jul 2023 14:45)  HR: 62 (20 Jul 2023 07:55) (62 - 114)  BP: 118/72 (20 Jul 2023 07:55) (106/73 - 128/85)  BP(mean): --  RR: 20 (20 Jul 2023 07:55) (17 - 20)  SpO2: 98% (20 Jul 2023 07:55) (85% - 98%)    Parameters below as of 20 Jul 2023 07:55  Patient On (Oxygen Delivery Method): nasal cannula  O2 Flow (L/min): 3      CONSTITUTIONAL: NAD,obese  EYES: PERRLA; conjunctiva and sclera clear  ENMT: Moist oral mucosa,   RESPIRATORY: Normal respiratory effort; crackles  to auscultation bilaterally  CARDIOVASCULAR: Regular rate and rhythm, normal S1 and S2, no murmur   EXTS: + lower extremity edema; Peripheral pulses are 2+ bilaterally. RUE Eczema change --chronic  ABDOMEN: Nontender to palpation, normoactive bowel sounds, no rebound/guarding;   MUSCLOSKELETAL:   no joint swelling or tenderness to palpation  PSYCH: affect appropriate  NEUROLOGY: A+O to person, place, and time; CN 2-12 are intact and symmetric; no gross sensory/motor deficits;       LABS:                        10.2   2.39  )-----------( 70       ( 20 Jul 2023 02:25 )             36.2     07-20    136  |  101  |  16.5  ----------------------------<  160<H>  3.7   |  27.0  |  0.67    Ca    7.9<L>      20 Jul 2023 02:25  Mg     1.6     07-19    TPro  7.8  /  Alb  2.3<L>  /  TBili  1.3  /  DBili  x   /  AST  16  /  ALT  <5  /  AlkPhos  86  07-19    PT/INR - ( 19 Jul 2023 14:59 )   PT: 19.4 sec;   INR: 1.66 ratio         PTT - ( 19 Jul 2023 14:59 )  PTT:34.3 sec  CARDIAC MARKERS ( 19 Jul 2023 14:59 )  x     / <0.01 ng/mL / x     / x     / x          Urinalysis Basic - ( 20 Jul 2023 02:25 )    Color: x / Appearance: x / SG: x / pH: x  Gluc: 160 mg/dL / Ketone: x  / Bili: x / Urobili: x   Blood: x / Protein: x / Nitrite: x   Leuk Esterase: x / RBC: x / WBC x   Sq Epi: x / Non Sq Epi: x / Bacteria: x          RADIOLOGY & ADDITIONAL TESTS:  Results Reviewed:   c< from: CT Angio Chest PE Protocol w/ IV Cont (07.19.23 @ 20:30) >  IMPRESSION:  Limited evaluation for lobar, segmental and subsegmental pulmonary   embolism. No main, left or right pulmonary embolism.    Moderate pericardial effusion.    Mosaic attenuation may be in part due to expiratory phase of imaging.   Suggestion of superimposed patchy lung opacities that may be infectious   or inflammatory.    < end of copied text >      < from: US Duplex Venous Lower Ext Complete, Bilateral (07.20.23 @ 09:46) >    IMPRESSION:  No evidence of deep venous thrombosis in either lower extremity.    < end of copied text >

## 2023-07-20 NOTE — PATIENT PROFILE ADULT - FALL HARM RISK - RISK INTERVENTIONS

## 2023-07-20 NOTE — PATIENT PROFILE ADULT - FUNCTIONAL ASSESSMENT - BASIC MOBILITY 6.
3-calculated by average/Not able to assess (calculate score using [facility] averaging method) none

## 2023-07-21 NOTE — PROGRESS NOTE ADULT - SUBJECTIVE AND OBJECTIVE BOX
[facility]                              [address] [place] [address]                                                 PHONE: [phone]                                                 FAX: [phone]  -----------------------------------------------------------------------------------------------  Pt seen and examined. FU for  shortness of breath    Overnight events/Complaints: Pt remains on O2. Persistent edema.    Vital Signs Last 24 Hrs  T(C): 36.4 (21 Jul 2023 08:03), Max: 36.7 (20 Jul 2023 21:35)  T(F): 97.5 (21 Jul 2023 08:03), Max: 98.1 (20 Jul 2023 21:35)  HR: 88 (21 Jul 2023 08:53) (82 - 88)  BP: 118/76 (21 Jul 2023 08:03) (95/67 - 118/76)  BP(mean): --  RR: 19 (21 Jul 2023 08:03) (18 - 20)  SpO2: 95% (21 Jul 2023 08:03) (93% - 96%)    Parameters below as of 21 Jul 2023 08:53  Patient On (Oxygen Delivery Method): nasal cannula      I&O's Summary      RELEVENT PHYSICAL EXAM:  Neck: No obvious JVD  Cardiovascular: regular S1, S2  Respiratory: Lungs clear to auscultation; no crepitations, no wheeze  Musculoskeletal: ++ edema        LABS:                        10.4   4.71  )-----------( 122      ( 21 Jul 2023 07:09 )             38.1     07-21    136  |  100  |  28.0<H>  ----------------------------<  123<H>  3.8   |  24.0  |  1.10    Ca    8.4      21 Jul 2023 07:09  Mg     1.6     07-19    TPro  7.8  /  Alb  2.3<L>  /  TBili  1.3  /  DBili  x   /  AST  16  /  ALT  <5  /  AlkPhos  86  07-19    CARDIAC MARKERS ( 19 Jul 2023 14:59 )  x     / <0.01 ng/mL / x     / x     / x          PT/INR - ( 19 Jul 2023 14:59 )   PT: 19.4 sec;   INR: 1.66 ratio         PTT - ( 19 Jul 2023 14:59 )  PTT:34.3 sec    RADIOLOGY & ADDITIONAL STUDIES: (reviewed)  CXR was independently visualized/reviewed  and demonstrated: cardiomegaly    CARDIOLOGY TESTING:(reviewed)     ECHOCARDIOGRAM independently visualized/reviewed and demonstrated :    1. Technically difficult study.   2. Left ventricular ejection fraction, by visual estimation, is 60 to   65%.   3. Normal global left ventricular systolic function.   4. Right ventricular volume and pressure overload.   5. Severely enlarged right ventricle.   6. Moderately reduced RV systolic function.   7. Severely enlarged right atrium.   8. Mild mitral valve regurgitation.   9. Mild-moderate tricuspid regurgitation.  10. Mild pulmonic valve regurgitation.  11. Estimated pulmonary artery systolic pressure is 45.5 mmHg assuming a   right atrial pressure of 3 mmHg, which is consistent with mild pulmonary   hypertension.  12. Small pericardial effusion.    MEDICATIONS:(reviewed)  MEDICATIONS  (STANDING):  apixaban 2.5 milliGRAM(s) Oral every 12 hours  budesonide 160 MICROgram(s)/formoterol 4.5 MICROgram(s) Inhaler 2 Puff(s) Inhalation two times a day  furosemide   Injectable 40 milliGRAM(s) IV Push two times a day  lactobacillus acidophilus 1 Tablet(s) Oral daily  levoFLOXacin IVPB      levoFLOXacin IVPB 750 milliGRAM(s) IV Intermittent every 24 hours  metoprolol tartrate 25 milliGRAM(s) Oral two times a day  montelukast 10 milliGRAM(s) Oral daily  PARoxetine 20 milliGRAM(s) Oral daily  spironolactone 50 milliGRAM(s) Oral daily    ASSESSMENT AND PLAN:    37y Female with a PMHx of asthma, recurrent PE, morbid obesity, pulmonary HTN, HFpEF, HTN who presents with SOB found to have acute on chronic diastolic heart failure exacerbation.    Acute on Chronic Diastolic Heart Failure Exacerbation  -patient significantly volume overloaded  -c/w lasix 40mg IV BID with aldactone  -daily chemistry to evaluate renal function and electrolytes  -Echo consistent with likely right sided HF secondary to pHTN

## 2023-07-21 NOTE — PROGRESS NOTE ADULT - ASSESSMENT
36yo female with pmh of BMI > 40, CHF-diastolic, B/L LE DVT + PE on Eliquis, HTN, Asthma,eczema and Anxiety presents with chest pain and sob.    Acute hypoxic respiratory failure likely sec to Acute on Chronic diastolic  HF   Patchy infiltrate possible pneumonia  -cont with Lasix 40mg IV BID, cont with spironolactone, BB  -daily weight, strict I/O  -Check TTE normal EF  -f/u cardiology recs  -abx for suspected pneumonia    Thrombocytopenia/leukopenia  -unclear etiology  -plts 70 ->122  -monitor CBC    HX DVT/PE  -cont with Eliquis   -cta chest no acute PE.Doppler neg dvt   -rec f/u hematology out pt    HTN  -cont Metoprolol    Asthma  -cont inhalers        Eczema-chronic   -Rec f/u dermatology out pt    Anxiety/depression   -cont Paxil     DVT ppx  -Eliquis           plan of care d/w pt and Dr. White

## 2023-07-21 NOTE — PROGRESS NOTE ADULT - SUBJECTIVE AND OBJECTIVE BOX
Patient is a 37y old  Female who presents with a chief complaint of Acute chronic HF (21 Jul 2023 10:46)      INTERVAL History of Present Illness/OVERNIGHT EVENTS: feels better    MEDICATIONS  (STANDING):  apixaban 2.5 milliGRAM(s) Oral every 12 hours  budesonide 160 MICROgram(s)/formoterol 4.5 MICROgram(s) Inhaler 2 Puff(s) Inhalation two times a day  furosemide   Injectable 40 milliGRAM(s) IV Push two times a day  lactobacillus acidophilus 1 Tablet(s) Oral daily  levoFLOXacin IVPB 750 milliGRAM(s) IV Intermittent every 24 hours  levoFLOXacin IVPB      metoprolol tartrate 25 milliGRAM(s) Oral two times a day  montelukast 10 milliGRAM(s) Oral daily  PARoxetine 20 milliGRAM(s) Oral daily  spironolactone 50 milliGRAM(s) Oral daily    MEDICATIONS  (PRN):  acetaminophen     Tablet .. 650 milliGRAM(s) Oral every 6 hours PRN Temp greater or equal to 38C (100.4F), Mild Pain (1 - 3)  aluminum hydroxide/magnesium hydroxide/simethicone Suspension 30 milliLiter(s) Oral every 4 hours PRN Dyspepsia  melatonin 3 milliGRAM(s) Oral at bedtime PRN Insomnia  ondansetron Injectable 4 milliGRAM(s) IV Push every 8 hours PRN Nausea and/or Vomiting      Allergies    iodine (Hives)  shellfish (Hives)  ceftriaxone (Hives)    Intolerances        REVIEW OF SYSTEMS:  Other systems currently negative unless otherwise specified above.    Vital Signs Last 24 Hrs  T(C): 36.4 (21 Jul 2023 08:03), Max: 36.7 (20 Jul 2023 21:35)  T(F): 97.5 (21 Jul 2023 08:03), Max: 98.1 (20 Jul 2023 21:35)  HR: 88 (21 Jul 2023 08:53) (82 - 88)  BP: 118/76 (21 Jul 2023 08:03) (95/67 - 118/76)  BP(mean): --  RR: 19 (21 Jul 2023 08:03) (18 - 20)  SpO2: 95% (21 Jul 2023 08:03) (93% - 96%)    Parameters below as of 21 Jul 2023 08:53  Patient On (Oxygen Delivery Method): nasal cannula          Weight (kg): 171.5 (07-21 @ 09:33)    PHYSICAL EXAM:  GENERAL: No apparent distress, appears stated age  EYES: Conjunctiva and sclera clear, no discharge  ENMT: Moist mucous membranes, no nasal discharge  CHEST/LUNG: Clear to auscultation bilaterally, no wheeze or rales  HEART: Regular rhythm, no rubs or gallops  ABDOMEN: Morbidly obese limiting exam  EXTREMITIES:  No clubbing, cyanosis but pannus and edema thighs  SKIN: No rash, no new discoloration      LABS:                        10.4   4.71  )-----------( 122      ( 21 Jul 2023 07:09 )             38.1     21 Jul 2023 07:09    136    |  100    |  28.0   ----------------------------<  123    3.8     |  24.0   |  1.10     Ca    8.4        21 Jul 2023 07:09      PT/INR - ( 19 Jul 2023 14:59 )   PT: 19.4 sec;   INR: 1.66 ratio         PTT - ( 19 Jul 2023 14:59 )  PTT:34.3 sec  Urinalysis Basic - ( 21 Jul 2023 07:09 )    Color: x / Appearance: x / SG: x / pH: x  Gluc: 123 mg/dL / Ketone: x  / Bili: x / Urobili: x   Blood: x / Protein: x / Nitrite: x   Leuk Esterase: x / RBC: x / WBC x   Sq Epi: x / Non Sq Epi: x / Bacteria: x      CAPILLARY BLOOD GLUCOSE          Weight (kg): 171.5 (07-21 @ 09:33)    RADIOLOGY & ADDITIONAL TESTS:      Images reviewed personally    Consultant Notes Reviewed and Care Discussed with relevant Consultants.

## 2023-07-22 NOTE — PROGRESS NOTE ADULT - SUBJECTIVE AND OBJECTIVE BOX
[facility] Division of Hospital Medicine    Chief Complaint:      SUBJECTIVE / OVERNIGHT EVENTS: No events overnight.  Complains of not urinating enough and has some mild abdo pain related to gas and indigestion.    MEDICATIONS  (STANDING):  apixaban 2.5 milliGRAM(s) Oral every 12 hours  budesonide 160 MICROgram(s)/formoterol 4.5 MICROgram(s) Inhaler 2 Puff(s) Inhalation two times a day  furosemide   Injectable 40 milliGRAM(s) IV Push two times a day  furosemide   Injectable 80 milliGRAM(s) IV Push once  lactobacillus acidophilus 1 Tablet(s) Oral daily  levoFLOXacin IVPB 750 milliGRAM(s) IV Intermittent every 24 hours  levoFLOXacin IVPB      metoprolol tartrate 25 milliGRAM(s) Oral two times a day  montelukast 10 milliGRAM(s) Oral daily  PARoxetine 20 milliGRAM(s) Oral daily  spironolactone 50 milliGRAM(s) Oral daily    MEDICATIONS  (PRN):  acetaminophen     Tablet .. 650 milliGRAM(s) Oral every 6 hours PRN Temp greater or equal to 38C (100.4F), Mild Pain (1 - 3)  aluminum hydroxide/magnesium hydroxide/simethicone Suspension 30 milliLiter(s) Oral every 4 hours PRN Dyspepsia  melatonin 3 milliGRAM(s) Oral at bedtime PRN Insomnia  ondansetron Injectable 4 milliGRAM(s) IV Push every 8 hours PRN Nausea and/or Vomiting  simethicone 80 milliGRAM(s) Chew daily PRN Gas  simethicone 80 milliGRAM(s) Chew daily PRN Gas        I&O's Summary    21 Jul 2023 07:01  -  22 Jul 2023 07:00  --------------------------------------------------------  IN: 240 mL / OUT: 0 mL / NET: 240 mL        PHYSICAL EXAM:  Vital Signs Last 24 Hrs  T(C): 36.4 (22 Jul 2023 08:47), Max: 36.6 (21 Jul 2023 21:58)  T(F): 97.5 (22 Jul 2023 08:47), Max: 97.8 (21 Jul 2023 21:58)  HR: 85 (22 Jul 2023 08:47) (75 - 89)  BP: 105/68 (22 Jul 2023 08:47) (98/64 - 105/68)  BP(mean): 78 (21 Jul 2023 21:58) (74 - 78)  RR: 18 (22 Jul 2023 08:47) (18 - 19)  SpO2: 93% (22 Jul 2023 08:47) (92% - 97%)    Parameters below as of 22 Jul 2023 08:47  Patient On (Oxygen Delivery Method): nasal cannula            CONSTITUTIONAL: Morbidly Obese  RESPIRATORY: Normal respiratory effort; lungs are clear to auscultation bilaterally  CARDIOVASCULAR: Regular rate and rhythm, normal S1 and S2, no murmur/rub/gallop  ABDOMEN: Nontender to palpation, normoactive bowel sounds, no rebound/guarding; limited exam due to morbid obesity  PSYCH: A+O to person, place, and time; affect appropriate  NEUROLOGY: CN 2-12 are intact and symmetric; no gross sensory deficits;   Extremities: Lower extremity edema upto thigh area bilaterally    LABS:                        10.3   4.17  )-----------( 128      ( 22 Jul 2023 05:50 )             37.5     07-22    134<L>  |  100  |  32.6<H>  ----------------------------<  73  5.1   |  25.0  |  1.10    Ca    8.4      22 Jul 2023 05:50  Mg     2.0     07-22    TPro  7.7  /  Alb  2.5<L>  /  TBili  0.6  /  DBili  x   /  AST  23  /  ALT  6   /  AlkPhos  71  07-22          Urinalysis Basic - ( 22 Jul 2023 05:50 )    Color: x / Appearance: x / SG: x / pH: x  Gluc: 73 mg/dL / Ketone: x  / Bili: x / Urobili: x   Blood: x / Protein: x / Nitrite: x   Leuk Esterase: x / RBC: x / WBC x   Sq Epi: x / Non Sq Epi: x / Bacteria: x        CAPILLARY BLOOD GLUCOSE            RADIOLOGY & ADDITIONAL TESTS:  Results Reviewed  Imaging Personally Reviewed  Electrocardiogram Personally Reviewed

## 2023-07-22 NOTE — PROGRESS NOTE ADULT - ASSESSMENT
38yo female with pmh of BMI > 40, CHF-diastolic, B/L LE DVT + PE on Eliquis, HTN, Asthma,eczema and Anxiety presents with chest pain and sob.    Acute hypoxic respiratory failure likely sec to Acute on Chronic diastolic HF Due to Pulmonary HTN  -CTA with moderated pericardial effusion, patchy infiltrate, BNP 5360  -2l NC,Taper off as tolerate  -IV LQ . Afebrile,leukopenia.   -b/L LE edema,neg doppler for dvt  -received lasix 40mg IVP in the ED   -cont with Lasix 40mg IV BID, cont with spironolactone, BB  -daily weight, strict I/O  -Check TTE- shows EF 60-65%, Right sided ventricular and atrial overload, PASP 45mmhg  -f/u cardiology rec appreciated- will give lasix 80mg once tonight    Thrombocytopenia/leukopenia (Improved)  -unclear etiology  -plts 70-- > 128  -Will continue to monitor closely     HX DVT/PE  -cont with Eliquis 2.5mg BID  -cta chest no acute PE.Doppler neg dvt   -rec f/u hematology out pt    HTN  -cont Metoprolol    Asthma  -cont inhalers      Eczema-chronic   -Rec f/u dermatology out pt    Anxiety/depression   -cont Paxil     DVT ppx  -Eliquis           plan of care dw pt.

## 2023-07-22 NOTE — PROGRESS NOTE ADULT - SUBJECTIVE AND OBJECTIVE BOX
[place] [facility] - [facility] [place], THE HEART CENTER                                   [address] [place] [address]                                                      PHONE: [phone]                                                         FAX: [phone]  [email or web address]  ---------------------------------------------------------------------------------------------------------------------------------    Overnight events/patient complaints: Feels ok.  Breathing improved.  Legs still swollen.  She is concerned she does not seem to be urinating alot despite IV diuretic      iodine (Hives)  shellfish (Hives)  ceftriaxone (Hives)    MEDICATIONS  (STANDING):  apixaban 2.5 milliGRAM(s) Oral every 12 hours  budesonide 160 MICROgram(s)/formoterol 4.5 MICROgram(s) Inhaler 2 Puff(s) Inhalation two times a day  furosemide   Injectable 40 milliGRAM(s) IV Push two times a day  lactobacillus acidophilus 1 Tablet(s) Oral daily  levoFLOXacin IVPB 750 milliGRAM(s) IV Intermittent every 24 hours  levoFLOXacin IVPB      metoprolol tartrate 25 milliGRAM(s) Oral two times a day  montelukast 10 milliGRAM(s) Oral daily  PARoxetine 20 milliGRAM(s) Oral daily  spironolactone 50 milliGRAM(s) Oral daily    MEDICATIONS  (PRN):  acetaminophen     Tablet .. 650 milliGRAM(s) Oral every 6 hours PRN Temp greater or equal to 38C (100.4F), Mild Pain (1 - 3)  aluminum hydroxide/magnesium hydroxide/simethicone Suspension 30 milliLiter(s) Oral every 4 hours PRN Dyspepsia  melatonin 3 milliGRAM(s) Oral at bedtime PRN Insomnia  ondansetron Injectable 4 milliGRAM(s) IV Push every 8 hours PRN Nausea and/or Vomiting      Vital Signs Last 24 Hrs  T(C): 36.4 (22 Jul 2023 08:47), Max: 36.6 (21 Jul 2023 21:58)  T(F): 97.5 (22 Jul 2023 08:47), Max: 97.8 (21 Jul 2023 21:58)  HR: 85 (22 Jul 2023 08:47) (75 - 89)  BP: 105/68 (22 Jul 2023 08:47) (98/64 - 105/68)  BP(mean): 78 (21 Jul 2023 21:58) (74 - 78)  RR: 18 (22 Jul 2023 08:47) (18 - 19)  SpO2: 93% (22 Jul 2023 08:47) (92% - 97%)    Parameters below as of 22 Jul 2023 08:47  Patient On (Oxygen Delivery Method): nasal cannula      ICU Vital Signs Last 24 Hrs  MITCHEL GUNN  I&O's Detail    21 Jul 2023 07:01  -  22 Jul 2023 07:00  --------------------------------------------------------  IN:    Oral Fluid: 240 mL  Total IN: 240 mL    OUT:  Total OUT: 0 mL    Total NET: 240 mL        I&O's Summary    21 Jul 2023 07:01  -  22 Jul 2023 07:00  --------------------------------------------------------  IN: 240 mL / OUT: 0 mL / NET: 240 mL      Drug Dosing Weight  MITCHEL GUNN      PHYSICAL EXAM:  General: alert and cooperative.  HEENT: Head; normocephalic, atraumatic.  Eyes: Pupils reactive, cornea wnl.  Neck: Supple, no nodes adenopathy, no NVD or carotid bruit or thyromegaly.  CARDIOVASCULAR: Normal S1 and S2, No murmur, rub, gallop or lift.   LUNGS: No rales, rhonchi or wheeze. Normal breath sounds bilaterally.  ABDOMEN: Soft, nontender without mass or organomegaly. bowel sounds normoactive.  EXTREMITIES: marked BLE edema  SKIN: warm and dry with normal turgor.  NEURO: Alert/oriented x 3/normal motor exam. No pathologic reflexes.    PSYCH: normal affect.        LABS:                        10.3   4.17  )-----------( 128      ( 22 Jul 2023 05:50 )             37.5     07-22    134<L>  |  100  |  32.6<H>  ----------------------------<  73  5.1   |  25.0  |  1.10    Ca    8.4      22 Jul 2023 05:50  Mg     2.0     07-22    TPro  7.7  /  Alb  2.5<L>  /  TBili  0.6  /  DBili  x   /  AST  23  /  ALT  6   /  AlkPhos  71  07-22    MITCHEL GUNN        Urinalysis Basic - ( 22 Jul 2023 05:50 )    Color: x / Appearance: x / SG: x / pH: x  Gluc: 73 mg/dL / Ketone: x  / Bili: x / Urobili: x   Blood: x / Protein: x / Nitrite: x   Leuk Esterase: x / RBC: x / WBC x   Sq Epi: x / Non Sq Epi: x / Bacteria: x            ASSESSMENT AND PLAN:    37y Female with a PMHx of asthma, recurrent PE, morbid obesity, pulmonary HTN, HFpEF, HTN who presents with SOB found to have acute on chronic diastolic heart failure exacerbation.    Acute on Chronic Diastolic Heart Failure Exacerbation  -patient significantly volume overloaded  -try lasix 80mg IV tonight (pt reports not urinating "enough"), then back to 40mg IV bid  -daily chemistry to evaluate renal function and electrolytes  -Echo consistent with likely right sided HF secondary to pHTN  -Continue Eliquis for DVT prophylaxis

## 2023-07-23 NOTE — PROGRESS NOTE ADULT - ASSESSMENT
38yo female with pmh of BMI > 40, CHF-diastolic, B/L LE DVT + PE on Eliquis, HTN, Asthma,eczema and Anxiety presents with chest pain and sob. Admitted for acute on chronic diastolic CHF.    Acute hypoxic respiratory failure likely sec to Acute on Chronic diastolic HF Due to Pulmonary HTN  -CTA with moderated pericardial effusion, patchy infiltrate, BNP 5360  -2l NC,Taper off as tolerate  -IV LQ . Afebrile,leukopenia.   -b/L LE edema,neg doppler for dvt  -cont with Lasix 40mg IV BID, cont with spironolactone, BB  -daily weight, strict I/O  -TTE- shows EF 60-65%, Right sided ventricular and atrial overload, PASP 45mmhg  -f/u cardiology rec appreciated  -Lasix 80mg improved fluid overload, will continue with lasix 40mg BID for now.      Thrombocytopenia (Improved)  -unclear etiology  -plts 70-- > 128-->93  -Will continue to monitor closely     HX DVT/PE  -cont with Eliquis 2.5mg BID  -cta chest no acute PE.Doppler neg dvt   -rec f/u hematology out pt    HTN  -cont Metoprolol    Asthma  -cont inhalers   -cont montelukast    Eczema-chronic   -Rec f/u dermatology out pt    Anxiety/depression   -cont Paxil     DVT ppx  -Eliquis     Dyspepsia  - C/w Simethicone prn         Plan of care discussed with patient.

## 2023-07-23 NOTE — PROGRESS NOTE ADULT - SUBJECTIVE AND OBJECTIVE BOX
[facility] Division of Hospital Medicine    Chief Complaint: SOB and Fluid Overload    SUBJECTIVE / OVERNIGHT EVENTS: Endorses urinating frequently overnight due to lasix dose. feels her sob has improved and fluid overload is slowly better.    Patient denies chest pain, abd pain, N/V, fever, chills, dysuria or any other complaints. All remainder ROS negative.     MEDICATIONS  (STANDING):  apixaban 2.5 milliGRAM(s) Oral every 12 hours  budesonide 160 MICROgram(s)/formoterol 4.5 MICROgram(s) Inhaler 2 Puff(s) Inhalation two times a day  furosemide   Injectable 40 milliGRAM(s) IV Push two times a day  lactobacillus acidophilus 1 Tablet(s) Oral daily  levoFLOXacin IVPB 750 milliGRAM(s) IV Intermittent every 24 hours  levoFLOXacin IVPB      metoprolol tartrate 25 milliGRAM(s) Oral two times a day  montelukast 10 milliGRAM(s) Oral daily  PARoxetine 20 milliGRAM(s) Oral daily  spironolactone 50 milliGRAM(s) Oral daily    MEDICATIONS  (PRN):  acetaminophen     Tablet .. 650 milliGRAM(s) Oral every 6 hours PRN Temp greater or equal to 38C (100.4F), Mild Pain (1 - 3)  aluminum hydroxide/magnesium hydroxide/simethicone Suspension 30 milliLiter(s) Oral every 4 hours PRN Dyspepsia  melatonin 3 milliGRAM(s) Oral at bedtime PRN Insomnia  ondansetron Injectable 4 milliGRAM(s) IV Push every 8 hours PRN Nausea and/or Vomiting  simethicone 80 milliGRAM(s) Chew daily PRN Gas  simethicone 80 milliGRAM(s) Chew daily PRN Gas        I&O's Summary    22 Jul 2023 07:01  -  23 Jul 2023 07:00  --------------------------------------------------------  IN: 500 mL / OUT: 1175 mL / NET: -675 mL        PHYSICAL EXAM:  Vital Signs Last 24 Hrs  T(C): 36.6 (23 Jul 2023 05:11), Max: 36.7 (22 Jul 2023 21:19)  T(F): 97.8 (23 Jul 2023 05:11), Max: 98.1 (22 Jul 2023 21:19)  HR: 78 (23 Jul 2023 05:11) (78 - 83)  BP: 101/66 (23 Jul 2023 05:11) (101/66 - 107/67)  BP(mean): 81 (22 Jul 2023 16:57) (81 - 81)  RR: 17 (23 Jul 2023 05:11) (17 - 18)  SpO2: 94% (23 Jul 2023 05:11) (92% - 95%)    Parameters below as of 23 Jul 2023 05:11  Patient On (Oxygen Delivery Method): nasal cannula  O2 Flow (L/min): 2          CONSTITUTIONAL: Morbidly Obese, NAD  RESPIRATORY: Normal respiratory effort; Mild crackles bilaterally  CARDIOVASCULAR: Regular rate and rhythm, normal S1 and S2, no murmur/rub/gallop; + lower extremity edema; Peripheral pulses are 2+ bilaterally  ABDOMEN: Limited exam due to body habitus  MUSCLOSKELETAL:  Normal gait; no clubbing or cyanosis of digits; no joint swelling or tenderness to palpation  PSYCH: A+O to person, place, and time; affect appropriate  NEUROLOGY: CN 2-12 are intact and symmetric; no gross sensory deficits;   EXTREMITIES: BLE edema bilaterally    LABS:                        10.3   4.27  )-----------( 93       ( 23 Jul 2023 07:03 )             36.4     07-23    136  |  98  |  35.9<H>  ----------------------------<  91  3.9   |  30.0<H>  |  1.13    Ca    8.9      23 Jul 2023 07:03  Phos  2.9     07-23  Mg     1.7     07-23    TPro  8.2  /  Alb  2.9<L>  /  TBili  0.7  /  DBili  x   /  AST  25  /  ALT  10  /  AlkPhos  77  07-23          Urinalysis Basic - ( 23 Jul 2023 07:03 )    Color: x / Appearance: x / SG: x / pH: x  Gluc: 91 mg/dL / Ketone: x  / Bili: x / Urobili: x   Blood: x / Protein: x / Nitrite: x   Leuk Esterase: x / RBC: x / WBC x   Sq Epi: x / Non Sq Epi: x / Bacteria: x        CAPILLARY BLOOD GLUCOSE            RADIOLOGY & ADDITIONAL TESTS:  Results Reviewed    Imaging Personally Reviewed    Electrocardiogram Personally Reviewed

## 2023-07-24 NOTE — DIETITIAN INITIAL EVALUATION ADULT - ADD RECOMMEND
1) Consider fluid restriction per CHF, edema   2) Encourage po intake, monitor diet tolerance, and provide assistance at meals as needed  3) Obtain daily weights to monitor trends, strict I&O, gradual desired weight loss 1-2lbs per week towards IBW   4) Monitor nutrition related labs; renal, lytes

## 2023-07-24 NOTE — PROGRESS NOTE ADULT - SUBJECTIVE AND OBJECTIVE BOX
[facility] Division of Hospital Medicine    Chief Complaint:  SOB and Fluid Overload    SUBJECTIVE / OVERNIGHT EVENTS: Improved sob and feels much less fluid overloaded. Patient denies chest pain, SOB, abd pain, N/V, fever, chills, dysuria or any other complaints. All remainder ROS negative.     MEDICATIONS  (STANDING):  apixaban 2.5 milliGRAM(s) Oral every 12 hours  budesonide 160 MICROgram(s)/formoterol 4.5 MICROgram(s) Inhaler 2 Puff(s) Inhalation two times a day  furosemide   Injectable 40 milliGRAM(s) IV Push two times a day  lactobacillus acidophilus 1 Tablet(s) Oral daily  levoFLOXacin IVPB 750 milliGRAM(s) IV Intermittent every 24 hours  levoFLOXacin IVPB      metoprolol tartrate 25 milliGRAM(s) Oral two times a day  montelukast 10 milliGRAM(s) Oral daily  PARoxetine 20 milliGRAM(s) Oral daily  spironolactone 50 milliGRAM(s) Oral daily    MEDICATIONS  (PRN):  acetaminophen     Tablet .. 650 milliGRAM(s) Oral every 6 hours PRN Temp greater or equal to 38C (100.4F), Mild Pain (1 - 3)  aluminum hydroxide/magnesium hydroxide/simethicone Suspension 30 milliLiter(s) Oral every 4 hours PRN Dyspepsia  melatonin 3 milliGRAM(s) Oral at bedtime PRN Insomnia  ondansetron Injectable 4 milliGRAM(s) IV Push every 8 hours PRN Nausea and/or Vomiting  simethicone 80 milliGRAM(s) Chew daily PRN Gas  simethicone 80 milliGRAM(s) Chew daily PRN Gas        I&O's Summary    23 Jul 2023 07:01  -  24 Jul 2023 07:00  --------------------------------------------------------  IN: 1050 mL / OUT: 300 mL / NET: 750 mL        PHYSICAL EXAM:  Vital Signs Last 24 Hrs  T(C): 36.7 (24 Jul 2023 16:15), Max: 36.7 (24 Jul 2023 16:15)  T(F): 98 (24 Jul 2023 16:15), Max: 98 (24 Jul 2023 16:15)  HR: 84 (24 Jul 2023 16:15) (72 - 84)  BP: 102/64 (24 Jul 2023 16:15) (96/61 - 119/72)  BP(mean): --  RR: 18 (24 Jul 2023 16:15) (17 - 20)  SpO2: 95% (24 Jul 2023 16:15) (94% - 97%)    Parameters below as of 24 Jul 2023 16:15  Patient On (Oxygen Delivery Method): nasal cannula            CONSTITUTIONAL: Morbidly Obese, NAD  RESPIRATORY: Normal respiratory effort; CTAB  CARDIOVASCULAR: Regular rate and rhythm, normal S1 and S2, no murmur/rub/gallop; + lower extremity edema; Peripheral pulses are 2+ bilaterally  ABDOMEN: Limited exam due to body habitus  MUSCLOSKELETAL:  Normal gait; no clubbing or cyanosis of digits; no joint swelling or tenderness to palpation  PSYCH: A+O to person, place, and time; affect appropriate  NEUROLOGY: CN 2-12 are intact and symmetric; no gross sensory deficits;   EXTREMITIES: BLE edema bilaterally trace    LABS:                        10.7   4.12  )-----------( 78       ( 24 Jul 2023 06:10 )             37.6     07-24    137  |  100  |  38.5<H>  ----------------------------<  89  4.3   |  28.0  |  1.24    Ca    9.0      24 Jul 2023 06:10  Phos  2.8     07-24  Mg     1.7     07-24    TPro  7.8  /  Alb  2.7<L>  /  TBili  0.8  /  DBili  x   /  AST  23  /  ALT  10  /  AlkPhos  77  07-24          Urinalysis Basic - ( 24 Jul 2023 06:10 )    Color: x / Appearance: x / SG: x / pH: x  Gluc: 89 mg/dL / Ketone: x  / Bili: x / Urobili: x   Blood: x / Protein: x / Nitrite: x   Leuk Esterase: x / RBC: x / WBC x   Sq Epi: x / Non Sq Epi: x / Bacteria: x        CAPILLARY BLOOD GLUCOSE            RADIOLOGY & ADDITIONAL TESTS:  Results Reviewed    Imaging Personally Reviewed    Electrocardiogram Personally Reviewed.

## 2023-07-24 NOTE — PROGRESS NOTE ADULT - ASSESSMENT
36yo female with pmh of BMI > 40, CHF-diastolic, B/L LE DVT + PE on Eliquis, HTN, Asthma,eczema and Anxiety presents with chest pain and sob. Admitted for acute on chronic diastolic CHF.    Acute hypoxic respiratory failure likely sec to Acute on Chronic diastolic HF Due to Pulmonary HTN  -CTA with moderated pericardial effusion, patchy infiltrate, BNP 5360  -2l NC,Taper off as tolerate  -IV LQ . Afebrile,leukopenia.   -b/L LE edema,neg doppler for dvt  -cont with Lasix 40mg IV BID today, cont with spironolactone, BB  -daily weight, strict I/O  -f/u cardiology rec appreciated  -C/w Lasix 40mg PO daily from tomorrow      Thrombocytopenia ( worsened)  - Heme/Onc consult  -Will continue to monitor closely     HX DVT/PE  -cont with Eliquis 2.5mg BID  -cta chest no acute PE.Doppler neg dvt   -rec f/u hematology out pt    HTN  -cont Metoprolol    Asthma  -cont inhalers   -cont montelukast    Eczema-chronic   -Rec f/u dermatology out pt    Anxiety/depression   -cont Paxil     DVT ppx  -Eliquis     Dyspepsia  - C/w Simethicone prn         Plan of care discussed with patient.

## 2023-07-24 NOTE — PROGRESS NOTE ADULT - SUBJECTIVE AND OBJECTIVE BOX
[place] [facility] - [facility], THE HEART CENTER                                   [address] [place] [address]                                                      PHONE: [phone]                                                         FAX: [phone]  [email or web address]  ---------------------------------------------------------------------------------------------------------------------------------    Overnight events/patient complaints:      NAD feeling better today     iodine (Hives)  shellfish (Hives)  ceftriaxone (Hives)    MEDICATIONS  (STANDING):  apixaban 2.5 milliGRAM(s) Oral every 12 hours  budesonide 160 MICROgram(s)/formoterol 4.5 MICROgram(s) Inhaler 2 Puff(s) Inhalation two times a day  furosemide   Injectable 40 milliGRAM(s) IV Push two times a day  lactobacillus acidophilus 1 Tablet(s) Oral daily  levoFLOXacin IVPB 750 milliGRAM(s) IV Intermittent every 24 hours  levoFLOXacin IVPB      metoprolol tartrate 25 milliGRAM(s) Oral two times a day  montelukast 10 milliGRAM(s) Oral daily  PARoxetine 20 milliGRAM(s) Oral daily  spironolactone 50 milliGRAM(s) Oral daily    MEDICATIONS  (PRN):  acetaminophen     Tablet .. 650 milliGRAM(s) Oral every 6 hours PRN Temp greater or equal to 38C (100.4F), Mild Pain (1 - 3)  aluminum hydroxide/magnesium hydroxide/simethicone Suspension 30 milliLiter(s) Oral every 4 hours PRN Dyspepsia  melatonin 3 milliGRAM(s) Oral at bedtime PRN Insomnia  ondansetron Injectable 4 milliGRAM(s) IV Push every 8 hours PRN Nausea and/or Vomiting  simethicone 80 milliGRAM(s) Chew daily PRN Gas  simethicone 80 milliGRAM(s) Chew daily PRN Gas      Vital Signs Last 24 Hrs  T(C): 36.6 (24 Jul 2023 08:06), Max: 36.8 (23 Jul 2023 16:53)  T(F): 97.9 (24 Jul 2023 08:06), Max: 98.3 (23 Jul 2023 16:53)  HR: 72 (24 Jul 2023 08:06) (72 - 88)  BP: 108/66 (24 Jul 2023 08:06) (96/61 - 113/73)  BP(mean): 82 (23 Jul 2023 11:04) (82 - 82)  RR: 20 (24 Jul 2023 08:06) (17 - 20)  SpO2: 97% (24 Jul 2023 08:06) (94% - 97%)    Parameters below as of 24 Jul 2023 08:06  Patient On (Oxygen Delivery Method): nasal cannula      ICU Vital Signs Last 24 Hrs  MITCHEL GUNN  I&O's Detail    23 Jul 2023 07:01  -  24 Jul 2023 07:00  --------------------------------------------------------  IN:    IV PiggyBack: 150 mL    Oral Fluid: 900 mL  Total IN: 1050 mL    OUT:    Voided (mL): 300 mL  Total OUT: 300 mL    Total NET: 750 mL        I&O's Summary    23 Jul 2023 07:01  -  24 Jul 2023 07:00  --------------------------------------------------------  IN: 1050 mL / OUT: 300 mL / NET: 750 mL      Drug Dosing Weight  MITCHEL GUNN      PHYSICAL EXAM:  General: Appears well developed, alert and cooperative.  HEENT: Head; normocephalic, atraumatic.  Eyes: Pupils reactive, cornea wnl.  Neck: Supple, no nodes adenopathy, no NVD or carotid bruit or thyromegaly.  CARDIOVASCULAR: Normal S1 and S2, No murmur, rub, gallop or lift.   LUNGS: Decrease BS at the lower bases   ABDOMEN: Soft, nontender without mass or organomegaly. bowel sounds normoactive.  EXTREMITIES: No clubbing, cyanosis ++ edema. Distal pulses wnl.   SKIN: warm and dry with normal turgor.  NEURO: Alert/oriented x 3/normal motor exam. No pathologic reflexes.    PSYCH: normal affect.        LABS:                        10.7   4.12  )-----------( 78       ( 24 Jul 2023 06:10 )             37.6     07-24    137  |  100  |  38.5<H>  ----------------------------<  89  4.3   |  28.0  |  1.24    Ca    9.0      24 Jul 2023 06:10  Phos  2.8     07-24  Mg     1.7     07-24    TPro  7.8  /  Alb  2.7<L>  /  TBili  0.8  /  DBili  x   /  AST  23  /  ALT  10  /  AlkPhos  77  07-24    MITCHEL GUNN        Urinalysis Basic - ( 24 Jul 2023 06:10 )    Color: x / Appearance: x / SG: x / pH: x  Gluc: 89 mg/dL / Ketone: x  / Bili: x / Urobili: x   Blood: x / Protein: x / Nitrite: x   Leuk Esterase: x / RBC: x / WBC x   Sq Epi: x / Non Sq Epi: x / Bacteria: x        RADIOLOGY & ADDITIONAL STUDIES:    INTERPRETATION OF TELEMETRY (personally reviewed):    < from: TTE Echo Complete w/ Contrast w/ Doppler (07.20.23 @ 11:36) >  Summary:   1. Technically difficult study.   2. Left ventricular ejection fraction, by visual estimation, is 60 to   65%.   3. Normal global left ventricular systolic function.   4. Right ventricular volume and pressure overload.   5. Severely enlarged right ventricle.   6. Moderately reduced RV systolic function.  7. Severely enlarged right atrium.   8. Mild mitral valve regurgitation.   9. Mild-moderate tricuspid regurgitation.  10. Mild pulmonic valve regurgitation.  11. Estimated pulmonary artery systolic pressure is 45.5 mmHg assuming a   right atrial pressure of 3 mmHg, which is consistent with mild pulmonary   hypertension.  12. Small pericardial effusion.    MD Ella Electronically signed on 7/20/2023 at 1:39:21 PM    < end of copied text >      37y Female with a PMHx of asthma, recurrent PE, morbid obesity, pulmonary HTN, HFpEF, HTN who presents with SOB found to have acute on chronic diastolic heart failure exacerbation.    Acute on Chronic Diastolic Heart Failure Exacerbation  -Continue Lasix IV BID today and change to 40 mg po daily tomorrow   -TTE consistent with likely right sided HF secondary to obese/PHTN  -Continue Eliquis for DVT prophylaxis

## 2023-07-24 NOTE — DIETITIAN INITIAL EVALUATION ADULT - ORAL INTAKE PTA/DIET HISTORY
RD assessment completed at bedside. Pt states usual body weight is around 338lbs, current weight 378.9lbs. Increase in weight due to fluid retention, edema noted and charted. Pt was eating breakfast at time of assessment with good appetite. Complaints of N/C. Discussed pt with RN, no dietary concerns, RD to remain available

## 2023-07-24 NOTE — DIETITIAN INITIAL EVALUATION ADULT - PERTINENT LABORATORY DATA
07-24    137  |  100  |  38.5<H>  ----------------------------<  89  4.3   |  28.0  |  1.24    Ca    9.0      24 Jul 2023 06:10  Phos  2.8     07-24  Mg     1.7     07-24    TPro  7.8  /  Alb  2.7<L>  /  TBili  0.8  /  DBili  x   /  AST  23  /  ALT  10  /  AlkPhos  77  07-24 07-24 Na137 mmol/L Glu 89 mg/dL K+ 4.3 mmol/L Cr  1.24 mg/dL BUN 38.5 mg/dL<H> Phos 2.8 mg/dL Alb 2.7 g/dL<L> PAB n/a

## 2023-07-24 NOTE — DIETITIAN INITIAL EVALUATION ADULT - PERTINENT MEDS FT
MEDICATIONS  (STANDING):  apixaban 2.5 milliGRAM(s) Oral every 12 hours  budesonide 160 MICROgram(s)/formoterol 4.5 MICROgram(s) Inhaler 2 Puff(s) Inhalation two times a day  furosemide   Injectable 40 milliGRAM(s) IV Push two times a day  lactobacillus acidophilus 1 Tablet(s) Oral daily  levoFLOXacin IVPB 750 milliGRAM(s) IV Intermittent every 24 hours  levoFLOXacin IVPB      metoprolol tartrate 25 milliGRAM(s) Oral two times a day  montelukast 10 milliGRAM(s) Oral daily  PARoxetine 20 milliGRAM(s) Oral daily  spironolactone 50 milliGRAM(s) Oral daily    MEDICATIONS  (PRN):  acetaminophen     Tablet .. 650 milliGRAM(s) Oral every 6 hours PRN Temp greater or equal to 38C (100.4F), Mild Pain (1 - 3)  aluminum hydroxide/magnesium hydroxide/simethicone Suspension 30 milliLiter(s) Oral every 4 hours PRN Dyspepsia  melatonin 3 milliGRAM(s) Oral at bedtime PRN Insomnia  ondansetron Injectable 4 milliGRAM(s) IV Push every 8 hours PRN Nausea and/or Vomiting  simethicone 80 milliGRAM(s) Chew daily PRN Gas  simethicone 80 milliGRAM(s) Chew daily PRN Gas

## 2023-07-24 NOTE — DIETITIAN INITIAL EVALUATION ADULT - OTHER INFO
37y Female with a PMHx of asthma, recurrent PE, morbid obesity, pulmonary HTN, HFpEF, HTN who presents with SOB found to have acute on chronic diastolic heart failure exacerbation.

## 2023-07-24 NOTE — CONSULT NOTE ADULT - SUBJECTIVE AND OBJECTIVE BOX
HPI:  pt seen before midnight.    38yo female with pmh of BMI > 40, CHF, B/L LE DVT + PE on Eliquis, HTN, Asthma and Anxiety presents with chest pain and sob. Pt states for the past 2 weeks with sob and increasing LE edema. Pt states that in the past 5days she been having sob and worsening leg edema R>L came to ED for evaluation. Denies cp, palpitations, fever, chills, dysuria, n/v. In the ED pt found to be hypoxic 85% RA placed on 2L NC. labs pertinent for pro BNP 5360, Plts 76, CTA chest pertinent for moderate pericardial effusion, no PE. Pt was given Lasix 40mg IVP, neb and solu medrol in the ED. Her platelet count as fluctuated and went as high as 128, but more recently trended down to 93-->78. Hematology was consulted to address anticoagulation in the setting thrombocytopenia.      REVIEW OF SYSTEMS:    As per HPI    PAST MEDICAL & SURGICAL HISTORY:  Pulmonary embolism      DVT, lower extremity      CHF (congestive heart failure)      Asthma      Anxiety      Severe obesity (BMI >= 40)      Hypertension      No significant past surgical history        FAMILY HISTORY:  Family history of colon cancer (Mother)    Family history of stroke (Father)        Allergies    iodine (Hives)  shellfish (Hives)  ceftriaxone (Hives)    Intolerances        MEDICATIONS  (STANDING):  apixaban 2.5 milliGRAM(s) Oral every 12 hours  budesonide 160 MICROgram(s)/formoterol 4.5 MICROgram(s) Inhaler 2 Puff(s) Inhalation two times a day  furosemide   Injectable 40 milliGRAM(s) IV Push two times a day  lactobacillus acidophilus 1 Tablet(s) Oral daily  levoFLOXacin IVPB 750 milliGRAM(s) IV Intermittent every 24 hours  levoFLOXacin IVPB      metoprolol tartrate 25 milliGRAM(s) Oral two times a day  montelukast 10 milliGRAM(s) Oral daily  PARoxetine 20 milliGRAM(s) Oral daily  spironolactone 50 milliGRAM(s) Oral daily    MEDICATIONS  (PRN):  acetaminophen     Tablet .. 650 milliGRAM(s) Oral every 6 hours PRN Temp greater or equal to 38C (100.4F), Mild Pain (1 - 3)  aluminum hydroxide/magnesium hydroxide/simethicone Suspension 30 milliLiter(s) Oral every 4 hours PRN Dyspepsia  melatonin 3 milliGRAM(s) Oral at bedtime PRN Insomnia  ondansetron Injectable 4 milliGRAM(s) IV Push every 8 hours PRN Nausea and/or Vomiting  simethicone 80 milliGRAM(s) Chew daily PRN Gas  simethicone 80 milliGRAM(s) Chew daily PRN Gas      Vital Signs Last 24 Hrs  T(C): 36.6 (24 Jul 2023 08:06), Max: 36.8 (23 Jul 2023 16:53)  T(F): 97.9 (24 Jul 2023 08:06), Max: 98.3 (23 Jul 2023 16:53)  HR: 72 (24 Jul 2023 08:06) (72 - 88)  BP: 108/66 (24 Jul 2023 08:06) (96/61 - 113/73)  BP(mean): --  RR: 20 (24 Jul 2023 08:06) (17 - 20)  SpO2: 97% (24 Jul 2023 08:06) (94% - 97%)    Parameters below as of 24 Jul 2023 08:06  Patient On (Oxygen Delivery Method): nasal cannula        PHYSICAL EXAM:    GENERAL: NAD, well-groomed, well-developed  HEAD:  Atraumatic, Normocephalic  EYES: EOMI, PERRLA, conjunctiva and sclera clear  ENMT:  Moist mucous membranes  NECK: Supple, No JVD, Normal thyroid  NERVOUS SYSTEM:  Alert & Oriented X3  CHEST/LUNG: Clear to percussion bilaterally  HEART: Regular rate and rhythm  ABDOMEN: Soft, Nontender, Nondistended  EXTREMITIES:  2+ Peripheral Pulses, 1+ edema bilat  SKIN: No rashes or lesions      LABS:                        10.7   4.12  )-----------( 78       ( 24 Jul 2023 06:10 )             37.6     07-24    137  |  100  |  38.5<H>  ----------------------------<  89  4.3   |  28.0  |  1.24    Ca    9.0      24 Jul 2023 06:10  Phos  2.8     07-24  Mg     1.7     07-24    TPro  7.8  /  Alb  2.7<L>  /  TBili  0.8  /  DBili  x   /  AST  23  /  ALT  10  /  AlkPhos  77  07-24      Urinalysis Basic - ( 24 Jul 2023 06:10 )    Color: x / Appearance: x / SG: x / pH: x  Gluc: 89 mg/dL / Ketone: x  / Bili: x / Urobili: x   Blood: x / Protein: x / Nitrite: x   Leuk Esterase: x / RBC: x / WBC x   Sq Epi: x / Non Sq Epi: x / Bacteria: x          RADIOLOGY & ADDITIONAL STUDIES:< from: US Duplex Venous Lower Ext Complete, Bilateral (07.20.23 @ 09:46) >  RIGHT:  Normal compressibility of the RIGHT common femoral, femoral and popliteal   veins.  Doppler examination shows normal spontaneous and phasic flow.  No RIGHT calf vein thrombosis is detected.    LEFT:  Normal compressibility of the LEFT common femoral, femoral and popliteal   veins.  Doppler examination shows normal spontaneous andphasic flow.  No LEFT calf vein thrombosis is detected.    IMPRESSION:  No evidence of deep venous thrombosis in either lower extremity.    < end of copied text >      PATHOLOGY:

## 2023-07-25 NOTE — PROGRESS NOTE ADULT - SUBJECTIVE AND OBJECTIVE BOX
[facility]                                      [facility], [facility]                              [address] [place] [address]                                                 PHONE: [phone]                                                 FAX: [phone]  -----------------------------------------------------------------------------------------------  Pt seen and examined. FU for  shortness of breath    Overnight events/Complaints: Pt remains on O2. Persistent edema.    Vital Signs Last 24 Hrs  T(C): 36.7 (25 Jul 2023 07:50), Max: 36.7 (24 Jul 2023 16:15)  T(F): 98 (25 Jul 2023 07:50), Max: 98.1 (24 Jul 2023 23:53)  HR: 73 (25 Jul 2023 07:50) (72 - 84)  BP: 114/70 (25 Jul 2023 07:50) (82/58 - 119/72)  BP(mean): --  RR: 17 (25 Jul 2023 07:50) (17 - 21)  SpO2: 95% (25 Jul 2023 07:50) (94% - 98%)    Parameters below as of 25 Jul 2023 05:24  Patient On (Oxygen Delivery Method): nasal cannula  O2 Flow (L/min): 3    I&O's Summary      RELEVENT PHYSICAL EXAM:  Neck: No obvious JVD  Cardiovascular: regular S1, S2  Respiratory: Lungs clear to auscultation; no crepitations, no wheeze  Musculoskeletal: ++ edema        LABS:                        9.9    3.79  )-----------( 76       ( 25 Jul 2023 06:44 )             34.8     07-25    136  |  97  |  38.3<H>  ----------------------------<  79  4.0   |  30.0<H>  |  1.21    Ca    8.4      25 Jul 2023 06:44  Phos  3.0     07-25  Mg     1.7     07-25    TPro  7.2  /  Alb  2.8<L>  /  TBili  0.8  /  DBili  x   /  AST  17  /  ALT  8   /  AlkPhos  75  07-25      RADIOLOGY & ADDITIONAL STUDIES: (reviewed)  CXR was independently visualized/reviewed  and demonstrated: cardiomegaly    CARDIOLOGY TESTING:(reviewed)     ECHOCARDIOGRAM independently visualized/reviewed and demonstrated :    1. Technically difficult study.   2. Left ventricular ejection fraction, by visual estimation, is 60 to   65%.   3. Normal global left ventricular systolic function.   4. Right ventricular volume and pressure overload.   5. Severely enlarged right ventricle.   6. Moderately reduced RV systolic function.   7. Severely enlarged right atrium.   8. Mild mitral valve regurgitation.   9. Mild-moderate tricuspid regurgitation.  10. Mild pulmonic valve regurgitation.  11. Estimated pulmonary artery systolic pressure is 45.5 mmHg assuming a   right atrial pressure of 3 mmHg, which is consistent with mild pulmonary   hypertension.  12. Small pericardial effusion.    MEDICATIONS:(reviewed)  MEDICATIONS  (STANDING):  apixaban 2.5 milliGRAM(s) Oral every 12 hours  budesonide 160 MICROgram(s)/formoterol 4.5 MICROgram(s) Inhaler 2 Puff(s) Inhalation two times a day  lactobacillus acidophilus 1 Tablet(s) Oral daily  levoFLOXacin IVPB 750 milliGRAM(s) IV Intermittent every 24 hours  levoFLOXacin IVPB      magnesium sulfate  IVPB 1 Gram(s) IV Intermittent once  metoprolol tartrate 25 milliGRAM(s) Oral two times a day  montelukast 10 milliGRAM(s) Oral daily  nystatin Powder 1 Application(s) Topical every 12 hours  PARoxetine 20 milliGRAM(s) Oral daily  spironolactone 50 milliGRAM(s) Oral daily    ASSESSMENT AND PLAN:    37y Female with a PMHx of asthma, recurrent PE, morbid obesity, pulmonary HTN, HFpEF, HTN who presents with SOB found to have acute on chronic diastolic heart failure exacerbation.    Acute on Chronic Diastolic Heart Failure Exacerbation  -patient significantly volume overloaded but improved O2  -c/w lasix 40mg  with aldactone  -Echo consistent with likely right sided HF secondary to pHTN

## 2023-07-25 NOTE — PROGRESS NOTE ADULT - ASSESSMENT
38yo female with pmh of BMI > 40, CHF-diastolic, B/L LE DVT + PE on Eliquis, HTN, Asthma,eczema and Anxiety presents with chest pain and sob. Admitted for acute on chronic diastolic CHF.    Acute hypoxic respiratory failure likely sec to Acute on Chronic diastolic HF Due to Pulmonary HTN  -CTA with moderated pericardial effusion, patchy infiltrate, BNP 5360  -2l NC,Taper off as tolerate  -IV LQ . Afebrile,leukopenia.   -b/L LE edema,neg doppler for dvt  -cont with Lasix 40mg IV BID today, cont with spironolactone, BB  -daily weight, strict I/O  -f/u cardiology rec appreciated  -C/w Lasix 40mg PO daily from tomorrow      Thrombocytopenia (stable)  - Heme/Onc consult noted.  - Will hold eliquis if platelets<50K  -Will continue to monitor closely     HX DVT/PE  -cont with Eliquis 2.5mg BID  -cta chest no acute PE.Doppler neg dvt   -rec f/u hematology out pt    HTN  -cont Metoprolol    Asthma  -cont inhalers   -cont montelukast    Eczema-chronic   -Rec f/u dermatology out pt    Anxiety/depression   -cont Paxil     DVT ppx  -Eliquis     Dyspepsia  - C/w Simethicone prn         Plan of care discussed with patient.

## 2023-07-25 NOTE — PROGRESS NOTE ADULT - SUBJECTIVE AND OBJECTIVE BOX
[facility] Division of Hospital Medicine    Chief Complaint: Sob    SUBJECTIVE / OVERNIGHT EVENTS: had episode of hypotension and felt some dizziness at that time. received 250cc bolus with improvement in bp and symptoms. at bedside, she feels better this morning with improvement in O2 though still fluid overloaded in BLE.  Patient denies chest pain, SOB, abd pain, N/V, fever, chills, dysuria or any other complaints. All remainder ROS negative.     MEDICATIONS  (STANDING):  apixaban 2.5 milliGRAM(s) Oral every 12 hours  budesonide 160 MICROgram(s)/formoterol 4.5 MICROgram(s) Inhaler 2 Puff(s) Inhalation two times a day  lactobacillus acidophilus 1 Tablet(s) Oral daily  levoFLOXacin IVPB 750 milliGRAM(s) IV Intermittent every 24 hours  levoFLOXacin IVPB      metoprolol tartrate 25 milliGRAM(s) Oral two times a day  montelukast 10 milliGRAM(s) Oral daily  nystatin Powder 1 Application(s) Topical every 12 hours  PARoxetine 20 milliGRAM(s) Oral daily  spironolactone 50 milliGRAM(s) Oral daily    MEDICATIONS  (PRN):  acetaminophen     Tablet .. 650 milliGRAM(s) Oral every 6 hours PRN Temp greater or equal to 38C (100.4F), Mild Pain (1 - 3)  aluminum hydroxide/magnesium hydroxide/simethicone Suspension 30 milliLiter(s) Oral every 4 hours PRN Dyspepsia  melatonin 3 milliGRAM(s) Oral at bedtime PRN Insomnia  ondansetron Injectable 4 milliGRAM(s) IV Push every 8 hours PRN Nausea and/or Vomiting  simethicone 80 milliGRAM(s) Chew daily PRN Gas  simethicone 80 milliGRAM(s) Chew daily PRN Gas        I&O's Summary      PHYSICAL EXAM:  Vital Signs Last 24 Hrs  T(C): 36.7 (25 Jul 2023 07:50), Max: 36.7 (24 Jul 2023 16:15)  T(F): 98 (25 Jul 2023 07:50), Max: 98.1 (24 Jul 2023 23:53)  HR: 73 (25 Jul 2023 07:50) (72 - 84)  BP: 114/70 (25 Jul 2023 07:50) (82/58 - 114/70)  BP(mean): --  RR: 17 (25 Jul 2023 07:50) (17 - 21)  SpO2: 95% (25 Jul 2023 07:50) (94% - 98%)    Parameters below as of 25 Jul 2023 05:24  Patient On (Oxygen Delivery Method): nasal cannula  O2 Flow (L/min): 3          CONSTITUTIONAL: Morbidly Obese, NAD  RESPIRATORY: Normal respiratory effort; CTAB  CARDIOVASCULAR: Regular rate and rhythm, normal S1 and S2, no murmur/rub/gallop; + lower extremity edema; Peripheral pulses are 2+ bilaterally  ABDOMEN: Limited exam due to body habitus  MUSCLOSKELETAL:  Normal gait; no clubbing or cyanosis of digits; no joint swelling or tenderness to palpation  PSYCH: A+O to person, place, and time; affect appropriate  NEUROLOGY: CN 2-12 are intact and symmetric; no gross sensory deficits;   EXTREMITIES: BLE edema bilaterally 2+    LABS:                        9.9    3.79  )-----------( 76       ( 25 Jul 2023 06:44 )             34.8     07-25    136  |  97  |  38.3<H>  ----------------------------<  79  4.0   |  30.0<H>  |  1.21    Ca    8.4      25 Jul 2023 06:44  Phos  3.0     07-25  Mg     1.7     07-25    TPro  7.2  /  Alb  2.8<L>  /  TBili  0.8  /  DBili  x   /  AST  17  /  ALT  8   /  AlkPhos  75  07-25          Urinalysis Basic - ( 25 Jul 2023 06:44 )    Color: x / Appearance: x / SG: x / pH: x  Gluc: 79 mg/dL / Ketone: x  / Bili: x / Urobili: x   Blood: x / Protein: x / Nitrite: x   Leuk Esterase: x / RBC: x / WBC x   Sq Epi: x / Non Sq Epi: x / Bacteria: x        CAPILLARY BLOOD GLUCOSE            RADIOLOGY & ADDITIONAL TESTS:  Results Reviewed    Imaging Personally Reviewed    Electrocardiogram Personally Reviewed

## 2023-07-26 NOTE — CHART NOTE - NSCHARTNOTEFT_GEN_A_CORE
Pt seen and examined at bedside for c/o sudden onset chest heaviness, dizziness and blurry vision.   Upon my arrival pt stating she is feeling better. Vision back to baseline.   Denies SOB, n/v/d/c, abdominal pain, palpitations.  All vital signs stable.   On exam +S1, S2. RRR.  Crackles b/l, unlabored.   Stat EKG  CBC, CMP, Mag, Phos,Trop

## 2023-07-26 NOTE — PROGRESS NOTE ADULT - ASSESSMENT
36yo female with pmh of BMI > 40, CHF-diastolic, B/L LE DVT + PE on Eliquis, HTN, Asthma,eczema and Anxiety presents with chest pain and sob. Admitted for acute on chronic diastolic CHF.    Acute hypoxic respiratory failure likely sec to Acute on Chronic diastolic HF Due to Pulmonary HTN (On intermittent O2)  -CTA with moderated pericardial effusion, patchy infiltrate, BNP 5360  -2l NC,Taper off as tolerate   -b/L LE edema,neg doppler for dvt  -cont with Lasix 40mg IV BID today, cont with spironolactone, BB  -daily weight, strict I/O  -f/u cardiology rec appreciated  -Will revert back to IV lasix as fluid overloaded      Thrombocytopenia (stable)  - Heme/Onc consult noted.  - Will hold eliquis if platelets<50K  -Will continue to monitor closely     HX DVT/PE  -cont with Eliquis 2.5mg BID  -cta chest no acute PE.Doppler neg dvt   -rec f/u hematology out pt    HTN  -cont Metoprolol    Asthma  -cont inhalers   -cont montelukast    Eczema-chronic   - Nystatin to Groin area, A&D ointment to affected area  - Rec f/u dermatology out pt    Anxiety/depression   -cont Paxil     Dyspepsia  - C/w Simethicone prn     DVT ppx  -Eliquis       Plan of care discussed with patient.

## 2023-07-26 NOTE — PROGRESS NOTE ADULT - SUBJECTIVE AND OBJECTIVE BOX
[facility] Division of Hospital Medicine    Chief Complaint: SOB    SUBJECTIVE / OVERNIGHT EVENTS: No acute events. patient feels her sob has improved. She still feels her legs are fluid overloaded. She endorses she uses home o2 intermittently and has an oxygen concentrator at home.  Patient denies chest pain, SOB, abd pain, N/V, fever, chills, dysuria or any other complaints. All remainder ROS negative.     MEDICATIONS  (STANDING):  apixaban 2.5 milliGRAM(s) Oral every 12 hours  budesonide 160 MICROgram(s)/formoterol 4.5 MICROgram(s) Inhaler 2 Puff(s) Inhalation two times a day  furosemide   Injectable 40 milliGRAM(s) IV Push two times a day  lactobacillus acidophilus 1 Tablet(s) Oral daily  levoFLOXacin IVPB 750 milliGRAM(s) IV Intermittent every 24 hours  levoFLOXacin IVPB      metoprolol tartrate 25 milliGRAM(s) Oral two times a day  montelukast 10 milliGRAM(s) Oral daily  nystatin Powder 1 Application(s) Topical every 12 hours  PARoxetine 20 milliGRAM(s) Oral daily  spironolactone 50 milliGRAM(s) Oral daily    MEDICATIONS  (PRN):  acetaminophen     Tablet .. 650 milliGRAM(s) Oral every 6 hours PRN Temp greater or equal to 38C (100.4F), Mild Pain (1 - 3)  aluminum hydroxide/magnesium hydroxide/simethicone Suspension 30 milliLiter(s) Oral every 4 hours PRN Dyspepsia  melatonin 3 milliGRAM(s) Oral at bedtime PRN Insomnia  ondansetron Injectable 4 milliGRAM(s) IV Push every 8 hours PRN Nausea and/or Vomiting  simethicone 80 milliGRAM(s) Chew daily PRN Gas        I&O's Summary    25 Jul 2023 07:01  -  26 Jul 2023 07:00  --------------------------------------------------------  IN: 500 mL / OUT: 0 mL / NET: 500 mL        PHYSICAL EXAM:  Vital Signs Last 24 Hrs  T(C): 36.7 (26 Jul 2023 07:57), Max: 36.8 (25 Jul 2023 16:32)  T(F): 98 (26 Jul 2023 07:57), Max: 98.2 (25 Jul 2023 16:32)  HR: 70 (26 Jul 2023 07:57) (70 - 86)  BP: 118/70 (26 Jul 2023 07:57) (99/62 - 118/70)  BP(mean): 74 (25 Jul 2023 16:32) (74 - 74)  RR: 19 (26 Jul 2023 07:57) (17 - 19)  SpO2: 94% (26 Jul 2023 07:57) (94% - 96%)    Parameters below as of 26 Jul 2023 07:57  Patient On (Oxygen Delivery Method): nasal cannula            CONSTITUTIONAL: Morbidly Obese, NAD  RESPIRATORY: Normal respiratory effort; CTAB  CARDIOVASCULAR: Regular rate and rhythm, normal S1 and S2, no murmur/rub/gallop; + lower extremity edema; Peripheral pulses are 2+ bilaterally  ABDOMEN: Limited exam due to body habitus  MUSCLOSKELETAL:  Normal gait; no clubbing or cyanosis of digits; no joint swelling or tenderness to palpation  PSYCH: A+O to person, place, and time; affect appropriate  NEUROLOGY: CN 2-12 are intact and symmetric; no gross sensory deficits;   EXTREMITIES: BLE edema bilaterally 2+    LABS:                        9.5    3.69  )-----------( 69       ( 26 Jul 2023 06:11 )             33.8     07-26    138  |  99  |  38.8<H>  ----------------------------<  79  4.3   |  31.0<H>  |  1.22    Ca    8.6      26 Jul 2023 06:11  Phos  3.0     07-25  Mg     1.7     07-25    TPro  7.2  /  Alb  2.6<L>  /  TBili  0.7  /  DBili  x   /  AST  15  /  ALT  8   /  AlkPhos  75  07-26          Urinalysis Basic - ( 26 Jul 2023 06:11 )    Color: x / Appearance: x / SG: x / pH: x  Gluc: 79 mg/dL / Ketone: x  / Bili: x / Urobili: x   Blood: x / Protein: x / Nitrite: x   Leuk Esterase: x / RBC: x / WBC x   Sq Epi: x / Non Sq Epi: x / Bacteria: x        CAPILLARY BLOOD GLUCOSE            RADIOLOGY & ADDITIONAL TESTS:  Results Reviewed    Imaging Personally Reviewed    Electrocardiogram Personally Reviewed

## 2023-07-26 NOTE — PROGRESS NOTE ADULT - SUBJECTIVE AND OBJECTIVE BOX
[facility] - [facility], THE HEART CENTER                                   [address] [place] [address]                                                      PHONE: [phone]                                                         FAX: [phone]  [email or web address]  ---------------------------------------------------------------------------------------------------------------------------------    Overnight events/patient complaints: No acute cardiac events overnight. Patient without complaints this AM but still has significant edema.      iodine (Hives)  shellfish (Hives)  ceftriaxone (Hives)    MEDICATIONS  (STANDING):  apixaban 2.5 milliGRAM(s) Oral every 12 hours  budesonide 160 MICROgram(s)/formoterol 4.5 MICROgram(s) Inhaler 2 Puff(s) Inhalation two times a day  furosemide    Tablet 40 milliGRAM(s) Oral daily  lactobacillus acidophilus 1 Tablet(s) Oral daily  levoFLOXacin IVPB 750 milliGRAM(s) IV Intermittent every 24 hours  levoFLOXacin IVPB      metoprolol tartrate 25 milliGRAM(s) Oral two times a day  montelukast 10 milliGRAM(s) Oral daily  nystatin Powder 1 Application(s) Topical every 12 hours  PARoxetine 20 milliGRAM(s) Oral daily  spironolactone 50 milliGRAM(s) Oral daily    MEDICATIONS  (PRN):  acetaminophen     Tablet .. 650 milliGRAM(s) Oral every 6 hours PRN Temp greater or equal to 38C (100.4F), Mild Pain (1 - 3)  aluminum hydroxide/magnesium hydroxide/simethicone Suspension 30 milliLiter(s) Oral every 4 hours PRN Dyspepsia  melatonin 3 milliGRAM(s) Oral at bedtime PRN Insomnia  ondansetron Injectable 4 milliGRAM(s) IV Push every 8 hours PRN Nausea and/or Vomiting  simethicone 80 milliGRAM(s) Chew daily PRN Gas      Vital Signs Last 24 Hrs  T(C): 36.7 (26 Jul 2023 07:57), Max: 36.8 (25 Jul 2023 16:32)  T(F): 98 (26 Jul 2023 07:57), Max: 98.2 (25 Jul 2023 16:32)  HR: 70 (26 Jul 2023 07:57) (70 - 86)  BP: 118/70 (26 Jul 2023 07:57) (99/62 - 118/70)  BP(mean): 74 (25 Jul 2023 16:32) (74 - 74)  RR: 19 (26 Jul 2023 07:57) (17 - 19)  SpO2: 94% (26 Jul 2023 07:57) (94% - 96%)    Parameters below as of 26 Jul 2023 07:57  Patient On (Oxygen Delivery Method): nasal cannula      ICU Vital Signs Last 24 Hrs  MITCHEL GUNN  I&O's Detail    25 Jul 2023 07:01  -  26 Jul 2023 07:00  --------------------------------------------------------  IN:    Oral Fluid: 500 mL  Total IN: 500 mL    OUT:  Total OUT: 0 mL    Total NET: 500 mL        Drug Dosing Weight  MITCHEL GUNN      PHYSICAL EXAM:  General: NAD  HEENT: Head; normocephalic, atraumatic.  Eyes: EOMI  Neck: Supple, unable to assess JVD  CARDIOVASCULAR: RRR, S1 S2, No murmurs or gallops  LUNGS: Crackles b/l, no respiratory distress  EXTREMITIES: 2-3+ edema b/l  SKIN: Dry  NEURO: Alert/oriented x 3  PSYCH: normal affect.        LABS:                        9.5    3.69  )-----------( 69       ( 26 Jul 2023 06:11 )             33.8     07-26    138  |  99  |  38.8<H>  ----------------------------<  79  4.3   |  31.0<H>  |  1.22    Ca    8.6      26 Jul 2023 06:11  Phos  3.0     07-25  Mg     1.7     07-25    TPro  7.2  /  Alb  2.6<L>  /  TBili  0.7  /  DBili  x   /  AST  15  /  ALT  8   /  AlkPhos  75  07-26    MITCHEL GUNN        Urinalysis Basic - ( 26 Jul 2023 06:11 )    Color: x / Appearance: x / SG: x / pH: x  Gluc: 79 mg/dL / Ketone: x  / Bili: x / Urobili: x   Blood: x / Protein: x / Nitrite: x   Leuk Esterase: x / RBC: x / WBC x   Sq Epi: x / Non Sq Epi: x / Bacteria: x        RADIOLOGY & ADDITIONAL STUDIES:    INTERPRETATION OF TELEMETRY (personally reviewed): No significant cardiac events    ECHO: 7/20/23  Summary:   1. Technically difficult study.   2. Left ventricular ejection fraction, by visual estimation, is 60 to   65%.   3. Normal global left ventricular systolic function.   4. Right ventricular volume and pressure overload.   5. Severely enlarged right ventricle.   6. Moderately reduced RV systolic function.  7. Severely enlarged right atrium.   8. Mild mitral valve regurgitation.   9. Mild-moderate tricuspid regurgitation.  10. Mild pulmonic valve regurgitation.  11. Estimated pulmonary artery systolic pressure is 45.5 mmHg assuming a   right atrial pressure of 3 mmHg, which is consistent with mild pulmonary   hypertension.  12. Small pericardial effusion.    ASSESSMENT AND PLAN:  37y Female with a PMHx of asthma, recurrent PE, morbid obesity, pulmonary HTN, HFpEF, HTN who presents with SOB and leg swelling found to have acute on chronic diastolic heart failure exacerbation.    Acute on Chronic Diastolic Heart Failure Exacerbation  -patient significantly volume overloaded but improved O2  -patient switched to oral lasix this morning due to hypotensive event two nights ago  -patient's BP now stable  -would switch back to lasix 40mg IV BID  -daily weights  -strict I/O  -daily chemistry to evaluate renal function and electrolytes  -recommend wrapping legs  -Echo consistent with likely right sided HF secondary to pHTN      Thank you for letting [facility] to assist in the management of this patient. Please call with any questions.

## 2023-07-27 NOTE — PROGRESS NOTE ADULT - ASSESSMENT
36yo female with pmh of BMI > 40, CHF-diastolic, B/L LE DVT + PE on Eliquis, HTN, Asthma,eczema and Anxiety presents with chest pain and sob. Admitted for acute on chronic diastolic CHF.    Acute hypoxic respiratory failure likely sec to Acute on Chronic diastolic HF Due to Pulmonary HTN (On intermittent home O2)  -CTA with moderated pericardial effusion, patchy infiltrate, BNP 5360  -2l NC,Taper off as tolerate   -b/L LE edema,neg doppler for dvt  -cont with Lasix 40mg IV BID, cont with spironolactone, BB  -daily weight, strict I/O  -f/u cardiology rec appreciated      Thrombocytopenia (stable)  - Heme/Onc consult noted.  - Will hold eliquis if platelets<50K  -Will continue to monitor closely     HX DVT/PE  -cont with Eliquis 2.5mg BID  -cta chest no acute PE.Doppler neg dvt   -rec f/u hematology out pt    HTN  -cont Metoprolol    Asthma  -cont inhalers   -cont montelukast    Eczema-chronic   - Nystatin to Groin area, A&D ointment to affected area  - Rec f/u dermatology out pt    Anxiety/depression   -cont Paxil     Dyspepsia  - C/w Simethicone prn     DVT ppx  -Eliquis       Plan of care discussed with patient.

## 2023-07-27 NOTE — PROGRESS NOTE ADULT - SUBJECTIVE AND OBJECTIVE BOX
[facility] Division of Hospital Medicine    Chief Complaint: SOB    SUBJECTIVE / OVERNIGHT EVENTS: Had an episode of difficulty breathing overnight. no EKG changes or lab abnormalities. she feels better now. She didn't know what caused it.    Patient denies chest pain, SOB, abd pain, N/V, fever, chills, dysuria or any other complaints. All remainder ROS negative.     MEDICATIONS  (STANDING):  apixaban 2.5 milliGRAM(s) Oral every 12 hours  budesonide 160 MICROgram(s)/formoterol 4.5 MICROgram(s) Inhaler 2 Puff(s) Inhalation two times a day  furosemide   Injectable 40 milliGRAM(s) IV Push two times a day  lactobacillus acidophilus 1 Tablet(s) Oral daily  levoFLOXacin IVPB 750 milliGRAM(s) IV Intermittent every 24 hours  levoFLOXacin IVPB      metoprolol tartrate 25 milliGRAM(s) Oral two times a day  montelukast 10 milliGRAM(s) Oral daily  nystatin Powder 1 Application(s) Topical every 12 hours  PARoxetine 20 milliGRAM(s) Oral daily  spironolactone 50 milliGRAM(s) Oral daily    MEDICATIONS  (PRN):  acetaminophen     Tablet .. 650 milliGRAM(s) Oral every 6 hours PRN Temp greater or equal to 38C (100.4F), Mild Pain (1 - 3)  aluminum hydroxide/magnesium hydroxide/simethicone Suspension 30 milliLiter(s) Oral every 4 hours PRN Dyspepsia  melatonin 3 milliGRAM(s) Oral at bedtime PRN Insomnia  ondansetron Injectable 4 milliGRAM(s) IV Push every 8 hours PRN Nausea and/or Vomiting  simethicone 80 milliGRAM(s) Chew daily PRN Gas        I&O's Summary    26 Jul 2023 07:01  -  27 Jul 2023 07:00  --------------------------------------------------------  IN: 200 mL / OUT: 1600 mL / NET: -1400 mL    27 Jul 2023 07:01  -  27 Jul 2023 16:15  --------------------------------------------------------  IN: 0 mL / OUT: 900 mL / NET: -900 mL        PHYSICAL EXAM:  Vital Signs Last 24 Hrs  T(C): 36.6 (27 Jul 2023 08:05), Max: 36.8 (26 Jul 2023 19:28)  T(F): 97.8 (27 Jul 2023 08:05), Max: 98.3 (26 Jul 2023 19:28)  HR: 86 (27 Jul 2023 13:57) (73 - 86)  BP: 109/67 (27 Jul 2023 13:57) (90/61 - 115/72)  BP(mean): --  RR: 17 (27 Jul 2023 08:05) (17 - 19)  SpO2: 94% (27 Jul 2023 08:05) (94% - 96%)    Parameters below as of 27 Jul 2023 08:05    O2 Flow (L/min): 2          CONSTITUTIONAL: Morbidly Obese, NAD  RESPIRATORY: Normal respiratory effort; CTAB  CARDIOVASCULAR: Regular rate and rhythm, normal S1 and S2, no murmur/rub/gallop; + lower extremity edema; Peripheral pulses are 2+ bilaterally  ABDOMEN: Limited exam due to body habitus  MUSCLOSKELETAL:  Normal gait; no clubbing or cyanosis of digits; no joint swelling or tenderness to palpation  PSYCH: A+O to person, place, and time; affect appropriate  NEUROLOGY: CN 2-12 are intact and symmetric; no gross sensory deficits;   EXTREMITIES: BLE edema bilaterally 2+, legs wrapped.    LABS:                        9.5    3.48  )-----------( 71       ( 27 Jul 2023 06:40 )             33.3     07-27    140  |  100  |  35.7<H>  ----------------------------<  72  4.4   |  30.0<H>  |  1.11    Ca    8.8      27 Jul 2023 06:40  Phos  3.0     07-27  Mg     1.9     07-27    TPro  7.3  /  Alb  2.5<L>  /  TBili  0.8  /  DBili  x   /  AST  14  /  ALT  7   /  AlkPhos  83  07-27      CARDIAC MARKERS ( 26 Jul 2023 20:05 )  x     / 0.02 ng/mL / x     / x     / x          Urinalysis Basic - ( 27 Jul 2023 06:40 )    Color: x / Appearance: x / SG: x / pH: x  Gluc: 72 mg/dL / Ketone: x  / Bili: x / Urobili: x   Blood: x / Protein: x / Nitrite: x   Leuk Esterase: x / RBC: x / WBC x   Sq Epi: x / Non Sq Epi: x / Bacteria: x        CAPILLARY BLOOD GLUCOSE      POCT Blood Glucose.: 136 mg/dL (26 Jul 2023 19:22)        RADIOLOGY & ADDITIONAL TESTS:  Results Reviewed    Imaging Personally Reviewed    Electrocardiogram Personally Reviewed

## 2023-07-27 NOTE — PROGRESS NOTE ADULT - SUBJECTIVE AND OBJECTIVE BOX
[facility]                                   [address] [place] [address]                                                      PHONE: [phone]                                                         FAX: [phone]  [email or web address]  ---------------------------------------------------------------------------------------------------------------------------------    Overnight events/patient complaints: Patient states that she feels better this AM. Patient legs wrapped and states her edema is down.       iodine (Hives)  shellfish (Hives)  ceftriaxone (Hives)    MEDICATIONS  (STANDING):  apixaban 2.5 milliGRAM(s) Oral every 12 hours  budesonide 160 MICROgram(s)/formoterol 4.5 MICROgram(s) Inhaler 2 Puff(s) Inhalation two times a day  furosemide   Injectable 40 milliGRAM(s) IV Push two times a day  lactobacillus acidophilus 1 Tablet(s) Oral daily  levoFLOXacin IVPB 750 milliGRAM(s) IV Intermittent every 24 hours  levoFLOXacin IVPB      metoprolol tartrate 25 milliGRAM(s) Oral two times a day  montelukast 10 milliGRAM(s) Oral daily  nystatin Powder 1 Application(s) Topical every 12 hours  PARoxetine 20 milliGRAM(s) Oral daily  spironolactone 50 milliGRAM(s) Oral daily    MEDICATIONS  (PRN):  acetaminophen     Tablet .. 650 milliGRAM(s) Oral every 6 hours PRN Temp greater or equal to 38C (100.4F), Mild Pain (1 - 3)  aluminum hydroxide/magnesium hydroxide/simethicone Suspension 30 milliLiter(s) Oral every 4 hours PRN Dyspepsia  melatonin 3 milliGRAM(s) Oral at bedtime PRN Insomnia  ondansetron Injectable 4 milliGRAM(s) IV Push every 8 hours PRN Nausea and/or Vomiting  simethicone 80 milliGRAM(s) Chew daily PRN Gas      Vital Signs Last 24 Hrs  T(C): 36.6 (27 Jul 2023 08:05), Max: 36.8 (26 Jul 2023 19:28)  T(F): 97.8 (27 Jul 2023 08:05), Max: 98.3 (26 Jul 2023 19:28)  HR: 73 (27 Jul 2023 08:05) (73 - 85)  BP: 115/72 (27 Jul 2023 08:05) (90/61 - 115/72)  BP(mean): --  RR: 17 (27 Jul 2023 08:05) (17 - 19)  SpO2: 94% (27 Jul 2023 08:05) (94% - 96%)    Parameters below as of 27 Jul 2023 08:05    O2 Flow (L/min): 2    ICU Vital Signs Last 24 Hrs  MITCHEL GUNN  I&O's Detail    26 Jul 2023 07:01  -  27 Jul 2023 07:00  --------------------------------------------------------  IN:    Oral Fluid: 200 mL  Total IN: 200 mL    OUT:    Voided (mL): 1600 mL  Total OUT: 1600 mL    Total NET: -1400 mL        Drug Dosing Weight  MITCHEL GUNN      PHYSICAL EXAM:  General: NAD  HEENT: Head; normocephalic, atraumatic.  Eyes: EOMI, conjunctiva normal  Neck: Supple, unable to assess JVD  CARDIOVASCULAR: RRR, S1 S2, No murmurs or gallops  LUNGS: Slight crackles b/l at bases (improved)  ABDOMEN: Soft, nontender, non-distended, +bowel sounds  EXTREMITIES: 1-2+ edema b/l, legs wrapped   SKIN: warm and dry  NEURO: Alert/oriented x 3  PSYCH: normal affect.        LABS:                        9.5    3.48  )-----------( 71       ( 27 Jul 2023 06:40 )             33.3     07-27    140  |  100  |  35.7<H>  ----------------------------<  72  4.4   |  30.0<H>  |  1.11    Ca    8.8      27 Jul 2023 06:40  Phos  3.0     07-27  Mg     1.9     07-27    TPro  7.3  /  Alb  2.5<L>  /  TBili  0.8  /  DBili  x   /  AST  14  /  ALT  7   /  AlkPhos  83  07-27    MITCHEL GUNN  CARDIAC MARKERS ( 26 Jul 2023 20:05 )  x     / 0.02 ng/mL / x     / x     / x            Urinalysis Basic - ( 27 Jul 2023 06:40 )    Color: x / Appearance: x / SG: x / pH: x  Gluc: 72 mg/dL / Ketone: x  / Bili: x / Urobili: x   Blood: x / Protein: x / Nitrite: x   Leuk Esterase: x / RBC: x / WBC x   Sq Epi: x / Non Sq Epi: x / Bacteria: x        RADIOLOGY & ADDITIONAL STUDIES:    INTERPRETATION OF TELEMETRY (personally reviewed): No significant cardiac events.     ASSESSMENT AND PLAN:  37y Female with a PMHx of asthma, recurrent PE, morbid obesity, pulmonary HTN, HFpEF, HTN who presents with SOB and leg swelling found to have acute on chronic diastolic heart failure exacerbation.    Acute on Chronic Diastolic Heart Failure Exacerbation  -legs wrapped and patient states that she has significant improvement  -c/w lasix 40mg IV BID  -closely monitor BP, currently stable  -attempt to wean off O2 today  -daily weights  -strict I/O  -daily chemistry to evaluate renal function and electrolytes  -Echo consistent with likely right sided HF secondary to pHTN    Thank you for letting [facility] to assist in the management of this patient. Please call with any questions.

## 2023-07-28 NOTE — DISCHARGE NOTE PROVIDER - ATTENDING DISCHARGE PHYSICAL EXAMINATION:
T(C): 36.6 (07-29-23 @ 08:22), Max: 36.9 (07-28-23 @ 16:32)  HR: 75 (07-29-23 @ 08:22) (67 - 83)  BP: 118/63 (07-29-23 @ 08:22) (101/67 - 118/63)  RR: 19 (07-29-23 @ 08:22) (16 - 19)  SpO2: 96% (07-29-23 @ 08:22) (94% - 96%)    CONSTITUTIONAL: Well groomed, morbidly obese female in no apparent distress  EYES: PERRLA and symmetric, EOMI, No conjunctival or scleral injection, non-icteric  ENMT: Oral mucosa with moist membranes. Normal dentition; no pharyngeal injection or exudates             NECK: Supple, symmetric and without tracheal deviation   RESP: No respiratory distress, no use of accessory muscles; CTA b/l, no WRR, saturating well on NC  CV: RRR, +S1S2, no MRG; no JVD; 1+ pitting edema noted in LE's b/l   GI: Soft, NT, ND, no rebound, no guarding; no palpable masses; no hepatosplenomegaly; no hernia palpated  LYMPH: No cervical LAD or tenderness; no axillary LAD or tenderness; no inguinal LAD or tenderness  MSK: Normal gait; No digital clubbing or cyanosis; examination of the (head/neck/spine/ribs/pelvis, RUE, LUE, RLE, LLE) without misalignment,            Normal ROM without pain, no spinal tenderness, normal muscle strength/tone  SKIN: No rashes or ulcers noted; no subcutaneous nodules or induration palpable  NEURO: CN II-XII intact; normal reflexes in upper and lower extremities, sensation intact in upper and lower extremities b/l to light touch   PSYCH: Appropriate insight/judgment; A+O x 3, mood and affect appropriate, recent/remote memory intact

## 2023-07-28 NOTE — DISCHARGE NOTE PROVIDER - NSDCFUADDINST_GEN_ALL_CORE_FT
You were initially admitted for shortness of breath, this was likely secondary to condition of diastolic heart failure as well as pulmonary hypertension. These conditions are inter related and are likely in part being caused by your weight. During your admission you were given IV diuresis to remove water from your system which offloads the amount of work your heart has to do. Going forward you are advised to carefully watch your fluid intake and restrict yourself to ~ 1.5 L daily and attempt to lose weight as able. You have also been counseled to continue taking your home medications and use your home oxygen as prescribed prior to admission, however additional home care will be set up for you. You will follow up with cardiology and primary care as an outpatient within 1-2 weeks after discharge.     Additional Information for CHF:   What is heart failure?  Heart failure is a condition that does not allow your heart to fill or pump properly. Your heart cannot pump enough oxygen in your blood to your organs and tissues. Fluid may not move through your body properly. Fluid may build up and cause swelling and trouble breathing. This is known as congestive heart failure. Heart failure is a long-term condition that tends to get worse over time. It is important to manage your health to improve your quality of life.    Heart Failure    What are the signs and symptoms of heart failure?  Signs and symptoms depend on the type of heart failure you have and how severe it is. You may have any of the following:    Trouble breathing with activity that worsens to trouble breathing at rest  Shortness of breath while lying flat  Severe shortness of breath and coughing at night that usually wakes you  Feeling lightheaded when you stand up  Purple color around your mouth and nails  Confusion or anxiety  Chest pain at night  Periods of no breathing, then breathing fast  Lack of energy (often worsened by physical activity), or trouble sleeping  Swelling in your ankles, legs, or abdomen  Heartbeat that is fast or not regular  Fingers and toes feel cool to the touch  How is heart failure diagnosed?  Tell your healthcare provider about your health history and the medicines you take. Tell him or her if you have a family history of heart failure or cardiomyopathy. He or she will ask about your shortness of breath and other symptoms. You may need any of the following:    Blood tests are used to check for heart problems such as coronary artery disease or decreased blood flow. Blood tests also give healthcare providers information about your kidney, liver, and thyroid function. The results can also show an infection.  An EKG test records your heart rhythm and how fast your heart beats. It shows healthcare providers if you have heart block or have had a heart attack.  Echocardiogram is a type of ultrasound. Sound waves are used to show the structure and function of your heart. This test may show if there are problems with your heart valves. It may also show if the chambers of your heart are working properly.  X-ray, CT, or MRI pictures may be taken of your heart and lungs. The pictures may show the cause of your heart failure, or blood clots or fluid in your lungs. You may be given contrast liquid to help your heart show up better in the pictures. Tell the healthcare provider if you have ever had an allergic reaction to contrast liquid. Do not enter the MRI room with anything metal. Metal can cause serious injury. Tell the provider if you have any metal in or on your body.    How is heart failure treated?  Heart failure is often caused by damage or injury to your heart. The damage may be caused by other heart problems, diabetes, or high blood pressure. The damage may have also been caused by an infection. Your healthcare providers will help you manage any other health conditions that may be causing your heart failure. The goals of treatment are to manage, slow, or reverse heart damage. Treatment may include the following:    Medicines may be given to help regulate your heart rhythm and lower your blood pressure. You may also need medicines to help decrease extra fluids. Medicines, such as NSAIDs, may be stopped if they are causing your heart failure to become worse. Do not stop any of your medicines on your own.  Cardiac rehab is a program run by specialists who will help you safely strengthen your heart. In the program you will learn about exercise, relaxation, stress management, and heart-healthy nutrition. Cardiac rehab may be recommended if your heart failure is not severe.  Oxygen may help you breathe easier if your oxygen level is lower than normal. A CPAP machine may be used to keep your airway open while you sleep.  CPAP  Surgery can be done to implant a pacemaker or another device in your chest to regulate your heart rhythm. Other types of surgery can open blocked heart vessels, replace a damaged heart valve, or remove scar tissue.  Treatment options  The following list of medications are in some way related to or used in the treatment of this condition.    Lasix  furosemide  Coreg  Kenalog-40  Entresto  View moretreatment options    What can I do to manage swelling from extra fluid?  Elevate (raise) your legs above the level of your heart. This will help with fluid that builds up in your legs or ankles. Elevate your legs as often as possible during the day. Prop your legs on pillows or blankets to keep them elevated comfortably. Try not to stand for long periods of time during the day. Move around to keep your blood circulating.  Elevate Leg  Limit sodium (salt). Ask how much sodium you can have each day. Your healthcare provider may give you a limit, such as 2,300 milligrams (mg) a day. Your provider or a dietitian can teach you how to read food labels for the number of mg in a food. He or she can also help you find ways to have less salt. For example, if you add salt to food as you cook, do not add more at the table.    Drink liquids as directed. You may need to limit the amount of liquid you drink within 24 hours. Your healthcare provider will tell you how much liquid to have and which liquids are best for you. He or she may tell you to limit liquid to 1.5 to 2 liters in a day. He or she will also tell you how often to drink liquid throughout the day.  Weigh yourself every morning. Use the same scale, in the same spot. Do this after you use the bathroom, but before you eat or drink. Wear the same type of clothing each time. Write down your weight and call your healthcare provider if you have a sudden weight gain. Swelling and weight gain are signs of fluid buildup.  Weight Checks JAIMIE  What can I do to manage heart failure?  Your quality of life may improve with treatment and the following:    Do not smoke. Nicotine and other chemicals in cigarettes and cigars can cause lung and heart damage. Ask your healthcare provider for information if you currently smoke and need help to quit. E-cigarettes or smokeless tobacco still contain nicotine. Talk to your healthcare provider before you use these products.  Do not drink alcohol or use illegal drugs. Alcohol and drugs can increase your risk for high blood pressure, diabetes, and coronary artery disease.  Eat heart-healthy foods. Heart-healthy foods include fruits, vegetables, lean meat (such as beef, chicken, or pork), and low-fat dairy products. Fatty fish such as salmon and tuna are also heart healthy. Other heart-healthy foods include walnuts, whole-grain breads, and legumes such as colón beans. Replace butter and margarine with heart-healthy oils such as olive oil or canola oil. Your provider or a dietitian can help you create heart-healthy meal plans.    Manage any chronic health conditions you have. These include high blood pressure, diabetes, obesity, high cholesterol, metabolic syndrome, and COPD. You will have fewer symptoms if you manage these health conditions. Follow your healthcare provider's recommendations and follow up with him or her regularly.  Maintain a healthy weight. Being overweight can increase your risk for high blood pressure, diabetes, and coronary artery disease. These conditions can make your symptoms worse. Ask your healthcare provider what a healthy weight is for you. Ask him or her to help you create a weight loss plan, if needed.  Stay active. Activity can help keep your symptoms from getting worse. Walking is a type of physical activity that helps maintain your strength and improve your mood. Physical activity also helps you manage your weight. Work with your healthcare provider to create an exercise plan that is right for you.  [facility] for Exercise  Get vaccines as directed. The flu, COVID-19, and pneumonia can be severe for a person who has heart failure. Vaccines protect you from these infections. Get a flu vaccine every year as soon as it is recommended, usually in September or October. Get a COVID-19 vaccine and booster as directed. You may also need the pneumonia vaccine. Your healthcare provider can tell you if you need other vaccines, and when to get them.

## 2023-07-28 NOTE — PROGRESS NOTE ADULT - ASSESSMENT
36yo female with pmh of BMI > 40, CHF-diastolic, B/L LE DVT + PE on Eliquis, HTN, Asthma,eczema and Anxiety presents with chest pain and sob. Admitted for acute on chronic diastolic CHF.    Acute hypoxic respiratory failure likely sec to Acute on Chronic diastolic HF Due to Pulmonary HTN (On intermittent home O2) (Resolving)  -CTA with moderated pericardial effusion, patchy infiltrate, BNP 5360  - S/p Levaquin therapy  -2l NC,Taper off as tolerate   -b/L LE edema,neg doppler for dvt   cont with spironolactone, BB  -daily weight, strict I/O  - Switch to po lasix daily  -f/u cardiology rec appreciated      Thrombocytopenia (stable)  - Heme/Onc consult noted.  - Will hold eliquis if platelets<50K  -Will continue to monitor closely     HX DVT/PE  -cont with Eliquis 2.5mg BID  -cta chest no acute PE.Doppler neg dvt   -rec f/u hematology out pt    HTN  -cont Metoprolol    Asthma  -cont inhalers   -cont montelukast    Eczema-chronic   - Nystatin to Groin area, A&D ointment to affected area  - Rec f/u dermatology out pt    Anxiety/depression   -cont Paxil     Dyspepsia  - C/w Simethicone prn     DVT ppx  -Eliquis       Plan of care discussed with patient.

## 2023-07-28 NOTE — PROGRESS NOTE ADULT - SUBJECTIVE AND OBJECTIVE BOX
[facility] Division of Hospital Medicine    Chief Complaint:  SOB    SUBJECTIVE / OVERNIGHT EVENTS: No acute  events. She feels better and endorses her leg swelling has improved. Patient denies chest pain, SOB, abd pain, N/V, fever, chills, dysuria or any other complaints. All remainder ROS negative.     MEDICATIONS  (STANDING):  apixaban 2.5 milliGRAM(s) Oral every 12 hours  budesonide 160 MICROgram(s)/formoterol 4.5 MICROgram(s) Inhaler 2 Puff(s) Inhalation two times a day  lactobacillus acidophilus 1 Tablet(s) Oral daily  metoprolol tartrate 25 milliGRAM(s) Oral two times a day  montelukast 10 milliGRAM(s) Oral daily  nystatin Powder 1 Application(s) Topical every 12 hours  PARoxetine 20 milliGRAM(s) Oral daily  spironolactone 50 milliGRAM(s) Oral daily    MEDICATIONS  (PRN):  acetaminophen     Tablet .. 650 milliGRAM(s) Oral every 6 hours PRN Temp greater or equal to 38C (100.4F), Mild Pain (1 - 3)  aluminum hydroxide/magnesium hydroxide/simethicone Suspension 30 milliLiter(s) Oral every 4 hours PRN Dyspepsia  melatonin 3 milliGRAM(s) Oral at bedtime PRN Insomnia  ondansetron Injectable 4 milliGRAM(s) IV Push every 8 hours PRN Nausea and/or Vomiting  simethicone 80 milliGRAM(s) Chew daily PRN Gas        I&O's Summary    27 Jul 2023 07:01  -  28 Jul 2023 07:00  --------------------------------------------------------  IN: 0 mL / OUT: 900 mL / NET: -900 mL        PHYSICAL EXAM:  Vital Signs Last 24 Hrs  T(C): 36.6 (28 Jul 2023 08:06), Max: 36.8 (28 Jul 2023 00:12)  T(F): 97.8 (28 Jul 2023 08:06), Max: 98.2 (28 Jul 2023 00:12)  HR: 76 (28 Jul 2023 08:06) (76 - 88)  BP: 110/66 (28 Jul 2023 08:06) (96/62 - 110/74)  BP(mean): 80 (27 Jul 2023 16:45) (80 - 80)  RR: 17 (28 Jul 2023 08:06) (17 - 18)  SpO2: 93% (28 Jul 2023 08:06) (91% - 96%)    Parameters below as of 28 Jul 2023 08:06  Patient On (Oxygen Delivery Method): room air            CONSTITUTIONAL: Morbidly Obese, NAD  RESPIRATORY: Normal respiratory effort; CTAB  CARDIOVASCULAR: Regular rate and rhythm, normal S1 and S2, no murmur/rub/gallop; + lower extremity edema; Peripheral pulses are 2+ bilaterally  ABDOMEN: Limited exam due to body habitus  MUSCLOSKELETAL:  Normal gait; no clubbing or cyanosis of digits; no joint swelling or tenderness to palpation  PSYCH: A+O to person, place, and time; affect appropriate  NEUROLOGY: CN 2-12 are intact and symmetric; no gross sensory deficits;   EXTREMITIES: BLE edema bilaterally 2+, legs wrapped.    LABS:                        9.9    3.02  )-----------( 67       ( 28 Jul 2023 06:11 )             34.1     07-28    138  |  97  |  36.1<H>  ----------------------------<  76  4.3   |  31.0<H>  |  1.10    Ca    8.9      28 Jul 2023 06:11  Phos  3.0     07-27  Mg     1.9     07-27    TPro  7.5  /  Alb  2.5<L>  /  TBili  0.8  /  DBili  x   /  AST  15  /  ALT  7   /  AlkPhos  90  07-28      CARDIAC MARKERS ( 26 Jul 2023 20:05 )  x     / 0.02 ng/mL / x     / x     / x          Urinalysis Basic - ( 28 Jul 2023 06:11 )    Color: x / Appearance: x / SG: x / pH: x  Gluc: 76 mg/dL / Ketone: x  / Bili: x / Urobili: x   Blood: x / Protein: x / Nitrite: x   Leuk Esterase: x / RBC: x / WBC x   Sq Epi: x / Non Sq Epi: x / Bacteria: x        CAPILLARY BLOOD GLUCOSE            RADIOLOGY & ADDITIONAL TESTS:  Results Reviewed    Imaging Personally Reviewed    Electrocardiogram Personally Reviewed

## 2023-07-28 NOTE — DISCHARGE NOTE PROVIDER - NSDCCPCAREPLAN_GEN_ALL_CORE_FT
PRINCIPAL DISCHARGE DIAGNOSIS  Diagnosis: Acute systolic congestive heart failure  Assessment and Plan of Treatment: Diuresed with lasix. Restrict water intake and educated on medication compliance and cardiology follow up.      SECONDARY DISCHARGE DIAGNOSES  Diagnosis: Isolated thrombocytopenia  Assessment and Plan of Treatment: Outpatient follow up with Hematology    Diagnosis: DVT, lower extremity  Assessment and Plan of Treatment:     Diagnosis: HTN (hypertension)  Assessment and Plan of Treatment:     Diagnosis: Mild asthma  Assessment and Plan of Treatment:     Diagnosis: Atopic eczema  Assessment and Plan of Treatment:     Diagnosis: Chronic anxiety  Assessment and Plan of Treatment:     Diagnosis: Non-ulcer dyspepsia  Assessment and Plan of Treatment:      PRINCIPAL DISCHARGE DIAGNOSIS  Diagnosis: Acute on chronic diastolic congestive heart failure  Assessment and Plan of Treatment:       SECONDARY DISCHARGE DIAGNOSES  Diagnosis: Isolated thrombocytopenia  Assessment and Plan of Treatment: Outpatient follow up with Hematology    Diagnosis: DVT, lower extremity  Assessment and Plan of Treatment:     Diagnosis: HTN (hypertension)  Assessment and Plan of Treatment:     Diagnosis: Mild asthma  Assessment and Plan of Treatment:     Diagnosis: Atopic eczema  Assessment and Plan of Treatment:     Diagnosis: Chronic anxiety  Assessment and Plan of Treatment:     Diagnosis: Non-ulcer dyspepsia  Assessment and Plan of Treatment:

## 2023-07-28 NOTE — DISCHARGE NOTE PROVIDER - CARE PROVIDER_API CALL
Jaiden Garces  Cardiology  [address] [place] [id number]  Phone: [phone]  Follow Up Time:     Landy Cruz  Medical Oncology  [address] [place] [address], [facility] [place] [address]  Phone: [phone]  Fax: [phone]  Follow Up Time:

## 2023-07-28 NOTE — PROGRESS NOTE ADULT - PROVIDER SPECIALTY LIST ADULT
Cardiology
Hospitalist
Cardiology
Hospitalist

## 2023-07-28 NOTE — DISCHARGE NOTE PROVIDER - NSDCMRMEDTOKEN_GEN_ALL_CORE_FT
albuterol 2.5 mg/3 mL (0.083%) inhalation solution: 3 milliliter(s) inhaled every 6 hours, As Needed  Breo Ellipta 200 mcg-25 mcg/inh inhalation powder: 1 puff(s) inhaled once a day  Eliquis 2.5 mg oral tablet: 1 tab(s) orally 2 times a day  metoprolol tartrate 100 mg oral tablet: 1 tab(s) orally once a day  montelukast 10 mg oral tablet: 1 tab(s) orally once a day  PARoxetine 20 mg oral tablet: 1 tab(s) orally once a day  potassium chloride 20 mEq oral tablet, extended release: 2 tab(s) orally once a day  spironolactone 50 mg oral tablet: 1 tab(s) orally once a day

## 2023-07-28 NOTE — PROGRESS NOTE ADULT - SUBJECTIVE AND OBJECTIVE BOX
[facility]                                   [address] [place] [address]                                                      PHONE: [phone]                                                         FAX: [phone]  [email or web address]  ---------------------------------------------------------------------------------------------------------------------------------    Overnight events/patient complaints:      NAD feeling well today     iodine (Hives)  shellfish (Hives)  ceftriaxone (Hives)    MEDICATIONS  (STANDING):  apixaban 2.5 milliGRAM(s) Oral every 12 hours  budesonide 160 MICROgram(s)/formoterol 4.5 MICROgram(s) Inhaler 2 Puff(s) Inhalation two times a day  lactobacillus acidophilus 1 Tablet(s) Oral daily  levoFLOXacin IVPB      levoFLOXacin IVPB 750 milliGRAM(s) IV Intermittent every 24 hours  metoprolol tartrate 25 milliGRAM(s) Oral two times a day  montelukast 10 milliGRAM(s) Oral daily  nystatin Powder 1 Application(s) Topical every 12 hours  PARoxetine 20 milliGRAM(s) Oral daily  spironolactone 50 milliGRAM(s) Oral daily    MEDICATIONS  (PRN):  acetaminophen     Tablet .. 650 milliGRAM(s) Oral every 6 hours PRN Temp greater or equal to 38C (100.4F), Mild Pain (1 - 3)  aluminum hydroxide/magnesium hydroxide/simethicone Suspension 30 milliLiter(s) Oral every 4 hours PRN Dyspepsia  melatonin 3 milliGRAM(s) Oral at bedtime PRN Insomnia  ondansetron Injectable 4 milliGRAM(s) IV Push every 8 hours PRN Nausea and/or Vomiting  simethicone 80 milliGRAM(s) Chew daily PRN Gas      Vital Signs Last 24 Hrs  T(C): 36.6 (28 Jul 2023 08:06), Max: 36.8 (28 Jul 2023 00:12)  T(F): 97.8 (28 Jul 2023 08:06), Max: 98.2 (28 Jul 2023 00:12)  HR: 76 (28 Jul 2023 08:06) (76 - 88)  BP: 110/66 (28 Jul 2023 08:06) (96/62 - 110/74)  BP(mean): 80 (27 Jul 2023 16:45) (80 - 80)  RR: 17 (28 Jul 2023 08:06) (17 - 18)  SpO2: 93% (28 Jul 2023 08:06) (91% - 96%)    Parameters below as of 28 Jul 2023 08:06  Patient On (Oxygen Delivery Method): room air      ICU Vital Signs Last 24 Hrs  MITCHEL GUNN  I&O's Detail    27 Jul 2023 07:01  -  28 Jul 2023 07:00  --------------------------------------------------------  IN:  Total IN: 0 mL    OUT:    Voided (mL): 900 mL  Total OUT: 900 mL    Total NET: -900 mL        I&O's Summary    27 Jul 2023 07:01  -  28 Jul 2023 07:00  --------------------------------------------------------  IN: 0 mL / OUT: 900 mL / NET: -900 mL      Drug Dosing Weight  MITCHEL VELIA      PHYSICAL EXAM:  General: Appears well developed, alert and cooperative.  HEENT: Head; normocephalic, atraumatic.  Eyes: Pupils reactive, cornea wnl.  Neck: Supple, no nodes adenopathy, no NVD or carotid bruit or thyromegaly.  CARDIOVASCULAR: Normal S1 and S2, No murmur, rub, gallop or lift.   LUNGS: No rales, rhonchi or wheeze. Normal breath sounds bilaterally.  ABDOMEN: Soft, nontender without mass or organomegaly. bowel sounds normoactive.  EXTREMITIES: No clubbing, cyanosis + edema. Distal pulses wnl.   SKIN: warm and dry with normal turgor.  NEURO: Alert/oriented x 3/normal motor exam. No pathologic reflexes.    PSYCH: normal affect.        LABS:                        9.9    3.02  )-----------( 67       ( 28 Jul 2023 06:11 )             34.1     07-28    138  |  97  |  36.1<H>  ----------------------------<  76  4.3   |  31.0<H>  |  1.10    Ca    8.9      28 Jul 2023 06:11  Phos  3.0     07-27  Mg     1.9     07-27    TPro  7.5  /  Alb  2.5<L>  /  TBili  0.8  /  DBili  x   /  AST  15  /  ALT  7   /  AlkPhos  90  07-28    MITCHEL GUNN  CARDIAC MARKERS ( 26 Jul 2023 20:05 )  x     / 0.02 ng/mL / x     / x     / x            Urinalysis Basic - ( 28 Jul 2023 06:11 )    Color: x / Appearance: x / SG: x / pH: x  Gluc: 76 mg/dL / Ketone: x  / Bili: x / Urobili: x   Blood: x / Protein: x / Nitrite: x   Leuk Esterase: x / RBC: x / WBC x   Sq Epi: x / Non Sq Epi: x / Bacteria: x        RADIOLOGY & ADDITIONAL STUDIES:    INTERPRETATION OF TELEMETRY (personally reviewed):        RADIOLOGY & ADDITIONAL STUDIES:    INTERPRETATION OF TELEMETRY (personally reviewed):    < from: TTE Echo Complete w/ Contrast w/ Doppler (07.20.23 @ 11:36) >  Summary:   1. Technically difficult study.   2. Left ventricular ejection fraction, by visual estimation, is 60 to   65%.   3. Normal global left ventricular systolic function.   4. Right ventricular volume and pressure overload.   5. Severely enlarged right ventricle.   6. Moderately reduced RV systolic function.  7. Severely enlarged right atrium.   8. Mild mitral valve regurgitation.   9. Mild-moderate tricuspid regurgitation.  10. Mild pulmonic valve regurgitation.  11. Estimated pulmonary artery systolic pressure is 45.5 mmHg assuming a   right atrial pressure of 3 mmHg, which is consistent with mild pulmonary   hypertension.  12. Small pericardial effusion.    MD Ella Electronically signed on 7/20/2023 at 1:39:21 PM    < end of copied text >      37y Female with a PMHx of asthma, recurrent PE, morbid obesity, pulmonary HTN, HFpEF, HTN who presents with SOB found to have acute on chronic diastolic heart failure exacerbation.    Acute on Chronic Diastolic Heart Failure Exacerbation  -Change to 40 mg po daily   -TTE consistent with likely right sided HF secondary to obese/PHTN  -Continue Eliquis for DVT prophylaxis    DC planning and please recall if needed

## 2023-07-28 NOTE — DISCHARGE NOTE PROVIDER - HOSPITAL COURSE
36yo female with pmh of BMI > 40, CHF-diastolic, B/L LE DVT + PE on Eliquis, HTN, Asthma,eczema and Anxiety presents with chest pain and sob. Admitted for acute on chronic diastolic CHF.    Cardiology saw her and recommended IV diuresis. She has pulmonary hypertension on TTE. she endorses using home O2 intermittent though in the hospital she required continuous O2 supplementation. She was seen by hematology for thrombocytopenia and indicated it may be due to medication. Hematology recommended to hold eliquis if platelets less than 50K. She is clinically and hemodynamically stable for discharge home and is to follow up with her PCP, Cardiology, and Hematology outpatient. HPI 7/19/23: 36yo female with pmh of BMI > 40, CHF, B/L LE DVT + PE on Eliquis, HTN, Asthma and Anxiety presents with chest pain and sob. Pt states for the past 2 weeks with sob and increasing LE edema. Pt states that in the past 5days she been having sob and worsening leg edema R>L came to ED for evaluation. Denies cp, palpitations, fever, chills, dysuria, n/v. In the ED pt found to be hypoxic 85% RA placed on 2L NC. labs pertinent for pro BNP 5360, Plts 76, CTA chest pertinent for moderate pericardial effusion, no PE. Pt was given Lasix 40mg IVP, neb and solu medrol in the ED.   During Hospitalization Cardiology saw her and recommended IV diuresis for acute on chronic CHF exacerbation. She has pulmonary hypertension on TTE which is likely contributing to her pulmonary symptoms. she endorses using home O2 intermittently, though in the hospital she required continuous O2 supplementation. She was seen by hematology for thrombocytopenia and indicated it may be due to medication. Hematology recommended to hold eliquis if platelets less than 50K. Today she feels well, can tolerate RA while sitting in bed and only mildly desaturates while ambulating. She is clinically and hemodynamically stable for discharge home with home health care and is to follow up with her PCP, Cardiology, and Hematology outpatient.

## 2023-07-28 NOTE — PROGRESS NOTE ADULT - NUTRITIONAL ASSESSMENT
This patient has been assessed with a concern for Malnutrition and has been determined to have a diagnosis/diagnoses of Morbid obesity (BMI > 40).    This patient is being managed with:   Diet DASH/TLC-  Sodium & Cholesterol Restricted  Entered: Jul 19 2023 10:58PM  

## 2023-07-28 NOTE — PROGRESS NOTE ADULT - REASON FOR ADMISSION
Acute chronic HF
Acute on chronic HF
Acute chronic HF
Acute on chronic HF
Acute chronic HF
Acute chronic HF

## 2023-07-28 NOTE — DISCHARGE NOTE PROVIDER - DETAILS OF MALNUTRITION DIAGNOSIS/DIAGNOSES
This patient has been assessed with a concern for Malnutrition and was treated during this hospitalization for the following Nutrition diagnosis/diagnoses:     -  07/24/2023: Morbid obesity (BMI > 40)

## 2023-07-29 NOTE — DISCHARGE NOTE NURSING/CASE MANAGEMENT/SOCIAL WORK - PATIENT PORTAL LINK FT
You can access the FollowMyHealth Patient Portal offered by [facility] by registering at the following website: [email or web address]. By joining [employer]’s FollowMyHealth portal, you will also be able to view your health information using other applications (apps) compatible with our system.

## 2023-07-29 NOTE — DISCHARGE NOTE NURSING/CASE MANAGEMENT/SOCIAL WORK - NSDCPEFALRISK_GEN_ALL_CORE
For information on Fall & Injury Prevention, visit: [email or web address] OR  [email or web address] OR  https://www.cdc.gov/steadi/patient.html

## 2023-10-18 NOTE — ED ADULT NURSE NOTE - NSFALLUNIVINTERV_ED_ALL_ED
Bed/Stretcher in lowest position, wheels locked, appropriate side rails in place/Call bell, personal items and telephone in reach/Instruct patient to call for assistance before getting out of bed/chair/stretcher/Non-slip footwear applied when patient is off stretcher/[place] to call system/Physically safe environment - no spills, clutter or unnecessary equipment/Purposeful proactive rounding/Room/bathroom lighting operational, light cord in reach

## 2023-10-18 NOTE — ED ADULT NURSE REASSESSMENT NOTE - NS ED NURSE REASSESS COMMENT FT1
received report from Mona miramontes and assumed care of pt. pt sitting calm in bed. a and o x3. breathing even and unlabored on 2l o2 via nc. nsr on cm. awaiting results for dispo. updated on poc.

## 2023-10-18 NOTE — ED ADULT NURSE REASSESSMENT NOTE - NS ED NURSE REASSESS COMMENT FT1
pt complaining of right buttocks pain. area appears red and is hot to touch. dr. rincon informed. no new orders at this time.

## 2023-10-18 NOTE — H&P ADULT - NSHPPHYSICALEXAM_GEN_ALL_CORE
PHYSICAL EXAM:    General: in no acute distress  Eyes: PERRLA, EOMI; conjunctiva and sclera clear  Head: Normocephalic; atraumatic  ENMT: No nasal discharge; airway clear  Neck: Supple; non tender; no masses  Respiratory: No wheezes, diminished lung sounds bibasilar infiltrates.  Cardiovascular: Regular rate and rhythm. S1 and S2 Normal; No murmurs, gallops or rubs  Gastrointestinal: Soft non-tender non-distended; Normal bowel sounds  Genitourinary: No costovertebral angle tenderness  Extremities: Normal range of motion, No clubbing, cyanosis or edema  Vascular: Peripheral pulses palpable 2+ bilaterally  Neurological: Alert and oriented x4  Skin: Warm and dry. No acute rash  Lymph Nodes: No acute cervical adenopathy  Musculoskeletal: Normal gait, tone, without deformities  Psychiatric: Cooperative and appropriate PHYSICAL EXAM:    General: in no acute distress  Eyes: PERRLA, EOMI; conjunctiva and sclera clear  Head: Normocephalic; atraumatic  ENMT: No nasal discharge; airway clear  Neck: Supple; non tender; no masses  Respiratory: No wheezes, diminished lung sounds bibasilar infiltrates.  Cardiovascular: Regular rate and rhythm. S1 and S2 Normal; No murmurs, gallops or rubs  Gastrointestinal: Soft non-tender non-distended; Normal bowel sounds  Genitourinary: No costovertebral angle tenderness  Extremities: Normal range of motion, No clubbing, cyanosis  Vascular: Peripheral pulses palpable 2+ bilaterally  Neurological: Alert and oriented x4  Skin: Warm and dry. Non pitting pedal edema  Lymph Nodes: No acute cervical adenopathy  Musculoskeletal: Normal gait, tone, without deformities  Psychiatric: Cooperative and appropriate

## 2023-10-18 NOTE — ED PROVIDER NOTE - OBJECTIVE STATEMENT
Ms. Juliana Epperson 38-year-old female past medical history of obesity pneumonia on home oxygen at 2 L comes to the ED with 1 week history of increasing shortness of breath on exertion.  Patient noted today in the morning on her 2 L of oxygen her O2 saturation to be 79%.  Patient also noted over the last few days when she goes out her oxygen has dropped into the low 80s when she is not on her oxygen.  Patient notes her oxygen level at rest is normal.  Patient normally takes furosemide for heart failure.  Patient denies any sick contacts.

## 2023-10-18 NOTE — CONSULT NOTE ADULT - SUBJECTIVE AND OBJECTIVE BOX
[facility], THE HEART CENTER                                   [address] [place] [address]                                                      PHONE: [phone]                                                         FAX: [phone]  [email or web address]  ---------------------------------------------------------------------------------------------------------------------------------    Reason for Consult: dyspnea ? CHF    HPI:  MITCHEL GUNN is an 38y Female with HTN,  morbid obesity, h/o DVT with recurrent PE on chronic AC, ? underlying pul HTN, asthma/ restrictive lung disease on home O2, no evidence of structural or ischemic HD on nononvasive testing came to ER with progressive dyspnea.    PAST MEDICAL & SURGICAL HISTORY:  Pulmonary embolism      DVT, lower extremity      CHF (congestive heart failure)      Asthma      Anxiety      Severe obesity (BMI >= 40)      Hypertension      No significant past surgical history          iodine (Hives)  ceftriaxone (Hives)  shellfish (Hives)      MEDICATIONS  (STANDING):    MEDICATIONS  (PRN):      Social History:  Cigarettes:        neg            Alchohol:     neg            Illicit Drug Abuse:  neg  neg FH     ROS: Negative other than as mentioned in HPI.    Vital Signs Last 24 Hrs  T(C): 37 (18 Oct 2023 15:30), Max: 37 (18 Oct 2023 15:30)  T(F): 98.6 (18 Oct 2023 15:30), Max: 98.6 (18 Oct 2023 15:30)  HR: 106 (18 Oct 2023 15:30) (98 - 111)  BP: 101/68 (18 Oct 2023 15:30) (101/68 - 137/91)  BP(mean): --  RR: 19 (18 Oct 2023 15:30) (19 - 26)  SpO2: 95% (18 Oct 2023 15:30) (78% - 99%)    Parameters below as of 18 Oct 2023 15:30  Patient On (Oxygen Delivery Method): nasal cannula      ICU Vital Signs Last 24 Hrs  MITCHEL VELIA  I&O's Detail    I&O's Summary    Drug Dosing Weight  MITCHEL GUNN      PHYSICAL EXAM:  General: Appears alert and cooperative.  HEENT: Head; normocephalic, atraumatic.  Eyes: Pupils reactive, cornea wnl.  Neck: Supple, no nodes adenopathy, no NVD or carotid bruit or thyromegaly.  CARDIOVASCULAR: Normal S1 and S2, No murmur, rub, gallop or lift.   LUNGS: No rales, rhonchi or wheeze. Normal breath sounds bilaterally.  ABDOMEN: Soft, nontender without mass or organomegaly. bowel sounds normoactive.  EXTREMITIES: No clubbing, cyanosis or edema. Distal pulses wnl.   SKIN: warm and dry with normal turgor.  NEURO: Alert/oriented x 3/normal motor exam. No pathologic reflexes.    PSYCH: normal affect.        LABS:                        11.0   8.49  )-----------( 124      ( 18 Oct 2023 11:28 )             37.8     10-18    129<L>  |  94<L>  |  35.3<H>  ----------------------------<  279<H>  5.0   |  22.0  |  0.84    Ca    8.2<L>      18 Oct 2023 13:00    TPro  8.3  /  Alb  2.9<L>  /  TBili  1.1  /  DBili  x   /  AST  76<H>  /  ALT  124<H>  /  AlkPhos  100  10-18    MITCHEL GUNN  CARDIAC MARKERS ( 18 Oct 2023 13:00 )  x     / <0.01 ng/mL / x     / x     / x          PT/INR - ( 18 Oct 2023 11:28 )   PT: 18.3 sec;   INR: 1.67 ratio         PTT - ( 18 Oct 2023 11:28 )  PTT:36.2 sec  Urinalysis Basic - ( 18 Oct 2023 13:00 )    Color: x / Appearance: x / SG: x / pH: x  Gluc: 279 mg/dL / Ketone: x  / Bili: x / Urobili: x   Blood: x / Protein: x / Nitrite: x   Leuk Esterase: x / RBC: x / WBC x   Sq Epi: x / Non Sq Epi: x / Bacteria: x        RADIOLOGY & ADDITIONAL STUDIES:    INTERPRETATION OF TELEMETRY (personally reviewed):    ECG: reviewed NSR RAA inc RBBB LAD PRWP    ECHO:< from: TTE Echo Complete w/ Contrast w/ Doppler (07.20.23 @ 11:36) >    PHYSICIAN INTERPRETATION:  Left Ventricle: Normal left ventricular size and wall thicknesses, with   normal systolic and diastolic function. The left ventricular internal   cavity size is normal.  Global LV systolic function was normal. Left ventricular ejection   fraction, by visual estimation, is 60 to 65%. The interventricular septum   is flattened in systole and diastole, consistent with right ventricular   pressure and volume overload. The left ventricular diastolic function   could not be assessed in this study.  Right Ventricle: The right ventricular size is severely enlarged. RV   systolic function is moderately reduced. TV S' 0.1 m/s.  Left Atrium: The left atrium is normal in size.  Right Atrium: Severely enlarged right atrium.  Pericardium: A small pericardial effusion is present. The pericardial   effusion is posterior to the left ventricle. There is excessive   respiratory variation in the tricuspid valve spectral Doppler velocities.   There is no evidence of cardiac tamponade. There is a significant   pericardial fat pad present.  Mitral Valve: Structurally normal mitral valve, with normal leaflet   excursion. No evidence of mitral stenosis. Mild mitral valve   regurgitation is seen.  Tricuspid Valve: Mild-moderate tricuspid regurgitation is visualized.   Estimated pulmonary artery systolic pressure is 45.5 mmHg assuming a   right atrial pressure of 3 mmHg, which is consistent with mild pulmonary   hypertension.  Aortic Valve: The aorticvalve was not well visualized. No evidence of   aortic stenosis. Trivial aortic valve regurgitation is seen.  Pulmonic Valve: The pulmonic valve was not well visualized. Mild pulmonic   valve regurgitation.  Aorta: The aortic root and ascending aortaare structurally normal, with   no evidence of dilitation.  In comparison to the previous echocardiogram(s): There are no prior   studies on this patient for comparison purposes.      Summary:   1. Technically difficult study.   2. Left ventricular ejection fraction, by visual estimation, is 60 to   65%.   3. Normal global left ventricular systolic function.   4. Right ventricular volume and pressure overload.   5. Severely enlarged right ventricle.   6. Moderately reduced RV systolic function.  7. Severely enlarged right atrium.   8. Mild mitral valve regurgitation.   9. Mild-moderate tricuspid regurgitation.  10. Mild pulmonic valve regurgitation.  11. Estimated pulmonary artery systolic pressure is 45.5 mmHg assuming a   right atrial pressure of 3 mmHg, which is consistent with mild pulmonary   hypertension.  12. Small pericardial effusion.    MD Ella Electronically signed on 7/20/2023 at 1:39:21 PM        < end of copied text >      STRESS TEST:  2022 nuclear stress normal perfusion EF 75%                         [facility], [facility]                                   [address] [place] [address]                                                      PHONE: [phone]                                                         FAX: [phone]  [email or web address]  ---------------------------------------------------------------------------------------------------------------------------------    Reason for Consult: dyspnea ? CHF    HPI:  MITCHEL GUNN is an 38y Female with morbid obesity, h/o DVT with recurrent PE on chronic AC,  underlying pul HTN, asthma/ restrictive lung disease on home O2, no evidence of structural or ischemic HD on noninvasive testing came to ER with progressive dyspnea, O2 desaturation, and edema.  Pt states she had been told of "clotting disorder" in past and has been on AC for years.  recently lost > 150 lbs with dietary changes.    PAST MEDICAL & SURGICAL HISTORY:  Pulmonary embolism      DVT, lower extremity      CHF (congestive heart failure)      Asthma      Anxiety      Severe obesity (BMI >= 40)      Hypertension      No significant past surgical history          iodine (Hives)  ceftriaxone (Hives)  shellfish (Hives)      MEDICATIONS  (STANDING):    MEDICATIONS  (PRN):      Social History:  Cigarettes:       neg            Alchohol:     neg            Illicit Drug Abuse:  neg  neg FH     ROS: Negative other than as mentioned in HPI.    Vital Signs Last 24 Hrs  T(C): 37 (18 Oct 2023 15:30), Max: 37 (18 Oct 2023 15:30)  T(F): 98.6 (18 Oct 2023 15:30), Max: 98.6 (18 Oct 2023 15:30)  HR: 106 (18 Oct 2023 15:30) (98 - 111)  BP: 101/68 (18 Oct 2023 15:30) (101/68 - 137/91)  BP(mean): --  RR: 19 (18 Oct 2023 15:30) (19 - 26)  SpO2: 95% (18 Oct 2023 15:30) (78% - 99%)    Parameters below as of 18 Oct 2023 15:30  Patient On (Oxygen Delivery Method): nasal cannula      ICU Vital Signs Last 24 Hrs  MITCHEL GUNN  I&O's Detail    I&O's Summary    Drug Dosing Weight  MITCHEL GUNN      PHYSICAL EXAM:  General: Appears alert and cooperative.  HEENT: Head; normocephalic, atraumatic.  Eyes: Pupils reactive, cornea wnl.  Neck: Supple, no nodes adenopathy, no JVD or carotid bruit or thyromegaly.  CARDIOVASCULAR: Normal S1 and S2, No murmur, rub, gallop or lift.   LUNGS: rales left base  ABDOMEN: Soft, nontender without mass or organomegaly. bowel sounds normoactive.  EXTREMITIES: No clubbing or cyanosis, 1-2 + edema  SKIN: warm and dry with normal turgor.  NEURO: Alert/oriented x 3/normal motor exam. No pathologic reflexes.    PSYCH: normal affect.        LABS:                        11.0   8.49  )-----------( 124      ( 18 Oct 2023 11:28 )             37.8     10-18    129<L>  |  94<L>  |  35.3<H>  ----------------------------<  279<H>  5.0   |  22.0  |  0.84    Ca    8.2<L>      18 Oct 2023 13:00    TPro  8.3  /  Alb  2.9<L>  /  TBili  1.1  /  DBili  x   /  AST  76<H>  /  ALT  124<H>  /  AlkPhos  100  10-18    MITCHEL GUNN  CARDIAC MARKERS ( 18 Oct 2023 13:00 )  x     / <0.01 ng/mL / x     / x     / x          PT/INR - ( 18 Oct 2023 11:28 )   PT: 18.3 sec;   INR: 1.67 ratio         PTT - ( 18 Oct 2023 11:28 )  PTT:36.2 sec  Urinalysis Basic - ( 18 Oct 2023 13:00 )    Color: x / Appearance: x / SG: x / pH: x  Gluc: 279 mg/dL / Ketone: x  / Bili: x / Urobili: x   Blood: x / Protein: x / Nitrite: x   Leuk Esterase: x / RBC: x / WBC x   Sq Epi: x / Non Sq Epi: x / Bacteria: x        RADIOLOGY & ADDITIONAL STUDIES:    INTERPRETATION OF TELEMETRY (personally reviewed):    ECG: reviewed NSR RAA inc RBBB LAD PRWP    ECHO:< from: TTE Echo Complete w/ Contrast w/ Doppler (07.20.23 @ 11:36) >    PHYSICIAN INTERPRETATION:  Left Ventricle: Normal left ventricular size and wall thicknesses, with   normal systolic and diastolic function. The left ventricular internal   cavity size is normal.  Global LV systolic function was normal. Left ventricular ejection   fraction, by visual estimation, is 60 to 65%. The interventricular septum   is flattened in systole and diastole, consistent with right ventricular   pressure and volume overload. The left ventricular diastolic function   could not be assessed in this study.  Right Ventricle: The right ventricular size is severely enlarged. RV   systolic function is moderately reduced. TV S' 0.1 m/s.  Left Atrium: The left atrium is normal in size.  Right Atrium: Severely enlarged right atrium.  Pericardium: A small pericardial effusion is present. The pericardial   effusion is posterior to the left ventricle. There is excessive   respiratory variation in the tricuspid valve spectral Doppler velocities.   There is no evidence of cardiac tamponade. There is a significant   pericardial fat pad present.  Mitral Valve: Structurally normal mitral valve, with normal leaflet   excursion. No evidence of mitral stenosis. Mild mitral valve   regurgitation is seen.  Tricuspid Valve: Mild-moderate tricuspid regurgitation is visualized.   Estimated pulmonary artery systolic pressure is 45.5 mmHg assuming a   right atrial pressure of 3 mmHg, which is consistent with mild pulmonary   hypertension.  Aortic Valve: The aorticvalve was not well visualized. No evidence of   aortic stenosis. Trivial aortic valve regurgitation is seen.  Pulmonic Valve: The pulmonic valve was not well visualized. Mild pulmonic   valve regurgitation.  Aorta: The aortic root and ascending aortaare structurally normal, with   no evidence of dilitation.  In comparison to the previous echocardiogram(s): There are no prior   studies on this patient for comparison purposes.      Summary:   1. Technically difficult study.   2. Left ventricular ejection fraction, by visual estimation, is 60 to   65%.   3. Normal global left ventricular systolic function.   4. Right ventricular volume and pressure overload.   5. Severely enlarged right ventricle.   6. Moderately reduced RV systolic function.  7. Severely enlarged right atrium.   8. Mild mitral valve regurgitation.   9. Mild-moderate tricuspid regurgitation.  10. Mild pulmonic valve regurgitation.  11. Estimated pulmonary artery systolic pressure is 45.5 mmHg assuming a   right atrial pressure of 3 mmHg, which is consistent with mild pulmonary   hypertension.  12. Small pericardial effusion.    MD Ella Electronically signed on 7/20/2023 at 1:39:21 PM        < end of copied text >      STRESS TEST:  2022 nuclear stress normal perfusion EF 75%

## 2023-10-18 NOTE — H&P ADULT - ASSESSMENT
39yo female with pmh of BMI > 40, CHF-diastolic, B/L LE DVT + PE on Eliquis, HTN, Asthma,eczema and Anxiety here for mgmt of acute on chronic hypoxic respiratory failure in the setting of CHF and PNA    #Acute on chronic hypoxic respiratory failure in the setting of Acute on chronic HFpEF and Possible developing PNA  initially desaturating on oxygen to 79%  CXR as above  follows [place] [facility]  on chronic steroids for the past month  lasix given in ED  Echo from 7/2023 reveals EF 60-65% and Mild pulm htn  - Lasix 40mg IVP Q12  - Toprol XL 100mg Q24  - Aldactone 50mg Q24  - CT Chest Non Con  - start levofloxacin Q24  - montelukast Q24  - Supplementary Oxygen  - Admit tele/  - duonebs PRN  - Takes breo ellipta at home, start symbicort  - increase steroids to 40mg Q24, taper down by 10 every 3 days until down to daily level  - takes chronic potassium chloride, obtain Whole Blood Potassium level w/ ABG and continue supplementation, maintain K 4-5    #Asthma  on home oxygen  - plan as above    #B/L DVT/PE   on eliquis long term  - c/w Eliquis 2.5mg Q12    #Depression/Anxiety  stable  - c/w paroxetine    #HTN  c/w meds as above    #?SLE  being worked up for SLE  C3/C4 labs reviewed  on chronic steroids  - c/w steroids  - f/u outpatient    #Healthcare maintenance  DVT PPx - Eliquis  Dispo - Acute 37yo female with pmh of BMI > 40, CHF-diastolic, B/L LE DVT + PE on Eliquis, HTN, Asthma,eczema and Anxiety here for mgmt of acute on chronic hypoxic respiratory failure in the setting of CHF and PNA    #Acute on chronic hypoxic respiratory failure in the setting of Acute on chronic HFpEF and Possible developing PNA  initially desaturating on oxygen to 79%  CXR as above  follows [place] [facility]  on chronic steroids for the past month  lasix given in ED  Echo from 7/2023 reveals EF 60-65% and Mild pulm htn  - Lasix 40mg IVP Q12  - Toprol XL 100mg Q24  - Aldactone 50mg Q24  - CT Chest Non Con  - start levofloxacin Q24  - montelukast Q24  - Supplementary Oxygen  - Admit tele/  - duonebs PRN  - Takes breo ellipta at home, start symbicort  - increase steroids to 40mg Q24, taper down by 10 every 3 days until down to daily level  - takes chronic potassium chloride, obtain Whole Blood Potassium level w/ ABG and continue supplementation, maintain K 4-5    #RLE cellulitis  states it began today after she bumped her R leg into the table  large, red, painful  given 1 dose vanco  - c/w levofloxacin  - pain control PRN  - monitor   - consult ID if no improvement on abx    #Asthma  on home oxygen  - plan as above    #B/L DVT/PE   on eliquis long term  - c/w Eliquis 2.5mg Q12    #Depression/Anxiety  stable  - c/w paroxetine    #HTN  c/w meds as above    #?SLE  being worked up for SLE  C3/C4 labs reviewed  on chronic steroids  - c/w steroids  - f/u outpatient    #Healthcare maintenance  DVT PPx - Eliquis  Dispo - Acute

## 2023-10-18 NOTE — PATIENT PROFILE ADULT - FALL HARM RISK - RISK INTERVENTIONS

## 2023-10-18 NOTE — CONSULT NOTE ADULT - ASSESSMENT
Assessment  Dyspnea  Elevated BNP sec to pul HTN  Pul Htn ? severe given RVE and pul artery size on CT chest  Pul HTn ? sec to chronic thromboembolic disease WHO Class IV or diastolic HF WHO Class II  Prior DVT/PE  Morbid Obesity  Abnormal ECG c/w RVPO Assessment  Dyspnea c/w HFpEF predominant RHF  Elevated BNP sec to pul HTN  Pul Htn ? severe given RVE and pul artery size on CT chest likely underestimated on echo  Pul HTn ? sec to chronic thromboembolic disease WHO Class IV or diastolic HF WHO Class II  Prior DVT/PE  Morbid Obesity  Abnormal ECG c/w RVPO  skin rash ? underlying collagen vascular disease      Rec  Admit IV lasix 40 day  cont spironolactone 50 mg day  cont toprol 100 g day  Discussed Right heart cath to assess PAP/PCW /PVR to determine etiology of Pul HTN and treatment plan  Pt may benefit from cardiomems if PCW elevated  consider collagen vasc workup  will follow

## 2023-10-18 NOTE — CHART NOTE - NSCHARTNOTEFT_GEN_A_CORE
Contacted by RN due to patient with ? new cellulitis. Patient admitted today 10/18 for acute on chronic hypoxic respiratory failure 2/2 CHF exacerbation. PMHx includes BMI >40, CHF-diastolic, B/L LE DVT +PE on Eliquis, HTN, Asthma, Eczema, and Anxiety.     Patient seen and examined at bedside, reports she hit her upper R thigh on the end table in the hospital earlier in the day time and the pain has worsened over a few hours. She reports it feels like a cellulitis for which she has had in the past where her skin bcame very red, warm, and painful to the touch however to a different area of her body. Patient states her pain is 10/10 at this time to site. It was discussed with patient that her blood pressure has been soft so we need to be cautious with pain medication and she agreed however states IV ofirmev did not ease any of her pain. Patient at this time admits to some anxiety for which she thinks the pain is driving her anxiety at this time also in conjuction with her being at the hospital for CHF exacerbation and SOB. Pt denies chest pain, abdominal pain, n/v/d.    VS: T 98.4, HR 96, /54, RR 18, SpO2 91% on 3L    PHYSICAL EXAM:  GENERAL: Pt lying in bed comfortably in NAD  HEAD:  Atraumatic   EYES: EOMI, PERRL, conjunctiva and sclera clear  ENT: Moist mucous membranes  NECK: Supple, No JVD  CHEST/LUNG: Diminished lower lobe lung sounds, Unlabored respirations  HEART: Regular rate and rhythm; No murmurs, rubs, or gallops  ABDOMEN: Bowel sounds present; Soft, Nontender, Nondistended. No guarding or rigidity    NERVOUS SYSTEM:  Alert & Oriented X3, speech clear. Answers questions appropriately. No deficits   MSK: FROM x 4 extremities   SKIN: Large erythematous warm to touch tender to touch on upper R thigh, non purulent, no blisters        A/P: Patient with ? new cellulitis, R Thigh, reports started today after bumping into end table in CDU. Pain 10/10, large, erythematous, tender to palpation, and pt reports history of cellulitis in the past. VSS, NAD.   -oxycodone IR 5mg x1  -Blood, urine & wound cultures pending  -Vanco 1000 given earlier today  -tylenol  PRN pain and/or fever  -leg elevation, marker the lesion area  -ID consult    RN to call with questions or concerns, will endorse to day team

## 2023-10-18 NOTE — ED ADULT NURSE NOTE - OBJECTIVE STATEMENT
assumed pt care at 1100.  pt awake, alert and oriented x3 c/o 10 lb weight gain overnight, with low oxygen and shortness of breath with activity.  pt denies SOB at rest.  uses 2L NC PRn at home.  respirations even and unlabored at this time.  skin warm and dry.  TROY well and with purpose.

## 2023-10-18 NOTE — ED ADULT NURSE REASSESSMENT NOTE - NS ED NURSE REASSESS COMMENT FT1
sitting calm in bed. a and o x3. breathing even and unlabored. nsr on cm. admitted and awaiting bed placement.

## 2023-10-18 NOTE — H&P ADULT - HISTORY OF PRESENT ILLNESS
39yo female with pmh of BMI > 40, CHF-diastolic, B/L LE DVT + PE on Eliquis, HTN, Asthma,eczema and Anxiety presents to the ED w/ 1 week of worsening SOB on Exertion. Pt notes that this morning she was saturating at 79% on 2LPM (home ox dose). Noted that on exertion her oxygen dropping to low 80s on Room air days prior to admission. Last in hospital for fluid overload in July, since then is compliant with her medication and only added on daily 10mg steroids from outpatient physician. Being worked up for SLE outpatient by PCP, results from 1 week prior showing low C3/4 complement and elevated CRP. Seen at bedside, in NAD, endorsing resolution of SOB at rest, pleasant, denies CP, Abd pain, NVD, Lethargy, fevers.

## 2023-10-18 NOTE — H&P ADULT - NSHPLABSRESULTS_GEN_ALL_CORE
LABS:                         11.0   8.49  )-----------( 124      ( 18 Oct 2023 11:28 )             37.8     10-18    129<L>  |  94<L>  |  35.3<H>  ----------------------------<  279<H>  5.0   |  22.0  |  0.84    Ca    8.2<L>      18 Oct 2023 13:00    TPro  8.3  /  Alb  2.9<L>  /  TBili  1.1  /  DBili  x   /  AST  76<H>  /  ALT  124<H>  /  AlkPhos  100  10-18    PT/INR - ( 18 Oct 2023 11:28 )   PT: 18.3 sec;   INR: 1.67 ratio         PTT - ( 18 Oct 2023 11:28 )  PTT:36.2 sec  Urinalysis Basic - ( 18 Oct 2023 13:00 )    Color: x / Appearance: x / SG: x / pH: x  Gluc: 279 mg/dL / Ketone: x  / Bili: x / Urobili: x   Blood: x / Protein: x / Nitrite: x   Leuk Esterase: x / RBC: x / WBC x   Sq Epi: x / Non Sq Epi: x / Bacteria: x      CARDIAC MARKERS ( 18 Oct 2023 13:00 )  x     / <0.01 ng/mL / x     / x     / x                RADIOLOGY, EKG & ADDITIONAL TESTS:     CXR -  Mild CHF. Left lower lobe/retrocardiac consolidation.

## 2023-10-18 NOTE — ED ADULT TRIAGE NOTE - CHIEF COMPLAINT QUOTE
Pt BIBA, c/o difficulty breathing x 1 week, recurrent pneumonia on home O2 since July, hx of heart failure, 10 lb weight gain overnight

## 2023-10-19 NOTE — CHART NOTE - NSCHARTNOTEFT_GEN_A_CORE
Called by RN reporting cardiac monitor showed aflutter, 2 episodes that last less than 5 minutes and resolved spontaneously.  Pt is currently in sinus rhythm.  Pt is seen resting comfortably resting in bed. NAD. A & OP x 4.  She admits to palpitations and flushing about 3 minutes ago that resolved. Currently with no complaints. Denies previous hx of abnormal heart rhythm.  Cardiologist is at [place] [facility]. Pt is 39 y/o f with hx of obesity, b/l dvt/PE admitted for CHF/ PNA. Received Metroprolol 100 mg PO and ELIQUIS 2.5 at 6 am.  BP 98/63 HR 80 O2 at 93% on 3L NC. Discussed with Dr. Austin. EKG- Continue with telemetry monitoring. Follow up with Cardio. Called by RN reporting cardiac monitor showed aflutter, 2 episodes that last less than 5 minutes and resolved spontaneously.  Pt is currently in sinus rhythm.  Pt is seen resting comfortably resting in bed. NAD. A & OP x 4.  She admits to palpitations and flushing about 3 minutes ago that resolved. Currently with no complaints. Denies previous hx of abnormal heart rhythm.  Cardiologist is at [place]. Pt does not know why her dose of Eliquis is 2.5 mg BID.  She says she had been taking 5 mg but the Cardiologist changed the dose. Cr. 0.84. Pt is 37 y/o f with hx of obesity, b/l dvt/PE admitted for CHF/ PNA. Received Metroprolol 100 mg PO and ELIQUIS 2.5 at 6 am.  BP 98/63 HR 80 O2 at 93% on 3L NC. Discussed with Dr. Austin. Give an additional 2.5 mg of Eliquis now. EKG- NSR at 86 bpm compared with EKG from yesterday. Continue with telemetry monitoring. Follow up with Cardio.

## 2023-10-19 NOTE — CONSULT NOTE ADULT - SUBJECTIVE AND OBJECTIVE BOX
[facility]                                                INFECTIOUS DISEASES  =======================================================                     Steven Evans#   Gallo Hart MD#   Terrell Jerome MD*                           Kait Huang MD*   Corine Garcia MD*            Diplomates American Board of Internal Medicine & Infectious Diseases                  # [place] Office - Appt - Tel  [phone] Fax [phone]                * [place] Office - Appt - Tel [phone] Fax [phone]                                  Hospital Consult line:  [phone]  =======================================================      [id number]  MITCHEL GUNN   HPI:  37yo female with pmh of BMI > 40, CHF-diastolic, B/L LE DVT + PE on Eliquis, HTN, Asthma,eczema and Anxiety presents to the ED w/ 1 week of worsening SOB on Exertion. Pt notes that this morning she was saturating at 79% on 2LPM (home ox dose). Noted that on exertion her oxygen dropping to low 80s on Room air days prior to admission. Last in hospital for fluid overload in July, since then is compliant with her medication and only added on daily 10mg steroids from outpatient physician. Being worked up for SLE outpatient by PCP, results from 1 week prior showing low C3/4 complement and elevated CRP. Seen at bedside, in NAD, endorsing resolution of SOB at rest, pleasant, denies CP, Abd pain, NVD, Lethargy, fevers. (18 Oct 2023 16:00)          I have personally reviewed the labs and data; pertinent labs and data are listed in this note; please see below.   =======================================================  Past Medical & Surgical Hx:  =====================  PAST MEDICAL & SURGICAL HISTORY:  Pulmonary embolism      DVT, lower extremity      CHF (congestive heart failure)      Asthma      Anxiety      Severe obesity (BMI >= 40)      Hypertension      No significant past surgical history        Problem List:  ==========  HEALTH ISSUES - PROBLEM Dx:  Pulmonary thromboembolism          Social Hx:  =======  no toxic habits currently    FAMILY HISTORY:  Family history of colon cancer (Mother)    Family history of stroke (Father)    no significant family history of immunosuppressive disorders in mother or father   =======================================================    REVIEW OF SYSTEMS:  CONSTITUTIONAL:  No Fever or chills  HEENT:  No diplopia or blurred vision.  No earache, sore throat or runny nose.  CARDIOVASCULAR:  No pressure, squeezing, strangling, tightness, heaviness or aching about the chest, neck, axilla or epigastrium.  RESPIRATORY:  No cough, shortness of breath  GASTROINTESTINAL:  No nausea, vomiting or diarrhea.  GENITOURINARY:  No dysuria, frequency or urgency. No Blood in urine  MUSCULOSKELETAL:  no joint aches, no muscle pain  SKIN:  No change in skin, hair or nails.  NEUROLOGIC:  No Headaches, seizures or weakness.  PSYCHIATRIC:  No disorder of thought or mood.  ENDOCRINE:  No heat or cold intolerance  HEMATOLOGICAL:  No easy bruising or bleeding.    =======================================================  Allergies    iodine (Hives)  ceftriaxone (Hives)  shellfish (Hives)    Intolerances    Antibiotics:  azithromycin   Tablet 500 milliGRAM(s) Oral daily  piperacillin/tazobactam IVPB. 3.375 Gram(s) IV Intermittent once  piperacillin/tazobactam IVPB.- 3.375 Gram(s) IV Intermittent once  piperacillin/tazobactam IVPB.. 3.375 Gram(s) IV Intermittent every 8 hours    Other medications:  apixaban 2.5 milliGRAM(s) Oral every 12 hours  budesonide 160 MICROgram(s)/formoterol 4.5 MICROgram(s) Inhaler 2 Puff(s) Inhalation two times a day  furosemide   Injectable 40 milliGRAM(s) IV Push two times a day  influenza   Vaccine 0.5 milliLiter(s) IntraMuscular once  metoprolol tartrate 100 milliGRAM(s) Oral daily  montelukast 10 milliGRAM(s) Oral daily  PARoxetine 20 milliGRAM(s) Oral daily  predniSONE   Tablet 40 milliGRAM(s) Oral daily  spironolactone 50 milliGRAM(s) Oral daily     levoFLOXacin  Tablet   750 milliGRAM(s) Oral (10-18-23 @ 21:13)    vancomycin  IVPB.   250 mL/Hr IV Intermittent (10-18-23 @ 11:58)      ======================================================  Physical Exam:  ============  T(F): 98 (19 Oct 2023 09:10), Max: 98.6 (18 Oct 2023 15:30)  HR: 74 (19 Oct 2023 09:10)  BP: 109/65 (19 Oct 2023 09:10)  RR: 18 (19 Oct 2023 09:10)  SpO2: 95% (19 Oct 2023 09:10) (91% - 99%)  temp max in last 48H T(F): , Max: 98.6 (10-18-23 @ 15:30)    General:  No acute distress.  Eye: Pupils are equal, round and reactive to light, Normal conjunctiva.  HENT: Normocephalic, Oral mucosa is moist, No pharyngeal erythema, No sinus tenderness.  Neck: Supple, No lymphadenopathy.  Respiratory: Lungs are clear to auscultation, Respirations are non-labored.  Cardiovascular: Normal rate, Regular rhythm, s1 + s2  Gastrointestinal: Soft, Non-tender, Non-distended, Normal bowel sounds.  Genitourinary: No costovertebral angle tenderness.  Lymphatics: No lymphadenopathy neck,   Musculoskeletal: Normal range of motion, Normal strength.  Integumentary: No rash.  Neurologic: Alert, Oriented, No focal deficits  Psychiatric: Appropriate mood & affect.    =======================================================  Labs:                        9.8    8.92  )-----------( 136      ( 19 Oct 2023 06:50 )             34.2     10-19    135  |  98  |  42.2<H>  ----------------------------<  148<H>  4.6   |  23.0  |  1.22    Ca    8.5      19 Oct 2023 06:50  Mg     1.8     10-19    TPro  8.0  /  Alb  2.9<L>  /  TBili  1.1  /  DBili  x   /  AST  52<H>  /  ALT  102<H>  /  AlkPhos  99  10-19       SARS-CoV-2: Katelyn (10-18-23 @ 12:05)                                                   [facility]                                                INFECTIOUS DISEASES  =======================================================                     Steven Evans#   Gallo Hart MD#   Terrell Jerome MD*                           Kait Huang MD*   Corine Garcia MD*            Diplomates American Board of Internal Medicine & Infectious Diseases                  # [place] Office - Appt - Tel  [phone] Fax [phone]                * [place] Office - Appt - Tel [phone] Fax [phone]                                  Hospital Consult line:  [phone]  =======================================================      [id number]  MITCHEL GUNN   HPI:  37yo female with pmh of BMI > 40, CHF-diastolic, B/L LE DVT + PE on Eliquis, HTN, Asthma,eczema and Anxiety presents to the ED w/ 1 week of worsening SOB on Exertion. Pt notes that this morning she was saturating at 79% on 2LPM (home ox dose). Noted that on exertion her oxygen dropping to low 80s on Room air days prior to admission. Last in hospital for fluid overload in July, since then is compliant with her medication and only added on daily 10mg steroids from outpatient physician. Being worked up for SLE outpatient by PCP, results from 1 week prior showing low C3/4 complement and elevated CRP. Seen at bedside, in NAD, endorsing resolution of SOB at rest, pleasant, denies CP, Abd pain, NVD, Lethargy, fevers. (18 Oct 2023 16:00)      pt reports dry cough, has been on amoxicillin and prednisone/methylprednisolone- steroids were prescribed for joint pains which is suspected to be due to lupus-she is currently being worked up for this  reports cellulitis in the past  states she bumped her right posterior hip gluteal area and since noted tenderness and warmth. states warmth improved with cold packs  denies fever at home  no urinary symptoms or diarrhea    I have personally reviewed the labs and data; pertinent labs and data are listed in this note; please see below.   =======================================================  Past Medical & Surgical Hx:  =====================  PAST MEDICAL & SURGICAL HISTORY:  Pulmonary embolism      DVT, lower extremity      CHF (congestive heart failure)      Asthma      Anxiety      Severe obesity (BMI >= 40)      Hypertension      No significant past surgical history        Problem List:  ==========  HEALTH ISSUES - PROBLEM Dx:  Pulmonary thromboembolism          Social Hx:  =======  no toxic habits currently    FAMILY HISTORY:  Family history of colon cancer (Mother)    Family history of stroke (Father)    no significant family history of immunosuppressive disorders in mother or father   =======================================================    REVIEW OF SYSTEMS:  CONSTITUTIONAL:  No Fever or chills  HEENT:  No diplopia or blurred vision.  No earache, sore throat or runny nose.  CARDIOVASCULAR:  No pressure, squeezing, strangling, tightness, heaviness or aching about the chest, neck, axilla or epigastrium.  RESPIRATORY:  + cough, shortness of breath  GASTROINTESTINAL:  No nausea, vomiting or diarrhea.  GENITOURINARY:  No dysuria, frequency or urgency. No Blood in urine  MUSCULOSKELETAL:  no joint aches, no muscle pain  SKIN:  No change in skin, hair or nails. rt thigh swelling and pain  NEUROLOGIC:  No Headaches, seizures or weakness.  PSYCHIATRIC:  No disorder of thought or mood.  ENDOCRINE:  No heat or cold intolerance  HEMATOLOGICAL:  No easy bruising or bleeding.    =======================================================  Allergies    iodine (Hives)  ceftriaxone (Hives)  shellfish (Hives)    Intolerances    Antibiotics:  azithromycin   Tablet 500 milliGRAM(s) Oral daily  piperacillin/tazobactam IVPB. 3.375 Gram(s) IV Intermittent once  piperacillin/tazobactam IVPB.- 3.375 Gram(s) IV Intermittent once  piperacillin/tazobactam IVPB.. 3.375 Gram(s) IV Intermittent every 8 hours    Other medications:  apixaban 2.5 milliGRAM(s) Oral every 12 hours  budesonide 160 MICROgram(s)/formoterol 4.5 MICROgram(s) Inhaler 2 Puff(s) Inhalation two times a day  furosemide   Injectable 40 milliGRAM(s) IV Push two times a day  influenza   Vaccine 0.5 milliLiter(s) IntraMuscular once  metoprolol tartrate 100 milliGRAM(s) Oral daily  montelukast 10 milliGRAM(s) Oral daily  PARoxetine 20 milliGRAM(s) Oral daily  predniSONE   Tablet 40 milliGRAM(s) Oral daily  spironolactone 50 milliGRAM(s) Oral daily     levoFLOXacin  Tablet   750 milliGRAM(s) Oral (10-18-23 @ 21:13)    vancomycin  IVPB.   250 mL/Hr IV Intermittent (10-18-23 @ 11:58)      ======================================================  Physical Exam:  ============  T(F): 98 (19 Oct 2023 09:10), Max: 98.6 (18 Oct 2023 15:30)  HR: 74 (19 Oct 2023 09:10)  BP: 109/65 (19 Oct 2023 09:10)  RR: 18 (19 Oct 2023 09:10)  SpO2: 95% (19 Oct 2023 09:10) (91% - 99%)  temp max in last 48H T(F): , Max: 98.6 (10-18-23 @ 15:30)    General:  No acute distress. on o2  HENT: Normocephalic, Oral mucosa is moist, No pharyngeal erythema, No sinus tenderness.  Neck: Supple, No lymphadenopathy.  Respiratory: basal crackles to auscultation, Respirations are non-labored.  Cardiovascular: Normal rate, Regular rhythm, s1 + s2  Gastrointestinal: Soft, Non-tender, Non-distended, Normal bowel sounds.  Genitourinary: No costovertebral angle tenderness.  Lymphatics: No lymphadenopathy neck,   Integumentary: RUE erythematous patches. rt posterior upper thigh/gluteus with indurated skin and eccyhomses with discoloration, + tenderness, mild warmth. no fluctuance or crepitus. extends to posterior upper/lateral thigh. b/l Le varicose veins  Neurologic: Alert, Oriented, No focal deficits  Psychiatric: Appropriate mood & affect.    =======================================================  Labs:                        9.8    8.92  )-----------( 136      ( 19 Oct 2023 06:50 )             34.2     10-19    135  |  98  |  42.2<H>  ----------------------------<  148<H>  4.6   |  23.0  |  1.22    Ca    8.5      19 Oct 2023 06:50  Mg     1.8     10-19    TPro  8.0  /  Alb  2.9<L>  /  TBili  1.1  /  DBili  x   /  AST  52<H>  /  ALT  102<H>  /  AlkPhos  99  10-19       SARS-CoV-2: NotDetec (10-18-23 @ 12:05)       < from: Xray Chest 1 View- PORTABLE-Urgent (Xray Chest 1 View- PORTABLE-Urgent .) (10.18.23 @ 14:14) >    FINDINGS:    Single frontal view of the chest demonstrates mild CHF. Left lower   lobe/retrocardiac consolidation. The cardiomediastinal silhouette is   enlarged. No acute osseous abnormalities. Overlying EKG leads and wires   are noted    IMPRESSION: Mild CHF. Left lower lobe/retrocardiac consolidation.   Consider chest CT.    --- End of Report ---    < end of copied text >

## 2023-10-19 NOTE — CONSULT NOTE ADULT - ASSESSMENT
37yo female with pmh of BMI > 40, CHF-diastolic, B/L LE DVT + PE on Eliquis, HTN, Asthma,eczema and Anxiety presents to the ED w/ 1 week of worsening SOB on Exertion. Pt notes that this morning she was saturating at 79% on 2LPM (home ox dose). Noted that on exertion her oxygen dropping to low 80s on Room air days prior to admission. Last in hospital for fluid overload in July, since then is compliant with her medication and only added on daily 10mg steroids from outpatient physician. Being worked up for SLE outpatient by PCP, results from 1 week prior showing low C3/4 complement and elevated CRP. Seen at bedside, in NAD, endorsing resolution of SOB at rest, pleasant, denies CP, Abd pain, NVD, Lethargy, fevers. (18 Oct 2023 16:00)    Acute on chronic hypoxic respiratory failure- CHF exacerbation and possible underlying pneumonia  RT LE cellulitis vs hematoma  r/o SLE  Ceftriaxone allergy-hives      - send legionella, strep pneumo, mycoplasma  - RVP (-)  - agree with CT chest however patient refused  - c/w diuresis  - f/u BCX  - empiric zosyn and vancomycin  - add azithromycin for atypical coverage  - monitor vanco trough and adjust per pharmacy protocol  - pt reports tolerated amoxicillin in the past  - check USG right upper thigh/gluteus r/o abscess  - dc levaquin  - Trend Fever  - Trend WBC-on steroids    d/w pharmacy  Will Follow

## 2023-10-19 NOTE — PROGRESS NOTE ADULT - ASSESSMENT
39yo female with pmh of BMI > 40, CHF-diastolic, B/L LE DVT + PE on Eliquis, HTN, Asthma,eczema and Anxiety here for mgmt of acute on chronic hypoxic respiratory failure in the setting of CHF and PNA    1-Acute on chronic hypoxic respiratory failure in the setting of Acute on chronic HFpEF and Possible developing PNA  seen by cardio   cont lasix iv   Echo from 7/2023 reveals EF 60-65% and Mild pulm htn  - Toprol XL 100mg Q24  - Aldactone 50mg Q24  cont  levofloxacin Q24  - montelukast Q24  - Supplementary Oxygen    - duonebs PRN  - Takes breo ellipta at home, start symbicort  - increase steroids to 40mg Q24, taper down by 10 every 3 days until down to daily level  - takes chronic potassium chloride, obtain Whole Blood Potassium level w/ ABG and continue supplementation, maintain K 4-5    2-RLE thigh pain with flank hip area pain   after she  she bumped her R leg into the table  wll get Ct of abd pelvis and lower ext r/o hematoma   drop in hb noted   hold pm eliquis pending above   d/w PA covering ESSU   - pain control PRN  - monitor     3-Asthma  on home oxygen  - plan as above    4-B/L DVT/PE   on eliquis long term      5-Depression/Anxiety  stable  - c/w paroxetine    #HTN  c/w meds as above    #?SLE  being worked up for SLE  C3/C4 labs reviewed  on chronic steroids  - c/w steroids  - f/u outpatient    #Healthcare maintenance  DVT PPx - Eliquis  Dispo - Acute

## 2023-10-19 NOTE — PROGRESS NOTE ADULT - SUBJECTIVE AND OBJECTIVE BOX
Patient is a 38y old  Female who presents with a chief complaint of Acute on Chronic Hypoxic Respiratory Failure 2/2 CHF Exacerbation (19 Oct 2023 11:09)      Patient seen and examined at bedside in am   c/o pain in [place] right hip flank arae and right thigh : I hit my site to furniture getting out of home with ambulance   pt was in atrial flutter this am , tele monitoring NSR   D/w cardiology ad PA       ALLERGIES:  iodine (Hives)  ceftriaxone (Hives)  shellfish (Hives)    MEDICATIONS  (STANDING):  apixaban 2.5 milliGRAM(s) Oral every 12 hours  azithromycin   Tablet 500 milliGRAM(s) Oral daily  budesonide 160 MICROgram(s)/formoterol 4.5 MICROgram(s) Inhaler 2 Puff(s) Inhalation two times a day  furosemide   Injectable 40 milliGRAM(s) IV Push two times a day  influenza   Vaccine 0.5 milliLiter(s) IntraMuscular once  metoprolol tartrate 100 milliGRAM(s) Oral daily  montelukast 10 milliGRAM(s) Oral daily  PARoxetine 20 milliGRAM(s) Oral daily  piperacillin/tazobactam IVPB.- 3.375 Gram(s) IV Intermittent once  piperacillin/tazobactam IVPB.. 3.375 Gram(s) IV Intermittent every 8 hours  predniSONE   Tablet 40 milliGRAM(s) Oral daily  spironolactone 50 milliGRAM(s) Oral daily  vancomycin  IVPB 1250 milliGRAM(s) IV Intermittent every 12 hours    MEDICATIONS  (PRN):  acetaminophen     Tablet .. 650 milliGRAM(s) Oral every 6 hours PRN Temp greater or equal to 38C (100.4F), Mild Pain (1 - 3)  albuterol/ipratropium for Nebulization 3 milliLiter(s) Nebulizer every 6 hours PRN Shortness of Breath and/or Wheezing  aluminum hydroxide/magnesium hydroxide/simethicone Suspension 30 milliLiter(s) Oral every 4 hours PRN Dyspepsia  melatonin 3 milliGRAM(s) Oral at bedtime PRN Insomnia  ondansetron Injectable 4 milliGRAM(s) IV Push every 8 hours PRN Nausea and/or Vomiting  oxyCODONE    IR 2.5 milliGRAM(s) Oral every 6 hours PRN Moderate Pain (4 - 6)  oxyCODONE    IR 5 milliGRAM(s) Oral every 6 hours PRN Severe Pain (7 - 10)    Vital Signs Last 24 Hrs  T(F): 98.2 (19 Oct 2023 11:23), Max: 98.6 (18 Oct 2023 15:30)  HR: 94 (19 Oct 2023 11:23) (73 - 106)  BP: 113/72 (19 Oct 2023 11:23) (92/62 - 113/72)  RR: 20 (19 Oct 2023 11:23) (18 - 20)  SpO2: 92% (19 Oct 2023 11:23) (91% - 98%)  I&O's Summary      PHYSICAL EXAM:  General: awake alert  Neck: supple, no JVD   Lungs: CTA bilateral  Cardio: RRR, S1/S2, No murmur  Abdomen: Soft, Nontender, Nondistended; Bowel sounds present  Extremities: No calf tenderness, No pitting edema  Skin : right flank skin bruise , tender on palpation   rightthigh lateral ski mild bruises   LABS:                        9.8    8.92  )-----------( 136      ( 19 Oct 2023 06:50 )             34.2     10-19    135  |  98  |  42.2  ----------------------------<  148  4.6   |  23.0  |  1.22    Ca    8.5      19 Oct 2023 06:50  Mg     1.8     10-19    TPro  8.0  /  Alb  2.9  /  TBili  1.1  /  DBili  x   /  AST  52  /  ALT  102  /  AlkPhos  99  10-19          PT/INR - ( 18 Oct 2023 11:28 )   PT: 18.3 sec;   INR: 1.67 ratio         PTT - ( 18 Oct 2023 11:28 )  PTT:36.2 sec              11:28 - VBG - pH:       | pCO2:       | pO2:       | Lactate: 1.60     ABG - ( 18 Oct 2023 15:54 )  pH, Arterial: 7.400 pH, Blood: x     /  pCO2: 40    /  pO2: 67    / HCO3: 25    / Base Excess: 0.0   /  SaO2: 93.4                        Urinalysis Basic - ( 19 Oct 2023 06:50 )    Color: x / Appearance: x / SG: x / pH: x  Gluc: 148 mg/dL / Ketone: x  / Bili: x / Urobili: x   Blood: x / Protein: x / Nitrite: x   Leuk Esterase: x / RBC: x / WBC x   Sq Epi: x / Non Sq Epi: x / Bacteria: x          RADIOLOGY & ADDITIONAL TESTS:

## 2023-10-19 NOTE — PROGRESS NOTE ADULT - SUBJECTIVE AND OBJECTIVE BOX
[facility]                                   [address] [place] [address]                                                      PHONE: [phone]                                                         FAX: [phone]  [email or web address]  ---------------------------------------------------------------------------------------------------------------------------------    Overnight events/patient complaints:  Patient having palpitations and dyspnea this morning.  She has been converting in and out of atrial flutter this morning.      PAST MEDICAL & SURGICAL HISTORY:  Pulmonary embolism      DVT, lower extremity      CHF (congestive heart failure)      Asthma      Anxiety      Severe obesity (BMI >= 40)      Hypertension      No significant past surgical history          iodine (Hives)  ceftriaxone (Hives)  shellfish (Hives)    MEDICATIONS  (STANDING):  apixaban 2.5 milliGRAM(s) Oral every 12 hours  budesonide 160 MICROgram(s)/formoterol 4.5 MICROgram(s) Inhaler 2 Puff(s) Inhalation two times a day  furosemide   Injectable 40 milliGRAM(s) IV Push two times a day  influenza   Vaccine 0.5 milliLiter(s) IntraMuscular once  levoFLOXacin  Tablet 750 milliGRAM(s) Oral every 24 hours  metoprolol tartrate 100 milliGRAM(s) Oral daily  montelukast 10 milliGRAM(s) Oral daily  PARoxetine 20 milliGRAM(s) Oral daily  predniSONE   Tablet 40 milliGRAM(s) Oral daily  spironolactone 50 milliGRAM(s) Oral daily    MEDICATIONS  (PRN):  acetaminophen     Tablet .. 650 milliGRAM(s) Oral every 6 hours PRN Temp greater or equal to 38C (100.4F), Mild Pain (1 - 3)  albuterol/ipratropium for Nebulization 3 milliLiter(s) Nebulizer every 6 hours PRN Shortness of Breath and/or Wheezing  aluminum hydroxide/magnesium hydroxide/simethicone Suspension 30 milliLiter(s) Oral every 4 hours PRN Dyspepsia  melatonin 3 milliGRAM(s) Oral at bedtime PRN Insomnia  ondansetron Injectable 4 milliGRAM(s) IV Push every 8 hours PRN Nausea and/or Vomiting  oxyCODONE    IR 5 milliGRAM(s) Oral every 6 hours PRN Severe Pain (7 - 10)  oxyCODONE    IR 2.5 milliGRAM(s) Oral every 6 hours PRN Moderate Pain (4 - 6)      Vital Signs Last 24 Hrs  T(C): 36.7 (19 Oct 2023 09:10), Max: 37 (18 Oct 2023 15:30)  T(F): 98 (19 Oct 2023 09:10), Max: 98.6 (18 Oct 2023 15:30)  HR: 74 (19 Oct 2023 09:10) (73 - 111)  BP: 109/65 (19 Oct 2023 09:10) (92/62 - 137/91)  BP(mean): --  RR: 18 (19 Oct 2023 09:10) (18 - 26)  SpO2: 95% (19 Oct 2023 09:10) (78% - 99%)    Parameters below as of 19 Oct 2023 09:10  Patient On (Oxygen Delivery Method): nasal cannula  O2 Flow (L/min): 3    ICU Vital Signs Last 24 Hrs  MITCHEL GUNN  I&O's Detail    I&O's Summary    Drug Dosing Weight  MITCHELPHI GUNN      PHYSICAL EXAM:  General: Appears well developed, alert and cooperative.  Morbid obesity.  HEENT: Head; normocephalic, atraumatic.  Eyes: Pupils reactive, cornea wnl.  Neck: Supple, no nodes adenopathy, no JVD, no carotid bruit  CARDIOVASCULAR: Normal S1 and S2, No murmur, rub, gallop or lift.   LUNGS: No rales, rhonchi or wheeze. Normal breath sounds bilaterally.  ABDOMEN: Soft, nontender, nondistended  EXTREMITIES: No clubbing or edema. Distal pulses wnl.   SKIN: warm and dry with normal turgor.  NEURO: Alert/oriented x 3/normal motor exam.   PSYCH: normal affect.        LABS:                        9.8    8.92  )-----------( 136      ( 19 Oct 2023 06:50 )             34.2     10-19    135  |  98  |  42.2<H>  ----------------------------<  148<H>  4.6   |  23.0  |  1.22    Ca    8.5      19 Oct 2023 06:50  Mg     1.8     10-19    TPro  8.0  /  Alb  2.9<L>  /  TBili  1.1  /  DBili  x   /  AST  52<H>  /  ALT  102<H>  /  AlkPhos  99  10-19    MITCHEL GUNN  CARDIAC MARKERS ( 19 Oct 2023 06:50 )  x     / <0.01 ng/mL / x     / x     / x      CARDIAC MARKERS ( 18 Oct 2023 13:00 )  x     / <0.01 ng/mL / x     / x     / x          PT/INR - ( 18 Oct 2023 11:28 )   PT: 18.3 sec;   INR: 1.67 ratio         PTT - ( 18 Oct 2023 11:28 )  PTT:36.2 sec  Urinalysis Basic - ( 19 Oct 2023 06:50 )    Color: x / Appearance: x / SG: x / pH: x  Gluc: 148 mg/dL / Ketone: x  / Bili: x / Urobili: x   Blood: x / Protein: x / Nitrite: x   Leuk Esterase: x / RBC: x / WBC x   Sq Epi: x / Non Sq Epi: x / Bacteria: x        RADIOLOGY & ADDITIONAL STUDIES:    INTERPRETATION OF TELEMETRY (personally reviewed): NSR, episodes of atrial flutter    ECG:    ECHO:    STRESS TEST:    CARDIAC CATHETERIZATION:    ASSESSMENT AND PLAN:  In summary, MITCHEL GUNN is an 38y Female with past medical history significant for morbid obesity, HFpEF, DVT/PE on eliquis, HTN p/w with worsening AMAYA/HFpEF and pna.    PNA- on abx    HFpEF- patient with acute decompensated heart failure  -continue lasix 40mg IV BID  -continue toprolol xl  -continue aldactone  -strict I/Os    Atrial flutter- new dx.  If patient converts back to flutter, would perform 12 lead EKG.  Continue tele monitoring.  Pt currently in NSR  -continue anticoagulation.  Would increase eliquis to 5mg bid to adequately prophylax against stroke    Thank you for allowing [facility] to participate in the care of this patient.  Please feel free to call with any questions or concerns.

## 2023-10-20 NOTE — PROGRESS NOTE ADULT - SUBJECTIVE AND OBJECTIVE BOX
[facility]                                                INFECTIOUS DISEASES  =======================================================                     Steven Evans#   Gallo Hart MD#   Terrell Jerome MD*                           Kait Huang MD*   Corine Garcia MD*            Diplomates American Board of Internal Medicine & Infectious Diseases                  # [place] Office - Appt - Tel  [phone] Fax [phone]                * [place] Office - Appt - Tel [phone] Fax [phone]                                  Hospital Consult line:  [phone]  =======================================================      [facility]-[id number]  MITCHEL GUNN       Interval f/u - pneumonia, rle cellulitis vs hematoma  afebrile  dyspnea better  BP was low, lasix changed to po  still has RLE pain-refused USG states region is too tender to touch    I have personally reviewed the labs and data; pertinent labs and data are listed in this note; please see below.   =======================================================  Past Medical & Surgical Hx:  =====================  PAST MEDICAL & SURGICAL HISTORY:  Pulmonary embolism      DVT, lower extremity      CHF (congestive heart failure)      Asthma      Anxiety      Severe obesity (BMI >= 40)      Hypertension      No significant past surgical history        Problem List:  ==========  HEALTH ISSUES - PROBLEM Dx:  Pulmonary thromboembolism          Social Hx:  =======  no toxic habits currently    FAMILY HISTORY:  Family history of colon cancer (Mother)    Family history of stroke (Father)    no significant family history of immunosuppressive disorders in mother or father   =======================================================    REVIEW OF SYSTEMS:  CONSTITUTIONAL:  No Fever or chills  HEENT:  No diplopia or blurred vision.  No earache, sore throat or runny nose.  CARDIOVASCULAR:  No pressure, squeezing, strangling, tightness, heaviness or aching about the chest, neck, axilla or epigastrium.  RESPIRATORY:  + cough, shortness of breath  GASTROINTESTINAL:  No nausea, vomiting or diarrhea.  GENITOURINARY:  No dysuria, frequency or urgency. No Blood in urine  MUSCULOSKELETAL:  no joint aches, no muscle pain  SKIN:  No change in skin, hair or nails. rt thigh swelling and pain  NEUROLOGIC:  No Headaches, seizures or weakness.  PSYCHIATRIC:  No disorder of thought or mood.  ENDOCRINE:  No heat or cold intolerance  HEMATOLOGICAL:  No easy bruising or bleeding.    =======================================================  Allergies    iodine (Hives)  ceftriaxone (Hives)  shellfish (Hives)    Intolerances    Antibiotics:  azithromycin   Tablet 500 milliGRAM(s) Oral daily  piperacillin/tazobactam IVPB. 3.375 Gram(s) IV Intermittent once  piperacillin/tazobactam IVPB.- 3.375 Gram(s) IV Intermittent once  piperacillin/tazobactam IVPB.. 3.375 Gram(s) IV Intermittent every 8 hours    Other medications:  apixaban 2.5 milliGRAM(s) Oral every 12 hours  budesonide 160 MICROgram(s)/formoterol 4.5 MICROgram(s) Inhaler 2 Puff(s) Inhalation two times a day  furosemide   Injectable 40 milliGRAM(s) IV Push two times a day  influenza   Vaccine 0.5 milliLiter(s) IntraMuscular once  metoprolol tartrate 100 milliGRAM(s) Oral daily  montelukast 10 milliGRAM(s) Oral daily  PARoxetine 20 milliGRAM(s) Oral daily  predniSONE   Tablet 40 milliGRAM(s) Oral daily  spironolactone 50 milliGRAM(s) Oral daily     levoFLOXacin  Tablet   750 milliGRAM(s) Oral (10-18-23 @ 21:13)    vancomycin  IVPB.   250 mL/Hr IV Intermittent (10-18-23 @ 11:58)      ======================================================  Physical Exam:  ============  Vital Signs Last 24 Hrs  T(C): 36.7 (20 Oct 2023 08:00), Max: 36.9 (19 Oct 2023 23:33)  T(F): 98 (20 Oct 2023 08:00), Max: 98.5 (19 Oct 2023 23:33)  HR: 78 (20 Oct 2023 10:00) (78 - 92)  BP: 93/64 (20 Oct 2023 10:00) (84/53 - 112/67)  BP(mean): --  RR: 18 (20 Oct 2023 08:00) (17 - 20)  SpO2: 96% (20 Oct 2023 08:00) (93% - 98%)    Parameters below as of 20 Oct 2023 08:00  Patient On (Oxygen Delivery Method): nasal cannula  O2 Flow (L/min): 3    General:  No acute distress. on o2  HENT: Normocephalic, Oral mucosa is moist, No pharyngeal erythema, No sinus tenderness.  Neck: Supple, No lymphadenopathy.  Respiratory: basal crackles to auscultation, Respirations are non-labored.  Cardiovascular: Normal rate, Regular rhythm, s1 + s2  Gastrointestinal: Soft, Non-tender, Non-distended, Normal bowel sounds.  Genitourinary: No costovertebral angle tenderness.  Lymphatics: No lymphadenopathy neck,   Integumentary: RUE erythematous patches. Right posterior upper thigh/gluteus with indurated skin and eccyhmoses with discoloration, + tenderness, no warmth. no fluctuance or crepitus. extends to posterior upper/lateral thigh. b/l Le varicose veins  Neurologic: Alert, Oriented, No focal deficits  Psychiatric: Appropriate mood & affect.    =======================================================  Labs:                                      9.3    6.95  )-----------( 123      ( 20 Oct 2023 05:00 )             32.1       10-20    136  |  100  |  44.4<H>  ----------------------------<  105<H>  4.4   |  23.0  |  1.08    Ca    8.4      20 Oct 2023 05:00  Mg     1.8     10-20    TPro  8.0  /  Alb  2.9<L>  /  TBili  1.1  /  DBili  x   /  AST  52<H>  /  ALT  102<H>  /  AlkPhos  99  10-19          ABG - ( 18 Oct 2023 15:54 )  pH, Arterial: 7.400 pH, Blood: x     /  pCO2: 40    /  pO2: 67    / HCO3: 25    / Base Excess: 0.0   /  SaO2: 93.4                Urinalysis Basic - ( 20 Oct 2023 05:00 )    Color: x / Appearance: x / SG: x / pH: x  Gluc: 105 mg/dL / Ketone: x  / Bili: x / Urobili: x   Blood: x / Protein: x / Nitrite: x   Leuk Esterase: x / RBC: x / WBC x   Sq Epi: x / Non Sq Epi: x / Bacteria: x            CARDIAC MARKERS ( 19 Oct 2023 06:50 )  x     / <0.01 ng/mL / x     / x     / x            CAPILLARY BLOOD GLUCOSE                        Urinalysis Basic - ( 20 Oct 2023 05:00 )    Color: x / Appearance: x / SG: x / pH: x  Gluc: 105 mg/dL / Ketone: x  / Bili: x / Urobili: x   Blood: x / Protein: x / Nitrite: x   Leuk Esterase: x / RBC: x / WBC x   Sq Epi: x / Non Sq Epi: x / Bacteria: x            CARDIAC MARKERS ( 19 Oct 2023 06:50 )  x     / <0.01 ng/mL / x     / x     / x            CAPILLARY BLOOD GLUCOSE                 SARS-CoV-2: NotDetec (10-18-23 @ 12:05)       < from: Xray Chest 1 View- PORTABLE-Urgent (Xray Chest 1 View- PORTABLE-Urgent .) (10.18.23 @ 14:14) >    FINDINGS:    Single frontal view of the chest demonstrates mild CHF. Left lower   lobe/retrocardiac consolidation. The cardiomediastinal silhouette is   enlarged. No acute osseous abnormalities. Overlying EKG leads and wires   are noted    IMPRESSION: Mild CHF. Left lower lobe/retrocardiac consolidation.   Consider chest CT.    --- End of Report ---    < end of copied text >

## 2023-10-20 NOTE — PROVIDER CONTACT NOTE (OTHER) - SITUATION
Pt refusing ultrasound and ct scan
pt's heart rate is in 140s, pt was up to use the commode, nauseous, zofran already given

## 2023-10-20 NOTE — DIETITIAN INITIAL EVALUATION ADULT - OTHER INFO
39yo female with pmh of BMI > 40, CHF-diastolic, B/L LE DVT + PE on Eliquis, HTN, Asthma, eczema and Anxiety here for mgmt of acute on chronic hypoxic respiratory failure in the setting of CHF and PNA

## 2023-10-20 NOTE — PROGRESS NOTE ADULT - SUBJECTIVE AND OBJECTIVE BOX
Patient is a 38y old  Female who presents with a chief complaint of Acute on Chronic Hypoxic Respiratory Failure 2/2 CHF Exacerbation (19 Oct 2023 14:52)    CC right thigh ship area pain   pt is with low BP this am   metoprolol given at 6 , lasix held per nursing     Pt reports hitting her right side furniture  prior admission at home   HB 9.6 yesterday ordered CT scan r/o hematoma however refused the test yesterday and currently keep refusion   I offered her to be medicated with pain meds or valium to get tests keep refusing , instructed the need for Ct r/o hematoma and need to hold AC       MEDICATIONS  (STANDING):  acetaminophen   IVPB .. 1000 milliGRAM(s) IV Intermittent once  azithromycin   Tablet 500 milliGRAM(s) Oral daily  budesonide 160 MICROgram(s)/formoterol 4.5 MICROgram(s) Inhaler 2 Puff(s) Inhalation two times a day  influenza   Vaccine 0.5 milliLiter(s) IntraMuscular once  magnesium sulfate  IVPB 2 Gram(s) IV Intermittent once  montelukast 10 milliGRAM(s) Oral daily  PARoxetine 20 milliGRAM(s) Oral daily  piperacillin/tazobactam IVPB.. 3.375 Gram(s) IV Intermittent every 8 hours  predniSONE   Tablet 40 milliGRAM(s) Oral daily  spironolactone 50 milliGRAM(s) Oral daily  vancomycin  IVPB 1250 milliGRAM(s) IV Intermittent every 12 hours      Vital Signs Last 24 Hrs  T(C): 36.5 (20 Oct 2023 04:39), Max: 36.9 (19 Oct 2023 23:33)  T(F): 97.7 (20 Oct 2023 04:39), Max: 98.5 (19 Oct 2023 23:33)  HR: 86 (20 Oct 2023 04:39) (74 - 94)  BP: 93/66 (20 Oct 2023 04:39) (90/63 - 113/72)  BP(mean): --  RR: 17 (20 Oct 2023 04:39) (17 - 20)  SpO2: 98% (20 Oct 2023 04:39) (92% - 98%)    Parameters below as of 20 Oct 2023 04:39  Patient On (Oxygen Delivery Method): nasal cannula  O2 Flow (L/min): 3      PHYSICAL EXAM:    GENERAL: NAD,   NECK: Supple, No JVD, Normal thyroid  CHEST/LUNG: Clear to auscultation bilaterally; No rales, rhonchi, wheezing,  HEART: Regular rate and rhythm; No murmurs, rubs, or gallops  ABDOMEN: Soft, Nontender, Nondistended; Bowel sounds present  EXTREMITIES:  No clubbing, cyanosis, or edema  SKIN: right hip lateral area skin tender to touch , mild skin redness           LABS:                        9.3    6.95  )-----------( 123      ( 20 Oct 2023 05:00 )             32.1     10-20    136  |  100  |  44.4<H>  ----------------------------<  105<H>  4.4   |  23.0  |  1.08    Ca    8.4      20 Oct 2023 05:00  Mg     1.8     10-20    TPro  8.0  /  Alb  2.9<L>  /  TBili  1.1  /  DBili  x   /  AST  52<H>  /  ALT  102<H>  /  AlkPhos  99  10-19    PT/INR - ( 18 Oct 2023 11:28 )   PT: 18.3 sec;   INR: 1.67 ratio         PTT - ( 18 Oct 2023 11:28 )  PTT:36.2 sec  Urinalysis Basic - ( 20 Oct 2023 05:00 )    Color: x / Appearance: x / SG: x / pH: x  Gluc: 105 mg/dL / Ketone: x  / Bili: x / Urobili: x   Blood: x / Protein: x / Nitrite: x   Leuk Esterase: x / RBC: x / WBC x   Sq Epi: x / Non Sq Epi: x / Bacteria: x        RADIOLOGY & ADDITIONAL TESTS:

## 2023-10-20 NOTE — PROVIDER CONTACT NOTE (OTHER) - ACTION/TREATMENT ORDERED:
provider ordered IV reglan, IV lasix. Wait an hour, recheck bp and give oral metoprolol if within parameters
provider aware

## 2023-10-20 NOTE — DIETITIAN NUTRITION RISK NOTIFICATION - ADDITIONAL COMMENTS/DIETITIAN RECOMMENDATIONS
Continue diet as tolerated.  Encourage po intake, monitor diet tolerance, and provide assistance at meals as needed.   Obtain daily weights to monitor trends.

## 2023-10-20 NOTE — CHART NOTE - NSCHARTNOTEFT_GEN_A_CORE
37 y/o female with pmhx of BMI > 40, CHF-diastolic, B/L LE DVT + PE on Eliquis, HTN, Asthma, eczema, and Anxiety here for mgmt of acute on chronic hypoxic respiratory failure in the setting of CHF and PNA. New onset A flutter.   RN called due to tachycardia. Pt seen at bedside ill appearing, diaphoretic, and pale. She reports increased nausea and vomiting. Pt also notes SOB which is unchanged from previously. She also reports R leg/hip pain. Pt reports hitting her right side furniture prior admission at home. Hgb 9.6 yesterday. CT scan ordered to r/o hematoma, however, she refused the test yesterday. US of the R thigh and CT chest also refused.     Detailed discussion held w/ pt in regard to concern for an acute bleed 2/2 RLE hematoma and anemia. AC being held in the setting of acute anemia. Pt w/ history of DVT/PE. She understands that we need to perform imaging to r/o a bleed and imaging to rule out a PE. Pt understands the risks and potential for rapid deterioration that is concerning to the medical team. Knowing these risks and complications she is still refusing imaging. Pt is A&Ox4.      Vital Signs Last 24 Hrs  T(C): 36.6 (20 Oct 2023 16:00), Max: 36.9 (19 Oct 2023 23:33)  T(F): 97.8 (20 Oct 2023 16:00), Max: 98.5 (19 Oct 2023 23:33)  HR: 130 (20 Oct 2023 16:20) (78 - 140)  BP: 104/74 (20 Oct 2023 16:20) (84/53 - 117/79)  BP(mean): --  RR: 18 (20 Oct 2023 16:00) (17 - 20)  SpO2: 94% (20 Oct 2023 16:00) (93% - 98%)  Parameters below as of 20 Oct 2023 16:00  Patient On (Oxygen Delivery Method): nasal cannula  O2 Flow (L/min): 5    GENERAL: Ill appearing, diaphoretic, and pale  NERVOUS SYSTEM: A&Ox4, Good concentration  CHEST/LUNG: Clear to auscultation b/l; Unlabored respirations  HEART: Tachycardia  SKIN: R hip/flank lateral area skin ecchymotic w/ tenderness to palpation      A/P:  Acute on chronic CHF exacerbation w/ fluid overload  - Increased O2 requirement. Currently in 5L NC.   - CXR showing increased consolidation. Official read pending.  - AM lasix held 2/2 hypotension > IV lasix given for fluid overload  - Cardiac enzymes negative    New onset A flutter  - c/w Lopressor and PRN Cardizem  - ECG performed and w/ A flutter. -140.    Hx of PE  - AC held in the setting of hematoma and possible acute bleed.  - Eliquis 5mg x 1.  - CT chest refused.    RLE Hematoma  - Repeat Hgb stable.  - Type and screen ordered.  - CT and US refused.    Pt is medically acute  Low threshold to upgrade to SDU for increased vitals  Night team to follow up   Continue to monitor  RN to notify for any changes  Case discussed w/ attending

## 2023-10-20 NOTE — PROGRESS NOTE ADULT - ASSESSMENT
39yo female with pmh of BMI > 40, CHF-diastolic, B/L LE DVT + PE on Eliquis, HTN, Asthma,eczema and Anxiety here for mgmt of acute on chronic hypoxic respiratory failure in the setting of CHF and PNA    1-Acute on chronic hypoxic respiratory failure in the setting of Acute on chronic HFpEF and Possible developing PNA  seen by cardio   cont lasix iv daily hold today due to low BP   Echo from 7/2023 reveals EF 60-65% and Mild pulm htn  - Toprol XL 100mg Q24  - Aldactone 50mg Q24  cont  levofloxacin Q24  - montelukast Q24  - Supplementary Oxygen      2-RLE/ thigh pain with  hip area pain   after she  she bumped her R leg into the table  wll get Ct of abd pelvis and lower ext r/o hematoma   pt refsuing CT of leg   will get US   hold eliquis   moinitor CBC / hb   pt understand possible dx r/o hemotama risk of holding AC   cont to refuse   ice pack to right hip area       3-Asthma  on home oxygen  cont nebulizer     4-B/L DVT/PE   on eliquis long term      5-Depression/Anxiety  stable  - c/w paroxetine    6-?SLE  being worked up for SLE  C3/C4 labs reviewed  on chronic steroids  - c/w steroids  - f/u outpatient    #Healthcare maintenance  DVT PPx -hold AC r/o hematoma     Dispo - Acute

## 2023-10-20 NOTE — DIETITIAN INITIAL EVALUATION ADULT - ORAL INTAKE PTA/DIET HISTORY
Nutrition assessment completed. Pt reports good appetite/po intake prior to admission and currently. States she has lost weight intentionally (contributes to fluid loss) after starting to follow a low sodium diet since previous admission. Provided with heart failure nutrition literature at bedside. RD to follow up.

## 2023-10-20 NOTE — DIETITIAN INITIAL EVALUATION ADULT - PERTINENT LABORATORY DATA
10-20    136  |  100  |  44.4<H>  ----------------------------<  105<H>  4.4   |  23.0  |  1.08    Ca    8.4      20 Oct 2023 05:00  Mg     1.8     10-20    TPro  8.0  /  Alb  2.9<L>  /  TBili  1.1  /  DBili  x   /  AST  52<H>  /  ALT  102<H>  /  AlkPhos  99  10-19

## 2023-10-20 NOTE — PROGRESS NOTE ADULT - ASSESSMENT
39yo female with pmh of BMI > 40, CHF-diastolic, B/L LE DVT + PE on Eliquis, HTN, Asthma,eczema and Anxiety presents to the ED w/ 1 week of worsening SOB on Exertion. Pt notes that this morning she was saturating at 79% on 2LPM (home ox dose). Noted that on exertion her oxygen dropping to low 80s on Room air days prior to admission. Last in hospital for fluid overload in July, since then is compliant with her medication and only added on daily 10mg steroids from outpatient physician. Being worked up for SLE outpatient by PCP, results from 1 week prior showing low C3/4 complement and elevated CRP. Seen at bedside, in NAD, endorsing resolution of SOB at rest, pleasant, denies CP, Abd pain, NVD, Lethargy, fevers. (18 Oct 2023 16:00)    Acute on chronic hypoxic respiratory failure- CHF exacerbation and possible underlying pneumonia  RT LE cellulitis vs hematoma  r/o SLE  Ceftriaxone allergy-hives      - send legionella, strep pneumo, mycoplasma  - RVP (-)  - agree with CT chest however patient refused  - c/w diuresis  - f/u BCX  - empiric zosyn and vancomycin  - add azithromycin for atypical coverage, dc if legionella is negative  - monitor vanco trough and adjust per pharmacy protocol  - pt reports tolerated amoxicillin in the past  - check USG right upper thigh/gluteus r/o abscess. pt refused. suspect hematoma from her injury  - Trend Fever  - Trend WBC-on steroids    d/w attending  Will Follow

## 2023-10-20 NOTE — PROGRESS NOTE ADULT - SUBJECTIVE AND OBJECTIVE BOX
[facility] - [facility], [facility]                                   [address] [place] [address]                                                      PHONE: [phone]                                                         FAX: [phone]  [email or web address]  ---------------------------------------------------------------------------------------------------------------------------------    Overnight events/patient complaints:  Pt feels well without complaints    iodine (Hives)  ceftriaxone (Hives)  shellfish (Hives)    MEDICATIONS  (STANDING):  azithromycin   Tablet 500 milliGRAM(s) Oral daily  budesonide 160 MICROgram(s)/formoterol 4.5 MICROgram(s) Inhaler 2 Puff(s) Inhalation two times a day  influenza   Vaccine 0.5 milliLiter(s) IntraMuscular once  magnesium sulfate  IVPB 2 Gram(s) IV Intermittent once  montelukast 10 milliGRAM(s) Oral daily  PARoxetine 20 milliGRAM(s) Oral daily  piperacillin/tazobactam IVPB.. 3.375 Gram(s) IV Intermittent every 8 hours  predniSONE   Tablet 40 milliGRAM(s) Oral daily  spironolactone 50 milliGRAM(s) Oral daily  vancomycin  IVPB 1250 milliGRAM(s) IV Intermittent every 12 hours    MEDICATIONS  (PRN):  acetaminophen     Tablet .. 650 milliGRAM(s) Oral every 6 hours PRN Temp greater or equal to 38C (100.4F), Mild Pain (1 - 3)  albuterol/ipratropium for Nebulization 3 milliLiter(s) Nebulizer every 6 hours PRN Shortness of Breath and/or Wheezing  aluminum hydroxide/magnesium hydroxide/simethicone Suspension 30 milliLiter(s) Oral every 4 hours PRN Dyspepsia  melatonin 3 milliGRAM(s) Oral at bedtime PRN Insomnia  ondansetron Injectable 4 milliGRAM(s) IV Push every 8 hours PRN Nausea and/or Vomiting  oxyCODONE    IR 2.5 milliGRAM(s) Oral every 6 hours PRN Moderate Pain (4 - 6)  oxyCODONE    IR 5 milliGRAM(s) Oral every 6 hours PRN Severe Pain (7 - 10)      Vital Signs Last 24 Hrs  T(C): 36.7 (20 Oct 2023 08:00), Max: 36.9 (19 Oct 2023 23:33)  T(F): 98 (20 Oct 2023 08:00), Max: 98.5 (19 Oct 2023 23:33)  HR: 81 (20 Oct 2023 08:00) (78 - 94)  BP: 84/53 (20 Oct 2023 08:00) (84/53 - 113/72)  BP(mean): --  RR: 18 (20 Oct 2023 08:00) (17 - 20)  SpO2: 96% (20 Oct 2023 08:00) (92% - 98%)    Parameters below as of 20 Oct 2023 08:00  Patient On (Oxygen Delivery Method): nasal cannula  O2 Flow (L/min): 3    ICU Vital Signs Last 24 Hrs  MITCHEL GUNN  I&O's Detail    Drug Dosing Weight  MITCHEL EVANS      PHYSICAL EXAM:  General:  alert and cooperative.  HEENT: Head; normocephalic, atraumatic.  Eyes: Pupils reactive, cornea wnl.  Neck: Supple, no nodes adenopathy, no NVD or carotid bruit or thyromegaly.  CARDIOVASCULAR: Normal S1 and S2, No murmur, rub, gallop or lift.   LUNGS: No rales, rhonchi or wheeze. Normal breath sounds bilaterally.  ABDOMEN: Soft, nontender without mass or organomegaly. bowel sounds normoactive.  EXTREMITIES: No clubbing, cyanosis or edema. Distal pulses wnl. Right buttock erythema  SKIN: warm and dry with normal turgor.  NEURO: Alert/oriented x 3/normal motor exam. No pathologic reflexes.    PSYCH: normal affect.        LABS:                        9.3    6.95  )-----------( 123      ( 20 Oct 2023 05:00 )             32.1     10-20    136  |  100  |  44.4<H>  ----------------------------<  105<H>  4.4   |  23.0  |  1.08    Ca    8.4      20 Oct 2023 05:00  Mg     1.8     10-20    TPro  8.0  /  Alb  2.9<L>  /  TBili  1.1  /  DBili  x   /  AST  52<H>  /  ALT  102<H>  /  AlkPhos  99  10-19    MITCHEL GUNN  CARDIAC MARKERS ( 19 Oct 2023 06:50 )  x     / <0.01 ng/mL / x     / x     / x      CARDIAC MARKERS ( 18 Oct 2023 13:00 )  x     / <0.01 ng/mL / x     / x     / x          PT/INR - ( 18 Oct 2023 11:28 )   PT: 18.3 sec;   INR: 1.67 ratio         PTT - ( 18 Oct 2023 11:28 )  PTT:36.2 sec  Urinalysis Basic - ( 20 Oct 2023 05:00 )    Color: x / Appearance: x / SG: x / pH: x  Gluc: 105 mg/dL / Ketone: x  / Bili: x / Urobili: x   Blood: x / Protein: x / Nitrite: x   Leuk Esterase: x / RBC: x / WBC x   Sq Epi: x / Non Sq Epi: x / Bacteria: x        RADIOLOGY & ADDITIONAL STUDIES:    INTERPRETATION OF TELEMETRY (personally reviewed): overnight afib         ECHO: < from: TTE Echo Complete w/ Contrast w/ Doppler (07.20.23 @ 11:36) >  Summary:   1. Technically difficult study.   2. Left ventricular ejection fraction, by visual estimation, is 60 to   65%.   3. Normal global left ventricular systolic function.   4. Right ventricular volume and pressure overload.   5. Severely enlarged right ventricle.   6. Moderately reduced RV systolic function.  7. Severely enlarged right atrium.   8. Mild mitral valve regurgitation.   9. Mild-moderate tricuspid regurgitation.  10. Mild pulmonic valve regurgitation.  11. Estimated pulmonary artery systolic pressure is 45.5 mmHg assuming a   right atrial pressure of 3 mmHg, which is consistent with mild pulmonary   hypertension.  12. Small pericardial effusion.    MD Ella Electronically signed on 7/20/2023 at 1:39:21 PM    < end of copied text >        ASSESSMENT AND PLAN:  In summary, MITCHEL GUNN is an 38y Female with past medical history significant for morbid obesity, HFpEF, DVT/PE on eliquis, HTN p/w with worsening AMAYA/HFpEF and pna.    PNA- on abx    HFpEF- patient with acute decompensated heart failure  -stop IV lasix and transition to oral  -continue toprol xl  -continue aldactone  -strict I/Os    Atrial flutter- new dx.   Pt currently in NSR  -would restart eliquis to 5mg bid to adequately prophylax against stroke    Pt hesitant to undergo CT scan due to contrast allergy and claustrophobia

## 2023-10-20 NOTE — DIETITIAN INITIAL EVALUATION ADULT - PERTINENT MEDS FT
MEDICATIONS  (STANDING):  piperacillin/tazobactam IVPB.. 3.375 Gram(s) IV Intermittent every 8 hours  predniSONE   Tablet 40 milliGRAM(s) Oral daily  spironolactone 50 milliGRAM(s) Oral daily  vancomycin  IVPB 1250 milliGRAM(s) IV Intermittent every 12 hours    MEDICATIONS  (PRN):  ondansetron Injectable 4 milliGRAM(s) IV Push every 8 hours PRN Nausea and/or Vomiting

## 2023-10-21 NOTE — PROGRESS NOTE ADULT - SUBJECTIVE AND OBJECTIVE BOX
INTERVAL HPI/OVERNIGHT EVENTS:    CC:  cellulitis, pneumonia, diastolic chf, hyponatremia, hyperkalemia    Chart and course reviewed.  states has mild nausea and ? decreased urine output    Vital Signs Last 24 Hrs  T(C): 36.6 (21 Oct 2023 09:17), Max: 36.9 (21 Oct 2023 07:40)  T(F): 97.8 (21 Oct 2023 09:17), Max: 98.5 (21 Oct 2023 07:40)  HR: 93 (21 Oct 2023 09:17) (90 - 140)  BP: 96/94 (21 Oct 2023 09:17) (92/60 - 117/79)  BP(mean): --  RR: 17 (21 Oct 2023 09:17) (17 - 18)  SpO2: 94% (21 Oct 2023 09:17) (92% - 95%)    Parameters below as of 21 Oct 2023 09:17  Patient On (Oxygen Delivery Method): nasal cannula  O2 Flow (L/min): 5      PHYSICAL EXAM:    GENERAL: morbidly obese, not in distress, sitting in bed  CHEST/LUNG: b/l air entry  HEART:reg  ABDOMEN: soft, bs+  EXTREMITIES:  no edema, tenderness. bruise over right hip improving per patient    MEDICATIONS  (STANDING):  apixaban 2.5 milliGRAM(s) Oral every 12 hours  budesonide 160 MICROgram(s)/formoterol 4.5 MICROgram(s) Inhaler 2 Puff(s) Inhalation two times a day  influenza   Vaccine 0.5 milliLiter(s) IntraMuscular once  metoprolol tartrate 50 milliGRAM(s) Oral two times a day  montelukast 10 milliGRAM(s) Oral daily  PARoxetine 20 milliGRAM(s) Oral daily  piperacillin/tazobactam IVPB.. 3.375 Gram(s) IV Intermittent every 8 hours  sodium bicarbonate  Infusion 0.06 mEq/kG/Hr (60 mL/Hr) IV Continuous <Continuous>    MEDICATIONS  (PRN):  albuterol/ipratropium for Nebulization 3 milliLiter(s) Nebulizer every 6 hours PRN Shortness of Breath and/or Wheezing  aluminum hydroxide/magnesium hydroxide/simethicone Suspension 30 milliLiter(s) Oral every 4 hours PRN Dyspepsia  diltiazem Injectable 10 milliGRAM(s) IV Push every 8 hours PRN if Hr persistent over 130  melatonin 3 milliGRAM(s) Oral at bedtime PRN Insomnia  ondansetron Injectable 4 milliGRAM(s) IV Push every 8 hours PRN Nausea and/or Vomiting  oxyCODONE    IR 2.5 milliGRAM(s) Oral every 6 hours PRN Moderate Pain (4 - 6)  oxyCODONE    IR 5 milliGRAM(s) Oral every 6 hours PRN Severe Pain (7 - 10)      Allergies    iodine (Hives)  ceftriaxone (Hives)  shellfish (Hives)    Intolerances          LABS:                          11.4   13.20 )-----------( 205      ( 21 Oct 2023 06:48 )             41.0     10-21    128<L>  |  92<L>  |  51.1<H>  ----------------------------<  97  6.0<H>   |  18.0<L>  |  1.91<H>    Ca    8.5      21 Oct 2023 10:35  Mg     2.3     10-21    TPro  8.6  /  Alb  3.1<L>  /  TBili  1.8  /  DBili  x   /  AST  1611<H>  /  ALT  1151<H>  /  AlkPhos  127<H>  10-21      Urinalysis Basic - ( 21 Oct 2023 10:35 )    Color: x / Appearance: x / SG: x / pH: x  Gluc: 97 mg/dL / Ketone: x  / Bili: x / Urobili: x   Blood: x / Protein: x / Nitrite: x   Leuk Esterase: x / RBC: x / WBC x   Sq Epi: x / Non Sq Epi: x / Bacteria: x        RADIOLOGY & ADDITIONAL TESTS:

## 2023-10-21 NOTE — PROGRESS NOTE ADULT - ASSESSMENT
38 yr old female with morbid obesity, diastolic CHF, bilateral LE DVT and pulmonary embolism on Eliquis, hypertension, chronic hypoxic respiratory failure on oxygen, asthma, eczema presented with complaints of worsening shortness of breath, hypoxia at home. CXR revealed left lower lobe consolidation. She was admitted for possible exacerbation of CHF and pneumonia. Cardiology consulted, IV Lasix was initiated. She reported right thigh pain, which started after trauma at home. CT hip was ordered but patient declined the same. Eliquis was held given concerns for hematoma. She developed new onset atrial flutter, noted on telemetry. Eliquis initially held, but resumed given a flutter. SHe was noted to have hyponatremia, hyperkalemia and metabolic acidosis, sodium bicarbonate gtt was initiated, Lokelma was given and nephrology consultation was requested.     1. Acute on chronic hypoxic respiratory failure sec community acquired pneumonia and diastolic CHF:  Continue supplemental oxygen  on Zosyn  hold Lasix given SARAH and hyponatremia  cardiology following    2. A flutter:  Continue Metoprolol 50 mg bid  Eliquis resumed    3. Cellulitis:  On Zosyn  hold Vancomycin based on trough    4. hyponatremia, hyperkalemia, metabolic acidosis, SARAH:  Start Sodium bicarbonate gtt  repeat BMP pending  Lokelma x 1 given  Lasix on hold  nephrology consulted  EKG ordered  bladder scan to check for retention.    5. DVT:  Eliquis resumed    Discussed with patient and RN.

## 2023-10-21 NOTE — CONSULT NOTE ADULT - SUBJECTIVE AND OBJECTIVE BOX
Patient is a 38y old  Female who presents with a chief complaint of Acute on Chronic Hypoxic Respiratory Failure 2/2 CHF Exacerbation (21 Oct 2023 15:44)      HPI:  37yo female with pmh of BMI > 40, CHF-diastolic, B/L LE DVT + PE on Eliquis, HTN, Asthma,eczema and Anxiety presents to the ED w/ 1 week of worsening SOB on Exertion. Pt notes that this morning she was saturating at 79% on 2LPM (home ox dose). Noted that on exertion her oxygen dropping to low 80s on Room air days prior to admission. Last in hospital for fluid overload in July, since then is compliant with her medication and only added on daily 10mg steroids from outpatient physician. Being worked up for SLE outpatient by PCP, results from 1 week prior showing low C3/4 complement and elevated CRP. Seen at bedside, in NAD, endorsing resolution of SOB at rest, pleasant, denies CP, Abd pain, NVD, Lethargy, fevers. (18 Oct 2023 16:00)    Interim noted   Recent hypotension , Lasix held   + elevated Vancomycin levels   No diarrhea   No IV Contrast   On Abx for PNA       PAST MEDICAL & SURGICAL HISTORY:  Pulmonary embolism      DVT, lower extremity      CHF (congestive heart failure)      Asthma      Anxiety      Severe obesity (BMI >= 40)      Hypertension      No significant past surgical history          FAMILY HISTORY:  Family history of colon cancer (Mother)    Family history of stroke (Father)        Social History:    MEDICATIONS  (STANDING):  apixaban 2.5 milliGRAM(s) Oral every 12 hours  budesonide 160 MICROgram(s)/formoterol 4.5 MICROgram(s) Inhaler 2 Puff(s) Inhalation two times a day  influenza   Vaccine 0.5 milliLiter(s) IntraMuscular once  metoprolol tartrate 50 milliGRAM(s) Oral two times a day  montelukast 10 milliGRAM(s) Oral daily  PARoxetine 20 milliGRAM(s) Oral daily  piperacillin/tazobactam IVPB.. 3.375 Gram(s) IV Intermittent every 8 hours  sodium bicarbonate  Infusion 0.06 mEq/kG/Hr (60 mL/Hr) IV Continuous <Continuous>    MEDICATIONS  (PRN):  albuterol/ipratropium for Nebulization 3 milliLiter(s) Nebulizer every 6 hours PRN Shortness of Breath and/or Wheezing  aluminum hydroxide/magnesium hydroxide/simethicone Suspension 30 milliLiter(s) Oral every 4 hours PRN Dyspepsia  diltiazem Injectable 10 milliGRAM(s) IV Push every 8 hours PRN if Hr persistent over 130  melatonin 3 milliGRAM(s) Oral at bedtime PRN Insomnia  ondansetron Injectable 4 milliGRAM(s) IV Push every 8 hours PRN Nausea and/or Vomiting  oxyCODONE    IR 5 milliGRAM(s) Oral every 6 hours PRN Severe Pain (7 - 10)  oxyCODONE    IR 2.5 milliGRAM(s) Oral every 6 hours PRN Moderate Pain (4 - 6)      Allergies    iodine (Hives)  ceftriaxone (Hives)  shellfish (Hives)    Intolerances      Vital Signs Last 24 Hrs  T(C): 36.7 (21 Oct 2023 19:54), Max: 36.9 (21 Oct 2023 07:40)  T(F): 98 (21 Oct 2023 19:54), Max: 98.5 (21 Oct 2023 07:40)  HR: 82 (21 Oct 2023 19:54) (82 - 139)  BP: 90/64 (21 Oct 2023 21:06) (79/55 - 98/60)  BP(mean): --  RR: 16 (21 Oct 2023 19:54) (1 - 18)  SpO2: 93% (21 Oct 2023 19:54) (93% - 100%)    Parameters below as of 21 Oct 2023 19:54  Patient On (Oxygen Delivery Method): nasal cannula  O2 Flow (L/min): 5    Daily     Daily   I&O's Detail    20 Oct 2023 07:01  -  21 Oct 2023 07:00  --------------------------------------------------------  IN:  Total IN: 0 mL    OUT:    Voided (mL): 500 mL  Total OUT: 500 mL    Total NET: -500 mL      21 Oct 2023 07:01  -  21 Oct 2023 23:30  --------------------------------------------------------  IN:  Total IN: 0 mL    OUT:    Voided (mL): 275 mL  Total OUT: 275 mL    Total NET: -275 mL        I&O's Summary    20 Oct 2023 07:01  -  21 Oct 2023 07:00  --------------------------------------------------------  IN: 0 mL / OUT: 500 mL / NET: -500 mL    21 Oct 2023 07:01  -  21 Oct 2023 23:30  --------------------------------------------------------  IN: 0 mL / OUT: 275 mL / NET: -275 mL        PHYSICAL EXAM:    GENERAL: NAD, alert and  friendly   HEAD:  Atraumatic, Normocephalic  NECK: Supple, No JVD, Normal thyroid  NERVOUS SYSTEM:  Alert & Oriented X3  CHEST/LUNG: EAE , Some basilar crackles   HEART: Regular rate and rhythm; No murmurs, rubs, or gallops  ABDOMEN: Soft, Nontender, Nondistended; Bowel sounds present  EXTREMITIES:  decreased edema , no myositis RUE erythematous patches. Right posterior upper thigh/gluteus with indurated skin and eccyhmoses with discoloration, + tenderness, no warmth. no fluctuance or crepitus. extends to posterior upper/lateral thigh. b/l Le varicose veins          LABS:                        11.2   13.28 )-----------( 152      ( 21 Oct 2023 21:00 )             39.0     10-21    127<L>  |  93<L>  |  56.1<H>  ----------------------------<  141<H>  6.7<HH>   |  16.0<L>  |  2.26<H>    Ca    8.5      21 Oct 2023 21:00  Mg     2.3     10-21    TPro  x   /  Alb  2.8<L>  /  TBili  x   /  DBili  x   /  AST  x   /  ALT  x   /  AlkPhos  x   10-21      Vancomycin Level, Trough: 30.8: Critical value: Urinalysis Basic - ( 21 Oct 2023 21:00 )    Color: x / Appearance: x / SG: x / pH: x  Gluc: 141 mg/dL / Ketone: x  / Bili: x / Urobili: x   Blood: x / Protein: x / Nitrite: x   Leuk Esterase: x / RBC: x / WBC x   Sq Epi: x / Non Sq Epi: x / Bacteria: x      Magnesium: 2.3 mg/dL (10-21 @ 06:48)      < from: Xray Chest 1 View- PORTABLE-Urgent (Xray Chest 1 View- PORTABLE-Urgent .) (10.20.23 @ 17:49) >    ACC: [id number] EXAM:  XR CHEST PORTABLE URGENT 1V   ORDERED BY: GENE OWEN     PROCEDURE DATE:  10/20/2023          INTERPRETATION:  Exam:XR CHEST URGENT    clinical history:Shortness of breath    Heart size is stable. Hazy opacification of the lungs bilaterally. No   pneumothorax.    IMPRESSION: No significant change from October 18    --- End of Report ---            PHUC LINDO MD; Attending Radiologist  This do    < end of copied text >      < from: TTE Echo Complete w/ Contrast w/ Doppler (07.20.23 @ 11:36) >    Summary:   1. Technically difficult study.   2. Left ventricular ejection fraction, by visual estimation, is 60 to   65%.   3. Normal global left ventricular systolic function.   4. Right ventricular volume and pressure overload.   5. Severely enlarged right ventricle.   6. Moderately reduced RV systolic function.  7. Severely enlarged right atrium.   8. Mild mitral valve regurgitation.   9. Mild-moderate tricuspid regurgitation.  10. Mild pulmonic valve regurgitation.  11. Estimated pulmonary artery systolic pressure is 45.5 mmHg assuming a   right atrial pressure of 3 mmHg, which is consistent with mild pulmonary   hypertension.  12. Small pericardial effusion.    MD Ella Electronically signed on 7/20/2023 at 1:39:21 PM        < end of copied text >  RADIOLOGY & ADDITIONAL TESTS:

## 2023-10-21 NOTE — PHARMACOTHERAPY INTERVENTION NOTE - COMMENTS
As per policy, ordered a vancomycin random level for 10/22 AM in the setting of vancomycin dose by levels.    Randal Samayoa, PharmD  Clinical Pharmacy Specialist, Infectious Diseases  Tele-Antimicrobial Stewardship Program (Tele-[facility])  Tele-ASP Phone: [phone]

## 2023-10-21 NOTE — CONSULT NOTE ADULT - ASSESSMENT
37yo female with pmh of BMI > 40, CHF-diastolic, B/L LE DVT + PE on Eliquis, HTN, Asthma,eczema and Anxiety presents to the ED w/ 1 week of worsening SOB on Exertion. Pt notes that this morning she was saturating at 79% on 2LPM (home ox dose). Noted that on exertion her oxygen dropping to low 80s on Room air days prior to admission. Last in hospital for fluid overload in July, since then is compliant with her medication and only added on daily 10mg steroids from outpatient physician. Being worked up for SLE outpatient by PCP, results from 1 week prior showing low C3/4 complement and elevated CRP. Seen at bedside, in NAD, endorsing resolution of SOB at rest, pleasant, denies CP, Abd pain, NVD, Lethargy, fevers. (18 Oct 2023 16:00) .Acute on chronic hypoxic respiratory failure- CHF exacerbation and possible underlying pneumonia.RT LE cellulitis vs hematoma  r/o SLE . Ceftriaxone allergy-hives    SARAH with associated Met aciso   37yo female with pmh of BMI > 40, CHF-diastolic, B/L LE DVT + PE on Eliquis, HTN, Asthma,eczema and Anxiety presents to the ED w/ 1 week of worsening SOB on Exertion. Pt notes that this morning she was saturating at 79% on 2LPM (home ox dose). Noted that on exertion her oxygen dropping to low 80s on Room air days prior to admission. Last in hospital for fluid overload in July, since then is compliant with her medication and only added on daily 10mg steroids from outpatient physician. Being worked up for SLE outpatient by PCP, results from 1 week prior showing low C3/4 complement and elevated CRP. Seen at bedside, in NAD, endorsing resolution of SOB at rest, pleasant, denies CP, Abd pain, NVD, Lethargy, fevers. (18 Oct 2023 16:00) .Acute on chronic hypoxic respiratory failure- CHF exacerbation and possible underlying pneumonia.RT LE cellulitis vs hematoma  r/o SLE . Ceftriaxone allergy-hives    SARAH with associated Met acidosis and hyperkalemia ( exacerbated by Aldactone )   Recent hypotension - ? episode of ATN  R/O Vancomycin toxicity , Rhabdo   UA relatively bland   She refused CT chest to further delineate infiltrates   Part of elevated BUN related to steroids     Lasix , Aldactone and Vanco held   Remains on Zosyn   Agree with IV Bicarb   Check FENA , CPK   Renal sono   Vanco level in am     Hyperkalemia   Aldactone held   S/P D50 / Insulin , IV Calcium , Lokelma   Daily Lokelma   Recent labs were hemolyzed , these will be repeated   Tele - no changes related to potassium   Add Florinef     Will follow      37yo female with pmh of BMI > 40, CHF-diastolic, B/L LE DVT + PE on Eliquis, HTN, Asthma,eczema and Anxiety presents to the ED w/ 1 week of worsening SOB on Exertion. Pt notes that this morning she was saturating at 79% on 2LPM (home ox dose). Noted that on exertion her oxygen dropping to low 80s on Room air days prior to admission. Last in hospital for fluid overload in July, since then is compliant with her medication and only added on daily 10mg steroids from outpatient physician. Being worked up for SLE outpatient by PCP, results from 1 week prior showing low C3/4 complement and elevated CRP. Seen at bedside, in NAD, endorsing resolution of SOB at rest, pleasant, denies CP, Abd pain, NVD, Lethargy, fevers. (18 Oct 2023 16:00) .Acute on chronic hypoxic respiratory failure- CHF exacerbation and possible underlying pneumonia.RT LE cellulitis vs hematoma  r/o SLE . Ceftriaxone allergy-hives    SARAH with associated Met acidosis and hyperkalemia ( exacerbated by Aldactone )   Recent hypotension - ? episode of ATN  R/O Vancomycin toxicity , Rhabdo   UA relatively bland   She refused CT chest to further delineate infiltrates   Part of elevated BUN related to steroids     Lasix , Aldactone and Vanco held   Remains on Zosyn   Agree with IV Bicarb   Check FENA , CPK   Renal sono ( make full abdomen , given increased LFT's )   Hold IV Vancomycin , pending Vanco level in am     Hyperkalemia   Hgb stable   Aldactone held   S/P D50 / Insulin , IV Calcium , Lokelma   Daily Lokelma   Recent labs were hemolyzed , these will be repeated   Tele - no changes related to potassium   Add Florinef     Will follow

## 2023-10-21 NOTE — PROGRESS NOTE ADULT - SUBJECTIVE AND OBJECTIVE BOX
[facility] - [facility], [facility]                                   [address] [place] [address]                                                      PHONE: [phone]                                                         FAX: [phone]  [email or web address]  ---------------------------------------------------------------------------------------------------------------------------------    Overnight events/patient complaints:   Pt with several nausea/vomiting episodes yesterday    iodine (Hives)  ceftriaxone (Hives)  shellfish (Hives)    MEDICATIONS  (STANDING):  azithromycin   Tablet 500 milliGRAM(s) Oral daily  budesonide 160 MICROgram(s)/formoterol 4.5 MICROgram(s) Inhaler 2 Puff(s) Inhalation two times a day  influenza   Vaccine 0.5 milliLiter(s) IntraMuscular once  metoprolol tartrate 50 milliGRAM(s) Oral two times a day  montelukast 10 milliGRAM(s) Oral daily  PARoxetine 20 milliGRAM(s) Oral daily  piperacillin/tazobactam IVPB.. 3.375 Gram(s) IV Intermittent every 8 hours  vancomycin  IVPB 1250 milliGRAM(s) IV Intermittent every 12 hours    MEDICATIONS  (PRN):  albuterol/ipratropium for Nebulization 3 milliLiter(s) Nebulizer every 6 hours PRN Shortness of Breath and/or Wheezing  aluminum hydroxide/magnesium hydroxide/simethicone Suspension 30 milliLiter(s) Oral every 4 hours PRN Dyspepsia  diltiazem Injectable 10 milliGRAM(s) IV Push every 8 hours PRN if Hr persistent over 130  melatonin 3 milliGRAM(s) Oral at bedtime PRN Insomnia  ondansetron Injectable 4 milliGRAM(s) IV Push every 8 hours PRN Nausea and/or Vomiting  oxyCODONE    IR 5 milliGRAM(s) Oral every 6 hours PRN Severe Pain (7 - 10)  oxyCODONE    IR 2.5 milliGRAM(s) Oral every 6 hours PRN Moderate Pain (4 - 6)      Vital Signs Last 24 Hrs  T(C): 36.6 (21 Oct 2023 04:58), Max: 36.6 (20 Oct 2023 16:00)  T(F): 97.8 (21 Oct 2023 04:58), Max: 97.8 (20 Oct 2023 16:00)  HR: 90 (21 Oct 2023 04:58) (78 - 140)  BP: 92/60 (21 Oct 2023 04:58) (92/60 - 117/79)  BP(mean): --  RR: 18 (21 Oct 2023 04:58) (18 - 18)  SpO2: 95% (21 Oct 2023 04:58) (92% - 95%)    Parameters below as of 21 Oct 2023 04:58  Patient On (Oxygen Delivery Method): nasal cannula  O2 Flow (L/min): 5    ICU Vital Signs Last 24 Hrs  MITCHEL GUNN  I&O's Detail    20 Oct 2023 07:01  -  21 Oct 2023 07:00  --------------------------------------------------------  IN:  Total IN: 0 mL    OUT:    Voided (mL): 500 mL  Total OUT: 500 mL    Total NET: -500 mL        Drug Dosing Weight  MITCHEL GUNN      PHYSICAL EXAM:  General:  alert and cooperative.  HEENT: Head; normocephalic, atraumatic.  Eyes: Pupils reactive, cornea wnl.  Neck: Supple, no nodes adenopathy, no NVD or carotid bruit or thyromegaly.  CARDIOVASCULAR: Normal S1 and S2, No murmur, rub, gallop or lift.   LUNGS: No rales, rhonchi or wheeze. Normal breath sounds bilaterally.  ABDOMEN: Soft, nontender without mass or organomegaly. bowel sounds normoactive.  EXTREMITIES: No clubbing, cyanosis or edema. Distal pulses wnl. Right buttock erythema and tenderness  SKIN: warm and dry with normal turgor.  NEURO: Alert/oriented x 3/normal motor exam. No pathologic reflexes.    PSYCH: normal affect.        LABS:                        11.4   13.20 )-----------( 205      ( 21 Oct 2023 06:48 )             41.0     10-21    127<L>  |  92<L>  |  49.1<H>  ----------------------------<  119<H>  6.0<H>   |  18.0<L>  |  1.61<H>    Ca    8.7      21 Oct 2023 06:48  Mg     2.3     10-21    TPro  8.6  /  Alb  3.1<L>  /  TBili  1.8  /  DBili  x   /  AST  1611<H>  /  ALT  1151<H>  /  AlkPhos  127<H>  10-21    MITCHEL GUNN  CARDIAC MARKERS ( 20 Oct 2023 16:51 )  x     / <0.01 ng/mL / 13 U/L / x     / x            Urinalysis Basic - ( 21 Oct 2023 06:48 )    Color: x / Appearance: x / SG: x / pH: x  Gluc: 119 mg/dL / Ketone: x  / Bili: x / Urobili: x   Blood: x / Protein: x / Nitrite: x   Leuk Esterase: x / RBC: x / WBC x   Sq Epi: x / Non Sq Epi: x / Bacteria: x        RADIOLOGY & ADDITIONAL STUDIES:    INTERPRETATION OF TELEMETRY (personally reviewed): No events         ECHO: < from: TTE Echo Complete w/ Contrast w/ Doppler (07.20.23 @ 11:36) >  Summary:   1. Technically difficult study.   2. Left ventricular ejection fraction, by visual estimation, is 60 to   65%.   3. Normal global left ventricular systolic function.   4. Right ventricular volume and pressure overload.   5. Severely enlarged right ventricle.   6. Moderately reduced RV systolic function.  7. Severely enlarged right atrium.   8. Mild mitral valve regurgitation.   9. Mild-moderate tricuspid regurgitation.  10. Mild pulmonic valve regurgitation.  11. Estimated pulmonary artery systolic pressure is 45.5 mmHg assuming a   right atrial pressure of 3 mmHg, which is consistent with mild pulmonary   hypertension.  12. Small pericardial effusion.    MD Ella Electronically signed on 7/20/2023 at 1:39:21 PM    < end of copied text >        ASSESSMENT AND PLAN:  In summary, MITCHEL GUNN is an 38y Female with past medical history significant for morbid obesity, HFpEF, DVT/PE on eliquis, HTN p/w with worsening AMAYA/HFpEF and pna.    PNA- on abx    HFpEF- patient with acute decompensated heart failure  -stop Lasix and aldactone as rising creatinine. can restart once stabilized  -continue toprol xl  -strict I/Os    Atrial flutter- new dx.   Pt currently in NSR  -would restart eliquis to 5mg bid to adequately prophylax against stroke. Hb appears to be stable  Pt hesitant to undergo CT scan due to contrast allergy and claustrophobia

## 2023-10-21 NOTE — PHARMACOTHERAPY INTERVENTION NOTE - COMMENTS
As per policy, dose adjusted vancomycin 1250mg IV q12h to vancomycin dose by levels. A vancomycin 750mg IV q24h order was placed as a "placeholder" and is timed for 10/22 @16:00, which will allow for a morning vancomycin random level to be collected and result to provide further guidance on vancomycin dosing optimization. According to [employer], a Bayesian pharmacokinetic modeling program, patient is predicted to not require further dosing until tomorrow at approximately noon (when level is ~15mcg/mL).     Randal Samayoa PharmD  Clinical Pharmacy Specialist, Infectious Diseases  Tele-Antimicrobial Stewardship Program (Tele-ASP)  Tele-ASP Phone: [phone]  As per policy, dose adjusted vancomycin 1250mg IV q12h to vancomycin dose by levels in the setting of a vancomycin trough level of 30.8mcg/mL and serum creatinine rising from 1.08mg/dL to 1.64mg/dL in ~24 hours. A vancomycin 750mg IV q24h order was placed as a "placeholder" and is timed for 10/22 @16:00, which will allow for a morning vancomycin random level to be collected and result to provide further guidance on vancomycin dosing optimization. According to [employer], a Bayesian pharmacokinetic modeling program, patient is predicted to not require further dosing until tomorrow at approximately noon (when level is ~15mcg/mL).     Randal Samayoa PharmD  Clinical Pharmacy Specialist, Infectious Diseases  Tele-Antimicrobial Stewardship Program (Tele-ASP)  Tele-ASP Phone: [phone]

## 2023-10-22 NOTE — CHART NOTE - NSCHARTNOTEFT_GEN_A_CORE
Pt hypotensive overnight, 79/55 and c/o nausea.  Additional 250cc bolus given with improvement to 90/64 and Zofran 4 mg IVP x1 given for nausea  Pt seen at bedside. Complains of mild headache and nausea. Denies any abd pain, diarrhea, chest pain, SOB, or any other complaints  Other VSS: Temp 98, HR 82, RR 16, spO2 93% on 5L  In NAD, lying comfortably, +S1, S2, unlabored respirations, +BS, abd soft nontender and nondistended, A&Ox4, no neuro deficits  Labs repeated. K 6.7. HyperK tx initially ordered but dc'd and not given as specimen was hemolyzed  Repeat BMP with potassium of 5.6  Additional Lokelma 10 mg x1 given   Will cont to monitor   RN to contact PA if any new or worsening S/S

## 2023-10-22 NOTE — PHARMACOTHERAPY INTERVENTION NOTE - COMMENTS
As per policy, ordered a vancomycin random level for 10/23 AM in the setting of the patient currently being in an SARAH and currently receiving vancomycin based on collected levels.    Randal Samayoa, PharmD  Clinical Pharmacy Specialist, Infectious Diseases  Tele-Antimicrobial Stewardship Program (Tele-ASP)  Tele-ASP Phone: [phone]

## 2023-10-22 NOTE — PROGRESS NOTE ADULT - ASSESSMENT
38 yr old female with morbid obesity, diastolic CHF, bilateral LE DVT and pulmonary embolism on Eliquis, hypertension, chronic hypoxic respiratory failure on oxygen, asthma, eczema presented with complaints of worsening shortness of breath, hypoxia at home. CXR revealed left lower lobe consolidation. She was admitted for possible exacerbation of CHF and pneumonia. Cardiology consulted, IV Lasix was initiated. She reported right thigh pain, which started after trauma at home. CT hip was ordered but patient declined the same. Eliquis was held given concerns for hematoma. She developed new onset atrial flutter, noted on telemetry. Eliquis initially held, but resumed given a flutter. SHe was noted to have hyponatremia, hyperkalemia and metabolic acidosis, sodium bicarbonate gtt was initiated, Lokelma was given and nephrology consultation was requested. Also noted to have acute transaminitis and worsening SARAH with hypotension. Noted to have increased oxygen requirements, MICU consulted on 10/22.     1. Acute on chronic hypoxic respiratory failure sec community acquired pneumonia and diastolic CHF:  increasing oxygen requirements  Repeat ECHO ordered.  Vancomycin on hold  continue Zosyn  CXR with worsening infiltrates  ABG with pH 7.26, MICU consulted.     2. A flutter:  Continue Metoprolol 50 mg bid  on Eliquis.     3. Cellulitis:  improving   Vancomycin on hold  continue Zosyn    4. SARAH, with metabolic acidosis, hyperkalemia, hyponatremia:  Supect pre renal vs Vancomycin related ATN  nephrology eval noted  Lasix and Aldactone on hold  hypotensive overnight s/p Albumin  monitor I/O  on Lokelma    5. DVT:  on Eliquis    6. Acute transaminitis:  Trend LFTs  acute hep panel ordered  suspect ischemic related hypotensive episodes.  GI eval appreciated.    Discussed with patient and RN.   updated mother Jen.   patient being transferred to ICU for further management.

## 2023-10-22 NOTE — CONSULT NOTE ADULT - SUBJECTIVE AND OBJECTIVE BOX
Patient is a 38y old  Female who presents with a chief complaint of Acute on Chronic Hypoxic Respiratory Failure 2/2 CHF Exacerbation (22 Oct 2023 12:58)    BRIEF HOSPITAL COURSE:   35yo F with PMH: Morbid Obesity- BMI 60, HFpEF, B/L LE DVT + recurrent PE on Eliquis, HTN, Asthma, chronic hyperkalemia on daily Lokelma and Anxiety     Who presented to ED because she couldn't get up out of bed on her own, she has had several days of SOB, some chest discomfort with dry cough and a 10 pound weight gain in a day. (She weighs herself daily and her alarm for weight gain is 3 pounds. CXR with left lower lobe pneumonia.  Seen by Cardiology and started Lasix 40mg IV daily, then increased to 40mg BID, continued her home spironolactone/toprol dosing. However 10/21 with worsening renal function, her diuretics were held. Nephrology has been consulted  At that time, discussed right heart cath  Takes daily Potassium 40mEq at home, in hospital has been on daily lokelma for K>5  While in hospital developed new onset Aflutter, intermittent then more persistent, getting lopressor to assist in rate control  10/21 developed hypotension late night, given small IVF bolus as she is already volume overloaded, by morning 10/22 6am she again became hypotensive, given albumin and fluid bolus of 500cc this time.   Did not improve much and ICU was consulted.    Patient has been refusing CT scans and ultrasound of leg  Of note the patient has been on chronic steroids for the last month for workup of SLE and join pains    Bedside POCUS with barely moving RV  Patient accepted and transferred to ICU for critical care monitoring    PAST MEDICAL & SURGICAL HISTORY:  Pulmonary embolism, with DVT, lower extremity  CHF (congestive heart failure), diastolic  Asthma  Anxiety  Severe obesity (BMI >= 60)  Hypertension    No significant past surgical history    Allergies  iodine (Hives)  ceftriaxone (Hives)  shellfish (Hives)      FAMILY HISTORY:  Family history of colon cancer (Mother)  Family history of stroke (Father)      Review of Systems:  CONSTITUTIONAL: No fever, chills, or fatigue  EYES: No eye pain, visual disturbances, or discharge  ENMT:  No difficulty hearing, tinnitus, vertigo; No sinus or throat pain  NECK: No pain or stiffness  RESPIRATORY: + cough, denies wheezing, chills or hemoptysis; +shortness of breath  CARDIOVASCULAR: No further chest pain, except with cough, + palpitations with new aflutter when rate rises, chronic leg swelling, worse last 2 days off her diuretics  GASTROINTESTINAL: + Nausea, states she gets nausea whenever she is on antibiotics, No abdominal or epigastric pain. No vomiting, or hematemesis; No diarrhea or constipation. No melena or hematochezia.  GENITOURINARY: No dysuria, frequency, hematuria, or incontinence  NEUROLOGICAL: No headaches, memory loss, loss of strength, numbness, or tremors  SKIN: No itching, burning, rashes, or lesions   MUSCULOSKELETAL: No joint pain or swelling; No muscle, back, Right posterior upper thigh with induration/ecchymosis, less tender than initially  PSYCHIATRIC: No depression, anxiety, mood swings, or difficulty sleeping    Medications:  piperacillin/tazobactam IVPB.. 3.375 Gram(s) IV Intermittent every 8 hours    diltiazem Injectable 10 milliGRAM(s) IV Push every 8 hours PRN  furosemide Infusion 10 mG/Hr IV Continuous <Continuous>  metoprolol tartrate 50 milliGRAM(s) Oral two times a day    albuterol/ipratropium for Nebulization 3 milliLiter(s) Nebulizer every 6 hours PRN  budesonide 160 MICROgram(s)/formoterol 4.5 MICROgram(s) Inhaler 2 Puff(s) Inhalation two times a day  montelukast 10 milliGRAM(s) Oral daily    melatonin 3 milliGRAM(s) Oral at bedtime PRN  ondansetron Injectable 4 milliGRAM(s) IV Push every 8 hours PRN  oxyCODONE    IR 5 milliGRAM(s) Oral every 6 hours PRN  oxyCODONE    IR 2.5 milliGRAM(s) Oral every 6 hours PRN  PARoxetine 20 milliGRAM(s) Oral daily    apixaban 2.5 milliGRAM(s) Oral every 12 hours    aluminum hydroxide/magnesium hydroxide/simethicone Suspension 30 milliLiter(s) Oral every 4 hours PRN  sodium bicarbonate  Infusion 0.06 mEq/kG/Hr IV Continuous <Continuous>  influenza   Vaccine 0.5 milliLiter(s) IntraMuscular once  chlorhexidine 2% Cloths 1 Application(s) Topical <User Schedule>  sodium zirconium cyclosilicate 10 Gram(s) Oral daily    ICU Vital Signs Last 24 Hrs  T(C): 36.7 (22 Oct 2023 11:27), Max: 36.7 (21 Oct 2023 16:47)  T(F): 98 (22 Oct 2023 11:27), Max: 98.1 (21 Oct 2023 16:47)  HR: 138 (22 Oct 2023 11:27) (82 - 138)  BP: 84/52 (22 Oct 2023 11:27) (78/58 - 96/54)  RR: 25 (22 Oct 2023 11:27) (1 - 25)  SpO2: 85% (22 Oct 2023 11:27) (85% - 100%)    O2 Parameters below as of 22 Oct 2023 11:27  Patient On (Oxygen Delivery Method): nasal cannula  O2 Flow (L/min): 5      ABG - ( 22 Oct 2023 12:11 ) on 5Liters NC  pH, Arterial: 7.260 pH, Blood: x     /  pCO2: 46    /  pO2: 71    / HCO3: 21    / Base Excess: -6.5  /  SaO2: 92.3        I&O's Detail    21 Oct 2023 07:01  -  22 Oct 2023 07:00  --------------------------------------------------------  IN:    Oral Fluid: 800 mL  Total IN: 800 mL    OUT:    Stool (mL): 1 mL    Voided (mL): 275 mL  Total OUT: 276 mL    Total NET: 524 mL      LABS:                        10.5   11.84 )-----------( 219      ( 22 Oct 2023 07:35 )             36.9     10-22    126<L>  |  89<L>  |  60.4<H>  ----------------------------<  115<H>  5.6<H>   |  17.0<L>  |  2.93<H>    Ca    7.9<L>      22 Oct 2023 07:35  Phos  7.1     10-22  Mg     2.1     10-22    TPro  7.9  /  Alb  2.9<L>  /  TBili  2.6<H>  /  DBili  x   /  AST  2798<H>  /  ALT  2437<H>  /  AlkPhos  137<H>  10-22    RVP negative    CARDIAC MARKERS ( 22 Oct 2023 07:35 )  x     / x     / 27 U/L / x     / x      CARDIAC MARKERS ( 20 Oct 2023 16:51 )  x     / <0.01 ng/mL / 13 U/L / x     / x      ProBNP 7712 on 10/18    CAPILLARY BLOOD GLUCOSE: POCT Blood Glucose.: 134 mg/dL (21 Oct 2023 23:04)    Urinalysis Basic - ( 22 Oct 2023 07:35 )  Color: x / Appearance: x / SG: x / pH: x  Gluc: 115 mg/dL / Ketone: x  / Bili: x / Urobili: x   Blood: x / Protein: x / Nitrite: x   Leuk Esterase: x / RBC: x / WBC x   Sq Epi: x / Non Sq Epi: x / Bacteria: x    CULTURES:  Culture Results:   No growth at 72 Hours (10-18 @ 11:30)  Culture Results:   No growth at 72 Hours (10-18 @ 11:28)    Physical Examination:  General: No acute distress.  Alert, oriented, interactive, nonfocal, does appear mildly tachypneic on nasal canula  HEENT: Pupils equal, reactive to light.  Symmetric.  PULM: Bilateral crackles 3/4 way up, no significant sputum production  CVS: irregular rate and rhythm, no murmurs, rubs, or gallops  ABD: Morbind obese, Soft, nondistended, nontender, hypoactive bowel sounds, no masses  EXT: 3+ edema, mild tender everywhere. Right lateral hip with ~30cm area of induration, mild redness, tender to palpation  SKIN: Warm and well perfused, no rashes noted.    RADIOLOGY:   10/22 CXR:   Constellation of findings concerning for congestive heart failure. LEFT lower zone airspace consolidation.  Continued surveillance recommended.    10/20 CXR:   No significant change from October 18th    10/18 CXR:   Mild CHF, Left Lower lobe/retrocardiac consolidation, consider Chest CT.    OLD RADIOLOGICAL STUDIES:  7/20/2023 Venous Duplex Bilateral Lower Extremities:  No evidence of DVT in either Lower extremity    7/20/2023 Echocardiogram:   1. Technically difficult study.   2. Left ventricular ejection fraction, by visual estimation, is 60 to 65%.   3. Normal global left ventricular systolic function.   4. Right ventricular volume and pressure overload.   5. Severely enlarged right ventricle.   6. Moderately reduced RV systolic function.  7. Severely enlarged right atrium.   8. Mild mitral valve regurgitation.   9. Mild-moderate tricuspid regurgitation.  10. Mild pulmonic valve regurgitation.  11. Estimated pulmonary artery systolic pressure is 45.5 mmHg assuming a right atrial pressure of 3 mmHg, which is consistent with mild pulmonary hypertension.  12. Small pericardial effusion.  ---The interventricular septum is flattened in systole and diastole, consistent with right ventricular pressure and volume overload.    7/19/2023 CT angio Chest PE Protocol  1- Limited evaluation for lobar, segmental and subsegmental pulmonary embolism. No main, left or right pulmonary embolism.  2- Moderate pericardial effusion.  3- Mosaic attenuation may be in part due to expiratory phase of imaging. Suggestion of superimposed patchy lung opacities that may be infectious or inflammatory.      CRITICAL CARE TIME SPENT: 90 minutes

## 2023-10-22 NOTE — CONSULT NOTE ADULT - ASSESSMENT
The patient with morbid obesity, with diastolic heart failure with right-sided heart failure with mild pulmonary hypertension and now after diuresis with SARAH.  Elevated LFTs most likely due to hypoxic/ischemic hepatitis.  Check acute viral hepatitis serologies.  Obtain right upper quadrant ultrasound with Doppler.  Check INR and LFTs on daily basis.  No evidence of any encephalopathy.  Acute kidney injury: Related to diuresis at this time.  Monitor as per the primary team.  GI will follow-up peripherally.

## 2023-10-22 NOTE — PROGRESS NOTE ADULT - CRITICAL CARE ATTENDING COMMENT
Critical care time spent titrating vasoactive medications including pressors/inotropes, performing invasive cardiac output monitoring and interpreting blood gases and chest imaging.

## 2023-10-22 NOTE — CHART NOTE - NSCHARTNOTEFT_GEN_A_CORE
Called to eval pt/ with hypotension with BP 84/ 52 and hypoxia = 85% on 5L n/c nand tachycardic with  per tele monitor. Pt. is asx. and denies any CP, SOB, palpitations, jaw/ arm/ shoulder/ abd/ back pain, dizziness/ light headedness or any other c/o @ present. Pt.'s BP has been running 70-90's/ 50's - 60's since 10/21 and is s/p albumin and fluid bolus's.     Pt. is a 37 y/o woman with pmhx.: of BMI > 40, CHF-diastolic, hx. of B/L LE DVT + PE, now on Eliquis, HTN, asthma, eczema and anxiety presents to the ED w/ 1 week of worsening SOB on exertion and hypoxia at home. Last in hospital for fluid overload in July, since then is compliant with her medication and only added on daily 10mg steroids from outpatient physician. Being worked up for SLE outpatient by PCP, results from 1 week prior showing low C3/4 complement and elevated CRP. Now admitted for management of acute on chronic hypoxic respiratory failure in the setting of CHF and PNA with SARAH and transaminitis.     O: NAD, A&Ox3, pt. appears comfortable in bed  VSS: T 98.7 (oral and groin - pt. refusing rectal temp), BP 84/ 52, , R 25, Sat 85% with good pleth  HEENT: AT, pupils reactive  Neck: Supple  Lungs: decreased BS bilat with mild tachypnea  Heart: +S1, S2  Abd: soft, nt/nd, +bs, obese, no rebound, no guarding, no cva tenderness, no sg/ sx of acute abd  Ext: no cords, no erythema, no tenderness    A/P: Asx. hypotension, tachycardia, mild tachypnea and hypoxia in this 37 y/o woman admitted with acute on chronic hypoxic respiratory failure in the setting of CHF and PNA. Pt. also noted with elevated BUN/ creatinine and LFT's.   - Pt. is s/p AM labs which were noted.  -  Stat cxr, abg, ammonia, ekg and bnp ord and (p)  - Events discussed with Dr. Rios who contacted ICU who came to consult, accepted pt. and will continue pt. care and follow up stat labs and tests.

## 2023-10-22 NOTE — PROGRESS NOTE ADULT - SUBJECTIVE AND OBJECTIVE BOX
Interval HPI:  39 yo female with hx of morbid obesity, chronic HFpEF, prior DVT/PE on eliquis, +KATHIE being worked up for SLE and recently started on steroids, presented initially to the hospital with hypoxemia and shortness of breath.  Patient was being treated for acute decompensated heart failure in the hospital as well as right upper leg cellulitis and possible pneumonia.  During hospital course patient developed worsening SARAH and transaminitis. Today patient was noted to have a dropping blood pressure.    ICU evaluation was requested today.  Patient felt to be in decompensated heart failure.  Bedside echo showed dilated hypokinetic RV, hyperkinetic RV.  Unable to get good windows due to body habitus.   PA catheter placed in right IJ.  PA pressure 86/43, mPAP 53 unable to wedge, CO 4.1, CI 1.7  STAT echo being performed.     Exam:  awake and alert, pale and diaphoretic  delayed capillary refill  tachycardic to 130  bilat air entry  crackles at bases  abd soft  bilat edema    Radiology: cxr - pulmonary edema, cardiomegaly     On Admission  10-18-23 (4d)  HPI:  39yo female with pmh of BMI > 40, CHF-diastolic, B/L LE DVT + PE on Eliquis, HTN, Asthma,eczema and Anxiety presents to the ED w/ 1 week of worsening SOB on Exertion. Pt notes that this morning she was saturating at 79% on 2LPM (home ox dose). Noted that on exertion her oxygen dropping to low 80s on Room air days prior to admission. Last in hospital for fluid overload in July, since then is compliant with her medication and only added on daily 10mg steroids from outpatient physician. Being worked up for SLE outpatient by PCP, results from 1 week prior showing low C3/4 complement and elevated CRP. Seen at bedside, in NAD, endorsing resolution of SOB at rest, pleasant, denies CP, Abd pain, NVD, Lethargy, fevers. (18 Oct 2023 16:00)    PAST MEDICAL & SURGICAL HISTORY:  Pulmonary embolism      DVT, lower extremity      CHF (congestive heart failure)      Asthma      Anxiety      Severe obesity (BMI >= 40)      Hypertension      No significant past surgical history          Antimicrobial:  piperacillin/tazobactam IVPB.. 3.375 Gram(s) IV Intermittent every 8 hours    Cardiovascular:  furosemide Infusion 10 mG/Hr IV Continuous <Continuous>  milrinone Infusion 0.25 MICROgram(s)/kG/Min IV Continuous <Continuous>  norepinephrine Infusion 0.05 MICROgram(s)/kG/Min IV Continuous <Continuous>    Pulmonary:  albuterol/ipratropium for Nebulization 3 milliLiter(s) Nebulizer every 6 hours PRN  budesonide 160 MICROgram(s)/formoterol 4.5 MICROgram(s) Inhaler 2 Puff(s) Inhalation two times a day  montelukast 10 milliGRAM(s) Oral daily    Hematalogic:    Other:  aluminum hydroxide/magnesium hydroxide/simethicone Suspension 30 milliLiter(s) Oral every 4 hours PRN  chlorhexidine 2% Cloths 1 Application(s) Topical <User Schedule>  hydrocortisone sodium succinate Injectable 50 milliGRAM(s) IV Push every 6 hours  influenza   Vaccine 0.5 milliLiter(s) IntraMuscular once  insulin regular  human recombinant 10 Unit(s) IV Push once  melatonin 3 milliGRAM(s) Oral at bedtime PRN  ondansetron Injectable 4 milliGRAM(s) IV Push every 8 hours PRN  oxyCODONE    IR 2.5 milliGRAM(s) Oral every 6 hours PRN  oxyCODONE    IR 5 milliGRAM(s) Oral every 6 hours PRN  PARoxetine 20 milliGRAM(s) Oral daily  sodium bicarbonate  Infusion 0.06 mEq/kG/Hr IV Continuous <Continuous>  sodium zirconium cyclosilicate 10 Gram(s) Oral daily      Drug Dosing Weight  Height (cm): 157.5 (18 Oct 2023 10:47)  Weight (kg): 148.8 (18 Oct 2023 10:47)  BMI (kg/m2): 60 (18 Oct 2023 10:47)  BSA (m2): 2.36 (18 Oct 2023 10:47)    T(C): 36.7 (10-22-23 @ 13:31), Max: 36.7 (10-21-23 @ 16:47)  HR: 132 (10-22-23 @ 13:31)  BP: 86/59 (10-22-23 @ 13:31)  BP(mean): 67 (10-22-23 @ 13:31)  ABP: --  ABP(mean): --  RR: 24 (10-22-23 @ 13:31)  SpO2: 93% (10-22-23 @ 13:31)    ABG - ( 22 Oct 2023 12:11 )  pH, Arterial: 7.260 pH, Blood: x     /  pCO2: 46    /  pO2: 71    / HCO3: 21    / Base Excess: -6.5  /  SaO2: 92.3                  10-21 @ 07:01  -  10-22 @ 07:00  --------------------------------------------------------  IN: 800 mL / OUT: 276 mL / NET: 524 mL              LABS:  CBC Full  -  ( 22 Oct 2023 07:35 )  WBC Count : 11.84 K/uL  RBC Count : 4.25 M/uL  Hemoglobin : 10.5 g/dL  Hematocrit : 36.9 %  Platelet Count - Automated : 219 K/uL  Mean Cell Volume : 86.8 fl  Mean Cell Hemoglobin : 24.7 pg  Mean Cell Hemoglobin Concentration : 28.5 gm/dL  Auto Neutrophil # : 10.14 K/uL  Auto Lymphocyte # : 0.53 K/uL  Auto Monocyte # : 0.91 K/uL  Auto Eosinophil # : 0.01 K/uL  Auto Basophil # : 0.01 K/uL  Auto Neutrophil % : 86.2 %  Auto Lymphocyte % : 4.5 %  Auto Monocyte % : 7.7 %  Auto Eosinophil % : 0.1 %  Auto Basophil % : 0.1 %    10-22    126<L>  |  89<L>  |  60.4<H>  ----------------------------<  115<H>  5.6<H>   |  17.0<L>  |  2.93<H>    Ca    7.9<L>      22 Oct 2023 07:35  Phos  7.1     10-22  Mg     2.1     10-22    TPro  7.9  /  Alb  2.9<L>  /  TBili  2.6<H>  /  DBili  x   /  AST  2798<H>  /  ALT  2437<H>  /  AlkPhos  137<H>  10-22    PT/INR - ( 22 Oct 2023 13:01 )   PT: 35.4 sec;   INR: 3.30 ratio           Urinalysis Basic - ( 22 Oct 2023 07:35 )    Color: x / Appearance: x / SG: x / pH: x  Gluc: 115 mg/dL / Ketone: x  / Bili: x / Urobili: x   Blood: x / Protein: x / Nitrite: x   Leuk Esterase: x / RBC: x / WBC x   Sq Epi: x / Non Sq Epi: x / Bacteria: x        ____________________________________________________________________________________________________

## 2023-10-22 NOTE — PROGRESS NOTE ADULT - SUBJECTIVE AND OBJECTIVE BOX
NEPHROLOGY INTERVAL HPI/OVERNIGHT EVENTS:    Interim noted   Transferred to MICU with Cardiogenic shock   Placed on Milrinone and Levophed   Not intubated --> awake and interactive   ECHO noted ---> Poor LV visualization , but [place] readings noted   Antecedent h/o steroids x 1 mo PTA ---> started stress steroids   Mixed acidosis on ABG with Lactate 4.1   Bumex drip started   IV Bicarb noted   UO improved in major   Vanco held --> Level today is 22, Remains on Zosyn   SLE serology reordered   Associated Ischemic hepatitis with coagulopathy       MEDICATIONS  (STANDING):  budesonide 160 MICROgram(s)/formoterol 4.5 MICROgram(s) Inhaler 2 Puff(s) Inhalation two times a day  buMETAnide Infusion 1 mG/Hr (5 mL/Hr) IV Continuous <Continuous>  chlorhexidine 2% Cloths 1 Application(s) Topical <User Schedule>  hydrocortisone sodium succinate Injectable 50 milliGRAM(s) IV Push every 6 hours  influenza   Vaccine 0.5 milliLiter(s) IntraMuscular once  milrinone Infusion 0.25 MICROgram(s)/kG/Min (11.2 mL/Hr) IV Continuous <Continuous>  montelukast 10 milliGRAM(s) Oral daily  norepinephrine Infusion 0.05 MICROgram(s)/kG/Min (6.98 mL/Hr) IV Continuous <Continuous>  PARoxetine 20 milliGRAM(s) Oral daily  piperacillin/tazobactam IVPB.. 3.375 Gram(s) IV Intermittent every 8 hours  sodium zirconium cyclosilicate 10 Gram(s) Oral daily    MEDICATIONS  (PRN):  albuterol/ipratropium for Nebulization 3 milliLiter(s) Nebulizer every 6 hours PRN Shortness of Breath and/or Wheezing  aluminum hydroxide/magnesium hydroxide/simethicone Suspension 30 milliLiter(s) Oral every 4 hours PRN Dyspepsia  melatonin 3 milliGRAM(s) Oral at bedtime PRN Insomnia  ondansetron Injectable 4 milliGRAM(s) IV Push every 8 hours PRN Nausea and/or Vomiting  oxyCODONE    IR 2.5 milliGRAM(s) Oral every 6 hours PRN Moderate Pain (4 - 6)  oxyCODONE    IR 5 milliGRAM(s) Oral every 6 hours PRN Severe Pain (7 - 10)      Allergies    iodine (Hives)  ceftriaxone (Hives)  shellfish (Hives)    Intolerances            Vital Signs Last 24 Hrs  T(C): 36.5 (22 Oct 2023 20:00), Max: 37.2 (22 Oct 2023 16:15)  T(F): 97.7 (22 Oct 2023 20:00), Max: 99 (22 Oct 2023 16:15)  HR: 131 (22 Oct 2023 22:00) (88 - 142)  BP: 109/92 (22 Oct 2023 22:00) (78/58 - 129/72)  BP(mean): 100 (22 Oct 2023 22:00) (58 - 100)  RR: 22 (22 Oct 2023 22:00) (16 - 28)  SpO2: 96% (22 Oct 2023 22:00) (85% - 100%)    Parameters below as of 22 Oct 2023 20:00  Patient On (Oxygen Delivery Method): mask, nonrebreather    O2 Concentration (%): 100  Daily     Daily   I&O's Detail    21 Oct 2023 07:01  -  22 Oct 2023 07:00  --------------------------------------------------------  IN:    Oral Fluid: 800 mL  Total IN: 800 mL    OUT:    Stool (mL): 1 mL    Voided (mL): 275 mL  Total OUT: 276 mL    Total NET: 524 mL      22 Oct 2023 07:01  -  22 Oct 2023 22:30  --------------------------------------------------------  IN:    Furosemide: 50 mL    Milrinone: 88.8 mL    Norepinephrine: 65.8 mL    Sodium Bicarbonate: 600 mL  Total IN: 804.6 mL    OUT:    Indwelling Catheter - Urethral (mL): 485 mL  Total OUT: 485 mL    Total NET: 319.6 mL        I&O's Summary    21 Oct 2023 07:01  -  22 Oct 2023 07:00  --------------------------------------------------------  IN: 800 mL / OUT: 276 mL / NET: 524 mL    22 Oct 2023 07:01  -  22 Oct 2023 22:30  --------------------------------------------------------  IN: 804.6 mL / OUT: 485 mL / NET: 319.6 mL        PHYSICAL EXAM:    GENERAL: NAD, alert   HEAD:  Nasal Can   NECK: Supple, No JVD, R neck cordice   NERVOUS SYSTEM:  Alert & Oriented X3  CHEST/LUNG: EAE , Some basilar crackles   HEART: Regular rate and rhythm; No murmurs, rubs, or gallops  ABDOMEN: Soft, Nontender, Nondistended; Bowel sounds present  EXTREMITIES:  decreased edema , no myositis RUE erythematous patches. Right posterior upper thigh/gluteus with indurated skin and eccyhmoses with discoloration, + tenderness, no warmth. no fluctuance or crepitus.   LABS:                        10.7   11.10 )-----------( 219      ( 22 Oct 2023 21:45 )             36.2     10-22    131<L>  |  91<L>  |  65.6<H>  ----------------------------<  159<H>  4.8   |  23.0  |  2.97<H>    Ca    7.8<L>      22 Oct 2023 21:45  Phos  7.4     10-22  Mg     2.1     10-22    TPro  8.1  /  Alb  2.9<L>  /  TBili  2.7<H>  /  DBili  x   /  AST  x   /  ALT  x   /  AlkPhos  165<H>  10-22    PT/INR - ( 22 Oct 2023 21:45 )   PT: 39.6 sec;   INR: 3.71 ratio         PTT - ( 22 Oct 2023 21:45 )  PTT:33.7 sec  Urinalysis Basic - ( 22 Oct 2023 21:45 )    Color: x / Appearance: x / SG: x / pH: x  Gluc: 159 mg/dL / Ketone: x  / Bili: x / Urobili: x   Blood: x / Protein: x / Nitrite: x   Leuk Esterase: x / RBC: x / WBC x   Sq Epi: x / Non Sq Epi: x / Bacteria: x      Magnesium: 2.1 mg/dL (10-22 @ 21:45)  Phosphorus: 7.4 mg/dL (10-22 @ 21:45)  Magnesium: 2.2 mg/dL (10-22 @ 16:00)  Magnesium: 2.1 mg/dL (10-22 @ 07:35)  Phosphorus: 7.1 mg/dL (10-22 @ 07:35)    ABG - ( 22 Oct 2023 12:11 )  pH, Arterial: 7.260 pH, Blood: x     /  pCO2: 46    /  pO2: 71    / HCO3: 21    / Base Excess: -6.5  /  SaO2: 92.3        ECHO 10/22    < from: TTE Echo Complete w/ Contrast w/ Doppler (10.22.23 @ 16:31) >  Summary:   1. Technically difficult study.   2. Endocardial visualization was enhanced with intravenous echo contrast.   3. Right ventricular volume and pressure overload.   4. Severely enlarged right ventricle.   5. Severely reduced RV systolic function.   6. Compared with TTE dated 7/20/23 the RV continues to be severely   dilated and dysfunctional.   7. LV cavity was not visualized despite the use of the echo contrast.    MD Ella Electronically signed on 10/22/2023 at 5:57:47 PM        < end of copied text >            RADIOLOGY & ADDITIONAL TESTS:

## 2023-10-22 NOTE — CONSULT NOTE ADULT - SUBJECTIVE AND OBJECTIVE BOX
HPI:  39yo female with pmh of BMI > 40, morbid obesity, congestive heart failure, diastolic, bilateral PE, hypertension, DVT, asthma, eczema, anxiety, presenting with Shortness of breath on exertion.  She was saturating 79% on 2 L oxygen.  She had been diagnosed with heart failure and has been diuresed. The patient is feeling better now.  However there was uptrending of the creatinine level and also there was sudden elevation in the LFTs.  This has happened in the past as per the patient as well.  She is denying any GI complaints.      PAST MEDICAL & SURGICAL HISTORY:  Pulmonary embolism      DVT, lower extremity      CHF (congestive heart failure)      Asthma      Anxiety      Severe obesity (BMI >= 40)      Hypertension      No significant past surgical history          ROS:  No Heartburn, regurgitation, dysphagia, odynophagia.  No dyspepsia  No abdominal pain.    No Nausea, vomiting.  No Bleeding.  No hematemesis.   No diarrhea.    No hematochesia.  No weight loss, anorexia.  No edema.      MEDICATIONS  (STANDING):  apixaban 2.5 milliGRAM(s) Oral every 12 hours  budesonide 160 MICROgram(s)/formoterol 4.5 MICROgram(s) Inhaler 2 Puff(s) Inhalation two times a day  influenza   Vaccine 0.5 milliLiter(s) IntraMuscular once  metoprolol tartrate 50 milliGRAM(s) Oral two times a day  montelukast 10 milliGRAM(s) Oral daily  PARoxetine 20 milliGRAM(s) Oral daily  piperacillin/tazobactam IVPB.. 3.375 Gram(s) IV Intermittent every 8 hours  sodium bicarbonate  Infusion 0.06 mEq/kG/Hr (60 mL/Hr) IV Continuous <Continuous>  sodium zirconium cyclosilicate 10 Gram(s) Oral daily    MEDICATIONS  (PRN):  albuterol/ipratropium for Nebulization 3 milliLiter(s) Nebulizer every 6 hours PRN Shortness of Breath and/or Wheezing  aluminum hydroxide/magnesium hydroxide/simethicone Suspension 30 milliLiter(s) Oral every 4 hours PRN Dyspepsia  diltiazem Injectable 10 milliGRAM(s) IV Push every 8 hours PRN if Hr persistent over 130  melatonin 3 milliGRAM(s) Oral at bedtime PRN Insomnia  ondansetron Injectable 4 milliGRAM(s) IV Push every 8 hours PRN Nausea and/or Vomiting  oxyCODONE    IR 2.5 milliGRAM(s) Oral every 6 hours PRN Moderate Pain (4 - 6)  oxyCODONE    IR 5 milliGRAM(s) Oral every 6 hours PRN Severe Pain (7 - 10)      Allergies    iodine (Hives)  ceftriaxone (Hives)  shellfish (Hives)    Intolerances        SOCIAL HISTORY:Denies x 3    ENDOSCOPIC/GI HISTORY:None    FAMILY HISTORY:  Family history of colon cancer (Mother)    Family history of stroke (Father)        Vital Signs Last 24 Hrs  T(C): 36.7 (22 Oct 2023 11:27), Max: 36.7 (21 Oct 2023 16:47)  T(F): 98 (22 Oct 2023 11:), Max: 98.1 (21 Oct 2023 16:47)  HR: 138 (22 Oct 2023 11:) (82 - 138)  BP: 84/52 (22 Oct 2023 11:27) (78/58 - 96/54)  BP(mean): --  RR: 25 (22 Oct 2023 11:) (1 - 25)  SpO2: 85% (22 Oct 2023 11:27) (85% - 100%)    Parameters below as of 22 Oct 2023 11:27  Patient On (Oxygen Delivery Method): nasal cannula  O2 Flow (L/min): 5      PHYSICAL EXAM:    GENERAL: Mildly short of breath  HEAD:  Atraumatic, Normocephalic  EYES: EOMI, PERRLA, conjunctiva and sclera clear  ENMT: No tonsillar erythema, exudates, or enlargement; Moist mucous membranes, Good dentition, No lesions  NECK: Supple, No JVD, Normal thyroid  CHEST/LUNG: Clear to percussion bilaterally; No rales, rhonchi, wheezing, or rubs  HEART: Regular rate and rhythm; No murmurs, rubs, or gallops  ABDOMEN: Soft, obese, Bowel sounds present  EXTREMITIES:  2+ Peripheral Pulses, No clubbing, cyanosis, or edema  LYMPH: No lymphadenopathy noted  SKIN: No rashes or lesions      LABS:                        10.5   11 )-----------( 219      ( 22 Oct 2023 07:35 )             36.9     10-22    126<L>  |  89<L>  |  60.4<H>  ----------------------------<  115<H>  5.6<H>   |  17.0<L>  |  2.93<H>    Ca    7.9<L>      22 Oct 2023 07:35  Phos  7.1     10-22  Mg     2.1     10-22    TPro  7.9  /  Alb  2.9<L>  /  TBili  2.6<H>  /  DBili  x   /  AST  2798<H>  /  ALT  2437<H>  /  AlkPhos  137<H>  10-22       Urinalysis Basic - ( 22 Oct 2023 07:35 )    Color: x / Appearance: x / SG: x / pH: x  Gluc: 115 mg/dL / Ketone: x  / Bili: x / Urobili: x   Blood: x / Protein: x / Nitrite: x   Leuk Esterase: x / RBC: x / WBC x   Sq Epi: x / Non Sq Epi: x / Bacteria: x        LIVER FUNCTIONS - ( 22 Oct 2023 07:35 )  Alb: 2.9 g/dL / Pro: 7.9 g/dL / ALK PHOS: 137 U/L / ALT: 2437 U/L / AST: 2798 U/L / GGT: x               RADIOLOGY & ADDITIONAL STUDIES:  < from: Xray Chest 1 View- PORTABLE-Urgent (Xray Chest 1 View- PORTABLE-Urgent .) (10.20.23 @ 17:49) >    ACC: [id number] EXAM:  XR CHEST PORTABLE URGENT 1V   ORDERED BY: GENE OWEN     PROCEDURE DATE:  10/20/2023          INTERPRETATION:  Exam:XR CHEST URGENT    clinical history:Shortness of breath    Heart size is stable. Hazy opacification of the lungs bilaterally. No   pneumothorax.    IMPRESSION: No significant change from     --- End of Report ---            PHUC LINDO MD; Attending Radiologist  This document has been electronically signed. Oct 21 2023  9:37AM    < end of copied text >    < from: TTE Echo Complete w/ Contrast w/ Doppler (23 @ 11:36) >    ACC: [id number] EXAM:  ECHO TTE WITH CON COMP W DOPP                          PROCEDURE DATE:  2023          INTERPRETATION:  TRANSTHORACIC ECHOCARDIOGRAM REPORT        Patient Name:   MITCHEL GUNN Patient Location: Emergency  Medical Rec #:[id number]       Accession #:      [id number]  Account #:      [id number]       Height:           64.0 in 162.6 cm  YOB: 1985      Weight:           331.0 lb 150.14 kg  Patient Age:    37 years       BSA:              2.42 m²  Patient Gender: F              BP:               115/78 mmHg      Date of Exam:        2023 11:36:58 AM  Sonographer:         Lola Tolentino  Referring Physician: Mari Ferrell    Procedure:     2D Echo/Doppler/Color Doppler Complete.  Indications:   R60.9 -Edema, unspecified  Diagnosis:     R60.9 - Edema, unspecified  Study Details: Technically difficult study. Study quality was adversely   affected                 due to patient obesity and body habitus.        2D AND M-MODE MEASUREMENTS (normal ranges within parentheses):  Left                 Normal   Aorta/Left            Normal  Ventricle:                    Atrium:  IVSd (2D):    0.98  (0.7-1.1) Left Atrium    4.16  (1.9-4.0)                 cm             (2D):           cm  LVPWd (2D):   0.91  (0.7-1.1) LA Volume      22.5                 cm             Index         ml/m²  LVIDd (2D):   4.17  (3.4-5.7) Right Ventricle:                 cm             RVd (2D):        5.15 cm  LVIDs (2D):   3.06            TAPSE:           1.81 cm           cm  LV FS (2D):   26.7   (>25%)                  %  Relative Wall 0.43   (<0.42)  Thickness    LV DIASTOLIC FUNCTION:  MV Peak E: 0.84 m/s Decel Time: 163 msec    SPECTRAL DOPPLER ANALYSIS (where applicable):  Mitral Valve:  MV P1/2 Time: 47.30 msec  MV Area, PHT: 4.65 cm²    LVOT Vmax:  LVOT VTI:  LVOT Diameter: 1.85 cm    Tricuspid Valve and PA/RV Systolic Pressure: TR Max Velocity: 3.26 m/s RA   Pressure: 3 mmHg RVSP/PASP: 45.5 mmHg    Pulmonic Valve:  PV Max Velocity: 1.05 m/s PV MaxP.4 mmHg PV Mean PG:      PHYSICIAN INTERPRETATION:  Left Ventricle: Normal left ventricular size and wall thicknesses, with   normal systolic and diastolic function. The left ventricular internal   cavity size is normal.  Global LV systolic function was normal. Left ventricular ejection   fraction, by visual estimation, is 60 to 65%. The interventricular septum   is flattened in systole and diastole, consistent with right ventricular   pressure and volume overload. The left ventricular diastolic function   could not be assessed in this study.  Right Ventricle: The right ventricular size is severely enlarged. RV   systolic function is moderately reduced. TV S' 0.1 m/s.  Left Atrium: The left atrium is normal in size.  Right Atrium: Severely enlarged right atrium.  Pericardium: A small pericardial effusion is present. The pericardial   effusion is posterior to the left ventricle. There is excessive   respiratory variation in the tricuspid valve spectral Doppler velocities.   There is no evidence of cardiac tamponade. There is a significant   pericardial fat pad present.  Mitral Valve: Structurally normal mitral valve, with normal leaflet   excursion. No evidence of mitral stenosis. Mild mitral valve   regurgitation is seen.  Tricuspid Valve: Mild-moderate tricuspid regurgitation is visualized.   Estimated pulmonary artery systolic pressure is 45.5 mmHg assuming a   right atrial pressure of 3 mmHg, which is consistent with mild pulmonary   hypertension.  Aortic Valve: The aorticvalve was not well visualized. No evidence of   aortic stenosis. Trivial aortic valve regurgitation is seen.  Pulmonic Valve: The pulmonic valve was not well visualized. Mild pulmonic   valve regurgitation.  Aorta: The aortic root and ascending aortaare structurally normal, with   no evidence of dilitation.  In comparison to the previous echocardiogram(s): There are no prior   studies on this patient for comparison purposes.      Summary:   1. Technically difficult study.   2. Left ventricular ejection fraction, by visual estimation, is 60 to   65%.   3. Normal global left ventricular systolic function.   4. Right ventricular volume and pressure overload.   5. Severely enlarged right ventricle.   6. Moderately reduced RV systolic function.  7. Severely enlarged right atrium.   8. Mild mitral valve regurgitation.   9. Mild-moderate tricuspid regurgitation.  10. Mild pulmonic valve regurgitation.  11. Estimated pulmonary artery systolic pressure is 45.5 mmHg assuming a   right atrial pressure of 3 mmHg, which is consistent with mild pulmonary   hypertension.  12. Small pericardial effusion.    MD Ella Electronically signed on 2023 at 1:39:21 PM            *** Final ***    < end of copied text >

## 2023-10-22 NOTE — PROGRESS NOTE ADULT - ASSESSMENT
SARAH --->  ATN +/- component Vanco toxicity , Low flow w/ Cardiogenic shock   Placed on Milrinone and Levophed   Not intubated --> awake and interactive   ECHO noted ---> Poor LV visualization , but [place] readings noted   ? Recent Dx / Suspicion of SLE ---> Antecedent h/o steroids x 1 mo PTA ---> started stress steroids   UA with no sig RBC or protein   CPK 27   Mixed acidosis on ABG with Lactate 4.1 ----> Improved to 2.6   Potassium down to 4.6 post Lokelma   Bumex drip started   IV Bicarb noted   UO improved in major   Vanco held --> Level today is 22, Remains on Zosyn   SLE serology reordered   Associated Ischemic hepatitis with coagulopathy   Complements , JONATHAN , KATHIE , ds DNA  , and ANCA pending   Hgb stable   No pressing need for acute renal replacement therapy at this moment , but will follow closely   Discussed with team

## 2023-10-22 NOTE — PROGRESS NOTE ADULT - ASSESSMENT
Impression  39 yo female with hx of  morbid obesity, chronic HFpEF, prior DVT/PE on eliquis, +KATHIE being worked up for SLE and recently started on steroids, probable MICHAEL, initially admitted with acute decompensated heart failure, now with SARAH, transaminitis, cardiogenic shock requiring pressor/inotropic support.    Cardiogenic shock  - index based on thermodilution 1.7, based on modified mary 2.2  - now on levophed, milrinone  - lasix infusion, target net negative fluid balance 1-2 liters/day    moderate to severe Pulmonary HTN   - mPAP 60s to 70s  - on milrinone  - may need to consider Teagan    Possible SLE  - ordered repeat KATHIE, ANCA, C3, C4  - on stress dose steroids    Cellulitis  - cultures ordered  - on empiric vanc/zosyn    SARAH/ metabolic acidosis  - bicarb infusion  - major for urine output monitoring     Transaminitis   - suspect due to hepatic congestion, attempting diuresis    Aflutter/ prior PE  - received eliquis today, will transition to heparin.     Prognosis guarded , family updated

## 2023-10-22 NOTE — PROGRESS NOTE ADULT - SUBJECTIVE AND OBJECTIVE BOX
INTERVAL HPI/OVERNIGHT EVENTS:    CC: acute CHF, SARAH, elevated LFTs, hyperkalemia, acute hypoxic resp failure      Overnight episodes of hypotension requiring albumin  attempted to taper oxygen, requiring 5L with increasing work of breathing.  reports nausea.    Vital Signs Last 24 Hrs  T(C): 36.7 (22 Oct 2023 11:27), Max: 36.7 (21 Oct 2023 16:47)  T(F): 98 (22 Oct 2023 11:27), Max: 98.1 (21 Oct 2023 16:47)  HR: 138 (22 Oct 2023 11:27) (82 - 138)  BP: 84/52 (22 Oct 2023 11:27) (78/58 - 96/54)  BP(mean): --  RR: 25 (22 Oct 2023 11:27) (1 - 25)  SpO2: 85% (22 Oct 2023 11:27) (85% - 100%)    Parameters below as of 22 Oct 2023 11:27  Patient On (Oxygen Delivery Method): nasal cannula  O2 Flow (L/min): 5      PHYSICAL EXAM:    GENERAL: tachypneic, mild distress  CHEST/LUNG: b/l air entry  HEART: tachy,  abdomen: soft, bs+  EXTREMITIES:  no edema, tenderness. right hip erythema improving    MEDICATIONS  (STANDING):  apixaban 2.5 milliGRAM(s) Oral every 12 hours  budesonide 160 MICROgram(s)/formoterol 4.5 MICROgram(s) Inhaler 2 Puff(s) Inhalation two times a day  chlorhexidine 2% Cloths 1 Application(s) Topical <User Schedule>  furosemide Infusion 10 mG/Hr (5 mL/Hr) IV Continuous <Continuous>  influenza   Vaccine 0.5 milliLiter(s) IntraMuscular once  metoprolol tartrate 50 milliGRAM(s) Oral two times a day  montelukast 10 milliGRAM(s) Oral daily  PARoxetine 20 milliGRAM(s) Oral daily  piperacillin/tazobactam IVPB.. 3.375 Gram(s) IV Intermittent every 8 hours  sodium bicarbonate  Infusion 0.06 mEq/kG/Hr (60 mL/Hr) IV Continuous <Continuous>  sodium zirconium cyclosilicate 10 Gram(s) Oral daily    MEDICATIONS  (PRN):  albuterol/ipratropium for Nebulization 3 milliLiter(s) Nebulizer every 6 hours PRN Shortness of Breath and/or Wheezing  aluminum hydroxide/magnesium hydroxide/simethicone Suspension 30 milliLiter(s) Oral every 4 hours PRN Dyspepsia  diltiazem Injectable 10 milliGRAM(s) IV Push every 8 hours PRN if Hr persistent over 130  melatonin 3 milliGRAM(s) Oral at bedtime PRN Insomnia  ondansetron Injectable 4 milliGRAM(s) IV Push every 8 hours PRN Nausea and/or Vomiting  oxyCODONE    IR 2.5 milliGRAM(s) Oral every 6 hours PRN Moderate Pain (4 - 6)  oxyCODONE    IR 5 milliGRAM(s) Oral every 6 hours PRN Severe Pain (7 - 10)      Allergies    iodine (Hives)  ceftriaxone (Hives)  shellfish (Hives)    Intolerances          LABS:                          10.5   11.84 )-----------( 219      ( 22 Oct 2023 07:35 )             36.9     10-22    126<L>  |  89<L>  |  60.4<H>  ----------------------------<  115<H>  5.6<H>   |  17.0<L>  |  2.93<H>    Ca    7.9<L>      22 Oct 2023 07:35  Phos  7.1     10-22  Mg     2.1     10-22    TPro  7.9  /  Alb  2.9<L>  /  TBili  2.6<H>  /  DBili  x   /  AST  2798<H>  /  ALT  2437<H>  /  AlkPhos  137<H>  10-22      Urinalysis Basic - ( 22 Oct 2023 07:35 )    Color: x / Appearance: x / SG: x / pH: x  Gluc: 115 mg/dL / Ketone: x  / Bili: x / Urobili: x   Blood: x / Protein: x / Nitrite: x   Leuk Esterase: x / RBC: x / WBC x   Sq Epi: x / Non Sq Epi: x / Bacteria: x        RADIOLOGY & ADDITIONAL TESTS:

## 2023-10-22 NOTE — CONSULT NOTE ADULT - ASSESSMENT
ICU ASSESSMENT AND PLAN:   35yo F with PMH: Morbid Obesity- BMI 60, HFpEF, B/L LE DVT + recurrent PE on Eliquis, HTN, Asthma, chronic hyperkalemia on daily Lokelma and Anxiety     Who presented to ED because she couldn't get up out of bed on her own, she has had several days of SOB, some chest discomfort with dry cough and a 10 pound weight gain in a day. (She weighs herself daily and her alarm for weight gain is 3 pounds. CXR with left lower lobe pneumonia.  Seen by Cardiology and started Lasix 40mg IV daily, then increased to 40mg BID, continued her home spironolactone/toprol dosing. However 10/21 with worsening renal function, her diuretics were held. Nephrology has been consulted  At that time, discussed right heart cath  Takes daily Potassium 40mEq at home, in hospital has been on daily lokelma for K>5  While in hospital developed new onset Aflutter, intermittent then more persistent, getting lopressor to assist in rate control  10/21 developed hypotension late night, given small IVF bolus as she is already volume overloaded, by morning 10/22 6am she again became hypotensive, given albumin and fluid bolus of 500cc this time.   Did not improve much and ICU was consulted.    Patient has been refusing CT scans and ultrasound of leg  Of note the patient has been on chronic steroids for the last month for workup of SLE and join pains    Bedside POCUS with barely moving RV    Patient accepted and transferred to ICU for critical care monitoring    1- Acute Hypoxic Respiratory Failure, with Pneumonia, CHF, fluid overload contributing, low thresshold to start Bipap  2- Pneumonia  3- Morbid obesity, BMI >60  4- Acute Diastolic Heart Failure with Normal LV and poor functioning RV  5- DVTs, recurrent PEs, has been on anticoagulation for many years, eliquis  6- Cellulitis right lateral hip  7- new onset aflutter  8- Currently in workup for SLE, has been on steroids for >1 prior to admission  9- Hyponatremia  10- Depression  11- Metabolic acidosis, started bicarb infusion  12- Transaminitis  13- Persistently elevated Potassium    Neuro:  - Avoid neuro suppression with borderline respiratory status  - Avoid morphine in patient with renal insufficiency    Cards:  - Stat Echo  - To Place [facility] and [place]- Renny catheter for more definitive evaluation of cardiac function  - Heart Failure team consult  - Lasix infusion for volume overload  - Maintain MAP>65, required to start Levophed for BP augmentation  - Continue Eliquis for AC with DVT/PE/Aflutter, renal dose to 2.5mg BID for now  - Serial BMP to monitor renal function and potassium levels    Resp:  - Maintain oxygenation> 92%, currently 5L NC, titrate as necessary  - Patient aware that if her conditions deteriorates, she will require Bipap, currently hesitent but understands that it is the mask or being intubated. She does not want intubation  - Pulmonary toilet, incentive spirometry  - Zosyn for pneumonia  - Lasix infusion for fluid overload  - Continue home nebs    GI:  - Renal, Cardiac diet for now  - Protonix for GI proph  - Zofran prn for nausea    Renal:  - Lasix infusion  - Serial BMP to monitor renal function/potassium  - Nephrology input appreciated  - maintain electorlytes, goals of K>4.0, Phos>3.0, Mg>2.0  - Has required daily lokelma dose to keep Potassium under 6 last few days  - Will give additional cocktail of D50, Insulin 10, Calcium gluconate  - Sodium Bicarb infusion for worsening metabolic acidosis    :   - urine culture  - Strict I&Os  - consider major catheter placement    ID:  - Continue Zosyn for broadened coverage  - Followup blood/urine cultures  - Check Procalcitonin  - Right lateral hip ultrasound to rule out abscess under infected area    Heme:  - Eliquis for DVT/PE/Aflutter      Case discussed with Dr. Ortega, ICU Attending   ICU ASSESSMENT AND PLAN:   37yo F with PMH: Morbid Obesity- BMI 60, HFpEF, B/L LE DVT + recurrent PE on Eliquis, HTN, Asthma, chronic hyperkalemia on daily Lokelma and Anxiety     Who presented to ED because she couldn't get up out of bed on her own, she has had several days of SOB, some chest discomfort with dry cough and a 10 pound weight gain in a day. (She weighs herself daily and her alarm for weight gain is 3 pounds. CXR with left lower lobe pneumonia.  Seen by Cardiology and started Lasix 40mg IV daily, then increased to 40mg BID, continued her home spironolactone/toprol dosing. However 10/21 with worsening renal function, her diuretics were held. Nephrology has been consulted  At that time, discussed right heart cath  Takes daily Potassium 40mEq at home, in hospital has been on daily lokelma for K>5  While in hospital developed new onset Aflutter, intermittent then more persistent, getting lopressor to assist in rate control  10/21 developed hypotension late night, given small IVF bolus as she is already volume overloaded, by morning 10/22 6am she again became hypotensive, given albumin and fluid bolus of 500cc this time.   Did not improve much and ICU was consulted.    Patient has been refusing CT scans and ultrasound of leg  Of note the patient has been on chronic steroids for the last month for workup of SLE and join pains    Bedside POCUS with barely moving RV    Patient accepted and transferred to ICU for critical care monitoring    1- Acute Hypoxic Respiratory Failure, with Pneumonia, CHF, fluid overload contributing, low thresshold to start Bipap  2- Pneumonia  3- Morbid obesity, BMI >60  4- Acute Diastolic Heart Failure with Normal LV and poor functioning RV  5- DVTs, recurrent PEs, has been on anticoagulation for many years, eliquis  6- Cellulitis right lateral hip  7- new onset aflutter  8- Currently in workup for SLE, has been on steroids for >1 prior to admission  9- Hyponatremia  10- Depression  11- Metabolic acidosis, started bicarb infusion  12- Transaminitis  13- Persistently elevated Potassium    Neuro:  - Avoid neuro suppression with borderline respiratory status  - Avoid morphine in patient with renal insufficiency    Cards:  - Stat Echo  - To Place Cordis and [place]- Renny catheter for more definitive evaluation of cardiac function  - Heart Failure team consult  - Lasix infusion for volume overload  - Maintain MAP>65, required to start Levophed for BP augmentation  - Continue Eliquis for AC with DVT/PE/Aflutter, renal dose to 2.5mg BID for now  - Serial BMP to monitor renal function and potassium levels    Resp:  - Maintain oxygenation> 92%, currently 5L NC, titrate as necessary  - Patient aware that if her conditions deteriorates, she will require Bipap, currently hesitent but understands that it is the mask or being intubated. She does not want intubation  - Pulmonary toilet, incentive spirometry  - Zosyn for pneumonia  - Lasix infusion for fluid overload  - Continue home nebs    GI:  - Renal, Cardiac diet for now  - Protonix for GI proph  - Zofran prn for nausea    Renal:  - Lasix infusion  - Serial BMP to monitor renal function/potassium  - Nephrology input appreciated  - maintain electorlytes, goals of K>4.0, Phos>3.0, Mg>2.0  - Has required daily lokelma dose to keep Potassium under 6 last few days  - Will give additional cocktail of D50, Insulin 10, Calcium gluconate  - Sodium Bicarb infusion for worsening metabolic acidosis    :   - urine culture  - Strict I&Os  - consider major catheter placement    ID:  - Continue Zosyn for broadened coverage  - Followup blood/urine cultures  - Check Procalcitonin  - Right lateral hip ultrasound to rule out abscess under infected area    Heme:  - Eliquis for DVT/PE/Aflutter      Case discussed with Dr. Ortega, ICU Attending  Time spent evaluating/treating patient with medical issues that pose a high risk for life threatening deterioration and/or end-organ damage, reviewing data/labs/imaging, discussing case with multidisciplinary team, discussing plan/goals of care with patient/family. Non-inclusive of procedure time. ICU ASSESSMENT AND PLAN:   35yo F with PMH: Morbid Obesity- BMI 60, HFpEF, B/L LE DVT + recurrent PE on Eliquis, HTN, Asthma, chronic hyperkalemia on daily Lokelma and Anxiety     Who presented to ED because she couldn't get up out of bed on her own, she has had several days of SOB, some chest discomfort with dry cough and a 10 pound weight gain in a day. (She weighs herself daily and her alarm for weight gain is 3 pounds. CXR with left lower lobe pneumonia.  Seen by Cardiology and started Lasix 40mg IV daily, then increased to 40mg BID, continued her home spironolactone/toprol dosing. However 10/21 with worsening renal function, her diuretics were held. Nephrology has been consulted  At that time, discussed right heart cath  Takes daily Potassium 40mEq at home, in hospital has been on daily lokelma for K>5  While in hospital developed new onset Aflutter, intermittent then more persistent, getting lopressor to assist in rate control  10/21 developed hypotension late night, given small IVF bolus as she is already volume overloaded, by morning 10/22 6am she again became hypotensive, given albumin and fluid bolus of 500cc this time.   Did not improve much and ICU was consulted.    Patient has been refusing CT scans and ultrasound of leg  Of note the patient has been on chronic steroids for the last month for workup of SLE and join pains    Bedside POCUS with barely moving RV    Patient accepted and transferred to ICU for critical care monitoring    1- Acute Hypoxic Respiratory Failure, with Pneumonia, CHF, fluid overload contributing, low thresshold to start Bipap  2- Pneumonia  3- Morbid obesity, BMI >60  4- Acute Diastolic Heart Failure with Normal LV and poor functioning RV  5- DVTs, recurrent PEs, has been on anticoagulation for many years, eliquis  6- Cellulitis right lateral hip  7- new onset aflutter  8- Currently in workup for SLE, has been on steroids for >1 prior to admission  9- Hyponatremia  10- Depression  11- Metabolic acidosis, started bicarb infusion  12- Transaminitis  13- Persistently elevated Potassium  14- Likely adrenal insufficiency in setting of prolonged steroid course  15- Hyperglycemia, possible new diabetes    Neuro:  - Avoid neuro suppression with borderline respiratory status  - Avoid morphine in patient with renal insufficiency    Cards:  - Stat Echo  - To Place Cordis and [place]- Renny catheter for more definitive evaluation of cardiac function  - Heart Failure team consult  - Lasix infusion for volume overload  - Maintain MAP>65, required to start Levophed for BP augmentation  - Continue Eliquis for AC with DVT/PE/Aflutter, renal dose to 2.5mg BID for now  - Serial BMP to monitor renal function and potassium levels    Resp:  - Maintain oxygenation> 92%, currently 5L NC, titrate as necessary  - Patient aware that if her conditions deteriorates, she will require Bipap, currently hesitent but understands that it is the mask or being intubated. She does not want intubation  - Pulmonary toilet, incentive spirometry  - Zosyn for pneumonia  - Lasix infusion for fluid overload  - Continue home nebs    GI:  - Renal, Cardiac diet for now  - Protonix for GI proph  - Zofran prn for nausea    Renal:  - Lasix infusion  - Serial BMP to monitor renal function/potassium  - Nephrology input appreciated  - maintain electorlytes, goals of K>4.0, Phos>3.0, Mg>2.0  - Has required daily lokelma dose to keep Potassium under 6 last few days  - Will give additional cocktail of D50, Insulin 10, Calcium gluconate  - Sodium Bicarb infusion for worsening metabolic acidosis    :   - urine culture  - Strict I&Os  - consider major catheter placement    ID:  - Continue Zosyn for broadened coverage  - Followup blood/urine cultures  - Check Procalcitonin  - Right lateral hip ultrasound to rule out abscess under infected area    Heme:  - Eliquis for DVT/PE/Aflutter    Endo:  - Check Glycohemoglobin  - Insulin sliding scale  - TSH normal    Rheum:  - Start Hydrocortisone 40mg q6h      Case discussed with Dr. Ortega, ICU Attending  Time spent evaluating/treating patient with medical issues that pose a high risk for life threatening deterioration and/or end-organ damage, reviewing data/labs/imaging, discussing case with multidisciplinary team, discussing plan/goals of care with patient/family. Non-inclusive of procedure time.

## 2023-10-23 NOTE — PROGRESS NOTE ADULT - ASSESSMENT
38F PMH B/L DVT, recurrent PE on Eliquis, HFpEF, morbid obesity, asthma, chronic hyperkalemia who presented on 10/18/23 with weakness. Notably was being worked up as outpatient for possible SLE given facial rash, photophobia, and join pains. She was started on Prednisone 20mg by her PCP for approximately 1 mo and reportedly a rheumatologic work-up was sent. She notes worsening LE edema, dry cough, weight gain. She was initially admitted to medicine service for treatment of PNA, could not tolerate CT scan given inability to lay flat. During her stay developed progressive SARAH, HAGMA, transaminitis, started on bicarbonate infusion, then developed hypotension and so was upgraded on 10/22/23 for cardiogenic shock. Started on Milrinone and Levophed and empiric antibiotics, diuresis. PA catheter placed on 10/22/23 for closer hemodynamic monitoring.    38F PMH B/L DVT, recurrent PE on Eliquis, HFpEF, morbid obesity, asthma, chronic hyperkalemia who presented on 10/18/23 with weakness. Notably was being worked up as outpatient for possible SLE given facial rash, photophobia, and join pains. She was started on Prednisone 20mg by her PCP for approximately 1 mo and reportedly a rheumatologic work-up was sent. She notes worsening LE edema, dry cough, weight gain. She was initially admitted to medicine service for treatment of PNA, could not tolerate CT scan given inability to lay flat. During her stay developed progressive SARAH, HAGMA, transaminitis, started on bicarbonate infusion, then developed hypotension and so was upgraded on 10/22/23 for cardiogenic shock. Started on Milrinone and Levophed and empiric antibiotics, diuresis. PA catheter placed on 10/22/23 for closer hemodynamic monitoring.     Impression/Plan:    Cardiogenic shock  Hypotension  Cor pulmonale  Transaminemia - 2/2 hypoperfusion  - Advanced heart failure on board  - Will increase preload with 500cc LR bolus  - [place]-Renny catheter re-positioned  - Will monitor thermodilution, central venous O2 saturations  - Monitor UOP closely, strict Is/Os, major  - C/w Milrinone, will try to wean  - Weaning Levophed as well  - Maintain MAP >=65    AFib with RVR  - Amio bolus + gtt started  - EP consulted [facility])  - Heparin infusion  - Monitor serial PTT, monitor for s/s bleeding    Acute renal failure  HAGMA - improved  - S/p bicarb gtt  - Trend UOP as above  - Renally adjust all medications    Sepsis  - BCx from 10/18/23 negative  - Leukocytosis is improved  - Is empirically on Zosyn  - Empiric stress dose steroids as pt was on Prednisone 20mg as outpatient    Possible SLE  - Unclear if outpatient work-up was sent  - Basic inflammatory panel sent (KATHIE, dsDNA, ANCA)  - Had been on empiric Prednisone 20mg started by PCP x 1 mo prior to admission  - Stress dose steroids as above  - Monitor UOP, renal function    Diet - DASH if tolerated  PPx - Heparin infusion as above  Lines/Tubes - R IJ SGC, PIV, indwelling major  Code Status - Full code  Dispo - C/w care in ICU. Remains critical      Ming Davis M.D.  , Pulmonary & Critical Care Medicine  [facility]  Pulmonary and Sleep Medicine at [address]  [place] [address]  T: [phone]  F: [phone]      Ming Davis M.D.  , Pulmonary & Critical Care Medicine  [facility]  Pulmonary and Sleep Medicine at [address]  [place], [place] [address]  T: [phone]  F: [phone]

## 2023-10-23 NOTE — CONSULT NOTE ADULT - SUBJECTIVE AND OBJECTIVE BOX
ADVANCED HEART FAILURE - CONSULT NOTE  [address] [place] [address]  Office Phone: [phone]/Fax: [phone]  Service/On Call Phone [phone]  _______________________________________________________________________________________________________    HPI:  37 YO F with morbid obesity (BMI 60), HFpEF but severe RV dysfunction (last hospitalized in July with HF exacerbation), prior DVT/PE on Eliquis (not sure if worked up for [facility] in the past) and SLE + KATHIE who presented to [facility] on 10/18 with heart failure and progressed to shock with end organ dysfunction (lactate 4, SARAH, shock liver).     The MICU team placed a swan over the weekend which showed a RA pressure of ~12, PA ~80s/30’s, wedge not able to be obtained, and Thermo/Tg CI 1.7/2.2. She was started milrinone/levo and inhaled NO. Shock team was activated given RV predominant cardiogenic shock with severe pulmonary hypertension. Plan was to continue inotropes, diurese to keep CVP closer to 8, and adding flolan if inhaled NO doesn’t work. HF consulted for further management of cardiogenic shock.      PAST MEDICAL & SURGICAL HISTORY:  Pulmonary embolism      DVT, lower extremity      CHF (congestive heart failure)      Asthma      Anxiety      Severe obesity (BMI >= 40)      Hypertension      No significant past surgical history        REVIEW OF SYSTEMS:  14 point ROS negative in detail apart from as documented in HPI.    MEDICATIONS  (STANDING):  aMIOdarone Infusion 1 mG/Min (33.3 mL/Hr) IV Continuous <Continuous>  aMIOdarone Infusion 0.5 mG/Min (16.7 mL/Hr) IV Continuous <Continuous>  budesonide 160 MICROgram(s)/formoterol 4.5 MICROgram(s) Inhaler 2 Puff(s) Inhalation two times a day  chlorhexidine 2% Cloths 1 Application(s) Topical <User Schedule>  heparin  Infusion. 1200 Unit(s)/Hr (12 mL/Hr) IV Continuous <Continuous>  hydrocortisone sodium succinate Injectable 50 milliGRAM(s) IV Push every 6 hours  influenza   Vaccine 0.5 milliLiter(s) IntraMuscular once  milrinone Infusion 0.25 MICROgram(s)/kG/Min (11.2 mL/Hr) IV Continuous <Continuous>  montelukast 10 milliGRAM(s) Oral daily  mupirocin 2% Ointment 1 Application(s) Both Nostrils two times a day  norepinephrine Infusion 0.05 MICROgram(s)/kG/Min (6.98 mL/Hr) IV Continuous <Continuous>  PARoxetine 20 milliGRAM(s) Oral daily  piperacillin/tazobactam IVPB.. 3.375 Gram(s) IV Intermittent every 12 hours    MEDICATIONS  (PRN):  albuterol/ipratropium for Nebulization 3 milliLiter(s) Nebulizer every 6 hours PRN Shortness of Breath and/or Wheezing  aluminum hydroxide/magnesium hydroxide/simethicone Suspension 30 milliLiter(s) Oral every 4 hours PRN Dyspepsia  melatonin 3 milliGRAM(s) Oral at bedtime PRN Insomnia  ondansetron Injectable 4 milliGRAM(s) IV Push every 8 hours PRN Nausea and/or Vomiting  oxyCODONE    IR 2.5 milliGRAM(s) Oral every 6 hours PRN Moderate Pain (4 - 6)  oxyCODONE    IR 5 milliGRAM(s) Oral every 6 hours PRN Severe Pain (7 - 10)    Home Medications:  Eliquis 2.5 mg oral tablet: 1 tab(s) orally 2 times a day (23 Oct 2023 09:06)  montelukast 10 mg oral tablet: 1 tab(s) orally once a day (23 Oct 2023 09:06)  PARoxetine 20 mg oral tablet: 1 tab(s) orally once a day (23 Oct 2023 09:06)  potassium chloride 20 mEq oral tablet, extended release: 2 tab(s) orally once a day (23 Oct 2023 09:06)  prednisoLONE 5 mg oral tablet: 2 tab(s) orally once a day (23 Oct 2023 09:06)  spironolactone 50 mg oral tablet: 1 tab(s) orally once a day (23 Oct 2023 09:06)  Toprol- mg oral tablet, extended release: 1 tab(s) orally once a day (23 Oct 2023 09:06)  Lasix 40 mg daily    Allergies  iodine (Hives)  ceftriaxone (Hives)  shellfish (Hives)    FAMILY HISTORY:  Family history of colon cancer (Mother)  Family history of stroke (Father)    ICU Vital Signs Last 24 Hrs  T(C): 36.5, Max: 37.2 (10-22 @ 16:15)  HR: 122 (100 - 156)  BP: 104/72 (78/53 - 131/93)  BP(mean): 82 (58 - 107)  ABP: --  ABP(mean): --  RR: 27 (15 - 28)  SpO2: 93% (88% - 100%)    Weight in k.9 (10-23-23)      I&O's Summary Last 24 Hrs    IN: 1275.4 mL / OUT: 2580 mL / NET: -1304.6 mL      Tele: Aflutter with -150s, PVCs    Physical Exam:    General: NAD.  Neck: JVP not elevated.  Respiratory: Clear to auscultation bilaterally  CV: Tachycardic. Normal S1 and S2. No murmurs, rub, or gallops. Radial pulses normal.  Abdomen: Soft, non-distended, non-tender  Extremities: Lukewarm, no edema  Neurology: Non-focal, alert and oriented times three.   Psych: Affect normal    Labs:    ( 10-23-23 @ 08:04 )               11.3   10.44 )--------( 217                  37.4     ( 10-23-23 @ 07:49 )     133  |  91  |  67.3  ---------------------<  182  4.6  |  25.0  |  2.44    Ca 7.6  Phos 6.6  Mg 2.1    ( 10-23-23 @ 07:49 )  TPro  8.3  /  Alb  3.0  /  TBili  2.8  /  DBili  x   /  AST  2507  /  ALT  2520  /  AlkPhos  163  ( 10-23-23 @ 02:05 )  TPro  8.0  /  Alb  2.9  /  TBili  2.7  /  DBili  x   /  AST  2941  /  ALT  2462  /  AlkPhos  160    PTT/PT/INR - ( 10-23-23 @ 06:45 )  PTT: 47.6 sec / PT: x     / INR: x        ( 10-22-23 @ 07:35 )  TropHS x     / CK 27    / CKMB x      ( 10-20-23 @ 16:51 )  TropHS x     / CK 13    / CKMB x        VBG - ( 10-23-23 @ 10:57 )  pH: 7.330 / pCO2: 56    / pO2: 52    / Oxygen saturation: 78.2    VBG - ( 10-23-23 @ 08:04 )  pH: 7.270 / pCO2: 60    / pO2: 71    / Oxygen saturation: 91.3    VBG - ( 10-23-23 @ 05:58 )  pH: 7.230 / pCO2: 67    / pO2: 54    / Oxygen saturation: 76.1    VBG - ( 10-23-23 @ 02:20 )  pH: 7.250 / pCO2: 61    / pO2: 59    / Oxygen saturation: 81.5    VBG - ( 10-22-23 @ 22:07 )  pH: 7.250 / pCO2: 59    / pO2: 56    / Oxygen saturation: 77.6    VBG - ( 10-22-23 @ 16:15 )  pH: 7.250 / pCO2: 49    / pO2: 42    / Oxygen saturation: 53.3    VBG - ( 10-22-23 @ 15:25 )  pH: 7.260 / pCO2: 53    / pO2: <42   / Oxygen saturation: 42.6      ABG - ( 10-23-23 @ 05:59 )  pH: 7.270 / pCO2: 61    / pO2: 110   / HCO3: 28    / Base Excess: 1.1   / SaO2: 99.0     Blood Gas Venous - Lactate: 1.90 (10-23-23 @ 10:57)  Blood Gas Venous - Lactate: 1.90 (10-23-23 @ 08:04)

## 2023-10-23 NOTE — PROGRESS NOTE ADULT - SUBJECTIVE AND OBJECTIVE BOX
Chief Complaint: This is a 38y old woman patient being seen in follow-up consultation for elevated liver enzymes.     Interval HPI / 24H events:  Patient seen and evaluated at bedside, on HF nasal cannula 65%, with Nitric oxide, with Inotropic and vasopressor support on Levophed and Milrinone infusion. Tachycardic. Patient denies abdominal pain, nausea, vomiting.      Review of Systems:  Limited, see GI ROS as above.       PAST MEDICAL/SURGICAL HISTORY:  Pulmonary embolism    DVT, lower extremity    CHF (congestive heart failure)    Asthma    Anxiety    Severe obesity (BMI >= 40)    Hypertension    No significant past surgical history      MEDICATIONS  (STANDING):  budesonide 160 MICROgram(s)/formoterol 4.5 MICROgram(s) Inhaler 2 Puff(s) Inhalation two times a day  chlorhexidine 2% Cloths 1 Application(s) Topical <User Schedule>  heparin  Infusion. 1200 Unit(s)/Hr (12 mL/Hr) IV Continuous <Continuous>  hydrocortisone sodium succinate Injectable 50 milliGRAM(s) IV Push every 6 hours  influenza   Vaccine 0.5 milliLiter(s) IntraMuscular once  milrinone Infusion 0.25 MICROgram(s)/kG/Min (11.2 mL/Hr) IV Continuous <Continuous>  montelukast 10 milliGRAM(s) Oral daily  mupirocin 2% Ointment 1 Application(s) Both Nostrils two times a day  norepinephrine Infusion 0.05 MICROgram(s)/kG/Min (6.98 mL/Hr) IV Continuous <Continuous>  PARoxetine 20 milliGRAM(s) Oral daily  piperacillin/tazobactam IVPB.. 3.375 Gram(s) IV Intermittent every 12 hours    MEDICATIONS  (PRN):  albuterol/ipratropium for Nebulization 3 milliLiter(s) Nebulizer every 6 hours PRN Shortness of Breath and/or Wheezing  aluminum hydroxide/magnesium hydroxide/simethicone Suspension 30 milliLiter(s) Oral every 4 hours PRN Dyspepsia  melatonin 3 milliGRAM(s) Oral at bedtime PRN Insomnia  ondansetron Injectable 4 milliGRAM(s) IV Push every 8 hours PRN Nausea and/or Vomiting  oxyCODONE    IR 5 milliGRAM(s) Oral every 6 hours PRN Severe Pain (7 - 10)  oxyCODONE    IR 2.5 milliGRAM(s) Oral every 6 hours PRN Moderate Pain (4 - 6)    iodine (Hives)  ceftriaxone (Hives)  shellfish (Hives)    T(C): 36.6 (10-23-23 @ 08:00), Max: 37.2 (10-22-23 @ 16:15)  HR: 120 (10-23-23 @ 09:06) (100 - 156)  BP: 104/58 (10-23-23 @ 09:00) (78/53 - 131/93)  RR: 23 (10-23-23 @ 09:00) (15 - 28)  SpO2: 95% (10-23-23 @ 09:06) (85% - 100%)    I&O's Summary    22 Oct 2023 07:01  -  23 Oct 2023 07:00  --------------------------------------------------------  IN: 1275.4 mL / OUT: 2580 mL / NET: -1304.6 mL    23 Oct 2023 07:01  -  23 Oct 2023 09:44  --------------------------------------------------------  IN: 175.4 mL / OUT: 650 mL / NET: -474.6 mL      PHYSICAL EXAM:  Constitutional: Obese, increase work of breathing. On pressures and inotrope support. On HFNC, NO   Neuro: Awake alert,   HEENT: anicteric sclerae  CV: regular rate, regular rhythm, tachycardia   Pulm/chest: lung sounds diminished bilaterally, On HFNC, NO,  increase work of breathing.   Abd: Obese, soft, nontender, nondistended, +bowel sounds. No rigidity, rebound tenderness, or guarding  Ext: no edema  Skin: warm, no jaundice   Psych: calm, cooperative      LABS:  ABG - ( 23 Oct 2023 05:59 )  pH, Arterial: 7.270 pH, Blood: x     /  pCO2: 61    /  pO2: 110   / HCO3: 28    / Base Excess: 1.1   /  SaO2: 99.0                         11.3   10.44 )-----------( 217      ( 10-23 @ 08:04 )             37.4                10.8   10.30 )-----------( 209      ( 10-23 @ 02:05 )             35.7                10.7   11.10 )-----------( 219      ( 10-22 @ 21:45 )             36.2                10.4   11.55 )-----------( 205      ( 10-22 @ 16:00 )             35.7                10.5   11.84 )-----------( 219      ( 10-22 @ 07:35 )             36.9                11.2   13.28 )-----------( 152      ( 10-21 @ 21:00 )             39.0                11.4   13.20 )-----------( 205      ( 10-21 @ 06:48 )             41.0                10.4   7.74  )-----------( 189      ( 10-20 @ 16:51 )             36.7       10-23    133<L>  |  91<L>  |  67.3<H>  ----------------------------<  182<H>  4.6   |  25.0  |  2.44<H>    Ca    7.6<L>      23 Oct 2023 07:49  Phos  6.6     10-23  Mg     2.1     10-23    TPro  8.3  /  Alb  3.0<L>  /  TBili  2.8<H>  /  DBili  x   /  AST  2507<H>  /  ALT  2520<H>  /  AlkPhos  163<H>  10-23    LIVER FUNCTIONS - ( 23 Oct 2023 07:49 )  Alb: 3.0 g/dL / Pro: 8.3 g/dL / ALK PHOS: 163 U/L / ALT: 2520 U/L / AST: 2507 U/L / GGT: x           Amylase: 27 U/L (10-22-23 @ 16:00)    Lipase: 25 U/L (10-22-23 @ 16:00)    PT/INR - ( 23 Oct 2023 02:05 )   PT: 40.7 sec;   INR: 3.81 ratio         PTT - ( 23 Oct 2023 06:45 )  PTT:47.6 sec  Iron Total: 25 ug/dL *L* (10-22-23 @ 16:00)  % Saturation, Iron: 14 % (10-22-23 @ 16:00)  Iron - Total Binding Capacity.: 173 ug/dL *L* (10-22-23 @ 16:00)  Ferritin: 1474 ng/mL *H* (10-22-23 @ 16:00)      < from: US Abdomen Doppler (10.22.23 @ 23:07) >  HISTORY:   Elevated liver function test.    TECHNIQUE: Limited real-time transabdominal scanning right upper quadrant   was performed.    COMPARISON: No relevant studies available.    FINDINGS:  Limited examination due to body habitus.    Liver measures 22 cm.  No space-occupying disease seen.    Main portal vein patent. Normal hepatopedal flow. Right and left portal   veins patent.    Right, middle and left hepatic veins patent.    Patent common hepatic artery..    Trace free fluid right upper quadrant.    IMPRESSION:    Findings as above.    < end of copied text >

## 2023-10-23 NOTE — CONSULT NOTE ADULT - PROBLEM SELECTOR RECOMMENDATION 3
- Rate uncontrolled  - Being started on amiodarone infusion per MICU  - Can consider adding digoxin (renally dosed) and EP c/s for rhythm control  - Continue AC with heparin gtt - Rate uncontrolled  - Recommend EP c/s for ?DCCV. Should avoid amio given elevated LFTs  - Can consider adding digoxin (renally dosed)   - Continue AC with heparin gtt

## 2023-10-23 NOTE — PROGRESS NOTE ADULT - SUBJECTIVE AND OBJECTIVE BOX
Patient is a 38y old  Female who presents with a chief complaint of Acute on Chronic Hypoxic Respiratory Failure 2/2 CHF Exacerbation (23 Oct 2023 12:30)      BRIEF HOSPITAL COURSE:   38F PMH B/L DVT, recurrent PE on Eliquis, HFpEF, morbid obesity, asthma, chronic hyperkalemia who presented on 10/18/23 with weakness. Notably was being worked up as outpatient for possible SLE given facial rash, photophobia, and join pains. She was started on Prednisone 20mg by her PCP for approximately 1 mo and reportedly a rheumatologic work-up was sent. She notes worsening LE edema, dry cough, weight gain. She was initially admitted to medicine service for treatment of PNA, could not tolerate CT scan given inability to lay flat. During her stay developed progressive SARAH, HAGMA, transaminitis, started on bicarbonate infusion, then developed hypotension and so was upgraded on 10/22/23 for cardiogenic shock. Started on Milrinone and Levophed and empiric antibiotics, diuresis. PA catheter placed on 10/22/23 for closer hemodynamic monitoring.       PAST MEDICAL & SURGICAL HISTORY:  Pulmonary embolism      DVT, lower extremity      CHF (congestive heart failure)      Asthma      Anxiety      Severe obesity (BMI >= 40)      Hypertension      No significant past surgical history            Medications:  piperacillin/tazobactam IVPB.. 3.375 Gram(s) IV Intermittent every 12 hours    aMIOdarone Infusion 0.5 mG/Min IV Continuous <Continuous>  aMIOdarone Infusion 1 mG/Min IV Continuous <Continuous>  milrinone Infusion 0.25 MICROgram(s)/kG/Min IV Continuous <Continuous>  norepinephrine Infusion 0.05 MICROgram(s)/kG/Min IV Continuous <Continuous>    albuterol/ipratropium for Nebulization 3 milliLiter(s) Nebulizer every 6 hours PRN  budesonide 160 MICROgram(s)/formoterol 4.5 MICROgram(s) Inhaler 2 Puff(s) Inhalation two times a day  montelukast 10 milliGRAM(s) Oral daily    melatonin 3 milliGRAM(s) Oral at bedtime PRN  ondansetron Injectable 4 milliGRAM(s) IV Push every 8 hours PRN  oxyCODONE    IR 5 milliGRAM(s) Oral every 6 hours PRN  oxyCODONE    IR 2.5 milliGRAM(s) Oral every 6 hours PRN  PARoxetine 20 milliGRAM(s) Oral daily      heparin  Infusion. 1200 Unit(s)/Hr IV Continuous <Continuous>    aluminum hydroxide/magnesium hydroxide/simethicone Suspension 30 milliLiter(s) Oral every 4 hours PRN      hydrocortisone sodium succinate Injectable 50 milliGRAM(s) IV Push every 6 hours  vasopressin Infusion 0.04 Unit(s)/Min IV Continuous <Continuous>      influenza   Vaccine 0.5 milliLiter(s) IntraMuscular once    chlorhexidine 2% Cloths 1 Application(s) Topical <User Schedule>  mupirocin 2% Ointment 1 Application(s) Both Nostrils two times a day            ICU Vital Signs Last 24 Hrs  T(C): 35.8 (23 Oct 2023 12:00), Max: 37.2 (22 Oct 2023 16:15)  T(F): 96.4 (23 Oct 2023 12:00), Max: 99 (22 Oct 2023 16:15)  HR: 100 (23 Oct 2023 14:15) (94 - 156)  BP: 83/63 (23 Oct 2023 14:15) (78/53 - 131/93)  BP(mean): 71 (23 Oct 2023 14:15) (58 - 107)  ABP: --  ABP(mean): --  RR: 20 (23 Oct 2023 14:15) (15 - 28)  SpO2: 95% (23 Oct 2023 14:15) (88% - 100%)    O2 Parameters below as of 23 Oct 2023 12:17  Patient On (Oxygen Delivery Method): nasal cannula, high flow            ABG - ( 23 Oct 2023 05:59 )  pH, Arterial: 7.270 pH, Blood: x     /  pCO2: 61    /  pO2: 110   / HCO3: 28    / Base Excess: 1.1   /  SaO2: 99.0                I&O's Detail    22 Oct 2023 07:01  -  23 Oct 2023 07:00  --------------------------------------------------------  IN:    Bumetanide: 5 mL    Bumetanide: 17.5 mL    Furosemide: 50 mL    Heparin Infusion: 87 mL    IV PiggyBack: 150 mL    Milrinone: 183.3 mL    Norepinephrine: 182.6 mL    Sodium Bicarbonate: 600 mL  Total IN: 1275.4 mL    OUT:    Indwelling Catheter - Urethral (mL): 2580 mL  Total OUT: 2580 mL    Total NET: -1304.6 mL      23 Oct 2023 07:01  -  23 Oct 2023 14:37  --------------------------------------------------------  IN:    Amiodarone: 166.5 mL    Heparin Infusion: 127.7 mL    IV PiggyBack: 50 mL    Lactated Ringers Bolus: 500 mL    Milrinone: 95.2 mL    Norepinephrine: 68.4 mL    Oral Fluid: 60 mL    Vasopressin: 18 mL  Total IN: 1085.8 mL    OUT:    Indwelling Catheter - Urethral (mL): 1500 mL  Total OUT: 1500 mL    Total NET: -414.2 mL            LABS:                        11.3   10.44 )-----------( 217      ( 23 Oct 2023 08:04 )             37.4     10-23    133<L>  |  91<L>  |  67.3<H>  ----------------------------<  182<H>  4.6   |  25.0  |  2.44<H>    Ca    7.6<L>      23 Oct 2023 07:49  Phos  6.6     10-23  Mg     2.1     10-23    TPro  8.3  /  Alb  3.0<L>  /  TBili  2.8<H>  /  DBili  x   /  AST  2507<H>  /  ALT  2520<H>  /  AlkPhos  163<H>  10-23      CARDIAC MARKERS ( 22 Oct 2023 16:00 )  x     / 0.02 ng/mL / x     / x     / x      CARDIAC MARKERS ( 22 Oct 2023 07:35 )  x     / x     / 27 U/L / x     / x          CAPILLARY BLOOD GLUCOSE      POCT Blood Glucose.: 134 mg/dL (21 Oct 2023 23:04)    PT/INR - ( 23 Oct 2023 02:05 )   PT: 40.7 sec;   INR: 3.81 ratio         PTT - ( 23 Oct 2023 12:35 )  PTT:42.3 sec  Urinalysis Basic - ( 23 Oct 2023 07:49 )    Color: x / Appearance: x / SG: x / pH: x  Gluc: 182 mg/dL / Ketone: x  / Bili: x / Urobili: x   Blood: x / Protein: x / Nitrite: x   Leuk Esterase: x / RBC: x / WBC x   Sq Epi: x / Non Sq Epi: x / Bacteria: x      CULTURES:  Rapid RVP Result: NotDetec (10-22 @ 16:18)  Rapid RVP Result: NotDetec (10-18 @ 12:05)  Culture Results:   No growth at 4 days (10-18 @ 11:30)  Culture Results:   No growth at 4 days (10-18 @ 11:28)      Physical Examination:  GENERAL: In NAD   HEENT: NC/AT  NECK: Supple, trachea midline  PULM: ***  CVS: +S1, S2, RRR  ABD: Soft, non-tender  EXTREMITIES: No pedal edema B/L  SKIN: No open wounds  NEURO: Grossly non-focal    DEVICES:     RADIOLOGY: ***   Patient is a 38y old  Female who presents with a chief complaint of Acute on Chronic Hypoxic Respiratory Failure 2/2 CHF Exacerbation (23 Oct 2023 12:30)      BRIEF HOSPITAL COURSE:   38F PMH B/L DVT, recurrent PE on Eliquis, HFpEF, morbid obesity, asthma, chronic hyperkalemia who presented on 10/18/23 with weakness. Notably was being worked up as outpatient for possible SLE given facial rash, photophobia, and join pains. She was started on Prednisone 20mg by her PCP for approximately 1 mo and reportedly a rheumatologic work-up was sent. She notes worsening LE edema, dry cough, weight gain. She was initially admitted to medicine service for treatment of PNA, could not tolerate CT scan given inability to lay flat. During her stay developed progressive SARAH, HAGMA, transaminitis, started on bicarbonate infusion, then developed hypotension and so was upgraded on 10/22/23 for cardiogenic shock. Started on Milrinone and Levophed and empiric antibiotics, diuresis. PA catheter placed on 10/22/23 for closer hemodynamic monitoring.       PAST MEDICAL & SURGICAL HISTORY:  Pulmonary embolism      DVT, lower extremity      CHF (congestive heart failure)      Asthma      Anxiety      Severe obesity (BMI >= 40)      Hypertension      No significant past surgical history            Medications:  piperacillin/tazobactam IVPB.. 3.375 Gram(s) IV Intermittent every 12 hours    aMIOdarone Infusion 0.5 mG/Min IV Continuous <Continuous>  aMIOdarone Infusion 1 mG/Min IV Continuous <Continuous>  milrinone Infusion 0.25 MICROgram(s)/kG/Min IV Continuous <Continuous>  norepinephrine Infusion 0.05 MICROgram(s)/kG/Min IV Continuous <Continuous>    albuterol/ipratropium for Nebulization 3 milliLiter(s) Nebulizer every 6 hours PRN  budesonide 160 MICROgram(s)/formoterol 4.5 MICROgram(s) Inhaler 2 Puff(s) Inhalation two times a day  montelukast 10 milliGRAM(s) Oral daily    melatonin 3 milliGRAM(s) Oral at bedtime PRN  ondansetron Injectable 4 milliGRAM(s) IV Push every 8 hours PRN  oxyCODONE    IR 5 milliGRAM(s) Oral every 6 hours PRN  oxyCODONE    IR 2.5 milliGRAM(s) Oral every 6 hours PRN  PARoxetine 20 milliGRAM(s) Oral daily      heparin  Infusion. 1200 Unit(s)/Hr IV Continuous <Continuous>    aluminum hydroxide/magnesium hydroxide/simethicone Suspension 30 milliLiter(s) Oral every 4 hours PRN      hydrocortisone sodium succinate Injectable 50 milliGRAM(s) IV Push every 6 hours  vasopressin Infusion 0.04 Unit(s)/Min IV Continuous <Continuous>      influenza   Vaccine 0.5 milliLiter(s) IntraMuscular once    chlorhexidine 2% Cloths 1 Application(s) Topical <User Schedule>  mupirocin 2% Ointment 1 Application(s) Both Nostrils two times a day            ICU Vital Signs Last 24 Hrs  T(C): 35.8 (23 Oct 2023 12:00), Max: 37.2 (22 Oct 2023 16:15)  T(F): 96.4 (23 Oct 2023 12:00), Max: 99 (22 Oct 2023 16:15)  HR: 100 (23 Oct 2023 14:15) (94 - 156)  BP: 83/63 (23 Oct 2023 14:15) (78/53 - 131/93)  BP(mean): 71 (23 Oct 2023 14:15) (58 - 107)  ABP: --  ABP(mean): --  RR: 20 (23 Oct 2023 14:15) (15 - 28)  SpO2: 95% (23 Oct 2023 14:15) (88% - 100%)    O2 Parameters below as of 23 Oct 2023 12:17  Patient On (Oxygen Delivery Method): nasal cannula, high flow            ABG - ( 23 Oct 2023 05:59 )  pH, Arterial: 7.270 pH, Blood: x     /  pCO2: 61    /  pO2: 110   / HCO3: 28    / Base Excess: 1.1   /  SaO2: 99.0                I&O's Detail    22 Oct 2023 07:01  -  23 Oct 2023 07:00  --------------------------------------------------------  IN:    Bumetanide: 5 mL    Bumetanide: 17.5 mL    Furosemide: 50 mL    Heparin Infusion: 87 mL    IV PiggyBack: 150 mL    Milrinone: 183.3 mL    Norepinephrine: 182.6 mL    Sodium Bicarbonate: 600 mL  Total IN: 1275.4 mL    OUT:    Indwelling Catheter - Urethral (mL): 2580 mL  Total OUT: 2580 mL    Total NET: -1304.6 mL      23 Oct 2023 07:01  -  23 Oct 2023 14:37  --------------------------------------------------------  IN:    Amiodarone: 166.5 mL    Heparin Infusion: 127.7 mL    IV PiggyBack: 50 mL    Lactated Ringers Bolus: 500 mL    Milrinone: 95.2 mL    Norepinephrine: 68.4 mL    Oral Fluid: 60 mL    Vasopressin: 18 mL  Total IN: 1085.8 mL    OUT:    Indwelling Catheter - Urethral (mL): 1500 mL  Total OUT: 1500 mL    Total NET: -414.2 mL            LABS:                        11.3   10.44 )-----------( 217      ( 23 Oct 2023 08:04 )             37.4     10-23    133<L>  |  91<L>  |  67.3<H>  ----------------------------<  182<H>  4.6   |  25.0  |  2.44<H>    Ca    7.6<L>      23 Oct 2023 07:49  Phos  6.6     10-23  Mg     2.1     10-23    TPro  8.3  /  Alb  3.0<L>  /  TBili  2.8<H>  /  DBili  x   /  AST  2507<H>  /  ALT  2520<H>  /  AlkPhos  163<H>  10-23      CARDIAC MARKERS ( 22 Oct 2023 16:00 )  x     / 0.02 ng/mL / x     / x     / x      CARDIAC MARKERS ( 22 Oct 2023 07:35 )  x     / x     / 27 U/L / x     / x          CAPILLARY BLOOD GLUCOSE      POCT Blood Glucose.: 134 mg/dL (21 Oct 2023 23:04)    PT/INR - ( 23 Oct 2023 02:05 )   PT: 40.7 sec;   INR: 3.81 ratio         PTT - ( 23 Oct 2023 12:35 )  PTT:42.3 sec  Urinalysis Basic - ( 23 Oct 2023 07:49 )    Color: x / Appearance: x / SG: x / pH: x  Gluc: 182 mg/dL / Ketone: x  / Bili: x / Urobili: x   Blood: x / Protein: x / Nitrite: x   Leuk Esterase: x / RBC: x / WBC x   Sq Epi: x / Non Sq Epi: x / Bacteria: x      CULTURES:  Rapid RVP Result: NotDetec (10-22 @ 16:18)  Rapid RVP Result: NotDetec (10-18 @ 12:05)  Culture Results:   No growth at 4 days (10-18 @ 11:30)  Culture Results:   No growth at 4 days (10-18 @ 11:28)      Physical Examination:  GENERAL: In NAD   HEENT: NC/AT  NECK: Supple, trachea midline  PULM: Coarse breath sounds anteriorly  CVS: +S1, S2  ABD: Soft, non-tender      DEVICES:     RADIOLOGY:   < from: Xray Chest 1 View- PORTABLE-Routine (Xray Chest 1 View- PORTABLE-Routine in AM.) (10.23.23 @ 06:24) >    ACC: [id number] EXAM:  XR CHEST PORTABLE ROUTINE 1V   ORDERED BY: RAI LUQUE     ACC: [id number] EXAM:  XR CHEST PORTABLE URGENT 1V   ORDERED BY: CARLOS PAGAN     ACC: [id number] EXAM:  XR CHEST PORTABLE URGENT 1V   ORDERED BY: NAHID MCMULLEN     PROCEDURE DATE:  10/22/2023          INTERPRETATION:  Chest one view 10/22/2023 3:49 PM    HISTORY: Line placement    COMPARISON STUDY: Earlier the same afternoon    Frontal expiratory view of the chest shows the heart to be enlarged in   size. Right jugular Waves-Renny catheter reaches the pulmonary artery   bifurcation. The lungs show less pulmonary congestion with similar left   effusion and there is no evidence of pneumothorax nor right pleural   effusion.    Chest one view 10/22/2023 6:32 PM  Compared to the prior study, Waves-Renny catheter coils in the right atrium.    Chest one view 10/23/2023 5:27 AM  Compared to the prior study, Waves-Renny catheter enters the right   ventricle and then coils back in the right atrium. Pulmonary congestion   has increased.    IMPRESSION:  Waves catheter as noted with pulmonary congestion.        Thank you for the courtesy of this referral.    --- End of Report ---            CHARLIE KING MD; Attending Interventional Radiologist  This document has been electronically signed. Oct 23 2023  9:40AM    < end of copied text >        < from: US Duplex Venous Lower Ext Complete, Bilateral (10.22.23 @ 23:15) >    ACC: [id number] EXAM:  US DPLX LWR EXT VEINS COMPL BI   ORDERED BY: FABIAN PERES     PROCEDURE DATE:  10/22/2023          INTERPRETATION:  CLINICAL INFORMATION: Diastolic heart failure. Evaluate   for deep venous thrombosis.    COMPARISON: July 20, 2023    TECHNIQUE: Duplex sonography of the BILATERAL LOWER extremity veins with   color and spectral Doppler, with and without compression.    FINDINGS:    RIGHT:  Normal compressibility of the RIGHT common femoral, femoral and popliteal   veins.  Doppler examination shows normal spontaneous and phasic flow.  No RIGHT calf vein thrombosis is detected where visualized. Peroneal vein   is not seen.    LEFT:  Normal compressibility of the LEFT common femoral, femoral and popliteal   veins.  Doppler examination shows normal spontaneous and phasic flow.  No LEFT calf vein thrombosis is detected.    IMPRESSION:  No evidence of deep venous thrombosis in either lower extremity where   visualized.        --- End of Report ---            RAI LYN; Attending Radiologist  This document has been electronically signed. Oct 22 2023 11:27PM    < end of copied text >        < from: TTE Echo Complete w/ Contrast w/ Doppler (10.22.23 @ 16:31) >  Summary:   1. Technically difficult study.   2. Endocardial visualization was enhanced with intravenous echo contrast.   3. Right ventricular volume and pressure overload.   4. Severely enlarged right ventricle.   5. Severely reduced RV systolic function.   6. Compared with TTE dated 7/20/23 the RV continues to be severely   dilated and dysfunctional.   7. LV cavity was not visualized despite the use of the echo contrast.    MD Ella Electronically signed on 10/22/2023 at 5:57:47 PM        *** Final ***    < end of copied text >

## 2023-10-23 NOTE — PROGRESS NOTE ADULT - ASSESSMENT
39yo female with pmh of BMI > 40, CHF-diastolic, B/L LE DVT + PE on Eliquis, HTN, Asthma,eczema and Anxiety presents to the ED w/ 1 week of worsening SOB on Exertion. Pt notes that this morning she was saturating at 79% on 2LPM (home ox dose). Noted that on exertion her oxygen dropping to low 80s on Room air days prior to admission. Last in hospital for fluid overload in July, since then is compliant with her medication and only added on daily 10mg steroids from outpatient physician. Being worked up for SLE outpatient by PCP, results from 1 week prior showing low C3/4 complement and elevated CRP. Seen at bedside, in NAD, endorsing resolution of SOB at rest, pleasant, denies CP, Abd pain, NVD, Lethargy, fevers. (18 Oct 2023 16:00)    Cardiogenic shock  Adrenal insufficiency  Transaminitis-shock liver  SARAH  Vancomycin toxicity  Acute on chronic hypoxic respiratory failure- CHF exacerbation and possible underlying pneumonia  RT LE cellulitis vs hematoma  r/o SLE  Ceftriaxone allergy-hives    - vanco stopped due to elevated levels and SARAH, last level 14  - c/w zosyn, plan for 7 days  - f/u strep pneumo, mycoplasma  - legionella (-)  - bcx (-)  - RVP (-)   - pt reports tolerated amoxicillin in the past  - USG right upper thigh/gluteus(-) abscess.  suspect hematoma from her injury  - Trend Fever  - Trend WBC-on steroids stress dose, was on po steroids at home  - trend LFT  - prognosis guarded    Will Follow

## 2023-10-23 NOTE — CONSULT NOTE ADULT - PROBLEM SELECTOR RECOMMENDATION 9
- Clinically euvolemic w/ lukewarm extremities.   - Inotrope: would wean milrinone to 0.125 mcg/kg/min and repeat MVO2, CMP, and lactate 2 hours later.  - Wean levo for MAP >65  - Monitor perfusion markers every 4 hrs (lactate, MVO2 from distal port of PA catheter, LFTs)  - Repeat CXR since swan was adjusted  - Diuretics: Continue PRN diuretics to keep net even  - Strict I&Os  - Hemodynamic goals: MAP 65, CVP 8-10, MVO2 >65, PCWP 12, CI >2.2, Lactate <2.2  - No advanced therapy options due to BMI of 60. Would not be a candidate for tMCS since he has no exit strategy. - Clinically euvolemic w/ lukewarm extremities.   - Inotrope: would wean milrinone to 0.125 mcg/kg/min and repeat MVO2, CMP, and lactate 2 hours later.  - Wean levo for MAP >65  - Monitor perfusion markers every 6 hrs (lactate, MVO2 from distal port of PA catheter, LFTs)  - Repeat CXR since swan was adjusted  - Diuretics: Continue PRN diuretics to keep net even  - Strict I&Os  - Hemodynamic goals: MAP 65, CVP 8-10, MVO2 >65, PCWP 12, CI >2.2, Lactate <2.2  - No advanced therapy options due to BMI of 60. Would not be a candidate for tMCS since he has no exit strategy.

## 2023-10-23 NOTE — PROGRESS NOTE ADULT - SUBJECTIVE AND OBJECTIVE BOX
[facility] - [facility], THE HEART CENTER                                   [address] [place] [address]                                                      PHONE: [phone]                                                         FAX: [phone]  [email or web address]  ---------------------------------------------------------------------------------------------------------------------------------    Overnight events/patient complaints: events noted, still in Aflutter with RVR, on IV milrinone and levo with IV heparin      iodine (Hives)  ceftriaxone (Hives)  shellfish (Hives)    MEDICATIONS  (STANDING):  budesonide 160 MICROgram(s)/formoterol 4.5 MICROgram(s) Inhaler 2 Puff(s) Inhalation two times a day  chlorhexidine 2% Cloths 1 Application(s) Topical <User Schedule>  heparin  Infusion. 1200 Unit(s)/Hr (12 mL/Hr) IV Continuous <Continuous>  hydrocortisone sodium succinate Injectable 50 milliGRAM(s) IV Push every 6 hours  influenza   Vaccine 0.5 milliLiter(s) IntraMuscular once  milrinone Infusion 0.25 MICROgram(s)/kG/Min (11.2 mL/Hr) IV Continuous <Continuous>  montelukast 10 milliGRAM(s) Oral daily  mupirocin 2% Ointment 1 Application(s) Both Nostrils two times a day  norepinephrine Infusion 0.05 MICROgram(s)/kG/Min (6.98 mL/Hr) IV Continuous <Continuous>  PARoxetine 20 milliGRAM(s) Oral daily  piperacillin/tazobactam IVPB.. 3.375 Gram(s) IV Intermittent every 12 hours    MEDICATIONS  (PRN):  albuterol/ipratropium for Nebulization 3 milliLiter(s) Nebulizer every 6 hours PRN Shortness of Breath and/or Wheezing  aluminum hydroxide/magnesium hydroxide/simethicone Suspension 30 milliLiter(s) Oral every 4 hours PRN Dyspepsia  melatonin 3 milliGRAM(s) Oral at bedtime PRN Insomnia  ondansetron Injectable 4 milliGRAM(s) IV Push every 8 hours PRN Nausea and/or Vomiting  oxyCODONE    IR 2.5 milliGRAM(s) Oral every 6 hours PRN Moderate Pain (4 - 6)  oxyCODONE    IR 5 milliGRAM(s) Oral every 6 hours PRN Severe Pain (7 - 10)      Vital Signs Last 24 Hrs  T(C): 36.6 (23 Oct 2023 08:00), Max: 37.2 (22 Oct 2023 16:15)  T(F): 97.9 (23 Oct 2023 08:00), Max: 99 (22 Oct 2023 16:15)  HR: 120 (23 Oct 2023 09:06) (100 - 156)  BP: 104/58 (23 Oct 2023 09:00) (78/53 - 131/93)  BP(mean): 69 (23 Oct 2023 09:00) (58 - 107)  RR: 23 (23 Oct 2023 09:00) (15 - 28)  SpO2: 95% (23 Oct 2023 09:06) (85% - 100%)    Parameters below as of 23 Oct 2023 09:06  Patient On (Oxygen Delivery Method): nasal cannula, high flow      Daily     Daily Weight in k.9 (23 Oct 2023 03:16)  ICU Vital Signs Last 24 Hrs  MITCHEL GUNN  I&O's Detail    22 Oct 2023 07:01  -  23 Oct 2023 07:00  --------------------------------------------------------  IN:    Bumetanide: 5 mL    Bumetanide: 17.5 mL    Furosemide: 50 mL    Heparin Infusion: 87 mL    IV PiggyBack: 150 mL    Milrinone: 183.3 mL    Norepinephrine: 182.6 mL    Sodium Bicarbonate: 600 mL  Total IN: 1275.4 mL    OUT:    Indwelling Catheter - Urethral (mL): 2580 mL  Total OUT: 2580 mL    Total NET: -1304.6 mL      23 Oct 2023 07:01  -  23 Oct 2023 09:36  --------------------------------------------------------  IN:    Heparin Infusion: 45 mL    IV PiggyBack: 50 mL    Milrinone: 35.7 mL    Norepinephrine: 44.7 mL  Total IN: 175.4 mL    OUT:    Indwelling Catheter - Urethral (mL): 650 mL  Total OUT: 650 mL    Total NET: -474.6 mL        I&O's Summary    22 Oct 2023 07:01  -  23 Oct 2023 07:00  --------------------------------------------------------  IN: 1275.4 mL / OUT: 2580 mL / NET: -1304.6 mL    23 Oct 2023 07:01  -  23 Oct 2023 09:36  --------------------------------------------------------  IN: 175.4 mL / OUT: 650 mL / NET: -474.6 mL      Drug Dosing Weight  MITCHEL VELIA      PHYSICAL EXAM:  General: Appears alert and cooperative.  HEENT: Head; normocephalic, atraumatic no JVD, dry mucosa  Eyes: Pupils reactive, cornea wnl.  Neck: Supple, no nodes adenopathy, no NVD or carotid bruit or thyromegaly.  CARDIOVASCULAR: Normal S1 and S2, No murmur, rub, gallop or lift.   LUNGS: No rales, rhonchi or wheeze. Normal breath sounds bilaterally.  ABDOMEN: Soft, nontender without mass or organomegaly. bowel sounds normoactive.  EXTREMITIES: No clubbing, cyanosis or edema. Distal pulses wnl.   SKIN: warm and dry with normal turgor.  NEURO: Alert/oriented x 3/normal motor exam. No pathologic reflexes.    PSYCH: normal affect.        LABS:                        11.3   10.44 )-----------( 217      ( 23 Oct 2023 08:04 )             37.4     10-23    133<L>  |  91<L>  |  67.3<H>  ----------------------------<  182<H>  4.6   |  25.0  |  2.44<H>    Ca    7.6<L>      23 Oct 2023 07:49  Phos  6.6     10-23  Mg     2.1     10-23    TPro  8.3  /  Alb  3.0<L>  /  TBili  2.8<H>  /  DBili  x   /  AST  2507<H>  /  ALT  2520<H>  /  AlkPhos  163<H>  10-23    MITCHEL GUNN  CARDIAC MARKERS ( 22 Oct 2023 16:00 )  x     / 0.02 ng/mL / x     / x     / x      CARDIAC MARKERS ( 22 Oct 2023 07:35 )  x     / x     / 27 U/L / x     / x          PT/INR - ( 23 Oct 2023 02:05 )   PT: 40.7 sec;   INR: 3.81 ratio         PTT - ( 23 Oct 2023 06:45 )  PTT:47.6 sec  Urinalysis Basic - ( 23 Oct 2023 07:49 )    Color: x / Appearance: x / SG: x / pH: x  Gluc: 182 mg/dL / Ketone: x  / Bili: x / Urobili: x   Blood: x / Protein: x / Nitrite: x   Leuk Esterase: x / RBC: x / WBC x   Sq Epi: x / Non Sq Epi: x / Bacteria: x        RADIOLOGY & ADDITIONAL STUDIES: < from: Xray Chest 1 View-PORTABLE IMMEDIATE (Xray Chest 1 View-PORTABLE IMMEDIATE .) (10.22.23 @ 12:29) >    ACC: [id number] EXAM:  XR CHEST PORTABLE IMMED 1V   ORDERED BY: FABIAN PERES     PROCEDURE DATE:  10/22/2023          INTERPRETATION:  CLINICAL HISTORY: hypoxia.    COMPARISON: Chest radiograph dated _10/ chest x-ray. CT abdomen   10/19/2023    TECHNIQUE: Portable frontal chest radiograph. Adequate positioning.    FINDINGS:    Support devices: None.    Cardiac/mediastinum/hilum: Heart size difficult to assess due to   opacified LEFT lower hemithorax. Heart Size enlarged in transverse   diameter following evaluation of 2023 chest CT.      Lung parenchyma/Pleura: Bilateral diffuse pulmonary vascular congestion.   Bilateral haziness overlying the hemithoraces may indicate pleural   effusions layering along posterior thoracic wall.   LEFT mid and lower zone airspace consolidation obscuring segments of   LEFT diaphragm contour.      Skeleton/soft tissues: No acute osseous deformity.      IMPRESSION: Constellation of findings concerning for congestive heart   failure. LEFT lower zone airspace consolidation.  Continued surveillance recommended.    --- End of Report ---          DIXON GHOSH DO; Resident Radiologist  This document has been electronically signed.  ADALBERTO DELACRUZ MD; Attending Radiologist  This document has been electronically signed. Oct 22 2023 12:44PM    < end of copied text >      INTERPRETATION OF TELEMETRY (personally reviewed):    ECG: < from: 12 Lead ECG (10.22.23 @ 16:28) >  Diagnosis Line *** Age and gender specific ECG analysis ***  Atrial flutter with variable A-V block  Low voltage QRS  inferolateral ischemia        Confirmed by DANIELA REES ([id number]) on 10/23/2023 12:09:47 AM    < end of copied text >      ECHO:< from: TTE Echo Complete w/ Contrast w/ Doppler (10.22.23 @ 16:31) >    ACC: [id number] EXAM:  ECHO TTE WITH CON COMP W DOPP                          PROCEDURE DATE:  10/22/2023          INTERPRETATION:  TRANSTHORACIC ECHOCARDIOGRAM REPORT        Patient Name:   MITCHEL GUNN Patient Location:  [facility] Rec #:  [id number]     Accession #:      [id number]  Account #:      [id number]       Height:           62.2 in 158.0 cm  YOB: 1985      Weight:           330.7 lb 150.00 kg  Patient Age:    38 years       BSA:              2.37 m²  Patient Gender: F       BP:               84/52 mmHg      Date of Exam:        10/22/2023 4:31:24 PM  Sonographer:         Yeimi Matias  Referring Physician: Arleen Elaine MD    Procedure:     Follow up or Limited Echocardiogram.  Indications:   R06.02 - Shortness of breath  Diagnosis:     R06.02 - Shortness of breath  Study Details: Technically difficult study. Study quality was adversely   affected                 due to patient obesity and body habitus.    SPECTRAL DOPPLER ANALYSIS (where applicable):  Tricuspid Valve and PA/RV Systolic Pressure: TR Max Velocity: 2.09 m/s RA   Pressure: 15 mmHg RVSP/PASP: 32.5 mmHg      PHYSICIAN INTERPRETATION:  Left Ventricle: Endocardial visualization was enhanced with intravenous   echo contrast. The left ventricle was not well visualized.  The interventricular septum is flattened in systole and diastole,   consistent with right ventricular pressure and volume overload.  Right Ventricle: The right ventricular size is severely enlarged. RV   systolic functionis severely reduced.  Left Atrium: The left atrium was not well visualized.  Right Atrium: The right atrium was not well visualized.  Pericardium: The pericardium was not well visualized.  Mitral Valve: The mitral valve is not well seen.  Tricuspid Valve: The tricuspid valve is not well seen.  Aortic Valve: The aortic valve was not well visualized.  Pulmonic Valve: The pulmonic valve was not well visualized.  Aorta: The aorta was not well visualized.  Pulmonary Artery: The pulmonary artery is notwell seen.  Venous: The inferior vena cava was dilated, with respiratory size   variation less than 50%, consistent with elevated right atrial pressure.  Additional Comments: A pacer wire is visualized in the right atrium and   right ventricle.  In comparison to the previous echocardiogram(s): Prior examinations are   available and were reviewed for comparison purposes. Compared with TTE   dated 23 the RV continues to be severely dilated and dysfunctional.      Summary:   1. Technically difficult study.   2. Endocardial visualization was enhanced with intravenous echo contrast.   3. Right ventricular volume and pressure overload.   4. Severely enlarged right ventricle.   5. Severely reduced RV systolic function.   6. Compared with TTE dated 23 the RV continues to be severely   dilated and dysfunctional.   7. LV cavity was not visualized despite the use of the echo contrast.    MD Ella Electronically signed on 10/22/2023 at 5:57:47 PM        *** Final ***    < end of copied text >     < from: TTE Echo Complete w/ Contrast w/ Doppler (23 @ 11:36) >    Summary:   1. Technically difficult study.   2. Left ventricular ejection fraction, by visual estimation, is 60 to   65%.   3. Normal global left ventricular systolic function.   4. Right ventricular volume and pressure overload.   5. Severely enlarged right ventricle.   6. Moderately reduced RV systolic function.  7. Severely enlarged right atrium.   8. Mild mitral valve regurgitation.   9. Mild-moderate tricuspid regurgitation.  10. Mild pulmonic valve regurgitation.  11. Estimated pulmonary artery systolic pressure is 45.5 mmHg assuming a   right atrial pressure of 3 mmHg, which is consistent with mild pulmonary   hypertension.  12. Small pericardial effusion.    MD Ella Electronically signed on 2023 at 1:39:21 PM        < end of copied text >

## 2023-10-23 NOTE — CONSULT NOTE ADULT - SUBJECTIVE AND OBJECTIVE BOX
[facility], THE HEART CENTER                                   [address] [place] [address]                                                      PHONE: [phone]                                                         FAX: [phone]  [email or web address]  ---------------------------------------------------------------------------------------------------------------------------------    Reason for Consult:    HPI:  MITCHEL GUNN is an 38y Female with history morbid obesity, longstanding R heart failure (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  IV amiodarone started this afternoon with some improvement in heart rate.  No palpitaitons, denies CP, SOB.  On IV heparin, was on oral Eliquis at home    PAST MEDICAL & SURGICAL HISTORY:  Pulmonary embolism      DVT, lower extremity      CHF (congestive heart failure)      Asthma      Anxiety      Severe obesity (BMI >= 40)      Hypertension      No significant past surgical history          iodine (Hives)  ceftriaxone (Hives)  shellfish (Hives)      MEDICATIONS  (STANDING):  aMIOdarone Infusion 0.5 mG/Min (16.7 mL/Hr) IV Continuous <Continuous>  aMIOdarone Infusion 1 mG/Min (33.3 mL/Hr) IV Continuous <Continuous>  budesonide 160 MICROgram(s)/formoterol 4.5 MICROgram(s) Inhaler 2 Puff(s) Inhalation two times a day  chlorhexidine 2% Cloths 1 Application(s) Topical <User Schedule>  heparin  Infusion. 1200 Unit(s)/Hr (12 mL/Hr) IV Continuous <Continuous>  hydrocortisone sodium succinate Injectable 50 milliGRAM(s) IV Push every 6 hours  influenza   Vaccine 0.5 milliLiter(s) IntraMuscular once  milrinone Infusion 0.25 MICROgram(s)/kG/Min (11.2 mL/Hr) IV Continuous <Continuous>  montelukast 10 milliGRAM(s) Oral daily  mupirocin 2% Ointment 1 Application(s) Both Nostrils two times a day  norepinephrine Infusion 0.05 MICROgram(s)/kG/Min (6.98 mL/Hr) IV Continuous <Continuous>  PARoxetine 20 milliGRAM(s) Oral daily  piperacillin/tazobactam IVPB.. 3.375 Gram(s) IV Intermittent every 12 hours  vasopressin Infusion 0.04 Unit(s)/Min (6 mL/Hr) IV Continuous <Continuous>    MEDICATIONS  (PRN):  albuterol/ipratropium for Nebulization 3 milliLiter(s) Nebulizer every 6 hours PRN Shortness of Breath and/or Wheezing  aluminum hydroxide/magnesium hydroxide/simethicone Suspension 30 milliLiter(s) Oral every 4 hours PRN Dyspepsia  melatonin 3 milliGRAM(s) Oral at bedtime PRN Insomnia  ondansetron Injectable 4 milliGRAM(s) IV Push every 8 hours PRN Nausea and/or Vomiting  oxyCODONE    IR 2.5 milliGRAM(s) Oral every 6 hours PRN Moderate Pain (4 - 6)  oxyCODONE    IR 5 milliGRAM(s) Oral every 6 hours PRN Severe Pain (7 - 10)      Social History:  Cigarettes:    denies                Alchohol:                 Illicit Drug Abuse:      ROS: Negative other than as mentioned in HPI.    Vital Signs Last 24 Hrs  T(C): 35.8 (23 Oct 2023 12:00), Max: 37.2 (22 Oct 2023 16:15)  T(F): 96.4 (23 Oct 2023 12:00), Max: 99 (22 Oct 2023 16:15)  HR: 100 (23 Oct 2023 14:30) (94 - 156)  BP: 98/74 (23 Oct 2023 14:30) (78/53 - 131/93)  BP(mean): 83 (23 Oct 2023 14:30) (58 - 107)  RR: 23 (23 Oct 2023 14:30) (15 - 28)  SpO2: 96% (23 Oct 2023 14:30) (88% - 100%)    Parameters below as of 23 Oct 2023 12:17  Patient On (Oxygen Delivery Method): nasal cannula, high flow      ICU Vital Signs Last 24 Hrs  MITCHEL GUNN  I&O's Detail    22 Oct 2023 07:01  -  23 Oct 2023 07:00  --------------------------------------------------------  IN:    Bumetanide: 5 mL    Bumetanide: 17.5 mL    Furosemide: 50 mL    Heparin Infusion: 87 mL    IV PiggyBack: 150 mL    Milrinone: 183.3 mL    Norepinephrine: 182.6 mL    Sodium Bicarbonate: 600 mL  Total IN: 1275.4 mL    OUT:    Indwelling Catheter - Urethral (mL): 2580 mL  Total OUT: 2580 mL    Total NET: -1304.6 mL      23 Oct 2023 07:01  -  23 Oct 2023 15:26  --------------------------------------------------------  IN:    Amiodarone: 166.5 mL    Heparin Infusion: 127.7 mL    IV PiggyBack: 50 mL    Lactated Ringers Bolus: 500 mL    Milrinone: 95.2 mL    Norepinephrine: 68.4 mL    Oral Fluid: 60 mL    Vasopressin: 18 mL  Total IN: 1085.8 mL    OUT:    Indwelling Catheter - Urethral (mL): 1500 mL  Total OUT: 1500 mL    Total NET: -414.2 mL        I&O's Summary    22 Oct 2023 07:01  -  23 Oct 2023 07:00  --------------------------------------------------------  IN: 1275.4 mL / OUT: 2580 mL / NET: -1304.6 mL    23 Oct 2023 07:01  -  23 Oct 2023 15:26  --------------------------------------------------------  IN: 1085.8 mL / OUT: 1500 mL / NET: -414.2 mL      Drug Dosing Weight  MITCHEL GUNN      PHYSICAL EXAM:  General:  alert and cooperative.  HEENT: Head; normocephalic, atraumatic. JVP not elevated  Eyes: Pupils reactive, cornea wnl.  Neck: Supple, no nodes adenopathy, no NVD or carotid bruit or thyromegaly.  CARDIOVASCULAR: irregularly irregular, tachycardic2, No murmur, rub, gallop or lift.   LUNGS: No rales, rhonchi or wheeze. Normal breath sounds bilaterally.  ABDOMEN: Soft, nontender without mass or organomegaly. bowel sounds normoactive.  EXTREMITIES: No clubbing, cyanosis or edema. Distal pulses wnl.  Warm.  SKIN: warm and dry with normal turgor.  NEURO: Alert/oriented x 3/normal motor exam. No pathologic reflexes.    PSYCH: normal affect.        LABS:                        11.3   10.44 )-----------( 217      ( 23 Oct 2023 08:04 )             37.4     10-23    133<L>  |  91<L>  |  67.3<H>  ----------------------------<  182<H>  4.6   |  25.0  |  2.44<H>    Ca    7.6<L>      23 Oct 2023 07:49  Phos  6.6     10-23  Mg     2.1     10-23    TPro  8.3  /  Alb  3.0<L>  /  TBili  2.8<H>  /  DBili  x   /  AST  2507<H>  /  ALT  2520<H>  /  AlkPhos  163<H>  10-23    MITCHEL GUNN  CARDIAC MARKERS ( 22 Oct 2023 16:00 )  x     / 0.02 ng/mL / x     / x     / x      CARDIAC MARKERS ( 22 Oct 2023 07:35 )  x     / x     / 27 U/L / x     / x          PT/INR - ( 23 Oct 2023 02:05 )   PT: 40.7 sec;   INR: 3.81 ratio         PTT - ( 23 Oct 2023 12:35 )  PTT:42.3 sec  Urinalysis Basic - ( 23 Oct 2023 07:49 )    Color: x / Appearance: x / SG: x / pH: x  Gluc: 182 mg/dL / Ketone: x  / Bili: x / Urobili: x   Blood: x / Protein: x / Nitrite: x   Leuk Esterase: x / RBC: x / WBC x   Sq Epi: x / Non Sq Epi: x / Bacteria: x        RADIOLOGY & ADDITIONAL STUDIES:    INTERPRETATION OF TELEMETRY (personally reviewed): atrial flutter, tachy    ECHO: 7/2023: normal LVEF, markedly enlarged LA/RA, enlarged RV, mod RV dysfunction, mild-mod TR, mild MR      Assessment and Plan:  Atrial flutter in setting of cardiogenic shock, advanced RV failure , on inotrope/pressor  Has been in and out of atrial flutter here  Agree with IV amiodarone for rate control/possible rhythm control in spite of transaminitis (which seem due to shock and is improving)  If transaminitis worsening after IV amio, will need to reconsider  DCCV without antiarrhythmic seems to be of little benefit as she has been in and out of flutter on her own, and remains on milrinone and pressors, which makes recurrence of flutter post-CV very likely.  Consider eventual DC cardioversion on amiodarone if she tolerates the med and does not convert on her own  Continue anticoagulation

## 2023-10-23 NOTE — CONSULT NOTE ADULT - NS ATTEND AMEND GEN_ALL_CORE FT
39 y/o female with h/o chronic HFpEF ACC/AHA stage C-D, RV dysfunction, morbid obesity BMI 60, prior DVT/PE on Eliquis, SLE (+KATHIE), HTN, asthma, restrictive lung disease on home O2 and anxiety who was admitted with decompensated HF. Her hospital course was complicated by cardiogenic shock, RV failure, pHTN, transaminitis, SARAH and lactic acidosis peak 4.1. A bedside PAC was remarkable for RA 12, PA 80/30s and thermos/Tg 1.7/2.2. She was started on milrinone gtt, levo as well as Teagan. The cardiogenic shock team was activated, and it was recommended to continue medical therapy. tMCS was deferred as she was deemed a suboptimal candidate for heart/lung tx due to morbid obesity.  A Le venous US ruled out DVT. It’s unclear if she’s had CTEPH work up in the past.   She remains in the MICU on HFNC/Tegaan, milrione gtt @ 0.250 mcg/kg/min, levo 0.08 mcg/kg/min and Teagan 12 ppm. Her telemetry is remarkable for Aflutter with RVR. Her bumex gtt was d/c’ed this morning. Bedside hemodynamic severe confirmed pre-capillary pHTN. Pt is alert and Ox3. Her end organ function is improving.   In summary, this is a 39 y/o female with h/o chronic HFpEF ACC/AHA stage C-D, RV dysfunction, morbid obesity BMI 60, prior DVT/PE on Eliquis, SLE (+KATHIE), HTN, asthma, restrictive lung disease on home O2 and anxiety p/w RV failure, cardiogenic shock, pHTN and ADHF. Her hemodyanmics confirmed pre-capillary HTN. She would benefit from vasodilator therapy and further work up including MICHAEL, CTEPH.   Recommendations:  - Continue hemodynamic monitoring via PAC  - Hemodynamic goals XX. Monitor perfusion labs daily.   - Inotropes/Vasopressors: milrinone gtt 0.250 mcg/kg/min, levophed 0.08mcg/kg/min. Consider weaning levophed  - Teagan @ 12PPM. Recommend to start sildenafil 10mg TID and uptitrate as BP allows. Might need IV prostacyclin (Veletri/Flolan) and CTEPH work up once stable.   - Diuretics: bumex gtt d/c’ed this am. Consider Bumex 1 mg IV daily to keep I/O balance even and CVP < 12  - AC: heparin gtt  - Recommend EP c/s aflutter with RVR. ?DAMASO/DCCV. May have to avoid amio due to elevated LFTs.   - Currently not a candidate for advanced therapies due to morbid obesity  - We will continue to follow with you

## 2023-10-23 NOTE — PROGRESS NOTE ADULT - PROBLEM SELECTOR PLAN 1
Elevated LFTs most likely due to hypoxic/ischemic hepatitis. Patient with morbid obesity, with diastolic heart failure with right-sided heart failure with mild pulmonary hypertension, severely reduced RV systolic function, severely dilated RA  US abd with no evidence of PVT. Showed patent portal circulation.   Liver chemistry essentially unchanged. SARAH improving    Hepatitis serology pending.   Trend liver enzymes, coags, avoid hepatoptotic agents  Further care pre ICU team

## 2023-10-23 NOTE — PROGRESS NOTE ADULT - SUBJECTIVE AND OBJECTIVE BOX
NEPHROLOGY INTERVAL HPI/OVERNIGHT EVENTS:    Interim noted   Pressors the same   UO improved to 1.5 L  Off Bumex drip   Vanco level today 14   Lactate and potassium better     MEDICATIONS  (STANDING):  aMIOdarone Infusion 1 mG/Min (33.3 mL/Hr) IV Continuous <Continuous>  aMIOdarone Infusion 0.5 mG/Min (16.7 mL/Hr) IV Continuous <Continuous>  budesonide 160 MICROgram(s)/formoterol 4.5 MICROgram(s) Inhaler 2 Puff(s) Inhalation two times a day  chlorhexidine 2% Cloths 1 Application(s) Topical <User Schedule>  heparin  Infusion. 1200 Unit(s)/Hr (12 mL/Hr) IV Continuous <Continuous>  hydrocortisone sodium succinate Injectable 50 milliGRAM(s) IV Push every 6 hours  influenza   Vaccine 0.5 milliLiter(s) IntraMuscular once  milrinone Infusion 0.25 MICROgram(s)/kG/Min (11.2 mL/Hr) IV Continuous <Continuous>  montelukast 10 milliGRAM(s) Oral daily  mupirocin 2% Ointment 1 Application(s) Both Nostrils two times a day  norepinephrine Infusion 0.05 MICROgram(s)/kG/Min (6.98 mL/Hr) IV Continuous <Continuous>  PARoxetine 20 milliGRAM(s) Oral daily  piperacillin/tazobactam IVPB.. 3.375 Gram(s) IV Intermittent every 12 hours  vasopressin Infusion 0.04 Unit(s)/Min (6 mL/Hr) IV Continuous <Continuous>    MEDICATIONS  (PRN):  albuterol/ipratropium for Nebulization 3 milliLiter(s) Nebulizer every 6 hours PRN Shortness of Breath and/or Wheezing  aluminum hydroxide/magnesium hydroxide/simethicone Suspension 30 milliLiter(s) Oral every 4 hours PRN Dyspepsia  melatonin 3 milliGRAM(s) Oral at bedtime PRN Insomnia  ondansetron Injectable 4 milliGRAM(s) IV Push every 8 hours PRN Nausea and/or Vomiting  oxyCODONE    IR 2.5 milliGRAM(s) Oral every 6 hours PRN Moderate Pain (4 - 6)  oxyCODONE    IR 5 milliGRAM(s) Oral every 6 hours PRN Severe Pain (7 - 10)      Allergies    iodine (Hives)  ceftriaxone (Hives)  shellfish (Hives)    Intolerances        Vital Signs Last 24 Hrs  T(C): 35.8 (23 Oct 2023 12:00), Max: 37.2 (22 Oct 2023 16:15)  T(F): 96.4 (23 Oct 2023 12:00), Max: 99 (22 Oct 2023 16:15)  HR: 100 (23 Oct 2023 14:30) (94 - 156)  BP: 98/74 (23 Oct 2023 14:30) (78/53 - 131/93)  BP(mean): 83 (23 Oct 2023 14:30) (58 - 107)  RR: 23 (23 Oct 2023 14:30) (15 - 28)  SpO2: 96% (23 Oct 2023 14:30) (88% - 100%)    Parameters below as of 23 Oct 2023 12:17  Patient On (Oxygen Delivery Method): nasal cannula, high flow      Daily     Daily Weight in k.9 (23 Oct 2023 03:16)  I&O's Detail    22 Oct 2023 07:01  -  23 Oct 2023 07:00  --------------------------------------------------------  IN:    Bumetanide: 5 mL    Bumetanide: 17.5 mL    Furosemide: 50 mL    Heparin Infusion: 87 mL    IV PiggyBack: 150 mL    Milrinone: 183.3 mL    Norepinephrine: 182.6 mL    Sodium Bicarbonate: 600 mL  Total IN: 1275.4 mL    OUT:    Indwelling Catheter - Urethral (mL): 2580 mL  Total OUT: 2580 mL    Total NET: -1304.6 mL      23 Oct 2023 07:01  -  23 Oct 2023 15:27  --------------------------------------------------------  IN:    Amiodarone: 166.5 mL    Heparin Infusion: 127.7 mL    IV PiggyBack: 50 mL    Lactated Ringers Bolus: 500 mL    Milrinone: 95.2 mL    Norepinephrine: 68.4 mL    Oral Fluid: 60 mL    Vasopressin: 18 mL  Total IN: 1085.8 mL    OUT:    Indwelling Catheter - Urethral (mL): 1500 mL  Total OUT: 1500 mL    Total NET: -414.2 mL        I&O's Summary    22 Oct 2023 07:  -  23 Oct 2023 07:00  --------------------------------------------------------  IN: 1275.4 mL / OUT: 2580 mL / NET: -1304.6 mL    23 Oct 2023 07:01  -  23 Oct 2023 15:27  --------------------------------------------------------  IN: 1085.8 mL / OUT: 1500 mL / NET: -414.2 mL        PHYSICAL EXAM:      GENERAL: NAD, alert   HEAD:  Nasal Can   NECK: Supple, No JVD, R neck cordice   NERVOUS SYSTEM:  Alert & Oriented X3  CHEST/LUNG: EAE , Some basilar crackles   HEART: Regular rate and rhythm; No murmurs, rubs, or gallops  ABDOMEN: Soft, Nontender, Nondistended; Bowel sounds present  EXTREMITIES:  decreased edema , no myositis RUE erythematous patches. Right posterior upper thigh/gluteus   LABS:                        11.3   10.44 )-----------( 217      ( 23 Oct 2023 08:04 )             37.4     10-23    133<L>  |  91<L>  |  67.3<H>  ----------------------------<  182<H>  4.6   |  25.0  |  2.44<H>    Ca    7.6<L>      23 Oct 2023 07:49  Phos  6.6     10-23  Mg     2.1     10-23    TPro  8.3  /  Alb  3.0<L>  /  TBili  2.8<H>  /  DBili  x   /  AST  2507<H>  /  ALT  2520<H>  /  AlkPhos  163<H>  10-23    PT/INR - ( 23 Oct 2023 02:05 )   PT: 40.7 sec;   INR: 3.81 ratio         PTT - ( 23 Oct 2023 12:35 )  PTT:42.3 sec  Urinalysis Basic - ( 23 Oct 2023 07:49 )    Color: x / Appearance: x / SG: x / pH: x  Gluc: 182 mg/dL / Ketone: x  / Bili: x / Urobili: x   Blood: x / Protein: x / Nitrite: x   Leuk Esterase: x / RBC: x / WBC x   Sq Epi: x / Non Sq Epi: x / Bacteria: x      Phosphorus: 6.6 mg/dL (10-23 @ 07:49)  Magnesium: 2.1 mg/dL (10-23 @ 07:49)  Magnesium: 2.1 mg/dL (10-23 @ 02:05)  Phosphorus: 6.9 mg/dL (10-23 @ 02:05)  Magnesium: 2.1 mg/dL (10-22 @ 21:45)  Phosphorus: 7.4 mg/dL (10-22 @ 21:45)  C3 Complement, Serum: 10 mg/dL (10-22 @ 18:10)  C4 Complement, Serum: 1 mg/dL (10-22 @ 18:10)  Magnesium: 2.2 mg/dL (10-22 @ 16:00)    ABG - ( 23 Oct 2023 05:59 )  pH, Arterial: 7.270 pH, Blood: x     /  pCO2: 61    /  pO2: 110   / HCO3: 28    / Base Excess: 1.1   /  SaO2: 99.0                  RADIOLOGY & ADDITIONAL TESTS:

## 2023-10-23 NOTE — PROGRESS NOTE ADULT - SUBJECTIVE AND OBJECTIVE BOX
[facility]                                                INFECTIOUS DISEASES  =======================================================                     Steven Evans#   Gallo Hart MD#   Terrell Jerome MD*                           Kiat Huang MD*   Corine Garcia MD*            Diplomates American Board of Internal Medicine & Infectious Diseases                  # [place] Office - Appt - Tel  [phone] Fax [phone]                * [place] Office - Appt - Tel [phone] Fax [phone]                                  Hospital Consult line:  [phone]  =======================================================      [id number]  MITCHEL GUNN       Interval f/u - pneumonia, rle cellulitis vs hematoma  pt developed SARAH, transaminitis and cardiogenic shock-transferred to MICU  currently on hiflo on milrinone, levo and vaso  on stress dose steroids  pt reports dyspnea improved  reports RLE pain has much improved  afebrile      I have personally reviewed the labs and data; pertinent labs and data are listed in this note; please see below.   =======================================================  Past Medical & Surgical Hx:  =====================  PAST MEDICAL & SURGICAL HISTORY:  Pulmonary embolism      DVT, lower extremity      CHF (congestive heart failure)      Asthma      Anxiety      Severe obesity (BMI >= 40)      Hypertension      No significant past surgical history        Problem List:  ==========  HEALTH ISSUES - PROBLEM Dx:  Pulmonary thromboembolism          Social Hx:  =======  no toxic habits currently    FAMILY HISTORY:  Family history of colon cancer (Mother)    Family history of stroke (Father)    no significant family history of immunosuppressive disorders in mother or father   =======================================================    REVIEW OF SYSTEMS:  CONSTITUTIONAL:  No Fever or chills  HEENT:  No diplopia or blurred vision.  No earache, sore throat or runny nose.  CARDIOVASCULAR:  No pressure, squeezing, strangling, tightness, heaviness or aching about the chest, neck, axilla or epigastrium.  RESPIRATORY:  + cough, shortness of breath  GASTROINTESTINAL:  No nausea, vomiting or diarrhea.  GENITOURINARY:  No dysuria, frequency or urgency. No Blood in urine  MUSCULOSKELETAL:  no joint aches, no muscle pain  SKIN:  No change in skin, hair or nails. rt thigh swelling and pain  NEUROLOGIC:  No Headaches, seizures or weakness.  PSYCHIATRIC:  No disorder of thought or mood.  ENDOCRINE:  No heat or cold intolerance  HEMATOLOGICAL:  No easy bruising or bleeding.    =======================================================  Allergies    iodine (Hives)  ceftriaxone (Hives)  shellfish (Hives)    Intolerances    Antibiotics:  azithromycin   Tablet 500 milliGRAM(s) Oral daily  piperacillin/tazobactam IVPB. 3.375 Gram(s) IV Intermittent once  piperacillin/tazobactam IVPB.- 3.375 Gram(s) IV Intermittent once  piperacillin/tazobactam IVPB.. 3.375 Gram(s) IV Intermittent every 8 hours    Other medications:  apixaban 2.5 milliGRAM(s) Oral every 12 hours  budesonide 160 MICROgram(s)/formoterol 4.5 MICROgram(s) Inhaler 2 Puff(s) Inhalation two times a day  furosemide   Injectable 40 milliGRAM(s) IV Push two times a day  influenza   Vaccine 0.5 milliLiter(s) IntraMuscular once  metoprolol tartrate 100 milliGRAM(s) Oral daily  montelukast 10 milliGRAM(s) Oral daily  PARoxetine 20 milliGRAM(s) Oral daily  predniSONE   Tablet 40 milliGRAM(s) Oral daily  spironolactone 50 milliGRAM(s) Oral daily     levoFLOXacin  Tablet   750 milliGRAM(s) Oral (10-18-23 @ 21:13)    vancomycin  IVPB.   250 mL/Hr IV Intermittent (10-18-23 @ 11:58)      ======================================================  Physical Exam:  ============  Vital Signs Last 24 Hrs  T(C): 35.8 (23 Oct 2023 12:00), Max: 36.6 (23 Oct 2023 07:00)  T(F): 96.4 (23 Oct 2023 12:00), Max: 97.9 (23 Oct 2023 07:00)  HR: 111 (23 Oct 2023 21:45) (94 - 156)  BP: 94/54 (23 Oct 2023 21:45) (78/53 - 131/93)  BP(mean): 66 (23 Oct 2023 21:45) (58 - 107)  RR: 24 (23 Oct 2023 21:45) (15 - 28)  SpO2: 93% (23 Oct 2023 21:45) (90% - 99%)    Parameters below as of 23 Oct 2023 20:53  Patient On (Oxygen Delivery Method): nasal cannula, high flow        General:  No acute distress. on o2 hiflo  Neck: Supple, No lymphadenopathy.  Respiratory: basal crackles to auscultation, Respirations are non-labored.  Cardiovascular: Normal rate, Regular rhythm, s1 + s2  Gastrointestinal: Soft, Non-tender, Non-distended, Normal bowel sounds.  Genitourinary: No costovertebral angle tenderness. +major  Integumentary: RUE erythematous patches. Right posterior upper thigh/gluteus with indurated skin and eccyhmoses with discoloration,  + blisters. no tenderness, no warmth. no fluctuance or crepitus. extends to posterior upper/lateral thigh. b/l Le varicose veins  Neurologic: Alert, Oriented, No focal deficits  Psychiatric: Appropriate mood & affect.    =======================================================  Labs:                                          10.0   8.17  )-----------( 211      ( 23 Oct 2023 21:40 )             33.9       10-23    133<L>  |  91<L>  |  67.3<H>  ----------------------------<  182<H>  4.6   |  25.0  |  2.44<H>    Ca    7.6<L>      23 Oct 2023 07:49  Phos  6.6     10-23  Mg     2.0     10-23    TPro  8.3  /  Alb  3.0<L>  /  TBili  2.8<H>  /  DBili  x   /  AST  2507<H>  /  ALT  2520<H>  /  AlkPhos  163<H>  10-23          ABG - ( 23 Oct 2023 05:59 )  pH, Arterial: 7.270 pH, Blood: x     /  pCO2: 61    /  pO2: 110   / HCO3: 28    / Base Excess: 1.1   /  SaO2: 99.0                Urinalysis Basic - ( 23 Oct 2023 07:49 )    Color: x / Appearance: x / SG: x / pH: x  Gluc: 182 mg/dL / Ketone: x  / Bili: x / Urobili: x   Blood: x / Protein: x / Nitrite: x   Leuk Esterase: x / RBC: x / WBC x   Sq Epi: x / Non Sq Epi: x / Bacteria: x        PT/INR - ( 23 Oct 2023 02:05 )   PT: 40.7 sec;   INR: 3.81 ratio         PTT - ( 23 Oct 2023 18:55 )  PTT:48.8 sec    CARDIAC MARKERS ( 22 Oct 2023 16:00 )  x     / 0.02 ng/mL / x     / x     / x      CARDIAC MARKERS ( 22 Oct 2023 07:35 )  x     / x     / 27 U/L / x     / x            CAPILLARY BLOOD GLUCOSE      POCT Blood Glucose.: 134 mg/dL (21 Oct 2023 23:04)                    Urinalysis Basic - ( 20 Oct 2023 05:00 )    Color: x / Appearance: x / SG: x / pH: x  Gluc: 105 mg/dL / Ketone: x  / Bili: x / Urobili: x   Blood: x / Protein: x / Nitrite: x   Leuk Esterase: x / RBC: x / WBC x   Sq Epi: x / Non Sq Epi: x / Bacteria: x            CARDIAC MARKERS ( 19 Oct 2023 06:50 )  x     / <0.01 ng/mL / x     / x     / x            CAPILLARY BLOOD GLUCOSE                        Urinalysis Basic - ( 20 Oct 2023 05:00 )    Color: x / Appearance: x / SG: x / pH: x  Gluc: 105 mg/dL / Ketone: x  / Bili: x / Urobili: x   Blood: x / Protein: x / Nitrite: x   Leuk Esterase: x / RBC: x / WBC x   Sq Epi: x / Non Sq Epi: x / Bacteria: x            CARDIAC MARKERS ( 19 Oct 2023 06:50 )  x     / <0.01 ng/mL / x     / x     / x            CAPILLARY BLOOD GLUCOSE                 SARS-CoV-2: NotDetec (10-18-23 @ 12:05)       < from: Xray Chest 1 View- PORTABLE-Urgent (Xray Chest 1 View- PORTABLE-Urgent .) (10.18.23 @ 14:14) >    FINDINGS:    Single frontal view of the chest demonstrates mild CHF. Left lower   lobe/retrocardiac consolidation. The cardiomediastinal silhouette is   enlarged. No acute osseous abnormalities. Overlying EKG leads and wires   are noted    IMPRESSION: Mild CHF. Left lower lobe/retrocardiac consolidation.   Consider chest CT.    --- End of Report ---    < end of copied text >

## 2023-10-23 NOTE — CONSULT NOTE ADULT - PROBLEM SELECTOR RECOMMENDATION 4
- Pre-capillary PH  - has a h/o PE (was on AC at home), not sure if she was worked up to CTEPH in the past   - would benefit from sildenafil once weaned off levo  - continue Teagan as above

## 2023-10-23 NOTE — CONSULT NOTE ADULT - ASSESSMENT
Initial HF c/s: lAine Magdaleno MD    37 YO F with morbid obesity (BMI 60), HFpEF but severe RV dysfunction (last hospitalized in July with HF exacerbation), prior DVT/PE on Eliquis (not sure if worked up for CTEPH in the past) and SLE + KATHIE who presented to [facility] on 10/18 with heart failure and progressed to shock with end organ dysfunction (lactate 4, SARAH, shock liver).     The MICU team placed a swan over the weekend which showed a RA pressure of ~12, PA ~80s/30’s, wedge not able to be obtained, and Thermo/Tg CI 1.7/2.2. She was started milrinone/levo and inhaled NO. Shock team was activated given RV predominant cardiogenic shock with severe pulmonary hypertension. Plan was to continue inotropes, diurese to keep CVP closer to 8, and adding flolan if inhaled NO doesn’t work.     This morning she reports feeling tired but denies any SOB. She remains with severe pre-capillary PH with low/normal PCW and CVP. She is also in Aflutter with RVR.     Hemodynamics:  10/23 (mil 0.25, levo .08, Teagan 12 ppm): -140s, /72 (87), CVP 5, PA 87/33/52, PCW 7, PA sat 78.2%, Tg CO/CI 9.6/3.8,  dsc, PVR 4.69 [place].    Cardiac Studies:  10/22/23 TTE: LV poorly visualized, severe RVE with severe RV dysfunction.  7/20/23 TTE: LVIDd 4.17cm, LVEF 60-65%, severe RVE with mod RV dysfunction, mild MR, mild-mod TR, mild VA, est PASP 45.5 mmHg.

## 2023-10-23 NOTE — PROGRESS NOTE ADULT - ASSESSMENT
37 yo female, PMHx HFpEF, B/L LE DVT + recurrent PE on Eliquis, HTN, asthma, chronic hyperkalemia on daily Lokelma, anxiety, morbid obesity, who presented for evaluation of generalized weakness, shortness of breath, dyspnea, and weight gain. Admitted to medicine service for treatment of pneumonia. Hospital course c/b worsening SARAH, metabolic acidosis, transaminitis.    Transferred to ICU 10/22 with acute hypoxic hypercapnic respiratory failure and cardiogenic shock as evidenced by lactic acidosis, SARAH, transaminitis, cool distal extremities, and poor UOP in the face of acute on chronic RV failure and severe pHTN.  Overall etiology of pHTN felt to be multifactorial due to chronic PE, OHS/MICHAEL, with a possible component of SLE, though the presence of underlying rheumatologic disease is only suspected and has not been confirmed.  SGC placed for invasive hemodynamic monitoring. Initial PAP 60-70, with CO/CI 3.1/1.32. Unfortunately due to severe pHTN and probable TR, PA catheter has migrated back to RA and is malpositioned.  Will maintain SGC for now to trend mvO2 in response to therapies and guide titration of vasopressors and inotropic support   Shock call made to [facility] multidisciplinary team by Dr. Ortega - not a candidate for advanced therapies; advised to continue medical management  Treating cardiogenic shock and exacerbation of pHTN with multiple modalities including inotropes, vasopressors, pulmonary vasodilators, and diuretics as follows:  Milrinone gtt for RV failure, renally adjusted  Norepinephrine gtt for hemodynamic support, titrating to keep goal MAP 65-70  Stress dose steroids  Teagan at 20 PPM for pulmonary artery vasodilation  Remained oliguric despite Lasix gtt. Changed to Bumex and given Bumex load with improved efficacy, now with brisk UOP. Bumex gtt decreased to 0.5mg/hr, will target net negative fluid balance of approximately 1.5L and CVP <8   Discussed with Nephrology Dr. Worthy, renal failure multifactorial including cardiorenal syndrome, ATN, vancomycin toxicity, and again possible contributing factor of SLE if confirmed. No urgent need for CRRT at present. Will continue trial of aggressive diuresis and trend renal indices; plan for CRRT as next step if these interventions are unsuccessful at mitigating acidemia, hyperkalemia, and fluid overload.  Monitoring dynamic end-points of perfusion, including serial lactate, mvO2, and end organ function levels. Despite downtrending lactate and improved UOP, distal extremities remain cool to touch c/w ongoing malperfusion.   HFNC increasing flow as tolerated and titrating FiO2 to keep SpO2 >92%. High threshold for intubation due to high risk for sudden cardiac death with induction and positive pressure ventilation.   Continue Lokelma for chronic hyperkalemia. Monitoring and supplementing electrolytes q4-6hrs with aggressive diuretic therapy.  Empiric treatment for pneumonia with Zosyn. Blood cultures 10/18 negative to date; urine Legionella Ag negative, RVP negative.  Received Eliquis 10/22, converting to Heparin gtt for chronic VTE    Above plan formulated with multidisciplinary team including ICU Attending Dr. Mcclellan, Nephrologist Dr. Worthy, and bedside RN.   35 yo female, PMHx HFpEF, B/L LE DVT + recurrent PE on Eliquis, HTN, asthma, chronic hyperkalemia on daily Lokelma, anxiety, morbid obesity, who presented for evaluation of generalized weakness, shortness of breath, dyspnea, and weight gain. Admitted to medicine service for treatment of pneumonia. Hospital course c/b worsening SARAH, metabolic acidosis, transaminitis.    Transferred to ICU 10/22 with acute hypoxic hypercapnic respiratory failure and cardiogenic shock as evidenced by lactic acidosis, SARAH, transaminitis, cool distal extremities, and poor UOP in the face of acute on chronic RV failure and severe pHTN.  Overall etiology of pHTN felt to be multifactorial due to chronic PE, OHS/MICHAEL, with a possible component of SLE, though the presence of underlying rheumatologic disease is only suspected and has not been confirmed.  SGC placed for invasive hemodynamic monitoring. Initial PAP 60-70, with CO/CI 3.1/1.32. Unfortunately due to severe pHTN and probable TR, PA catheter has migrated back to RA and is malpositioned.  Will maintain SGC for now to trend mvO2 in response to therapies and guide titration of vasopressors and inotropic support   Shock call made to [facility] multidisciplinary team by Dr. Ortega - not a candidate for advanced therapies; advised to continue medical management  Treating cardiogenic shock and exacerbation of pHTN with multiple modalities including inotropes, vasopressors, pulmonary vasodilators, and diuretics as follows:  Milrinone gtt for RV failure, renally adjusted  Norepinephrine gtt for hemodynamic support, titrating to keep goal MAP 65-70  Stress dose steroids  Teagan at 20 PPM for pulmonary artery vasodilation  Remained oliguric despite Lasix gtt. Changed to Bumex and given Bumex load with improved efficacy, now with brisk UOP. Bumex gtt decreased to 0.5mg/hr, will target net negative fluid balance of approximately 1.5L and CVP <8   Discussed with Nephrology Dr. Worthy, renal failure multifactorial including cardiorenal syndrome, ATN, vancomycin toxicity, and again possible contributing factor of SLE if confirmed. No urgent need for CRRT at present. Will continue trial of aggressive diuresis and trend renal indices; plan for CRRT as next step if these interventions are unsuccessful at mitigating acidemia, hyperkalemia, and fluid overload.  Monitoring dynamic end-points of perfusion, including serial lactate, mvO2, and end organ function levels. Despite downtrending lactate and improved UOP, distal extremities remain cool to touch c/w ongoing malperfusion.   HFNC increasing flow as tolerated and titrating FiO2 to keep SpO2 >92%. High threshold for intubation due to high risk for sudden cardiac death with induction and positive pressure ventilation.   Continue Lokelma for chronic hyperkalemia. Monitoring and supplementing electrolytes q4-6hrs with aggressive diuretic therapy.  Empiric treatment for pneumonia with Zosyn. Blood cultures 10/18 negative to date; urine Legionella Ag negative, RVP negative.  Calculated SVR is elevated, consistent with cardiogenic shock rather than distributive shock secondary to sepsis  Received Eliquis 10/22, converting to Heparin gtt for chronic VTE    Above plan formulated with multidisciplinary team including ICU Attending Dr. Mcclellan, Nephrologist Dr. Worthy, and bedside RN.

## 2023-10-23 NOTE — PROGRESS NOTE ADULT - ASSESSMENT
SARAH --->  ATN +/- component Vanco toxicity , Low flow w/ Cardiogenic shock ---> Now non-oliguric  Placed on Milrinone and Levophed   Not intubated --> awake and interactive   ECHO noted ---> Poor LV visualization , but [place] readings noted   ? Recent Dx / Suspicion of SLE ---> Antecedent h/o steroids x 1 mo PTA ---> started stress steroids   UA with no sig RBC or protein   CPK 27   Mixed acidosis , lactate and hyperkalemia - better   Bumex drip now held   Transient IV bicarb   UO improved in major   Vanco held --> Level today is 22, Remains on Zosyn   SLE serology reordered   Associated Ischemic hepatitis with coagulopathy   Complements , JONATHAN , KATHIE , ds DNA  , and ANCA pending   Hgb stable   No pressing need for acute renal replacement therapy at this moment , but will follow closely   Currently on Zosyn , Vanco level down to 14

## 2023-10-23 NOTE — PHARMACOTHERAPY INTERVENTION NOTE - NSPHARMCOMMASP
ASP - Dose optimization/Non-Renal dose adjustment
ASP - De-escalation
ASP - Lab/ test recommended

## 2023-10-23 NOTE — CONSULT NOTE ADULT - PROBLEM SELECTOR RECOMMENDATION 2
- - HFpEF with severe RV dysfunction  - Management of cardiogenic shock as above  - Will add GDMT for HFpEF (MRA, SGLT2i) when SARAH resolved - HFpEF with severe RV dysfunction  - Management of cardiogenic shock as above  - Will add GDMT for HFpEF (MRA, SGLT2i) when SARAH resolved and BP allows

## 2023-10-23 NOTE — PROGRESS NOTE ADULT - ASSESSMENT
Assessment  Cardiogenic shock inotrope dependent  Hemodynamic monitoring in place with low filling pressures  Long standing RV failure with preserved LVEF  Chronic pul HTN with RV failure  SARAH/ transaminitis likely sec to low output  atrial flutter with poor rate control  Pul HTN ? sec CTEPH  remote DVT / PE  ? underlying SLE  Morbid obesity      Rec  Aggressively increase IVF, maintain CVP > 15  Cont IV heparin  Cont IV milrinone  Consider weaning norepi, maintain SBP>88 MAP > 60  If pressors needed consider vasopressin to avoid tachycardia  IV amiodarone for rate control AF  In light of morbid obesity, therapuetic options limited  Will consult advance heart failure team ? RVAD ? ECMO if above therapuetics futile  will follow      Discussed with MICU team/ brother at bedside  Critical care 35 min

## 2023-10-23 NOTE — PROGRESS NOTE ADULT - ASSESSMENT
37 yo female, PMHx HFpEF, B/L LE DVT + recurrent PE on Eliquis, HTN, asthma, chronic hyperkalemia on daily Lokelma, anxiety, morbid obesity, who presented for evaluation of generalized weakness, shortness of breath, dyspnea, and weight gain. Admitted to medicine service for treatment of pneumonia. Hospital course c/b worsening SARAH, metabolic acidosis, transaminitis.

## 2023-10-23 NOTE — PROGRESS NOTE ADULT - SUBJECTIVE AND OBJECTIVE BOX
Patient is a 38y old  Female who presents with a chief complaint of Acute on Chronic Hypoxic Respiratory Failure 2/2 CHF Exacerbation (22 Oct 2023 22:30)      BRIEF HOSPITAL COURSE: 35 yo female, PMHx HFpEF, B/L LE DVT + recurrent PE on Eliquis, HTN, Asthma, chronic hyperkalemia on daily Lokelma, Anxiety, morbid obesity (BMI 60), who presented to ED initially for evaluation of weakness, unable to get up out of bed on her own. Patient reports she has been undergoing outpatient evaluation for possible Lupus, given recent history of RUE and facial rash that is exacerbated by sunlight and joint pains; she was evaluated by an outpatient rheumatologist, but has not yet received the results of her workup. She was started on Prednisone 20mg daily approximately 1 month ago by her PMD. Over the past 1.5 weeks, she reports "pneumonia symptoms" including worsening dyspnea on exertion, non-productive cough, LE edema, and rapid weight gain (reports 10lb weight gain overnight despite her diuretics). She denies fever or chills, URI symptoms, dysuria, hematuria, or recent sick contacts. She was admitted to medicine service for treatment of pneumonia. Patient refused to undergo CT chest due to inability to lay flat. Over the course of her hospitalization, she has had worsening end-organ dysfunction, with progressive SARAH, anion gap metabolic acidosis, and transaminitis. She has been seen by Cardiology, Nephrology, and GI. Diuretics were initially increased by Cardiology, and then held with worsening SARAH and was started on sodium bicarbonate infusion instead. Despite these interventions, her end organ function has continued to deteriorate. She developed hypotension and was given IV fluid boluses without response. She was ultimately transferred to MICU on 10/22 with multiorgan failure and shock. TTE revealed severe RV dilation and failure; LV, pulmonary artery, TV, pericardium not well visualized due to body habitus.       Events last 24 hours:   Received patient tachycardic, hypotensive on Milrinone and Norepinephrine gtts, starting Teagan at 20PPM, and HFNC 70% FiO2.   On Lasix gtt initially oliguric with UOP ~15 cc/hr.   PA catheter placed today with severely elevated PAP, now migrated back to RA. CVP 10.  Repeat labs with some improvement in acidemia, lactate beginning to downtrend, though on exam remains with cold distal extremities.   Lasix changed to Bumex, given Bumex 2mg IV push bolus and started on Bumex gtt at 1mg/hr with robust response, now UOP >300cc/hr.   Able to titrate down Norepinephrine gtt slightly.       PAST MEDICAL & SURGICAL HISTORY:  Pulmonary embolism      DVT, lower extremity      CHF (congestive heart failure)      Asthma      Anxiety      Severe obesity (BMI >= 40)      Hypertension      No significant past surgical history          Medications:  piperacillin/tazobactam IVPB.. 3.375 Gram(s) IV Intermittent every 8 hours    buMETAnide Infusion 0.5 mG/Hr IV Continuous <Continuous>  milrinone Infusion 0.25 MICROgram(s)/kG/Min IV Continuous <Continuous>  norepinephrine Infusion 0.05 MICROgram(s)/kG/Min IV Continuous <Continuous>    albuterol/ipratropium for Nebulization 3 milliLiter(s) Nebulizer every 6 hours PRN  budesonide 160 MICROgram(s)/formoterol 4.5 MICROgram(s) Inhaler 2 Puff(s) Inhalation two times a day  montelukast 10 milliGRAM(s) Oral daily    melatonin 3 milliGRAM(s) Oral at bedtime PRN  ondansetron Injectable 4 milliGRAM(s) IV Push every 8 hours PRN  oxyCODONE    IR 2.5 milliGRAM(s) Oral every 6 hours PRN  oxyCODONE    IR 5 milliGRAM(s) Oral every 6 hours PRN  PARoxetine 20 milliGRAM(s) Oral daily      heparin  Infusion. 1200 Unit(s)/Hr IV Continuous <Continuous>    aluminum hydroxide/magnesium hydroxide/simethicone Suspension 30 milliLiter(s) Oral every 4 hours PRN      hydrocortisone sodium succinate Injectable 50 milliGRAM(s) IV Push every 6 hours      influenza   Vaccine 0.5 milliLiter(s) IntraMuscular once    chlorhexidine 2% Cloths 1 Application(s) Topical <User Schedule>    sodium zirconium cyclosilicate 10 Gram(s) Oral daily          ICU Vital Signs Last 24 Hrs  T(C): 36.5 (23 Oct 2023 00:00), Max: 37.2 (22 Oct 2023 16:15)  T(F): 97.7 (23 Oct 2023 00:00), Max: 99 (22 Oct 2023 16:15)  HR: 117 (23 Oct 2023 00:00) (88 - 142)  BP: 127/91 (23 Oct 2023 00:00) (78/58 - 129/72)  BP(mean): 101 (23 Oct 2023 00:00) (58 - 107)  ABP: --  ABP(mean): --  RR: 18 (23 Oct 2023 00:00) (15 - 28)  SpO2: 98% (23 Oct 2023 00:00) (85% - 100%)    O2 Parameters below as of 23 Oct 2023 00:00  Patient On (Oxygen Delivery Method): nasal cannula, high flow  O2 Flow (L/min): 40  O2 Concentration (%): 65        ABG - ( 22 Oct 2023 12:11 )  pH, Arterial: 7.260 pH, Blood: x     /  pCO2: 46    /  pO2: 71    / HCO3: 21    / Base Excess: -6.5  /  SaO2: 92.3                I&O's Detail    21 Oct 2023 07:01  -  22 Oct 2023 07:00  --------------------------------------------------------  IN:    Oral Fluid: 800 mL  Total IN: 800 mL    OUT:    Stool (mL): 1 mL    Voided (mL): 275 mL  Total OUT: 276 mL    Total NET: 524 mL      22 Oct 2023 07:01  -  23 Oct 2023 00:33  --------------------------------------------------------  IN:    Bumetanide: 5 mL    Bumetanide: 5 mL    Furosemide: 50 mL    IV PiggyBack: 100 mL    Milrinone: 111.9 mL    Norepinephrine: 90.3 mL    Sodium Bicarbonate: 600 mL  Total IN: 962.2 mL    OUT:    Indwelling Catheter - Urethral (mL): 1135 mL  Total OUT: 1135 mL    Total NET: -172.8 mL            LABS:                        10.7   11.10 )-----------( 219      ( 22 Oct 2023 21:45 )             36.2     10-22    131<L>  |  91<L>  |  65.6<H>  ----------------------------<  159<H>  4.8   |  23.0  |  2.97<H>    Ca    7.8<L>      22 Oct 2023 21:45  Phos  7.4     10-22  Mg     2.1     10-22    TPro  8.1  /  Alb  2.9<L>  /  TBili  2.7<H>  /  DBili  x   /  AST  3290<H>  /  ALT  2526<H>  /  AlkPhos  165<H>  10-22      CARDIAC MARKERS ( 22 Oct 2023 16:00 )  x     / 0.02 ng/mL / x     / x     / x      CARDIAC MARKERS ( 22 Oct 2023 07:35 )  x     / x     / 27 U/L / x     / x          CAPILLARY BLOOD GLUCOSE      POCT Blood Glucose.: 134 mg/dL (21 Oct 2023 23:04)    PT/INR - ( 22 Oct 2023 21:45 )   PT: 39.6 sec;   INR: 3.71 ratio         PTT - ( 22 Oct 2023 21:45 )  PTT:33.7 sec  Urinalysis Basic - ( 22 Oct 2023 21:45 )    Color: x / Appearance: x / SG: x / pH: x  Gluc: 159 mg/dL / Ketone: x  / Bili: x / Urobili: x   Blood: x / Protein: x / Nitrite: x   Leuk Esterase: x / RBC: x / WBC x   Sq Epi: x / Non Sq Epi: x / Bacteria: x      CULTURES:  Rapid RVP Result: NotDetec (10-22 @ 16:18)  Rapid RVP Result: NotDetec (10-18 @ 12:05)  Culture Results:   No growth at 4 days (10-18 @ 11:30)  Culture Results:   No growth at 4 days (10-18 @ 11:28)            Physical Examination:    General: Toxic appearing    HEENT: Pupils equal, reactive to light. Symmetric.    PULM: Diminished to auscultation bilaterally.    CVS: Tachycardia, irregular rhythm. Unable to assess jugular veins.    ABD: Soft, nondistended, nontender    EXT: 3+ LE pitting edema, extending up through abdomen     SKIN: Distal LE cool to touch    NEURO: Alert, oriented, interactive, nonfocal        INVASIVE LINES: R IJ cordis with SGC   INDWELLING BLACK: Y  VTE PROPHYLAXIS: Heparin gtt  CAM ICU: -   CODE STATUS: FULL        CRITICAL CARE TIME SPENT: 95 minutes spent performing frequent bedside reassessments and augmenting plan of care to address problems of acute critical illness that pose high probability of life threatening deterioration and/or end organ damage/dysfunction and discussing goals of care, non-inclusive of time spent on procedures performed. Patient is a 38y old  Female who presents with a chief complaint of Acute on Chronic Hypoxic Respiratory Failure 2/2 CHF Exacerbation (22 Oct 2023 22:30)      BRIEF HOSPITAL COURSE: 37 yo female, PMHx HFpEF, B/L LE DVT + recurrent PE on Eliquis, HTN, asthma, chronic hyperkalemia on daily Lokelma, anxiety, morbid obesity (BMI 60), who presented to ED initially for evaluation of weakness, unable to get up out of bed on her own. Patient reports she has been undergoing outpatient evaluation for possible Lupus, given recent history of RUE and facial rash that is exacerbated by sunlight and joint pains; she was evaluated by an outpatient rheumatologist, but has not yet received the results of her workup. She was started on Prednisone 20mg daily approximately 1 month ago by her PMD. Over the past 1.5 weeks, she reports "pneumonia symptoms" including worsening dyspnea on exertion, non-productive cough, LE edema, and rapid weight gain (reports 10lb weight gain overnight despite her diuretics). She denies fever or chills, URI symptoms, dysuria, hematuria, or recent sick contacts. She was admitted to medicine service for treatment of pneumonia. Patient refused to undergo CT chest due to inability to lay flat. Over the course of her hospitalization, she has had worsening end-organ dysfunction, with progressive SARAH, anion gap metabolic acidosis, and transaminitis. She has been seen by Cardiology, Nephrology, and GI. Diuretics were initially increased by Cardiology, and then held with worsening SARAH and was started on sodium bicarbonate infusion instead. Despite these interventions, her end organ function has continued to deteriorate. She developed hypotension and was given IV fluid boluses without response. She was ultimately transferred to MICU on 10/22 with multiorgan failure and shock. TTE revealed severe RV dilation and failure; LV, pulmonary artery, TV, pericardium not well visualized due to body habitus.       Events last 24 hours:   Received patient tachycardic, hypotensive on Milrinone and Norepinephrine gtts, starting Teagan at 20PPM, and HFNC 70% FiO2.   On Lasix gtt initially oliguric with UOP ~15 cc/hr.   PA catheter placed today with severely elevated PAP, now migrated back to RA. CVP 10.  Repeat labs with some improvement in acidemia, lactate beginning to downtrend, though on exam remains with cold distal extremities.   Lasix changed to Bumex, given Bumex 2mg IV push bolus and started on Bumex gtt at 1mg/hr with robust response, now UOP >300cc/hr.   Able to titrate down Norepinephrine gtt slightly.       PAST MEDICAL & SURGICAL HISTORY:  Pulmonary embolism      DVT, lower extremity      CHF (congestive heart failure)      Asthma      Anxiety      Severe obesity (BMI >= 40)      Hypertension      No significant past surgical history          Medications:  piperacillin/tazobactam IVPB.. 3.375 Gram(s) IV Intermittent every 8 hours    buMETAnide Infusion 0.5 mG/Hr IV Continuous <Continuous>  milrinone Infusion 0.25 MICROgram(s)/kG/Min IV Continuous <Continuous>  norepinephrine Infusion 0.05 MICROgram(s)/kG/Min IV Continuous <Continuous>    albuterol/ipratropium for Nebulization 3 milliLiter(s) Nebulizer every 6 hours PRN  budesonide 160 MICROgram(s)/formoterol 4.5 MICROgram(s) Inhaler 2 Puff(s) Inhalation two times a day  montelukast 10 milliGRAM(s) Oral daily    melatonin 3 milliGRAM(s) Oral at bedtime PRN  ondansetron Injectable 4 milliGRAM(s) IV Push every 8 hours PRN  oxyCODONE    IR 2.5 milliGRAM(s) Oral every 6 hours PRN  oxyCODONE    IR 5 milliGRAM(s) Oral every 6 hours PRN  PARoxetine 20 milliGRAM(s) Oral daily      heparin  Infusion. 1200 Unit(s)/Hr IV Continuous <Continuous>    aluminum hydroxide/magnesium hydroxide/simethicone Suspension 30 milliLiter(s) Oral every 4 hours PRN      hydrocortisone sodium succinate Injectable 50 milliGRAM(s) IV Push every 6 hours      influenza   Vaccine 0.5 milliLiter(s) IntraMuscular once    chlorhexidine 2% Cloths 1 Application(s) Topical <User Schedule>    sodium zirconium cyclosilicate 10 Gram(s) Oral daily          ICU Vital Signs Last 24 Hrs  T(C): 36.5 (23 Oct 2023 00:00), Max: 37.2 (22 Oct 2023 16:15)  T(F): 97.7 (23 Oct 2023 00:00), Max: 99 (22 Oct 2023 16:15)  HR: 117 (23 Oct 2023 00:00) (88 - 142)  BP: 127/91 (23 Oct 2023 00:00) (78/58 - 129/72)  BP(mean): 101 (23 Oct 2023 00:00) (58 - 107)  ABP: --  ABP(mean): --  RR: 18 (23 Oct 2023 00:00) (15 - 28)  SpO2: 98% (23 Oct 2023 00:00) (85% - 100%)    O2 Parameters below as of 23 Oct 2023 00:00  Patient On (Oxygen Delivery Method): nasal cannula, high flow  O2 Flow (L/min): 40  O2 Concentration (%): 65        ABG - ( 22 Oct 2023 12:11 )  pH, Arterial: 7.260 pH, Blood: x     /  pCO2: 46    /  pO2: 71    / HCO3: 21    / Base Excess: -6.5  /  SaO2: 92.3                I&O's Detail    21 Oct 2023 07:01  -  22 Oct 2023 07:00  --------------------------------------------------------  IN:    Oral Fluid: 800 mL  Total IN: 800 mL    OUT:    Stool (mL): 1 mL    Voided (mL): 275 mL  Total OUT: 276 mL    Total NET: 524 mL      22 Oct 2023 07:01  -  23 Oct 2023 00:33  --------------------------------------------------------  IN:    Bumetanide: 5 mL    Bumetanide: 5 mL    Furosemide: 50 mL    IV PiggyBack: 100 mL    Milrinone: 111.9 mL    Norepinephrine: 90.3 mL    Sodium Bicarbonate: 600 mL  Total IN: 962.2 mL    OUT:    Indwelling Catheter - Urethral (mL): 1135 mL  Total OUT: 1135 mL    Total NET: -172.8 mL            LABS:                        10.7   11.10 )-----------( 219      ( 22 Oct 2023 21:45 )             36.2     10-22    131<L>  |  91<L>  |  65.6<H>  ----------------------------<  159<H>  4.8   |  23.0  |  2.97<H>    Ca    7.8<L>      22 Oct 2023 21:45  Phos  7.4     10-22  Mg     2.1     10-22    TPro  8.1  /  Alb  2.9<L>  /  TBili  2.7<H>  /  DBili  x   /  AST  3290<H>  /  ALT  2526<H>  /  AlkPhos  165<H>  10-22      CARDIAC MARKERS ( 22 Oct 2023 16:00 )  x     / 0.02 ng/mL / x     / x     / x      CARDIAC MARKERS ( 22 Oct 2023 07:35 )  x     / x     / 27 U/L / x     / x          CAPILLARY BLOOD GLUCOSE      POCT Blood Glucose.: 134 mg/dL (21 Oct 2023 23:04)    PT/INR - ( 22 Oct 2023 21:45 )   PT: 39.6 sec;   INR: 3.71 ratio         PTT - ( 22 Oct 2023 21:45 )  PTT:33.7 sec  Urinalysis Basic - ( 22 Oct 2023 21:45 )    Color: x / Appearance: x / SG: x / pH: x  Gluc: 159 mg/dL / Ketone: x  / Bili: x / Urobili: x   Blood: x / Protein: x / Nitrite: x   Leuk Esterase: x / RBC: x / WBC x   Sq Epi: x / Non Sq Epi: x / Bacteria: x      CULTURES:  Rapid RVP Result: NotDetec (10-22 @ 16:18)  Rapid RVP Result: NotDetec (10-18 @ 12:05)  Culture Results:   No growth at 4 days (10-18 @ 11:30)  Culture Results:   No growth at 4 days (10-18 @ 11:28)            Physical Examination:    General: Toxic appearing    HEENT: Pupils equal, reactive to light. Symmetric.    PULM: Diminished to auscultation bilaterally.    CVS: Tachycardia, irregular rhythm. Unable to assess jugular veins.    ABD: Soft, nondistended, nontender    EXT: 3+ LE pitting edema, extending up through abdomen     SKIN: Distal LE cool to touch    NEURO: Alert, oriented, interactive, nonfocal        INVASIVE LINES: R IJ cordis with SGC   INDWELLING BLACK: Y  VTE PROPHYLAXIS: Heparin gtt  CAM ICU: -   CODE STATUS: FULL        CRITICAL CARE TIME SPENT: 115 minutes spent performing frequent bedside reassessments and augmenting plan of care to address problems of acute critical illness that pose high probability of life threatening deterioration and/or end organ damage/dysfunction and discussing goals of care, non-inclusive of time spent on procedures performed.

## 2023-10-24 NOTE — PROGRESS NOTE ADULT - PROBLEM SELECTOR PLAN 2
- HFpEF with severe RV dysfunction  - Management of cardiogenic shock as above  - Will add GDMT for HFpEF (MRA, SGLT2i) when SARAH resolved and BP allows.

## 2023-10-24 NOTE — PROGRESS NOTE ADULT - SUBJECTIVE AND OBJECTIVE BOX
Chief Complaint: This is a 38y old woman patient being seen in follow-up consultation for elevated liver enzymes.     Interval HPI / 24H events:  Patient seen and evaluated at bedside. Patient appears alert and awake this morning. Denies abdominal pain, nausea, vomiting. Patient with HF nasal cannula 65%, Nitric oxide, with Inotropic and vasopressor support. On Amiodarone infusion.      Review of Systems:  . Constitutional: No fever, chills  . HEENT: Negative  · Respiratory and Thorax: No shortness of breath, no cough  · Cardiovascular: No chest pain, palpitation, no dizziness  · Gastrointestinal: see above  · Genitourinary: No hematuria  · Musculoskeletal: Negative  · Neurological: negative  · Psychiatric: no agitation, no anxiety      PAST MEDICAL/SURGICAL HISTORY:  Pulmonary embolism    DVT, lower extremity    CHF (congestive heart failure)    Asthma    Anxiety    Severe obesity (BMI >= 40)    Hypertension    No significant past surgical history      MEDICATIONS  (STANDING):  aMIOdarone    Tablet   Oral   aMIOdarone    Tablet 400 milliGRAM(s) Oral every 8 hours  aMIOdarone Infusion 0.5 mG/Min (16.7 mL/Hr) IV Continuous <Continuous>  budesonide 160 MICROgram(s)/formoterol 4.5 MICROgram(s) Inhaler 2 Puff(s) Inhalation two times a day  chlorhexidine 2% Cloths 1 Application(s) Topical <User Schedule>  heparin  Infusion. 1200 Unit(s)/Hr (12 mL/Hr) IV Continuous <Continuous>  hydrocortisone sodium succinate Injectable 50 milliGRAM(s) IV Push every 6 hours  influenza   Vaccine 0.5 milliLiter(s) IntraMuscular once  milrinone Infusion 0.25 MICROgram(s)/kG/Min (11.2 mL/Hr) IV Continuous <Continuous>  montelukast 10 milliGRAM(s) Oral daily  mupirocin 2% Ointment 1 Application(s) Both Nostrils two times a day  nystatin Powder 1 Application(s) Topical two times a day  PARoxetine 20 milliGRAM(s) Oral daily  phenylephrine    Infusion 0.1 MICROgram(s)/kG/Min (5.58 mL/Hr) IV Continuous <Continuous>  piperacillin/tazobactam IVPB.. 3.375 Gram(s) IV Intermittent every 12 hours  vasopressin Infusion 0.04 Unit(s)/Min (6 mL/Hr) IV Continuous <Continuous>    MEDICATIONS  (PRN):  albuterol/ipratropium for Nebulization 3 milliLiter(s) Nebulizer every 6 hours PRN Shortness of Breath and/or Wheezing  aluminum hydroxide/magnesium hydroxide/simethicone Suspension 30 milliLiter(s) Oral every 4 hours PRN Dyspepsia  melatonin 3 milliGRAM(s) Oral at bedtime PRN Insomnia  ondansetron Injectable 4 milliGRAM(s) IV Push every 8 hours PRN Nausea and/or Vomiting  oxyCODONE    IR 5 milliGRAM(s) Oral every 6 hours PRN Severe Pain (7 - 10)  oxyCODONE    IR 2.5 milliGRAM(s) Oral every 6 hours PRN Moderate Pain (4 - 6)    iodine (Hives)  ceftriaxone (Hives)  shellfish (Hives)    T(C): 37.7 (10-24-23 @ 08:00), Max: 37.7 (10-24-23 @ 08:00)  HR: 96 (10-24-23 @ 09:30) (94 - 149)  BP: 118/69 (10-24-23 @ 09:15) (83/49 - 127/73)  RR: 24 (10-24-23 @ 09:30) (17 - 28)  SpO2: 96% (10-24-23 @ 09:30) (90% - 99%)    I&O's Summary    23 Oct 2023 07:01  -  24 Oct 2023 07:00  --------------------------------------------------------  IN: 2209.9 mL / OUT: 2224 mL / NET: -14.1 mL      PHYSICAL EXAM:    Constitutional: Obese, on pressures and inotrope support. On HFNC, NO   Neuro: Awake alert,   HEENT: anicteric sclerae  CV: regular rate, regular rhythm, tachycardia   Pulm/chest: lung sounds diminished bilaterally, On HFNC, NO,  increased work of breathing.   Abd: Obese, soft, nontender, nondistended, +bowel sounds. No rigidity, rebound tenderness, or guarding  Ext: no edema  Skin: warm, no jaundice   Psych: calm, cooperative      LABS:  ABG - ( 24 Oct 2023 05:03 )  pH, Arterial: 7.250 pH, Blood: x     /  pCO2: 66    /  pO2: 71    / HCO3: 29    / Base Excess: 1.7   /  SaO2: 92.3                         9.9    7.62  )-----------( 199      ( 10-24 @ 07:45 )             32.9                10.0   8.00  )-----------( 220      ( 10-24 @ 03:55 )             33.4                10.0   8.17  )-----------( 211      ( 10-23 @ 21:40 )             33.9                11.0   9.05  )-----------( 228      ( 10-23 @ 15:00 )             36.9                11.3   10.44 )-----------( 217      ( 10-23 @ 08:04 )             37.4                10.8   10.30 )-----------( 209      ( 10-23 @ 02:05 )             35.7                10.7   11.10 )-----------( 219      ( 10-22 @ 21:45 )             36.2                10.4   11.55 )-----------( 205      ( 10-22 @ 16:00 )             35.7                10.5   11.84 )-----------( 219      ( 10-22 @ 07:35 )             36.9                11.2   13.28 )-----------( 152      ( 10-21 @ 21:00 )             39.0       10-24    131<L>  |  89<L>  |  74.7<H>  ----------------------------<  223<H>  4.3   |  28.0  |  2.78<H>    Ca    7.5<L>      24 Oct 2023 07:45  Phos  6.2     10-24  Mg     2.0     10-24    TPro  7.7  /  Alb  2.7<L>  /  TBili  2.6<H>  /  DBili  x   /  AST  1154<H>  /  ALT  1703<H>  /  AlkPhos  149<H>  10-24    LIVER FUNCTIONS - ( 24 Oct 2023 07:45 )  Alb: 2.7 g/dL / Pro: 7.7 g/dL / ALK PHOS: 149 U/L / ALT: 1703 U/L / AST: 1154 U/L / GGT: x           Amylase: 27 U/L (10-22-23 @ 16:00)    Lipase: 25 U/L (10-22-23 @ 16:00)    PT/INR - ( 23 Oct 2023 02:05 )   PT: 40.7 sec;   INR: 3.81 ratio         PTT - ( 24 Oct 2023 07:45 )  PTT:72.2 sec  Iron Total: 25 ug/dL *L* (10-22-23 @ 16:00)  % Saturation, Iron: 14 % (10-22-23 @ 16:00)  Iron - Total Binding Capacity.: 173 ug/dL *L* (10-22-23 @ 16:00)  Ferritin: 1474 ng/mL *H* (10-22-23 @ 16:00)      Culture - Blood (collected 22 Oct 2023 18:50)  Source: .Blood Blood-Peripheral  Preliminary Report (24 Oct 2023 02:01):    No growth at 24 hours    Culture - Blood (collected 22 Oct 2023 16:00)  Source: .Blood Blood-Peripheral  Preliminary Report (23 Oct 2023 23:01):    No growth at 24 hours        < from: US Abdomen Doppler (10.22.23 @ 23:07) >    FINDINGS:  Limited examination due to body habitus.    Liver measures 22 cm.  No space-occupying disease seen.    Main portal vein patent. Normal hepatopedal flow. Right and left portal   veins patent.    Right, middle and left hepatic veins patent.    Patent common hepatic artery..    Trace free fluid right upper quadrant.    IMPRESSION:    Findings as above.    < end of copied text >

## 2023-10-24 NOTE — PROGRESS NOTE ADULT - PROBLEM SELECTOR PLAN 1
- Clinically euvolemic w/ lukewarm extremities.   - Continue hemodynamic monitoring via PAC.   - Hemodynamic goals: CVP < 12, SVO2 > 65%, lactic acid < 2.2, CI > 2.2, iCa > 1.3  - Monitor perfusion markers daily (lactate, MVO2 from distal port of PA catheter, LFTs)  - Inotrope: would wean milrinone to 0.125 mcg/kg/min and repeat MVO2, CMP, and lactate 2 hours later.  - Vasopressors: phenylephrine 0.1 mcg/kg/min, vaso 0.04 u/min. Consider weaning as able. OK to use PO midodrine.  - Teagan @ 11 PPM  - Diuretics: Continue PRN diuretics to keep net even  - Strict I&Os  - No advanced therapy options due to BMI of 60. Would not be a candidate for tMCS since he has no exit strategy.

## 2023-10-24 NOTE — PROGRESS NOTE ADULT - ASSESSMENT
35 yo female, PMHx HFpEF, B/L LE DVT + recurrent PE on Eliquis, HTN, asthma, chronic hyperkalemia on daily Lokelma, anxiety, morbid obesity, who presented for evaluation of generalized weakness, shortness of breath, dyspnea, and weight gain. Admitted to medicine service for treatment of pneumonia. Hospital course c/b worsening SARAH, metabolic acidosis, transaminitis.    Transferred to ICU 10/22 with acute hypoxic hypercapnic respiratory failure and cardiogenic shock as evidenced by lactic acidosis, SARAH, transaminitis, cool distal extremities, and poor UOP in the face of acute on chronic RV failure and severe pHTN.  Overall etiology of pHTN felt to be multifactorial due to chronic PE, OHS/MICHAEL, with a possible component of SLE, though the presence of underlying rheumatologic disease is only suspected and has not been confirmed.  SGC placed for invasive hemodynamic monitoring. Initial PAP 60-70, with CO/CI 3.1/1.32. Unfortunately due to severe pHTN and probable TR, PA catheter has migrated back to RA and is malpositioned.  Will maintain SGC for now to trend mvO2 in response to therapies and guide titration of vasopressors and inotropic support   Shock call made to [facility] multidisciplinary team by Dr. Ortega - not a candidate for advanced therapies; advised to continue medical management  Treating cardiogenic shock and exacerbation of pHTN with multiple modalities including inotropes, vasopressors, pulmonary vasodilators, and diuretics as follows:  Milrinone gtt for RV failure, renally adjusted  Norepinephrine gtt for hemodynamic support, titrating to keep goal MAP 65-70  Stress dose steroids  Teagan at 20 PPM for pulmonary artery vasodilation  Remained oliguric despite Lasix gtt. Changed to Bumex and given Bumex load with improved efficacy, now with brisk UOP. Bumex gtt decreased to 0.5mg/hr, will target net negative fluid balance of approximately 1.5L and CVP <8   Discussed with Nephrology Dr. Worthy, renal failure multifactorial including cardiorenal syndrome, ATN, vancomycin toxicity, and again possible contributing factor of SLE if confirmed. No urgent need for CRRT at present. Will continue trial of aggressive diuresis and trend renal indices; plan for CRRT as next step if these interventions are unsuccessful at mitigating acidemia, hyperkalemia, and fluid overload.  Monitoring dynamic end-points of perfusion, including serial lactate, mvO2, and end organ function levels. Despite downtrending lactate and improved UOP, distal extremities remain cool to touch c/w ongoing malperfusion.   HFNC increasing flow as tolerated and titrating FiO2 to keep SpO2 >92%. High threshold for intubation due to high risk for sudden cardiac death with induction and positive pressure ventilation.   Continue Lokelma for chronic hyperkalemia. Monitoring and supplementing electrolytes q4-6hrs with aggressive diuretic therapy.  Empiric treatment for pneumonia with Zosyn. Blood cultures 10/18 negative to date; urine Legionella Ag negative, RVP negative.  Calculated SVR is elevated, consistent with cardiogenic shock rather than distributive shock secondary to sepsis  Received Eliquis 10/22, converting to Heparin gtt for chronic VTE    Above plan formulated with multidisciplinary team including ICU Attending Dr. Mcclellan, Nephrologist Dr. Worthy, and bedside RN. 35 yo female, PMHx HFpEF, B/L LE DVT + recurrent PE on Eliquis, HTN, asthma, chronic hyperkalemia on daily Lokelma, anxiety, morbid obesity, who presented for evaluation of generalized weakness, shortness of breath, dyspnea, and weight gain with:    1. Acute Hypoxic Respiratory Failure   2. Cardiogenic Shock  3. SARAH  4. Metabolic Acidosis   5. Transaminitis       Neuro: Alert and oriented, endorsing significant fatigue however with improvement in dyspnea. Bedrest while SGC in place. Pain control PRN. Avoid delirogenic medications.   Resp: Transferred to ICU with worsening respiratory status in setting of acute hypoxic hypercapnic respiratory failure and cardiogenic shock. SGC placed for invasive hemodynamic monitoring and guidance in titration of vasopressors/ inotropic support. Initial PAP 60-70, with CO/CI 3.1/1.32. Due to severe pHTN and probable TR, PA catheter requiring frequent re-positioning. Daily CXR for placement. Stress dose steroids. Teagan at 20 PPM for pulmonary artery vasodilation. Maintaining O2>93% on HFNC. Actively titrating FiO2/Flow as tolerated. High threshold for intubation due to high risk for sudden cardiac death with induction and positive pressure ventilation.   CV: Shock state likely cardiogenic in nature as evidenced by lactic acidosis, SARAH, transaminitis, cool distal extremities, and poor UOP in the face of acute on chronic RV failure and severe pHTN. Calculated SVR is elevated, consistent with cardiogenic shock rather than distributive shock secondary to sepsis. Milrinone gtt for RV failure, renally adjusted. Attempted to transition of Norepinephrine gtt; however with downtrending hemodynamics and worsening tachycardia. Initiated on Phenylephrine gtt, titrate to MAP >65-70. Continues to have intermittent episodes of Afib with RVR despite Amio load and gtt. Will attempt rhythm control with Digoxin, renally dosed. Monitoring dynamic end-points of perfusion, including serial lactate, mvO2, and end organ function levels. Despite downtrending lactate and improved UOP, distal extremities remain cool to touch c/w ongoing malperfusion.   GI: DASH Diet, tolerating well. GI prophylaxis with Protonix.   Renal: Trial of Lasix gtt initially, however remained oliguric. Initially with adequate response to IVP Bumex, but unable to tolerate in setting of worsening SARAH. Transitioned off Bumex gtt with subsequent oliquria. Will give 1mg Bumex, target net negative fluid balance of approximately 1.5L and CVP <8. No urgent need for CRRT at present. Continue trial of aggressive diuresis and trend renal indices. Likely will require CRRT in future if aggressive medical management unsuccessful at mitigating acidemia, hyperkalemia, and fluid overload. Continue Lokelma for chronic hyperkalemia. Serial BMPs Q4-6hrs, monitoring and supplementing electrolytes with aggressive diuretic therapy.  ID: Empiric treatment for pneumonia with Zosyn. Blood cultures 10/18 negative to date; urine Legionella Ag negative, RVP negative.  Heme: H/H stable. Trend H/H, transfuse for hgb <7.0 if necessary. Full AC with Eliquis previously, will transition to Heparin gtt for chronic VTE. SCDs in place.

## 2023-10-24 NOTE — PROGRESS NOTE ADULT - ASSESSMENT
SARAH --->  ATN +/- component Vanco toxicity , Low flow w/ Cardiogenic shock ---> Now non-oliguric  Placed on Milrinone and Levophed   Not intubated --> awake and interactive   ECHO noted ---> Poor LV visualization , but [place] readings noted   ? Recent Dx / Suspicion of SLE ---> Antecedent h/o steroids x 1 mo PTA ---> started stress steroids   UA with no sig RBC or protein   CPK 27   Mixed acidosis , lactate and hyperkalemia - better   Bumex drip now held   Transient IV bicarb   UO improved in major   Vanco held / Remains on Zosyn   SLE serology reordered - dsDNA  > 347 ---> Prior prednisone 20 mg daily PTA , placed on stress dose steroids   Associated Ischemic hepatitis with coagulopathy   Complements , JONATHAN , KATHIE , and ANCA pending   No pressing need for acute renal replacement therapy at this moment , but will follow closely

## 2023-10-24 NOTE — PROGRESS NOTE ADULT - SUBJECTIVE AND OBJECTIVE BOX
NEPHROLOGY INTERVAL HPI/OVERNIGHT EVENTS:    UO improved > 2 L   Off Bumex   Lactate normalized  Cardio follow up noted   + Milrinone / Phenyleprine  ---> Vasopressin added   Cardiac Index improved to 2.7       MEDICATIONS  (STANDING):  aMIOdarone    Tablet 400 milliGRAM(s) Oral every 8 hours  aMIOdarone    Tablet   Oral   budesonide 160 MICROgram(s)/formoterol 4.5 MICROgram(s) Inhaler 2 Puff(s) Inhalation two times a day  chlorhexidine 2% Cloths 1 Application(s) Topical <User Schedule>  heparin  Infusion. 1200 Unit(s)/Hr (12 mL/Hr) IV Continuous <Continuous>  hydrocortisone sodium succinate Injectable 50 milliGRAM(s) IV Push every 6 hours  influenza   Vaccine 0.5 milliLiter(s) IntraMuscular once  milrinone Infusion 0.25 MICROgram(s)/kG/Min (11.2 mL/Hr) IV Continuous <Continuous>  montelukast 10 milliGRAM(s) Oral daily  mupirocin 2% Ointment 1 Application(s) Both Nostrils two times a day  nystatin Powder 1 Application(s) Topical two times a day  PARoxetine 20 milliGRAM(s) Oral daily  phenylephrine    Infusion 0.1 MICROgram(s)/kG/Min (5.58 mL/Hr) IV Continuous <Continuous>  piperacillin/tazobactam IVPB.. 3.375 Gram(s) IV Intermittent every 12 hours  vasopressin Infusion 0.04 Unit(s)/Min (6 mL/Hr) IV Continuous <Continuous>    MEDICATIONS  (PRN):  albuterol/ipratropium for Nebulization 3 milliLiter(s) Nebulizer every 6 hours PRN Shortness of Breath and/or Wheezing  aluminum hydroxide/magnesium hydroxide/simethicone Suspension 30 milliLiter(s) Oral every 4 hours PRN Dyspepsia  melatonin 3 milliGRAM(s) Oral at bedtime PRN Insomnia  ondansetron Injectable 4 milliGRAM(s) IV Push every 8 hours PRN Nausea and/or Vomiting  oxyCODONE    IR 2.5 milliGRAM(s) Oral every 6 hours PRN Moderate Pain (4 - 6)  oxyCODONE    IR 5 milliGRAM(s) Oral every 6 hours PRN Severe Pain (7 - 10)      Allergies    iodine (Hives)  ceftriaxone (Hives)  shellfish (Hives)    Intolerances              Vital Signs Last 24 Hrs  T(C): 37.7 (24 Oct 2023 08:00), Max: 37.7 (24 Oct 2023 08:00)  T(F): 99.9 (24 Oct 2023 08:00), Max: 99.9 (24 Oct 2023 08:00)  HR: 98 (24 Oct 2023 13:00) (94 - 122)  BP: 106/66 (24 Oct 2023 13:00) (83/63 - 127/73)  BP(mean): 78 (24 Oct 2023 13:00) (60 - 94)  RR: 21 (24 Oct 2023 13:00) (17 - 28)  SpO2: 92% (24 Oct 2023 13:00) (90% - 99%)    Parameters below as of 24 Oct 2023 12:00  Patient On (Oxygen Delivery Method): nasal cannula, high flow      Daily     Daily Weight in k.4 (24 Oct 2023 03:00)  I&O's Detail    23 Oct 2023 07:01  -  24 Oct 2023 07:00  --------------------------------------------------------  IN:    Amiodarone: 233.1 mL    Amiodarone: 233.8 mL    Heparin Infusion: 451.7 mL    IV PiggyBack: 200 mL    Lactated Ringers Bolus: 500 mL    Milrinone: 285.6 mL    Norepinephrine: 71.4 mL    Oral Fluid: 60 mL    Phenylephrine: 60.3 mL    Vasopressin: 114 mL  Total IN: 2209.9 mL    OUT:    Indwelling Catheter - Urethral (mL): 2220 mL    Stool (mL): 4 mL  Total OUT: 2224 mL    Total NET: -14.1 mL      24 Oct 2023 07:01  -  24 Oct 2023 13:19  --------------------------------------------------------  IN:    Amiodarone: 66.8 mL    Heparin Infusion: 126 mL    Milrinone: 71.4 mL    Phenylephrine: 40.2 mL    Vasopressin: 36 mL  Total IN: 340.4 mL    OUT:    Indwelling Catheter - Urethral (mL): 270 mL  Total OUT: 270 mL    Total NET: 70.4 mL        I&O's Summary    23 Oct 2023 07:  -  24 Oct 2023 07:00  --------------------------------------------------------  IN: 2209.9 mL / OUT: 2224 mL / NET: -14.1 mL    24 Oct 2023 07:01  -  24 Oct 2023 13:19  --------------------------------------------------------  IN: 340.4 mL / OUT: 270 mL / NET: 70.4 mL        PHYSICAL EXAM:    GENERAL: NAD, alert   HEAD:  Nasal Can   NECK: Supple, No JVD, R neck cordice   NERVOUS SYSTEM:  Alert & Oriented X3  CHEST/LUNG: EAE , Some basilar crackles   HEART: Regular rate and rhythm; No murmurs, rubs, or gallops  ABDOMEN: Soft, Nontender, Nondistended; Bowel sounds present  EXTREMITIES:  decreased edema , no myositis RUE erythematous patches. Right posterior upper thigh/gluteus LABS:                        9.9    7.62  )-----------( 199      ( 24 Oct 2023 07:45 )             32.9     10-24    131<L>  |  89<L>  |  74.7<H>  ----------------------------<  223<H>  4.3   |  28.0  |  2.78<H>    Ca    7.5<L>      24 Oct 2023 07:45  Phos  6.2     10-24  Mg     2.0     10-24    TPro  7.7  /  Alb  2.7<L>  /  TBili  2.6<H>  /  DBili  x   /  AST  1154<H>  /  ALT  1703<H>  /  AlkPhos  149<H>  10-24    PT/INR - ( 23 Oct 2023 02:05 )   PT: 40.7 sec;   INR: 3.81 ratio         PTT - ( 24 Oct 2023 07:45 )  PTT:72.2 sec  Urinalysis Basic - ( 24 Oct 2023 07:45 )    Color: x / Appearance: x / SG: x / pH: x  Gluc: 223 mg/dL / Ketone: x  / Bili: x / Urobili: x   Blood: x / Protein: x / Nitrite: x   Leuk Esterase: x / RBC: x / WBC x   Sq Epi: x / Non Sq Epi: x / Bacteria: x      Phosphorus: 6.2 mg/dL (10-24 @ 07:45)  Magnesium: 2.0 mg/dL (10-24 @ 07:45)  Magnesium: 2.0 mg/dL (10-24 @ 03:55)  Phosphorus: 6.4 mg/dL (10-24 @ 03:55)  Phosphorus: 6.6 mg/dL (10-23 @ 21:40)  Magnesium: 2.0 mg/dL (10-23 @ 15:00)  Phosphorus: 6.6 mg/dL (10-23 @ 15:00)    ABG - ( 24 Oct 2023 05:03 )  pH, Arterial: 7.250 pH, Blood: x     /  pCO2: 66    /  pO2: 71    / HCO3: 29    / Base Excess: 1.7   /  SaO2: 92.3                  RADIOLOGY & ADDITIONAL TESTS:

## 2023-10-24 NOTE — PROGRESS NOTE ADULT - SUBJECTIVE AND OBJECTIVE BOX
Patient is a 38y old  Female who presents with a chief complaint of Acute on Chronic Hypoxic Respiratory Failure 2/2 CHF Exacerbation (23 Oct 2023 16:02)      BRIEF HOSPITAL COURSE-  37 yo female, PMHx HFpEF, B/L LE DVT + recurrent PE on Eliquis, HTN, asthma, chronic hyperkalemia on daily Lokelma, anxiety, morbid obesity (BMI 60), who presented to ED initially for evaluation of weakness, unable to get up out of bed on her own. Patient reports she has been undergoing outpatient evaluation for possible Lupus, given recent history of RUE and facial rash that is exacerbated by sunlight and joint pains; she was evaluated by an outpatient rheumatologist, but has not yet received the results of her workup. She was started on Prednisone 20mg daily approximately 1 month ago by her PMD. Over the past 1.5 weeks, she reports "pneumonia symptoms" including worsening dyspnea on exertion, non-productive cough, LE edema, and rapid weight gain (reports 10lb weight gain overnight despite her diuretics). She denies fever or chills, URI symptoms, dysuria, hematuria, or recent sick contacts. She was admitted to medicine service for treatment of pneumonia. Patient refused to undergo CT chest due to inability to lay flat. Over the course of her hospitalization, she has had worsening end-organ dysfunction, with progressive SARAH, anion gap metabolic acidosis, and transaminitis. She has been seen by Cardiology, Nephrology, and GI. Diuretics were initially increased by Cardiology, and then held with worsening SARAH and was started on sodium bicarbonate infusion instead. Despite these interventions, her end organ function has continued to deteriorate. She developed hypotension and was given IV fluid boluses without response. She was ultimately transferred to MICU on 10/22 with multiorgan failure and shock. TTE revealed severe RV dilation and failure; LV, pulmonary artery, TV, pericardium not well visualized due to body habitus.       Events last 24 hours:       Review of Systems:  CONSTITUTIONAL: No fever, chills, or fatigue  EYES: No eye pain, visual disturbances, or discharge  ENMT:  No difficulty hearing, tinnitus, vertigo; No sinus or throat pain  NECK: No pain or stiffness  RESPIRATORY: No cough, wheezing, chills or hemoptysis; No shortness of breath  CARDIOVASCULAR: No chest pain, palpitations, dizziness, or leg swelling  GASTROINTESTINAL: No abdominal or epigastric pain. No nausea, vomiting, or hematemesis; No diarrhea or constipation. No melena or hematochezia.  GENITOURINARY: No dysuria, frequency, hematuria, or incontinence  NEUROLOGICAL: No headaches, memory loss, loss of strength, numbness, or tremors  SKIN: No itching, burning, rashes, or lesions   MUSCULOSKELETAL: No joint pain or swelling; No muscle, back, or extremity pain  PSYCHIATRIC: No depression, anxiety, mood swings, or difficulty sleeping        PAST MEDICAL & SURGICAL HISTORY-  Pulmonary embolism      DVT, lower extremity      CHF (congestive heart failure)      Asthma      Anxiety      Severe obesity (BMI >= 40)      Hypertension      No significant past surgical history          Medications:  piperacillin/tazobactam IVPB.. 3.375 Gram(s) IV Intermittent every 12 hours    aMIOdarone Infusion 0.5 mG/Min IV Continuous <Continuous>  aMIOdarone Infusion 1 mG/Min IV Continuous <Continuous>  milrinone Infusion 0.25 MICROgram(s)/kG/Min IV Continuous <Continuous>  phenylephrine    Infusion 0.1 MICROgram(s)/kG/Min IV Continuous <Continuous>    albuterol/ipratropium for Nebulization 3 milliLiter(s) Nebulizer every 6 hours PRN  budesonide 160 MICROgram(s)/formoterol 4.5 MICROgram(s) Inhaler 2 Puff(s) Inhalation two times a day  montelukast 10 milliGRAM(s) Oral daily    melatonin 3 milliGRAM(s) Oral at bedtime PRN  ondansetron Injectable 4 milliGRAM(s) IV Push every 8 hours PRN  oxyCODONE    IR 5 milliGRAM(s) Oral every 6 hours PRN  oxyCODONE    IR 2.5 milliGRAM(s) Oral every 6 hours PRN  PARoxetine 20 milliGRAM(s) Oral daily    heparin  Infusion. 1200 Unit(s)/Hr IV Continuous <Continuous>    aluminum hydroxide/magnesium hydroxide/simethicone Suspension 30 milliLiter(s) Oral every 4 hours PRN    hydrocortisone sodium succinate Injectable 50 milliGRAM(s) IV Push every 6 hours  vasopressin Infusion 0.04 Unit(s)/Min IV Continuous <Continuous>    influenza   Vaccine 0.5 milliLiter(s) IntraMuscular once    chlorhexidine 2% Cloths 1 Application(s) Topical <User Schedule>  mupirocin 2% Ointment 1 Application(s) Both Nostrils two times a day  nystatin Powder 1 Application(s) Topical two times a day        ICU Vital Signs Last 24 Hrs  T(C): 35.6 (24 Oct 2023 03:00), Max: 36.6 (23 Oct 2023 07:00)  T(F): 96.1 (24 Oct 2023 03:00), Max: 97.9 (23 Oct 2023 07:00)  HR: 95 (24 Oct 2023 03:00) (94 - 156)  BP: 98/59 (24 Oct 2023 03:00) (78/53 - 125/92)  BP(mean): 71 (24 Oct 2023 03:00) (58 - 101)  ABP: --  ABP(mean): --  RR: 21 (24 Oct 2023 03:00) (16 - 28)  SpO2: 97% (24 Oct 2023 03:00) (90% - 98%)    O2 Parameters below as of 24 Oct 2023 00:15  Patient On (Oxygen Delivery Method): nasal cannula, high flow        ABG - ( 23 Oct 2023 05:59 )  pH, Arterial: 7.270 pH, Blood: x     /  pCO2: 61    /  pO2: 110   / HCO3: 28    / Base Excess: 1.1   /  SaO2: 99.0            I&O's Detail    22 Oct 2023 07:01  -  23 Oct 2023 07:00  --------------------------------------------------------  IN:    Bumetanide: 5 mL    Bumetanide: 17.5 mL    Furosemide: 50 mL    Heparin Infusion: 87 mL    IV PiggyBack: 150 mL    Milrinone: 183.3 mL    Norepinephrine: 182.6 mL    Sodium Bicarbonate: 600 mL  Total IN: 1275.4 mL    OUT:    Indwelling Catheter - Urethral (mL): 2580 mL  Total OUT: 2580 mL    Total NET: -1304.6 mL      23 Oct 2023 07:01  -  24 Oct 2023 03:43  --------------------------------------------------------  IN:    Amiodarone: 233.1 mL    Amiodarone: 167 mL    Heparin Infusion: 367.7 mL    IV PiggyBack: 150 mL    Lactated Ringers Bolus: 500 mL    Milrinone: 238 mL    Norepinephrine: 71.4 mL    Oral Fluid: 60 mL    Phenylephrine: 33.5 mL    Vasopressin: 90 mL  Total IN: 1910.7 mL    OUT:    Indwelling Catheter - Urethral (mL): 2070 mL    Stool (mL): 3 mL  Total OUT: 2073 mL    Total NET: -162.3 mL        LABS:                        10.0   8.17  )-----------( 211      ( 23 Oct 2023 21:40 )             33.9       128<L>  |  88<L>  |  71.7<H>  ----------------------------<  209<H>  4.5   |  23.0  |  2.61<H>    Ca    7.3<L>      23 Oct 2023 21:40  Phos  6.6     10-23  Mg     2.0     10-23    TPro  7.8  /  Alb  2.7<L>  /  TBili  2.6<H>  /  DBili  x   /  AST  1565<H>  /  ALT  1878<H>  /  AlkPhos  151<H>  10-23      CARDIAC MARKERS ( 22 Oct 2023 16:00 )  x     / 0.02 ng/mL / x     / x     / x      CARDIAC MARKERS ( 22 Oct 2023 07:35 )  x     / x     / 27 U/L / x     / x          CAPILLARY BLOOD GLUCOSE      PT/INR - ( 23 Oct 2023 02:05 )   PT: 40.7 sec;   INR: 3.81 ratio    PTT - ( 24 Oct 2023 01:05 )  PTT:74.8 sec      Urinalysis Basic - ( 23 Oct 2023 21:40 )  Color: x / Appearance: x / SG: x / pH: x  Gluc: 209 mg/dL / Ketone: x  / Bili: x / Urobili: x   Blood: x / Protein: x / Nitrite: x   Leuk Esterase: x / RBC: x / WBC x   Sq Epi: x / Non Sq Epi: x / Bacteria: x      CULTURES:  Culture Results:   No growth at 24 hours (10-22 @ 18:50)  Rapid RVP Result: NotDetec (10-22 @ 16:18)  Culture Results:   No growth at 24 hours (10-22 @ 16:00)  Rapid RVP Result: NotDetec (10-18 @ 12:05)  Culture Results:   No growth at 5 days (10-18 @ 11:30)  Culture Results:   No growth at 5 days (10-18 @ 11:28)      Physical Examination:  General: No acute distress.    HEENT: Pupils equal, reactive to light.  Symmetric.  PULM: Clear to auscultation bilaterally, no significant sputum production  NECK: Supple, no lymphadenopathy, trachea midline  CVS: Regular rate and rhythm, no murmurs, rubs, or gallops  ABD: Soft, nondistended, nontender, normoactive bowel sounds, no masses  EXT: No edema, nontender  SKIN: Warm and well perfused, no rashes noted.  NEURO: Alert, oriented, interactive, nonfocal  DEVICES:     RADIOLOGY: ***    CRITICAL CARE TIME SPENT: ** minutes assessing presenting problems of acute illness, which pose high probability of life threatening deterioration or end organ damage/dysfunction, as well as medical decision making including initiating plan of care, reviewing data, reviewing radiologic exams, discussing with multidisciplinary team,  discussing goals of care with patient/family, and writing this note.  Non-inclusive of procedures performed,   Patient is a 38y old  Female who presents with a chief complaint of Acute on Chronic Hypoxic Respiratory Failure 2/2 CHF Exacerbation (23 Oct 2023 16:02)      BRIEF HOSPITAL COURSE-  37 y/o Female with PMHx HFpEF, B/L LE DVT + Recurrent PE (On Eliquis), HTN, Asthma, Chronic Hyperkalemia (On Lokelma), Anxiety, Morbid obesity (BMI 60) presents to [facility] ED complaining of weakness. Patient reports she has been undergoing outpatient evaluation for Lupus, given recent history of RUE and facial rash that is exacerbated by sunlight and joint pains. She was started on Prednisone 20mg daily approximately 1 month ago by her PMD. Over the past 1.5 weeks, she reports "pneumonia symptoms" including worsening dyspnea on exertion, non-productive cough, LE edema, and rapid weight gain (reports 10lb weight gain overnight despite her diuretics). Admitted to Medicine for treatment of pneumonia. Course complicated by worsening end-organ dysfunction, with progressive SARAH, anion gap metabolic acidosis, and transaminitis. Transferred to MICU on for worsening multiorgan failure and shock.        Events last 24 hours:   Persistently tachycardia, with intermittent episodes of Afib w/ RVR. s/p Amiodarone bolus with continuation of gtt. However given significant transaminitis will attempt Digoxin. Attempt to transition off of pressors, however with subsequent downtrending hemodynamics and urine output.       Review of Systems:  CONSTITUTIONAL: +Fatigue. No fever or chills.  EYES: No eye pain, visual disturbances, or discharge.  ENMT: No difficulty hearing, tinnitus, vertigo. No sinus or throat pain.  NECK: No pain or stiffness.  RESPIRATORY: No cough, wheezing, chills or hemoptysis. No shortness of breath.  CARDIOVASCULAR: No chest pain, palpitations, dizziness, or leg swelling.  GASTROINTESTINAL: No abdominal or epigastric pain. No nausea, vomiting, or hematemesis; No diarrhea or constipation. No melena or hematochezia.  GENITOURINARY: No dysuria, frequency, hematuria, or incontinence.  NEUROLOGICAL: No headaches, memory loss, loss of strength, numbness, or tremors.  SKIN: No itching, burning, rashes, or lesions.  MUSCULOSKELETAL: No joint pain or swelling; No muscle, back, or extremity pain.  PSYCHIATRIC: No depression, anxiety, mood swings, or difficulty sleeping.      PAST MEDICAL & SURGICAL HISTORY-  Pulmonary embolism      DVT, lower extremity      CHF (congestive heart failure)      Asthma      Anxiety      Severe obesity (BMI >= 40)      Hypertension      No significant past surgical history          Medications:  piperacillin/tazobactam IVPB.. 3.375 Gram(s) IV Intermittent every 12 hours    aMIOdarone Infusion 0.5 mG/Min IV Continuous <Continuous>  aMIOdarone Infusion 1 mG/Min IV Continuous <Continuous>  milrinone Infusion 0.25 MICROgram(s)/kG/Min IV Continuous <Continuous>  phenylephrine    Infusion 0.1 MICROgram(s)/kG/Min IV Continuous <Continuous>    albuterol/ipratropium for Nebulization 3 milliLiter(s) Nebulizer every 6 hours PRN  budesonide 160 MICROgram(s)/formoterol 4.5 MICROgram(s) Inhaler 2 Puff(s) Inhalation two times a day  montelukast 10 milliGRAM(s) Oral daily    melatonin 3 milliGRAM(s) Oral at bedtime PRN  ondansetron Injectable 4 milliGRAM(s) IV Push every 8 hours PRN  oxyCODONE    IR 5 milliGRAM(s) Oral every 6 hours PRN  oxyCODONE    IR 2.5 milliGRAM(s) Oral every 6 hours PRN  PARoxetine 20 milliGRAM(s) Oral daily    heparin  Infusion. 1200 Unit(s)/Hr IV Continuous <Continuous>    aluminum hydroxide/magnesium hydroxide/simethicone Suspension 30 milliLiter(s) Oral every 4 hours PRN    hydrocortisone sodium succinate Injectable 50 milliGRAM(s) IV Push every 6 hours  vasopressin Infusion 0.04 Unit(s)/Min IV Continuous <Continuous>    influenza   Vaccine 0.5 milliLiter(s) IntraMuscular once    chlorhexidine 2% Cloths 1 Application(s) Topical <User Schedule>  mupirocin 2% Ointment 1 Application(s) Both Nostrils two times a day  nystatin Powder 1 Application(s) Topical two times a day        ICU Vital Signs Last 24 Hrs  T(C): 35.6 (24 Oct 2023 03:00), Max: 36.6 (23 Oct 2023 07:00)  T(F): 96.1 (24 Oct 2023 03:00), Max: 97.9 (23 Oct 2023 07:00)  HR: 95 (24 Oct 2023 03:00) (94 - 156)  BP: 98/59 (24 Oct 2023 03:00) (78/53 - 125/92)  BP(mean): 71 (24 Oct 2023 03:00) (58 - 101)  ABP: --  ABP(mean): --  RR: 21 (24 Oct 2023 03:00) (16 - 28)  SpO2: 97% (24 Oct 2023 03:00) (90% - 98%)    O2 Parameters below as of 24 Oct 2023 00:15  Patient On (Oxygen Delivery Method): nasal cannula, high flow        ABG - ( 23 Oct 2023 05:59 )  pH, Arterial: 7.270 pH, Blood: x     /  pCO2: 61    /  pO2: 110   / HCO3: 28    / Base Excess: 1.1   /  SaO2: 99.0          I&O's Detail    22 Oct 2023 07:01  -  23 Oct 2023 07:00  --------------------------------------------------------  IN:    Bumetanide: 5 mL    Bumetanide: 17.5 mL    Furosemide: 50 mL    Heparin Infusion: 87 mL    IV PiggyBack: 150 mL    Milrinone: 183.3 mL    Norepinephrine: 182.6 mL    Sodium Bicarbonate: 600 mL  Total IN: 1275.4 mL    OUT:    Indwelling Catheter - Urethral (mL): 2580 mL  Total OUT: 2580 mL    Total NET: -1304.6 mL      23 Oct 2023 07:01  -  24 Oct 2023 03:43  --------------------------------------------------------  IN:    Amiodarone: 233.1 mL    Amiodarone: 167 mL    Heparin Infusion: 367.7 mL    IV PiggyBack: 150 mL    Lactated Ringers Bolus: 500 mL    Milrinone: 238 mL    Norepinephrine: 71.4 mL    Oral Fluid: 60 mL    Phenylephrine: 33.5 mL    Vasopressin: 90 mL  Total IN: 1910.7 mL    OUT:    Indwelling Catheter - Urethral (mL): 2070 mL    Stool (mL): 3 mL  Total OUT: 2073 mL    Total NET: -162.3 mL        LABS:                        10.0   8.17  )-----------( 211      ( 23 Oct 2023 21:40 )             33.9       128<L>  |  88<L>  |  71.7<H>  ----------------------------<  209<H>  4.5   |  23.0  |  2.61<H>    Ca    7.3<L>      23 Oct 2023 21:40  Phos  6.6     10-23  Mg     2.0     10-23    TPro  7.8  /  Alb  2.7<L>  /  TBili  2.6<H>  /  DBili  x   /  AST  1565<H>  /  ALT  1878<H>  /  AlkPhos  151<H>  10-23      CARDIAC MARKERS ( 22 Oct 2023 16:00 )  x     / 0.02 ng/mL / x     / x     / x      CARDIAC MARKERS ( 22 Oct 2023 07:35 )  x     / x     / 27 U/L / x     / x          CAPILLARY BLOOD GLUCOSE      PT/INR - ( 23 Oct 2023 02:05 )   PT: 40.7 sec;   INR: 3.81 ratio    PTT - ( 24 Oct 2023 01:05 )  PTT:74.8 sec      Urinalysis Basic - ( 23 Oct 2023 21:40 )  Color: x / Appearance: x / SG: x / pH: x  Gluc: 209 mg/dL / Ketone: x  / Bili: x / Urobili: x   Blood: x / Protein: x / Nitrite: x   Leuk Esterase: x / RBC: x / WBC x   Sq Epi: x / Non Sq Epi: x / Bacteria: x      CULTURES:  Culture Results:   No growth at 24 hours (10-22 @ 18:50)  Rapid RVP Result: NotDetec (10-22 @ 16:18)  Culture Results:   No growth at 24 hours (10-22 @ 16:00)  Rapid RVP Result: NotDetec (10-18 @ 12:05)  Culture Results:   No growth at 5 days (10-18 @ 11:30)  Culture Results:   No growth at 5 days (10-18 @ 11:28)      Physical Examination:  General: Middle aged obese female, toxic appearing. In no acute distress.    HEENT: Pupils equal, reactive to light. Symmetric.  PULM: Diminished to auscultation bilaterally, no adventitious sounds. No significant sputum production.  NECK: Supple, no lymphadenopathy, trachea midline.  CVS: Irregular rate, irregularly irregular rhythm. No murmurs, rubs, or gallops. S1, S2 intact.  ABD: Soft, nondistended, nontender, normoactive bowel sounds. No masses palpable.  EXT: B/L 3+ pitting edema. Nontender,  SKIN: Cool B/L LE, extending up to abdomen. No rashes noted.  NEURO: Alert, oriented, interactive, nonfocal  DEVICES:       RADIOLOGY-    < from: Xray Chest 1 View- PORTABLE-Urgent (Xray Chest 1 View- PORTABLE-Urgent .) (10.18.23 @ 14:14) >  ACC: [id number] EXAM:  XR CHEST PORTABLE URGENT 1V   ORDERED BY: ADAM CHANG     PROCEDURE DATE:  10/18/2023          INTERPRETATION:  TECHNIQUE: Single portable view of the chest.    COMPARISON:  7/19/2023, x-ray and chest CT    CLINICAL HISTORY: chest pain    FINDINGS:    Single frontal view of the chest demonstrates mild CHF. Left lower   lobe/retrocardiac consolidation. The cardiomediastinal silhouette is   enlarged. No acute osseous abnormalities. Overlying EKG leads and wires   are noted    IMPRESSION: Mild CHF. Left lower lobe/retrocardiac consolidation.   Consider chest CT.    --- End of Report ---      DMITRIY SINGH MD; Attending Radiologist  This document has been electronically signed. Oct 18 2023  3:26PM    < end of copied text >    < from: Xray Chest 1 View- PORTABLE-Urgent (Xray Chest 1 View- PORTABLE-Urgent .) (10.23.23 @ 11:45) >  ACC: [id number] EXAM:  XR CHEST PORTABLE URGENT 1V   ORDERED BY: MIGUELITO MERCEDES     PROCEDURE DATE:  10/23/2023      INTERPRETATION:  TIME OF EXAM: October 23, 2023 at 11:13 AM.    CLINICAL INFORMATION: Post Chamberlain. Right heart failure.    COMPARISON:October 23, 2023 at 5:27 AM.    TECHNIQUE:   AP Portable chest x-ray. Right costophrenic angle excluded   from image. Limited by rotation.    INTERPRETATION:    Heart size and the mediastinum cannot be accurately evaluated on this   projection.  There is a right IJ approach Chamberlain-Renny catheter with tip in the right   main pulmonary artery.  There are worsening diffuse right lung airspace opacities.  There is improved central left pulmonary vascular congestion.  No definite right pleural effusion.  Continued left lower/retrocardiac opacity with obscuration of the left   hemidiaphragm.  No pneumothorax seen.  No acute bony abnormality.      IMPRESSION:  Right IJ approach Chamberlain-Renny catheter with tip in the right   main pulmonary artery. No pneumothorax.    Worsening diffuse right lung airspace opacities, possibly asymmetric   pulmonary edema, although infection is not excluded.    Improved central left pulmonary vascular congestion.    Continued left lower/retrocardiac opacity, possibly a left pleural   effusion with associated passive atelectasis. Atelectasis of other cause,   and/or other pathology including, but not limited to, pneumonia is not   excluded.    --- End of Report ---    JOÃO BAXTER MD; Attending Radiologist  Thisdocument has been electronically signed. Oct 23 2023  3:50PM    < end of copied text >        CRITICAL CARE TIME SPENT: 109 minutes assessing presenting problems of acute illness, which pose high probability of life threatening deterioration or end organ damage/dysfunction, as well as medical decision making including initiating plan of care, reviewing data, reviewing radiologic exams, discussing with multidisciplinary team,  discussing goals of care with patient/family, and writing this note.  Non-inclusive of procedures performed,

## 2023-10-24 NOTE — PROGRESS NOTE ADULT - PROBLEM SELECTOR PLAN 1
Transaminitis consistent ischemic hepatitis and also a component of congestive hepatopathy. Patient with   with diastolic heart failure with right-sided heart failure with mild pulmonary hypertension, severely reduced RV systolic function, severely dilated RA.   US abd with no evidence of PVT. Showed patent portal circulation  Hepatitis panel negative   Trend Liver enzymes, coags, CBC  Avoid Hepatotoxic agents  Avoid hypotension, hypoxia  Further care per ICU team

## 2023-10-24 NOTE — PROGRESS NOTE ADULT - PROBLEM SELECTOR PLAN 4
Pre-capillary PH  - has a h/o PE (was on AC at home), not sure if she was worked up to CTEPH in the past   - would benefit from sildenafil once off vasopressors. Would likely need IV vasodilator. It's unclear if she's a candidate for home Veletri. MICU/Pulmonary to assess as well as SW. A less optimal option would be triple therapy (PO).  - V/Q scan when stable   - Consider bridging Teagan to vasodilators. Can start with sildenafil once pt is off pressors.

## 2023-10-24 NOTE — PROGRESS NOTE ADULT - ASSESSMENT
Initial HF c/s: Aline Magdaleno MD    39 YO F with morbid obesity (BMI 60), HFpEF but severe RV dysfunction (last hospitalized in July with HF exacerbation), prior DVT/PE on Eliquis (not sure if worked up for CTEPH in the past) and SLE + KATHIE who presented to [facility] on 10/18 with heart failure and progressed to shock with end organ dysfunction (lactate 4, SARAH, shock liver).     The MICU team placed a swan over the weekend which showed a RA pressure of ~12, PA ~80s/30’s, wedge not able to be obtained, and Thermo/Tg CI 1.7/2.2. She was started milrinone/levo and inhaled NO. Shock team was activated given RV predominant cardiogenic shock with severe pulmonary hypertension. Plan was to continue inotropes, diurese to keep CVP closer to 8, and adding flolan if inhaled NO doesn’t work.     This morning she reports feeling tired but denies any SOB. She remains with severe pre-capillary PH with low/normal PCW and CVP. She is also in Aflutter with RVR.     Hemodynamics:  10/24 milrinone 0.250 mcg/kg/min+vaso 0.04 u/min+phenylephrine 0.1 mcg/kg/min+Teagan 11 ppm CVP 8 OA 85/24/44 PCWP n/a PA sat 89.3% HR 95 /36/76  10/23 (mil 0.25, levo .08, Teagan 12 ppm): -140s, /72 (87), CVP 5, PA 87/33/52, PCW 7, PA sat 78.2%, Tg CO/CI 9.6/3.8,  dsc, PVR 4.69 CHARLTON.    Cardiac Studies:  10/22/23 TTE: LV poorly visualized, severe RVE with severe RV dysfunction.  7/20/23 TTE: LVIDd 4.17cm, LVEF 60-65%, severe RVE with mod RV dysfunction, mild MR, mild-mod TR, mild NC, est PASP 45.5 mmHg.

## 2023-10-24 NOTE — PROGRESS NOTE ADULT - ASSESSMENT
37 yo female, PMHx HFpEF, B/L LE DVT + recurrent PE on Eliquis, HTN, asthma, chronic hyperkalemia on daily Lokelma, anxiety, morbid obesity, who presented for evaluation of generalized weakness, shortness of breath, dyspnea, and weight gain. Admitted hypoxic respiratory failure, cardiogenic shock, SARAH, metabolic acidosis, transaminitis.

## 2023-10-24 NOTE — PROGRESS NOTE ADULT - SUBJECTIVE AND OBJECTIVE BOX
[place] [facility], [facility]                                   [address] [place] [address]                                                      PHONE: [phone]                                                         FAX: [phone]  [email or web address]  ---------------------------------------------------------------------------------------------------------------------------------    Overnight events/patient complaints:  Patient is feeling slightly better.  She remains in the ICU on milrinone, and vasopressin    PAST MEDICAL & SURGICAL HISTORY:  Pulmonary embolism      DVT, lower extremity      CHF (congestive heart failure)      Asthma      Anxiety      Severe obesity (BMI >= 40)      Hypertension      No significant past surgical history          iodine (Hives)  ceftriaxone (Hives)  shellfish (Hives)    MEDICATIONS  (STANDING):  aMIOdarone    Tablet   Oral   aMIOdarone    Tablet 400 milliGRAM(s) Oral every 8 hours  budesonide 160 MICROgram(s)/formoterol 4.5 MICROgram(s) Inhaler 2 Puff(s) Inhalation two times a day  chlorhexidine 2% Cloths 1 Application(s) Topical <User Schedule>  heparin  Infusion. 1200 Unit(s)/Hr (12 mL/Hr) IV Continuous <Continuous>  hydrocortisone sodium succinate Injectable 50 milliGRAM(s) IV Push every 6 hours  influenza   Vaccine 0.5 milliLiter(s) IntraMuscular once  milrinone Infusion 0.25 MICROgram(s)/kG/Min (11.2 mL/Hr) IV Continuous <Continuous>  montelukast 10 milliGRAM(s) Oral daily  mupirocin 2% Ointment 1 Application(s) Both Nostrils two times a day  nystatin Powder 1 Application(s) Topical two times a day  PARoxetine 20 milliGRAM(s) Oral daily  phenylephrine    Infusion 0.1 MICROgram(s)/kG/Min (5.58 mL/Hr) IV Continuous <Continuous>  piperacillin/tazobactam IVPB.. 3.375 Gram(s) IV Intermittent every 12 hours  vasopressin Infusion 0.04 Unit(s)/Min (6 mL/Hr) IV Continuous <Continuous>    MEDICATIONS  (PRN):  albuterol/ipratropium for Nebulization 3 milliLiter(s) Nebulizer every 6 hours PRN Shortness of Breath and/or Wheezing  aluminum hydroxide/magnesium hydroxide/simethicone Suspension 30 milliLiter(s) Oral every 4 hours PRN Dyspepsia  melatonin 3 milliGRAM(s) Oral at bedtime PRN Insomnia  ondansetron Injectable 4 milliGRAM(s) IV Push every 8 hours PRN Nausea and/or Vomiting  oxyCODONE    IR 2.5 milliGRAM(s) Oral every 6 hours PRN Moderate Pain (4 - 6)  oxyCODONE    IR 5 milliGRAM(s) Oral every 6 hours PRN Severe Pain (7 - 10)      Vital Signs Last 24 Hrs  T(C): 37.7 (24 Oct 2023 08:00), Max: 37.7 (24 Oct 2023 08:00)  T(F): 99.9 (24 Oct 2023 08:00), Max: 99.9 (24 Oct 2023 08:00)  HR: 97 (24 Oct 2023 11:45) (94 - 122)  BP: 105/61 (24 Oct 2023 11:45) (83/49 - 127/73)  BP(mean): 76 (24 Oct 2023 11:45) (58 - 94)  RR: 24 (24 Oct 2023 11:45) (17 - 28)  SpO2: 97% (24 Oct 2023 11:45) (90% - 99%)    Parameters below as of 24 Oct 2023 09:18  Patient On (Oxygen Delivery Method): nasal cannula, high flow      ICU Vital Signs Last 24 Hrs  MITCHEL GUNN  I&O's Detail    23 Oct 2023 07:01  -  24 Oct 2023 07:00  --------------------------------------------------------  IN:    Amiodarone: 233.1 mL    Amiodarone: 233.8 mL    Heparin Infusion: 451.7 mL    IV PiggyBack: 200 mL    Lactated Ringers Bolus: 500 mL    Milrinone: 285.6 mL    Norepinephrine: 71.4 mL    Oral Fluid: 60 mL    Phenylephrine: 60.3 mL    Vasopressin: 114 mL  Total IN: 2209.9 mL    OUT:    Indwelling Catheter - Urethral (mL): 2220 mL    Stool (mL): 4 mL  Total OUT: 2224 mL    Total NET: -14.1 mL      24 Oct 2023 07:01  -  24 Oct 2023 12:18  --------------------------------------------------------  IN:    Amiodarone: 66.8 mL    Heparin Infusion: 84 mL    Milrinone: 47.6 mL    Phenylephrine: 26.8 mL    Vasopressin: 24 mL  Total IN: 249.2 mL    OUT:    Indwelling Catheter - Urethral (mL): 230 mL  Total OUT: 230 mL    Total NET: 19.2 mL        I&O's Summary    23 Oct 2023 07:01  -  24 Oct 2023 07:00  --------------------------------------------------------  IN: 2209.9 mL / OUT: 2224 mL / NET: -14.1 mL    24 Oct 2023 07:01  -  24 Oct 2023 12:18  --------------------------------------------------------  IN: 249.2 mL / OUT: 230 mL / NET: 19.2 mL      Drug Dosing Weight  MITCHEL GUNN      PHYSICAL EXAM:  General: Morbid obesity  HEENT: Head; normocephalic, atraumatic.  Eyes: Pupils reactive, cornea wnl.  Neck: Supple, no nodes adenopathy, no JVD, no carotid bruit  CARDIOVASCULAR: Normal S1 and S2, No murmur, rub, gallop or lift.   LUNGS: Rales at lung bases  ABDOMEN: Soft, nontender, nondistended  EXTREMITIES: No clubbing or edema. Distal pulses wnl.   SKIN: warm and dry with normal turgor.  NEURO: Alert/oriented x 3/normal motor exam.   PSYCH: normal affect.        LABS:                        9.9    7.62  )-----------( 199      ( 24 Oct 2023 07:45 )             32.9     10-24    131<L>  |  89<L>  |  74.7<H>  ----------------------------<  223<H>  4.3   |  28.0  |  2.78<H>    Ca    7.5<L>      24 Oct 2023 07:45  Phos  6.2     10-24  Mg     2.0     10-24    TPro  7.7  /  Alb  2.7<L>  /  TBili  2.6<H>  /  DBili  x   /  AST  1154<H>  /  ALT  1703<H>  /  AlkPhos  149<H>  10-24    MITCHEL GUNN  CARDIAC MARKERS ( 24 Oct 2023 10:10 )  x     / 0.01 ng/mL / x     / x     / x      CARDIAC MARKERS ( 22 Oct 2023 16:00 )  x     / 0.02 ng/mL / x     / x     / x          PT/INR - ( 23 Oct 2023 02:05 )   PT: 40.7 sec;   INR: 3.81 ratio         PTT - ( 24 Oct 2023 07:45 )  PTT:72.2 sec  Urinalysis Basic - ( 24 Oct 2023 07:45 )    Color: x / Appearance: x / SG: x / pH: x  Gluc: 223 mg/dL / Ketone: x  / Bili: x / Urobili: x   Blood: x / Protein: x / Nitrite: x   Leuk Esterase: x / RBC: x / WBC x   Sq Epi: x / Non Sq Epi: x / Bacteria: x        RADIOLOGY & ADDITIONAL STUDIES:    INTERPRETATION OF TELEMETRY (personally reviewed):    ECG:    ECHO:    STRESS TEST:    CARDIAC CATHETERIZATION:    ASSESSMENT AND PLAN:  In summary, MITCHEL GUNN is an 38y Female with past medical history significant for morbid obesity, HFpEF, severe RV dysfunction, PE/DVT, SLE p/w heart failure/cardiogenic shock.    Cardiogenic shock- patient remains on vasopressin and milrinone in the ICE.  She is on phenylephrine and Teagan with high flow O2    -would check CO and wedge today  -attempt to wean milrinon  -continue pressor support  -wean supplemental O2 as tolerate    Atrial flutter- rates controled.  pt on IV amiodarone  -can add digoxin if rates >110    Thank you for allowing [facility] to participate in the care of this patient.  Please feel free to call with any questions or concerns.

## 2023-10-24 NOTE — PROGRESS NOTE ADULT - PROBLEM SELECTOR PLAN 3
Recommend EP c/s for ?DCCV. Should avoid amio given elevated LFTs  - Can consider adding digoxin (renally dosed)   - Continue AC with heparin gtt

## 2023-10-24 NOTE — PROGRESS NOTE ADULT - SUBJECTIVE AND OBJECTIVE BOX
INCOMPLETE NOTE      Subjective:  No overnight events    Medications:  albuterol/ipratropium for Nebulization 3 milliLiter(s) Nebulizer every 6 hours PRN  aluminum hydroxide/magnesium hydroxide/simethicone Suspension 30 milliLiter(s) Oral every 4 hours PRN  aMIOdarone Infusion 0.5 mG/Min IV Continuous <Continuous>  aMIOdarone Infusion 1 mG/Min IV Continuous <Continuous>  budesonide 160 MICROgram(s)/formoterol 4.5 MICROgram(s) Inhaler 2 Puff(s) Inhalation two times a day  chlorhexidine 2% Cloths 1 Application(s) Topical <User Schedule>  heparin  Infusion. 1200 Unit(s)/Hr IV Continuous <Continuous>  hydrocortisone sodium succinate Injectable 50 milliGRAM(s) IV Push every 6 hours  influenza   Vaccine 0.5 milliLiter(s) IntraMuscular once  melatonin 3 milliGRAM(s) Oral at bedtime PRN  milrinone Infusion 0.25 MICROgram(s)/kG/Min IV Continuous <Continuous>  montelukast 10 milliGRAM(s) Oral daily  mupirocin 2% Ointment 1 Application(s) Both Nostrils two times a day  nystatin Powder 1 Application(s) Topical two times a day  ondansetron Injectable 4 milliGRAM(s) IV Push every 8 hours PRN  oxyCODONE    IR 5 milliGRAM(s) Oral every 6 hours PRN  oxyCODONE    IR 2.5 milliGRAM(s) Oral every 6 hours PRN  PARoxetine 20 milliGRAM(s) Oral daily  phenylephrine    Infusion 0.1 MICROgram(s)/kG/Min IV Continuous <Continuous>  piperacillin/tazobactam IVPB.. 3.375 Gram(s) IV Intermittent every 12 hours  vasopressin Infusion 0.04 Unit(s)/Min IV Continuous <Continuous>    Vitals:  T(C): 35.6 (10-24-23 @ 03:00), Max: 36.6 (10-23-23 @ 08:00)  HR: 95 (10-24-23 @ 03:00) (94 - 156)  BP: 98/59 (10-24-23 @ 03:00) (83/49 - 124/91)  BP(mean): 71 (10-24-23 @ 03:00) (58 - 99)  RR: 21 (10-24-23 @ 03:00) (18 - 28)  SpO2: 98% (10-24-23 @ 06:01) (90% - 98%)    Daily     Daily Weight in k.4 (24 Oct 2023 03:00)        I&O's Summary    23 Oct 2023 07:01  -  24 Oct 2023 07:00  --------------------------------------------------------  IN: 1910.7 mL / OUT: 2073 mL / NET: -162.3 mL        Physical Exam:  Appearance: No Acute Distress  HEENT: JVP non elevated  Cardiovascular: RRR, Normal S1 S2, No murmurs/rubs/gallops  Respiratory: Clear to auscultation bilaterally  Gastrointestinal: Soft, Non-tender, non-distended	  Skin: no skin lesions  Neurologic: Non-focal  Extremities: No LE edema, warm and well perfused  Psychiatry: A & O x 3, Mood & affect appropriate      Labs:                        10.0   8.00  )-----------( 220      ( 24 Oct 2023 03:55 )             33.4     10-24    130<L>  |  88<L>  |  74.9<H>  ----------------------------<  212<H>  4.2   |  26.0  |  2.75<H>    Ca    7.3<L>      24 Oct 2023 03:55  Phos  6.4     10-24  Mg     2.0     10-24    TPro  7.7  /  Alb  2.7<L>  /  TBili  2.6<H>  /  DBili  x   /  AST  1289<H>  /  ALT  1780<H>  /  AlkPhos  149<H>  1024      PT/INR - ( 23 Oct 2023 02:05 )   PT: 40.7 sec;   INR: 3.81 ratio         PTT - ( 24 Oct 2023 01:05 )  PTT:74.8 sec  CARDIAC MARKERS ( 22 Oct 2023 16:00 )  x     / 0.02 ng/mL / x     / x     / x                Lactate, Blood: 1.8 mmol/L (10-23 @ 15:00)  Lactate, Blood: 1.8 mmol/L (10-23 @ 07:49)  Lactate, Blood: 4.1 mmol/L (10-22 @ 16:00)     Subjective:  No overnight events reported.  Pt reports pain on her back and buttocks due to cellulitis.  Started on amio as per EP recs.  Levo gtt transitioned to vaso/phenylephrine.     Medications:  albuterol/ipratropium for Nebulization 3 milliLiter(s) Nebulizer every 6 hours PRN  aluminum hydroxide/magnesium hydroxide/simethicone Suspension 30 milliLiter(s) Oral every 4 hours PRN  aMIOdarone Infusion 0.5 mG/Min IV Continuous <Continuous>  aMIOdarone Infusion 1 mG/Min IV Continuous <Continuous>  budesonide 160 MICROgram(s)/formoterol 4.5 MICROgram(s) Inhaler 2 Puff(s) Inhalation two times a day  chlorhexidine 2% Cloths 1 Application(s) Topical <User Schedule>  heparin  Infusion. 1200 Unit(s)/Hr IV Continuous <Continuous>  hydrocortisone sodium succinate Injectable 50 milliGRAM(s) IV Push every 6 hours  influenza   Vaccine 0.5 milliLiter(s) IntraMuscular once  melatonin 3 milliGRAM(s) Oral at bedtime PRN  milrinone Infusion 0.25 MICROgram(s)/kG/Min IV Continuous <Continuous>  montelukast 10 milliGRAM(s) Oral daily  mupirocin 2% Ointment 1 Application(s) Both Nostrils two times a day  nystatin Powder 1 Application(s) Topical two times a day  ondansetron Injectable 4 milliGRAM(s) IV Push every 8 hours PRN  oxyCODONE    IR 5 milliGRAM(s) Oral every 6 hours PRN  oxyCODONE    IR 2.5 milliGRAM(s) Oral every 6 hours PRN  PARoxetine 20 milliGRAM(s) Oral daily  phenylephrine    Infusion 0.1 MICROgram(s)/kG/Min IV Continuous <Continuous>  piperacillin/tazobactam IVPB.. 3.375 Gram(s) IV Intermittent every 12 hours  vasopressin Infusion 0.04 Unit(s)/Min IV Continuous <Continuous>    Vitals:  T(C): 35.6 (10-24-23 @ 03:00), Max: 36.6 (10-23-23 @ 08:00)  HR: 95 (10-24-23 @ 03:00) (94 - 156)  BP: 98/59 (10-24-23 @ 03:00) (83/49 - 124/91)  BP(mean): 71 (10-24-23 @ 03:00) (58 - 99)  RR: 21 (10-24-23 @ 03:00) (18 - 28)  SpO2: 98% (10-24-23 @ 06:01) (90% - 98%)    Daily     Daily Weight in k.4 (24 Oct 2023 03:00)        I&O's Summary    23 Oct 2023 07:01  -  24 Oct 2023 07:00  --------------------------------------------------------  IN: 1910.7 mL / OUT: 2073 mL / NET: -162.3 mL        Physical Exam:  Appearance: No Acute Distress  HEENT: PAC via RIJ  Cardiovascular: distant heart sounds, RRR, Normal S1 S2  Respiratory: decreased breath sounds throughout  Gastrointestinal: Soft, Non-tender, non-distended	  Skin: erythema,hyperthermia on lower back and buttocks  Neurologic: Non-focal  Extremities: No LE edema  Psychiatry: A & O x 3, Mood & affect appropriate      Labs:                        10.0   8.00  )-----------( 220      ( 24 Oct 2023 03:55 )             33.4     10-24    130<L>  |  88<L>  |  74.9<H>  ----------------------------<  212<H>  4.2   |  26.0  |  2.75<H>    Ca    7.3<L>      24 Oct 2023 03:55  Phos  6.4     10-24  Mg     2.0     10-24    TPro  7.7  /  Alb  2.7<L>  /  TBili  2.6<H>  /  DBili  x   /  AST  1289<H>  /  ALT  1780<H>  /  AlkPhos  149<H>  10-24      PT/INR - ( 23 Oct 2023 02:05 )   PT: 40.7 sec;   INR: 3.81 ratio         PTT - ( 24 Oct 2023 01:05 )  PTT:74.8 sec  CARDIAC MARKERS ( 22 Oct 2023 16:00 )  x     / 0.02 ng/mL / x     / x     / x                Lactate, Blood: 1.8 mmol/L (10-23 @ 15:00)  Lactate, Blood: 1.8 mmol/L (10-23 @ 07:49)  Lactate, Blood: 4.1 mmol/L (10-22 @ 16:00)

## 2023-10-25 NOTE — PROGRESS NOTE ADULT - ASSESSMENT
39 y/o F with PMHx of b/l DVTs, recurrent PE on eliquis, HFpEF, morbid obesity, asthma, and chronic hyperkalemia admitted with:    Acute hypoxic respiratory failure  Cardiogenic shock  Decompensated heart failure  SARAH  Transaminitis  Metabolic acidosis  Lobar PNA    PLAN:  - Maintain milrinone infusion at 0.25 plus fixed dose vasopressin.  - Teagan decreased to 5 PPM.  - Trend markers of perfusion daily.  - Amiodarone oral load per cardiology.  - Add GDMT when SARAH resolves and hemodynamic status improves.  - Stress dose steroids.  - Actively titrating HFNC settings to maintain SpO2 > 92%.  - High threshold for intubation given risk for sudden cardiac arrest with induction/positive pressure.  - Monitor renal function.  - No indication for RRT at this time.  - Bumex infusion discontinued on 10/24.   - Continue lokelma for chronic hyperkalemia.  - Trend LFTs.  - Abx coverage with zosyn.  - All cultures have been unremarkable thus far.  - Full dose anticoagulation with heparin infusion.

## 2023-10-25 NOTE — PROGRESS NOTE ADULT - PROBLEM SELECTOR PLAN 4
Pre-capillary PH  - has a h/o PE (was on AC at home), not sure if she was worked up to CTEPH in the past   - would benefit from sildenafil once off vasopressors. Would likely need IV vasodilator. It's unclear if she's a candidate for home Veletri. MICU/Pulmonary to assess as well as SW. A less optimal option would be triple therapy (PO).  - V/Q scan when stable   - Consider bridging Teagan to vasodilators. Can start with sildenafil once pt is off pressors. Pre-capillary PH  - has a h/o PE (was on AC at home), not sure if she was worked up for CTEPH in the past   - would benefit from sildenafil once off vasopressors. Would likely need IV vasodilator. It's unclear if she's a candidate for home Veletri. MICU/Pulmonary to assess as well as SW. Another option is SC remodulin or triple oral therapy (ERA+PDE5i+selexipeg).  - Please obtain V/Q scan when stable   - Consider bridging Teagan to vasodilators. Can start with sildenafil once pt is off pressors.

## 2023-10-25 NOTE — PROGRESS NOTE ADULT - ASSESSMENT
SARAH: ATN, cardiogenic shock   Ischemic hepatitis with coagulopathy   + SLE serology  Hypervolemic hyponatremia  - cont supportive care  - await further serologies  - avoid excessive hypotonic fluids  - monitor labs---> may need to consider RRT depending upon clinical course

## 2023-10-25 NOTE — PROGRESS NOTE ADULT - SUBJECTIVE AND OBJECTIVE BOX
INCOMPLETE NOTE      Subjective:  No overnight events    Medications:  albuterol/ipratropium for Nebulization 3 milliLiter(s) Nebulizer every 6 hours PRN  aluminum hydroxide/magnesium hydroxide/simethicone Suspension 30 milliLiter(s) Oral every 4 hours PRN  aMIOdarone    Tablet   Oral   aMIOdarone    Tablet 400 milliGRAM(s) Oral every 8 hours  budesonide 160 MICROgram(s)/formoterol 4.5 MICROgram(s) Inhaler 2 Puff(s) Inhalation two times a day  chlorhexidine 2% Cloths 1 Application(s) Topical <User Schedule>  heparin  Infusion. 1200 Unit(s)/Hr IV Continuous <Continuous>  hydrocortisone sodium succinate Injectable 50 milliGRAM(s) IV Push every 6 hours  influenza   Vaccine 0.5 milliLiter(s) IntraMuscular once  melatonin 3 milliGRAM(s) Oral at bedtime PRN  milrinone Infusion 0.25 MICROgram(s)/kG/Min IV Continuous <Continuous>  montelukast 10 milliGRAM(s) Oral daily  mupirocin 2% Ointment 1 Application(s) Both Nostrils two times a day  nystatin Powder 1 Application(s) Topical two times a day  ondansetron Injectable 4 milliGRAM(s) IV Push every 8 hours PRN  oxyCODONE    IR 5 milliGRAM(s) Oral every 6 hours PRN  oxyCODONE    IR 2.5 milliGRAM(s) Oral every 6 hours PRN  PARoxetine 20 milliGRAM(s) Oral daily  piperacillin/tazobactam IVPB.. 3.375 Gram(s) IV Intermittent every 12 hours  vasopressin Infusion 0.04 Unit(s)/Min IV Continuous <Continuous>    Vitals:  T(C): 35.8 (10-25-23 @ 03:00), Max: 37.7 (10-24-23 @ 08:00)  HR: 90 (10-25-23 @ 06:30) (90 - 118)  BP: 99/51 (10-25-23 @ 06:15) (89/47 - 140/115)  BP(mean): 63 (10-25-23 @ 06:15) (62 - 119)  RR: 26 (10-25-23 @ 06:30) (17 - 31)  SpO2: 93% (10-25-23 @ 06:30) (89% - 99%)    Daily     Daily Weight in kG: 160.4 (25 Oct 2023 03:00)        I&O's Summary    23 Oct 2023 07:01  -  24 Oct 2023 07:00  --------------------------------------------------------  IN: 2209.9 mL / OUT: 2224 mL / NET: -14.1 mL    24 Oct 2023 07:01  -  25 Oct 2023 06:46  --------------------------------------------------------  IN: 1075.4 mL / OUT: 905 mL / NET: 170.4 mL        Physical Exam:  Appearance: No Acute Distress  HEENT: JVP non elevated  Cardiovascular: RRR, Normal S1 S2, No murmurs/rubs/gallops  Respiratory: Clear to auscultation bilaterally  Gastrointestinal: Soft, Non-tender, non-distended	  Skin: no skin lesions  Neurologic: Non-focal  Extremities: No LE edema, warm and well perfused  Psychiatry: A & O x 3, Mood & affect appropriate      Labs:                        9.3    6.39  )-----------( 182      ( 25 Oct 2023 05:14 )             30.9     10-25    129<L>  |  89<L>  |  84.2<H>  ----------------------------<  229<H>  4.1   |  27.0  |  2.95<H>    Ca    7.5<L>      25 Oct 2023 05:14  Phos  5.1     10-25  Mg     2.1     10-25    TPro  7.6  /  Alb  2.8<L>  /  TBili  2.1<H>  /  DBili  x   /  AST  594<H>  /  ALT  1301<H>  /  AlkPhos  143<H>  10-25      PT/INR - ( 25 Oct 2023 05:14 )   PT: 18.8 sec;   INR: 1.72 ratio         PTT - ( 25 Oct 2023 05:14 )  PTT:142.3 sec  CARDIAC MARKERS ( 24 Oct 2023 10:10 )  x     / 0.01 ng/mL / x     / x     / x                Lactate, Blood: 1.4 mmol/L (10-25 @ 05:14)  Lactate, Blood: 1.5 mmol/L (10-24 @ 16:00)  Lactate, Blood: 1.8 mmol/L (10-23 @ 15:00)  Lactate, Blood: 1.8 mmol/L (10-23 @ 07:49)  Lactate, Blood: 4.1 mmol/L (10-22 @ 16:00)     Subjective:  No overnight events reported  Pt states that she feels the same as yesterday/    Medications:  albuterol/ipratropium for Nebulization 3 milliLiter(s) Nebulizer every 6 hours PRN  aluminum hydroxide/magnesium hydroxide/simethicone Suspension 30 milliLiter(s) Oral every 4 hours PRN  aMIOdarone    Tablet   Oral   aMIOdarone    Tablet 400 milliGRAM(s) Oral every 8 hours  budesonide 160 MICROgram(s)/formoterol 4.5 MICROgram(s) Inhaler 2 Puff(s) Inhalation two times a day  chlorhexidine 2% Cloths 1 Application(s) Topical <User Schedule>  heparin  Infusion. 1200 Unit(s)/Hr IV Continuous <Continuous>  hydrocortisone sodium succinate Injectable 50 milliGRAM(s) IV Push every 6 hours  influenza   Vaccine 0.5 milliLiter(s) IntraMuscular once  melatonin 3 milliGRAM(s) Oral at bedtime PRN  milrinone Infusion 0.25 MICROgram(s)/kG/Min IV Continuous <Continuous>  montelukast 10 milliGRAM(s) Oral daily  mupirocin 2% Ointment 1 Application(s) Both Nostrils two times a day  nystatin Powder 1 Application(s) Topical two times a day  ondansetron Injectable 4 milliGRAM(s) IV Push every 8 hours PRN  oxyCODONE    IR 5 milliGRAM(s) Oral every 6 hours PRN  oxyCODONE    IR 2.5 milliGRAM(s) Oral every 6 hours PRN  PARoxetine 20 milliGRAM(s) Oral daily  piperacillin/tazobactam IVPB.. 3.375 Gram(s) IV Intermittent every 12 hours  vasopressin Infusion 0.04 Unit(s)/Min IV Continuous <Continuous>    Vitals:  T(C): 35.8 (10-25-23 @ 03:00), Max: 37.7 (10-24-23 @ 08:00)  HR: 90 (10-25-23 @ 06:30) (90 - 118)  BP: 99/51 (10-25-23 @ 06:15) (89/47 - 140/115)  BP(mean): 63 (10-25-23 @ 06:15) (62 - 119)  RR: 26 (10-25-23 @ 06:30) (17 - 31)  SpO2: 93% (10-25-23 @ 06:30) (89% - 99%)    Daily     Daily Weight in kG: 160.4 (25 Oct 2023 03:00)        I&O's Summary    23 Oct 2023 07:01  -  24 Oct 2023 07:00  --------------------------------------------------------  IN: 2209.9 mL / OUT: 2224 mL / NET: -14.1 mL    24 Oct 2023 07:01  -  25 Oct 2023 06:46  --------------------------------------------------------  IN: 1075.4 mL / OUT: 905 mL / NET: 170.4 mL        Physical Exam:  Appearance: No Acute Distress, on HFNC  HEENT: PAC via RIJ  Cardiovascular: tachycardic heart sounds  Respiratory: decreased Bs throughout  Gastrointestinal: Soft, Non-tender, non-distended	  Skin: no skin lesions  Neurologic: Non-focal  Extremities: No LE edema, warm and well perfused  Psychiatry: A & O x 3, Mood & affect appropriate      Labs:                        9.3    6.39  )-----------( 182      ( 25 Oct 2023 05:14 )             30.9     10-25    129<L>  |  89<L>  |  84.2<H>  ----------------------------<  229<H>  4.1   |  27.0  |  2.95<H>    Ca    7.5<L>      25 Oct 2023 05:14  Phos  5.1     10-25  Mg     2.1     10-25    TPro  7.6  /  Alb  2.8<L>  /  TBili  2.1<H>  /  DBili  x   /  AST  594<H>  /  ALT  1301<H>  /  AlkPhos  143<H>  10-25      PT/INR - ( 25 Oct 2023 05:14 )   PT: 18.8 sec;   INR: 1.72 ratio         PTT - ( 25 Oct 2023 05:14 )  PTT:142.3 sec  CARDIAC MARKERS ( 24 Oct 2023 10:10 )  x     / 0.01 ng/mL / x     / x     / x                Lactate, Blood: 1.4 mmol/L (10-25 @ 05:14)  Lactate, Blood: 1.5 mmol/L (10-24 @ 16:00)  Lactate, Blood: 1.8 mmol/L (10-23 @ 15:00)  Lactate, Blood: 1.8 mmol/L (10-23 @ 07:49)  Lactate, Blood: 4.1 mmol/L (10-22 @ 16:00)

## 2023-10-25 NOTE — PROGRESS NOTE ADULT - ASSESSMENT
Initial HF c/s: Aline Magdaleno MD    39 YO F with morbid obesity (BMI 60), HFpEF but severe RV dysfunction (last hospitalized in July with HF exacerbation), prior DVT/PE on Eliquis (not sure if worked up for CTEPH in the past) and SLE + KATHIE who presented to [facility] on 10/18 with heart failure and progressed to shock with end organ dysfunction (lactate 4, SARAH, shock liver).     The MICU team placed a swan over the weekend which showed a RA pressure of ~12, PA ~80s/30’s, wedge not able to be obtained, and Thermo/Tg CI 1.7/2.2. She was started milrinone/levo and inhaled NO. Shock team was activated given RV predominant cardiogenic shock with severe pulmonary hypertension. Plan was to continue inotropes, diurese to keep CVP closer to 8, and adding flolan if inhaled NO doesn’t work.     This morning she reports feeling tired but denies any SOB. She remains with severe pre-capillary PH with low/normal PCW and CVP. She is also in Aflutter with RVR.     Hemodynamics:  10/24 milrinone 0.250 mcg/kg/min+vaso 0.04 u/min+phenylephrine 0.1 mcg/kg/min+Teagan 11 ppm CVP 8 OA 85/24/44 PCWP n/a PA sat 89.3% HR 95 /36/76  10/23 (mil 0.25, levo .08, Teagan 12 ppm): -140s, /72 (87), CVP 5, PA 87/33/52, PCW 7, PA sat 78.2%, Tg CO/CI 9.6/3.8,  dsc, PVR 4.69 CHARLTON.    Cardiac Studies:  10/22/23 TTE: LV poorly visualized, severe RVE with severe RV dysfunction.  7/20/23 TTE: LVIDd 4.17cm, LVEF 60-65%, severe RVE with mod RV dysfunction, mild MR, mild-mod TR, mild NC, est PASP 45.5 mmHg.   Initial HF c/s: Aline Magdaleno MD    37 YO F with morbid obesity (BMI 60), HFpEF but severe RV dysfunction (last hospitalized in July with HF exacerbation), prior DVT/PE on Eliquis (not sure if worked up for CTEPH in the past) and SLE + KATHIE who presented to [facility] on 10/18 with heart failure and progressed to shock with end organ dysfunction (lactate 4, SARAH, shock liver).     The MICU team placed a swan over the weekend which showed a RA pressure of ~12, PA ~80s/30’s, wedge not able to be obtained, and Thermo/Tg CI 1.7/2.2. She was started milrinone/levo and inhaled NO. Shock team was activated given RV predominant cardiogenic shock with severe pulmonary hypertension. Plan was to continue inotropes, diurese to keep CVP closer to 8, and adding flolan if inhaled NO doesn’t work.     This morning she reports feeling tired but denies any SOB. She remains with severe pre-capillary PH with low/normal PCW and CVP. She is also in Aflutter with RVR.     Hemodynamics:  10/25 milrinone 0.250 mcg/kg/min+vaso 0.04 u/min+Teagan 5 ppm CVP 10 PA 96/21/42 PCWP 7 PA sat 63% /68/83 HR 99 Tg CO/CI  7.7/3.0 FIO2 40%  10/24 milrinone 0.250 mcg/kg/min+vaso 0.04 u/min+phenylephrine 0.1 mcg/kg/min+Teagan 11 ppm CVP 8 OA 85/24/44 PCWP n/a PA sat 89.3% HR 95 /36/76  10/23 (mil 0.25, levo .08, Teagan 12 ppm): -140s, /72 (87), CVP 5, PA 87/33/52, PCW 7, PA sat 78.2%, Tg CO/CI 9.6/3.8,  dsc, PVR 4.69 CHARLTON.    Cardiac Studies:  10/22/23 TTE: LV poorly visualized, severe RVE with severe RV dysfunction.  7/20/23 TTE: LVIDd 4.17cm, LVEF 60-65%, severe RVE with mod RV dysfunction, mild MR, mild-mod TR, mild CA, est PASP 45.5 mmHg.

## 2023-10-25 NOTE — PROGRESS NOTE ADULT - PROBLEM SELECTOR PLAN 3
Recommend EP c/s for ?DCCV. Should avoid amio given elevated LFTs  - Can consider adding digoxin (renally dosed)   - Continue AC with heparin gtt - Appreciate EP recommendations  - digoxin (renally dosed)   - Amio as per EP. Monitor PFTs, TFTs  - Continue AC with heparin gtt

## 2023-10-25 NOTE — PROGRESS NOTE ADULT - SUBJECTIVE AND OBJECTIVE BOX
[facility]                                   [address] [place] [address]                                                      PHONE: [phone]                                                         FAX: [phone]  [email or web address]  ---------------------------------------------------------------------------------------------------------------------------------    Overnight events/patient complaints:  Pt in ICU feels slightly better    iodine (Hives)  ceftriaxone (Hives)  shellfish (Hives)    MEDICATIONS  (STANDING):  aMIOdarone    Tablet   Oral   aMIOdarone    Tablet 400 milliGRAM(s) Oral every 8 hours  budesonide 160 MICROgram(s)/formoterol 4.5 MICROgram(s) Inhaler 2 Puff(s) Inhalation two times a day  chlorhexidine 2% Cloths 1 Application(s) Topical <User Schedule>  heparin  Infusion. 1200 Unit(s)/Hr (12 mL/Hr) IV Continuous <Continuous>  hydrocortisone sodium succinate Injectable 50 milliGRAM(s) IV Push every 6 hours  influenza   Vaccine 0.5 milliLiter(s) IntraMuscular once  insulin lispro (ADMELOG) corrective regimen sliding scale.   SubCutaneous four times a day with meals  milrinone Infusion 0.25 MICROgram(s)/kG/Min (11.2 mL/Hr) IV Continuous <Continuous>  montelukast 10 milliGRAM(s) Oral daily  mupirocin 2% Ointment 1 Application(s) Both Nostrils two times a day  nystatin Powder 1 Application(s) Topical two times a day  PARoxetine 20 milliGRAM(s) Oral daily  piperacillin/tazobactam IVPB.. 3.375 Gram(s) IV Intermittent every 12 hours  vasopressin Infusion 0.04 Unit(s)/Min (6 mL/Hr) IV Continuous <Continuous>    MEDICATIONS  (PRN):  albuterol/ipratropium for Nebulization 3 milliLiter(s) Nebulizer every 6 hours PRN Shortness of Breath and/or Wheezing  aluminum hydroxide/magnesium hydroxide/simethicone Suspension 30 milliLiter(s) Oral every 4 hours PRN Dyspepsia  melatonin 3 milliGRAM(s) Oral at bedtime PRN Insomnia  ondansetron Injectable 4 milliGRAM(s) IV Push every 8 hours PRN Nausea and/or Vomiting  oxyCODONE    IR 2.5 milliGRAM(s) Oral every 6 hours PRN Moderate Pain (4 - 6)  oxyCODONE    IR 5 milliGRAM(s) Oral every 6 hours PRN Severe Pain (7 - 10)      Vital Signs Last 24 Hrs  T(C): 35.9 (25 Oct 2023 08:00), Max: 35.9 (24 Oct 2023 23:00)  T(F): 96.6 (25 Oct 2023 08:00), Max: 96.6 (24 Oct 2023 23:00)  HR: 99 (25 Oct 2023 09:30) (81 - 118)  BP: 126/75 (25 Oct 2023 09:30) (89/47 - 148/128)  BP(mean): 89 (25 Oct 2023 09:30) (62 - 134)  RR: 25 (25 Oct 2023 09:30) (17 - 31)  SpO2: 94% (25 Oct 2023 09:30) (89% - 99%)    Parameters below as of 25 Oct 2023 09:30  Patient On (Oxygen Delivery Method): nasal cannula, high flow      ICU Vital Signs Last 24 Hrs  MITCHEL GUNN  I&O's Detail    24 Oct 2023 07:01  -  25 Oct 2023 07:00  --------------------------------------------------------  IN:    Amiodarone: 66.8 mL    Heparin Infusion: 479 mL    IV PiggyBack: 100 mL    Milrinone: 268.8 mL    Phenylephrine: 16.8 mL    Vasopressin: 144 mL  Total IN: 1075.4 mL    OUT:    Indwelling Catheter - Urethral (mL): 940 mL  Total OUT: 940 mL    Total NET: 135.4 mL      25 Oct 2023 07:01  -  25 Oct 2023 10:13  --------------------------------------------------------  IN:    Heparin Infusion: 51 mL    IV PiggyBack: 100 mL    Milrinone: 33.6 mL    Vasopressin: 18 mL  Total IN: 202.6 mL    OUT:    Indwelling Catheter - Urethral (mL): 80 mL  Total OUT: 80 mL    Total NET: 122.6 mL        Drug Dosing Weight  MITCHEL GUNN      PHYSICAL EXAM:  General:  alert and cooperative.  HEENT: Head; normocephalic, atraumatic.  Eyes: Pupils reactive, cornea wnl.  Neck: Supple, no nodes adenopathy, no NVD or carotid bruit or thyromegaly.  CARDIOVASCULAR: tachycardic no murmurs  LUNGS: No rales, rhonchi or wheeze. Normal breath sounds bilaterally.  ABDOMEN: Soft, nontender without mass or organomegaly. bowel sounds normoactive.  EXTREMITIES: No clubbing, cyanosis or edema. Distal pulses wnl. Right buttock erythema  SKIN: warm and dry with normal turgor.  NEURO: Alert/oriented x 3/normal motor exam. No pathologic reflexes.    PSYCH: normal affect.        LABS:                        9.3    6.39  )-----------( 182      ( 25 Oct 2023 05:14 )             30.9     10-25    129<L>  |  89<L>  |  84.2<H>  ----------------------------<  229<H>  4.1   |  27.0  |  2.95<H>    Ca    7.5<L>      25 Oct 2023 05:14  Phos  5.1     10-25  Mg     2.1     10-25    TPro  7.6  /  Alb  2.8<L>  /  TBili  2.1<H>  /  DBili  x   /  AST  594<H>  /  ALT  1301<H>  /  AlkPhos  143<H>  10-25    MITCHEL GUNN  CARDIAC MARKERS ( 24 Oct 2023 10:10 )  x     / 0.01 ng/mL / x     / x     / x          PT/INR - ( 25 Oct 2023 05:14 )   PT: 18.8 sec;   INR: 1.72 ratio         PTT - ( 25 Oct 2023 05:14 )  PTT:142.3 sec  Urinalysis Basic - ( 25 Oct 2023 05:14 )    Color: x / Appearance: x / SG: x / pH: x  Gluc: 229 mg/dL / Ketone: x  / Bili: x / Urobili: x   Blood: x / Protein: x / Nitrite: x   Leuk Esterase: x / RBC: x / WBC x   Sq Epi: x / Non Sq Epi: x / Bacteria: x        RADIOLOGY & ADDITIONAL STUDIES:    INTERPRETATION OF TELEMETRY (personally reviewed): atrial flutter rates controlled         ECHO:< from: TTE Echo Complete w/ Contrast w/ Doppler (10.22.23 @ 16:31) >  Summary:   1. Technically difficult study.   2. Endocardial visualization was enhanced with intravenous echo contrast.   3. Right ventricular volume and pressure overload.   4. Severely enlarged right ventricle.   5. Severely reduced RV systolic function.   6. Compared with TTE dated 7/20/23 the RV continues to be severely   dilated and dysfunctional.   7. LV cavity was not visualized despite the use of the echo contrast.    MD Ella Electronically signed on 10/22/2023 at 5:57:47 PM    < end of copied text >         ASSESSMENT AND PLAN:  In summary, MITCHEL GUNN is an 38y Female with past medical history significant for morbid obesity, HFpEF, severe RV dysfunction, PE/DVT, SLE p/w heart failure/cardiogenic shock.      Cardiogenic shock- patient remains on vasopressin and milrinone    -continue pressor support  -wean supplemental O2 as tolerate    Atrial flutter- rates controled.     - Pt rates controlled. Intermittently off AC here in hospital due to possible bleeding issue. Would hold on DAMASO or DCCV unless hemodynamically unstable

## 2023-10-25 NOTE — PROGRESS NOTE ADULT - SUBJECTIVE AND OBJECTIVE BOX
NEPHROLOGY INTERVAL HPI/OVERNIGHT EVENTS:  pt remains clinically the same  still with dyspnea  pressor dependent    MEDICATIONS  (STANDING):  budesonide 160 MICROgram(s)/formoterol 4.5 MICROgram(s) Inhaler 2 Puff(s) Inhalation two times a day  chlorhexidine 2% Cloths 1 Application(s) Topical <User Schedule>  heparin  Infusion. 1200 Unit(s)/Hr (12 mL/Hr) IV Continuous <Continuous>  hydrocortisone sodium succinate Injectable 50 milliGRAM(s) IV Push every 6 hours  influenza   Vaccine 0.5 milliLiter(s) IntraMuscular once  insulin lispro (ADMELOG) corrective regimen sliding scale.   SubCutaneous four times a day with meals  milrinone Infusion 0.25 MICROgram(s)/kG/Min (11.2 mL/Hr) IV Continuous <Continuous>  montelukast 10 milliGRAM(s) Oral daily  mupirocin 2% Ointment 1 Application(s) Both Nostrils two times a day  nystatin Powder 1 Application(s) Topical two times a day  PARoxetine 20 milliGRAM(s) Oral daily  piperacillin/tazobactam IVPB.. 3.375 Gram(s) IV Intermittent every 12 hours  vasopressin Infusion 0.04 Unit(s)/Min (6 mL/Hr) IV Continuous <Continuous>    MEDICATIONS  (PRN):  albuterol/ipratropium for Nebulization 3 milliLiter(s) Nebulizer every 6 hours PRN Shortness of Breath and/or Wheezing  aluminum hydroxide/magnesium hydroxide/simethicone Suspension 30 milliLiter(s) Oral every 4 hours PRN Dyspepsia  melatonin 3 milliGRAM(s) Oral at bedtime PRN Insomnia  ondansetron Injectable 4 milliGRAM(s) IV Push every 8 hours PRN Nausea and/or Vomiting  oxyCODONE    IR 2.5 milliGRAM(s) Oral every 6 hours PRN Moderate Pain (4 - 6)  oxyCODONE    IR 5 milliGRAM(s) Oral every 6 hours PRN Severe Pain (7 - 10)      Allergies    iodine (Hives)  ceftriaxone (Hives)  shellfish (Hives)        Vital Signs Last 24 Hrs  T(C): 35.8 (25 Oct 2023 11:18), Max: 35.9 (24 Oct 2023 23:00)  T(F): 96.4 (25 Oct 2023 11:18), Max: 96.6 (24 Oct 2023 23:00)  HR: 84 (25 Oct 2023 15:00) (81 - 118)  BP: 129/74 (25 Oct 2023 15:00) (89/47 - 148/128)  BP(mean): 85 (25 Oct 2023 15:00) (62 - 134)  RR: 24 (25 Oct 2023 15:00) (17 - 31)  SpO2: 95% (25 Oct 2023 15:00) (89% - 97%)    Parameters below as of 25 Oct 2023 15:00  Patient On (Oxygen Delivery Method): nasal cannula, high flow  O2 Flow (L/min): 40  O2 Concentration (%): 40    PHYSICAL EXAM:  GENERAL: Obese, languid  HEENT:   NECK: No JVD  NERVOUS SYSTEM:  Alert & Oriented X3  CHEST/LUNG: Diminished BS + crackles  HEART: Regular rate and rhythm; No rub  ABDOMEN: Soft, Nontender, Nondistended; BS  EXTREMITIES: + dependent edema      LABS:                        9.2    6.97  )-----------( 180      ( 25 Oct 2023 12:36 )             30.4     10-25    126<L>  |  85<L>  |  85.5<H>  ----------------------------<  234<H>  3.9   |  27.0  |  3.01<H>        Ca    7.7<L>      25 Oct 2023 12:36  Phos  5.1     10-25  Mg     2.1     10-25    TPro  7.4  /  Alb  2.8<L>  /  TBili  2.0  /  DBili  x   /  AST  470<H>  /  ALT  1213<H>  /  AlkPhos  135<H>  10-25    PT/INR - ( 25 Oct 2023 12:36 )   PT: 18.0 sec;   INR: 1.65 ratio         PTT - ( 25 Oct 2023 12:36 )  PTT:106.3 sec  Urinalysis Basic - ( 25 Oct 2023 12:36 )    Color: x / Appearance: x / SG: x / pH: x  Gluc: 234 mg/dL / Ketone: x  / Bili: x / Urobili: x   Blood: x / Protein: x / Nitrite: x   Leuk Esterase: x / RBC: x / WBC x   Sq Epi: x / Non Sq Epi: x / Bacteria: x      Magnesium: 2.1 mg/dL (10-25 @ 05:14)  Phosphorus: 5.1 mg/dL (10-25 @ 05:14)  Magnesium: 2.0 mg/dL (10-24 @ 23:25)  Phosphorus: 5.0 mg/dL (10-24 @ 23:25)  Magnesium: 2.0 mg/dL (10-24 @ 16:00)  Phosphorus: 5.4 mg/dL (10-24 @ 16:00)      RADIOLOGY & ADDITIONAL TESTS:

## 2023-10-25 NOTE — PROGRESS NOTE ADULT - PROBLEM SELECTOR PLAN 1
- Clinically euvolemic w/ lukewarm extremities.   - Continue hemodynamic monitoring via PAC.   - Hemodynamic goals: CVP < 12, SVO2 > 65%, lactic acid < 2.2, CI > 2.2, iCa > 1.3  - Monitor perfusion markers daily (lactate, MVO2 from distal port of PA catheter, LFTs)  - Inotrope: would wean milrinone to 0.125 mcg/kg/min and repeat MVO2, CMP, and lactate 2 hours later.  - Vasopressors: phenylephrine 0.1 mcg/kg/min, vaso 0.04 u/min. Consider weaning as able. OK to use PO midodrine.  - Teagan @ 11 PPM  - Diuretics: Continue PRN diuretics to keep net even  - Strict I&Os  - No advanced therapy options due to BMI of 60. Would not be a candidate for tMCS since he has no exit strategy. - Continue hemodynamic monitoring via PAC.   - Hemodynamic goals: CVP < 12, SVO2 > 65%, lactic acid < 2.2, CI > 2.2, iCa > 1.3  - Monitor perfusion markers daily (lactate, MVO2 from distal port of PA catheter, LFTs)  - Inotrope: would wean milrinone to 0.125 mcg/kg/min and repeat MVO2, CMP, and lactate 2 hours later.  - Vasopressors: vaso 0.04 u/min. Consider weaning as able. OK to use PO midodrine.  - Teagan @ 5 PPM  - Diuretics: Consider starting bumex 2mg IV BID  - Strict I&Os  - No advanced therapy options due to BMI of 60. Would not be a candidate for tMCS since she has no exit strategy.

## 2023-10-25 NOTE — PROGRESS NOTE ADULT - SUBJECTIVE AND OBJECTIVE BOX
Patient is a 38y old  Female who presents with a chief complaint of respiratory failure (24 Oct 2023 13:19)    BRIEF HOSPITAL COURSE: ***    Events last 24 hours: Decreased Teagan to 5 from 7. Remains on milrinone and vasopressin.    PAST MEDICAL & SURGICAL HISTORY:  Pulmonary embolism  DVT, lower extremity  CHF (congestive heart failure)  Asthma  Anxiety  Severe obesity (BMI >= 40)  Hypertension  No significant past surgical history    Review of Systems:  CONSTITUTIONAL: No fever, chills, or fatigue.  EYES: No eye pain, visual disturbances, or discharge.  ENMT:  No difficulty hearing, tinnitus, or vertigo. No sinus or throat pain.  NECK: No pain or stiffness.  RESPIRATORY: No shortness of breath, cough, or wheezing.  CARDIOVASCULAR: No chest pain, palpitations, dizziness, or leg swelling.  GASTROINTESTINAL: No abdominal or epigastric pain. No nausea, vomiting, diarrhea, or constipation. No hematemesis, melena, or hematochezia.  GENITOURINARY: No dysuria, increased frequency, hematuria, or incontinence.  NEUROLOGICAL: No headaches, memory loss, loss of strength, numbness, or tremors.  SKIN: No itching, burning, rashes, or lesions.  MUSCULOSKELETAL: No joint pain or swelling. No muscle, back, or extremity pain.  PSYCHIATRIC: No depression, anxiety, mood swings, or difficulty sleeping.    Medications:  piperacillin/tazobactam IVPB.. 3.375 Gram(s) IV Intermittent every 12 hours  aMIOdarone    Tablet   Oral   aMIOdarone    Tablet 400 milliGRAM(s) Oral every 8 hours  milrinone Infusion 0.25 MICROgram(s)/kG/Min IV Continuous <Continuous>  albuterol/ipratropium for Nebulization 3 milliLiter(s) Nebulizer every 6 hours PRN  budesonide 160 MICROgram(s)/formoterol 4.5 MICROgram(s) Inhaler 2 Puff(s) Inhalation two times a day  montelukast 10 milliGRAM(s) Oral daily  melatonin 3 milliGRAM(s) Oral at bedtime PRN  ondansetron Injectable 4 milliGRAM(s) IV Push every 8 hours PRN  oxyCODONE    IR 2.5 milliGRAM(s) Oral every 6 hours PRN  oxyCODONE    IR 5 milliGRAM(s) Oral every 6 hours PRN  PARoxetine 20 milliGRAM(s) Oral daily  heparin  Infusion. 1200 Unit(s)/Hr IV Continuous <Continuous>  aluminum hydroxide/magnesium hydroxide/simethicone Suspension 30 milliLiter(s) Oral every 4 hours PRN  hydrocortisone sodium succinate Injectable 50 milliGRAM(s) IV Push every 6 hours  vasopressin Infusion 0.04 Unit(s)/Min IV Continuous <Continuous>  influenza   Vaccine 0.5 milliLiter(s) IntraMuscular once  chlorhexidine 2% Cloths 1 Application(s) Topical <User Schedule>  mupirocin 2% Ointment 1 Application(s) Both Nostrils two times a day  nystatin Powder 1 Application(s) Topical two times a day    ICU Vital Signs Last 24 Hrs  T(C): 35.8 (25 Oct 2023 03:00), Max: 37.7 (24 Oct 2023 08:00)  T(F): 96.4 (25 Oct 2023 03:00), Max: 99.9 (24 Oct 2023 08:00)  HR: 104 (25 Oct 2023 04:25) (90 - 118)  BP: 119/65 (25 Oct 2023 01:00) (89/47 - 140/115)  BP(mean): 81 (25 Oct 2023 01:00) (62 - 119)  ABP: --  ABP(mean): --  RR: 25 (25 Oct 2023 01:00) (17 - 31)  SpO2: 96% (25 Oct 2023 04:25) (89% - 99%)    O2 Parameters below as of 25 Oct 2023 00:28  Patient On (Oxygen Delivery Method): nasal cannula, high flow, 40%    ABG - ( 24 Oct 2023 05:03 )  pH, Arterial: 7.250 pH, Blood: x     /  pCO2: 66    /  pO2: 71    / HCO3: 29    / Base Excess: 1.7   /  SaO2: 92.3      I&O's Detail    23 Oct 2023 07:01  -  24 Oct 2023 07:00  --------------------------------------------------------  IN:    Amiodarone: 233.1 mL    Amiodarone: 233.8 mL    Heparin Infusion: 451.7 mL    IV PiggyBack: 200 mL    Lactated Ringers Bolus: 500 mL    Milrinone: 285.6 mL    Norepinephrine: 71.4 mL    Oral Fluid: 60 mL    Phenylephrine: 60.3 mL    Vasopressin: 114 mL  Total IN: 2209.9 mL    OUT:    Indwelling Catheter - Urethral (mL): 2220 mL    Stool (mL): 4 mL  Total OUT: 2224 mL    Total NET: -14.1 mL    24 Oct 2023 07:01  -  25 Oct 2023 04:42  --------------------------------------------------------  IN:    Amiodarone: 66.8 mL    Heparin Infusion: 357 mL    IV PiggyBack: 100 mL    Milrinone: 190.4 mL    Phenylephrine: 16.8 mL    Vasopressin: 102 mL  Total IN: 833 mL    OUT:    Indwelling Catheter - Urethral (mL): 675 mL  Total OUT: 675 mL    Total NET: 158 mL    LABS:                        9.1    6.94  )-----------( 189      ( 24 Oct 2023 23:25 )             30.8     10-24    127<L>  |  87<L>  |  78.8<H>  ----------------------------<  235<H>  4.1   |  25.0  |  2.92<H>    Ca    7.4<L>      24 Oct 2023 23:25  Phos  5.0     10-24  Mg     2.0     10-24    TPro  7.1  /  Alb  2.6<L>  /  TBili  2.2<H>  /  DBili  x   /  AST  681<H>  /  ALT  1427<H>  /  AlkPhos  139<H>  10-24    CARDIAC MARKERS ( 24 Oct 2023 10:10 )  x     / 0.01 ng/mL / x     / x     / x        CAPILLARY BLOOD GLUCOSE    PTT - ( 24 Oct 2023 07:45 )  PTT:72.2 sec    Urinalysis Basic - ( 24 Oct 2023 23:25 )  Color: x / Appearance: x / SG: x / pH: x  Gluc: 235 mg/dL / Ketone: x  / Bili: x / Urobili: x   Blood: x / Protein: x / Nitrite: x   Leuk Esterase: x / RBC: x / WBC x   Sq Epi: x / Non Sq Epi: x / Bacteria: x    CULTURES:  Culture Results:   No growth at 48 Hours (10-22 @ 18:50)  Rapid RVP Result: NotDetec (10-22 @ 16:18)  Culture Results:   No growth at 48 Hours (10-22 @ 16:00)  Rapid RVP Result: NotDetec (10-18 @ 12:05)  Culture Results:   No growth at 5 days (10-18 @ 11:30)  Culture Results:   No growth at 5 days (10-18 @ 11:28)    Physical Examination:    General: Well appearing, lying in bed in NAD.      HEENT: Pupils equal, reactive to light. Symmetric. No scleral icterus or injection.    PULM: Clear to auscultation B/L. No wheezes, rales, or rhonchi apprecaited. No significant sputum production or increased respiratory effort.    NECK: Supple, no lymphadenopathy, trachea midline.    CVS: Regular rate and rhythm, no murmurs appreciated, +s1/s2.    ABD: Soft, nondistended, nontender, normoactive bowel sounds.    EXT: No edema, nontender.    SKIN: Warm and well perfused, no rashes noted.    NEURO: Alert, oriented, interactive, nonfocal.    RADIOLOGY:  < from: Xray Chest 1 View- PORTABLE-Urgent (Xray Chest 1 View- PORTABLE-Urgent .) (10.23.23 @ 11:45) >  ACC: [id number] EXAM:  XR CHEST PORTABLE URGENT 1V   ORDERED BY: MIGUELITO MERCEDES     PROCEDURE DATE:  10/23/2023      INTERPRETATION:  TIME OF EXAM: October 23, 2023 at 11:13 AM.    CLINICAL INFORMATION: Post [place]. Right heart failure.    COMPARISON:October 23, 2023 at 5:27 AM.    TECHNIQUE:   AP Portable chest x-ray. Right costophrenic angle excluded   from image. Limited by rotation.    INTERPRETATION:    Heart size and the mediastinum cannot be accurately evaluated on this   projection.  There is a right IJ approach [place]-Renny catheter with tip in the right   main pulmonary artery.  There are worsening diffuse right lung airspace opacities.  There is improved central left pulmonary vascular congestion.  No definite right pleural effusion.  Continued left lower/retrocardiac opacity with obscuration of the left   hemidiaphragm.  No pneumothorax seen.  No acute bony abnormality.    IMPRESSION:  Right IJ approach [place]-Renny catheter with tip in the right   main pulmonary artery. No pneumothorax.    Worsening diffuse right lung airspace opacities, possibly asymmetric   pulmonary edema, although infection is not excluded.    Improved central left pulmonary vascular congestion.    Continued left lower/retrocardiac opacity, possibly a left pleural   effusion with associated passive atelectasis. Atelectasis of other cause,   and/or other pathology including, but not limited to, pneumonia is not   excluded.    --- End of Report ---    JOÃO BAXTER MD; Attending Radiologist  Thisdocument has been electronically signed. Oct 23 2023  3:50PM    < end of copied text >    CRITICAL CARE TIME SPENT: 39 minutes   Patient is a 38y old  Female who presents with a chief complaint of respiratory failure (24 Oct 2023 13:19)    BRIEF HOSPITAL COURSE: 37 y/o F with PMHx of b/l DVTs, recurrent PE on eliquis, HFpEF, morbid obesity, asthma, and chronic hyperkalemia who presented on 10/18 complaining of weakness, worsening LE edema, cough, and weight gain. Of note, was being worked up as an outpatient for possible SLE given facial rash, photophobia, and join pains. She was started on prednisone 20 mg by her PCP x ~1 month. Pt was admitted to medicine for treatment of PNA. Hospital course complicated by progressive SARAH, HAGMA, and transaminitis. Pt was upgraded to ICU level of care on 10/22 for cardiogenic shock.     Events last 24 hours: Remains on HFNC (40lpm/50%). Decreased Teagan to 5 from 7 overnight. On milrinone and vasopressin. Has been off of neosynephrine since 11:00.    PAST MEDICAL & SURGICAL HISTORY:  Pulmonary embolism  DVT, lower extremity  CHF (congestive heart failure)  Asthma  Anxiety  Severe obesity (BMI >= 40)  Hypertension  No significant past surgical history    Review of Systems:  CONSTITUTIONAL: No fever, chills, or fatigue.  EYES: No eye pain, visual disturbances, or discharge.  ENMT:  No difficulty hearing, tinnitus, or vertigo. No sinus or throat pain.  NECK: No pain or stiffness.  RESPIRATORY: No shortness of breath, cough, or wheezing.  CARDIOVASCULAR: No chest pain, palpitations, dizziness, or leg swelling.  GASTROINTESTINAL: No abdominal or epigastric pain. No nausea, vomiting, diarrhea, or constipation. No hematemesis, melena, or hematochezia.  GENITOURINARY: No dysuria, increased frequency, hematuria, or incontinence.  NEUROLOGICAL: No headaches, memory loss, loss of strength, numbness, or tremors.  SKIN: No itching, burning, rashes, or lesions.  MUSCULOSKELETAL: No joint pain or swelling. No muscle, back, or extremity pain.  PSYCHIATRIC: No depression, anxiety, mood swings, or difficulty sleeping.    Medications:  piperacillin/tazobactam IVPB.. 3.375 Gram(s) IV Intermittent every 12 hours  aMIOdarone    Tablet   Oral   aMIOdarone    Tablet 400 milliGRAM(s) Oral every 8 hours  milrinone Infusion 0.25 MICROgram(s)/kG/Min IV Continuous <Continuous>  albuterol/ipratropium for Nebulization 3 milliLiter(s) Nebulizer every 6 hours PRN  budesonide 160 MICROgram(s)/formoterol 4.5 MICROgram(s) Inhaler 2 Puff(s) Inhalation two times a day  montelukast 10 milliGRAM(s) Oral daily  melatonin 3 milliGRAM(s) Oral at bedtime PRN  ondansetron Injectable 4 milliGRAM(s) IV Push every 8 hours PRN  oxyCODONE    IR 2.5 milliGRAM(s) Oral every 6 hours PRN  oxyCODONE    IR 5 milliGRAM(s) Oral every 6 hours PRN  PARoxetine 20 milliGRAM(s) Oral daily  heparin  Infusion. 1200 Unit(s)/Hr IV Continuous <Continuous>  aluminum hydroxide/magnesium hydroxide/simethicone Suspension 30 milliLiter(s) Oral every 4 hours PRN  hydrocortisone sodium succinate Injectable 50 milliGRAM(s) IV Push every 6 hours  vasopressin Infusion 0.04 Unit(s)/Min IV Continuous <Continuous>  influenza   Vaccine 0.5 milliLiter(s) IntraMuscular once  chlorhexidine 2% Cloths 1 Application(s) Topical <User Schedule>  mupirocin 2% Ointment 1 Application(s) Both Nostrils two times a day  nystatin Powder 1 Application(s) Topical two times a day    ICU Vital Signs Last 24 Hrs  T(C): 35.8 (25 Oct 2023 03:00), Max: 37.7 (24 Oct 2023 08:00)  T(F): 96.4 (25 Oct 2023 03:00), Max: 99.9 (24 Oct 2023 08:00)  HR: 104 (25 Oct 2023 04:25) (90 - 118)  BP: 119/65 (25 Oct 2023 01:00) (89/47 - 140/115)  BP(mean): 81 (25 Oct 2023 01:00) (62 - 119)  ABP: --  ABP(mean): --  RR: 25 (25 Oct 2023 01:00) (17 - 31)  SpO2: 96% (25 Oct 2023 04:25) (89% - 99%)    O2 Parameters below as of 25 Oct 2023 00:28  Patient On (Oxygen Delivery Method): nasal cannula, high flow, 40%    ABG - ( 24 Oct 2023 05:03 )  pH, Arterial: 7.250 pH, Blood: x     /  pCO2: 66    /  pO2: 71    / HCO3: 29    / Base Excess: 1.7   /  SaO2: 92.3      I&O's Detail    23 Oct 2023 07:01  -  24 Oct 2023 07:00  --------------------------------------------------------  IN:    Amiodarone: 233.1 mL    Amiodarone: 233.8 mL    Heparin Infusion: 451.7 mL    IV PiggyBack: 200 mL    Lactated Ringers Bolus: 500 mL    Milrinone: 285.6 mL    Norepinephrine: 71.4 mL    Oral Fluid: 60 mL    Phenylephrine: 60.3 mL    Vasopressin: 114 mL  Total IN: 2209.9 mL    OUT:    Indwelling Catheter - Urethral (mL): 2220 mL    Stool (mL): 4 mL  Total OUT: 2224 mL    Total NET: -14.1 mL    24 Oct 2023 07:01  -  25 Oct 2023 04:42  --------------------------------------------------------  IN:    Amiodarone: 66.8 mL    Heparin Infusion: 357 mL    IV PiggyBack: 100 mL    Milrinone: 190.4 mL    Phenylephrine: 16.8 mL    Vasopressin: 102 mL  Total IN: 833 mL    OUT:    Indwelling Catheter - Urethral (mL): 675 mL  Total OUT: 675 mL    Total NET: 158 mL    LABS:                        9.1    6.94  )-----------( 189      ( 24 Oct 2023 23:25 )             30.8     10-24    127<L>  |  87<L>  |  78.8<H>  ----------------------------<  235<H>  4.1   |  25.0  |  2.92<H>    Ca    7.4<L>      24 Oct 2023 23:25  Phos  5.0     10-24  Mg     2.0     10-24    TPro  7.1  /  Alb  2.6<L>  /  TBili  2.2<H>  /  DBili  x   /  AST  681<H>  /  ALT  1427<H>  /  AlkPhos  139<H>  10-24    CARDIAC MARKERS ( 24 Oct 2023 10:10 )  x     / 0.01 ng/mL / x     / x     / x        CAPILLARY BLOOD GLUCOSE    PTT - ( 24 Oct 2023 07:45 )  PTT:72.2 sec    Urinalysis Basic - ( 24 Oct 2023 23:25 )  Color: x / Appearance: x / SG: x / pH: x  Gluc: 235 mg/dL / Ketone: x  / Bili: x / Urobili: x   Blood: x / Protein: x / Nitrite: x   Leuk Esterase: x / RBC: x / WBC x   Sq Epi: x / Non Sq Epi: x / Bacteria: x    CULTURES:  Culture Results:   No growth at 48 Hours (10-22 @ 18:50)  Rapid RVP Result: NotDetec (10-22 @ 16:18)  Culture Results:   No growth at 48 Hours (10-22 @ 16:00)  Rapid RVP Result: NotDetec (10-18 @ 12:05)  Culture Results:   No growth at 5 days (10-18 @ 11:30)  Culture Results:   No growth at 5 days (10-18 @ 11:28)    Physical Examination:    General: Resting comfortably.    HEENT: Pupils equal, reactive to light. Symmetric. No scleral icterus or injection.    PULM: Diminished breath sounds b/l.    NECK: Supple, no lymphadenopathy, trachea midline.    CVS: Regular rate and rhythm, no murmurs appreciated, +s1/s2.    ABD: Soft, nondistended, nontender, normoactive bowel sounds.    EXT: No edema, nontender.    SKIN: Warm and well perfused, no rashes noted.    NEURO: Alert, oriented, interactive, nonfocal.    RADIOLOGY:  < from: Xray Chest 1 View- PORTABLE-Urgent (Xray Chest 1 View- PORTABLE-Urgent .) (10.23.23 @ 11:45) >  ACC: [id number] EXAM:  XR CHEST PORTABLE URGENT 1V   ORDERED BY: MIGUELITO MERCEDES     PROCEDURE DATE:  10/23/2023      INTERPRETATION:  TIME OF EXAM: October 23, 2023 at 11:13 AM.    CLINICAL INFORMATION: Post [place]. Right heart failure.    COMPARISON:October 23, 2023 at 5:27 AM.    TECHNIQUE:   AP Portable chest x-ray. Right costophrenic angle excluded   from image. Limited by rotation.    INTERPRETATION:    Heart size and the mediastinum cannot be accurately evaluated on this   projection.  There is a right IJ approach [place]-Renny catheter with tip in the right   main pulmonary artery.  There are worsening diffuse right lung airspace opacities.  There is improved central left pulmonary vascular congestion.  No definite right pleural effusion.  Continued left lower/retrocardiac opacity with obscuration of the left   hemidiaphragm.  No pneumothorax seen.  No acute bony abnormality.    IMPRESSION:  Right IJ approach [place]-Renny catheter with tip in the right   main pulmonary artery. No pneumothorax.    Worsening diffuse right lung airspace opacities, possibly asymmetric   pulmonary edema, although infection is not excluded.    Improved central left pulmonary vascular congestion.    Continued left lower/retrocardiac opacity, possibly a left pleural   effusion with associated passive atelectasis. Atelectasis of other cause,   and/or other pathology including, but not limited to, pneumonia is not   excluded.    --- End of Report ---    JOÃO BAXTER MD; Attending Radiologist  Thisdocument has been electronically signed. Oct 23 2023  3:50PM    < end of copied text >    CRITICAL CARE TIME SPENT: 39 minutes

## 2023-10-25 NOTE — PROGRESS NOTE ADULT - PROBLEM SELECTOR PLAN 5
in the setting of cardiogenic shock  - improving with optimization as above. - in the setting of cardiogenic shock  - Monitor

## 2023-10-26 NOTE — PROGRESS NOTE ADULT - ASSESSMENT
SARAH: ATN, cardiogenic shock   Ischemic hepatitis with coagulopathy   + SLE serology  Hypervolemic hyponatremia  - cont supportive care  - cont pressor, milrinone and diuretics  - may need to consider RRT depending upon clinical course  - check f/u serologies

## 2023-10-26 NOTE — PROGRESS NOTE ADULT - PROBLEM SELECTOR PLAN 1
- Continue hemodynamic monitoring via PAC.   - Hemodynamic goals: CVP < 12, SVO2 > 65%, lactic acid < 2.2, CI > 2.2, iCa > 1.3  - Monitor perfusion markers daily (lactate, MVO2 from distal port of PA catheter, LFTs)  - Inotrope: would wean milrinone to 0.125 mcg/kg/min and repeat MVO2, CMP, and lactate 2 hours later.  - Vasopressors: vaso 0.04 u/min. Consider weaning as able. OK to use PO midodrine.  - Teagan @ 5 PPM  - Diuretics: Consider starting bumex 2mg IV BID  - Strict I&Os  - No advanced therapy options due to BMI of 60. Would not be a candidate for tMCS since she has no exit strategy. - Continue hemodynamic monitoring via PAC.   - Hemodynamic goals: CVP < 12, SVO2 > 65%, lactic acid < 2.2, CI > 2.2, iCa > 1.3  - Monitor perfusion markers daily (lactate, MVO2 from distal port of PA catheter, LFTs)  - Inotrope: would wean milrinone to 0.125 mcg/kg/min and repeat MVO2, CMP, and lactate 2 hours later.  - Vasopressorsweaned to OFF this am  - Teagan weaned to OFF this am  - Diuretics: recommend bumex 2mg IV BID  - Strict I&Os  - No advanced therapy options due to BMI of 60. Would not be a candidate for tMCS since she has no exit strategy.

## 2023-10-26 NOTE — PROGRESS NOTE ADULT - SUBJECTIVE AND OBJECTIVE BOX
[facility]                                   [address] [place] [address]                                                      PHONE: [phone]                                                         FAX: [phone]  [email or web address]  ---------------------------------------------------------------------------------------------------------------------------------    Overnight events/patient complaints:  Patient doing better today.  Vasopressin was turned down to .02.      PAST MEDICAL & SURGICAL HISTORY:  Pulmonary embolism      DVT, lower extremity      CHF (congestive heart failure)      Asthma      Anxiety      Severe obesity (BMI >= 40)      Hypertension      No significant past surgical history          iodine (Hives)  ceftriaxone (Hives)  shellfish (Hives)    MEDICATIONS  (STANDING):  aMIOdarone    Tablet 200 milliGRAM(s) Oral daily  budesonide 160 MICROgram(s)/formoterol 4.5 MICROgram(s) Inhaler 2 Puff(s) Inhalation two times a day  chlorhexidine 2% Cloths 1 Application(s) Topical <User Schedule>  heparin  Infusion. 1200 Unit(s)/Hr (12 mL/Hr) IV Continuous <Continuous>  influenza   Vaccine 0.5 milliLiter(s) IntraMuscular once  insulin lispro (ADMELOG) corrective regimen sliding scale.   SubCutaneous four times a day with meals  milrinone Infusion 0.25 MICROgram(s)/kG/Min (11.2 mL/Hr) IV Continuous <Continuous>  montelukast 10 milliGRAM(s) Oral daily  mupirocin 2% Ointment 1 Application(s) Both Nostrils two times a day  nystatin Powder 1 Application(s) Topical two times a day  PARoxetine 20 milliGRAM(s) Oral daily  piperacillin/tazobactam IVPB.. 3.375 Gram(s) IV Intermittent every 12 hours  vasopressin Infusion 0.03 Unit(s)/Min (4.5 mL/Hr) IV Continuous <Continuous>    MEDICATIONS  (PRN):  albuterol/ipratropium for Nebulization 3 milliLiter(s) Nebulizer every 6 hours PRN Shortness of Breath and/or Wheezing  aluminum hydroxide/magnesium hydroxide/simethicone Suspension 30 milliLiter(s) Oral every 4 hours PRN Dyspepsia  melatonin 3 milliGRAM(s) Oral at bedtime PRN Insomnia  ondansetron Injectable 4 milliGRAM(s) IV Push every 8 hours PRN Nausea and/or Vomiting      Vital Signs Last 24 Hrs  T(C): 36 (26 Oct 2023 03:00), Max: 36.1 (25 Oct 2023 23:00)  T(F): 96.8 (26 Oct 2023 03:00), Max: 97 (25 Oct 2023 23:00)  HR: 108 (26 Oct 2023 09:30) (84 - 117)  BP: 130/74 (26 Oct 2023 09:30) (98/62 - 145/79)  BP(mean): 91 (26 Oct 2023 09:30) (63 - 97)  RR: 26 (26 Oct 2023 09:30) (15 - 31)  SpO2: 95% (26 Oct 2023 09:30) (89% - 96%)    Parameters below as of 26 Oct 2023 08:49  Patient On (Oxygen Delivery Method): nasal cannula, high flow      ICU Vital Signs Last 24 Hrs  MITCHEL GUNN  I&O's Detail    25 Oct 2023 07:01  -  26 Oct 2023 07:00  --------------------------------------------------------  IN:    Heparin Infusion: 318 mL    IV PiggyBack: 275 mL    Milrinone: 268.8 mL    Oral Fluid: 200 mL    Vasopressin: 114 mL    Vasopressin: 18 mL  Total IN: 1193.8 mL    OUT:    Indwelling Catheter - Urethral (mL): 1205 mL  Total OUT: 1205 mL    Total NET: -11.2 mL      26 Oct 2023 07:01  -  26 Oct 2023 10:07  --------------------------------------------------------  IN:    Heparin Infusion: 22 mL    Milrinone: 33.6 mL    Vasopressin: 3 mL  Total IN: 58.6 mL    OUT:    Indwelling Catheter - Urethral (mL): 140 mL  Total OUT: 140 mL    Total NET: -81.4 mL        I&O's Summary    25 Oct 2023 07:01  -  26 Oct 2023 07:00  --------------------------------------------------------  IN: 1193.8 mL / OUT: 1205 mL / NET: -11.2 mL    26 Oct 2023 07:01  -  26 Oct 2023 10:07  --------------------------------------------------------  IN: 58.6 mL / OUT: 140 mL / NET: -81.4 mL      Drug Dosing Weight  MITCHEL GUNN      PHYSICAL EXAM:  General: Morbid obesity  HEENT: R swan cat catheter  Eyes: Pupils reactive, cornea wnl.  Neck: Supple, no nodes adenopathy, no JVD, no carotid bruit  CARDIOVASCULAR: Normal S1 and S2, No murmur, rub, gallop or lift.   LUNGS: No rales, rhonchi or wheeze. Normal breath sounds bilaterally.  ABDOMEN: Soft, nontender, nondistended  EXTREMITIES: No clubbing or edema. Distal pulses wnl.   SKIN: warm and dry with normal turgor.  NEURO: Alert/oriented x 3/normal motor exam.   PSYCH: normal affect.        LABS:                        8.9    6.49  )-----------( 148      ( 26 Oct 2023 06:09 )             30.1     10-26    130<L>  |  89<L>  |  90.8<H>  ----------------------------<  206<H>  3.8   |  27.0  |  2.93<H>    Ca    7.9<L>      26 Oct 2023 06:09  Phos  4.3     10-26  Mg     2.2     10-26    TPro  7.3  /  Alb  2.6<L>  /  TBili  1.8  /  DBili  x   /  AST  327<H>  /  ALT  1072<H>  /  AlkPhos  136<H>  10-26    MITCHEL GUNN  CARDIAC MARKERS ( 24 Oct 2023 10:10 )  x     / 0.01 ng/mL / x     / x     / x          PT/INR - ( 25 Oct 2023 12:36 )   PT: 18.0 sec;   INR: 1.65 ratio         PTT - ( 26 Oct 2023 02:40 )  PTT:62.6 sec  Urinalysis Basic - ( 26 Oct 2023 06:09 )    Color: x / Appearance: x / SG: x / pH: x  Gluc: 206 mg/dL / Ketone: x  / Bili: x / Urobili: x   Blood: x / Protein: x / Nitrite: x   Leuk Esterase: x / RBC: x / WBC x   Sq Epi: x / Non Sq Epi: x / Bacteria: x    Hemodynamics- off inhaled Teagan  Milrinone ifusion  Vasopressin at 0.02u/min    RADIOLOGY & ADDITIONAL STUDIES:    INTERPRETATION OF TELEMETRY (personally reviewed):    ECG:    ECHO:    STRESS TEST:    CARDIAC CATHETERIZATION:    ASSESSMENT AND PLAN:  In summary, MITCHEL GUNN is an 38y Female with past medical history significant for morbid obesity, HFpEF, severe RV dysfunction, PE/DVT, SLE p/w heart failure/cardiogenic shock.    Cardiogenic shock- patient remains on vasopressin and milrinone in the medical ICU.  She is off Teagan but remains on high flow O2    -some improvement  -wedge normal today  -attempt to wean milrinone tonight or tomorrow  -would discontinue vasopressin as MAP is 83  -wean supplemental O2 as tolerate    Atrial flutter- rates controled.  pt on PO amiodarone    Thank you for allowing [facility] to participate in the care of this patient.  Please feel free to call with any questions or concerns.

## 2023-10-26 NOTE — PROGRESS NOTE ADULT - SUBJECTIVE AND OBJECTIVE BOX
INCOMPLETE NOTE    Subjective:  No overnight events    Medications:  albuterol/ipratropium for Nebulization 3 milliLiter(s) Nebulizer every 6 hours PRN  aluminum hydroxide/magnesium hydroxide/simethicone Suspension 30 milliLiter(s) Oral every 4 hours PRN  aMIOdarone    Tablet 200 milliGRAM(s) Oral daily  budesonide 160 MICROgram(s)/formoterol 4.5 MICROgram(s) Inhaler 2 Puff(s) Inhalation two times a day  chlorhexidine 2% Cloths 1 Application(s) Topical <User Schedule>  heparin  Infusion. 1200 Unit(s)/Hr IV Continuous <Continuous>  influenza   Vaccine 0.5 milliLiter(s) IntraMuscular once  insulin lispro (ADMELOG) corrective regimen sliding scale.   SubCutaneous four times a day with meals  melatonin 3 milliGRAM(s) Oral at bedtime PRN  milrinone Infusion 0.25 MICROgram(s)/kG/Min IV Continuous <Continuous>  montelukast 10 milliGRAM(s) Oral daily  mupirocin 2% Ointment 1 Application(s) Both Nostrils two times a day  nystatin Powder 1 Application(s) Topical two times a day  ondansetron Injectable 4 milliGRAM(s) IV Push every 8 hours PRN  PARoxetine 20 milliGRAM(s) Oral daily  piperacillin/tazobactam IVPB.. 3.375 Gram(s) IV Intermittent every 12 hours  vasopressin Infusion 0.03 Unit(s)/Min IV Continuous <Continuous>    Vitals:  T(C): 36 (10-26-23 @ 03:00), Max: 36.1 (10-25-23 @ 23:00)  HR: 101 (10-26-23 @ 07:00) (81 - 117)  BP: 135/74 (10-26-23 @ 07:00) (98/62 - 143/65)  BP(mean): 89 (10-26-23 @ 07:00) (63 - 96)  RR: 27 (10-26-23 @ 07:00) (15 - 31)  SpO2: 91% (10-26-23 @ 07:00) (89% - 96%)    Daily     Daily Weight in k.8 (26 Oct 2023 03:00)        I&O's Summary    25 Oct 2023 07:01  -  26 Oct 2023 07:00  --------------------------------------------------------  IN: 1193.8 mL / OUT: 1205 mL / NET: -11.2 mL        Physical Exam:  Appearance: No Acute Distress  HEENT: JVP non elevated  Cardiovascular: RRR, Normal S1 S2, No murmurs/rubs/gallops  Respiratory: Clear to auscultation bilaterally  Gastrointestinal: Soft, Non-tender, non-distended	  Skin: no skin lesions  Neurologic: Non-focal  Extremities: No LE edema, warm and well perfused  Psychiatry: A & O x 3, Mood & affect appropriate      Labs:                        8.9    6.49  )-----------( 148      ( 26 Oct 2023 06:09 )             30.1     10-26    130<L>  |  89<L>  |  90.8<H>  ----------------------------<  206<H>  3.8   |  27.0  |  2.93<H>    Ca    7.9<L>      26 Oct 2023 06:09  Phos  4.3     10-26  Mg     2.2     10-26    TPro  7.3  /  Alb  2.6<L>  /  TBili  1.8  /  DBili  x   /  AST  327<H>  /  ALT  1072<H>  /  AlkPhos  136<H>  10-26      PT/INR - ( 25 Oct 2023 12:36 )   PT: 18.0 sec;   INR: 1.65 ratio         PTT - ( 26 Oct 2023 02:40 )  PTT:62.6 sec  CARDIAC MARKERS ( 24 Oct 2023 10:10 )  x     / 0.01 ng/mL / x     / x     / x                Lactate, Blood: 1.4 mmol/L (10-25 @ 05:14)  Lactate, Blood: 1.5 mmol/L (10-24 @ 16:00)  Lactate, Blood: 1.8 mmol/L (10-23 @ 15:00)     Subjective:  No overnight events reported  Pt states that she feels well  Teagan and pressors weaned to OFF    Medications:  albuterol/ipratropium for Nebulization 3 milliLiter(s) Nebulizer every 6 hours PRN  aluminum hydroxide/magnesium hydroxide/simethicone Suspension 30 milliLiter(s) Oral every 4 hours PRN  aMIOdarone    Tablet 200 milliGRAM(s) Oral daily  budesonide 160 MICROgram(s)/formoterol 4.5 MICROgram(s) Inhaler 2 Puff(s) Inhalation two times a day  chlorhexidine 2% Cloths 1 Application(s) Topical <User Schedule>  heparin  Infusion. 1200 Unit(s)/Hr IV Continuous <Continuous>  influenza   Vaccine 0.5 milliLiter(s) IntraMuscular once  insulin lispro (ADMELOG) corrective regimen sliding scale.   SubCutaneous four times a day with meals  melatonin 3 milliGRAM(s) Oral at bedtime PRN  milrinone Infusion 0.25 MICROgram(s)/kG/Min IV Continuous <Continuous>  montelukast 10 milliGRAM(s) Oral daily  mupirocin 2% Ointment 1 Application(s) Both Nostrils two times a day  nystatin Powder 1 Application(s) Topical two times a day  ondansetron Injectable 4 milliGRAM(s) IV Push every 8 hours PRN  PARoxetine 20 milliGRAM(s) Oral daily  piperacillin/tazobactam IVPB.. 3.375 Gram(s) IV Intermittent every 12 hours  vasopressin Infusion 0.03 Unit(s)/Min IV Continuous <Continuous>    Vitals:  T(C): 36 (10-26-23 @ 03:00), Max: 36.1 (10-25-23 @ 23:00)  HR: 101 (10-26-23 @ 07:00) (81 - 117)  BP: 135/74 (10-26-23 @ 07:00) (98/62 - 143/65)  BP(mean): 89 (10-26-23 @ 07:00) (63 - 96)  RR: 27 (10-26-23 @ 07:00) (15 - 31)  SpO2: 91% (10-26-23 @ 07:00) (89% - 96%)    Daily     Daily Weight in k.8 (26 Oct 2023 03:00)        I&O's Summary    25 Oct 2023 07:01  -  26 Oct 2023 07:00  --------------------------------------------------------  IN: 1193.8 mL / OUT: 1205 mL / NET: -11.2 mL        Physical Exam:  Appearance: No Acute Distress  HEENT: JVP difficult to assess. PAC via RIJ  Cardiovascular: tachycardic heart sounds  Respiratory: decreased Bs throughout  Gastrointestinal: Soft, Non-tender, non-distended	  Neurologic: Non-focal  Extremities: No LE edema, warm and well perfused  Psychiatry: A & O x 3, Mood & affect appropriate      Labs:                        8.9    6.49  )-----------( 148      ( 26 Oct 2023 06:09 )             30.1     10-26    130<L>  |  89<L>  |  90.8<H>  ----------------------------<  206<H>  3.8   |  27.0  |  2.93<H>    Ca    7.9<L>      26 Oct 2023 06:09  Phos  4.3     10-26  Mg     2.2     10-26    TPro  7.3  /  Alb  2.6<L>  /  TBili  1.8  /  DBili  x   /  AST  327<H>  /  ALT  1072<H>  /  AlkPhos  136<H>  10-26      PT/INR - ( 25 Oct 2023 12:36 )   PT: 18.0 sec;   INR: 1.65 ratio         PTT - ( 26 Oct 2023 02:40 )  PTT:62.6 sec  CARDIAC MARKERS ( 24 Oct 2023 10:10 )  x     / 0.01 ng/mL / x     / x     / x                Lactate, Blood: 1.4 mmol/L (10-25 @ 05:14)  Lactate, Blood: 1.5 mmol/L (10-24 @ 16:00)  Lactate, Blood: 1.8 mmol/L (10-23 @ 15:00)

## 2023-10-26 NOTE — PROGRESS NOTE ADULT - SUBJECTIVE AND OBJECTIVE BOX
NEPHROLOGY INTERVAL HPI/OVERNIGHT EVENTS:  pt remains clinically unchanged  still requiring milrinone and pressors  Bumex continues as well    MEDICATIONS  (STANDING):  aMIOdarone    Tablet 200 milliGRAM(s) Oral daily  budesonide 160 MICROgram(s)/formoterol 4.5 MICROgram(s) Inhaler 2 Puff(s) Inhalation two times a day  buMETAnide Injectable 2 milliGRAM(s) IV Push once  chlorhexidine 2% Cloths 1 Application(s) Topical <User Schedule>  heparin  Infusion. 1200 Unit(s)/Hr (12 mL/Hr) IV Continuous <Continuous>  influenza   Vaccine 0.5 milliLiter(s) IntraMuscular once  insulin lispro (ADMELOG) corrective regimen sliding scale.   SubCutaneous four times a day with meals  milrinone Infusion 0.25 MICROgram(s)/kG/Min (11.2 mL/Hr) IV Continuous <Continuous>  montelukast 10 milliGRAM(s) Oral daily  mupirocin 2% Ointment 1 Application(s) Both Nostrils two times a day  nystatin Powder 1 Application(s) Topical two times a day  PARoxetine 20 milliGRAM(s) Oral daily  piperacillin/tazobactam IVPB.. 3.375 Gram(s) IV Intermittent every 12 hours  sildenafil (REVATIO) 10 milliGRAM(s) Oral three times a day  vasopressin Infusion 0.03 Unit(s)/Min (4.5 mL/Hr) IV Continuous <Continuous>    MEDICATIONS  (PRN):  albuterol/ipratropium for Nebulization 3 milliLiter(s) Nebulizer every 6 hours PRN Shortness of Breath and/or Wheezing  aluminum hydroxide/magnesium hydroxide/simethicone Suspension 30 milliLiter(s) Oral every 4 hours PRN Dyspepsia  melatonin 3 milliGRAM(s) Oral at bedtime PRN Insomnia  ondansetron Injectable 4 milliGRAM(s) IV Push every 8 hours PRN Nausea and/or Vomiting      Allergies    iodine (Hives)  ceftriaxone (Hives)  shellfish (Hives)        Vital Signs Last 24 Hrs  T(C): 36 (26 Oct 2023 03:00), Max: 36.1 (25 Oct 2023 23:00)  T(F): 96.8 (26 Oct 2023 03:00), Max: 97 (25 Oct 2023 23:00)  HR: 103 (26 Oct 2023 11:00) (84 - 110)  BP: 128/66 (26 Oct 2023 11:00) (98/62 - 145/79)  BP(mean): 78 (26 Oct 2023 11:00) (70 - 97)  RR: 25 (26 Oct 2023 11:00) (15 - 31)  SpO2: 94% (26 Oct 2023 11:00) (89% - 96%)    Parameters below as of 26 Oct 2023 08:49  Patient On (Oxygen Delivery Method): nasal cannula, high flow        PHYSICAL EXAM:  GENERAL: Appears acutely ill  HEENT: Periorbital edema  NECK: No JVD  NERVOUS SYSTEM:  Alert & Oriented X3  CHEST/LUNG: Diminished BS bilaterally  HEART: Regular rate and rhythm; No rub  ABDOMEN: Soft, Nontender, Nondistended; BS  EXTREMITIES: + dependent edema  : + major    LABS:                        8.9    6.49  )-----------( 148      ( 26 Oct 2023 06:09 )             30.1     10-26    130<L>  |  89<L>  |  90.8<H>  ----------------------------<  206<H>  3.8   |  27.0  |  2.93<H>    Ca    7.9<L>      26 Oct 2023 06:09  Phos  4.3     10-26  Mg     2.2     10-26    TPro  7.3  /  Alb  2.6<L>  /  TBili  1.8  /  DBili  x   /  AST  327<H>  /  ALT  1072<H>  /  AlkPhos  136<H>  10-26    PT/INR - ( 25 Oct 2023 12:36 )   PT: 18.0 sec;   INR: 1.65 ratio         PTT - ( 26 Oct 2023 09:40 )  PTT:102.7 sec  Urinalysis Basic - ( 26 Oct 2023 06:09 )    Color: x / Appearance: x / SG: x / pH: x  Gluc: 206 mg/dL / Ketone: x  / Bili: x / Urobili: x   Blood: x / Protein: x / Nitrite: x   Leuk Esterase: x / RBC: x / WBC x   Sq Epi: x / Non Sq Epi: x / Bacteria: x      Magnesium: 2.2 mg/dL (10-26 @ 06:09)  Phosphorus: 4.3 mg/dL (10-26 @ 06:09)  Magnesium: 2.2 mg/dL (10-26 @ 00:29)  Phosphorus: 4.3 mg/dL (10-26 @ 00:29)      RADIOLOGY & ADDITIONAL TESTS:  < from: Xray Chest 1 View- PORTABLE-Urgent (Xray Chest 1 View- PORTABLE-Urgent .) (10.23.23 @ 11:45) >    ACC: [id number] EXAM:  XR CHEST PORTABLE URGENT 1V   ORDERED BY: MIGUELITO MERCEDES     PROCEDURE DATE:  10/23/2023          INTERPRETATION:  TIME OF EXAM: October 23, 2023 at 11:13 AM.    CLINICAL INFORMATION: Post [place]. Right heart failure.    COMPARISON:October 23, 2023 at 5:27 AM.    TECHNIQUE:   AP Portable chest x-ray. Right costophrenic angle excluded   from image. Limited by rotation.    INTERPRETATION:    Heart size and the mediastinum cannot be accurately evaluated on this   projection.  There is a right IJ approach [place]-Renny catheter with tip in the right   main pulmonary artery.  There are worsening diffuse right lung airspace opacities.  There is improved central left pulmonary vascular congestion.  No definite right pleural effusion.  Continued left lower/retrocardiac opacity with obscuration of the left   hemidiaphragm.  No pneumothorax seen.  No acute bony abnormality.      IMPRESSION:  Right IJ approach [place]Renny catheter with tip in the right   main pulmonary artery. No pneumothorax.    Worsening diffuse right lung airspace opacities, possibly asymmetric   pulmonary edema, although infection is not excluded.    Improved central left pulmonary vascular congestion.    Continued left lower/retrocardiac opacity, possibly a left pleural   effusion with associated passive atelectasis. Atelectasis of other cause,   and/or other pathology including, but not limited to, pneumonia is not   excluded.    < end of copied text >

## 2023-10-26 NOTE — PROGRESS NOTE ADULT - ASSESSMENT
37 y/o F with a h/o b/l DVTs, recurrent PE on eliquis, HFpEF, morbid obesity, asthma, chronic hyperkalemia, with:    # Cardiogenic shock  # Cor pulmonale  # Acute hypoxemic respiratory failure  # Multifocal pneumonia  # SARAH  # Transaminitis  # Atrial flutter    - recent SVO2 61, lactate 1.2  - maintain milrinone @ 0.25 mcg/kg/min  - wean vasopressin to 0.03 u/min, maintain a MAP > 65  - will consider adding midodrine to help offload infusion requirement  - discontinue stress dose hydrocortisone  - weaning Teagan 1ppm Q 2 hours, consider transition to PO pulmonary vasodilator once off Teagan and vaso  - 2mg IV bumetanide x 1  - cardiology and advanced heart failure input appreciated  - SARAH secondary to ischemic ATN/cardiorenal syndrome, optimize end-organ perfusion as above  - BUN/Cr continue to worsen, trend electrolytes and acid-base balance, monitor hourly UOP  - no indication for urgent RRT at this time, however she remains at increased risk of requiring this during this hospitalization  - transaminitis due to passive hepatic congestion + ischemia, LFTs slowly downtrending  - actively titrating HFNC settings to maintain SpO2 > 92%, NIPPV PRN for increased work of breathing  - therapeutic anticoagulation with heparin infusion    Case discussed with MICU physician, Dr. Honeycutt.

## 2023-10-26 NOTE — PROGRESS NOTE ADULT - PROBLEM SELECTOR PLAN 3
- Appreciate EP recommendations  - digoxin (renally dosed)   - Amio as per EP. Monitor PFTs, TFTs  - Continue AC with heparin gtt

## 2023-10-26 NOTE — PROGRESS NOTE ADULT - PROBLEM SELECTOR PLAN 4
Pre-capillary PH  - has a h/o PE (was on AC at home), not sure if she was worked up for CTEPH in the past   - would benefit from sildenafil once off vasopressors. Would likely need IV vasodilator. It's unclear if she's a candidate for home Veletri. MICU/Pulmonary to assess as well as SW. Another option is SC remodulin or triple oral therapy (ERA+[id number]+selexipeg).  - Please obtain V/Q scan when stable   - Consider bridging Teagan to vasodilators. Can start with sildenafil once pt is off pressors. Pre-capillary PH - SEVERE. Close to systemic pressures.   - has a h/o PE (was on AC at home), not sure if she was worked up for CTEPH in the past   - would benefit from sildenafil now that she is off vasopressors. Recommend sildenafil 20mg TID. Would likely need IV vasodilator. It's unclear if she's a candidate for home Veletri. MICU/Pulmonary to assess as well as SW. Another option is SC remodulin or triple oral therapy (ERA+PDE5i+selexipeg).  - Please obtain V/Q scan when stable and CxR cleared  - Recommend MICHAEL screening as outpt as well as rheum work up. She recently established care with outpt rheumatologist

## 2023-10-26 NOTE — PROGRESS NOTE ADULT - SUBJECTIVE AND OBJECTIVE BOX
Patient is a 38y old  Female who presents with a chief complaint of Acute on Chronic Hypoxic Respiratory Failure 2/2 CHF Exacerbation (25 Oct 2023 15:12)      BRIEF HOSPITAL COURSE: 37 y/o F with a h/o     Events last 24 hours: ***        PAST MEDICAL & SURGICAL HISTORY:  Pulmonary embolism      DVT, lower extremity      CHF (congestive heart failure)      Asthma      Anxiety      Severe obesity (BMI >= 40)      Hypertension      No significant past surgical history          Review of Systems:  CONSTITUTIONAL: No fever, chills, or fatigue  EYES: No eye pain, visual disturbances, or discharge  ENMT:  No difficulty hearing, tinnitus, vertigo; No sinus or throat pain  NECK: No pain or stiffness  RESPIRATORY: No cough, wheezing, chills or hemoptysis; No shortness of breath  CARDIOVASCULAR: No chest pain, palpitations, dizziness, or leg swelling  GASTROINTESTINAL: No abdominal or epigastric pain. No nausea, vomiting, or hematemesis; No diarrhea or constipation. No melena or hematochezia.  GENITOURINARY: No dysuria, frequency, hematuria, or incontinence  NEUROLOGICAL: No headaches, memory loss, loss of strength, numbness, or tremors  SKIN: No itching, burning, rashes, or lesions   MUSCULOSKELETAL: No joint pain or swelling; No muscle, back, or extremity pain  PSYCHIATRIC: No depression, anxiety, mood swings, or difficulty sleeping      Medications:  piperacillin/tazobactam IVPB.. 3.375 Gram(s) IV Intermittent every 12 hours    aMIOdarone    Tablet 200 milliGRAM(s) Oral daily  milrinone Infusion 0.25 MICROgram(s)/kG/Min IV Continuous <Continuous>    albuterol/ipratropium for Nebulization 3 milliLiter(s) Nebulizer every 6 hours PRN  budesonide 160 MICROgram(s)/formoterol 4.5 MICROgram(s) Inhaler 2 Puff(s) Inhalation two times a day  montelukast 10 milliGRAM(s) Oral daily    melatonin 3 milliGRAM(s) Oral at bedtime PRN  ondansetron Injectable 4 milliGRAM(s) IV Push every 8 hours PRN  PARoxetine 20 milliGRAM(s) Oral daily      heparin  Infusion. 1200 Unit(s)/Hr IV Continuous <Continuous>    aluminum hydroxide/magnesium hydroxide/simethicone Suspension 30 milliLiter(s) Oral every 4 hours PRN      insulin lispro (ADMELOG) corrective regimen sliding scale.   SubCutaneous four times a day with meals  vasopressin Infusion 0.04 Unit(s)/Min IV Continuous <Continuous>      influenza   Vaccine 0.5 milliLiter(s) IntraMuscular once    chlorhexidine 2% Cloths 1 Application(s) Topical <User Schedule>  mupirocin 2% Ointment 1 Application(s) Both Nostrils two times a day  nystatin Powder 1 Application(s) Topical two times a day            ICU Vital Signs Last 24 Hrs  T(C): 36.1 (25 Oct 2023 23:00), Max: 36.1 (25 Oct 2023 23:00)  T(F): 97 (25 Oct 2023 23:00), Max: 97 (25 Oct 2023 23:00)  HR: 100 (26 Oct 2023 01:00) (81 - 117)  BP: 130/70 (26 Oct 2023 01:00) (98/57 - 148/128)  BP(mean): 85 (26 Oct 2023 01:00) (63 - 134)  ABP: --  ABP(mean): --  RR: 27 (26 Oct 2023 01:00) (16 - 31)  SpO2: 91% (26 Oct 2023 01:00) (90% - 97%)    O2 Parameters below as of 26 Oct 2023 00:00  Patient On (Oxygen Delivery Method): nasal cannula, high flow  O2 Flow (L/min): 40  O2 Concentration (%): 40        ABG - ( 24 Oct 2023 05:03 )  pH, Arterial: 7.250 pH, Blood: x     /  pCO2: 66    /  pO2: 71    / HCO3: 29    / Base Excess: 1.7   /  SaO2: 92.3                I&O's Detail    24 Oct 2023 07:01  -  25 Oct 2023 07:00  --------------------------------------------------------  IN:    Amiodarone: 66.8 mL    Heparin Infusion: 479 mL    IV PiggyBack: 100 mL    Milrinone: 268.8 mL    Phenylephrine: 16.8 mL    Vasopressin: 144 mL  Total IN: 1075.4 mL    OUT:    Indwelling Catheter - Urethral (mL): 940 mL  Total OUT: 940 mL    Total NET: 135.4 mL      25 Oct 2023 07:01  -  26 Oct 2023 02:07  --------------------------------------------------------  IN:    Heparin Infusion: 252 mL    IV PiggyBack: 200 mL    Milrinone: 201.6 mL    Oral Fluid: 100 mL    Vasopressin: 108 mL  Total IN: 861.6 mL    OUT:    Indwelling Catheter - Urethral (mL): 805 mL  Total OUT: 805 mL    Total NET: 56.6 mL            LABS:                        9.0    6.90  )-----------( 183      ( 26 Oct 2023 00:29 )             30.4     10-26    128<L>  |  88<L>  |  88.8<H>  ----------------------------<  210<H>  3.7   |  27.0  |  3.20<H>    Ca    7.8<L>      26 Oct 2023 00:29  Phos  4.3     10-26  Mg     2.2     10-26    TPro  7.3  /  Alb  2.6<L>  /  TBili  1.8  /  DBili  x   /  AST  327<H>  /  ALT  1072<H>  /  AlkPhos  136<H>  10-26      CARDIAC MARKERS ( 24 Oct 2023 10:10 )  x     / 0.01 ng/mL / x     / x     / x          CAPILLARY BLOOD GLUCOSE      POCT Blood Glucose.: 219 mg/dL (25 Oct 2023 21:20)    PT/INR - ( 25 Oct 2023 12:36 )   PT: 18.0 sec;   INR: 1.65 ratio         PTT - ( 25 Oct 2023 18:58 )  PTT:108.0 sec  Urinalysis Basic - ( 26 Oct 2023 00:29 )    Color: x / Appearance: x / SG: x / pH: x  Gluc: 210 mg/dL / Ketone: x  / Bili: x / Urobili: x   Blood: x / Protein: x / Nitrite: x   Leuk Esterase: x / RBC: x / WBC x   Sq Epi: x / Non Sq Epi: x / Bacteria: x      CULTURES:  Culture Results:   No growth at 72 Hours (10-22-23 @ 18:50)  Rapid RVP Result: NotDetec (10-22-23 @ 16:18)  Culture Results:   No growth at 72 Hours (10-22-23 @ 16:00)        Physical Examination:    General: No acute distress.  Alert, oriented, interactive, nonfocal    HEENT: Pupils equal, reactive to light.  Symmetric.    PULM: Clear to auscultation bilaterally, no significant sputum production    CVS: Regular rate and rhythm, no murmurs, rubs, or gallops    ABD: Soft, nondistended, nontender, normoactive bowel sounds, no masses    EXT: No edema, nontender    SKIN: Warm and well perfused, no rashes noted.    NEURO: A&Ox3, strength 5/5 all extremities, cranial nerves grossly intact, no focal deficits        RADIOLOGY: ***        CRITICAL CARE TIME SPENT: ***  Time spent evaluating/treating patient with medical issues that pose a high risk for life threatening deterioration and/or end-organ damage, reviewing data/labs/imaging, discussing case with multidisciplinary team, discussing plan/goals of care with patient/family. Non-inclusive of procedure time.   Patient is a 38y old  Female who presents with a chief complaint of Acute on Chronic Hypoxic Respiratory Failure 2/2 CHF Exacerbation (25 Oct 2023 15:12)      BRIEF HOSPITAL COURSE: 37 y/o F with a h/o b/l DVTs, recurrent PE on eliquis, HFpEF, morbid obesity, asthma, chronic hyperkalemia, admitted on 10/18 with complaints of weakness, worsening LE edema, cough, and weight gain. Of note, was being worked up as an outpatient for possible SLE given facial rash, photophobia, and joint pains. She was started on prednisone 20 mg by her PCP x ~1 month. Pt was admitted to medicine for treatment of PNA. Hospital course complicated by progressive SARAH, HAGMA, transaminitis and acute hypoxemic respiratory failure secondary to development of cardiogenic shock/severe RV failure.    Events last 24 hours: Remains dependent on IV vasopressor and inotrope. Weaning Teagan. Remains on HFNC.      PAST MEDICAL & SURGICAL HISTORY:  Pulmonary embolism      DVT, lower extremity      CHF (congestive heart failure)      Asthma      Anxiety      Severe obesity (BMI >= 40)      Hypertension      No significant past surgical history          Review of Systems:  CONSTITUTIONAL: No fever, chills, or fatigue  EYES: No eye pain, visual disturbances, or discharge  ENMT:  No difficulty hearing, tinnitus, vertigo; No sinus or throat pain  NECK: No pain or stiffness  RESPIRATORY: No cough, wheezing, chills or hemoptysis; No shortness of breath  CARDIOVASCULAR: No chest pain, palpitations, dizziness, or leg swelling  GASTROINTESTINAL: No abdominal or epigastric pain. No nausea, vomiting, or hematemesis; No diarrhea or constipation. No melena or hematochezia.  GENITOURINARY: No dysuria, frequency, hematuria, or incontinence  NEUROLOGICAL: No headaches, memory loss, loss of strength, numbness, or tremors  SKIN: No itching, burning, rashes, or lesions   MUSCULOSKELETAL: No joint pain or swelling; No muscle, back, or extremity pain  PSYCHIATRIC: No depression, anxiety, mood swings, or difficulty sleeping      Medications:  piperacillin/tazobactam IVPB.. 3.375 Gram(s) IV Intermittent every 12 hours  aMIOdarone    Tablet 200 milliGRAM(s) Oral daily  milrinone Infusion 0.25 MICROgram(s)/kG/Min IV Continuous <Continuous>  albuterol/ipratropium for Nebulization 3 milliLiter(s) Nebulizer every 6 hours PRN  budesonide 160 MICROgram(s)/formoterol 4.5 MICROgram(s) Inhaler 2 Puff(s) Inhalation two times a day  montelukast 10 milliGRAM(s) Oral daily  melatonin 3 milliGRAM(s) Oral at bedtime PRN  ondansetron Injectable 4 milliGRAM(s) IV Push every 8 hours PRN  PARoxetine 20 milliGRAM(s) Oral daily  heparin  Infusion. 1200 Unit(s)/Hr IV Continuous <Continuous>  aluminum hydroxide/magnesium hydroxide/simethicone Suspension 30 milliLiter(s) Oral every 4 hours PRN  insulin lispro (ADMELOG) corrective regimen sliding scale.   SubCutaneous four times a day with meals  vasopressin Infusion 0.04 Unit(s)/Min IV Continuous <Continuous>  influenza   Vaccine 0.5 milliLiter(s) IntraMuscular once  chlorhexidine 2% Cloths 1 Application(s) Topical <User Schedule>  mupirocin 2% Ointment 1 Application(s) Both Nostrils two times a day  nystatin Powder 1 Application(s) Topical two times a day            ICU Vital Signs Last 24 Hrs  T(C): 36.1 (25 Oct 2023 23:00), Max: 36.1 (25 Oct 2023 23:00)  T(F): 97 (25 Oct 2023 23:00), Max: 97 (25 Oct 2023 23:00)  HR: 100 (26 Oct 2023 01:00) (81 - 117)  BP: 130/70 (26 Oct 2023 01:00) (98/57 - 148/128)  BP(mean): 85 (26 Oct 2023 01:00) (63 - 134)  ABP: --  ABP(mean): --  RR: 27 (26 Oct 2023 01:00) (16 - 31)  SpO2: 91% (26 Oct 2023 01:00) (90% - 97%)    O2 Parameters below as of 26 Oct 2023 00:00  Patient On (Oxygen Delivery Method): nasal cannula, high flow  O2 Flow (L/min): 40  O2 Concentration (%): 40        ABG - ( 24 Oct 2023 05:03 )  pH, Arterial: 7.250 pH, Blood: x     /  pCO2: 66    /  pO2: 71    / HCO3: 29    / Base Excess: 1.7   /  SaO2: 92.3                I&O's Detail    24 Oct 2023 07:01  -  25 Oct 2023 07:00  --------------------------------------------------------  IN:    Amiodarone: 66.8 mL    Heparin Infusion: 479 mL    IV PiggyBack: 100 mL    Milrinone: 268.8 mL    Phenylephrine: 16.8 mL    Vasopressin: 144 mL  Total IN: 1075.4 mL    OUT:    Indwelling Catheter - Urethral (mL): 940 mL  Total OUT: 940 mL    Total NET: 135.4 mL      25 Oct 2023 07:01  -  26 Oct 2023 02:07  --------------------------------------------------------  IN:    Heparin Infusion: 252 mL    IV PiggyBack: 200 mL    Milrinone: 201.6 mL    Oral Fluid: 100 mL    Vasopressin: 108 mL  Total IN: 861.6 mL    OUT:    Indwelling Catheter - Urethral (mL): 805 mL  Total OUT: 805 mL    Total NET: 56.6 mL            LABS:                        9.0    6.90  )-----------( 183      ( 26 Oct 2023 00:29 )             30.4     10-26    128<L>  |  88<L>  |  88.8<H>  ----------------------------<  210<H>  3.7   |  27.0  |  3.20<H>    Ca    7.8<L>      26 Oct 2023 00:29  Phos  4.3     10-26  Mg     2.2     10-26    TPro  7.3  /  Alb  2.6<L>  /  TBili  1.8  /  DBili  x   /  AST  327<H>  /  ALT  1072<H>  /  AlkPhos  136<H>  10-26      CARDIAC MARKERS ( 24 Oct 2023 10:10 )  x     / 0.01 ng/mL / x     / x     / x          CAPILLARY BLOOD GLUCOSE      POCT Blood Glucose.: 219 mg/dL (25 Oct 2023 21:20)    PT/INR - ( 25 Oct 2023 12:36 )   PT: 18.0 sec;   INR: 1.65 ratio         PTT - ( 25 Oct 2023 18:58 )  PTT:108.0 sec  Urinalysis Basic - ( 26 Oct 2023 00:29 )    Color: x / Appearance: x / SG: x / pH: x  Gluc: 210 mg/dL / Ketone: x  / Bili: x / Urobili: x   Blood: x / Protein: x / Nitrite: x   Leuk Esterase: x / RBC: x / WBC x   Sq Epi: x / Non Sq Epi: x / Bacteria: x      CULTURES:  Culture Results:   No growth at 72 Hours (10-22-23 @ 18:50)  Rapid RVP Result: NotDetec (10-22-23 @ 16:18)  Culture Results:   No growth at 72 Hours (10-22-23 @ 16:00)        Physical Examination:    General: No acute distress.  Alert, oriented, interactive, nonfocal, laying on her side in bed, on HFNC    HEENT: Pupils equal, reactive to light.  Symmetric.    PULM: diminished breath sounds at bases bilaterally, no significant sputum production    CVS: Regular rate and rhythm, no murmurs, rubs, or gallops    ABD: Soft, nondistended, nontender, normoactive bowel sounds, no masses    EXT: No edema, nontender    SKIN: Warm and well perfused, no rashes noted.    NEURO: A&Ox3, strength 5/5 all extremities, cranial nerves grossly intact, no focal deficits        RADIOLOGY:     < from: Xray Chest 1 View- PORTABLE-Urgent (Xray Chest 1 View- PORTABLE-Urgent .) (10.23.23 @ 11:45) >  INTERPRETATION:    Heart size and the mediastinum cannot be accurately evaluated on this   projection.  There is a right IJ approach Scammon-Renny catheter with tip in the right   main pulmonary artery.  There are worsening diffuse right lung airspace opacities.  There is improved central left pulmonary vascular congestion.  No definite right pleural effusion.  Continued left lower/retrocardiac opacity with obscuration of the left   hemidiaphragm.  No pneumothorax seen.  No acute bony abnormality.      IMPRESSION:  Right IJ approach Scammon-Renny catheter with tip in the right   main pulmonary artery. No pneumothorax.    Worsening diffuse right lung airspace opacities, possibly asymmetric   pulmonary edema, although infection is not excluded.    Improved central left pulmonary vascular congestion.    Continued left lower/retrocardiac opacity, possibly a left pleural   effusion with associated passive atelectasis. Atelectasis of other cause,   and/or other pathology including, but not limited to, pneumonia is not   excluded.          CRITICAL CARE TIME SPENT: 40 mins  Time spent evaluating/treating patient with medical issues that pose a high risk for life threatening deterioration and/or end-organ damage, reviewing data/labs/imaging, discussing case with multidisciplinary team, discussing plan/goals of care with patient/family. Non-inclusive of procedure time.

## 2023-10-26 NOTE — PROGRESS NOTE ADULT - PROBLEM SELECTOR PLAN 2
- HFpEF with severe RV dysfunction  - Management of cardiogenic shock as above  - Will add GDMT for HFpEF (MRA, SGLT2i) when SARAH resolved and BP allows. - HFpEF with severe RV dysfunction  - Management of cardiogenic shock as above  - Will add GDMT for HFpEF (MRA, SGLT2i) when SARAH resolves  - Diuretics: bumex 2mg IV BID. Adjust as needed for goal 24h I/O balance - 1000ml  - Repeat CxR

## 2023-10-26 NOTE — PROGRESS NOTE ADULT - ASSESSMENT
Initial HF c/s: Aline Magdaleno MD    39 YO F with morbid obesity (BMI 60), HFpEF but severe RV dysfunction (last hospitalized in July with HF exacerbation), prior DVT/PE on Eliquis (not sure if worked up for CTEPH in the past) and SLE + KATHIE who presented to [facility] on 10/18 with heart failure and progressed to shock with end organ dysfunction (lactate 4, SARAH, shock liver).     The MICU team placed a swan over the weekend which showed a RA pressure of ~12, PA ~80s/30’s, wedge not able to be obtained, and Thermo/Tg CI 1.7/2.2. She was started milrinone/levo and inhaled NO. Shock team was activated given RV predominant cardiogenic shock with severe pulmonary hypertension. Plan was to continue inotropes, diurese to keep CVP closer to 8, and adding flolan if inhaled NO doesn’t work.     This morning she reports feeling tired but denies any SOB. She remains with severe pre-capillary PH with low/normal PCW and CVP. She is also in Aflutter with RVR.     Hemodynamics:  10/25 milrinone 0.250 mcg/kg/min+vaso 0.04 u/min+Teagan 5 ppm CVP 10 PA 96/21/42 PCWP 7 PA sat 63% /68/83 HR 99 Tg CO/CI  7.7/3.0 FIO2 40%  10/24 milrinone 0.250 mcg/kg/min+vaso 0.04 u/min+phenylephrine 0.1 mcg/kg/min+Teagan 11 ppm CVP 8 OA 85/24/44 PCWP n/a PA sat 89.3% HR 95 /36/76  10/23 (mil 0.25, levo .08, Teagan 12 ppm): -140s, /72 (87), CVP 5, PA 87/33/52, PCW 7, PA sat 78.2%, Tg CO/CI 9.6/3.8,  dsc, PVR 4.69 CHARLTON.    Cardiac Studies:  10/22/23 TTE: LV poorly visualized, severe RVE with severe RV dysfunction.  7/20/23 TTE: LVIDd 4.17cm, LVEF 60-65%, severe RVE with mod RV dysfunction, mild MR, mild-mod TR, mild ND, est PASP 45.5 mmHg.   Initial HF c/s: Aline Magdaleno MD    37 YO F with morbid obesity (BMI 60), HFpEF but severe RV dysfunction (last hospitalized in July with HF exacerbation), prior DVT/PE on Eliquis (not sure if worked up for CTEPH in the past) and SLE + KATHIE who presented to [facility] on 10/18 with heart failure and progressed to shock with end organ dysfunction (lactate 4, SARAH, shock liver).     The MICU team placed a swan over the weekend which showed a RA pressure of ~12, PA ~80s/30’s, wedge not able to be obtained, and Thermo/Tg CI 1.7/2.2. She was started milrinone/levo and inhaled NO. Shock team was activated given RV predominant cardiogenic shock with severe pulmonary hypertension. Plan was to continue inotropes, diurese to keep CVP closer to 8, and adding flolan if inhaled NO doesn’t work.     This morning she reports feeling tired but denies any SOB. She remains with severe pre-capillary PH with low/normal PCW and CVP. She is also in Aflutter with RVR.     Hemodynamics:  10/26 milrinone 0.250 mcg/kg/min CVP 9 /31/54 PCWP 7 PA sat 61.9% /71/91  Tg CO/CI  7.7/3.0 FIO2 40% PVR 6.1 wilkinson  10/25 milrinone 0.250 mcg/kg/min+vaso 0.04 u/min+Teagan 5 ppm CVP 10 PA 96/21/42 PCWP 7 PA sat 63% /68/83 HR 99 Tg CO/CI  7.7/3.0 FIO2 40%  10/24 milrinone 0.250 mcg/kg/min+vaso 0.04 u/min+phenylephrine 0.1 mcg/kg/min+Teagan 11 ppm CVP 8 OA 85/24/44 PCWP n/a PA sat 89.3% HR 95 /36/76  10/23 (mil 0.25, levo .08, Teagan 12 ppm): -140s, /72 (87), CVP 5, PA 87/33/52, PCW 7, PA sat 78.2%, Tg CO/CI 9.6/3.8,  dsc, PVR 4.69 WILKINSON.    Cardiac Studies:  10/22/23 TTE: LV poorly visualized, severe RVE with severe RV dysfunction.  7/20/23 TTE: LVIDd 4.17cm, LVEF 60-65%, severe RVE with mod RV dysfunction, mild MR, mild-mod TR, mild TN, est PASP 45.5 mmHg.

## 2023-10-27 NOTE — CONSULT NOTE ADULT - REASON FOR ADMISSION
Acute on Chronic Hypoxic Respiratory Failure 2/2 CHF Exacerbation
dyspnea
Acute on Chronic Hypoxic Respiratory Failure 2/2 CHF Exacerbation
Acute on Chronic Hypoxic Respiratory Failure 2/2 CHF Exacerbation

## 2023-10-27 NOTE — CONSULT NOTE ADULT - CONSULT REASON
SARAH
dr forte
worsening respiratory status
dyspnea
SLE
atrial flutter
Elevated LFTs
pneumonia  cellulitis
Cardiogenic shock

## 2023-10-27 NOTE — PROGRESS NOTE ADULT - PROBLEM SELECTOR PROBLEM 5
SARAH (acute kidney injury)

## 2023-10-27 NOTE — PROGRESS NOTE ADULT - SUBJECTIVE AND OBJECTIVE BOX
[facility]                              [address] [place] [address]                                                 PHONE: [phone]                                                 FAX: [phone]  -----------------------------------------------------------------------------------------------  Pt seen and examined. FU for  shortness of breath     Overnight events/Complaints: Pt remains on high flow. Reports difficulty with taking deep breaths. On milrinone and iv heparin.    Vital Signs Last 24 Hrs  T(C): 35.7 (27 Oct 2023 07:00), Max: 36.6 (26 Oct 2023 16:00)  T(F): 96.3 (27 Oct 2023 07:00), Max: 97.9 (26 Oct 2023 16:00)  HR: 95 (27 Oct 2023 08:27) (81 - 107)  BP: 131/69 (27 Oct 2023 08:00) (111/57 - 140/64)  BP(mean): 86 (27 Oct 2023 08:00) (69 - 107)  RR: 23 (27 Oct 2023 08:00) (17 - 28)  SpO2: 95% (27 Oct 2023 08:27) (93% - 100%)    Parameters below as of 27 Oct 2023 08:27  Patient On (Oxygen Delivery Method): nasal cannula, high flow      I&O's Summary    26 Oct 2023 07:01  -  27 Oct 2023 07:00  --------------------------------------------------------  IN: 575.6 mL / OUT: 2780 mL / NET: -2204.4 mL    27 Oct 2023 07:01  -  27 Oct 2023 09:45  --------------------------------------------------------  IN: 43.2 mL / OUT: 300 mL / NET: -256.8 mL        RELEVENT PHYSICAL EXAM:  Neck: No obvious JVD  Cardiovascular: regular S1, S2  Respiratory: Lungs clear to auscultation; no crepitations, no wheeze  Musculoskeletal: + edema        LABS:                        8.8    5.52  )-----------( 146      ( 27 Oct 2023 05:45 )             29.9     10-27    133<L>  |  91<L>  |  92.3<H>  ----------------------------<  138<H>  3.8   |  29.0  |  2.81<H>    Ca    8.2<L>      27 Oct 2023 05:45  Phos  4.9     10-27  Mg     2.2     10-27    TPro  7.0  /  Alb  2.9<L>  /  TBili  1.7  /  DBili  x   /  AST  136<H>  /  ALT  723<H>  /  AlkPhos  121<H>  10-27        PT/INR - ( 25 Oct 2023 12:36 )   PT: 18.0 sec;   INR: 1.65 ratio         PTT - ( 27 Oct 2023 06:41 )  PTT:70.6 sec    RADIOLOGY & ADDITIONAL STUDIES: (reviewed)  CXR was independently visualized/reviewed  and demonstrated: cardiomegaly    CARDIOLOGY TESTING:(reviewed)     12 lead EKG independently visualized/reviewed  and demonstrated    ECHOCARDIOGRAM independently visualized/reviewed and demonstrated :    1. Technically difficult study.   2. Endocardial visualization was enhanced with intravenous echo contrast.   3. Right ventricular volume and pressure overload.   4. Severely enlarged right ventricle.   5. Severely reduced RV systolic function.   6. Compared with TTE dated 7/20/23 the RV continues to be severely   dilated and dysfunctional.   7. LV cavity was not visualized despite the use of the echo contrast.    TELEMETRY independently visualized/reviewed and demonstrated : NSR/AFL with verty frequent PVCs    MEDICATIONS:(reviewed)  MEDICATIONS  (STANDING):  aMIOdarone    Tablet 200 milliGRAM(s) Oral daily  budesonide 160 MICROgram(s)/formoterol 4.5 MICROgram(s) Inhaler 2 Puff(s) Inhalation two times a day  chlorhexidine 2% Cloths 1 Application(s) Topical <User Schedule>  heparin  Infusion. 1200 Unit(s)/Hr (12 mL/Hr) IV Continuous <Continuous>  hydrocortisone sodium succinate Injectable 100 milliGRAM(s) IV Push every 8 hours  influenza   Vaccine 0.5 milliLiter(s) IntraMuscular once  insulin lispro (ADMELOG) corrective regimen sliding scale.   SubCutaneous four times a day with meals  milrinone Infusion 0.125 MICROgram(s)/kG/Min (5.58 mL/Hr) IV Continuous <Continuous>  montelukast 10 milliGRAM(s) Oral daily  mupirocin 2% Ointment 1 Application(s) Both Nostrils two times a day  nystatin Powder 1 Application(s) Topical two times a day  PARoxetine 20 milliGRAM(s) Oral daily  sildenafil (REVATIO) 10 milliGRAM(s) Oral three times a day    ASSESSMENT AND PLAN:    38y Female with past medical history significant for morbid obesity, HFpEF, severe RV dysfunction, PE/DVT, SLE p/w heart failure/cardiogenic shock.    Cardiogenic shock- patient remains on milrinone and iv heparin.  - frequent PVCs noted on milrinone therapy  - Tolerating sildenafil

## 2023-10-27 NOTE — PROGRESS NOTE ADULT - ASSESSMENT
39yo female with pmh of BMI > 40, CHF-diastolic, B/L LE DVT + PE on Eliquis, HTN, Asthma,eczema and Anxiety presents to the ED w/ 1 week of worsening SOB on Exertion. Pt notes that this morning she was saturating at 79% on 2LPM (home ox dose). Noted that on exertion her oxygen dropping to low 80s on Room air days prior to admission. Last in hospital for fluid overload in July, since then is compliant with her medication and only added on daily 10mg steroids from outpatient physician. Being worked up for SLE outpatient by PCP, results from 1 week prior showing low C3/4 complement and elevated CRP. Seen at bedside, in NAD, endorsing resolution of SOB at rest, pleasant, denies CP, Abd pain, NVD, Lethargy, fevers. (18 Oct 2023 16:00)    Cardiogenic shock  Adrenal insufficiency  Transaminitis-shock liver  SARAH  Vancomycin toxicity  Acute on chronic hypoxic respiratory failure- CHF exacerbation and possible underlying pneumonia  RT LE cellulitis vs hematoma  r/o SLE  Ceftriaxone allergy-hives      - s/p zpsyn empirically x 7 days  - f/u strep pneumo, mycoplasma  - legionella (-)  - bcx (-)  - RVP (-)   - pt reports tolerated amoxicillin in the past  - USG right upper thigh/gluteus(-) abscess.  suspect hematoma from her injury  - Trend Fever  - Trend WBC-on steroids stress dose, was on po steroids at home  - trend LFT  - prognosis guarded    please call with questions

## 2023-10-27 NOTE — CONSULT NOTE ADULT - PROVIDER SPECIALTY LIST ADULT
Rheumatology
Electrophysiology
Infectious Disease
Gastroenterology
Nephrology
Rheumatology
Critical Care
Cardiology
Heart Failure

## 2023-10-27 NOTE — PROGRESS NOTE ADULT - ASSESSMENT
37 y/o F with a h/o b/l DVTs, recurrent PE on eliquis, HFpEF, morbid obesity, asthma, chronic hyperkalemia, with:    # Cardiogenic shock  # Cor pulmonale  # Acute hypoxemic respiratory failure  # Multifocal pneumonia  # SARAH  # Transaminitis  # Atrial flutter    - milrinone weaned to 0.125 mcg/kg/min, most recent SVO2 79, lactate 1.2, CO/CI 7.7/3.2  - will trial off inotrope this morning  - PCWP was normal yesterday as per cardiology  - weaned off vasopressin  - weaned off Teagan and transitioned to sildenafil  - monitor hemodynamics closely, maintain a MAP > 65  - monitor clinical and laboratory end-points of perfusion closely, current appears warm and well perfused  - overall net negative approx 3L, intermittently diuresing with IV bumetanide  - cardiology and advanced heart failure input appreciated  - SARAH secondary to ischemic ATN/cardiorenal syndrome, optimize end-organ perfusion as above  - trend BUN/Cr, electrolytes and acid-base balance, monitor hourly UOP (nonoliguric)  - no indication for urgent RRT at this time  - transaminitis due to passive hepatic congestion + ischemia, LFTs slowly downtrending  - actively titrating HFNC settings to maintain SpO2 > 92%, NIPPV PRN for increased work of breathing  - therapeutic anticoagulation with heparin infusion  - KATHIE positive, atypical ANCA serology was indeterminate    Case discussed with MICU physician, Dr. Honeycutt.

## 2023-10-27 NOTE — PROGRESS NOTE ADULT - ASSESSMENT
Initial HF c/s: Aline Magdaleno MD    39 YO F with morbid obesity (BMI 60), HFpEF but severe RV dysfunction (last hospitalized in July with HF exacerbation), prior DVT/PE on Eliquis (not sure if worked up for CTEPH in the past) and SLE + KATHIE who presented to [facility] on 10/18 with heart failure and progressed to shock with end organ dysfunction (lactate 4, SARAH, shock liver).     The MICU team placed a swan over the weekend which showed a RA pressure of ~12, PA ~80s/30’s, wedge not able to be obtained, and Thermo/Tg CI 1.7/2.2. She was started milrinone/levo and inhaled NO. Shock team was activated given RV predominant cardiogenic shock with severe pulmonary hypertension. Plan was to continue inotropes, diurese to keep CVP closer to 8, and adding flolan if inhaled NO doesn’t work.     This morning she reports feeling tired but denies any SOB. She remains with severe pre-capillary PH with low/normal PCW and CVP. She is also in Aflutter with RVR.     Hemodynamics:  10/26 milrinone 0.250 mcg/kg/min CVP 9 /31/54 PCWP 7 PA sat 61.9% /71/91  Tg CO/CI  7.7/3.0 FIO2 40% PVR 6.1 wilkinson  10/25 milrinone 0.250 mcg/kg/min+vaso 0.04 u/min+Teagan 5 ppm CVP 10 PA 96/21/42 PCWP 7 PA sat 63% /68/83 HR 99 Tg CO/CI  7.7/3.0 FIO2 40%  10/24 milrinone 0.250 mcg/kg/min+vaso 0.04 u/min+phenylephrine 0.1 mcg/kg/min+Teagan 11 ppm CVP 8 OA 85/24/44 PCWP n/a PA sat 89.3% HR 95 /36/76  10/23 (mil 0.25, levo .08, Teagan 12 ppm): -140s, /72 (87), CVP 5, PA 87/33/52, PCW 7, PA sat 78.2%, Tg CO/CI 9.6/3.8,  dsc, PVR 4.69 WILKINSON.    Cardiac Studies:  10/22/23 TTE: LV poorly visualized, severe RVE with severe RV dysfunction.  7/20/23 TTE: LVIDd 4.17cm, LVEF 60-65%, severe RVE with mod RV dysfunction, mild MR, mild-mod TR, mild WV, est PASP 45.5 mmHg.   Initial HF c/s: Aline Magdaleno MD    37 YO F with morbid obesity (BMI 60), HFpEF but severe RV dysfunction (last hospitalized in July with HF exacerbation), prior DVT/PE on Eliquis (not sure if worked up for CTEPH in the past), SLE + KATHIE, and likely antiphospholipid syndrome, who presented to [facility] on 10/18 with heart failure and progressed to shock with end organ dysfunction (lactate 4, SARAH, shock liver).     The MICU team placed a swan over the weekend which showed a RA pressure of ~12, PA ~80s/30’s, wedge not able to be obtained, and Thermo/Tg CI 1.7/2.2. She was started milrinone/levo and inhaled NO. Shock team was activated given RV predominant cardiogenic shock with severe pulmonary hypertension. Plan was to continue inotropes, diurese to keep CVP closer to 8, and adding flolan if inhaled NO doesn’t work.     This morning she reports feeling tired with chest discomfort that is worse with taking deep breaths, but denies any SOB. She remains with severe pre-capillary PH with low/normal PCW and CVP. She was in Aflutter with RVR but converted to SR the evening of 10/26.    Hemodynamics:  10/27 milrinone 0.125 mcg/kg/min: CVP 8, PA 89/30/49, PCWP 13 (v25), PA sat 81.9%, Tg CO/CI 14.1/5.5, /77 (90), HR 95.  10/26 milrinone 0.250 mcg/kg/min CVP 9 /31/54 PCWP 7 PA sat 61.9% /71/91  Tg CO/CI  7.7/3.0 FIO2 40% PVR 6.1 wilkinson  10/25 milrinone 0.250 mcg/kg/min+vaso 0.04 u/min+Teagan 5 ppm CVP 10 PA 96/21/42 PCWP 7 PA sat 63% /68/83 HR 99 Tg CO/CI  7.7/3.0 FIO2 40%  10/24 milrinone 0.250 mcg/kg/min+vaso 0.04 u/min+phenylephrine 0.1 mcg/kg/min+Teagan 11 ppm CVP 8 OA 85/24/44 PCWP n/a PA sat 89.3% HR 95 /36/76  10/23 (mil 0.25, levo .08, Teagan 12 ppm): -140s, /72 (87), CVP 5, PA 87/33/52, PCW 7, PA sat 78.2%, Tg CO/CI 9.6/3.8,  dsc, PVR 4.69 WILKINSON.    Cardiac Studies:  10/22/23 TTE: LV poorly visualized, severe RVE with severe RV dysfunction.  7/20/23 TTE: LVIDd 4.17cm, LVEF 60-65%, severe RVE with mod RV dysfunction, mild MR, mild-mod TR, mild AL, est PASP 45.5 mmHg.   Initial HF c/s: Aline Magdaleno MD    39 YO F with morbid obesity (BMI 60), HFpEF but severe RV dysfunction (last hospitalized in July with HF exacerbation), prior DVT/PE on Eliquis (not sure if worked up for CTEPH in the past), SLE + KATHIE, and likely antiphospholipid syndrome, who presented to [facility] on 10/18 with heart failure and progressed to shock with end organ dysfunction (lactate 4, SARAH, shock liver).     The MICU team placed a swan over the weekend which showed a RA pressure of ~12, PA ~80s/30’s, wedge not able to be obtained, and Thermo/Tg CI 1.7/2.2. She was started milrinone/levo and inhaled NO. Shock team was activated given RV predominant cardiogenic shock with severe pulmonary hypertension. Her clinical condition has improved. All her vasopressors were weaned off. Her SARAH has stabilized and LFTs are improving. She was on aflutter and converted to SR overnight. Her PA pressures remain high with a normal pcwp. She was seen by rheumatology and her dx of SLE was confirmed. There is concern for some component of scleroderma as well. She was also found to have antiphospholipd syndrome.  She has precapillary pHTN and the DDx includes PAH related to rheumatologic disease, CTEP given h/o DVT/PE and group 3 in setting of restrictive/MICHAEL component. Sildenafil was started now that she's normotensive. However, given the severity of her PAH she needs escalation to IV epoprostenol or SC remodulin. Triple oral therapy might be suboptimal. Would also need a V/Q scan once the xray is cleared.     Her case was d/w Dr. Del Castillo and Dr Pearce. The plan is to remove the PAC today, conitnue milrinone gtt and transfer to [facility] MICU on Monday morning. [facility]'s MICU team should contact the transfer center on Sunday evening or Monday morning.       Hemodynamics:  10/27 milrinone 0.125 mcg/kg/min: CVP 8, PA 89/30/49, PCWP 13 (v25), PA sat 81.9%, Tg CO/CI 14.1/5.5, /77 (90), HR 95.  10/26 milrinone 0.250 mcg/kg/min CVP 9 /31/54 PCWP 7 PA sat 61.9% /71/91  Tg CO/CI  7.7/3.0 FIO2 40% PVR 6.1 charlton  10/25 milrinone 0.250 mcg/kg/min+vaso 0.04 u/min+Teagan 5 ppm CVP 10 PA 96/21/42 PCWP 7 PA sat 63% /68/83 HR 99 Tg CO/CI  7.7/3.0 FIO2 40%  10/24 milrinone 0.250 mcg/kg/min+vaso 0.04 u/min+phenylephrine 0.1 mcg/kg/min+Teagan 11 ppm CVP 8 OA 85/24/44 PCWP n/a PA sat 89.3% HR 95 /36/76  10/23 (mil 0.25, levo .08, Teagan 12 ppm): -140s, /72 (87), CVP 5, PA 87/33/52, PCW 7, PA sat 78.2%, Tg CO/CI 9.6/3.8,  dsc, PVR 4.69 CHARLTON.    Cardiac Studies:  10/22/23 TTE: LV poorly visualized, severe RVE with severe RV dysfunction.  7/20/23 TTE: LVIDd 4.17cm, LVEF 60-65%, severe RVE with mod RV dysfunction, mild MR, mild-mod TR, mild AR, est PASP 45.5 mmHg.

## 2023-10-27 NOTE — PROGRESS NOTE ADULT - PROBLEM SELECTOR PLAN 2
- HFpEF with severe RV dysfunction  - Management of cardiogenic shock as above  - Will add GDMT for HFpEF (MRA, SGLT2i) when SARAH resolves  - Diuretics: bumex 2mg IV BID. Adjust as needed for goal 24h I/O balance - 1000ml  - Repeat CxR - HFpEF with severe RV dysfunction  - Management of cardiogenic shock as above  - Will add GDMT for HFpEF (MRA, SGLT2i) when SARAH resolves  - Diuretics: as above. Adjust as needed for goal 24h I/O balance - 1000ml - HFpEF with severe RV dysfunction  - Management of cardiogenic shock as above  - Will add GDMT for HFpEF (MRA, SGLT2i) when SARAH resolves  - Diuretics: as above. Adjust as needed for goal 24h I/O balance - 500 to -1000ml. May need uptitration of diuretics with pulse steroids.

## 2023-10-27 NOTE — CONSULT NOTE ADULT - CONSULT REQUESTED DATE/TIME
19-Oct-2023 11:09
15-Oct-2023
18-Oct-2023 15:57
26-Oct-2023
23-Oct-2023 15:25
22-Oct-2023 11:31
21-Oct-2023
23-Oct-2023 12:31

## 2023-10-27 NOTE — PROGRESS NOTE ADULT - PROBLEM SELECTOR PLAN 4
Pre-capillary PH - SEVERE. Close to systemic pressures.   - has a h/o PE (was on AC at home), not sure if she was worked up for CTEPH in the past   - would benefit from sildenafil now that she is off vasopressors. Recommend sildenafil 20mg TID. Would likely need IV vasodilator. It's unclear if she's a candidate for home Veletri. MICU/Pulmonary to assess as well as SW. Another option is SC remodulin or triple oral therapy (ERA+PDE5i+selexipeg).  - Please obtain V/Q scan when stable and CxR cleared  - Recommend MICHAEL screening as outpt as well as rheum work up. She recently established care with outpt rheumatologist Pre-capillary PH - SEVERE. Close to systemic pressures.   - has a h/o PE (was on AC at home), not sure if she was worked up for CTEPH in the past   - will increase sildenafil to 20mg TID. Would likely need IV vasodilator. It's unclear if she's a candidate for home Veletri. MICU/Pulmonary to assess as well as SW. Another option is SC remodulin or triple oral therapy (ERA+PDE5i+selexipeg).  - Please obtain V/Q scan when stable and CxR cleared  - Recommend MICHAEL screening as outpt  - Rheum workup ongoing, appreciate their recs. Labs c/w SLE Pre-capillary PH - SEVERE. Close to systemic pressures.   - has a h/o PE (was on AC at home), not sure if she was worked up for CTEPH in the past   - will increase sildenafil to 20mg TID. Would likely need IV vasodilator. It's unclear if she's a candidate for home Veletri. MICU/Pulmonary to assess as well as SW. Another option is SC remodulin or triple oral therapy (ERA+PDE5i+selexipeg).  - Please obtain V/Q scan when stable and CxR cleared  - Recommend MICHAEL screening as outpt  - Rheum workup ongoing, appreciate their recs. Labs c/w SLE and antiphospholipid syndrome, possibly also has scleroderma. She is being started on pulse dose steroids and also needs heme c/s regarding AC (Eliquis may be suboptimal treatment with APLS)

## 2023-10-27 NOTE — PROGRESS NOTE ADULT - PROBLEM SELECTOR PLAN 8
Strong FH of autoimmune disorders  Most likely SLE + ALPS + Raynaud's. ?Scleroderma component.  She has low complements, high inflamm markers, high dsDNA and gross synovitis.   Plan for pulse steroids  Appreciate rheum recs

## 2023-10-27 NOTE — PROGRESS NOTE ADULT - PROBLEM SELECTOR PLAN 3
- Appreciate EP recommendations  - digoxin (renally dosed)   - Amio as per EP. Monitor PFTs, TFTs  - Continue AC with heparin gtt - Appreciate EP recommendations  - Converted to SR the evening of 10/26  - Amio as per EP. Monitor PFTs, TFTs  - Continue AC with heparin gtt

## 2023-10-27 NOTE — PROGRESS NOTE ADULT - ASSESSMENT
A/P 37 yo womna with severe obesity, HTN, recurrent DVT/PEs on chronic AC, CHF with severe RHF and pulmonary HTN presents for worsening dyspnea found to have worsening CHF and new A flutter with RVR.  Patient has become hypotensive and developed cardiogenic shock requiring pressors.      she  has a strong FH of autoimmune disorders -GM with scleroderma and sister with RA.  she is found to have a very high titer KATHIE 1:2560 and high dsDNA.  patient with most likley SLE with history of aloepcia, photosensitivity, arthritis/arthralgias, dry eye/mouth, raynauds.  pulmonary HTN is rare in SLE.  She may have have an overlap with another autoimmune disorder or the pulmonary HTN can be multifactorial given her chronic thromboembolic issues.    She does not have clear features of scleroderma other than raynauds.      SARAH: ATN, cardiogenic shock, unclear baseline creatinine and how much Ac LN cotributing -   Ischemic hepatitis with coagulopathy   + SLE serology- C3C4 extremely low, ds DNA v high - plans per Rheum- on Solumedrol  Hypervolemic hyponatremia  - cont supportive care  - cont pressor, milrinone and diuretics  - may need to consider RRT depending upon clinical course  - check f/u serologies    Recurrent PE/PAH - on Heparin   Creat sl better - watch

## 2023-10-27 NOTE — PROGRESS NOTE ADULT - SUBJECTIVE AND OBJECTIVE BOX
[facility]                                                INFECTIOUS DISEASES  =======================================================                     Steven Nathan#   Gallo Hart MD#   Terrell Jerome MD*                           Kait Huang MD*   Corine Garcia MD*            Diplomates American Board of Internal Medicine & Infectious Diseases                  # [place] Office - Appt - Tel  [phone] Fax [phone]                * [place] Office - Appt - Tel [phone] Fax [phone]                                  Hospital Consult line:  [phone]  =======================================================      [id number]  MITCHEL GUNN       Interval f/u - pneumonia, rle cellulitis vs hematoma  remains in micu in cardiogenic shock  pt reports dyspnea improved  reports RLE pain has much improved  afebrile      I have personally reviewed the labs and data; pertinent labs and data are listed in this note; please see below.   =======================================================  Past Medical & Surgical Hx:  =====================  PAST MEDICAL & SURGICAL HISTORY:  Pulmonary embolism      DVT, lower extremity      CHF (congestive heart failure)      Asthma      Anxiety      Severe obesity (BMI >= 40)      Hypertension      No significant past surgical history        Problem List:  ==========  HEALTH ISSUES - PROBLEM Dx:  Pulmonary thromboembolism          Social Hx:  =======  no toxic habits currently    FAMILY HISTORY:  Family history of colon cancer (Mother)    Family history of stroke (Father)    no significant family history of immunosuppressive disorders in mother or father   =======================================================    REVIEW OF SYSTEMS:  CONSTITUTIONAL:  No Fever or chills  HEENT:  No diplopia or blurred vision.  No earache, sore throat or runny nose.  CARDIOVASCULAR:  No pressure, squeezing, strangling, tightness, heaviness or aching about the chest, neck, axilla or epigastrium.  RESPIRATORY:   cough,  +shortness of breath  GASTROINTESTINAL:  No nausea, vomiting or diarrhea.  GENITOURINARY:  No dysuria, frequency or urgency. No Blood in urine  MUSCULOSKELETAL:  no joint aches, no muscle pain  SKIN:  No change in skin, hair or nails. rt thigh swelling and pain  NEUROLOGIC:  No Headaches, seizures or weakness.  PSYCHIATRIC:  No disorder of thought or mood.  ENDOCRINE:  No heat or cold intolerance  HEMATOLOGICAL:  No easy bruising or bleeding.    =======================================================  Allergies    iodine (Hives)  ceftriaxone (Hives)  shellfish (Hives)    Intolerances    Antibiotics:  azithromycin   Tablet 500 milliGRAM(s) Oral daily  piperacillin/tazobactam IVPB. 3.375 Gram(s) IV Intermittent once  piperacillin/tazobactam IVPB.- 3.375 Gram(s) IV Intermittent once  piperacillin/tazobactam IVPB.. 3.375 Gram(s) IV Intermittent every 8 hours    Other medications:  apixaban 2.5 milliGRAM(s) Oral every 12 hours  budesonide 160 MICROgram(s)/formoterol 4.5 MICROgram(s) Inhaler 2 Puff(s) Inhalation two times a day  furosemide   Injectable 40 milliGRAM(s) IV Push two times a day  influenza   Vaccine 0.5 milliLiter(s) IntraMuscular once  metoprolol tartrate 100 milliGRAM(s) Oral daily  montelukast 10 milliGRAM(s) Oral daily  PARoxetine 20 milliGRAM(s) Oral daily  predniSONE   Tablet 40 milliGRAM(s) Oral daily  spironolactone 50 milliGRAM(s) Oral daily     levoFLOXacin  Tablet   750 milliGRAM(s) Oral (10-18-23 @ 21:13)    vancomycin  IVPB.   250 mL/Hr IV Intermittent (10-18-23 @ 11:58)      ======================================================  Physical Exam:  ============  Vital Signs Last 24 Hrs  T(C): 35.7 (27 Oct 2023 20:00), Max: 35.8 (27 Oct 2023 11:00)  T(F): 96.3 (27 Oct 2023 20:00), Max: 96.5 (27 Oct 2023 11:00)  HR: 90 (27 Oct 2023 20:00) (81 - 106)  BP: 108/56 (27 Oct 2023 20:00) (108/56 - 138/74)  BP(mean): 72 (27 Oct 2023 20:00) (69 - 94)  RR: 17 (27 Oct 2023 20:00) (15 - 25)  SpO2: 93% (27 Oct 2023 20:00) (92% - 98%)    Parameters below as of 27 Oct 2023 20:00  Patient On (Oxygen Delivery Method): nasal cannula, high flow  O2 Flow (L/min): 40  O2 Concentration (%): 40        General:  No acute distress. on o2 hiflo  Neck: Supple, No lymphadenopathy.  Respiratory: basal crackles to auscultation, Respirations are non-labored.  Cardiovascular: Normal rate, Regular rhythm, s1 + s2  Gastrointestinal: Soft, Non-tender, Non-distended, Normal bowel sounds.  Genitourinary: No costovertebral angle tenderness. +major  Integumentary: RUE erythematous patches. Right posterior upper thigh/gluteus with improved eccyhmoses with discoloration, nontender,no warmth. no fluctuance or crepitus. extends to posterior upper/lateral thigh. b/l Le varicose veins  Neurologic: Alert, Oriented, No focal deficits  Psychiatric: Appropriate mood & affect.    =======================================================  Labs:                                    9.3    4.77  )-----------( 143      ( 27 Oct 2023 17:48 )             31.9       10-27    132<L>  |  88<L>  |  91.1<H>  ----------------------------<  198<H>  3.8   |  31.0<H>  |  2.94<H>    Ca    8.8      27 Oct 2023 17:48  Phos  5.6     10-27  Mg     2.3     10-27    TPro  7.7  /  Alb  3.1<L>  /  TBili  2.0  /  DBili  x   /  AST  110<H>  /  ALT  674<H>  /  AlkPhos  131<H>  10-27          ABG - ( 27 Oct 2023 04:22 )  pH, Arterial: 7.450 pH, Blood: x     /  pCO2: 46    /  pO2: 131   / HCO3: 32    / Base Excess: 8.0   /  SaO2: 99.7                Urinalysis Basic - ( 27 Oct 2023 17:48 )    Color: x / Appearance: x / SG: x / pH: x  Gluc: 198 mg/dL / Ketone: x  / Bili: x / Urobili: x   Blood: x / Protein: x / Nitrite: x   Leuk Esterase: x / RBC: x / WBC x   Sq Epi: x / Non Sq Epi: x / Bacteria: x        PT/INR - ( 27 Oct 2023 12:33 )   PT: 14.6 sec;   INR: 1.33 ratio         PTT - ( 27 Oct 2023 12:33 )  PTT:76.1 sec          CAPILLARY BLOOD GLUCOSE      POCT Blood Glucose.: 190 mg/dL (27 Oct 2023 16:42)              ABG - ( 23 Oct 2023 05:59 )  pH, Arterial: 7.270 pH, Blood: x     /  pCO2: 61    /  pO2: 110   / HCO3: 28    / Base Excess: 1.1   /  SaO2: 99.0                Urinalysis Basic - ( 23 Oct 2023 07:49 )    Color: x / Appearance: x / SG: x / pH: x  Gluc: 182 mg/dL / Ketone: x  / Bili: x / Urobili: x   Blood: x / Protein: x / Nitrite: x   Leuk Esterase: x / RBC: x / WBC x   Sq Epi: x / Non Sq Epi: x / Bacteria: x        PT/INR - ( 23 Oct 2023 02:05 )   PT: 40.7 sec;   INR: 3.81 ratio         PTT - ( 23 Oct 2023 18:55 )  PTT:48.8 sec    CARDIAC MARKERS ( 22 Oct 2023 16:00 )  x     / 0.02 ng/mL / x     / x     / x      CARDIAC MARKERS ( 22 Oct 2023 07:35 )  x     / x     / 27 U/L / x     / x            CAPILLARY BLOOD GLUCOSE      POCT Blood Glucose.: 134 mg/dL (21 Oct 2023 23:04)                    Urinalysis Basic - ( 20 Oct 2023 05:00 )    Color: x / Appearance: x / SG: x / pH: x  Gluc: 105 mg/dL / Ketone: x  / Bili: x / Urobili: x   Blood: x / Protein: x / Nitrite: x   Leuk Esterase: x / RBC: x / WBC x   Sq Epi: x / Non Sq Epi: x / Bacteria: x            CARDIAC MARKERS ( 19 Oct 2023 06:50 )  x     / <0.01 ng/mL / x     / x     / x            CAPILLARY BLOOD GLUCOSE                        Urinalysis Basic - ( 20 Oct 2023 05:00 )    Color: x / Appearance: x / SG: x / pH: x  Gluc: 105 mg/dL / Ketone: x  / Bili: x / Urobili: x   Blood: x / Protein: x / Nitrite: x   Leuk Esterase: x / RBC: x / WBC x   Sq Epi: x / Non Sq Epi: x / Bacteria: x            CARDIAC MARKERS ( 19 Oct 2023 06:50 )  x     / <0.01 ng/mL / x     / x     / x            CAPILLARY BLOOD GLUCOSE                 SARS-CoV-2: NotDetec (10-18-23 @ 12:05)       < from: Xray Chest 1 View- PORTABLE-Urgent (Xray Chest 1 View- PORTABLE-Urgent .) (10.18.23 @ 14:14) >    FINDINGS:    Single frontal view of the chest demonstrates mild CHF. Left lower   lobe/retrocardiac consolidation. The cardiomediastinal silhouette is   enlarged. No acute osseous abnormalities. Overlying EKG leads and wires   are noted    IMPRESSION: Mild CHF. Left lower lobe/retrocardiac consolidation.   Consider chest CT.    --- End of Report ---    < end of copied text >

## 2023-10-27 NOTE — CHART NOTE - NSCHARTNOTEFT_GEN_A_CORE
Source: Patient [ ]  Family [ ]   other [x] EMR    Current Diet: Diet, DASH/TLC:   Sodium & Cholesterol Restricted (10-18-23 @ 15:59)    PO intake:  < 50% [ ]   50-75%  [x]   %  [x]  other :    Source for PO intake [ ] Patient [ ] family [x] chart [x] staff [ ] other    Current Weight:   10/27 160.2 kg   10/18 148.8 kg  ?accuracy of weights, +2 generalized edema, continue to monitor trends    Pertinent Medications: MEDICATIONS  (STANDING):  aMIOdarone    Tablet 200 milliGRAM(s) Oral daily  budesonide 160 MICROgram(s)/formoterol 4.5 MICROgram(s) Inhaler 2 Puff(s) Inhalation two times a day  chlorhexidine 2% Cloths 1 Application(s) Topical <User Schedule>  heparin  Infusion. 1200 Unit(s)/Hr (12 mL/Hr) IV Continuous <Continuous>  influenza   Vaccine 0.5 milliLiter(s) IntraMuscular once  insulin lispro (ADMELOG) corrective regimen sliding scale.   SubCutaneous four times a day with meals  methylPREDNISolone sodium succinate IVPB 1000 milliGRAM(s) IV Intermittent once  milrinone Infusion 0.125 MICROgram(s)/kG/Min (5.58 mL/Hr) IV Continuous <Continuous>  montelukast 10 milliGRAM(s) Oral daily  mupirocin 2% Ointment 1 Application(s) Both Nostrils two times a day  nystatin Powder 1 Application(s) Topical two times a day  PARoxetine 20 milliGRAM(s) Oral daily  sildenafil (REVATIO) 20 milliGRAM(s) Oral three times a day    MEDICATIONS  (PRN):  albuterol/ipratropium for Nebulization 3 milliLiter(s) Nebulizer every 6 hours PRN Shortness of Breath and/or Wheezing  aluminum hydroxide/magnesium hydroxide/simethicone Suspension 30 milliLiter(s) Oral every 4 hours PRN Dyspepsia  melatonin 3 milliGRAM(s) Oral at bedtime PRN Insomnia  ondansetron Injectable 4 milliGRAM(s) IV Push every 8 hours PRN Nausea and/or Vomiting  oxyCODONE    IR 5 milliGRAM(s) Oral every 6 hours PRN Severe Pain (7 - 10)  oxyCODONE    IR 2.5 milliGRAM(s) Oral every 6 hours PRN Moderate Pain (4 - 6)  polyethylene glycol 3350 17 Gram(s) Oral daily PRN Constipation    Pertinent Labs: CBC Full  -  ( 27 Oct 2023 12:33 )  WBC Count : 5.36 K/uL  RBC Count : 3.67 M/uL  Hemoglobin : 9.1 g/dL  Hematocrit : 30.7 %  Platelet Count - Automated : 139 K/uL  Mean Cell Volume : 83.7 fl  Mean Cell Hemoglobin : 24.8 pg  Mean Cell Hemoglobin Concentration : 29.6 gm/dL    Pertinent Labs: POCT Glu (134-219), a1c 6.0%, Phos (H) 4.9, BUN/Creat (92.3/2.81), ^LFTs    Skin: MAD/IAD, lower quadrant skin tear    Nutrition focused physical exam previously conducted - found signs of malnutrition [x]absent [ ]present    Subcutaneous fat loss: [ ] Orbital fat pads region, [ ]Buccal fat region, [ ]Triceps region,  [ ]Ribs region    Muscle wasting: [ ]Temples region, [ ]Clavicle region, [ ]Shoulder region, [ ]Scapula region, [ ]Interosseous region,  [ ]thigh region, [ ]Calf region    Estimated Needs:   [x] no change since previous assessment  [ ] recalculated:     Hospital Course:  37 y/o F with a h/o b/l DVTs, recurrent PE on eliquis, HFpEF, morbid obesity, asthma, chronic hyperkalemia, admitted on 10/18 with complaints of weakness, worsening LE edema, cough, and weight gain. Of note, was being worked up as an outpatient for possible SLE given facial rash, photophobia, and joint pains. She was started on prednisone 20 mg by her PCP x ~1 month. Pt was admitted to medicine for treatment of PNA. Hospital course complicated by progressive SARAH, HAGMA, transaminitis and acute hypoxemic respiratory failure secondary to development of cardiogenic shock/severe RV failure.    Current Nutrition Diagnosis: Pt remains at high nutrition risk secondary to overweight/obesity related to energy intake exceeds expenditure as evidenced by BMI 60. Pt had gradual intentional weight loss of ~50 lbs pta. Tolerating diet with fair-good po intake. Current weights skewed with edema, continuing to monitor trends. Last BM 10/25 per documentation. Recommendations below:    Recommendations:   1. Consider adding Consistent CHO restriction to DASH/TLC  2. Monitor diet tolerance/po intake  3. Daily weights to trend    Monitoring and Evaluation:   [x] PO intake [ ] Tolerance to diet prescription [X] Weights  [X] Follow up per protocol [X] Labs:

## 2023-10-27 NOTE — CONSULT NOTE ADULT - SUBJECTIVE AND OBJECTIVE BOX
39 yo woman with morbid obesity ( BMI >40) , HTn, Asthma, eczema, anxiety, bilateral DVT and recurrent PEs, CHF with severe RHF and Pulmonary HTN came in with worsening dyspnea and low O2 sats.  She was also newly noted to have A Flutter with RVR. She had trauma to her right flank/thigh area with possible hematoma.  She has hypotension and was started on antibiotic for possible PNA and hydrocotisone stress doses..  She is currently in cardiogenic shock ( requiring mirinone and levophed) , SARAH, transaminitis ( thought to be hypoperfusion due to Hf) , Right side cellulitis vs hematoma, questionable PNA      Patient about 4 weeks ago was noted to have a high titer KATHIE and was referred to Rheumatologist Dr Maldonado.  She was started on prednisone 20mg  for joint pain and possible synovitis.  Per patient repot she has not seen any improvement inher joint issues while on prednisone 20mg.  she complaints of morning stiffness that is prolong, joint swelling of multiple joits.  she developed a rash in MAy that has now cleared but left a hyperpigmented area in herRight arm.  she has alopecia and photosensitivity.  She complaints of dry eye and dry mouth and drinks alot of water.  In addition she complains of recurrent ear infections but no hemoptysis, or sinus issues. No oral lesion, no red painful eye,     Her maternal Grandmother had scleroderma and her sister has RA    Given recurrent DVT and recurrent PE she has seen hematology but there has been no clear evidence of a hypercoaguable disorder.   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     labs:   cbc with Hg 9 plts 169  CR 2.98   ast 199  alt 866  dsDAN 347  KATHIE 1:2560 homogen  troponin normal        ECHO shows severe RV enlargement and dysfunction ,  with RV overload ,  came with high BNP   pulm HTN may be multifactorial   chronic thromboembolic DX, diastolic but also CTD     PE:   GEN severe obesity which makes examination a bit difficult  HEENT: clear sclera, no oral lesions, no malar   CVS RRR  noted right side mild erythema outlined with marker  difficult to access knees but left knee tenderness on palpation, pain on palpation of feet but no gross synovitis, right hand with IV and diffuse swelling and tenderness.  elbows ans shoulders normal    neuro: oriented, talking in full sentences ,     A/P 39 yo womna with severe obesity, HTN, recurrent DVT/PEs on chronic AC, CHF with severe RHF and pulmonary HTN presents for worsening dyspnea found to have worsening CHF and new A flutter with RVR.  Patient has become hypotensive and developed cardiogenic shock requiring pressors.      she  has a strong FH of autoimmune disorders -GM with scleroderma and sister with RA.  she is found to have a very high titer KATHIE 1:2560 and high dsDNA.  patient with most likley SLE with history of aloepcia, photosensitivity, arthritis/arthralgias, dry eye/mouth, raynauds.  pulmonary HTN is rare in SLE.  She may have have an overlap with another autoimmune disorder or the pulmonary HTN can be multifactorial given her chronic thromboembolic issues.    She does not have only clear features of scleroderma other than raynauds.      --please sent out serologies: sjogrens, JONATHAN, C3, c4, Rf, CCP, scl70, centromere, RNA polymerase, ANCA, lupus anticoaugulant, anti cardiolipin ( IgG IgA IgM ) beta 2 Glycoprotein ( IGG, IgM , igA)   --appreciate HF follwo up and agree once her HF has improved and no longer hypotensive - she would benefit from vasodilator like remodulin,, PDE5 inhibitors, endothelin receptor antogonist     --start solumedrol  37 yo woman with morbid obesity ( BMI >40) , HTn, Asthma, eczema, anxiety, bilateral DVT and recurrent PEs, CHF with severe RHF and Pulmonary HTN came in with worsening dyspnea and low O2 sats.  She was also newly noted to have A Flutter with RVR. She had trauma to her right flank/thigh area with possible hematoma.  She has hypotension and was started on antibiotic for possible PNA and hydrocotisone stress doses..  She is currently in cardiogenic shock ( requiring mirinone and levophed) , SARAH, transaminitis ( thought to be hypoperfusion due to Hf) , Right side cellulitis vs hematoma, questionable PNA      Patient about 4 weeks ago was noted to have a high titer KATHIE and was referred to Rheumatologist Dr Maldonado.  She was started on prednisone 20mg  for joint pain and possible synovitis.  Per patient repot she has not seen any improvement inher joint issues while on prednisone 20mg.  she complaints of morning stiffness that is prolong, joint swelling of multiple joits.  she developed a rash in MAy that has now cleared but left a hyperpigmented area in herRight arm.  she has alopecia and photosensitivity.  She complaints of dry eye and dry mouth and drinks alot of water.  In addition she complains of recurrent ear infections but no hemoptysis, or sinus issues. No oral lesion, no red painful eye,     Her maternal Grandmother had scleroderma and her sister has RA    Given recurrent DVT and recurrent PE she has seen hematology but there has been no clear evidence of a hypercoaguable disorder.   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     labs:   cbc with Hg 9 plts 169  CR 2.98   ast 199  alt 866  dsDAN 347  KATHIE 1:2560 homogen  troponin normal        ECHO shows severe RV enlargement and dysfunction ,  with RV overload ,  came with high BNP   pulm HTN may be multifactorial   chronic thromboembolic DX, diastolic but also CTD     PE:   GEN severe obesity which makes examination a bit difficult  HEENT: clear sclera, no oral lesions, no malar   CVS RRR  noted right side mild erythema outlined with marker  difficult to access knees but left knee tenderness on palpation, pain on palpation of feet but no gross synovitis, right hand with IV and diffuse swelling and tenderness.  elbows ans shoulders normal    neuro: oriented, talking in full sentences ,     A/P 37 yo womna with severe obesity, HTN, recurrent DVT/PEs on chronic AC, CHF with severe RHF and pulmonary HTN presents for worsening dyspnea found to have worsening CHF and new A flutter with RVR.  Patient has become hypotensive and developed cardiogenic shock requiring pressors.      she  has a strong FH of autoimmune disorders -GM with scleroderma and sister with RA.  she is found to have a very high titer KATHIE 1:2560 and high dsDNA.  patient with most likley SLE with history of aloepcia, photosensitivity, arthritis/arthralgias, dry eye/mouth, raynauds.  pulmonary HTN is rare in SLE.  She may have have an overlap with another autoimmune disorder or the pulmonary HTN can be multifactorial given her chronic thromboembolic issues.    She does not have clear features of scleroderma other than raynauds.      --please sent out serologies: sjogrens, JONATHAN, C3, c4, Rf, CCP, scl70, centromere, RNA polymerase, ANCA, lupus anticoaugulant, anti cardiolipin ( IgG IgA IgM ) beta 2 Glycoprotein ( IGG, IgM , igA)   --appreciate HF follwo up and agree once her HF has improved and no longer hypotensive - she would benefit from vasodilator like remodulin,, PDE5 inhibitors, endothelin receptor antogonist     --will need discuss with patient -solumedrol treatment- (  there is a risk of scleroderma crisis with steroid use if she were to have and overlap with scleroderma)   --please sent out hep b core, hep b surface AG, hep c, HIV and quantiferon- in anticipation for immunosupression with cellcpet vs cytoxan   --SARAH: repeat UA and sent out protein/cr ratio.  consider looking for cast   --transaminitis: monitor these closely

## 2023-10-27 NOTE — PROGRESS NOTE ADULT - SUBJECTIVE AND OBJECTIVE BOX
Patient seen and examined    I&O's Summary    26 Oct 2023 07:01  -  27 Oct 2023 07:00  --------------------------------------------------------  IN: 575.6 mL / OUT: 2780 mL / NET: -2204.4 mL    27 Oct 2023 07:01  -  27 Oct 2023 12:27  --------------------------------------------------------  IN: 64.8 mL / OUT: 400 mL / NET: -335.2 mL        REVIEW OF SYSTEMS: tired    CONSTITUTIONAL: No F/C  RESPIRATORY: No cough,  No SOB  CARDIOVASCULAR: No CP/palpitations,    GASTROINTESTINAL: No abdominal pain  or NVD   GENITOURINARY: No UTI sx  NEUROLOGICAL: No headaches, NO wk/numbness  MUSCULOSKELETAL:   EXTREMITIES : no swelling,    Vital Signs Last 24 Hrs  T(C): 35.8 (27 Oct 2023 11:00), Max: 36.6 (26 Oct 2023 16:00)  T(F): 96.5 (27 Oct 2023 11:00), Max: 97.9 (26 Oct 2023 16:00)  HR: 94 (27 Oct 2023 12:14) (81 - 106)  BP: 126/72 (27 Oct 2023 11:00) (111/57 - 140/64)  BP(mean): 85 (27 Oct 2023 11:00) (69 - 93)  RR: 18 (27 Oct 2023 11:00) (16 - 27)  SpO2: 96% (27 Oct 2023 12:14) (93% - 100%)    Parameters below as of 27 Oct 2023 12:14  Patient On (Oxygen Delivery Method): nasal cannula, high flow        PHYSICAL EXAM:    GENERAL: NAD, V Obese  EYES:  conjunctiva and sclera clear  NECK: Supple, No JVD/Bruit  NERVOUS SYSTEM:  A/O x3,   CHEST:  No rales or rhonchi, on NC  HEART:  RRR, No murmur  ABDOMEN: Soft, NT/ND BS+  EXTREMITIES:  mild Edema;  SKIN: No rashes    LABS:                          8.8    5.52  )-----------( 146      ( 27 Oct 2023 05:45 )             29.9     10-27    133<L>  |  91<L>  |  92.3<H>  ----------------------------<  138<H>  3.8   |  29.0  |  2.81<H>    Ca    8.2<L>      27 Oct 2023 05:45  Phos  4.9     10-27  Mg     2.2     10-27    TPro  7.0  /  Alb  2.9<L>  /  TBili  1.7  /  DBili  x   /  AST  136<H>  /  ALT  723<H>  /  AlkPhos  121<H>  10-27      MEDICATIONS  (STANDING):  albuterol/ipratropium for Nebulization PRN  aluminum hydroxide/magnesium hydroxide/simethicone Suspension PRN  aMIOdarone    Tablet  budesonide 160 MICROgram(s)/formoterol 4.5 MICROgram(s) Inhaler  chlorhexidine 2% Cloths  heparin  Infusion.  influenza   Vaccine  insulin lispro (ADMELOG) corrective regimen sliding scale.  melatonin PRN  methylPREDNISolone sodium succinate IVPB  milrinone Infusion  montelukast  mupirocin 2% Ointment  nystatin Powder  ondansetron Injectable PRN  oxyCODONE    IR PRN  oxyCODONE    IR PRN  PARoxetine  polyethylene glycol 3350 PRN  sildenafil (REVATIO)

## 2023-10-27 NOTE — PROGRESS NOTE ADULT - SUBJECTIVE AND OBJECTIVE BOX
[facility] - PROGRESS NOTE  [address] [place] [address]  Office Phone: [phone]/Fax: [phone]  Service/On Call Phone [phone]  _______________________________________________________________________________________________________    Subjective:  - endorses chest discomfort which started in the past 24 hours, which is worse with taking deep breaths.    Medications:  albuterol/ipratropium for Nebulization 3 milliLiter(s) Nebulizer every 6 hours PRN  aluminum hydroxide/magnesium hydroxide/simethicone Suspension 30 milliLiter(s) Oral every 4 hours PRN  aMIOdarone    Tablet 200 milliGRAM(s) Oral daily  budesonide 160 MICROgram(s)/formoterol 4.5 MICROgram(s) Inhaler 2 Puff(s) Inhalation two times a day  chlorhexidine 2% Cloths 1 Application(s) Topical <User Schedule>  heparin  Infusion. 1200 Unit(s)/Hr IV Continuous <Continuous>  influenza   Vaccine 0.5 milliLiter(s) IntraMuscular once  insulin lispro (ADMELOG) corrective regimen sliding scale.   SubCutaneous four times a day with meals  melatonin 3 milliGRAM(s) Oral at bedtime PRN  methylPREDNISolone sodium succinate IVPB 1000 milliGRAM(s) IV Intermittent once  milrinone Infusion 0.125 MICROgram(s)/kG/Min IV Continuous <Continuous>  montelukast 10 milliGRAM(s) Oral daily  mupirocin 2% Ointment 1 Application(s) Both Nostrils two times a day  nystatin Powder 1 Application(s) Topical two times a day  ondansetron Injectable 4 milliGRAM(s) IV Push every 8 hours PRN  oxyCODONE    IR 5 milliGRAM(s) Oral every 6 hours PRN  oxyCODONE    IR 2.5 milliGRAM(s) Oral every 6 hours PRN  PARoxetine 20 milliGRAM(s) Oral daily  polyethylene glycol 3350 17 Gram(s) Oral daily PRN  sildenafil (REVATIO) 20 milliGRAM(s) Oral three times a day      ICU Vital Signs Last 24 Hrs  T(C): 35.8, Max: 36.6 (10-26 @ 16:00)  HR: 94 (81 - 106)  BP: 126/72 (111/57 - 140/64)  BP(mean): 85 (69 - 93)  ABP: --  ABP(mean): --  RR: 18 (16 - 27)  SpO2: 96% (93% - 100%)    Weight in kG: 160.2 (10-27-23)  Weight in k.8 (10-26-23)      I&O's Summary Last 24 Hrs    IN: 575.6 mL / OUT: 2780 mL / NET: -2204.4 mL      Tele: SR (converted from Aflutter last night), PVCs, bigem    Physical Exam:    General: No distress. Comfortable.  HEENT: JVP difficult to assess. PAC via RIJ  Respiratory: Clear to auscultation bilaterally  CV: RRR. Normal S1 and S2. No murmurs, rub, or gallops. Radial pulses normal.  Abdomen: Soft, non-distended, non-tender  Extremities: Warm, no edema  Neurology: Non-focal, alert and oriented times three.   Psych: Affect normal    Labs:    ( 10-27-23 @ 05:45 )               8.8    5.52  )--------( 146                  29.9     ( 10-27-23 @ 05:45 )     133  |  91  |  92.3  ---------------------<  138  3.8  |  29.0  |  2.81    Ca 8.2  Phos 4.9  Mg 2.2    ( 10-27-23 @ 05:45 )  TPro  7.0  /  Alb  2.9  /  TBili  1.7  /  DBili  x   /  AST  136  /  ALT  723  /  AlkPhos  121  ( 10-27-23 @ 00:35 )  TPro  7.1  /  Alb  2.7  /  TBili  1.8  /  DBili  x   /  AST  160  /  ALT  794  /  AlkPhos  126    PTT/PT/INR - ( 10-27-23 @ 06:41 )  PTT: 70.6 sec / PT: x     / INR: x        ( 10-22-23 @ 07:35 )  TropHS x     / CK 27    / CKMB x      ( 10-20-23 @ 16:51 )  TropHS x     / CK 13    / CKMB x        VBG - ( 10-27-23 @ 12:23 )  pH: 7.390 / pCO2: 56    / pO2: 53    / Oxygen saturation: 81.3    VBG - ( 10-27-23 @ 05:40 )  pH: 7.360 / pCO2: 57    / pO2: 51    / Oxygen saturation: 81.9    VBG - ( 10-27-23 @ 00:18 )  pH: 7.360 / pCO2: 57    / pO2: 52    / Oxygen saturation: 79.7    VBG - ( 10-26-23 @ 17:38 )  pH: 7.390 / pCO2: 51    / pO2: 47    / Oxygen saturation: 74.9    VBG - ( 10-26-23 @ 13:20 )  pH: 7.390 / pCO2: 51    / pO2: 138   / Oxygen saturation: 98.7      ABG - ( 10-27-23 @ 04:22 )  pH: 7.450 / pCO2: 46    / pO2: 131   / HCO3: 32    / Base Excess: 8.0   / SaO2: 99.7     Blood Gas Venous - Lactate: 0.90 (10-27-23 @ 12:23)  Blood Gas Venous - Lactate: 1.20 (10-27-23 @ 00:18)

## 2023-10-27 NOTE — PROGRESS NOTE ADULT - SUBJECTIVE AND OBJECTIVE BOX
Patient is a 38y old  Female who presents with a chief complaint of Acute on Chronic Hypoxic Respiratory Failure 2/2 CHF Exacerbation (26 Oct 2023 11:46)      BRIEF HOSPITAL COURSE: 37 y/o F with a h/o b/l DVTs, recurrent PE on eliquis, HFpEF, morbid obesity, asthma, chronic hyperkalemia, admitted on 10/18 with complaints of weakness, worsening LE edema, cough, and weight gain. Of note, was being worked up as an outpatient for possible SLE given facial rash, photophobia, and joint pains. She was started on prednisone 20 mg by her PCP x ~1 month. Pt was admitted to medicine for treatment of PNA. Hospital course complicated by progressive SARAH, HAGMA, transaminitis and acute hypoxemic respiratory failure secondary to development of cardiogenic shock/severe RV failure.    Events last 24 hours: Weaned off IV vasopressor and Teagan- replaced with sildenafil. Remains on inotrope support. Dependent on HFNC and endorses dyspnea.        PAST MEDICAL & SURGICAL HISTORY:  Pulmonary embolism      DVT, lower extremity      CHF (congestive heart failure)      Asthma      Anxiety      Severe obesity (BMI >= 40)      Hypertension      No significant past surgical history          Review of Systems:  CONSTITUTIONAL: No fever, chills, or fatigue  EYES: No eye pain, visual disturbances, or discharge  ENMT:  No difficulty hearing, tinnitus, vertigo; No sinus or throat pain  NECK: No pain or stiffness  RESPIRATORY: No cough, wheezing, chills or hemoptysis; (+) shortness of breath  CARDIOVASCULAR: No chest pain, palpitations, dizziness, or leg swelling  GASTROINTESTINAL: No abdominal or epigastric pain. No nausea, vomiting, or hematemesis; No diarrhea or constipation. No melena or hematochezia.  GENITOURINARY: No dysuria, frequency, hematuria, or incontinence  NEUROLOGICAL: No headaches, memory loss, loss of strength, numbness, or tremors  SKIN: No itching, burning, rashes, or lesions   MUSCULOSKELETAL: No joint pain or swelling; No muscle, back, or extremity pain  PSYCHIATRIC: No depression, anxiety, mood swings, or difficulty sleeping      Medications:    aMIOdarone    Tablet 200 milliGRAM(s) Oral daily  milrinone Infusion 0.125 MICROgram(s)/kG/Min IV Continuous <Continuous>  sildenafil (REVATIO) 10 milliGRAM(s) Oral three times a day  albuterol/ipratropium for Nebulization 3 milliLiter(s) Nebulizer every 6 hours PRN  budesonide 160 MICROgram(s)/formoterol 4.5 MICROgram(s) Inhaler 2 Puff(s) Inhalation two times a day  montelukast 10 milliGRAM(s) Oral daily  melatonin 3 milliGRAM(s) Oral at bedtime PRN  ondansetron Injectable 4 milliGRAM(s) IV Push every 8 hours PRN  oxyCODONE    IR 5 milliGRAM(s) Oral every 6 hours PRN  oxyCODONE    IR 2.5 milliGRAM(s) Oral every 6 hours PRN  PARoxetine 20 milliGRAM(s) Oral daily  heparin  Infusion. 1200 Unit(s)/Hr IV Continuous <Continuous>  aluminum hydroxide/magnesium hydroxide/simethicone Suspension 30 milliLiter(s) Oral every 4 hours PRN  hydrocortisone sodium succinate Injectable 100 milliGRAM(s) IV Push every 8 hours  insulin lispro (ADMELOG) corrective regimen sliding scale.   SubCutaneous four times a day with meals  influenza   Vaccine 0.5 milliLiter(s) IntraMuscular once  chlorhexidine 2% Cloths 1 Application(s) Topical <User Schedule>  mupirocin 2% Ointment 1 Application(s) Both Nostrils two times a day  nystatin Powder 1 Application(s) Topical two times a day            ICU Vital Signs Last 24 Hrs  T(C): 36.6 (26 Oct 2023 16:00), Max: 36.6 (26 Oct 2023 16:00)  T(F): 97.9 (26 Oct 2023 16:00), Max: 97.9 (26 Oct 2023 16:00)  HR: 92 (27 Oct 2023 01:30) (81 - 110)  BP: 126/66 (27 Oct 2023 01:30) (111/57 - 145/79)  BP(mean): 82 (27 Oct 2023 01:30) (69 - 107)  ABP: --  ABP(mean): --  RR: 20 (27 Oct 2023 01:30) (15 - 29)  SpO2: 96% (27 Oct 2023 01:30) (89% - 100%)    O2 Parameters below as of 27 Oct 2023 00:35  Patient On (Oxygen Delivery Method): nasal cannula, high flow                I&O's Detail    25 Oct 2023 07:01  -  26 Oct 2023 07:00  --------------------------------------------------------  IN:    Heparin Infusion: 318 mL    IV PiggyBack: 275 mL    Milrinone: 268.8 mL    Oral Fluid: 200 mL    Vasopressin: 114 mL    Vasopressin: 18 mL  Total IN: 1193.8 mL    OUT:    Indwelling Catheter - Urethral (mL): 1205 mL  Total OUT: 1205 mL    Total NET: -11.2 mL      26 Oct 2023 07:01  -  27 Oct 2023 01:53  --------------------------------------------------------  IN:    Heparin Infusion: 191 mL    IV PiggyBack: 100 mL    Milrinone: 100.8 mL    Milrinone: 51.2 mL    Vasopressin: 3 mL  Total IN: 446 mL    OUT:    Indwelling Catheter - Urethral (mL): 2080 mL  Total OUT: 2080 mL    Total NET: -1634 mL            LABS:                        9.0    5.90  )-----------( 149      ( 27 Oct 2023 00:35 )             29.7     10-27    132<L>  |  89<L>  |  92.3<H>  ----------------------------<  138<H>  3.7   |  29.0  |  2.98<H>    Ca    8.3<L>      27 Oct 2023 00:35  Phos  4.7     10-27  Mg     2.2     10-27    TPro  7.1  /  Alb  2.7<L>  /  TBili  1.8  /  DBili  x   /  AST  160<H>  /  ALT  794<H>  /  AlkPhos  126<H>  10-27          CAPILLARY BLOOD GLUCOSE      POCT Blood Glucose.: 163 mg/dL (26 Oct 2023 22:01)    PT/INR - ( 25 Oct 2023 12:36 )   PT: 18.0 sec;   INR: 1.65 ratio         PTT - ( 27 Oct 2023 00:35 )  PTT:49.4 sec  Urinalysis Basic - ( 27 Oct 2023 00:35 )    Color: x / Appearance: x / SG: x / pH: x  Gluc: 138 mg/dL / Ketone: x  / Bili: x / Urobili: x   Blood: x / Protein: x / Nitrite: x   Leuk Esterase: x / RBC: x / WBC x   Sq Epi: x / Non Sq Epi: x / Bacteria: x      CULTURES:  Culture Results:   No growth at 72 Hours (10-22-23 @ 18:50)  Rapid RVP Result: NotDetec (10-22-23 @ 16:18)  Culture Results:   No growth at 4 days (10-22-23 @ 16:00)        Physical Examination:    General: No acute distress.  Alert, oriented, interactive, nonfocal, laying on her side in bed, on HFNC    HEENT: Pupils equal, reactive to light.  Symmetric.    PULM: diminished breath sounds at bases bilaterally, no significant sputum production    CVS: Regular rate and rhythm, no murmurs, rubs, or gallops    ABD: Soft, nondistended, nontender, normoactive bowel sounds, no masses    EXT: No edema, nontender    SKIN: Warm and well perfused, no rashes noted.    NEURO: A&Ox3, strength 5/5 all extremities, cranial nerves grossly intact, no focal deficits        RADIOLOGY:     < from: Xray Chest 1 View- PORTABLE-Urgent (Xray Chest 1 View- PORTABLE-Urgent .) (10.23.23 @ 11:45) >  INTERPRETATION:    Heart size and the mediastinum cannot be accurately evaluated on this   projection.  There is a right IJ approach [place] catheter with tip in the right   main pulmonary artery.  There are worsening diffuse right lung airspace opacities.  There is improved central left pulmonary vascular congestion.  No definite right pleural effusion.  Continued left lower/retrocardiac opacity with obscuration of the left   hemidiaphragm.  No pneumothorax seen.  No acute bony abnormality.      IMPRESSION:  Right IJ approach [place]Renny catheter with tip in the right   main pulmonary artery. No pneumothorax.    Worsening diffuse right lung airspace opacities, possibly asymmetric   pulmonary edema, although infection is not excluded.    Improved central left pulmonary vascular congestion.    Continued left lower/retrocardiac opacity, possibly a left pleural   effusion with associated passive atelectasis. Atelectasis of other cause,   and/or other pathology including, but not limited to, pneumonia is not   excluded.          CRITICAL CARE TIME SPENT: 37 mins  Time spent evaluating/treating patient with medical issues that pose a high risk for life threatening deterioration and/or end-organ damage, reviewing data/labs/imaging, discussing case with multidisciplinary team, discussing plan/goals of care with patient/family. Non-inclusive of procedure time.

## 2023-10-27 NOTE — CONSULT NOTE ADULT - SUBJECTIVE AND OBJECTIVE BOX
labs done 10/9 /2023 as out patient    show B2GP IgG >150 B2GP Ig A >150 and Lupus anticoagulant positive  she also has low complement c3 76 and c4 4  cbc with lymphopenia  and thrombocytopenia plts 121  KATHIE 1:2560  dsDNA 671  Sm 2.1  Ro >8   La>8   RF 65   CRP 45    negative RNP/CCP/ ACL/ ANCA/panda/[facility]/scl70/  cmp with normal ast/alt and CR 0.9  cpk 13 ( low)     Please consult hematology.  patient most likely with ALPS and Eliquis may be sub-optimal treatment.    I have discuss with DR Magdaleno ( heart failure) and her pulmonary HTN which can be attributed to chronic thromboembolic issues,  HTn with diastolic dysfunction and CTD/ SLE.  Given how active her SLE is with low Complements, high inflammatory markers,high dsDNA and gross synovitis of the hand and given how severe her RHF is we have decided to to start a pulse steroid for 3 days   please sent out UA and protein to creatine ratio.

## 2023-10-27 NOTE — PROGRESS NOTE ADULT - PROBLEM SELECTOR PLAN 6
in the setting of cardiogenic shock  - improving with optimization as above.

## 2023-10-27 NOTE — PROGRESS NOTE ADULT - NS ATTEND AMEND GEN_ALL_CORE FT
I agree with assessment and plan as above. Clinical picture consistent with ischemic hepatopathy and liver enzymes downtrending as expected. Viral hepatitis panel is negative. GI signing off, please call back as needed.
Pt with HFpEF, recurrent VTE on Eliquis, chronic respiratory failure, morbid obesity, who presented due to worsening dyspnea, was found to be in cardiogenic shock and acute on chronic respiratory failure, with acidosis and SARAH. GI was consulted due to elevated liver tests. Patient is on pressors and HFNC. Clinical presentation and pattern of elevated liver tests is consistent with ischemic hepatopathy. Aminotransferases now continue to downtrend with bili lagging. There may also be a component of congestive hepatopathy. US negative for vascular etiology of abnormal liver tests. F/u viral hepatitis panel. Continue to trend liver tests daily.
Pt denies SOB. She does reports some chest discomfort in the past 24 hrs. It could be due to Teagan weaning.   She is now off pressors and has converted to SR overnight.  CVP remains acceptable. However, should continue daily diuresis.   Her SARAH has stabilized and LFTs are improving.   Her PA pressures remain high with a normal pcwp.   She was seen by rheumatology and her dx of SLE was confirmed. There is concern for some component of scleroderma as well. She was also found to have antiphospholipd syndrome.  Hematology c/s was recommended.   She has precapillary pHTN and the DDx includes PAH related to rheumatologic disease, CTEPH given her h/o recurrent DVT/PE and group 3 PH in setting of restrictive physiology/probable MICHAEL. Sildenafil was started now that she's normotensive. However, given the severity of her PHTN she needs escalation to IV epoprostenol or SC remodulin. Triple oral therapy might be suboptimal.   Would also need a V/Q scan once the xray is cleared.     Her case was d/w Dr. Del Castillo and Dr Pearce. The plan is to remove the PAC today, continue milrinone gtt and transfer to [facility] MICU on Monday morning (team is aware). [facility]'s MICU team should contact the transfer center on Sunday evening or Monday morning to start the transfer process.  I've also updated Dr. White ([facility]).

## 2023-10-28 NOTE — PROGRESS NOTE ADULT - SUBJECTIVE AND OBJECTIVE BOX
Patient seen and examined    I&O's Summary    27 Oct 2023 07:01  -  28 Oct 2023 07:00  --------------------------------------------------------  IN: 1008.4 mL / OUT: 2050 mL / NET: -1041.6 mL        REVIEW OF SYSTEMS:    CONSTITUTIONAL: No F/C  RESPIRATORY: No cough,  mild persistent SOB  CARDIOVASCULAR: No CP/palpitations,    GASTROINTESTINAL: No abdominal pain  or NVD   GENITOURINARY: No UTI sx  NEUROLOGICAL: c/o headaches, NO wk/numbness  MUSCULOSKELETAL:   EXTREMITIES : + swelling,    Vital Signs Last 24 Hrs  T(C): 35.8 (28 Oct 2023 03:00), Max: 35.8 (27 Oct 2023 12:00)  T(F): 96.4 (28 Oct 2023 03:00), Max: 96.4 (27 Oct 2023 12:00)  HR: 99 (28 Oct 2023 10:00) (87 - 103)  BP: 113/53 (28 Oct 2023 10:00) (102/54 - 134/70)  BP(mean): 69 (28 Oct 2023 10:00) (67 - 101)  RR: 16 (28 Oct 2023 10:00) (12 - 20)  SpO2: 94% (28 Oct 2023 10:00) (92% - 97%)    Parameters below as of 28 Oct 2023 08:00  Patient On (Oxygen Delivery Method): nasal cannula, high flow  O2 Flow (L/min): 40  O2 Concentration (%): 40    PHYSICAL EXAM:    GENERAL: NAD, Obese  EYES:  conjunctiva and sclera clear  NECK: Supple, No JVD/Bruit  NERVOUS SYSTEM:  A/O x3,   CHEST:  No rales or rhonchi  HEART:  RRR, No murmur  ABDOMEN: Soft, NT/ND BS+  EXTREMITIES:  + Edema;  SKIN: No rashes    LABS:                          9.5    3.68  )-----------( 141      ( 28 Oct 2023 05:48 )             31.8     10-28    135  |  92<L>  |  93.5<H>  ----------------------------<  185<H>  3.8   |  30.0<H>  |  2.61<H>    Ca    8.9      28 Oct 2023 05:48  Phos  5.1     10-28  Mg     2.3     10-28    TPro  7.7  /  Alb  3.1<L>  /  TBili  1.8  /  DBili  x   /  AST  79<H>  /  ALT  584<H>  /  AlkPhos  128<H>  10-28      MEDICATIONS  (STANDING):  albuterol/ipratropium for Nebulization PRN  aluminum hydroxide/magnesium hydroxide/simethicone Suspension PRN  aMIOdarone    Tablet  budesonide 160 MICROgram(s)/formoterol 4.5 MICROgram(s) Inhaler  chlorhexidine 2% Cloths  heparin  Infusion.  influenza   Vaccine  insulin lispro (ADMELOG) corrective regimen sliding scale.  melatonin PRN  milrinone Infusion  montelukast  nystatin Powder  ondansetron Injectable PRN  oxyCODONE    IR PRN  oxyCODONE    IR PRN  PARoxetine  polyethylene glycol 3350 PRN  sildenafil (REVATIO)  sodium chloride 0.65% Nasal PRN

## 2023-10-28 NOTE — PROGRESS NOTE ADULT - ASSESSMENT
A/P 39 yo womna with severe obesity, HTN, recurrent DVT/PEs on chronic AC, CHF with severe RHF and pulmonary HTN presents for worsening dyspnea found to have worsening CHF and new A flutter with RVR.  Patient has become hypotensive and developed cardiogenic shock requiring pressors.      she  has a strong FH of autoimmune disorders -GM with scleroderma and sister with RA.  she is found to have a very high titer KATHIE 1:2560 and high dsDNA.  patient with most likley SLE with history of aloepcia, photosensitivity, arthritis/arthralgias, dry eye/mouth, raynauds.  pulmonary HTN is rare in SLE.  She may have have an overlap with another autoimmune disorder or the pulmonary HTN can be multifactorial given her chronic thromboembolic issues.    She does not have clear features of scleroderma other than raynauds.    JHas severe PAH systolic 80s despite Revatio    SARAH: ATN, cardiogenic shock, unclear baseline creatinine and how much Ac LN cotributing -   Ischemic hepatitis with coagulopathy   + SLE serology- C3C4 extremely low, ds DNA v high - plans per Rheum- on Solumedrol  Hypervolemic hyponatremia  - cont supportive care  - cont pressor, milrinone and diuretics  - may need to consider RRT depending upon clinical course  - check f/u serologies    Recurrent PE/PAH - on Heparin   Creat sl better - watch [id number]>2.61

## 2023-10-28 NOTE — PROGRESS NOTE ADULT - SUBJECTIVE AND OBJECTIVE BOX
[facility]                                   [address] [place] [address]                                                      PHONE: [phone]                                                         FAX: [phone]  [email or web address]  ---------------------------------------------------------------------------------------------------------------------------------    Overnight events/patient complaints:  Remains on HFNC. No complaint this morning    iodine (Hives)  ceftriaxone (Hives)  shellfish (Hives)    MEDICATIONS  (STANDING):  aMIOdarone    Tablet 200 milliGRAM(s) Oral daily  budesonide 160 MICROgram(s)/formoterol 4.5 MICROgram(s) Inhaler 2 Puff(s) Inhalation two times a day  chlorhexidine 2% Cloths 1 Application(s) Topical <User Schedule>  heparin  Infusion. 1200 Unit(s)/Hr (12 mL/Hr) IV Continuous <Continuous>  influenza   Vaccine 0.5 milliLiter(s) IntraMuscular once  insulin lispro (ADMELOG) corrective regimen sliding scale.   SubCutaneous four times a day with meals  milrinone Infusion 0.125 MICROgram(s)/kG/Min (5.58 mL/Hr) IV Continuous <Continuous>  montelukast 10 milliGRAM(s) Oral daily  nystatin Powder 1 Application(s) Topical two times a day  PARoxetine 20 milliGRAM(s) Oral daily  sildenafil (REVATIO) 20 milliGRAM(s) Oral three times a day    MEDICATIONS  (PRN):  albuterol/ipratropium for Nebulization 3 milliLiter(s) Nebulizer every 6 hours PRN Shortness of Breath and/or Wheezing  aluminum hydroxide/magnesium hydroxide/simethicone Suspension 30 milliLiter(s) Oral every 4 hours PRN Dyspepsia  melatonin 3 milliGRAM(s) Oral at bedtime PRN Insomnia  ondansetron Injectable 4 milliGRAM(s) IV Push every 8 hours PRN Nausea and/or Vomiting  oxyCODONE    IR 5 milliGRAM(s) Oral every 6 hours PRN Severe Pain (7 - 10)  oxyCODONE    IR 2.5 milliGRAM(s) Oral every 6 hours PRN Moderate Pain (4 - 6)  polyethylene glycol 3350 17 Gram(s) Oral daily PRN Constipation  sodium chloride 0.65% Nasal 1 Spray(s) Both Nostrils three times a day PRN Nasal Congestion      Vital Signs Last 24 Hrs  T(C): 35.8 (28 Oct 2023 03:00), Max: 35.8 (27 Oct 2023 12:00)  T(F): 96.4 (28 Oct 2023 03:00), Max: 96.4 (27 Oct 2023 12:00)  HR: 99 (28 Oct 2023 10:00) (87 - 103)  BP: 113/53 (28 Oct 2023 10:00) (102/54 - 134/70)  BP(mean): 69 (28 Oct 2023 10:00) (67 - 101)  RR: 16 (28 Oct 2023 10:00) (12 - 20)  SpO2: 94% (28 Oct 2023 10:00) (92% - 97%)    Parameters below as of 28 Oct 2023 08:00  Patient On (Oxygen Delivery Method): nasal cannula, high flow  O2 Flow (L/min): 40  O2 Concentration (%): 40  ICU Vital Signs Last 24 Hrs  MITCHEL GUNN  I&O's Detail    27 Oct 2023 07:01  -  28 Oct 2023 07:00  --------------------------------------------------------  IN:    Heparin Infusion: 384 mL    Milrinone: 134.4 mL    Oral Fluid: 490 mL  Total IN: 1008.4 mL    OUT:    Indwelling Catheter - Urethral (mL): 2050 mL  Total OUT: 2050 mL    Total NET: -1041.6 mL        I&O's Summary    27 Oct 2023 07:01  -  28 Oct 2023 07:00  --------------------------------------------------------  IN: 1008.4 mL / OUT: 2050 mL / NET: -1041.6 mL      Drug Dosing Weight  MITCHEL GUNN      PHYSICAL EXAM:  General: Appears comfortable, alert and cooperative.  HEENT: Normocephalic, atraumatic.  Eyes: Pupils reactive, cornea wnl.  Neck: Supple, no nodes adenopathy; no JVD, carotid bruit or thyromegaly.  CARDIOVASCULAR: Regular S1 S2 with no murmur, rub, gallop or lift.   LUNGS: No rales, rhonchi or wheeze. Normal breath sounds bilaterally.  ABDOMEN: Soft, nontender without mass or organomegaly. bowel sounds normoactive.  EXTREMITIES: No clubbing, cyanosis. 1+ edema. Distal pulses wnl.   SKIN: Warm and dry with normal turgor.  NEURO: Alert/oriented x 3/normal motor exam  PSYCH: Appropriate affect        LABS:                        9.5    3.68  )-----------( 141      ( 28 Oct 2023 05:48 )             31.8     10-28    135  |  92<L>  |  93.5<H>  ----------------------------<  185<H>  3.8   |  30.0<H>  |  2.61<H>    Ca    8.9      28 Oct 2023 05:48  Phos  5.1     10-28  Mg     2.3     10-28    TPro  7.7  /  Alb  3.1<L>  /  TBili  1.8  /  DBili  x   /  AST  79<H>  /  ALT  584<H>  /  AlkPhos  128<H>  10-28    MITCHEL VELIA      PT/INR - ( 28 Oct 2023 05:48 )   PT: 14.4 sec;   INR: 1.31 ratio         PTT - ( 28 Oct 2023 05:48 )  PTT:76.4 sec  Urinalysis Basic - ( 28 Oct 2023 05:48 )    Color: x / Appearance: x / SG: x / pH: x  Gluc: 185 mg/dL / Ketone: x  / Bili: x / Urobili: x   Blood: x / Protein: x / Nitrite: x   Leuk Esterase: x / RBC: x / WBC x   Sq Epi: x / Non Sq Epi: x / Bacteria: x        RADIOLOGY & ADDITIONAL STUDIES:    INTERPRETATION OF TELEMETRY (personally reviewed):  SR, PVC    ASSESSMENT AND PLAN:  In summary, MITCHEL GUNN is an 38y Female with morbid obesity, HFpEF, severe RV dysfunction, PE/DVT, SLE who p/w heart failure/cardiogenic shock and atrial flutter now converted to SR on amiodarone    - LFT stable, would continue amiodarone without change  - Telemetry monitoring  - Likely could benefit from further diuresis, will defer to CHF team

## 2023-10-28 NOTE — PROGRESS NOTE ADULT - ASSESSMENT
37 y/o F with a h/o b/l DVTs, recurrent PE on eliquis, HFpEF, morbid obesity, asthma, chronic hyperkalemia, with:    # Cardiogenic shock  # Cor pulmonale/severe pHTN  # Acute hypoxemic respiratory failure  # Multifocal pneumonia  # SARAH  # Transaminitis  # Atrial flutter    - maintain milrinone @ 0.125 mcg/kg/min pending transfer to [facility]  - most recent SVO2 78, lactate 1.0, CO/CI 10.4/4.4  - increase sildenafil to 20mg TID  - eventual plan for IV epoprostenol vs SC remodulin  - monitor hemodynamics closely, maintain a MAP > 65  - monitor clinical and laboratory end-points of perfusion closely, current appears warm and well perfused  - overall net negative approx 4.5L, intermittently diuresing with IV bumetanide with daily 500-1000cc net negative goal  - started on pulse dose methylprednisolone given strong concern for rheumatologic involvement regarding pHTN  - KATHIE and dsDNA AB elevated, SLE confirmed  - extensive serology testing ordered as per rheumatology  - cardiology, advanced heart failure, and rheumatology input much appreciated  - SARAH secondary to ischemic ATN/cardiorenal syndrome, optimize end-organ perfusion as above  - trend BUN/Cr, electrolytes and acid-base balance, monitor hourly UOP (nonoliguric)  - no indication for urgent RRT at this time  - transaminitis due to passive hepatic congestion + ischemia, LFTs slowly downtrending  - actively titrating HFNC settings to maintain SpO2 > 92%, failed trial of weaning FiO2  - NIPPV PRN for increased work of breathing  - CXR concerning for pulmonary congestion vs multifocal pneumonia, CT chest ordered   - therapeutic anticoagulation with heparin infusion      Case discussed with MICU physician, Dr. Honeycutt.

## 2023-10-28 NOTE — PROGRESS NOTE ADULT - SUBJECTIVE AND OBJECTIVE BOX
Patient is a 38y old  Female who presents with a chief complaint of Acute on Chronic Hypoxic Respiratory Failure 2/2 CHF Exacerbation (27 Oct 2023 15:22)        BRIEF HOSPITAL COURSE: 39 y/o F with a h/o b/l DVTs, recurrent PE on eliquis, HFpEF, morbid obesity, asthma, chronic hyperkalemia, admitted on 10/18 with complaints of weakness, worsening LE edema, cough, and weight gain. Of note, was being worked up as an outpatient for possible SLE given facial rash, photophobia, and joint pains. She was started on prednisone 20 mg by her PCP x ~1 month. Pt was admitted to medicine for treatment of PNA. Hospital course complicated by progressive SARAH, HAGMA, transaminitis and acute hypoxemic respiratory failure secondary to development of cardiogenic shock/severe RV failure.    Events last 24 hours: Remains on inotrope support. Having difficulty weaning HFNC. Started on pulse dose steroids given strong concerns of rheumatologic involvement.        PAST MEDICAL & SURGICAL HISTORY:  Pulmonary embolism      DVT, lower extremity      CHF (congestive heart failure)      Asthma      Anxiety      Severe obesity (BMI >= 40)      Hypertension      No significant past surgical history          Review of Systems:  CONSTITUTIONAL: No fever, chills, or fatigue  EYES: No eye pain, visual disturbances, or discharge  ENMT:  No difficulty hearing, tinnitus, vertigo; No sinus or throat pain  NECK: No pain or stiffness  RESPIRATORY: No cough, wheezing, chills or hemoptysis; (+) shortness of breath  CARDIOVASCULAR: No chest pain, palpitations, dizziness, or leg swelling  GASTROINTESTINAL: No abdominal or epigastric pain. No nausea, vomiting, or hematemesis; No diarrhea or constipation. No melena or hematochezia.  GENITOURINARY: No dysuria, frequency, hematuria, or incontinence  NEUROLOGICAL: No headaches, memory loss, loss of strength, numbness, or tremors  SKIN: No itching, burning, rashes, or lesions   MUSCULOSKELETAL: No joint pain or swelling; No muscle, back, or extremity pain  PSYCHIATRIC: No depression, anxiety, mood swings, or difficulty sleeping      Medications:    aMIOdarone    Tablet 200 milliGRAM(s) Oral daily  milrinone Infusion 0.125 MICROgram(s)/kG/Min IV Continuous <Continuous>  sildenafil (REVATIO) 20 milliGRAM(s) Oral three times a day  albuterol/ipratropium for Nebulization 3 milliLiter(s) Nebulizer every 6 hours PRN  budesonide 160 MICROgram(s)/formoterol 4.5 MICROgram(s) Inhaler 2 Puff(s) Inhalation two times a day  montelukast 10 milliGRAM(s) Oral daily  melatonin 3 milliGRAM(s) Oral at bedtime PRN  ondansetron Injectable 4 milliGRAM(s) IV Push every 8 hours PRN  oxyCODONE    IR 5 milliGRAM(s) Oral every 6 hours PRN  oxyCODONE    IR 2.5 milliGRAM(s) Oral every 6 hours PRN  PARoxetine 20 milliGRAM(s) Oral daily  heparin  Infusin. 1200 Unit(s)/Hr IV Continuous <Continuous>  aluminum hydroxide/magnesium hydroxide/simethicone Suspension 30 milliLiter(s) Oral every 4 hours PRN  polyethylene glycol 3350 17 Gram(s) Oral daily PRN  insulin lispro (ADMELOG) corrective regimen sliding scale.   SubCutaneous four times a day with meals  influenza   Vaccine 0.5 milliLiter(s) IntraMuscular once  chlorhexidine 2% Cloths 1 Application(s) Topical <User Schedule>  nystatin Powder 1 Application(s) Topical two times a day  sodium chloride 0.65% Nasal 1 Spray(s) Both Nostrils three times a day PRN            ICU Vital Signs Last 24 Hrs  T(C): 35.8 (27 Oct 2023 23:00), Max: 35.8 (27 Oct 2023 11:00)  T(F): 96.4 (27 Oct 2023 23:00), Max: 96.5 (27 Oct 2023 11:00)  HR: 93 (28 Oct 2023 01:00) (87 - 98)  BP: 118/61 (28 Oct 2023 01:00) (108/56 - 134/74)  BP(mean): 75 (28 Oct 2023 01:00) (72 - 94)  ABP: --  ABP(mean): --  RR: 20 (28 Oct 2023 01:00) (15 - 23)  SpO2: 93% (28 Oct 2023 01:00) (92% - 97%)    O2 Parameters below as of 28 Oct 2023 00:00  Patient On (Oxygen Delivery Method): nasal cannula, high flow  O2 Flow (L/min): 40  O2 Concentration (%): 40        ABG - ( 27 Oct 2023 04:22 )  pH, Arterial: 7.450 pH, Blood: x     /  pCO2: 46    /  pO2: 131   / HCO3: 32    / Base Excess: 8.0   /  SaO2: 99.7                I&O's Detail    26 Oct 2023 07:01  -  27 Oct 2023 07:00  --------------------------------------------------------  IN:    Heparin Infusion: 287 mL    IV PiggyBack: 100 mL    Milrinone: 100.8 mL    Milrinone: 84.8 mL    Vasopressin: 3 mL  Total IN: 575.6 mL    OUT:    Indwelling Catheter - Urethral (mL): 2780 mL  Total OUT: 2780 mL    Total NET: -2204.4 mL      27 Oct 2023 07:01  -  28 Oct 2023 02:03  --------------------------------------------------------  IN:    Heparin Infusion: 272 mL    Milrinone: 95.2 mL    Oral Fluid: 190 mL  Total IN: 557.2 mL    OUT:    Indwelling Catheter - Urethral (mL): 1640 mL  Total OUT: 1640 mL    Total NET: -1082.8 mL            LABS:                        9.2    4.24  )-----------( 145      ( 27 Oct 2023 23:53 )             31.3     10-27    134<L>  |  92<L>  |  97.3<H>  ----------------------------<  203<H>  3.8   |  30.0<H>  |  2.77<H>    Ca    8.7      27 Oct 2023 23:53  Phos  5.1     10-27  Mg     2.3     10-27    TPro  7.6  /  Alb  3.0<L>  /  TBili  1.8  /  DBili  x   /  AST  91<H>  /  ALT  616<H>  /  AlkPhos  123<H>  10-27          CAPILLARY BLOOD GLUCOSE      POCT Blood Glucose.: 208 mg/dL (27 Oct 2023 21:02)    PT/INR - ( 27 Oct 2023 12:33 )   PT: 14.6 sec;   INR: 1.33 ratio         PTT - ( 27 Oct 2023 12:33 )  PTT:76.1 sec  Urinalysis Basic - ( 27 Oct 2023 23:53 )    Color: x / Appearance: x / SG: x / pH: x  Gluc: 203 mg/dL / Ketone: x  / Bili: x / Urobili: x   Blood: x / Protein: x / Nitrite: x   Leuk Esterase: x / RBC: x / WBC x   Sq Epi: x / Non Sq Epi: x / Bacteria: x      CULTURES:  Culture Results:   No growth at 5 days (10-22-23 @ 18:50)  Rapid RVP Result: NotDetec (10-22-23 @ 16:18)  Culture Results:   No growth at 5 days (10-22-23 @ 16:00)        Physical Examination:    General: No acute distress.  Alert, oriented, interactive, nonfocal, on HFNC    HEENT: Pupils equal, reactive to light.  Symmetric.    PULM: diminished breath sounds at bases bilaterally, no significant sputum production    CVS: Regular rate and rhythm, no murmurs, rubs, or gallops    ABD: Soft, nondistended, nontender, normoactive bowel sounds, no masses    EXT: No edema, nontender    SKIN: Warm and well perfused, no rashes noted.    NEURO: A&Ox3, strength 5/5 all extremities, cranial nerves grossly intact, no focal deficits      RADIOLOGY:     < from: Xray Chest 1 View- PORTABLE-Urgent (Xray Chest 1 View- PORTABLE-Urgent .) (10.26.23 @ 17:33) >  Frontal expiratory view of the chest shows the heart to be similar in   size. Right jugular [place]-Renny catheter reaches the right pulmonary artery.    The lungs show mild to moderate pulmonary congestion and there is no   evidence of pneumothorax nor definite pleural effusion.    IMPRESSION:  Pulmonary congestion.          CRITICAL CARE TIME SPENT: 34 mins  Time spent evaluating/treating patient with medical issues that pose a high risk for life threatening deterioration and/or end-organ damage, reviewing data/labs/imaging, discussing case with multidisciplinary team, discussing plan/goals of care with patient/family. Non-inclusive of procedure time.   Patient is a 38y old  Female who presents with a chief complaint of Acute on Chronic Hypoxic Respiratory Failure 2/2 CHF Exacerbation (27 Oct 2023 15:22)        BRIEF HOSPITAL COURSE: 37 y/o F with a h/o b/l DVTs, recurrent PE on eliquis, HFpEF, morbid obesity, asthma, chronic hyperkalemia, admitted on 10/18 with complaints of weakness, worsening LE edema, cough, and weight gain. Of note, was being worked up as an outpatient for possible SLE given facial rash, photophobia, and joint pains. She was started on prednisone 20 mg by her PCP x ~1 month. Pt was admitted to medicine for treatment of PNA. Hospital course complicated by progressive SARAH, HAGMA, transaminitis and acute hypoxemic respiratory failure secondary to development of cardiogenic shock/severe RV failure/pHTN.    Events last 24 hours: Remains on inotrope support. Having difficulty weaning HFNC. Started on pulse dose steroids given strong concerns for rheumatologic involvement.        PAST MEDICAL & SURGICAL HISTORY:  Pulmonary embolism      DVT, lower extremity      CHF (congestive heart failure)      Asthma      Anxiety      Severe obesity (BMI >= 40)      Hypertension      No significant past surgical history          Review of Systems:  CONSTITUTIONAL: No fever, chills, or fatigue  EYES: No eye pain, visual disturbances, or discharge  ENMT:  No difficulty hearing, tinnitus, vertigo; No sinus or throat pain  NECK: No pain or stiffness  RESPIRATORY: No cough, wheezing, chills or hemoptysis; (+) shortness of breath  CARDIOVASCULAR: No chest pain, palpitations, dizziness, or leg swelling  GASTROINTESTINAL: No abdominal or epigastric pain. No nausea, vomiting, or hematemesis; No diarrhea or constipation. No melena or hematochezia.  GENITOURINARY: No dysuria, frequency, hematuria, or incontinence  NEUROLOGICAL: No headaches, memory loss, loss of strength, numbness, or tremors  SKIN: No itching, burning, rashes, or lesions   MUSCULOSKELETAL: No joint pain or swelling; No muscle, back, or extremity pain  PSYCHIATRIC: No depression, anxiety, mood swings, or difficulty sleeping      Medications:    aMIOdarone    Tablet 200 milliGRAM(s) Oral daily  milrinone Infusion 0.125 MICROgram(s)/kG/Min IV Continuous <Continuous>  sildenafil (REVATIO) 20 milliGRAM(s) Oral three times a day  albuterol/ipratropium for Nebulization 3 milliLiter(s) Nebulizer every 6 hours PRN  budesonide 160 MICROgram(s)/formoterol 4.5 MICROgram(s) Inhaler 2 Puff(s) Inhalation two times a day  montelukast 10 milliGRAM(s) Oral daily  melatonin 3 milliGRAM(s) Oral at bedtime PRN  ondansetron Injectable 4 milliGRAM(s) IV Push every 8 hours PRN  oxyCODONE    IR 5 milliGRAM(s) Oral every 6 hours PRN  oxyCODONE    IR 2.5 milliGRAM(s) Oral every 6 hours PRN  PARoxetine 20 milliGRAM(s) Oral daily  heparin  Infusin. 1200 Unit(s)/Hr IV Continuous <Continuous>  aluminum hydroxide/magnesium hydroxide/simethicone Suspension 30 milliLiter(s) Oral every 4 hours PRN  polyethylene glycol 3350 17 Gram(s) Oral daily PRN  insulin lispro (ADMELOG) corrective regimen sliding scale.   SubCutaneous four times a day with meals  influenza   Vaccine 0.5 milliLiter(s) IntraMuscular once  chlorhexidine 2% Cloths 1 Application(s) Topical <User Schedule>  nystatin Powder 1 Application(s) Topical two times a day  sodium chloride 0.65% Nasal 1 Spray(s) Both Nostrils three times a day PRN            ICU Vital Signs Last 24 Hrs  T(C): 35.8 (27 Oct 2023 23:00), Max: 35.8 (27 Oct 2023 11:00)  T(F): 96.4 (27 Oct 2023 23:00), Max: 96.5 (27 Oct 2023 11:00)  HR: 93 (28 Oct 2023 01:00) (87 - 98)  BP: 118/61 (28 Oct 2023 01:00) (108/56 - 134/74)  BP(mean): 75 (28 Oct 2023 01:00) (72 - 94)  ABP: --  ABP(mean): --  RR: 20 (28 Oct 2023 01:00) (15 - 23)  SpO2: 93% (28 Oct 2023 01:00) (92% - 97%)    O2 Parameters below as of 28 Oct 2023 00:00  Patient On (Oxygen Delivery Method): nasal cannula, high flow  O2 Flow (L/min): 40  O2 Concentration (%): 40        ABG - ( 27 Oct 2023 04:22 )  pH, Arterial: 7.450 pH, Blood: x     /  pCO2: 46    /  pO2: 131   / HCO3: 32    / Base Excess: 8.0   /  SaO2: 99.7                I&O's Detail    26 Oct 2023 07:01  -  27 Oct 2023 07:00  --------------------------------------------------------  IN:    Heparin Infusion: 287 mL    IV PiggyBack: 100 mL    Milrinone: 100.8 mL    Milrinone: 84.8 mL    Vasopressin: 3 mL  Total IN: 575.6 mL    OUT:    Indwelling Catheter - Urethral (mL): 2780 mL  Total OUT: 2780 mL    Total NET: -2204.4 mL      27 Oct 2023 07:01  -  28 Oct 2023 02:03  --------------------------------------------------------  IN:    Heparin Infusion: 272 mL    Milrinone: 95.2 mL    Oral Fluid: 190 mL  Total IN: 557.2 mL    OUT:    Indwelling Catheter - Urethral (mL): 1640 mL  Total OUT: 1640 mL    Total NET: -1082.8 mL            LABS:                        9.2    4.24  )-----------( 145      ( 27 Oct 2023 23:53 )             31.3     10-27    134<L>  |  92<L>  |  97.3<H>  ----------------------------<  203<H>  3.8   |  30.0<H>  |  2.77<H>    Ca    8.7      27 Oct 2023 23:53  Phos  5.1     10-27  Mg     2.3     10-27    TPro  7.6  /  Alb  3.0<L>  /  TBili  1.8  /  DBili  x   /  AST  91<H>  /  ALT  616<H>  /  AlkPhos  123<H>  10-27          CAPILLARY BLOOD GLUCOSE      POCT Blood Glucose.: 208 mg/dL (27 Oct 2023 21:02)    PT/INR - ( 27 Oct 2023 12:33 )   PT: 14.6 sec;   INR: 1.33 ratio         PTT - ( 27 Oct 2023 12:33 )  PTT:76.1 sec  Urinalysis Basic - ( 27 Oct 2023 23:53 )    Color: x / Appearance: x / SG: x / pH: x  Gluc: 203 mg/dL / Ketone: x  / Bili: x / Urobili: x   Blood: x / Protein: x / Nitrite: x   Leuk Esterase: x / RBC: x / WBC x   Sq Epi: x / Non Sq Epi: x / Bacteria: x      CULTURES:  Culture Results:   No growth at 5 days (10-22-23 @ 18:50)  Rapid RVP Result: NotDetec (10-22-23 @ 16:18)  Culture Results:   No growth at 5 days (10-22-23 @ 16:00)        Physical Examination:    General: No acute distress.  Alert, oriented, interactive, nonfocal, on HFNC    HEENT: Pupils equal, reactive to light.  Symmetric.    PULM: diminished breath sounds at bases bilaterally, no significant sputum production    CVS: Regular rate and rhythm, no murmurs, rubs, or gallops    ABD: Soft, nondistended, nontender, normoactive bowel sounds, no masses    EXT: No edema, nontender    SKIN: Warm and well perfused, no rashes noted.    NEURO: A&Ox3, strength 5/5 all extremities, cranial nerves grossly intact, no focal deficits      RADIOLOGY:     < from: Xray Chest 1 View- PORTABLE-Urgent (Xray Chest 1 View- PORTABLE-Urgent .) (10.26.23 @ 17:33) >  Frontal expiratory view of the chest shows the heart to be similar in   size. Right jugular [place]-Renny catheter reaches the right pulmonary artery.    The lungs show mild to moderate pulmonary congestion and there is no   evidence of pneumothorax nor definite pleural effusion.    IMPRESSION:  Pulmonary congestion.          CRITICAL CARE TIME SPENT: 34 mins  Time spent evaluating/treating patient with medical issues that pose a high risk for life threatening deterioration and/or end-organ damage, reviewing data/labs/imaging, discussing case with multidisciplinary team, discussing plan/goals of care with patient/family. Non-inclusive of procedure time.

## 2023-10-29 NOTE — PROGRESS NOTE ADULT - SUBJECTIVE AND OBJECTIVE BOX
[facility] - [facility]                                   [address] [place] [address]                                                      PHONE: [phone]                                                         FAX: [phone]  [email or web address]  ---------------------------------------------------------------------------------------------------------------------------------    Overnight events/patient complaints:  Has a headache. Breathing improving. No palps or chest discomfort with NSVT    iodine (Hives)  ceftriaxone (Hives)  shellfish (Hives)    MEDICATIONS  (STANDING):  aMIOdarone    Tablet 200 milliGRAM(s) Oral daily  budesonide 160 MICROgram(s)/formoterol 4.5 MICROgram(s) Inhaler 2 Puff(s) Inhalation two times a day  buMETAnide Injectable 2 milliGRAM(s) IV Push daily  chlorhexidine 2% Cloths 1 Application(s) Topical <User Schedule>  dextrose 5%. 1000 milliLiter(s) (100 mL/Hr) IV Continuous <Continuous>  dextrose 5%. 1000 milliLiter(s) (50 mL/Hr) IV Continuous <Continuous>  dextrose 50% Injectable 12.5 Gram(s) IV Push once  dextrose 50% Injectable 25 Gram(s) IV Push once  dextrose 50% Injectable 25 Gram(s) IV Push once  glucagon  Injectable 1 milliGRAM(s) IntraMuscular once  heparin  Infusion. 1600 Unit(s)/Hr (16 mL/Hr) IV Continuous <Continuous>  influenza   Vaccine 0.5 milliLiter(s) IntraMuscular once  insulin glargine Injectable (LANTUS) 15 Unit(s) SubCutaneous at bedtime  insulin lispro (ADMELOG) corrective regimen sliding scale   SubCutaneous at bedtime  insulin lispro (ADMELOG) corrective regimen sliding scale   SubCutaneous three times a day before meals  methylPREDNISolone sodium succinate IVPB 1000 milliGRAM(s) IV Intermittent once  milrinone Infusion 0.125 MICROgram(s)/kG/Min (5.58 mL/Hr) IV Continuous <Continuous>  montelukast 10 milliGRAM(s) Oral daily  nystatin Powder 1 Application(s) Topical two times a day  PARoxetine 20 milliGRAM(s) Oral daily  sildenafil (REVATIO) 20 milliGRAM(s) Oral three times a day    MEDICATIONS  (PRN):  albuterol/ipratropium for Nebulization 3 milliLiter(s) Nebulizer every 6 hours PRN Shortness of Breath and/or Wheezing  aluminum hydroxide/magnesium hydroxide/simethicone Suspension 30 milliLiter(s) Oral every 4 hours PRN Dyspepsia  dextrose Oral Gel 15 Gram(s) Oral once PRN Blood Glucose LESS THAN 70 milliGRAM(s)/deciliter  melatonin 3 milliGRAM(s) Oral at bedtime PRN Insomnia  ondansetron Injectable 4 milliGRAM(s) IV Push every 8 hours PRN Nausea and/or Vomiting  oxyCODONE    IR 2.5 milliGRAM(s) Oral every 6 hours PRN Moderate Pain (4 - 6)  oxyCODONE    IR 7.5 milliGRAM(s) Oral every 6 hours PRN Severe Pain (7 - 10)  polyethylene glycol 3350 17 Gram(s) Oral daily PRN Constipation  sodium chloride 0.65% Nasal 1 Spray(s) Both Nostrils three times a day PRN Nasal Congestion      Vital Signs Last 24 Hrs  T(C): 36.7 (29 Oct 2023 07:42), Max: 36.7 (28 Oct 2023 23:35)  T(F): 98 (29 Oct 2023 07:42), Max: 98.1 (28 Oct 2023 23:35)  HR: 101 (29 Oct 2023 10:00) (91 - 102)  BP: 117/62 (29 Oct 2023 10:00) (106/52 - 130/76)  BP(mean): 78 (29 Oct 2023 10:00) (67 - 91)  RR: 16 (29 Oct 2023 10:00) (13 - 17)  SpO2: 93% (29 Oct 2023 10:00) (93% - 97%)    Parameters below as of 29 Oct 2023 10:00  Patient On (Oxygen Delivery Method): nasal cannula, high flow      ICU Vital Signs Last 24 Hrs  MITCHEL GUNN  I&O's Detail    28 Oct 2023 07:01  -  29 Oct 2023 07:00  --------------------------------------------------------  IN:    Heparin Infusion: 208 mL    Heparin Infusion: 176 mL    IV PiggyBack: 50 mL    Milrinone: 144 mL    Oral Fluid: 350 mL  Total IN: 928 mL    OUT:    Indwelling Catheter - Urethral (mL): 2670 mL  Total OUT: 2670 mL    Total NET: -1742 mL      29 Oct 2023 07:01  -  29 Oct 2023 10:22  --------------------------------------------------------  IN:    Heparin Infusion: 64 mL    Milrinone: 24 mL    Oral Fluid: 240 mL  Total IN: 328 mL    OUT:    Indwelling Catheter - Urethral (mL): 350 mL  Total OUT: 350 mL    Total NET: -22 mL        I&O's Summary    28 Oct 2023 07:01  -  29 Oct 2023 07:00  --------------------------------------------------------  IN: 928 mL / OUT: 2670 mL / NET: -1742 mL    29 Oct 2023 07:01  -  29 Oct 2023 10:22  --------------------------------------------------------  IN: 328 mL / OUT: 350 mL / NET: -22 mL      Drug Dosing Stephenie GUNN      PHYSICAL EXAM:  General: Appears comfortable, alert and cooperative.  HEENT: Normocephalic, atraumatic.  Eyes: Pupils reactive, cornea wnl.  Neck: Supple, no nodes adenopathy; no JVD, carotid bruit or thyromegaly.  CARDIOVASCULAR: Regular S1 S2  LUNGS: No rales, rhonchi or wheeze. Normal breath sounds bilaterally.  ABDOMEN: Soft, nontender without mass or organomegaly. bowel sounds normoactive.  EXTREMITIES: No clubbing, cyanosis or edema. Distal pulses wnl.   SKIN: Warm and dry with normal turgor.  NEURO: Alert/oriented x 3/normal motor exam  PSYCH: Appropriate affect        LABS:                        9.4    4.87  )-----------( 159      ( 29 Oct 2023 03:45 )             32.0     10-29    135  |  92<L>  |  98.5<H>  ----------------------------<  204<H>  3.8   |  31.0<H>  |  2.58<H>    Ca    8.9      29 Oct 2023 03:45  Phos  4.8     10-29  Mg     2.4     10-29    TPro  7.6  /  Alb  3.2<L>  /  TBili  1.6  /  DBili  x   /  AST  53<H>  /  ALT  445<H>  /  AlkPhos  115  10-29    MITCHEL GUNN      PT/INR - ( 28 Oct 2023 05:48 )   PT: 14.4 sec;   INR: 1.31 ratio         PTT - ( 29 Oct 2023 03:45 )  PTT:57.4 sec  Urinalysis Basic - ( 29 Oct 2023 03:45 )    Color: x / Appearance: x / SG: x / pH: x  Gluc: 204 mg/dL / Ketone: x  / Bili: x / Urobili: x   Blood: x / Protein: x / Nitrite: x   Leuk Esterase: x / RBC: x / WBC x   Sq Epi: x / Non Sq Epi: x / Bacteria: x        RADIOLOGY & ADDITIONAL STUDIES:    INTERPRETATION OF TELEMETRY (personally reviewed):  SR, ST, NSVT    ASSESSMENT AND PLAN:  In summary, MITCHEL GUNN is an 38y Female with morbid obesity, HFpEF, severe RV dysfunction, PE/DVT, SLE who p/w heart failure/cardiogenic shock and atrial flutter now converted to SR on amiodarone    - Replete K > 4.5  - Continue bumex 2 mg IV BID, aim to continue net negative > 1 L daily  - Continue milrinone at 0.125  - Continue amiodarone 200 mg daily

## 2023-10-29 NOTE — PROGRESS NOTE ADULT - NS PANP COMMENT GEN_ALL_CORE FT
35 yo female, PMHx HFpEF, B/L LE DVT + recurrent PE on Eliquis, HTN, asthma, chronic hyperkalemia on daily Lokelma, anxiety, morbid obesity, who presented for evaluation of generalized weakness, shortness of breath, dyspnea, and weight gain. Admitted to medicine service for treatment of pneumonia. Hospital course c/b worsening SARAH, metabolic acidosis, transaminitis. Now admitted to MICU for cardiogenic shock , RV failure,  s/p swan with low cardiac index and high SVR.   Patient started milrinone, levophed, nitric oxide, and placed on high flow.    On heparin drip for AC,  patient refused CTA chest.  Also started on bumex drip overnight for aggressive diuresis.   Throughout the night, lactate improves to 1.9, overall 2L output.  Will hold off bumex drip for now.  Plan to come down on nitric oxide, and hopefully can place on bipap to help with respiratory status given patient has slight worsening of respiratory acidosis from hypercapnea.    Patient also has outpatient lupus workup which was pending, will send some autoimmune workup   heart failure consult in AM.   prognosis is guarded  rest of plan per LJ Childs.
Lantus dosing increased overnight due to hyperglycemia secondary to pulse dose steroids   to complete pulse dose today   Remains on milrinone 0.125   diuresing well with Bumex  endorses headache which may be related to sildenafil but should hopefully improve over time   remains in NSR   PT consulted but pt refused to get out of bed despite encouragement   will contact [facility] in AM for planned transfer tomorrow
pt seen by rheumatology this AM with plan for pulse dose steroids; pt was advised that there is risk of scleroderma renal crisis with steroid use should she have this condition; pt understands risk and wishes to proceed   pt tolerating sildenafil 10mg; will increase to 20mg TID per discussion with heart failure   MVO2 remains normal but will maintain on milrinone for now per heart failure recommendations    PCWP increased to 13; responding to diuretics   Heart failure team contacted [facility] with plan for possible transfer on Monday

## 2023-10-29 NOTE — PROGRESS NOTE ADULT - SUBJECTIVE AND OBJECTIVE BOX
Patient is a 38y old  Female who presents with a chief complaint of Acute on Chronic Hypoxic Respiratory Failure 2/2 CHF Exacerbation (28 Oct 2023 11:27)      BRIEF HOSPITAL COURSE: 39 y/o F with a h/o b/l DVTs, recurrent PE on eliquis, HFpEF, morbid obesity, asthma, chronic hyperkalemia, admitted on 10/18 with complaints of weakness, worsening LE edema, cough, and rapid weight gain. Of note, was being worked up as an outpatient for possible SLE given facial rash, photophobia, and joint pains and had been started on prednisone 20 mg by her PCP x ~1 month prior to arrival. Pt was admitted to medicine for treatment of PNA. Hospital course complicated by progressive SARAH, HAGMA, transaminitis and acute hypoxemic respiratory failure secondary to cardiogenic shock in the face of severe pHTN with acute on chronic cor pulmonale.      Events last 24 hours: Continues to be dependent on inotropic support and HFNC.           PAST MEDICAL & SURGICAL HISTORY:  Pulmonary embolism      DVT, lower extremity      CHF (congestive heart failure)      Asthma      Anxiety      Severe obesity (BMI >= 40)      Hypertension      No significant past surgical history          Review of Systems: offers no complaints          Medications:    aMIOdarone    Tablet 200 milliGRAM(s) Oral daily  buMETAnide Injectable 2 milliGRAM(s) IV Push daily  milrinone Infusion 0.125 MICROgram(s)/kG/Min IV Continuous <Continuous>  sildenafil (REVATIO) 20 milliGRAM(s) Oral three times a day    albuterol/ipratropium for Nebulization 3 milliLiter(s) Nebulizer every 6 hours PRN  budesonide 160 MICROgram(s)/formoterol 4.5 MICROgram(s) Inhaler 2 Puff(s) Inhalation two times a day  montelukast 10 milliGRAM(s) Oral daily    melatonin 3 milliGRAM(s) Oral at bedtime PRN  ondansetron Injectable 4 milliGRAM(s) IV Push every 8 hours PRN  oxyCODONE    IR 5 milliGRAM(s) Oral every 6 hours PRN  oxyCODONE    IR 2.5 milliGRAM(s) Oral every 6 hours PRN  PARoxetine 20 milliGRAM(s) Oral daily      heparin  Infusion. 1600 Unit(s)/Hr IV Continuous <Continuous>    aluminum hydroxide/magnesium hydroxide/simethicone Suspension 30 milliLiter(s) Oral every 4 hours PRN  polyethylene glycol 3350 17 Gram(s) Oral daily PRN      dextrose 50% Injectable 25 Gram(s) IV Push once  dextrose 50% Injectable 25 Gram(s) IV Push once  dextrose 50% Injectable 12.5 Gram(s) IV Push once  dextrose Oral Gel 15 Gram(s) Oral once PRN  glucagon  Injectable 1 milliGRAM(s) IntraMuscular once  insulin glargine Injectable (LANTUS) 15 Unit(s) SubCutaneous at bedtime  insulin lispro (ADMELOG) corrective regimen sliding scale   SubCutaneous at bedtime  insulin lispro (ADMELOG) corrective regimen sliding scale   SubCutaneous three times a day before meals    dextrose 5%. 1000 milliLiter(s) IV Continuous <Continuous>  dextrose 5%. 1000 milliLiter(s) IV Continuous <Continuous>    influenza   Vaccine 0.5 milliLiter(s) IntraMuscular once    chlorhexidine 2% Cloths 1 Application(s) Topical <User Schedule>  nystatin Powder 1 Application(s) Topical two times a day  sodium chloride 0.65% Nasal 1 Spray(s) Both Nostrils three times a day PRN            ICU Vital Signs Last 24 Hrs  T(C): 36.7 (28 Oct 2023 23:35), Max: 36.7 (28 Oct 2023 23:35)  T(F): 98.1 (28 Oct 2023 23:35), Max: 98.1 (28 Oct 2023 23:35)  HR: 100 (29 Oct 2023 01:00) (88 - 103)  BP: 113/58 (29 Oct 2023 01:00) (102/54 - 130/76)  BP(mean): 72 (29 Oct 2023 01:00) (67 - 101)  ABP: --  ABP(mean): --  RR: 17 (29 Oct 2023 01:00) (12 - 19)  SpO2: 94% (29 Oct 2023 01:00) (93% - 97%)    O2 Parameters below as of 28 Oct 2023 20:14  Patient On (Oxygen Delivery Method): nasal cannula, high flow, 40/40%            ABG - ( 27 Oct 2023 04:22 )  pH, Arterial: 7.450 pH, Blood: x     /  pCO2: 46    /  pO2: 131   / HCO3: 32    / Base Excess: 8.0   /  SaO2: 99.7                I&O's Detail    27 Oct 2023 07:01  -  28 Oct 2023 07:00  --------------------------------------------------------  IN:    Heparin Infusion: 384 mL    Milrinone: 134.4 mL    Oral Fluid: 490 mL  Total IN: 1008.4 mL    OUT:    Indwelling Catheter - Urethral (mL): 2050 mL  Total OUT: 2050 mL    Total NET: -1041.6 mL      28 Oct 2023 07:01  -  29 Oct 2023 01:53  --------------------------------------------------------  IN:    Heparin Infusion: 208 mL    Heparin Infusion: 80 mL    IV PiggyBack: 50 mL    Milrinone: 108 mL    Oral Fluid: 150 mL  Total IN: 596 mL    OUT:    Indwelling Catheter - Urethral (mL): 2130 mL  Total OUT: 2130 mL    Total NET: -1534 mL            LABS:                        9.5    3.68  )-----------( 141      ( 28 Oct 2023 05:48 )             31.8     10-28    135  |  92<L>  |  93.5<H>  ----------------------------<  185<H>  3.8   |  30.0<H>  |  2.61<H>    Ca    8.9      28 Oct 2023 05:48  Phos  5.1     10-28  Mg     2.3     10-28    TPro  7.7  /  Alb  3.1<L>  /  TBili  1.8  /  DBili  x   /  AST  79<H>  /  ALT  584<H>  /  AlkPhos  128<H>  10-28          CAPILLARY BLOOD GLUCOSE      POCT Blood Glucose.: 250 mg/dL (28 Oct 2023 21:39)    PT/INR - ( 28 Oct 2023 05:48 )   PT: 14.4 sec;   INR: 1.31 ratio         PTT - ( 28 Oct 2023 05:48 )  PTT:76.4 sec  Urinalysis Basic - ( 28 Oct 2023 05:48 )    Color: x / Appearance: x / SG: x / pH: x  Gluc: 185 mg/dL / Ketone: x  / Bili: x / Urobili: x   Blood: x / Protein: x / Nitrite: x   Leuk Esterase: x / RBC: x / WBC x   Sq Epi: x / Non Sq Epi: x / Bacteria: x      CULTURES:  Culture Results:   No growth at 5 days (10-22 @ 18:50)  Rapid RVP Result: NotDetec (10-22 @ 16:18)  Culture Results:   No growth at 5 days (10-22 @ 16:00)            Physical Examination:    General: No acute distress.      HEENT: Pupils equal, reactive to light.  Symmetric.    PULM: Diminished, poor inspiratory effort    CVS: Regular rate and rhythm    ABD: Soft, nondistended, nontender    EXT: No edema, nontender    SKIN: Warm and well perfused distally    NEURO: Alert, oriented, interactive, nonfocal        INVASIVE LINES: X   INDWELLING BLACK: Y   VTE PROPHYLAXIS: Heparin gtt  CAM ICU: -  CODE STATUS: FULL        CRITICAL CARE TIME SPENT: 35 minutes spent performing frequent bedside reassessments and augmenting plan of care to address problems of acute critical illness that pose high probability of life threatening deterioration and/or end organ damage/dysfunction and discussing goals of care, non-inclusive of time spent on procedures performed.

## 2023-10-29 NOTE — PROGRESS NOTE ADULT - NUTRITIONAL ASSESSMENT
This patient has been assessed with a concern for Malnutrition and has been determined to have a diagnosis/diagnoses of Morbid obesity (BMI > 40).    This patient is being managed with:   Diet DASH/TLC-  Sodium & Cholesterol Restricted  Entered: Oct 18 2023  3:55PM  
This patient has been assessed with a concern for Malnutrition and has been determined to have a diagnosis/diagnoses of Morbid obesity (BMI > 40).    This patient is being managed with:   Diet DASH/TLC-  Sodium & Cholesterol Restricted  Consistent Carbohydrate {Evening Snack} (CSTCHOSN)  Entered: Oct 28 2023 11:49PM  
This patient has been assessed with a concern for Malnutrition and has been determined to have a diagnosis/diagnoses of Morbid obesity (BMI > 40).    This patient is being managed with:   Diet DASH/TLC-  Sodium & Cholesterol Restricted  Entered: Oct 18 2023  3:55PM  

## 2023-10-29 NOTE — PROCEDURE NOTE - NSPROCDETAILS_GEN_ALL_CORE
guidewire recovered/lumen(s) aspirated and flushed/sterile dressing applied/sterile technique, catheter placed/ultrasound guidance with use of sterile gel and probe cove
location identified, draped/prepped, sterile technique used

## 2023-10-29 NOTE — PROGRESS NOTE ADULT - ASSESSMENT
39 yo female, PMHx HFpEF, B/L LE DVT + recurrent PE on Eliquis, HTN, asthma, chronic hyperkalemia on daily Lokelma, anxiety, morbid obesity, recent outpatient workup and initiation of steroid therapy for suspected autoimmune disease suspicious for SLE, who presented for evaluation of generalized weakness, shortness of breath, dyspnea, and weight gain. Admitted to medicine service for treatment of pneumonia. Hospital course c/b worsening SARAH, metabolic acidosis, transaminitis, and acute hypoxemic respiratory failure secondary to cardiogenic shock in the face of severe pHTN with acute on chronic cor pulmonale.    Overall etiology of pHTN felt to be multifactorial due to chronic PE, OHS/MICHAEL, with a possible component of autoimmune process.  We have been treating cardiogenic shock and exacerbation of pHTN with multiple modalities including inotropes, vasopressors, pulmonary vasodilators, and diuretics  Her shock state is improving, and Norepinephrine has been weaned off  She continues to require Milrinone gtt for RV failure, renally adjusted  Now that hemodynamics have stabilized, Teagan was weaned off and Sildenafil has been started, with plan for transition to IV epoprostenol vs SC remodulin per advanced heart failure recommendations  Rheumatology was consulted, confirmed suspected SLE with high KATHIE and dsDNA and given low complement levels, decision was made to initiate treatment with pulse steroids  She continues on diuretic therapy with overall net negative fluid balance ~6L, and at goal -1.5L past 24 hrs   Monitoring dynamic end-points of perfusion. Her end organ function continues to improve in response to therapies, with good UOP and warm distal extremities indicative of improved perfusion.  Continue HFNC for added PEEP due to suspected underlying MICHAEL/OHS, titrating to keep SpO2 >92%  Completed empiric treatment for pneumonia with Zosyn.  Heparin gtt for chronic VTE in the setting of newly diagnosed antiphospholipid syndrome.  Hyperglycemic iatrogenic due to steroids, add Lantus 15u and increase ISS. May require insulin gtt.    PT consult placed  ALIA/C moriah in AM

## 2023-10-29 NOTE — PROGRESS NOTE ADULT - ASSESSMENT
A/P 37 yo womna with severe obesity, HTN, recurrent DVT/PEs on chronic AC, CHF with severe RHF and pulmonary HTN presents for worsening dyspnea found to have worsening CHF and new A flutter with RVR.  Patient has become hypotensive and developed cardiogenic shock requiring pressors.      she  has a strong FH of autoimmune disorders -GM with scleroderma and sister with RA.  she is found to have a very high titer KATHIE 1:2560 and high dsDNA.  patient with most likley SLE with history of aloepcia, photosensitivity, arthritis/arthralgias, dry eye/mouth, raynauds.  pulmonary HTN is rare in SLE.  She may have have an overlap with another autoimmune disorder or the pulmonary HTN can be multifactorial given her chronic thromboembolic issues.    She does not have clear features of scleroderma other than raynauds.    Has severe PAH systolic 80s despite Revatio, now [place] out    SARAH: ATN, cardiogenic shock, unclear baseline creatinine and how much Ac LN cotributing -   Ischemic hepatitis with coagulopathy   + SLE serology- C3C4 extremely low, ds DNA v high - plans per Rheum- on Solumedrol 1g dose  Also has APL - fiest time DVT/PE 10 yrs ago  Rheum following    Hypervolemic hyponatremia  - cont supportive care  - off pressor,  Remains on milrinone and diuretics  - may need to consider RRT depending upon clinical course  - check f/u serologies    Recurrent PE/PAH - on Heparin   Creat sl better - watch 2.94.2.77>2.61>2.58

## 2023-10-29 NOTE — PROGRESS NOTE ADULT - SUBJECTIVE AND OBJECTIVE BOX
Patient seen and examined    I&O's Summary    28 Oct 2023 07:01  -  29 Oct 2023 07:00  --------------------------------------------------------  IN: 928 mL / OUT: 2670 mL / NET: -1742 mL    29 Oct 2023 07:01  -  29 Oct 2023 11:11  --------------------------------------------------------  IN: 328 mL / OUT: 635 mL / NET: -307 mL    Feels much better except HA    REVIEW OF SYSTEMS:    CONSTITUTIONAL: No F/C  RESPIRATORY: No cough,  less SOB - on NC  CARDIOVASCULAR: No CP/palpitations,    GASTROINTESTINAL: No abdominal pain  or NVD   GENITOURINARY: No UTI sx  NEUROLOGICAL: No headaches, NO wk/numbness  MUSCULOSKELETAL:   EXTREMITIES : mild swelling,    Vital Signs Last 24 Hrs  T(C): 36.7 (29 Oct 2023 07:42), Max: 36.7 (28 Oct 2023 23:35)  T(F): 98 (29 Oct 2023 07:42), Max: 98.1 (28 Oct 2023 23:35)  HR: 106 (29 Oct 2023 11:00) (91 - 106)  BP: 124/65 (29 Oct 2023 11:00) (106/52 - 130/76)  BP(mean): 81 (29 Oct 2023 11:00) (67 - 91)  RR: 15 (29 Oct 2023 11:00) (13 - 17)  SpO2: 94% (29 Oct 2023 11:00) (93% - 97%)    Parameters below as of 29 Oct 2023 10:00  Patient On (Oxygen Delivery Method): nasal cannula, high flow        PHYSICAL EXAM:    GENERAL: NAD, Obese  EYES:  conjunctiva and sclera clear  NECK: Supple, No JVD/Bruit  NERVOUS SYSTEM:  A/O x3,   CHEST:  No rales or rhonchi  HEART:  RRR, No murmur  ABDOMEN: Soft, NT/ND BS+  EXTREMITIES:  + Edema;  SKIN: No rashes    LABS:                          9.4    4.87  )-----------( 159      ( 29 Oct 2023 03:45 )             32.0     10-29    135  |  92<L>  |  98.5<H>  ----------------------------<  204<H>  3.8   |  31.0<H>  |  2.58<H>    Ca    8.9      29 Oct 2023 03:45  Phos  4.8     10-29  Mg     2.4     10-29    TPro  7.6  /  Alb  3.2<L>  /  TBili  1.6  /  DBili  x   /  AST  53<H>  /  ALT  445<H>  /  AlkPhos  115  10-29      MEDICATIONS  (STANDING):  albuterol/ipratropium for Nebulization PRN  aluminum hydroxide/magnesium hydroxide/simethicone Suspension PRN  aMIOdarone    Tablet  budesonide 160 MICROgram(s)/formoterol 4.5 MICROgram(s) Inhaler  buMETAnide Injectable  chlorhexidine 2% Cloths  dextrose 5%.  dextrose 5%.  dextrose 50% Injectable  dextrose 50% Injectable  dextrose 50% Injectable  dextrose Oral Gel PRN  glucagon  Injectable  heparin  Infusion.  influenza   Vaccine  insulin glargine Injectable (LANTUS)  insulin lispro (ADMELOG) corrective regimen sliding scale  insulin lispro (ADMELOG) corrective regimen sliding scale  melatonin PRN  milrinone Infusion  montelukast  nystatin Powder  ondansetron Injectable PRN  oxyCODONE    IR PRN  oxyCODONE    IR PRN  PARoxetine  polyethylene glycol 3350 PRN  sildenafil (REVATIO)  sodium chloride 0.65% Nasal PRN

## 2023-10-30 NOTE — PROGRESS NOTE ADULT - SUBJECTIVE AND OBJECTIVE BOX
Patient seen and examined    I&O's Summary    29 Oct 2023 07:01  -  30 Oct 2023 07:00  --------------------------------------------------------  IN: 1518 mL / OUT: 4405 mL / NET: -2887 mL    30 Oct 2023 07:01  -  30 Oct 2023 12:53  --------------------------------------------------------  IN: 86.4 mL / OUT: 425 mL / NET: -338.6 mL        REVIEW OF SYSTEMS:    CONSTITUTIONAL: No F/C  RESPIRATORY: No cough,  No SOB  CARDIOVASCULAR: No CP/palpitations,    GASTROINTESTINAL: No abdominal pain  or NVD   NEUROLOGICAL: Less headaches, NO wk/numbness  EXTREMITIES : + swelling,    Vital Signs Last 24 Hrs  T(C): 36.4 (30 Oct 2023 11:42), Max: 36.7 (30 Oct 2023 00:00)  T(F): 97.6 (30 Oct 2023 11:42), Max: 98 (30 Oct 2023 00:00)  HR: 86 (30 Oct 2023 10:00) (82 - 116)  BP: 87/41 (30 Oct 2023 10:00) (87/41 - 133/58)  BP(mean): 56 (30 Oct 2023 10:00) (56 - 90)  RR: 14 (30 Oct 2023 10:00) (12 - 25)  SpO2: 93% (30 Oct 2023 10:00) (90% - 96%)    Parameters below as of 30 Oct 2023 08:00  Patient On (Oxygen Delivery Method): nasal cannula, high flow        PHYSICAL EXAM:    GENERAL: NAD, Morbidly obese  EYES:  conjunctiva and sclera clear  NECK: Supple, No JVD/Bruit  NERVOUS SYSTEM:  A/O x3,   CHEST:  No rales or rhonchi  HEART:  RRR, No murmur  ABDOMEN: Soft, NT/ND BS+  EXTREMITIES:  No Edema;  SKIN: No rashes    LABS:                          9.2    4.12  )-----------( 158      ( 30 Oct 2023 09:30 )             32.0     10-30    141  |  94<L>  |  102.1<H>  ----------------------------<  203<H>  3.7   |  34.0<H>  |  1.91<H>    Ca    8.9      30 Oct 2023 09:30  Phos  4.7     10-30  Mg     2.3     10-30    TPro  7.3  /  Alb  3.1<L>  /  TBili  1.4  /  DBili  x   /  AST  33<H>  /  ALT  305<H>  /  AlkPhos  103  10-30      MEDICATIONS  (STANDING):  albuterol/ipratropium for Nebulization PRN  aluminum hydroxide/magnesium hydroxide/simethicone Suspension PRN  aMIOdarone    Tablet  budesonide 160 MICROgram(s)/formoterol 4.5 MICROgram(s) Inhaler  chlorhexidine 2% Cloths  dextrose 5%.  dextrose 5%.  dextrose 50% Injectable  dextrose 50% Injectable  dextrose 50% Injectable  dextrose Oral Gel PRN  glucagon  Injectable  heparin  Infusion.  influenza   Vaccine  insulin glargine Injectable (LANTUS)  insulin lispro (ADMELOG) corrective regimen sliding scale  insulin lispro (ADMELOG) corrective regimen sliding scale  melatonin PRN  methylPREDNISolone sodium succinate Injectable  milrinone Infusion  montelukast  nystatin Powder  ondansetron Injectable PRN  oxyCODONE    IR PRN  oxyCODONE    IR PRN  PARoxetine  polyethylene glycol 3350 PRN  sildenafil (REVATIO)  sodium chloride 0.65% Nasal PRN

## 2023-10-30 NOTE — H&P ADULT - NSHPLABSRESULTS_GEN_ALL_CORE
LABS:                        9.2    4.12  )-----------( 158      ( 30 Oct 2023 09:30 )             32.0     10-30    141  |  94<L>  |  102.1<H>  ----------------------------<  203<H>  3.7   |  34.0<H>  |  1.91<H>    Ca    8.9      30 Oct 2023 09:30  Phos  4.7     10-30  Mg     2.3     10-30    TPro  7.3  /  Alb  3.1<L>  /  TBili  1.4  /  DBili  x   /  AST  33<H>  /  ALT  305<H>  /  AlkPhos  103  10-30    PTT - ( 30 Oct 2023 09:30 )  PTT:81.0 sec      Urinalysis Basic - ( 30 Oct 2023 09:30 )    Color: x / Appearance: x / SG: x / pH: x  Gluc: 203 mg/dL / Ketone: x  / Bili: x / Urobili: x   Blood: x / Protein: x / Nitrite: x   Leuk Esterase: x / RBC: x / WBC x   Sq Epi: x / Non Sq Epi: x / Bacteria: x LABS:                        9.2    4.12  )-----------( 158      ( 30 Oct 2023 09:30 )             32.0     10-30    141  |  94<L>  |  102.1<H>  ----------------------------<  203<H>  3.7   |  34.0<H>  |  1.91<H>    Ca    8.9      30 Oct 2023 09:30  Phos  4.7     10-30  Mg     2.3     10-30    TPro  7.3  /  Alb  3.1<L>  /  TBili  1.4  /  DBili  x   /  AST  33<H>  /  ALT  305<H>  /  AlkPhos  103  10-30    PTT - ( 30 Oct 2023 09:30 )  PTT:81.0 sec    Urinalysis Basic - ( 30 Oct 2023 09:30 )    Color: x / Appearance: x / SG: x / pH: x  Gluc: 203 mg/dL / Ketone: x  / Bili: x / Urobili: x   Blood: x / Protein: x / Nitrite: x   Leuk Esterase: x / RBC: x / WBC x   Sq Epi: x / Non Sq Epi: x / Bacteria: x

## 2023-10-30 NOTE — DISCHARGE NOTE PROVIDER - NSFOLLOWUPCLINICS_GEN_ALL_ED_FT
Cardiology at [place] ([facility])  Cardiology  [address], [address]  [place] [address]  Phone: [phone]  Fax:   Follow Up Time: 1 week    Cardiology at [facility]  Cardiology  [address] [place] [id number]  Phone: [phone]  Fax:   Follow Up Time: 1 week

## 2023-10-30 NOTE — H&P ADULT - ATTENDING COMMENTS
1. Acute hypoxemic respiratory failure due to acute on chronic R heart failure. Pt transferred drom [facility] on Hi  Flow 02. Taper to nasal canula as tolerated.    2. Pulmonary HTN with mean PAP ~50. PCWP reported 7. NO given without vasodilator  response.      Pt aggressively Diuresed with Bumex and Milrinone. Pt says breathing has improved and edematous legs getting smaller.      TTE at [facility] reveals enlarged RV with decrease systolic function. LVF normal.       Etiology of Pul HTN:   1 Pt with SLE and APLS. Pt was started on  20mg prednisone as outpatient. Pt started on stress dose steroids at [facility] for cardiogenic shock. The pulsed with 1 gram solumedrol x 5 days. Unclear of reason for pulse therapy at this point.  Repeat collagen vascular screen    2. . Pt with h/o PE and DVT x 2 on home Eliquis . No won IV heparin.  3. Possible MICHAEL, OSH with morbid obesity    Plan: 1. Repeat collagen vascular labs.              2. Stop milrinone              3. Continue with Bumex diuresis              4. Continue with sildenafil  20 mg tid started at [facility]              5.  Pulmonary CTA if pt can tolerate contrast with high dose steroids before. Gets hives with contrast. (Also allergic to ceftriaxone. Hives)                   VQ scan if feasible             6. Will need repeat R heart cath when adequately diuresed.              7. After cath will evaluate for further treatment including Flolan.     4.Renal. Creatinine rising from baseline. Follow creatine and UO    5. Asthma. Pt already on high dose steroids.  Will taper steroids.                        Continue Symbicort                       Change duo nebs to prn albuterol                       Stop montelukast  6. DVT prophylaxis: IV heparin  7. GOC: Full code

## 2023-10-30 NOTE — DISCHARGE NOTE PROVIDER - NSDCMRMEDTOKEN_GEN_ALL_CORE_FT
aluminum hydroxide-magnesium hydroxide 200 mg-200 mg/5 mL oral suspension: 30 milliliter(s) orally every 4 hours As needed Dyspepsia  Eliquis 2.5 mg oral tablet: 1 tab(s) orally 2 times a day  furosemide 40 mg oral tablet: 1 tab(s) orally once a day  insulin glargine 100 units/mL subcutaneous solution: 15 unit(s) subcutaneous once a day (at bedtime)  ipratropium-albuterol 0.5 mg-2.5 mg/3 mL inhalation solution: 3 milliliter(s) inhaled every 6 hours As needed Shortness of Breath and/or Wheezing  melatonin 3 mg oral tablet: 1 tab(s) orally once a day (at bedtime) As needed Insomnia  nystatin 100,000 units/g topical powder: 1 Apply topically to affected area 2 times a day  polyethylene glycol 3350 oral powder for reconstitution: 17 gram(s) orally once a day As needed Constipation  potassium chloride 20 mEq oral tablet, extended release: 2 tab(s) orally once a day  sildenafil 20 mg oral tablet: 1 tab(s) orally 3 times a day  spironolactone 50 mg oral tablet: 1 tab(s) orally once a day  Toprol- mg oral tablet, extended release: 1 tab(s) orally once a day

## 2023-10-30 NOTE — DISCHARGE NOTE PROVIDER - DETAILS OF MALNUTRITION DIAGNOSIS/DIAGNOSES
This patient has been assessed with a concern for Malnutrition and was treated during this hospitalization for the following Nutrition diagnosis/diagnoses:     -  10/20/2023: Morbid obesity (BMI > 40)

## 2023-10-30 NOTE — DISCHARGE NOTE PROVIDER - HOSPITAL COURSE
37 y/o F with a h/o b/l DVTs, recurrent PE on eliquis, HFpEF, morbid obesity, asthma, chronic hyperkalemia, admitted on 10/18 with complaints of weakness, worsening LE edema, cough, and rapid weight gain. Of note, was being worked up as an outpatient for possible SLE given facial rash, photophobia, and joint pains and had been started on prednisone 20 mg by her PCP x ~1 month prior to arrival. Pt was admitted to medicine for treatment of PNA. Hospital course complicated by progressive SARAH, HAGMA, transaminitis and acute hypoxemic respiratory failure secondary to cardiogenic shock in the face of severe pHTN with acute on chronic cor pulmonale.        Patient pHTN likely multifactorial due to PE/OHS/MICHAEL/autoimmune process. Patient cardiogenic shock tx with intotropes, vasopressors, pulmonary vasodilattors, diuretics. Patient shock state has improved, no off vasopressors. Patient is now s/p inhaled nitric oxide. Currently on .125 of milrinone. Bumex DC'd with increasing cr. Patient persistently hypoxic, requiring HFNC. Patient has been transitioned to sildenafil. Patient end organ function has continue to improve. On zosyn for empiric tx of PNA. On heparin gtt for newly diagnosed antiphospholipid syndrome.     Patient accepted for transfer to [facility] for HF team evaluation, further management of pHTN.

## 2023-10-30 NOTE — H&P ADULT - NSHPPHYSICALEXAM_GEN_ALL_CORE
PHYSICAL EXAM:  GENERAL: NAD  HEENT:  Atraumatic  CHEST/LUNG: Chest rise equal bilaterally  HEART: Regular rate and rhythm, S1 S2   ABDOMEN: Soft, Nontender, Nondistended  EXTREMITIES:  Extremities warm  PSYCH: A&Ox3  SKIN: No obvious rashes or lesions  MSK: Moving all extremities   NEUROLOGY: A&Ox3, non-focal 18-Sep-2021 09:59

## 2023-10-30 NOTE — DISCHARGE NOTE PROVIDER - CARE PROVIDER_API CALL
Kevin Samuel  Interventional Cardiology  [address] [place] [address]  Phone: [phone]  Fax: [phone]  Follow Up Time: 1-3 days

## 2023-10-30 NOTE — PHYSICAL THERAPY INITIAL EVALUATION ADULT - ADDITIONAL COMMENTS
Pt reports living in private home alone with 6 WHITNEY with handrail and no stairs inside. Pt reports she may have assist available but did not disclose who. Pt independent with use of RW prior to admission

## 2023-10-30 NOTE — PHYSICAL THERAPY INITIAL EVALUATION ADULT - PERTINENT HX OF CURRENT PROBLEM, REHAB EVAL
37 y/o F with a h/o b/l DVTs, recurrent PE on eliquis, HFpEF, morbid obesity, asthma, chronic hyperkalemia, admitted on 10/18 with complaints of weakness, worsening LE edema, cough, and rapid weight gain. Of note, was being worked up as an outpatient for possible SLE given facial rash, photophobia, and joint pains and had been started on prednisone 20 mg by her PCP x ~1 month prior to arrival. Pt was admitted to medicine for treatment of PNA. Hospital course complicated by progressive SARAH, HAGMA, transaminitis and acute hypoxemic respiratory failure secondary to cardiogenic shock in the face of severe pHTN with acute on chronic cor pulmonale.

## 2023-10-30 NOTE — PROGRESS NOTE ADULT - SUBJECTIVE AND OBJECTIVE BOX
[facility]                                   [address] [place] [address]                                                      PHONE: [phone]                                                         FAX: [phone]  [email or web address]  ---------------------------------------------------------------------------------------------------------------------------------    Overnight events/patient complaints:      NAD feeling about the same     iodine (Hives)  ceftriaxone (Hives)  shellfish (Hives)    MEDICATIONS  (STANDING):  aMIOdarone    Tablet 200 milliGRAM(s) Oral daily  budesonide 160 MICROgram(s)/formoterol 4.5 MICROgram(s) Inhaler 2 Puff(s) Inhalation two times a day  chlorhexidine 2% Cloths 1 Application(s) Topical <User Schedule>  dextrose 5%. 1000 milliLiter(s) (100 mL/Hr) IV Continuous <Continuous>  dextrose 5%. 1000 milliLiter(s) (50 mL/Hr) IV Continuous <Continuous>  dextrose 50% Injectable 12.5 Gram(s) IV Push once  dextrose 50% Injectable 25 Gram(s) IV Push once  dextrose 50% Injectable 25 Gram(s) IV Push once  glucagon  Injectable 1 milliGRAM(s) IntraMuscular once  heparin  Infusion. 1600 Unit(s)/Hr (16 mL/Hr) IV Continuous <Continuous>  influenza   Vaccine 0.5 milliLiter(s) IntraMuscular once  insulin glargine Injectable (LANTUS) 15 Unit(s) SubCutaneous at bedtime  insulin lispro (ADMELOG) corrective regimen sliding scale   SubCutaneous at bedtime  insulin lispro (ADMELOG) corrective regimen sliding scale   SubCutaneous three times a day before meals  methylPREDNISolone sodium succinate Injectable 60 milliGRAM(s) IV Push every 6 hours  milrinone Infusion 0.125 MICROgram(s)/kG/Min (5.58 mL/Hr) IV Continuous <Continuous>  montelukast 10 milliGRAM(s) Oral daily  nystatin Powder 1 Application(s) Topical two times a day  PARoxetine 20 milliGRAM(s) Oral daily  sildenafil (REVATIO) 20 milliGRAM(s) Oral three times a day    MEDICATIONS  (PRN):  albuterol/ipratropium for Nebulization 3 milliLiter(s) Nebulizer every 6 hours PRN Shortness of Breath and/or Wheezing  aluminum hydroxide/magnesium hydroxide/simethicone Suspension 30 milliLiter(s) Oral every 4 hours PRN Dyspepsia  dextrose Oral Gel 15 Gram(s) Oral once PRN Blood Glucose LESS THAN 70 milliGRAM(s)/deciliter  melatonin 3 milliGRAM(s) Oral at bedtime PRN Insomnia  ondansetron Injectable 4 milliGRAM(s) IV Push every 8 hours PRN Nausea and/or Vomiting  oxyCODONE    IR 7.5 milliGRAM(s) Oral every 6 hours PRN Severe Pain (7 - 10)  oxyCODONE    IR 2.5 milliGRAM(s) Oral every 6 hours PRN Moderate Pain (4 - 6)  polyethylene glycol 3350 17 Gram(s) Oral daily PRN Constipation  sodium chloride 0.65% Nasal 1 Spray(s) Both Nostrils three times a day PRN Nasal Congestion      Vital Signs Last 24 Hrs  T(C): 36.4 (30 Oct 2023 04:00), Max: 36.7 (30 Oct 2023 00:00)  T(F): 97.6 (30 Oct 2023 04:00), Max: 98 (30 Oct 2023 00:00)  HR: 85 (30 Oct 2023 08:00) (82 - 116)  BP: 118/67 (30 Oct 2023 08:00) (107/57 - 133/58)  BP(mean): 81 (30 Oct 2023 08:00) (73 - 90)  RR: 12 (30 Oct 2023 08:00) (12 - 25)  SpO2: 90% (30 Oct 2023 08:00) (90% - 96%)    Parameters below as of 30 Oct 2023 08:00  Patient On (Oxygen Delivery Method): nasal cannula, high flow      ICU Vital Signs Last 24 Hrs  MITCHEL GUNN  I&O's Detail    29 Oct 2023 07:01  -  30 Oct 2023 07:00  --------------------------------------------------------  IN:    Heparin Infusion: 384 mL    IV PiggyBack: 50 mL    Milrinone: 144 mL    Oral Fluid: 940 mL  Total IN: 1518 mL    OUT:    Indwelling Catheter - Urethral (mL): 4405 mL  Total OUT: 4405 mL    Total NET: -2887 mL      30 Oct 2023 07:01  -  30 Oct 2023 10:41  --------------------------------------------------------  IN:    Heparin Infusion: 16 mL    Milrinone: 5.6 mL  Total IN: 21.6 mL    OUT:    Indwelling Catheter - Urethral (mL): 425 mL  Total OUT: 425 mL    Total NET: -403.4 mL        I&O's Summary    29 Oct 2023 07:01  -  30 Oct 2023 07:00  --------------------------------------------------------  IN: 1518 mL / OUT: 4405 mL / NET: -2887 mL    30 Oct 2023 07:01  -  30 Oct 2023 10:41  --------------------------------------------------------  IN: 21.6 mL / OUT: 425 mL / NET: -403.4 mL      Drug Dosing Stephenie GUNN      PHYSICAL EXAM:  General: Appears well developed, alert and cooperative.  HEENT: Head; normocephalic, atraumatic.  Eyes: Pupils reactive, cornea wnl.  Neck: Supple, no nodes adenopathy, no NVD or carotid bruit or thyromegaly.  CARDIOVASCULAR: Normal S1 and S2, 2/6 murmur, rub, gallop or lift.   LUNGS: Decrease BS at the lower bases   ABDOMEN: Soft, nontender without mass or organomegaly. bowel sounds normoactive.  EXTREMITIES: No clubbing, cyanosis ++ edema. Distal pulses wnl.   SKIN: warm and dry with normal turgor.  NEURO: Alert/oriented x 3/normal motor exam. No pathologic reflexes.    PSYCH: normal affect.        LABS:                        8.9    3.79  )-----------( 138      ( 30 Oct 2023 02:40 )             30.5     10-30    139  |  95<L>  |  104.7<H>  ----------------------------<  224<H>  3.8   |  36.0<H>  |  2.11<H>    Ca    8.9      30 Oct 2023 02:40  Phos  4.7     10-30  Mg     2.4     10-30    TPro  7.1  /  Alb  3.1<L>  /  TBili  1.4  /  DBili  x   /  AST  36<H>  /  ALT  311<H>  /  AlkPhos  97  10-30    MITCHEL GUNN      PTT - ( 29 Oct 2023 21:29 )  PTT:61.2 sec  Urinalysis Basic - ( 30 Oct 2023 02:40 )    Color: x / Appearance: x / SG: x / pH: x  Gluc: 224 mg/dL / Ketone: x  / Bili: x / Urobili: x   Blood: x / Protein: x / Nitrite: x   Leuk Esterase: x / RBC: x / WBC x   Sq Epi: x / Non Sq Epi: x / Bacteria: x        RADIOLOGY & ADDITIONAL STUDIES:    INTERPRETATION OF TELEMETRY (personally reviewed):    < from: TTE Echo Complete w/ Contrast w/ Doppler (10.22.23 @ 16:31) >  Summary:   1. Technically difficult study.   2. Endocardial visualization was enhanced with intravenous echo contrast.   3. Right ventricular volume and pressure overload.   4. Severely enlarged right ventricle.   5. Severely reduced RV systolic function.   6. Compared with TTE dated 7/20/23 the RV continues to be severely   dilated and dysfunctional.   7. LV cavity was not visualized despite the use of the echo contrast.    MD Ella Electronically signed on 10/22/2023 at 5:57:47 PM    < end of copied text >      INTERPRETATION OF TELEMETRY (personally reviewed):  SR, ST, NSVT    ASSESSMENT AND PLAN:  In summary, MITCHEL GUNN is an 38y Female with morbid obesity, HFpEF, severe RV dysfunction, PE/DVT, SLE who p/w heart failure/cardiogenic shock and atrial flutter now converted to SR on amiodarone      Plan  Primacor gtt   Revatio   keep negative   Heart failure team Dr Magdaleno following   Awaiting transfer to [facility]

## 2023-10-30 NOTE — PHYSICAL THERAPY INITIAL EVALUATION ADULT - GENERAL OBSERVATIONS, REHAB EVAL
Pt received in bed, + IV, +Tele//BP, + HiFlo NC O2, + VCBs with Z-flow Boots, + major, in NAD, in 7-8/10 LE pain, agreeable to PT patriciaal

## 2023-10-30 NOTE — CHART NOTE - NSCHARTNOTEFT_GEN_A_CORE
: Elizabeth Deutsch    INDICATION: critical illness    PROCEDURE:  [x] LIMITED ECHO  [x] LIMITED CHEST  [ ] LIMITED RETROPERITONEAL  [ ] LIMITED ABDOMINAL  [ ] LIMITED DVT  [ ] NEEDLE GUIDANCE VASCULAR  [ ] NEEDLE GUIDANCE THORACENTESIS  [ ] NEEDLE GUIDANCE PARACENTESIS  [ ] NEEDLE GUIDANCE PERICARDIOCENTESIS  [ ] OTHER    FINDINGS:  A line predominance at apices bilaterally  no pleural effusion  scattered B lines at bases  Poor cardiac views, RV > LV  IVC 2.6 cm    INTERPRETATION:  continue diuresis     Images uploaded on [employer] : Pete Mejia     INDICATION: critical illness, RV failure    PROCEDURE:  [x] LIMITED ECHO  [x] LIMITED CHEST  [ ] LIMITED RETROPERITONEAL  [ ] LIMITED ABDOMINAL  [ ] LIMITED DVT  [ ] NEEDLE GUIDANCE VASCULAR  [ ] NEEDLE GUIDANCE THORACENTESIS  [ ] NEEDLE GUIDANCE PARACENTESIS  [ ] NEEDLE GUIDANCE PERICARDIOCENTESIS  [ ] OTHER    FINDINGS:  A line predominance at apices bilaterally  no pleural effusion  scattered B lines at bases  Poor cardiac views, RV > LV  IVC 2.6 cm    INTERPRETATION:  continue diuresis   RV> LV  Images uploaded on [employer] Path    I have assisted and supervised entire procedure.    Pete Parry.

## 2023-10-30 NOTE — PHYSICAL THERAPY INITIAL EVALUATION ADULT - RANGE OF MOTION EXAMINATION, REHAB EVAL
b/l LE AROM limited due to weakness and soft tissue/bilateral upper extremity ROM was WNL (within normal limits)

## 2023-10-30 NOTE — DISCHARGE NOTE PROVIDER - DID THE PATIENT PRESENT WITH OR WAS TREATED FOR MALNUTRITION DURING THIS ADMISSION
10/14 s/p C1-C6 decompression and fusion
s/p fall 12 ft from ladder with extensive c-spine fx + right rib fx. s/p OR today for posterior cervical C1-C7 decompression fusion. PMHx HTN on metoprolol at home.
Yes...

## 2023-10-30 NOTE — H&P ADULT - ASSESSMENT
39yo female with pmh of BMI > 40, CHF-diastolic, B/L LE DVT + PE on Eliquis, HTN, Asthma,eczema and Anxiety presents to the ED w/ 1 week of worsening SOB on Exertion. Admitted to [facility] where pt was found to have cardiogenic shock, hypoxic respiratory failure requiring HFNC ISO of severe pHTN with acute on chronic cor pulmonale, requiring inotropes, vasopressors, pulmonary vasodilators and IV diuretics. Also found to have newly diagnosed SLE and APLS treated with steroids.Her course c/b transaminitis and SARAH. Now transfered to [facility] for management of pulmonary HTN.       #Neuro   A&Ox3    #Respiratory  Acute on Chronic respiratory failure 2/2 to CHF and pulmonary Hypertension   - on NC currently   Bumex 2g qd   > Duonebs q6, Symbicort   > Montelukast 10mg daily     #Pulmonary Hypertension - Unclear primary / autoimmune etiology /vs Chronic PE   - Hemodynamics and Echo as Below   > C/w Sildenafil 20  > CT angio chest evaluation for CTEF   Plan for RHC    #CV - Admitted for cardiogenic shock - Acute on chronic heart failure with preserved ejection fraction (HFpEF)  On Milrinone .125 will wean as tolerated, as low concern for LV dysfunction.   ECHO 10/22 - Dilated RV with severely reduced RV function   - Strict I&Os, Daily Weights, q24 labs   - Bumex 2g IV qd     Hemodynamics   10/26 milrinone 0.250 mcg/kg/min CVP 9 /31/54 PCWP 7 PA sat 61.9% /71/91  Tg CO/CI  7.7/3.0 FIO2 40% PVR 6.1 wilkinson  10/25 milrinone 0.250 mcg/kg/min+vaso 0.04 u/min+Teagan 5 ppm CVP 10 PA 96/21/42 PCWP 7 PA sat 63% /68/83 HR 99 Tg CO/CI  7.7/3.0 FIO2 40%  10/24 milrinone 0.250 mcg/kg/min+vaso 0.04 u/min+phenylephrine 0.1 mcg/kg/min+Teagan 11 ppm CVP 8 OA 85/24/44 PCWP n/a PA sat 89.3% HR 95 /36/76  10/23 (mil 0.25, levo .08, Teagan 12 ppm): -140s, /72 (87), CVP 5, PA 87/33/52, PCW 7, PA sat 78.2%, Tg CO/CI 9.6/3.8,  dsc, PVR 4.69 WILKINSON.    Aflutter with RVR   - s/p Amiodarone load   - Continue with amio 200PO    #GI - Regular diet     # - SARAH - BUN / CR 35/.8 on admission - ATN 2/2 CV shock     #Endocrine   - Moderate ISS    #Rheum  4 weeks ago was noted to have a high titer KATHIE and was referred to Rheumatologist Dr Maldonado. Treated with pred 20 mg outpatient.  + SLE serology- C3C4 extremely low, ds DNA v high  > Repeat APLS, Sjogren Scleraderma labs   > s/p 1g Solumedrol 10/27-30  Hydrocortisone 100q8 10/26  Solumedrol 60q6 - days todays, 40q8 -> 40q12   - Formal Rheum consult in the AM     #Hemeonc   - APLS + , hx of recurrent DVT / PE, takes Eliquis at Home   c/w Heparin ggt     #ID - treated for aspiration PNA (7d) - currently off Abx  - s/pZosyn 10/19-10/26  s/p Azitro 10/19-10/21  - Cx 10/22 NGTD     #Ethics   - FULL CODE  39yo female with pmh of BMI > 40, CHF-diastolic, B/L LE DVT + PE on Eliquis, HTN, Asthma,eczema and Anxiety presents to the ED w/ 1 week of worsening SOB on Exertion. Admitted to [facility] where pt was found to have cardiogenic shock, hypoxic respiratory failure requiring HFNC ISO of severe pHTN with acute on chronic cor pulmonale, requiring inotropes, vasopressors, pulmonary vasodilators and IV diuretics. Also found to have newly diagnosed SLE and APLS treated with steroids.Her course c/b transaminitis and SARAH. Now transfered to [facility] for management of pulmonary HTN.       #Neuro   A&Ox3    #Respiratory  Acute on Chronic respiratory failure 2/2 to CHF and pulmonary Hypertension   - on NC currently   Bumex 2g qd   > Duonebs q6, Symbicort   > Montelukast 10mg daily     #Pulmonary Hypertension - Unclear primary / autoimmune etiology /vs Chronic PE   - Hemodynamics and Echo as Below   > C/w Sildenafil 20  > CT angio chest evaluation for CTEF   Plan for RHC    #CV - Admitted for cardiogenic shock - Acute on chronic heart failure with preserved ejection fraction (HFpEF)  On Milrinone .125 will wean as tolerated, as low concern for LV dysfunction.   ECHO 10/22 - Dilated RV with severely reduced RV function   - Strict I&Os, Daily Weights, q24 labs   - Bumex 2g IV qd     Hemodynamics   10/26 milrinone 0.250 mcg/kg/min CVP 9 /31/54 PCWP 7 PA sat 61.9% /71/91  Tg CO/CI  7.7/3.0 FIO2 40% PVR 6.1 wilkinson  10/25 milrinone 0.250 mcg/kg/min+vaso 0.04 u/min+Teagan 5 ppm CVP 10 PA 96/21/42 PCWP 7 PA sat 63% /68/83 HR 99 Tg CO/CI  7.7/3.0 FIO2 40%  10/24 milrinone 0.250 mcg/kg/min+vaso 0.04 u/min+phenylephrine 0.1 mcg/kg/min+Teagan 11 ppm CVP 8 OA 85/24/44 PCWP n/a PA sat 89.3% HR 95 /36/76  10/23 (mil 0.25, levo .08, Teagan 12 ppm): -140s, /72 (87), CVP 5, PA 87/33/52, PCW 7, PA sat 78.2%, Tg CO/CI 9.6/3.8,  dsc, PVR 4.69 WILKINSON.    Aflutter with RVR   - s/p Amiodarone load   - Continue with amio 200PO    #GI - Regular diet     # - SARAH - BUN / CR 35/.8 on admission - ATN 2/2 CV shock     #Endocrine   - Moderate ISS    #Rheum  4 weeks ago was noted to have a high titer KATHIE and was referred to Rheumatologist Dr Maldonado. Treated with pred 20 mg outpatient.  + SLE serology- C3C4 extremely low, ds DNA v high  > Repeat APLS, Sjogren Scleraderma labs   > Stress dose Hydrocortisone 100q8 10/26  > s/p 1g Solumedrol 10/27-30  > s/p Solumedrol 60q6  plan for taper, currently 40q12   - Formal Rheum consult in the AM     #Hemeonc   - APLS + , hx of recurrent DVT / PE, takes Eliquis at Home. 6 years ago with bilateral DVT and associated PE. Has been taking eliquis since.   c/w Heparin ggt     #ID - treated for aspiration PNA (7d) - currently off Abx  - s/pZosyn 10/19-10/26  s/p Azitro 10/19-10/21  - Cx 10/22 NGTD     #Ethics   - FULL CODE  39yo female with pmh of BMI > 40, CHF-diastolic, B/L LE DVT + PE on Eliquis, HTN, Asthma,eczema and Anxiety presents to the ED w/ 1 week of worsening SOB on Exertion. Admitted to [facility] where pt was found to have cardiogenic shock, hypoxic respiratory failure requiring HFNC ISO of severe pHTN with acute on chronic cor pulmonale, requiring inotropes, vasopressors, pulmonary vasodilators and IV diuretics. Also found to have newly diagnosed SLE and APLS treated with steroids. Her course c/b transaminitis and SARAH. Now transfered to [facility] for management of pulmonary HTN.     #Neuro   A&Ox3    #Respiratory  Acute on Chronic respiratory failure 2/2 to CHF and pulmonary Hypertension   - on NC currently   Bumex 2g qd   > Duonebs q6, Symbicort   > Montelukast 10mg daily     #Pulmonary Hypertension - Unclear primary / autoimmune etiology /vs Chronic PE   - Hemodynamics and Echo as Below   > C/w Sildenafil 20  > CT angio chest evaluation for CTEF  Plan for RHC     #CV - Admitted for cardiogenic shock - Acute on chronic heart failure with preserved ejection fraction (HFpEF)  On Milrinone .125 will wean as tolerated, as low concern for LV dysfunction.   ECHO 10/22 - Dilated RV with severely reduced RV function   - Strict I&Os, Daily Weights, q24 labs   - Bumex 2g IV qd     Hemodynamics   10/26 milrinone 0.250 mcg/kg/min CVP 9 /31/54 PCWP 7 PA sat 61.9% /71/91  Tg CO/CI  7.7/3.0 FIO2 40% PVR 6.1 wilkinson  10/25 milrinone 0.250 mcg/kg/min+vaso 0.04 u/min+Teagan 5 ppm CVP 10 PA 96/21/42 PCWP 7 PA sat 63% /68/83 HR 99 Tg CO/CI  7.7/3.0 FIO2 40%  10/24 milrinone 0.250 mcg/kg/min+vaso 0.04 u/min+phenylephrine 0.1 mcg/kg/min+Teagan 11 ppm CVP 8 OA 85/24/44 PCWP n/a PA sat 89.3% HR 95 /36/76  10/23 (mil 0.25, levo .08, Teagan 12 ppm): -140s, /72 (87), CVP 5, PA 87/33/52, PCW 7, PA sat 78.2%, Tg CO/CI 9.6/3.8,  dsc, PVR 4.69 WILKINSON    Aflutter with RVR   - s/p Amiodarone load   - Continue with amio 200PO    #GI - Regular diet     # - SARAH - BUN / CR 35/.8 on admission - ATN 2/2 CV shock     #Endocrine   - Moderate ISS    #Rheum  4 weeks ago was noted to have a high titer KATHIE and was referred to Rheumatologist Dr Maldonado. Treated with pred 20 mg outpatient.  + SLE serology- C3C4 extremely low, ds DNA v high  > Repeat APLS, Sjogren Scleraderma labs   > Stress dose Hydrocortisone 100q8 10/26  > s/p 1g Solumedrol 10/27-30  > s/p Solumedrol 60q6  plan for taper, currently 40q12   - Formal Rheum consult in the AM     #Hemeonc   - APLS + , hx of recurrent DVT / PE, takes Eliquis at Home. 6 years ago with bilateral DVT and associated PE. Has been taking eliquis since.   c/w Heparin ggt   - F/U - genetics hypercoagulable workup    #ID - treated for aspiration PNA (7d) - currently off Abx  - s/pZosyn 10/19-10/26  s/p Azitro 10/19-10/21  - Cx 10/22 NGTD     #Ethics   - FULL CODE

## 2023-10-30 NOTE — H&P ADULT - HISTORY OF PRESENT ILLNESS
39yo female with pmh of BMI > 40, CHF-diastolic, B/L LE DVT + PE on Eliquis, HTN, Asthma,eczema and Anxiety presents to the ED w/ 1 week of worsening SOB on Exertion. Pt notes that this morning she was saturating at 79% on 2LPM (home ox dose). Noted that on exertion her oxygen dropping to low 80s on Room air days prior to admission.   Last in hospital for fluid overload in July, since then is compliant with her medication and only added on daily 10mg steroids from outpatient physician. Being worked up for SLE outpatient by PCP, results from 1 week prior showing low C3/4 complement and elevated CRP. Iniated on a steroid therapy for suspected autoimmune disease suspicious for SLE     Presenting for a transfer from [facility]   Initally presented to [facility] for cardiogenic shock intotropes, vasopressors, pulmonary vasodilattors, diuretics. Currently on Milrinone .125. Course c/b hypoxia requiring HFNC ISO of severe pHTN with acute on chronic cor pulmonale, started on Sildenafil. Course c/b SARAH, Transaminitis.    37 y/o F with a h/o b/l DVTs, recurrent PE on eliquis, HFpEF, morbid obesity, asthma, chronic hyperkalemia, admitted to [place] on 10/18 with complaints of weakness, worsening LE edema, cough, and rapid weight gain.Of note, was being worked up as an outpatient for possible SLE given facial rash, photophobia, and joint pains and had been started on prednisone 20 mg by her PCP x ~1 month prior to arrival at [place]. Outpatient  results from 1 week prior showing low C3/4 complement and elevated CRP. Pt was admitted to medicine for treatment of PNA. Hospital course complicated by progressive SARAH, HAGMA, transaminitis and acute hypoxemic respiratory failure secondary to cardiogenic shock in the face of severe pHTN with acute on chronic cor pulmonale.     Her pHTN was thought to be likely multifactorial due to PE/OHS/MICHAEL/autoimmune process. Patient cardiogenic shock and iniated on milrinone on 10/20. She had a [place] placement on 10/23 revealing elevated pulmonary pressures, and required levophed from 10/22-23 for pressure support, diuresis with bumex was also initiated on 10/22 and maintained, and is now s/p inshaled nitric oxide.. Patient shock state has improved. Currently on .125 of milrinone. Bumex DC'd with increasing cr. Patient persistently hypoxic, requiring HFNC. She was initiated on sildenafil on 10/26, now on 20 TID after conversation with  Dr. Del Castillo. . S/P emperic treatment for PNA with zosyn 10/19-26. She was placed on a heparin gtt due to c/f APLS      Presenting for a transfer from [facility]. At this time patient was taken off of milrinone.     39 y/o F with a h/o b/l DVTs, recurrent PE on eliquis, HFpEF, morbid obesity, asthma, chronic hyperkalemia, admitted to [place] on 10/18 with complaints of weakness, worsening LE edema, cough, and rapid weight gain.Of note, was being worked up as an outpatient for possible SLE given facial rash, photophobia, and joint pains and had been started on prednisone 20 mg by her PCP x ~1 month prior to arrival at [place]. Outpatient  results from 1 week prior showing low C3/4 complement and elevated CRP. Pt was admitted to medicine for treatment of PNA. Hospital course complicated by progressive SARAH, HAGMA, transaminitis and acute hypoxemic respiratory failure secondary to cardiogenic shock in the face of severe pHTN with acute on chronic cor pulmonale.     Her pHTN was thought to be likely multifactorial due to PE/OHS/MICHAEL/autoimmune process. Patient cardiogenic shock and iniated on milrinone on 10/20. She had a [place] placement on 10/23 revealing elevated pulmonary pressures, and required levophed from 10/22-23 for pressure support, diuresis with bumex was also initiated on 10/22 and maintained, and is now s/p inshaled nitric oxide.. Patient shock state has improved. Currently on .125 of milrinone. Bumex DC'd with increasing cr. Patient persistently hypoxic, requiring HFNC. She was initiated on sildenafil on 10/26, now on 20 TID after conversation with  Dr. Del Castillo. . S/P emperic treatment for PNA with zosyn 10/19-26. She was placed on a heparin gtt due to c/f APLS    Presenting for a transfer from [facility]. At this time patient was taken off of milrinone.

## 2023-10-30 NOTE — H&P ADULT - NSHPSOCIALHISTORY_GEN_ALL_CORE
No hx of smoking, alcohol use. Occupation - advertising operations  Sister with Rheumatoid Arthritis   Father with stroke at 50, Mother diagnosed with Colon Cx 55

## 2023-10-30 NOTE — H&P ADULT - NSHPREVIEWOFSYSTEMS_GEN_ALL_CORE
CONSTITUTIONAL:  No weight loss, fever, chills, +weakness/fatigue.  HEENT:  Eyes:  No visual loss, blurred vision, double vision or yellow sclerae.   CARDIOVASCULAR:  No chest pain, chest pressure or chest discomfort. No palpitations.  RESPIRATORY:  No shortness of breath, cough or sputum.  GASTROINTESTINAL:  No anorexia, nausea, vomiting or diarrhea. No abdominal pain or blood.  GENITOURINARY:  Denies hematuria, dysuria.   NEUROLOGICAL:  No headache, dizziness, syncope, paralysis, ataxia, numbness or tingling in the extremities. No change in bowel or bladder control.  MUSCULOSKELETAL:  No muscle, back pain, joint pain or stiffness.  HEMATOLOGIC:  No anemia, bleeding or bruising.  ENDOCRINOLOGIC:  No reports of sweating, cold or heat intolerance. No polyuria or polydipsia.  ALLERGIES:  No hives, eczema or rhinitis.

## 2023-10-30 NOTE — PROGRESS NOTE ADULT - PROVIDER SPECIALTY LIST ADULT
Cardiology
Critical Care
Infectious Disease
Nephrology
Cardiology
Critical Care
Hospitalist
Hospitalist
Nephrology
Cardiology
Critical Care
Critical Care
Heart Failure
Hospitalist
Infectious Disease
Infectious Disease
Nephrology
Nephrology
Critical Care
Gastroenterology
Hospitalist
MICU
Nephrology
Nephrology
Gastroenterology
Heart Failure
MICU
Heart Failure
Heart Failure

## 2023-10-31 NOTE — PROGRESS NOTE ADULT - ASSESSMENT
Ms. 38-yo female with PMHx of b/l DVTs, recurrent PE on eliquis, HFpEF w/ hx of cor pulmonale, morbid obesity, asthma, chronic hyperkalemia, recently diagnosed SLE (facial rash, photophobia, joint paints, started on prednisone 20 mg), admitted initially w/ PNA to [facility] on 10/18, course c/b SARAH, AHRF 2/2 cardiogenic shock 2/2 pHTN w/ RV failure s/p MICU stay at [facility] now off inotropes and pressors pending rheumatological work-up and CT Angio/RHC for pHTN.

## 2023-10-31 NOTE — PROGRESS NOTE ADULT - PROBLEM SELECTOR PLAN 3
- Pt treated w/ prednisone 20 mg for facial rash, photophobia, joint pains, c/f SLE w/ low C3C4, KATHIE 1:2560, dsDNA 671, Sm 2.1, Ro >8, La>8, RF 65, CRP 45, , negative RNP/CCP/ ACL/ ANCA/panda/[employer]/scl70 per rheum eval at [facility]  - f/u KATHIE, Anti-ribonuclear protein, dsDNA, Myomarker Panel 3, Scleroderma Antibodies, Sjogren's  - s/p stress dose hydrocortisone on 10/26, Solumedrol 1g 10/27-10/30, now on taper   - Rheum consulted, appreciate recommendations  - Continue Solumedrol 40 q12h

## 2023-10-31 NOTE — PATIENT PROFILE ADULT - FUNCTIONAL ASSESSMENT - BASIC MOBILITY 5.
1 = Total assistance 14.0   18.21 )-----------( 569      ( 30 Jul 2018 17:29 )             45.4     07-30    143  |  100  |  52<H>  ----------------------------<  111<H>  4.3   |  30  |  1.6<H>    Ca    9.1      30 Jul 2018 17:29  Mg     1.9     07-29    TPro  6.1  /  Alb  3.7  /  TBili  0.4  /  DBili  x   /  AST  15  /  ALT  18  /  AlkPhos  58  07-30            PT/INR - ( 30 Jul 2018 17:29 )   PT: 11.40 sec;   INR: 1.05 ratio         PTT - ( 30 Jul 2018 17:29 )  PTT:23.6 sec  Lactate Trend    CARDIAC MARKERS ( 30 Jul 2018 17:29 )  x     / <0.01 ng/mL / 32 U/L / x     / x          CAPILLARY BLOOD GLUCOSE  126 (30 Jul 2018 16:45)        Culture Results:   50,000 - 99,000 CFU/mL Coag Negative Staphylococcus (07-24 @ 07:48)

## 2023-10-31 NOTE — CHART NOTE - NSCHARTNOTEFT_GEN_A_CORE
MAR Accept Note  Transfer to:  [address] [place] [address]  Accepting Attending Physician:  Dr. Lang  Assigned Room:  612    Patient seen and examined.   Labs and data reviewed.   No findings precluding transfer of service.       HPI/MICU COURSE:   Please refer to MICU transfer note for full details. Briefly, this is a      FOR FOLLOW-UP: MAR Accept Note  Transfer to:  [address] [place] [address]  Accepting Attending Physician:  Dr. Lang  Assigned Room:  612    Patient seen and examined.   Labs and data reviewed.   No findings precluding transfer of service.       HPI/MICU COURSE:   Please refer to MICU transfer note for full details. Briefly, this is a 38 F with a h/o b/l DVTs, recurrent PE on eliquis, HFpEF, morbid obesity, asthma, chronic hyperkalemia, admitted to [facility] on 10/18, admitted for PNA. Of note, was being worked up as an outpatient for possible SLE given facial rash, photophobia, and joint pains and had been started on prednisone 20 mg by her PCP x ~1 month prior to arrival at [facility]. Hospital course complicated by progressive SARAH, HAGMA, transaminitis and acute hypoxemic respiratory failure secondary to cardiogenic shock in the face of severe pHTN with acute on chronic cor pulmonale.     Her pHTN was thought to be likely multifactorial due to PE/OHS/MICHAEL/autoimmune process. Patient cardiogenic shock and initiated on milrinone on 10/20. She had a [place] placement on 10/23 revealing elevated pulmonary pressures, and required levophed from 10/22-23 for pressure support, diuresis with bumex was also initiated. Patient shock state has improved. Was on .125 of milrinone. Bumex DC'd with increasing cr. Patient persistently hypoxic, requiring HFNC. She was initiated on sildenafil on 10/26, now on 20 TID after conversation with  Dr. Del aCstillo. S/P emperic treatment for PNA with zosyn 10/19-26. She was placed on a heparin gtt due to c/f APLS    Transferred to [facility] from [facility]. Admitted to MICU 10/30, taken off of milrinone. BP stable off pressors. Continued with bumex 2g BID. 3L output overnight. Currently on 4L NC.       FOR FOLLOW-UP:  [ ] Appreciate Recommendations for Rheumatology regarding steroid taper and New APLS diagnosis / ? SLE diagnosis   [ ] Protocol for CT angio Chest to r/o CTEPH - prior hives from contrast -   Schedule 2h after solumedrol dose and administer Benadryl 50 1h prior to CT scan  [ ] CT Angio scheduled for 5:30PM - Ativan 15mins prior and Benadryl 1h before.

## 2023-10-31 NOTE — CONSULT NOTE ADULT - ASSESSMENT
A 38-year-old woman with a history of DVT, recurrent PE, heart issues, and recent prednisone treatment for suspected SLE, was admitted to [facility] for pneumonia and right heart failure. She was started on steroid 1 g daily for 3 days for SLE flare. During her stay, she developed SARAH, shock liver, acute respiratory failure due to severe pulmonary hypertension. She was transferred to [facility] for pulmonary hypertension management with sildenafil.    # SLE with Antiphospholipid syndrome   - Pt has alopecia, malar rash, photophobia, and polyarthritis, DVT/PE   - Lab revealed B2GP IgG >150 B2GP Ig A >150 and Lupus anticoagulant positive  - Low complement c3 76 and c4 4, lymphopenia  and thrombocytopenia plts 121  - KATHIE 1:2560 homogenous dsDNA 671, Sm 2.1, Ro >8 , La>8 , RF 65 , CRP 45 ,, negative RNP/CCP/ ACL/ ANCA/panda/centromere/scl70/  - UA showed spot protein/creatinine ratio 0.3 g daily   - TTE shows severe RV enlargement and dysfunction , with RV overload, came with high BNP pulm HTN may be multifactorial  chronic thromboembolic DX, diastolic but also CTD  - Currently on heparin infusion, methylprednisolone 40 mg BID    # Severe Pulmonary HTN, likely multifactorial including Obesity, MICHAEL, chronic pulmonary embolism, HFpEF, and possible SLE?  - TTE 10/31: Estimated pulmonary artery systolic pressure is 53 mmHg ( was 45 in 7/23)   - Currently on sildenafil 20 mg TID    Recommendations:   1. Please decrease Methylprednisolone to 40 mg IV starting from tomorrow  2. Patient should be started on Coumadin before being discharged from the hospital.  3. Please start Plaquenil 200 mg daily     Rheumatology will follow    D/w. Dr Teetee Curiel MD  PGY-4  Rheumatology Fellow  Reachable on TEAMS  [phone]     A 38-year-old woman with a history of DVT, recurrent PE, heart issues, and recent prednisone treatment for suspected SLE, was admitted to [facility] for pneumonia and right heart failure. She was started on steroid 1 g daily for 3 days for SLE flare. During her stay, she developed SARAH, shock liver, acute respiratory failure due to severe pulmonary hypertension. She was transferred to [facility] for pulmonary hypertension management with sildenafil.    # SLE with Antiphospholipid syndrome   - Pt has alopecia, malar rash, photophobia, and polyarthritis, DVT/PE   - Lab revealed B2GP IgG >150 B2GP Ig A >150 and Lupus anticoagulant positive  - Low complement c3 76 and c4 4, lymphopenia  and thrombocytopenia plts 121  - KATHIE 1:2560 homogenous dsDNA 671, Sm 2.1, Ro >8 , La>8 , RF 65 , CRP 45 ,, negative RNP/CCP/ ACL/ ANCA/panda/centromere/scl70/  - UA showed spot protein/creatinine ratio 0.3 g daily   - TTE shows severe RV enlargement and dysfunction , with RV overload, came with high BNP pulm HTN may be multifactorial  chronic thromboembolic DX, diastolic but also CTD  - Currently on heparin infusion, methylprednisolone 40 mg BID    # Severe Pulmonary HTN, likely multifactorial including Obesity, MICHAEL, chronic pulmonary embolism, HFpEF, and possible SLE?  - TTE 10/31: Estimated pulmonary artery systolic pressure is 53 mmHg ( was 45 in 7/23)   - Currently on sildenafil 20 mg TID    Recommendations:   1. Please decrease Methylprednisolone to 40 mg IV starting from tomorrow  2. Patient should be started on Coumadin before being discharged from the hospital.  3. Please start Plaquenil 200 mg twice daily    Rheumatology will follow    D/w. Dr Teetee Curiel MD  PGY-4  Rheumatology Fellow  Reachable on TEAMS  [phone]

## 2023-10-31 NOTE — PROGRESS NOTE ADULT - PROBLEM SELECTOR PLAN 10
DVT PPx: Heparin Drip for full AC  Diet: Regular  Bowel Regimen: Last BM 4 days again, started Senna 2 at bedtime  Code: Full  Dispo: DICK per PT evaluation  Pharmacy:  PCP:   Communication: - Paroxetine

## 2023-10-31 NOTE — PROGRESS NOTE ADULT - PROBLEM SELECTOR PLAN 8
- Last a1c 6.0  - Pt w/ hyperglycemia to 200's in s/o steroid use  - Continue insulin sliding scale, regular diet - Pt w/ prior SARAH likely in s/o sepsis and cardiogenic shock  - Cr downtrending  - CTM Cr and UOP

## 2023-10-31 NOTE — ADVANCED PRACTICE NURSE CONSULT - REASON FOR CONSULT
arrived on unit, patient was found lying in a low air loss pressure redistribution support surface style bed.  patient  is unable to turn independently and staff assistance x 1 was provided.  Consult to assess patient skin initiated by RN skin injury   sacrum and bilateral heels deep tissue injury present on admission      History of Present Illness:  Reason for Admission: SOB - cardiogenic shock and Pulm HTN  History of Present Illness:   37 y/o F with a h/o b/l DVTs, recurrent PE on eliquis, HFpEF, morbid obesity, asthma, chronic hyperkalemia, admitted to [place] on 10/18 with complaints of weakness, worsening LE edema, cough, and rapid weight gain.Of note, was being worked up as an outpatient for possible SLE given facial rash, photophobia, and joint pains and had been started on prednisone 20 mg by her PCP x ~1 month prior to arrival at [place]. Outpatient  results from 1 week prior showing low C3/4 complement and elevated CRP. Pt was admitted to medicine for treatment of PNA. Hospital course complicated by progressive SARAH, HAGMA, transaminitis and acute hypoxemic respiratory failure secondary to cardiogenic shock in the face of severe pHTN with acute on chronic cor pulmonale.     Her pHTN was thought to be likely multifactorial due to PE/OHS/MICHAEL/autoimmune process. Patient cardiogenic shock and iniated on milrinone on 10/20. She had a [place] placement on 10/23 revealing elevated pulmonary pressures, and required levophed from 10/22-23 for pressure support, diuresis with bumex was also initiated on 10/22 and maintained, and is now s/p inshaled nitric oxide.. Patient shock state has improved. Currently on .125 of milrinone. Bumex DC'd with increasing cr. Patient persistently hypoxic, requiring HFNC. She was initiated on sildenafil on 10/26, now on 20 TID after conversation with  Dr. Del Castillo. . S/P emperic treatment for PNA with zosyn 10/19-26. She was placed on a heparin gtt due to c/f APLS    Presenting for a transfer from [facility]. At this time patient was taken off of milrinone.

## 2023-10-31 NOTE — ADVANCED PRACTICE NURSE CONSULT - RECOMMEDATIONS
Impression   bilateral sacrum/ buttocks deep tissue injury present on admission '  right hip deep tissue injury with evolution present on admission   right heel deep tissue injury present on admission  left heel deep tissue injury present on admission  bilateral / feet toes with dry scaly lesions      Recommendations  1 bilateral sacrum/buttocks deep tissue injury       right hip deep tissue injury with evolution    Topical therapy- sacral/bilateral buttocks- cleanse w/incontinent cleanser, pat dry & apply Traid paste twice daily & prn soiling .  monitor    for changes .    2 bilateral heels      podiatry to consult for toes and heels     cleans with normal saline pat dry  apply Balzo No sting liquid barrier film wipe to heels  daily & monitor for changes  . Elevate heels; apply Complete Cair air fluidized boots; ensure that the soles of the feet are not resting on the foot board of the bed.  3.  Incontinent management - incontinent cleanser, pads,  chioma care  BID  4. Maintain on an alternating air with low air loss surface   5. Turn & reposition every 2 hr; Use positioning pillow to turn and reposition, soft pillow between bony prominences; continue measures to decrease friction/shear/pressure.  6. Nutrition optimization.  7.  waffle cushion when out of bed to chair .  plan of care reviewed with covering staff RN

## 2023-10-31 NOTE — PROGRESS NOTE ADULT - PROBLEM SELECTOR PLAN 5
- Pt w/ metabolic alkalosis likely 2/2 overdiuresis in s/o bumex  - Bicarb 30's over last few days, pH 7.40  - Repeat VBG in AM w/ plan for Diamox if Bicarb continues to increase

## 2023-10-31 NOTE — CONSULT NOTE ADULT - SUBJECTIVE AND OBJECTIVE BOX
***consult has been received. note is in progress and incomplete without attending attestation***    Ayaka Curiel MD  PGY-4  Rheumatology Fellow  [employer]  [phone]    MITCHEL GUNN  [id number]    HISTORY OF PRESENT ILLNESS:      39 y/o F with a history of b/l DVTs, recurrent PE on eliquis, HFrEF, morbid obesity, asthm who was initially admitted to [facility] on 10/18 with complaints of weakness, worsening LE edema, cough.  Pt was admitted to medicine for treatment of PNA. Hospital course complicated by progressive SARAH, transaminitis and acute hypoxemic respiratory failure secondary to cardiogenic shock in the face of severe pHTN with acute on chronic cor pulmonale. Patient was transferred to [facility] for management of severe pulmonary HTN.     Of note, was being worked up as an outpatient for possible SLE given alopecia, malar rash, photophobia, and joint pains and had been started on prednisone 20 mg by her PCP x ~1 month prior to arrival at [place]. Outpatient  results from 1 week prior showing low C3/4 complement and elevated CRP.    SLE History:  Patient was noted to have a 'high titer KATHIE' and was referred to outside Rheumatologist, she was found to have alopecia, photosensitivity, sicca symptoms, Raynaud, she was started on prednisone 20mg in the first week of October  for joint pain and possible synovitis.    Given recurrent DVT and recurrent PE she has seen hematology but there has been no clear evidence of a hypercoagulable disorder, was recommend to continue Eliquis for lifelong.     Previous lab results:   labs done 10/9 /2023 as out patient    show B2GP IgG >150 B2GP Ig A >150 and Lupus anticoagulant positive  she also has low complement c3 76 and c4 4  cbc with lymphopenia  and thrombocytopenia plts 121  KATHIE 1:2560  dsDNA 671  Sm 2.1  Ro >8   La>8   RF 65   CRP 45    negative RNP/CCP/ ACL/ ANCA/panda/centromere/scl70/  cmp with normal ast/alt and CR 0.9  cpk 13 ( low)     Please consult hematology.  patient most likely with ALPS and Eliquis may be sub-optimal treatment.    I have discuss with DR Magdaleno ( heart failure) and her pulmonary HTN which can be attributed to chronic thromboembolic issues,  HTn with diastolic dysfunction and CTD/ SLE.  Given how active her SLE is with low Complements, high inflammatory markers,high dsDNA and gross synovitis of the hand and given how severe her RHF is we have decided to to start a pulse steroid for 3 days   please sent out UA and protein to creatine ratio.    dsDNA 347  KATHIE 1:2560 homogen  Ro/La positive      ECHO shows severe RV enlargement and dysfunction , with RV overload, came with high BNP pulm HTN may be multifactorial   chronic thromboembolic DX, diastolic but also CTD   Rheum ROS    Her maternal Grandmother had scleroderma and her sister has RA      MEDICATIONS  (STANDING):  acetaZOLAMIDE    Tablet 250 milliGRAM(s) Oral once  aMIOdarone    Tablet 200 milliGRAM(s) Oral daily  budesonide 160 MICROgram(s)/formoterol 4.5 MICROgram(s) Inhaler 2 Puff(s) Inhalation two times a day  buMETAnide Injectable 2 milliGRAM(s) IV Push every 12 hours  heparin  Infusion. 1600 Unit(s)/Hr (16 mL/Hr) IV Continuous <Continuous>  insulin lispro (ADMELOG) corrective regimen sliding scale   SubCutaneous Before meals and at bedtime  methylPREDNISolone sodium succinate Injectable 40 milliGRAM(s) IV Push two times a day  montelukast 10 milliGRAM(s) Oral daily  PARoxetine 20 milliGRAM(s) Oral daily  polyethylene glycol 3350 17 Gram(s) Oral daily  sildenafil (REVATIO) 20 milliGRAM(s) Oral three times a day    MEDICATIONS  (PRN):  albuterol/ipratropium for Nebulization 3 milliLiter(s) Nebulizer every 6 hours PRN Shortness of Breath      Allergies    ceftriaxone (Hives)  iodine (Hives)  shellfish (Hives)    Intolerances        PERTINENT MEDICATION HISTORY:      Social History:  No hx of smoking, alcohol use. Occupation - advertising operations  Sister with Rheumatoid Arthritis   Father with stroke at 50, Mother diagnosed with Colon Cx 55 (30 Oct 2023 16:04)      PAST MEDICAL & SURGICAL HISTORY:    Pulmonary embolism  DVT, lower extremity  CHF (congestive heart failure)  Asthma  Anxiety  Severe obesity (BMI >= 40)  Hypertension  No significant past surgical history      OCCUPATION:  TRAV[employer] HISTORY:    FAMILY HISTORY:  Family history of colon cancer (Mother)  Family history of stroke (Father)    Vital Signs Last 24 Hrs  T(C): 36.3 (31 Oct 2023 08:00), Max: 36.8 (31 Oct 2023 00:00)  T(F): 97.4 (31 Oct 2023 08:00), Max: 98.2 (31 Oct 2023 00:00)  HR: 79 (31 Oct 2023 13:00) (75 - 87)  BP: 115/55 (31 Oct 2023 13:00) (101/60 - 118/55)  BP(mean): 77 (31 Oct 2023 13:00) (72 - 89)  RR: 18 (31 Oct 2023 13:00) (14 - 32)  SpO2: 93% (31 Oct 2023 13:00) (91% - 100%)    Parameters below as of 31 Oct 2023 08:00  Patient On (Oxygen Delivery Method): nasal cannula  O2 Flow (L/min): 4      Physical Exam:  General: No apparent distress  HEENT: EOMI, MMM  CVS: +S1/S2, RRR, no murmurs/rubs/gallops  Resp: CTA b/l. No crackles/wheezing  GI: Soft, NT/ND +BS  MSK: no swelling/warmth/erythema of the joints of the UE/LE  Neuro: AAOx3  Skin: no visible rashes    LABS:                        9.4    4.30  )-----------( 161      ( 31 Oct 2023 00:31 )             32.8     10-31    143  |  97  |  104<H>  ----------------------------<  240<H>  3.4<L>   |  36<H>  |  1.68<H>    Ca    9.2      31 Oct 2023 00:31  Phos  4.2     10-31  Mg     2.3     10-31    TPro  7.2  /  Alb  3.3  /  TBili  1.3<H>  /  DBili  x   /  AST  33  /  ALT  250<H>  /  AlkPhos  104  10-31    PT/INR - ( 31 Oct 2023 00:31 )   PT: 13.8 sec;   INR: 1.33 ratio         PTT - ( 31 Oct 2023 00:31 )  PTT:62.7 sec  Urinalysis Basic - ( 31 Oct 2023 00:31 )    Color: x / Appearance: x / SG: x / pH: x  Gluc: 240 mg/dL / Ketone: x  / Bili: x / Urobili: x   Blood: x / Protein: x / Nitrite: x   Leuk Esterase: x / RBC: x / WBC x   Sq Epi: x / Non Sq Epi: x / Bacteria: x        Rheumatology Work Up    C3 Complement, Serum: 18 mg/dL *L* [81 - 157] (10-30-23 @ 16:14)  C4 Complement, Serum: 2 mg/dL *L* [13 - 39] (10-30-23 @ 16:14)  C-Reactive Protein, Serum: 6 mg/L *H* [0 - 4] (10-30-23 @ 16:14)  Sedimentation Rate, Erythrocyte: 96 mm/hr *H* [0 - 15] (10-30-23 @ 16:14)    Anti Nuclear Factor Titer: >1:2560: Antinuclear AB (KATHIE), IFA Method (10.22.23 @ 18:10)  KATHIE Pattern: Homogeneous (10.22.23 @ 18:10)    Double Stranded DNA Antibody: 347: Method: EIA            Reference Ranges            Interpretation            <= 29    IU/mL     Negative            30 - 75  IU/mL     Borderline            > 75      IU/mL     Positive IU/mL (10.22.23 @ 22:50)    Sjogren&#x27;s Syndrome Antibodies (10.28.23 @ 01:57)    Anti SS-A Antibody: >8.0 AI   Anti SS-B Antibody: >8.0: Fluorescent Bead Immunoassay   Reference Ranges for SS-A AND SS-B:   <1.0 AI (negative)   > or =1.0 AI (positive)   Reference values apply  to all ages. AI    Anticardiolipin Antibody Level, Total: Positive: Some false positive results are to be expected when comparing the KAREN  Screen to specific IgG and IgM anticardiolipin tests.  A slight  oversensitivity is built into the KAREN Screen as a margin to ensure that  weakly positive samples will not be missed.  In addition, some samples  may have low-level concentrations of IgG, IgA, and IgM anticardiolipin  antibodies that would be found below the positive cutoff for each  individual test yet the additive effect might cause the KAREN Screen assay  resultsto be positive.  Method: EIA (10.28.23 @ 01:57)    Beta 2 Glycoprotein 1 Antibody Screen: Positive: Some false positive results are to be expected when comparing the B2GP1  Screen to specific IgG, IgA and IgM B2GP1 tests.  A slight  oversensitivity is built into the B2GP1 Screen as a margin to ensure that  weakly positive samples will not be missed.  In addition, some samples  may have low-level concentrations of IgG, IgA, and IgM B2GP1 antibodies  that would be found below the positive cutoff for each individual test  yet the additive effect might cause the B2GP1 Screen assay results to be  positive. (10.28.23 @ 01:57)    JONATHAN Antibody Screening Test (10.28.23 @ 01:57)    SM (Pierson) Ab FBIA: 0.7 AI    Anti-Ribonuclear Protein: 0.5: Fluorescent Bead Immunoassy                      Reference Ranges for RNP and SM:                      <1.0 AI (negative)                      > or =1.0 AI (positive)                      Reference values apply to all ages AI    Scleroderma Antibodies (10.28.23 @ 01:57)    Scleroderma Antibodies: 0.3: Fluorescent Bead Immunoassay                       Scl 70  Antibodies, IgG                      <1.0 AI (negative)                      > or =1.0 AI (positive)                      Reference values apply to all ages. AI    Protein/Creatinine Ratio, Urine (10.28.23 @ 04:50): Creatinine, Random Urine: 58: Reference Ranges have NOT been established for random urine analytes due  to variability in fluid intake and concentration. mg/dL   Total Protein, Random Urine: 17.0 mg/dL   Protein/Creatinine Ratio Calculation: 0.3 Ratio        Lupus Profile (10.30.23 @ 16:14)    APTT 100%: 59.1 sec   APTT 50/50 2Hour Incub: 47.0: Incubation for 2 hrs at 37 deg C aids in the determination of a  time-dependent inhibitor or, in some cases, the presence of a lupus  anticoagulant. The APTT 50/50 2 HR (PTT 50/50 2H) result can be compared  to the reference range and the APTT 2 HR PATIENT CONTROL (PAT CTL 2H). If  this result is prolonged when compared to the patient control, this  suggests the presence of an inhibitor, either factor-specific or lupus  anticoagulant.  If these two results are similarly prolonged, this could  suggest a lupus anticoagulant. If these two results are within the  reference range, factor deficiency should be evaluated. sec   PAT CTL 2H: 47.6 sec   APTT 50/50: 39.4: If non-correction of mixing study is present, the presence of  anticoagulant inhibitor drugs such as heparin or direct thrombin  inhibitors cannot be excluded. sec   DRVVT Ratio: 0.86 Ratio   DRVVT Interpretation: LA NEG: Either positive Silica Clotting Time (SCT) or positive Diluted Mio  Viper Venom Time (dRVVT) indicate the presence of Lupus Anticoagulant.  The results should be interpreted with caution when taking Factor Xa  inhibitors or direct thrombin inhibitors (DOACs). Lupus anticoagulant  testing should be performed at least 48 hours after the last dose, and  longer in patients with renal impairment   Silica Clotting Time S/C Ratio: 0.69 Ratio   Silica Clotting Time Interpretation: LA NEG: Either positive Silica Clotting Time (SCT) or positive Diluted Mio  Viper Venom Time (dRVVT) indicate the presence of Lupus Anticoagulant.  The results should be interpreted with caution when taking heparin, LMW  heparin, Factor Xa inhibitors or direct thrombin inhibitors (DOACs).  Lupus anticoagulant testing should be performed at least 12 hours after  the last dose of heparin or LMW heparin, 48 hours after the last dose of  DOACs, and longer in patients with renal impairment.      RADIOLOGY & ADDITIONAL STUDIES:    < from: CT Angio Chest PE Protocol w/ IV Cont (07.19.23 @ 20:30) >  IMPRESSION:  Limited evaluation for lobar, segmental and subsegmental pulmonary   embolism. No main, left or right pulmonary embolism.    Moderate pericardial effusion.    Mosaic attenuation may be in part due to expiratory phase of imaging.   Suggestion of superimposed patchy lung opacities that may be infectious   or inflammatory.    < end of copied text >    < from: CT Abdomen and Pelvis w/ IV Cont (05.12.22 @ 23:40) >  IMPRESSION:  Splenomegaly.    No acute findings in the abdomen or pelvis.      < end of copied text >      < from: US Duplex Venous Lower Ext Complete, Bilateral (10.22.23 @ 23:15) >  IMPRESSION:  No evidence of deep venous thrombosis in either lower extremity where   visualized.        --- End of Report ---    < end of copied text >      < from: Xray Chest 1 View- PORTABLE-Urgent (Xray Chest 1 View- PORTABLE-Urgent .) (10.30.23 @ 12:31) >  INTERPRETATION:  AP chest on October 30, 2023 at 11:54 AM. Clinically   patient has CHF.    Heart enlargement again noted.    Diffuse moderate congestive picture similar to October 26. Large right   jugular line on the earlier study is been removed.    IMPRESSION: Removal of right jugular line. Persistent moderate CHF.    < end of copied text >   HISTORY OF PRESENT ILLNESS:  A 38-year-old female with a history of bilateral DVT with recurrent pulmonary embolism treated with Eliquis, HFpEF, morbid obesity, and asthma, was initially admitted to [facility] on 10/18 for weakness, worsening lower extremity edema, and cough. She was admitted  for the treatment of pneumonia and right heart failure.  Patient was started on prednisone 20 mg daily for SLE based on her symptoms including alopecia, malar rash, photophobia, and polyarthritis  a couple of weeks before her admission to [facility]. Rheumatology was consulted for SLE management, work up was revealed;  - B2GP IgG >150 B2GP Ig A >150 and Lupus anticoagulant positive  - low complement c3 76 and c4 4  -  lymphopenia  and thrombocytopenia plts 121  - KATHIE 1:2560 homogen, dsDNA 671, Sm 2.1, Ro >8 , La>8 , RF 65 , CRP 45 ,  - negative RNP/CCP/ ACL/ ANCA/panda/centromere/scl70/  - ECHO shows severe RV enlargement and dysfunction , with RV overload, came with high BNP pulm HTN may be multifactorial  chronic thromboembolic DX, diastolic but also CTD    Given how active her SLE is with low complements high inflammatory markers, high dsDNA and synovitis of the hand and given how severe her right heart failure, she was started on a pulse steroid for 3 days ( 10/27-10/29). During her hospital stay, she developed progressive SARAH, transaminitis, and acute hypoxemic respiratory failure, which was attributed to cardiogenic shock due to severe pulmonary hypertension. Subsequently, the patient was transferred to [facility] for the management of severe pulmonary hypertension. Pulmonary team was started on sildenafil 20 mg TID for pulmonary HTN. Undergoing Rheumatology consult for SLE management. Patient reports alopecia, Raynoud, malar rash, polyarthritis. Denies sicca symptoms, oral, nasal ulcers, chest pain, diarrhea.      Her maternal Grandmother had scleroderma and her sister has RA      MEDICATIONS  (STANDING):  acetaZOLAMIDE    Tablet 250 milliGRAM(s) Oral once  aMIOdarone    Tablet 200 milliGRAM(s) Oral daily  budesonide 160 MICROgram(s)/formoterol 4.5 MICROgram(s) Inhaler 2 Puff(s) Inhalation two times a day  buMETAnide Injectable 2 milliGRAM(s) IV Push every 12 hours  heparin  Infusion. 1600 Unit(s)/Hr (16 mL/Hr) IV Continuous <Continuous>  insulin lispro (ADMELOG) corrective regimen sliding scale   SubCutaneous Before meals and at bedtime  methylPREDNISolone sodium succinate Injectable 40 milliGRAM(s) IV Push two times a day  montelukast 10 milliGRAM(s) Oral daily  PARoxetine 20 milliGRAM(s) Oral daily  polyethylene glycol 3350 17 Gram(s) Oral daily  sildenafil (REVATIO) 20 milliGRAM(s) Oral three times a day    MEDICATIONS  (PRN):  albuterol/ipratropium for Nebulization 3 milliLiter(s) Nebulizer every 6 hours PRN Shortness of Breath      Allergies    ceftriaxone (Hives)  iodine (Hives)  shellfish (Hives)    Intolerances        PERTINENT MEDICATION HISTORY:      Social History:  No hx of smoking, alcohol use. Occupation - advertising operations  Sister with Rheumatoid Arthritis   Father with stroke at 50, Mother diagnosed with Colon Cx 55 (30 Oct 2023 16:04)      PAST MEDICAL & SURGICAL HISTORY:    Pulmonary embolism  DVT, lower extremity  CHF (congestive heart failure)  Asthma  Anxiety  Severe obesity (BMI >= 40)  Hypertension  No significant past surgical history      OCCUPATION:  [employer]AV[employer] HISTORY:    FAMILY HISTORY:  Family history of colon cancer (Mother)  Family history of stroke (Father)    Vital Signs Last 24 Hrs  T(C): 36.3 (31 Oct 2023 08:00), Max: 36.8 (31 Oct 2023 00:00)  T(F): 97.4 (31 Oct 2023 08:00), Max: 98.2 (31 Oct 2023 00:00)  HR: 79 (31 Oct 2023 13:00) (75 - 87)  BP: 115/55 (31 Oct 2023 13:00) (101/60 - 118/55)  BP(mean): 77 (31 Oct 2023 13:00) (72 - 89)  RR: 18 (31 Oct 2023 13:00) (14 - 32)  SpO2: 93% (31 Oct 2023 13:00) (91% - 100%)    Parameters below as of 31 Oct 2023 08:00  Patient On (Oxygen Delivery Method): nasal cannula  O2 Flow (L/min): 4      Physical Exam:  General: No apparent distress, obese  HEENT: EOMI, MMM  CVS: +S1/S2, RRR, 3/6 systolic murmur   Resp: B/L inspiratory crackles   GI: Soft, NT/ND +BS  MSK: Bilateral LE edema, no warmth/erythema of the joints of the UE/LE  Neuro: AAOx3  Skin: Maculopapular sheila on the right upper extremity     LABS:                        9.4    4.30  )-----------( 161      ( 31 Oct 2023 00:31 )             32.8     10-31    143  |  97  |  104<H>  ----------------------------<  240<H>  3.4<L>   |  36<H>  |  1.68<H>    Ca    9.2      31 Oct 2023 00:31  Phos  4.2     10-31  Mg     2.3     10-31    TPro  7.2  /  Alb  3.3  /  TBili  1.3<H>  /  DBili  x   /  AST  33  /  ALT  250<H>  /  AlkPhos  104  10-31    PT/INR - ( 31 Oct 2023 00:31 )   PT: 13.8 sec;   INR: 1.33 ratio         PTT - ( 31 Oct 2023 00:31 )  PTT:62.7 sec  Urinalysis Basic - ( 31 Oct 2023 00:31 )    Color: x / Appearance: x / SG: x / pH: x  Gluc: 240 mg/dL / Ketone: x  / Bili: x / Urobili: x   Blood: x / Protein: x / Nitrite: x   Leuk Esterase: x / RBC: x / WBC x   Sq Epi: x / Non Sq Epi: x / Bacteria: x        Rheumatology Work Up    C3 Complement, Serum: 18 mg/dL *L* [81 - 157] (10-30-23 @ 16:14)  C4 Complement, Serum: 2 mg/dL *L* [13 - 39] (10-30-23 @ 16:14)  C-Reactive Protein, Serum: 6 mg/L *H* [0 - 4] (10-30-23 @ 16:14)  Sedimentation Rate, Erythrocyte: 96 mm/hr *H* [0 - 15] (10-30-23 @ 16:14)    Anti Nuclear Factor Titer: >1:2560: Antinuclear AB (KATHIE), IFA Method (10.22.23 @ 18:10)  KATHIE Pattern: Homogeneous (10.22.23 @ 18:10)    Double Stranded DNA Antibody: 347: Method: EIA            Reference Ranges            Interpretation            <= 29    IU/mL     Negative            30 - 75  IU/mL     Borderline            > 75      IU/mL     Positive IU/mL (10.22.23 @ 22:50)    Sjogren&#x27;s Syndrome Antibodies (10.28.23 @ 01:57)    Anti SS-A Antibody: >8.0 AI   Anti SS-B Antibody: >8.0: Fluorescent Bead Immunoassay   Reference Ranges for SS-A AND SS-B:   <1.0 AI (negative)   > or =1.0 AI (positive)   Reference values apply  to all ages. AI    Anticardiolipin Antibody Level, Total: Positive: Some false positive results are to be expected when comparing the KAREN  Screen to specific IgG and IgM anticardiolipin tests.  A slight  oversensitivity is built into the KAREN Screen as a margin to ensure that  weakly positive samples will not be missed.  In addition, some samples  may have low-level concentrations of IgG, IgA, and IgM anticardiolipin  antibodies that would be found below the positive cutoff for each  individual test yet the additive effect might cause the KAREN Screen assay  resultsto be positive.  Method: EIA (10.28.23 @ 01:57)    Beta 2 Glycoprotein 1 Antibody Screen: Positive: Some false positive results are to be expected when comparing the B2GP1  Screen to specific IgG, IgA and IgM B2GP1 tests.  A slight  oversensitivity is built into the B2GP1 Screen as a margin to ensure that  weakly positive samples will not be missed.  In addition, some samples  may have low-level concentrations of IgG, IgA, and IgM B2GP1 antibodies  that would be found below the positive cutoff for each individual test  yet the additive effect might cause the B2GP1 Screen assay results to be  positive. (10.28.23 @ 01:57)    JONATHAN Antibody Screening Test (10.28.23 @ 01:57)    SM (Pierson) Ab FBIA: 0.7 AI    Anti-Ribonuclear Protein: 0.5: Fluorescent Bead Immunoassy                      Reference Ranges for RNP and SM:                      <1.0 AI (negative)                      > or =1.0 AI (positive)                      Reference values apply to all ages AI    Scleroderma Antibodies (10.28.23 @ 01:57)    Scleroderma Antibodies: 0.3: Fluorescent Bead Immunoassay                       Scl 70  Antibodies, IgG                      <1.0 AI (negative)                      > or =1.0 AI (positive)                      Reference values apply to all ages. AI    Protein/Creatinine Ratio, Urine (10.28.23 @ 04:50): Creatinine, Random Urine: 58: Reference Ranges have NOT been established for random urine analytes due  to variability in fluid intake and concentration. mg/dL   Total Protein, Random Urine: 17.0 mg/dL   Protein/Creatinine Ratio Calculation: 0.3 Ratio        Lupus Profile (10.30.23 @ 16:14)    APTT 100%: 59.1 sec   APTT 50/50 2Hour Incub: 47.0: Incubation for 2 hrs at 37 deg C aids in the determination of a  time-dependent inhibitor or, in some cases, the presence of a lupus  anticoagulant. The APTT 50/50 2 HR (PTT 50/50 2H) result can be compared  to the reference range and the APTT 2 HR PATIENT CONTROL (PAT CTL 2H). If  this result is prolonged when compared to the patient control, this  suggests the presence of an inhibitor, either factor-specific or lupus  anticoagulant.  If these two results are similarly prolonged, this could  suggest a lupus anticoagulant. If these two results are within the  reference range, factor deficiency should be evaluated. sec   PAT CTL 2H: 47.6 sec   APTT 50/50: 39.4: If non-correction of mixing study is present, the presence of  anticoagulant inhibitor drugs such as heparin or direct thrombin  inhibitors cannot be excluded. sec   DRVVT Ratio: 0.86 Ratio   DRVVT Interpretation: LA NEG: Either positive Silica Clotting Time (SCT) or positive Diluted Mio  Viper Venom Time (dRVVT) indicate the presence of Lupus Anticoagulant.  The results should be interpreted with caution when taking Factor Xa  inhibitors or direct thrombin inhibitors (DOACs). Lupus anticoagulant  testing should be performed at least 48 hours after the last dose, and  longer in patients with renal impairment   Silica Clotting Time S/C Ratio: 0.69 Ratio   Silica Clotting Time Interpretation: LA NEG: Either positive Silica Clotting Time (SCT) or positive Diluted Mio  Viper Venom Time (dRVVT) indicate the presence of Lupus Anticoagulant.  The results should be interpreted with caution when taking heparin, LMW  heparin, Factor Xa inhibitors or direct thrombin inhibitors (DOACs).  Lupus anticoagulant testing should be performed at least 12 hours after  the last dose of heparin or LMW heparin, 48 hours after the last dose of  DOACs, and longer in patients with renal impairment.      RADIOLOGY & ADDITIONAL STUDIES:    < from: CT Angio Chest PE Protocol w/ IV Cont (07.19.23 @ 20:30) >  IMPRESSION:  Limited evaluation for lobar, segmental and subsegmental pulmonary   embolism. No main, left or right pulmonary embolism.    Moderate pericardial effusion.    Mosaic attenuation may be in part due to expiratory phase of imaging.   Suggestion of superimposed patchy lung opacities that may be infectious   or inflammatory.    < end of copied text >    < from: CT Abdomen and Pelvis w/ IV Cont (05.12.22 @ 23:40) >  IMPRESSION:  Splenomegaly.    No acute findings in the abdomen or pelvis.      < end of copied text >      < from: US Duplex Venous Lower Ext Complete, Bilateral (10.22.23 @ 23:15) >  IMPRESSION:  No evidence of deep venous thrombosis in either lower extremity where   visualized.        --- End of Report ---    < end of copied text >      < from: Xray Chest 1 View- PORTABLE-Urgent (Xray Chest 1 View- PORTABLE-Urgent .) (10.30.23 @ 12:31) >  INTERPRETATION:  AP chest on October 30, 2023 at 11:54 AM. Clinically   patient has CHF.    Heart enlargement again noted.    Diffuse moderate congestive picture similar to October 26. Large right   jugular line on the earlier study is been removed.    IMPRESSION: Removal of right jugular line. Persistent moderate CHF.    < end of copied text >   HISTORY OF PRESENT ILLNESS:  A 38-year-old female with a history of bilateral DVT with recurrent pulmonary embolism treated with Eliquis, HFpEF, morbid obesity, and asthma, was initially admitted to [facility] on 10/18 for weakness, worsening lower extremity edema, and cough. She was admitted  for the treatment of pneumonia and right heart failure.  Patient was started on prednisone 20 mg daily for SLE based on her symptoms including alopecia, malar rash, photophobia, and polyarthritis  a couple of weeks before her admission to [facility]. Rheumatology was consulted for SLE management, work up was revealed;  - B2GP IgG >150 B2GP Ig A >150 and Lupus anticoagulant positive  - low complement c3 76 and c4 4  -  lymphopenia  and thrombocytopenia plts 121  - KATHIE 1:2560 homogen, dsDNA 671, Sm 2.1, Ro >8 , La>8 , RF 65 , CRP 45 ,  - negative RNP/CCP/ ACL/ ANCA/panda/centromere/scl70/  - ECHO shows severe RV enlargement and dysfunction , with RV overload, came with high BNP pulm HTN may be multifactorial  chronic thromboembolic DX, diastolic but also CTD    Given how active her SLE is with low complements high inflammatory markers, high dsDNA and synovitis of the hand and given how severe her right heart failure, she was started on a pulse steroid for 3 days ( 10/27-10/29). During her hospital stay, she developed progressive SARAH, transaminitis, and acute hypoxemic respiratory failure, which was attributed to cardiogenic shock due to severe pulmonary hypertension. Subsequently, the patient was transferred to [facility] for the management of severe pulmonary hypertension. Pulmonary team was started on sildenafil 20 mg TID for pulmonary HTN. Undergoing Rheumatology consult for SLE management. Patient reports alopecia, Raynoud, malar rash, polyarthritis. Denies sicca symptoms, oral, nasal ulcers, chest pain, diarrhea.      Her maternal Grandmother had scleroderma and her sister has RA      MEDICATIONS  (STANDING):  acetaZOLAMIDE    Tablet 250 milliGRAM(s) Oral once  aMIOdarone    Tablet 200 milliGRAM(s) Oral daily  budesonide 160 MICROgram(s)/formoterol 4.5 MICROgram(s) Inhaler 2 Puff(s) Inhalation two times a day  buMETAnide Injectable 2 milliGRAM(s) IV Push every 12 hours  heparin  Infusion. 1600 Unit(s)/Hr (16 mL/Hr) IV Continuous <Continuous>  insulin lispro (ADMELOG) corrective regimen sliding scale   SubCutaneous Before meals and at bedtime  methylPREDNISolone sodium succinate Injectable 40 milliGRAM(s) IV Push two times a day  montelukast 10 milliGRAM(s) Oral daily  PARoxetine 20 milliGRAM(s) Oral daily  polyethylene glycol 3350 17 Gram(s) Oral daily  sildenafil (REVATIO) 20 milliGRAM(s) Oral three times a day    MEDICATIONS  (PRN):  albuterol/ipratropium for Nebulization 3 milliLiter(s) Nebulizer every 6 hours PRN Shortness of Breath      Allergies    ceftriaxone (Hives)  iodine (Hives)  shellfish (Hives)    Intolerances        PERTINENT MEDICATION HISTORY:      Social History:  No hx of smoking, alcohol use. Occupation - advertising operations  Sister with Rheumatoid Arthritis   Father with stroke at 50, Mother diagnosed with Colon Cx 55 (30 Oct 2023 16:04)      PAST MEDICAL & SURGICAL HISTORY:    Pulmonary embolism  DVT, lower extremity  CHF (congestive heart failure)  Asthma  Anxiety  Severe obesity (BMI >= 40)  Hypertension  No significant past surgical history      OCCUPATION:  [employer] HISTORY:    FAMILY HISTORY:  Family history of colon cancer (Mother)  Family history of stroke (Father)    Vital Signs Last 24 Hrs  T(C): 36.3 (31 Oct 2023 08:00), Max: 36.8 (31 Oct 2023 00:00)  T(F): 97.4 (31 Oct 2023 08:00), Max: 98.2 (31 Oct 2023 00:00)  HR: 79 (31 Oct 2023 13:00) (75 - 87)  BP: 115/55 (31 Oct 2023 13:00) (101/60 - 118/55)  BP(mean): 77 (31 Oct 2023 13:00) (72 - 89)  RR: 18 (31 Oct 2023 13:00) (14 - 32)  SpO2: 93% (31 Oct 2023 13:00) (91% - 100%)    Parameters below as of 31 Oct 2023 08:00  Patient On (Oxygen Delivery Method): nasal cannula  O2 Flow (L/min): 4      Physical Exam:  General: No apparent distress, obese  HEENT: EOMI, MMM  CVS: +S1/S2, RRR, 3/6 systolic murmur   Resp: B/L inspiratory crackles   GI: Soft, NT/ND +BS  MSK: Bilateral LE edema, no warmth/erythema of the joints of the UE/LE  Neuro: AAOx3  Skin: Maculopapular rash on the right upper extremity     LABS:                        9.4    4.30  )-----------( 161      ( 31 Oct 2023 00:31 )             32.8     10-31    143  |  97  |  104<H>  ----------------------------<  240<H>  3.4<L>   |  36<H>  |  1.68<H>    Ca    9.2      31 Oct 2023 00:31  Phos  4.2     10-31  Mg     2.3     10-31    TPro  7.2  /  Alb  3.3  /  TBili  1.3<H>  /  DBili  x   /  AST  33  /  ALT  250<H>  /  AlkPhos  104  10-31    PT/INR - ( 31 Oct 2023 00:31 )   PT: 13.8 sec;   INR: 1.33 ratio         PTT - ( 31 Oct 2023 00:31 )  PTT:62.7 sec  Urinalysis Basic - ( 31 Oct 2023 00:31 )    Color: x / Appearance: x / SG: x / pH: x  Gluc: 240 mg/dL / Ketone: x  / Bili: x / Urobili: x   Blood: x / Protein: x / Nitrite: x   Leuk Esterase: x / RBC: x / WBC x   Sq Epi: x / Non Sq Epi: x / Bacteria: x        Rheumatology Work Up    C3 Complement, Serum: 18 mg/dL *L* [81 - 157] (10-30-23 @ 16:14)  C4 Complement, Serum: 2 mg/dL *L* [13 - 39] (10-30-23 @ 16:14)  C-Reactive Protein, Serum: 6 mg/L *H* [0 - 4] (10-30-23 @ 16:14)  Sedimentation Rate, Erythrocyte: 96 mm/hr *H* [0 - 15] (10-30-23 @ 16:14)    Anti Nuclear Factor Titer: >1:2560: Antinuclear AB (KATHIE), IFA Method (10.22.23 @ 18:10)  KATHIE Pattern: Homogeneous (10.22.23 @ 18:10)    Double Stranded DNA Antibody: 347: Method: EIA            Reference Ranges            Interpretation            <= 29    IU/mL     Negative            30 - 75  IU/mL     Borderline            > 75      IU/mL     Positive IU/mL (10.22.23 @ 22:50)    Sjogren&#x27;s Syndrome Antibodies (10.28.23 @ 01:57)    Anti SS-A Antibody: >8.0 AI   Anti SS-B Antibody: >8.0: Fluorescent Bead Immunoassay   Reference Ranges for SS-A AND SS-B:   <1.0 AI (negative)   > or =1.0 AI (positive)   Reference values apply  to all ages. AI    Anticardiolipin Antibody Level, Total: Positive: Some false positive results are to be expected when comparing the KAREN  Screen to specific IgG and IgM anticardiolipin tests.  A slight  oversensitivity is built into the KAREN Screen as a margin to ensure that  weakly positive samples will not be missed.  In addition, some samples  may have low-level concentrations of IgG, IgA, and IgM anticardiolipin  antibodies that would be found below the positive cutoff for each  individual test yet the additive effect might cause the KAREN Screen assay  resultsto be positive.  Method: EIA (10.28.23 @ 01:57)    Beta 2 Glycoprotein 1 Antibody Screen: Positive: Some false positive results are to be expected when comparing the B2GP1  Screen to specific IgG, IgA and IgM B2GP1 tests.  A slight  oversensitivity is built into the B2GP1 Screen as a margin to ensure that  weakly positive samples will not be missed.  In addition, some samples  may have low-level concentrations of IgG, IgA, and IgM B2GP1 antibodies  that would be found below the positive cutoff for each individual test  yet the additive effect might cause the B2GP1 Screen assay results to be  positive. (10.28.23 @ 01:57)    JONATHAN Antibody Screening Test (10.28.23 @ 01:57)    SM ([place]erson) Ab FBIA: 0.7 AI    Anti-Ribonuclear Protein: 0.5: Fluorescent Bead Immunoassy                      Reference Ranges for RNP and SM:                      <1.0 AI (negative)                      > or =1.0 AI (positive)                      Reference values apply to all ages AI    Scleroderma Antibodies (10.28.23 @ 01:57)    Scleroderma Antibodies: 0.3: Fluorescent Bead Immunoassay                       Scl 70  Antibodies, IgG                      <1.0 AI (negative)                      > or =1.0 AI (positive)                      Reference values apply to all ages. AI    Protein/Creatinine Ratio, Urine (10.28.23 @ 04:50): Creatinine, Random Urine: 58: Reference Ranges have NOT been established for random urine analytes due  to variability in fluid intake and concentration. mg/dL   Total Protein, Random Urine: 17.0 mg/dL   Protein/Creatinine Ratio Calculation: 0.3 Ratio        Lupus Profile (10.30.23 @ 16:14)    APTT 100%: 59.1 sec   APTT 50/50 2Hour Incub: 47.0: Incubation for 2 hrs at 37 deg C aids in the determination of a  time-dependent inhibitor or, in some cases, the presence of a lupus  anticoagulant. The APTT 50/50 2 HR (PTT 50/50 2H) result can be compared  to the reference range and the APTT 2 HR PATIENT CONTROL (PAT CTL 2H). If  this result is prolonged when compared to the patient control, this  suggests the presence of an inhibitor, either factor-specific or lupus  anticoagulant.  If these two results are similarly prolonged, this could  suggest a lupus anticoagulant. If these two results are within the  reference range, factor deficiency should be evaluated. sec   PAT CTL 2H: 47.6 sec   APTT 50/50: 39.4: If non-correction of mixing study is present, the presence of  anticoagulant inhibitor drugs such as heparin or direct thrombin  inhibitors cannot be excluded. sec   DRVVT Ratio: 0.86 Ratio   DRVVT Interpretation: LA NEG: Either positive Silica Clotting Time (SCT) or positive Diluted Mio  Viper Venom Time (dRVVT) indicate the presence of Lupus Anticoagulant.  The results should be interpreted with caution when taking Factor Xa  inhibitors or direct thrombin inhibitors (DOACs). Lupus anticoagulant  testing should be performed at least 48 hours after the last dose, and  longer in patients with renal impairment   Silica Clotting Time S/C Ratio: 0.69 Ratio   Silica Clotting Time Interpretation: LA NEG: Either positive Silica Clotting Time (SCT) or positive Diluted Mio  Viper Venom Time (dRVVT) indicate the presence of Lupus Anticoagulant.  The results should be interpreted with caution when taking heparin, LMW  heparin, Factor Xa inhibitors or direct thrombin inhibitors (DOACs).  Lupus anticoagulant testing should be performed at least 12 hours after  the last dose of heparin or LMW heparin, 48 hours after the last dose of  DOACs, and longer in patients with renal impairment.      RADIOLOGY & ADDITIONAL STUDIES:    < from: CT Angio Chest PE Protocol w/ IV Cont (07.19.23 @ 20:30) >  IMPRESSION:  Limited evaluation for lobar, segmental and subsegmental pulmonary   embolism. No main, left or right pulmonary embolism.    Moderate pericardial effusion.    Mosaic attenuation may be in part due to expiratory phase of imaging.   Suggestion of superimposed patchy lung opacities that may be infectious   or inflammatory.    < end of copied text >    < from: CT Abdomen and Pelvis w/ IV Cont (05.12.22 @ 23:40) >  IMPRESSION:  Splenomegaly.    No acute findings in the abdomen or pelvis.      < end of copied text >      < from: US Duplex Venous Lower Ext Complete, Bilateral (10.22.23 @ 23:15) >  IMPRESSION:  No evidence of deep venous thrombosis in either lower extremity where   visualized.        --- End of Report ---    < end of copied text >      < from: Xray Chest 1 View- PORTABLE-Urgent (Xray Chest 1 View- PORTABLE-Urgent .) (10.30.23 @ 12:31) >  INTERPRETATION:  AP chest on October 30, 2023 at 11:54 AM. Clinically   patient has CHF.    Heart enlargement again noted.    Diffuse moderate congestive picture similar to October 26. Large right   jugular line on the earlier study is been removed.    IMPRESSION: Removal of right jugular line. Persistent moderate CHF.    < end of copied text >

## 2023-10-31 NOTE — CHART NOTE - NSCHARTNOTEFT_GEN_A_CORE
MICU Transfer Note  ---------------------------    Transfer from: MICU  Transfer to:  ( ) Medicine    (X) Telemetry    (  ) RCU    (  ) Palliative    (  ) Stroke Unit    (  ) _______________  Accepting Physician: Dr. Sonia Lang       MICU COURSE  39 y/o F with a h/o b/l DVTs, recurrent PE on eliquis, HFpEF, morbid obesity, asthma, chronic hyperkalemia, admitted to [place] on 10/18 with complaints of weakness, worsening LE edema, cough, and rapid weight gain.Of note, was being worked up as an outpatient for possible SLE given facial rash, photophobia, and joint pains and had been started on prednisone 20 mg by her PCP x ~1 month prior to arrival at [place]. Outpatient  results from 1 week prior showing low C3/4 complement and elevated CRP. Pt was admitted to medicine for treatment of PNA. Hospital course complicated by progressive SARAH, HAGMA, transaminitis and acute hypoxemic respiratory failure secondary to cardiogenic shock in the face of severe pHTN with acute on chronic cor pulmonale.     Her pHTN was thought to be likely multifactorial due to PE/OHS/MICHAEL/autoimmune process. Patient cardiogenic shock and iniated on milrinone on 10/20. She had a [place] placement on 10/23 revealing elevated pulmonary pressures, and required levophed from 10/22-23 for pressure support, diuresis with bumex was also initiated on 10/22 and maintained, and is now s/p inshaled nitric oxide.. Patient shock state has improved. Currently on .125 of milrinone. Bumex DC'd with increasing cr. Patient persistently hypoxic, requiring HFNC. She was initiated on sildenafil on 10/26, now on 20 TID after conversation with  Dr. Del Castillo. . S/P emperic treatment for PNA with zosyn 10/19-26. She was placed on a heparin gtt due to c/f APLS    Presenting for a transfer from [facility]. At this time patient was taken off of milrinone. Admitted to MICU 10/30   In [facility] MICU - patient was taken off milrinone. BP stable off pressors. Continued with bumex 2g BID. 3L output overnight. Currently on 4L NC.   Rheumatology was consulted for new diagnosis of SLE vs APLS.     OBJECTIVE --  Vital Signs Last 24 Hrs  T(C): 36.3 (31 Oct 2023 08:00), Max: 36.8 (31 Oct 2023 00:00)  T(F): 97.4 (31 Oct 2023 08:00), Max: 98.2 (31 Oct 2023 00:00)  HR: 75 (31 Oct 2023 12:00) (75 - 87)  BP: 118/55 (31 Oct 2023 12:00) (101/60 - 140/67)  BP(mean): 79 (31 Oct 2023 12:00) (72 - 89)  RR: 18 (31 Oct 2023 12:00) (14 - 32)  SpO2: 94% (31 Oct 2023 12:00) (91% - 100%)    Parameters below as of 31 Oct 2023 08:00  Patient On (Oxygen Delivery Method): nasal cannula  O2 Flow (L/min): 4      I&O's Summary    30 Oct 2023 07:01  -  31 Oct 2023 07:00  --------------------------------------------------------  IN: 288 mL / OUT: 3475 mL / NET: -3187 mL    31 Oct 2023 07:01  -  31 Oct 2023 12:16  --------------------------------------------------------  IN: 80 mL / OUT: 1400 mL / NET: -1320 mL        MEDICATIONS  (STANDING):  acetaZOLAMIDE    Tablet 250 milliGRAM(s) Oral once  aMIOdarone    Tablet 200 milliGRAM(s) Oral daily  budesonide 160 MICROgram(s)/formoterol 4.5 MICROgram(s) Inhaler 2 Puff(s) Inhalation two times a day  buMETAnide Injectable 2 milliGRAM(s) IV Push every 12 hours  heparin  Infusion. 1600 Unit(s)/Hr (16 mL/Hr) IV Continuous <Continuous>  insulin lispro (ADMELOG) corrective regimen sliding scale   SubCutaneous Before meals and at bedtime  methylPREDNISolone sodium succinate Injectable 40 milliGRAM(s) IV Push two times a day  montelukast 10 milliGRAM(s) Oral daily  PARoxetine 20 milliGRAM(s) Oral daily  polyethylene glycol 3350 17 Gram(s) Oral daily  sildenafil (REVATIO) 20 milliGRAM(s) Oral three times a day    MEDICATIONS  (PRN):  albuterol/ipratropium for Nebulization 3 milliLiter(s) Nebulizer every 6 hours PRN Shortness of Breath        LABS                                            9.4                   Neurophils% (auto):   92.6   (10-31 @ 00:31):    4.30 )-----------(161          Lymphocytes% (auto):  2.8                                           32.8                   Eosinphils% (auto):   0.0      Manual%: Neutrophils x    ; Lymphocytes x    ; Eosinophils x    ; Bands%: x    ; Blasts x                                    143    |  97     |  104                 Calcium: 9.2   / iCa: x      (10-31 @ 00:31)    ----------------------------<  240       Magnesium: 2.3                              3.4     |  36     |  1.68             Phosphorous: 4.2      TPro  7.2    /  Alb  3.3    /  TBili  1.3    /  DBili  x      /  AST  33     /  ALT  250    /  AlkPhos  104    31 Oct 2023 00:31    ( 10-31 @ 00:31 )   PT: 13.8 sec;   INR: 1.33 ratio  aPTT: 62.7 sec          ASSESSMENT & PLAN:   37yo female with pmh of BMI > 40, CHF-diastolic, B/L LE DVT + PE on Eliquis, HTN, Asthma,eczema and Anxiety presents to the ED w/ 1 week of worsening SOB on Exertion. Admitted to [facility] where pt was found to have cardiogenic shock, hypoxic respiratory failure requiring HFNC ISO of severe pHTN with acute on chronic cor pulmonale, requiring inotropes, vasopressors, pulmonary vasodilators and IV diuretics. Also found to have newly diagnosed SLE and APLS treated with steroids. Her course c/b transaminitis and SARAH. Now transfered to [facility] for management of pulmonary HTN.     #Neuro   A&Ox3    #Respiratory  Acute on Chronic respiratory failure 2/2 to CHF and pulmonary Hypertension   - on 4NC currently   Bumex 2g qd - Net negative goal 1L - 2L  Currently with metabolic alkosis, s/p diamox 2501x  w/ potassium repletion, will recheck gas and diures based on results  > Duonebs q6, Symbicort   > Montelukast 10mg daily     #Pulmonary Hypertension - Unclear primary / autoimmune etiology /vs Chronic PE. however went to [facility] for profound volume overload ISO Cor Pulmonale  - Hemodynamics and Echo as Below   > f/u with Dr. Del Castillo (Pulmonary HTN specialist)   > C/w Sildenafil 20  > CT angio chest evaluation for CTEPH - Patient had hives w/ prior CT, f/u with radiology for protocol.   > Plan for RHC     #CV - Admitted for cardiogenic shock - Acute on chronic heart failure with preserved ejection fraction (HFpEF)  On Milrinone .125 will wean as tolerated, as low concern for LV dysfunction.   ECHO 10/22 - Dilated RV with severely reduced RV function   - Strict I&Os, Daily Weights, q24 labs   - Bumex 2g IV qd     Hemodynamics ([place] in [facility])  10/26 milrinone 0.250 mcg/kg/min CVP 9 /31/54 PCWP 7 PA sat 61.9% /71/91  Tg CO/CI  7.7/3.0 FIO2 40% PVR 6.1 charlton  10/25 milrinone 0.250 mcg/kg/min+vaso 0.04 u/min+Taegan 5 ppm CVP 10 PA 96/21/42 PCWP 7 PA sat 63% /68/83 HR 99 Tg CO/CI  7.7/3.0 FIO2 40%  10/24 milrinone 0.250 mcg/kg/min+vaso 0.04 u/min+phenylephrine 0.1 mcg/kg/min+Teagan 11 ppm CVP 8 OA 85/24/44 PCWP n/a PA sat 89.3% HR 95 /36/76  10/23 (mil 0.25, levo .08, Teagan 12 ppm): -140s, /72 (87), CVP 5, PA 87/33/52, PCW 7, PA sat 78.2%, Tg CO/CI 9.6/3.8,  dsc, PVR 4.69 CHARLTON    Aflutter with RVR   - s/p Amiodarone load   - Continue with amio 200PO    #GI - Regular diet     # - SARAH - BUN / CR 35/.8 on admission - ATN 2/2 CV shock Improving   - Metabolic Alkaosis  w/ respiratory acidosis - Diamox 1x with goal bicarb 30-32, replete K to help with alkosis    #Endocrine   - Moderate ISS    #Rheum  4 weeks ago was noted to have a high titer KATHIE and was referred to Rheumatologist Dr Maldonado. Treated with pred 20 mg outpatient.  + SLE serology - C3C4 extremely low, ds DNA v high  > Repeat APLS, Sjogren Scleraderma labs   > Stress dose Hydrocortisone 100q8 10/26  > s/p 1g Solumedrol 10/27-30  > s/p Solumedrol 60q6  plan for taper, currently 40q12   f/u Rheum consults     #Hemeonc   - APLS + , hx of recurrent DVT / PE, takes Eliquis at Home. 6 years ago with bilateral DVT and associated PE. Has been taking eliquis since.   c/w Heparin ggt   - F/U - genetics hypercoagulable workup    #ID - treated for aspiration PNA (7d) - currently off Abx  - s/pZosyn 10/19-10/26, s/p Azitro 10/19-10/21  - Cx 10/22 NGTD     #Ethics   - FULL CODE     For Follow-Up:  [ ] Appreciate Recommendations for Rheumatology regarding steroid taper and New APLS diagnosis / ? SLE diagnosis   [ ] Protocol for CT angio Chest to r/o CTEPH - prior hives from contrast MICU Transfer Note  ---------------------------    Transfer from: MICU  Transfer to:  ( ) Medicine    (X) Telemetry    (  ) RCU    (  ) Palliative    (  ) Stroke Unit    (  ) _______________  Accepting Physician: Dr. Sonia Lang       MICU COURSE  39 y/o F with a h/o b/l DVTs, recurrent PE on eliquis, HFpEF, morbid obesity, asthma, chronic hyperkalemia, admitted to [place] on 10/18 with complaints of weakness, worsening LE edema, cough, and rapid weight gain.Of note, was being worked up as an outpatient for possible SLE given facial rash, photophobia, and joint pains and had been started on prednisone 20 mg by her PCP x ~1 month prior to arrival at [place]. Outpatient  results from 1 week prior showing low C3/4 complement and elevated CRP. Pt was admitted to medicine for treatment of PNA. Hospital course complicated by progressive SARAH, HAGMA, transaminitis and acute hypoxemic respiratory failure secondary to cardiogenic shock in the face of severe pHTN with acute on chronic cor pulmonale.     Her pHTN was thought to be likely multifactorial due to PE/OHS/MICHAEL/autoimmune process. Patient cardiogenic shock and iniated on milrinone on 10/20. She had a [place] placement on 10/23 revealing elevated pulmonary pressures, and required levophed from 10/22-23 for pressure support, diuresis with bumex was also initiated on 10/22 and maintained, and is now s/p inshaled nitric oxide.. Patient shock state has improved. Currently on .125 of milrinone. Bumex DC'd with increasing cr. Patient persistently hypoxic, requiring HFNC. She was initiated on sildenafil on 10/26, now on 20 TID after conversation with  Dr. Del Castillo. . S/P emperic treatment for PNA with zosyn 10/19-26. She was placed on a heparin gtt due to c/f APLS    Presenting for a transfer from [facility]. At this time patient was taken off of milrinone. Admitted to MICU 10/30   In [facility] MICU - patient was taken off milrinone. BP stable off pressors. Continued with bumex 2g BID. 3L output overnight. Currently on 4L NC.   Rheumatology was consulted for new diagnosis of SLE vs APLS.     OBJECTIVE --  Vital Signs Last 24 Hrs  T(C): 36.3 (31 Oct 2023 08:00), Max: 36.8 (31 Oct 2023 00:00)  T(F): 97.4 (31 Oct 2023 08:00), Max: 98.2 (31 Oct 2023 00:00)  HR: 75 (31 Oct 2023 12:00) (75 - 87)  BP: 118/55 (31 Oct 2023 12:00) (101/60 - 140/67)  BP(mean): 79 (31 Oct 2023 12:00) (72 - 89)  RR: 18 (31 Oct 2023 12:00) (14 - 32)  SpO2: 94% (31 Oct 2023 12:00) (91% - 100%)    Parameters below as of 31 Oct 2023 08:00  Patient On (Oxygen Delivery Method): nasal cannula  O2 Flow (L/min): 4      I&O's Summary    30 Oct 2023 07:01  -  31 Oct 2023 07:00  --------------------------------------------------------  IN: 288 mL / OUT: 3475 mL / NET: -3187 mL    31 Oct 2023 07:01  -  31 Oct 2023 12:16  --------------------------------------------------------  IN: 80 mL / OUT: 1400 mL / NET: -1320 mL        MEDICATIONS  (STANDING):  acetaZOLAMIDE    Tablet 250 milliGRAM(s) Oral once  aMIOdarone    Tablet 200 milliGRAM(s) Oral daily  budesonide 160 MICROgram(s)/formoterol 4.5 MICROgram(s) Inhaler 2 Puff(s) Inhalation two times a day  buMETAnide Injectable 2 milliGRAM(s) IV Push every 12 hours  heparin  Infusion. 1600 Unit(s)/Hr (16 mL/Hr) IV Continuous <Continuous>  insulin lispro (ADMELOG) corrective regimen sliding scale   SubCutaneous Before meals and at bedtime  methylPREDNISolone sodium succinate Injectable 40 milliGRAM(s) IV Push two times a day  montelukast 10 milliGRAM(s) Oral daily  PARoxetine 20 milliGRAM(s) Oral daily  polyethylene glycol 3350 17 Gram(s) Oral daily  sildenafil (REVATIO) 20 milliGRAM(s) Oral three times a day    MEDICATIONS  (PRN):  albuterol/ipratropium for Nebulization 3 milliLiter(s) Nebulizer every 6 hours PRN Shortness of Breath        LABS                                            9.4                   Neurophils% (auto):   92.6   (10-31 @ 00:31):    4.30 )-----------(161          Lymphocytes% (auto):  2.8                                           32.8                   Eosinphils% (auto):   0.0      Manual%: Neutrophils x    ; Lymphocytes x    ; Eosinophils x    ; Bands%: x    ; Blasts x                                    143    |  97     |  104                 Calcium: 9.2   / iCa: x      (10-31 @ 00:31)    ----------------------------<  240       Magnesium: 2.3                              3.4     |  36     |  1.68             Phosphorous: 4.2      TPro  7.2    /  Alb  3.3    /  TBili  1.3    /  DBili  x      /  AST  33     /  ALT  250    /  AlkPhos  104    31 Oct 2023 00:31    ( 10-31 @ 00:31 )   PT: 13.8 sec;   INR: 1.33 ratio  aPTT: 62.7 sec          ASSESSMENT & PLAN:   37yo female with pmh of BMI > 40, CHF-diastolic, B/L LE DVT + PE on Eliquis, HTN, Asthma,eczema and Anxiety presents to the ED w/ 1 week of worsening SOB on Exertion. Admitted to [facility] where pt was found to have cardiogenic shock, hypoxic respiratory failure requiring HFNC ISO of severe pHTN with acute on chronic cor pulmonale, requiring inotropes, vasopressors, pulmonary vasodilators and IV diuretics. Also found to have newly diagnosed SLE and APLS treated with steroids. Her course c/b transaminitis and SARAH. Now transfered to [facility] for management of pulmonary HTN.     #Neuro   A&Ox3    #Respiratory  Acute on Chronic respiratory failure 2/2 to CHF and pulmonary Hypertension   - on 4NC currently   Bumex 2g qd - Net negative goal 1L - 2L  Currently with metabolic alkosis, s/p diamox 2501x  w/ potassium repletion, will recheck gas and diures based on results  > Duonebs q6, Symbicort   > Montelukast 10mg daily     #Pulmonary Hypertension - Unclear primary / autoimmune etiology /vs Chronic PE. however went to [facility] for profound volume overload ISO Cor Pulmonale  - Hemodynamics and Echo as Below   > f/u with Dr. Del Castillo (Pulmonary HTN specialist)   > C/w Sildenafil 20  > CT angio chest evaluation for CTEPH - Patient had hives w/ prior CT, f/u with radiology for protocol.   > Plan for RHC     #CV - Admitted for cardiogenic shock - Acute on chronic heart failure with preserved ejection fraction (HFpEF)  On Milrinone .125 will wean as tolerated, as low concern for LV dysfunction.   ECHO 10/22 - Dilated RV with severely reduced RV function   - Strict I&Os, Daily Weights, q24 labs   - Bumex 2g IV qd     Hemodynamics ([place] in [facility])  10/26 milrinone 0.250 mcg/kg/min CVP 9 /31/54 PCWP 7 PA sat 61.9% /71/91  Tg CO/CI  7.7/3.0 FIO2 40% PVR 6.1 charlton  10/25 milrinone 0.250 mcg/kg/min+vaso 0.04 u/min+Teagan 5 ppm CVP 10 PA 96/21/42 PCWP 7 PA sat 63% /68/83 HR 99 Tg CO/CI  7.7/3.0 FIO2 40%  10/24 milrinone 0.250 mcg/kg/min+vaso 0.04 u/min+phenylephrine 0.1 mcg/kg/min+Teagan 11 ppm CVP 8 OA 85/24/44 PCWP n/a PA sat 89.3% HR 95 /36/76  10/23 (mil 0.25, levo .08, Teagan 12 ppm): -140s, /72 (87), CVP 5, PA 87/33/52, PCW 7, PA sat 78.2%, Tg CO/CI 9.6/3.8,  dsc, PVR 4.69 CHARLTON    Aflutter with RVR   - s/p Amiodarone load   - Continue with amio 200PO    #GI - Regular diet     # - SARAH - BUN / CR 35/.8 on admission - ATN 2/2 CV shock Improving   - Metabolic Alkaosis  w/ respiratory acidosis - Diamox 1x with goal bicarb 30-32, replete K to help with alkosis    #Endocrine   - Moderate ISS    #Rheum  4 weeks ago was noted to have a high titer KATHIE and was referred to Rheumatologist Dr Maldonado. Treated with pred 20 mg outpatient.  + SLE serology - C3C4 extremely low, ds DNA v high  > Repeat APLS, Sjogren Scleraderma labs   > Stress dose Hydrocortisone 100q8 10/26  > s/p 1g Solumedrol 10/27-30  > s/p Solumedrol 60q6  plan for taper, currently 40q12   f/u Rheum consults     #Hemeonc   - APLS + , hx of recurrent DVT / PE, takes Eliquis at Home. 6 years ago with bilateral DVT and associated PE. Has been taking eliquis since.   c/w Heparin ggt   - F/U - genetics hypercoagulable workup    #ID - treated for aspiration PNA (7d) - currently off Abx  - s/pZosyn 10/19-10/26, s/p Azitro 10/19-10/21  - Cx 10/22 NGTD     #Ethics   - FULL CODE     For Follow-Up:  [ ] Appreciate Recommendations for Rheumatology regarding steroid taper and New APLS diagnosis / ? SLE diagnosis   [ ] Protocol for CT angio Chest to r/o CTEPH - prior hives from contrast -   Schedule 2h after solumedrol dose and administer Benadryl 50 1h prior to CT scan MICU Transfer Note  ---------------------------    Transfer from: MICU  Transfer to:  ( ) Medicine    (X) Telemetry    (  ) RCU    (  ) Palliative    (  ) Stroke Unit    (  ) _______________  Accepting Physician: Dr. Sonia Lang       MICU COURSE  37 y/o F with a h/o b/l DVTs, recurrent PE on eliquis, HFpEF, morbid obesity, asthma, chronic hyperkalemia, admitted to [place] on 10/18 with complaints of weakness, worsening LE edema, cough, and rapid weight gain.Of note, was being worked up as an outpatient for possible SLE given facial rash, photophobia, and joint pains and had been started on prednisone 20 mg by her PCP x ~1 month prior to arrival at [place]. Outpatient  results from 1 week prior showing low C3/4 complement and elevated CRP. Pt was admitted to medicine for treatment of PNA. Hospital course complicated by progressive SARAH, HAGMA, transaminitis and acute hypoxemic respiratory failure secondary to cardiogenic shock in the face of severe pHTN with acute on chronic cor pulmonale.     Her pHTN was thought to be likely multifactorial due to PE/OHS/MICHAEL/autoimmune process. Patient cardiogenic shock and iniated on milrinone on 10/20. She had a [place] placement on 10/23 revealing elevated pulmonary pressures, and required levophed from 10/22-23 for pressure support, diuresis with bumex was also initiated on 10/22 and maintained, and is now s/p inshaled nitric oxide.. Patient shock state has improved. Currently on .125 of milrinone. Bumex DC'd with increasing cr. Patient persistently hypoxic, requiring HFNC. She was initiated on sildenafil on 10/26, now on 20 TID after conversation with  Dr. Del Castillo. . S/P emperic treatment for PNA with zosyn 10/19-26. She was placed on a heparin gtt due to c/f APLS    Presenting for a transfer from [facility]. At this time patient was taken off of milrinone. Admitted to MICU 10/30   In [facility] MICU - patient was taken off milrinone. BP stable off pressors. Continued with bumex 2g BID. 3L output overnight. Currently on 4L NC.   Rheumatology was consulted for new diagnosis of SLE vs APLS.     OBJECTIVE --  Vital Signs Last 24 Hrs  T(C): 36.3 (31 Oct 2023 08:00), Max: 36.8 (31 Oct 2023 00:00)  T(F): 97.4 (31 Oct 2023 08:00), Max: 98.2 (31 Oct 2023 00:00)  HR: 75 (31 Oct 2023 12:00) (75 - 87)  BP: 118/55 (31 Oct 2023 12:00) (101/60 - 140/67)  BP(mean): 79 (31 Oct 2023 12:00) (72 - 89)  RR: 18 (31 Oct 2023 12:00) (14 - 32)  SpO2: 94% (31 Oct 2023 12:00) (91% - 100%)    Parameters below as of 31 Oct 2023 08:00  Patient On (Oxygen Delivery Method): nasal cannula  O2 Flow (L/min): 4      I&O's Summary    30 Oct 2023 07:01  -  31 Oct 2023 07:00  --------------------------------------------------------  IN: 288 mL / OUT: 3475 mL / NET: -3187 mL    31 Oct 2023 07:01  -  31 Oct 2023 12:16  --------------------------------------------------------  IN: 80 mL / OUT: 1400 mL / NET: -1320 mL        MEDICATIONS  (STANDING):  acetaZOLAMIDE    Tablet 250 milliGRAM(s) Oral once  aMIOdarone    Tablet 200 milliGRAM(s) Oral daily  budesonide 160 MICROgram(s)/formoterol 4.5 MICROgram(s) Inhaler 2 Puff(s) Inhalation two times a day  buMETAnide Injectable 2 milliGRAM(s) IV Push every 12 hours  heparin  Infusion. 1600 Unit(s)/Hr (16 mL/Hr) IV Continuous <Continuous>  insulin lispro (ADMELOG) corrective regimen sliding scale   SubCutaneous Before meals and at bedtime  methylPREDNISolone sodium succinate Injectable 40 milliGRAM(s) IV Push two times a day  montelukast 10 milliGRAM(s) Oral daily  PARoxetine 20 milliGRAM(s) Oral daily  polyethylene glycol 3350 17 Gram(s) Oral daily  sildenafil (REVATIO) 20 milliGRAM(s) Oral three times a day    MEDICATIONS  (PRN):  albuterol/ipratropium for Nebulization 3 milliLiter(s) Nebulizer every 6 hours PRN Shortness of Breath        LABS                                            9.4                   Neurophils% (auto):   92.6   (10-31 @ 00:31):    4.30 )-----------(161          Lymphocytes% (auto):  2.8                                           32.8                   Eosinphils% (auto):   0.0      Manual%: Neutrophils x    ; Lymphocytes x    ; Eosinophils x    ; Bands%: x    ; Blasts x                                    143    |  97     |  104                 Calcium: 9.2   / iCa: x      (10-31 @ 00:31)    ----------------------------<  240       Magnesium: 2.3                              3.4     |  36     |  1.68             Phosphorous: 4.2      TPro  7.2    /  Alb  3.3    /  TBili  1.3    /  DBili  x      /  AST  33     /  ALT  250    /  AlkPhos  104    31 Oct 2023 00:31    ( 10-31 @ 00:31 )   PT: 13.8 sec;   INR: 1.33 ratio  aPTT: 62.7 sec          ASSESSMENT & PLAN:   37yo female with pmh of BMI > 40, CHF-diastolic, B/L LE DVT + PE on Eliquis, HTN, Asthma,eczema and Anxiety presents to the ED w/ 1 week of worsening SOB on Exertion. Admitted to [facility] where pt was found to have cardiogenic shock, hypoxic respiratory failure requiring HFNC ISO of severe pHTN with acute on chronic cor pulmonale, requiring inotropes, vasopressors, pulmonary vasodilators and IV diuretics. Also found to have newly diagnosed SLE and APLS treated with steroids. Her course c/b transaminitis and SARAH. Now transfered to [facility] for management of pulmonary HTN.     #Neuro   A&Ox3    #Respiratory  Acute on Chronic respiratory failure 2/2 to CHF and pulmonary Hypertension   - on 4NC currently   Bumex 2g qd - Net negative goal 1L - 2L  Currently with metabolic alkosis, s/p diamox 2501x  w/ potassium repletion, will recheck gas and diures based on results  > Duonebs q6, Symbicort   > Montelukast 10mg daily     #Pulmonary Hypertension - Unclear primary / autoimmune etiology /vs Chronic PE. however went to [facility] for profound volume overload ISO Cor Pulmonale  - Hemodynamics and Echo as Below   > f/u with Dr. Del Castillo (Pulmonary HTN specialist)   > C/w Sildenafil 20  > CT angio chest evaluation for CTEPH - Patient had hives w/ prior CT, f/u with radiology for protocol.   > Plan for RHC     #CV - Admitted for cardiogenic shock - Acute on chronic heart failure with preserved ejection fraction (HFpEF)  On Milrinone .125 will wean as tolerated, as low concern for LV dysfunction.   ECHO 10/22 - Dilated RV with severely reduced RV function   - Strict I&Os, Daily Weights, q24 labs   - Bumex 2g IV qd     Hemodynamics ([place] in [facility])  10/26 milrinone 0.250 mcg/kg/min CVP 9 /31/54 PCWP 7 PA sat 61.9% /71/91  Tg CO/CI  7.7/3.0 FIO2 40% PVR 6.1 charlton  10/25 milrinone 0.250 mcg/kg/min+vaso 0.04 u/min+Teagan 5 ppm CVP 10 PA 96/21/42 PCWP 7 PA sat 63% /68/83 HR 99 Tg CO/CI  7.7/3.0 FIO2 40%  10/24 milrinone 0.250 mcg/kg/min+vaso 0.04 u/min+phenylephrine 0.1 mcg/kg/min+Teagan 11 ppm CVP 8 OA 85/24/44 PCWP n/a PA sat 89.3% HR 95 /36/76  10/23 (mil 0.25, levo .08, Teagan 12 ppm): -140s, /72 (87), CVP 5, PA 87/33/52, PCW 7, PA sat 78.2%, Tg CO/CI 9.6/3.8,  dsc, PVR 4.69 CHARLTON    Aflutter with RVR   - s/p Amiodarone load   - Continue with amio 200PO    #GI - Regular diet     # - SARAH - BUN / CR 35/.8 on admission - ATN 2/2 CV shock Improving   - Metabolic Alkaosis  w/ respiratory acidosis - Diamox 1x with goal bicarb 30-32, replete K to help with alkosis    #Endocrine   - Moderate ISS    #Rheum  4 weeks ago was noted to have a high titer KATHIE and was referred to Rheumatologist Dr Maldonado. Treated with pred 20 mg outpatient.  + SLE serology - C3C4 extremely low, ds DNA v high  > Repeat APLS, Sjogren Scleraderma labs   > Stress dose Hydrocortisone 100q8 10/26  > s/p 1g Solumedrol 10/27-30  > s/p Solumedrol 60q6  plan for taper, currently 40q12   f/u Rheum consults     #Hemeonc   - APLS + , hx of recurrent DVT / PE, takes Eliquis at Home. 6 years ago with bilateral DVT and associated PE. Has been taking eliquis since.   c/w Heparin ggt   - F/U - genetics hypercoagulable workup    #ID - treated for aspiration PNA (7d) - currently off Abx  - s/pZosyn 10/19-10/26, s/p Azitro 10/19-10/21  - Cx 10/22 NGTD     #Ethics   - FULL CODE     For Follow-Up:  [ ] Appreciate Recommendations for Rheumatology regarding steroid taper and New APLS diagnosis / ? SLE diagnosis   [ ] Protocol for CT angio Chest to r/o CTEPH - prior hives from contrast -   Schedule 2h after solumedrol dose and administer Benadryl 50 1h prior to CT scan  [ ] CT Angio scheduled for 5:30PM - Ativan 15mins prior and Benadryl 1h before

## 2023-10-31 NOTE — PROGRESS NOTE ADULT - ATTENDING COMMENTS
1. Acute hypoxemic respiratory failure due to acute on chronic R heart failure. Pt transferred drom [facility] on Hi  Flow 02. Taper to nasal canula as tolerated.    2. Pulmonary HTN with mean PAP ~50. PCWP reported 7. NO given without vasodilator  response.      Pt aggressively Diuresed with Bumex and Milrinone. Pt says breathing has improved and edematous legs getting smaller.      TTE at [facility] reveals enlarged RV with decrease systolic function. LVF  presumed normal. Difficult to visualize LV.       Etiology of Pul HTN:   1 Pt with SLE and APLS. Pt was started on  20mg prednisone as outpatient. Pt started on stress dose steroids at [facility] for cardiogenic shock. The pulsed with 1 gram solumedrol x 5 days. Unclear of reason for pulse therapy at this point.  Repeat collagen vascular screen    2. . Pt with h/o PE and DVT x 2 on home Eliquis . No won IV heparin.  3. Possible MICHAEL, OSH with morbid obesity    Plan: 1. Repeat collagen vascular labs.              2. Stop milrinone              3. Continue with Bumex diuresis alternating with Diamox. Pt with primary metabolic alkalosis. Please keep K+> 4.0.   Use Diamox until serum Bicarb is 30.              4. Continue with sildenafil  20 mg tid started at [facility]              5.  Pulmonary CTA if pt can tolerate contrast with high dose steroids before. Gets hives with contrast. (Also allergic to ceftriaxone. Hives)                   VQ scan if feasible             6. Will need repeat R heart cath when adequately diuresed.              7. After cath will evaluate for further treatment including Flolan.     4.Renal. Creatinine rising from baseline. Follow creatine and UO    5. Asthma. Pt already on high dose steroids.  Will taper steroids.                        Continue Symbicort                       Change duo nebs to prn albuterol                      PLEASE  STOP Montelukast  6. DVT prophylaxis: IV heparin  7. GOC: Full code

## 2023-10-31 NOTE — PROGRESS NOTE ADULT - PROBLEM SELECTOR PLAN 1
- Suspected multifactorial w/ primary insult due to volume overload w/ pHTN, cor pulmonale on existing prior PNA, hx of PEs, morbid obesity, asthma.   - s/p empiric Zosyn from 10/19-10/26 at [facility]  - Not intubated, on HFNC, now weaned to 4L NC with aggressive diuresis, Sildenafil  - Continue aggressive diuresis with Bumex, consider Diamox if continues to be Alkalotic - Suspected multifactorial w/ primary insult due to volume overload w/ pHTN, cor pulmonale on existing prior PNA, hx of PEs, morbid obesity, asthma.   - s/p empiric Zosyn from 10/19-10/26 at [facility]  - Not intubated, on HFNC, now weaned to 4L NC with aggressive diuresis, Sildenafil  - Continue aggressive diuresis with Bumex, consider Diamox if continues to be Alkalotic  - Continue Montelukast, Duonebs, Symbicort

## 2023-10-31 NOTE — PROGRESS NOTE ADULT - ATTENDING COMMENTS
38 year old female with a PMHx of morbid obesity, PE on eliquis (diagnosed in 2017, on eliquis at home), HFpEF, asthma, on PRN home O2, suspected SLE (being worked up by outpatient rheumatologist, on steroids x1 month prior to hospital presentation) who is transferred from the MICU to the general medicine floors for care. She was originally admitted to [facility] on 10/18 with complaints of weakness, worsening LE edema, cough, and rapid weight gain.  Her evaluation there was also significant for atrial flutter, converted to normal sinus rhythm with amiodarone. CXR showed mild pulmonary edema and LLL consolidation. She was treated with a course of IV diuretics for presumed decompensated heart failure as well as empiric antibiotics for pneumonia. During her treatment course, she developed an SARAH. Lasix were held. She progressively became hypotensive, fluid resuscitation limited by her volume-overloaded status. She was transferred to the ICU at [facility] on 10/22. Ddx for hypotension included adrenal insufficiency in the setting of chronic outpatient steroids v cardiogenic shock and cor pulmonale. They started her on pressors, stress-dose steroids, and bumex drip. [place] cath reading consistent with elevated pulmonary artery pressures. TTE also showing severely decreased RV systolic function and enlarged right ventricle. She was treated with inhaled nitric oxide and started on sildenafil. Course was further complicated by shock liver with AST and ALT up to 3000s+/2000+, now improving.     The patient was transferred to [facility] for further management of pHTN.     She arrived to [facility] on 10/30. Her pressors were titrated down and she was titrated off of high flow onto nasal canula O2 supplementation. She is now transferred to the [facility] for further care.     Pt seen this afternoon. She has no acute complaints.      On exam, the patient is morbidly obese, breathing comfortably on 4L of NC. She has lower extremity pitting edema, R>L 2+ edema. Indwelling major in place.     #cor pulmonale   #hypoxic and hypercapenic respiratory failure   #metabolic acidosis   #SLE   #APLS   #shock state s/p pressors (improved)- cardiogenic shock v adrenal insufficiency   #SARAH—improving   #shock liver—improving      -continue diuretics   -plan for CTA chest in the AM, with pre-medication (hx of hives IV contrast)   -continue solumedrol for now   -continue heparin gtt  -pulm and rheumatology consulted, appreciate evaluation     Rest of plan as per resident note above.

## 2023-10-31 NOTE — ADVANCED PRACTICE NURSE CONSULT - ASSESSMENT
Patient is alert and oriented and gave consent to skin consult. arrived on unit, patient was found lying in a low air loss pressure redistribution support surface style bed.  patient  is unable to turn independently and staff assistance x 1 was provided.   bilateral sacrum/buttocks non-blanchable deep red,   discoloration and hyperpigmentation  consistent with deep tissue  size approximately 13  cm x 10  cm x 0 cm.  present  on admission.  patient was able to turn to left. to view her skin back.   patient declined to turn to her right side with assistance. was not able assess the complete  left side  of her skin at time of consult .  right  hip non-blanchable deep red,   discoloration and hyperpigmentation with multiple area partial - thickness and tissue loss consistent with deep tissue injury with evolution  size approximately  10 cm x 10 cm x 0.1 cm.  present  on admission.  left  heel dry scaly with  deep red,   discoloration and hyperpigmentation  consistent with deep tissue injury   size approximately  3 cm x3 cm x 0 cm.  present  on admission.  right heel dry scaly with  deep red,  discoloration and hyperpigmentation  consistent with deep tissue injury  size approximately  3 cm x3 cm x 0 cm.  present  on admission.   bilateral / feet toes with dry scaly lesions   Patient  was educated on the importance for patient to  turning and positioning every 2 hours. the use of waffle cushion when out of bed to chair and, to shift her weight every 2 hours while in chair. the importance of keeping skin clean and dry and, to offload her heels/feet .and optimal nutrition

## 2023-10-31 NOTE — PROGRESS NOTE ADULT - SUBJECTIVE AND OBJECTIVE BOX
INTERVAL HPI/OVERNIGHT EVENTS:    SUBJECTIVE: Patient seen and examined at bedside.     CONSTITUTIONAL: No weakness, fevers or chills  EYES/ENT: No visual changes;  No vertigo or throat pain   NECK: No pain or stiffness  RESPIRATORY: No cough, wheezing, hemoptysis; No shortness of breath  CARDIOVASCULAR: No chest pain or palpitations  GASTROINTESTINAL: No abdominal or epigastric pain. No nausea, vomiting, or hematemesis; No diarrhea or constipation. No melena or hematochezia.  GENITOURINARY: No dysuria, frequency or hematuria  NEUROLOGICAL: No numbness or weakness  SKIN: No itching, rashes    OBJECTIVE:    VITAL SIGNS:  ICU Vital Signs Last 24 Hrs  T(C): 36.3 (31 Oct 2023 08:00), Max: 36.8 (31 Oct 2023 00:00)  T(F): 97.4 (31 Oct 2023 08:00), Max: 98.2 (31 Oct 2023 00:00)  HR: 83 (31 Oct 2023 10:00) (76 - 93)  BP: 108/59 (31 Oct 2023 10:00) (101/60 - 150/84)  BP(mean): 80 (31 Oct 2023 10:00) (72 - 102)  ABP: --  ABP(mean): --  RR: 23 (31 Oct 2023 10:00) (14 - 32)  SpO2: 92% (31 Oct 2023 10:00) (91% - 100%)    O2 Parameters below as of 31 Oct 2023 08:00  Patient On (Oxygen Delivery Method): nasal cannula  O2 Flow (L/min): 4            10-30 @ 07:01  -  10-31 @ 07:00  --------------------------------------------------------  IN: 288 mL / OUT: 3475 mL / NET: -3187 mL    10-31 @ 07:01  -  10-31 @ 11:53  --------------------------------------------------------  IN: 48 mL / OUT: 900 mL / NET: -852 mL      CAPILLARY BLOOD GLUCOSE      POCT Blood Glucose.: 200 mg/dL (31 Oct 2023 08:03)      PHYSICAL EXAM:    General: NAD  HEENT: NC/AT; PERRL, clear conjunctiva  Neck: supple  Respiratory: CTA b/l  Cardiovascular: +S1/S2; RRR  Abdomen: soft, NT/ND; +BS x4  Extremities: WWP, 2+ peripheral pulses b/l; no LE edema  Skin: normal color and turgor; no rash  Neurological:    MEDICATIONS:  MEDICATIONS  (STANDING):  acetaZOLAMIDE    Tablet 250 milliGRAM(s) Oral once  aMIOdarone    Tablet 200 milliGRAM(s) Oral daily  budesonide 160 MICROgram(s)/formoterol 4.5 MICROgram(s) Inhaler 2 Puff(s) Inhalation two times a day  buMETAnide Injectable 2 milliGRAM(s) IV Push every 12 hours  heparin  Infusion. 1600 Unit(s)/Hr (16 mL/Hr) IV Continuous <Continuous>  insulin lispro (ADMELOG) corrective regimen sliding scale   SubCutaneous Before meals and at bedtime  methylPREDNISolone sodium succinate Injectable 40 milliGRAM(s) IV Push two times a day  montelukast 10 milliGRAM(s) Oral daily  PARoxetine 20 milliGRAM(s) Oral daily  polyethylene glycol 3350 17 Gram(s) Oral daily  sildenafil (REVATIO) 20 milliGRAM(s) Oral three times a day    MEDICATIONS  (PRN):  albuterol/ipratropium for Nebulization 3 milliLiter(s) Nebulizer every 6 hours PRN Shortness of Breath      ALLERGIES:  Allergies    ceftriaxone (Hives)  iodine (Hives)  shellfish (Hives)    Intolerances        LABS:                        9.4    4.30  )-----------( 161      ( 31 Oct 2023 00:31 )             32.8     10-31    143  |  97  |  104<H>  ----------------------------<  240<H>  3.4<L>   |  36<H>  |  1.68<H>    Ca    9.2      31 Oct 2023 00:31  Phos  4.2     10-31  Mg     2.3     10-31    TPro  7.2  /  Alb  3.3  /  TBili  1.3<H>  /  DBili  x   /  AST  33  /  ALT  250<H>  /  AlkPhos  104  10-31    PT/INR - ( 31 Oct 2023 00:31 )   PT: 13.8 sec;   INR: 1.33 ratio         PTT - ( 31 Oct 2023 00:31 )  PTT:62.7 sec  Urinalysis Basic - ( 31 Oct 2023 00:31 )    Color: x / Appearance: x / SG: x / pH: x  Gluc: 240 mg/dL / Ketone: x  / Bili: x / Urobili: x   Blood: x / Protein: x / Nitrite: x   Leuk Esterase: x / RBC: x / WBC x   Sq Epi: x / Non Sq Epi: x / Bacteria: x        RADIOLOGY & ADDITIONAL TESTS: Reviewed. INTERVAL HPI/OVERNIGHT EVENTS:    SUBJECTIVE: Patient seen and examined at bedside. Slept poorly. Off milrinone or pressors.   No SOB, CP, weakness, pain in extremities.    OBJECTIVE:  VITAL SIGNS:  ICU Vital Signs Last 24 Hrs  T(C): 36.3 (31 Oct 2023 08:00), Max: 36.8 (31 Oct 2023 00:00)  T(F): 97.4 (31 Oct 2023 08:00), Max: 98.2 (31 Oct 2023 00:00)  HR: 83 (31 Oct 2023 10:00) (76 - 93)  BP: 108/59 (31 Oct 2023 10:00) (101/60 - 150/84)  BP(mean): 80 (31 Oct 2023 10:00) (72 - 102)  ABP: --  ABP(mean): --  RR: 23 (31 Oct 2023 10:00) (14 - 32)  SpO2: 92% (31 Oct 2023 10:00) (91% - 100%)    O2 Parameters below as of 31 Oct 2023 08:00  Patient On (Oxygen Delivery Method): nasal cannula  O2 Flow (L/min): 4      10-30 @ 07:01  -  10-31 @ 07:00  --------------------------------------------------------  IN: 288 mL / OUT: 3475 mL / NET: -3187 mL    10-31 @ 07:01  -  10-31 @ 11:53  --------------------------------------------------------  IN: 48 mL / OUT: 900 mL / NET: -852 mL      CAPILLARY BLOOD GLUCOSE      POCT Blood Glucose.: 200 mg/dL (31 Oct 2023 08:03)    PHYSICAL EXAM:  General: NAD, Lying in bed comfortably w/ 4L NC   HEENT: NC/AT; PERRL, clear conjunctiva  Neck: supple  Respiratory: CTA b/l  Cardiovascular: +S1/S2, loud S2, RRR, B/l crackles   Abdomen: soft, NT/ND; +BS x4  Extremities: WWP  Skin: normal color and turgor; no rash  Neurological: A&Ox3     MEDICATIONS:  MEDICATIONS  (STANDING):  acetaZOLAMIDE    Tablet 250 milliGRAM(s) Oral once  aMIOdarone    Tablet 200 milliGRAM(s) Oral daily  budesonide 160 MICROgram(s)/formoterol 4.5 MICROgram(s) Inhaler 2 Puff(s) Inhalation two times a day  buMETAnide Injectable 2 milliGRAM(s) IV Push every 12 hours  heparin  Infusion. 1600 Unit(s)/Hr (16 mL/Hr) IV Continuous <Continuous>  insulin lispro (ADMELOG) corrective regimen sliding scale   SubCutaneous Before meals and at bedtime  methylPREDNISolone sodium succinate Injectable 40 milliGRAM(s) IV Push two times a day  montelukast 10 milliGRAM(s) Oral daily  PARoxetine 20 milliGRAM(s) Oral daily  polyethylene glycol 3350 17 Gram(s) Oral daily  sildenafil (REVATIO) 20 milliGRAM(s) Oral three times a day    MEDICATIONS  (PRN):  albuterol/ipratropium for Nebulization 3 milliLiter(s) Nebulizer every 6 hours PRN Shortness of Breath      ALLERGIES:  Allergies    ceftriaxone (Hives)  iodine (Hives)  shellfish (Hives)    Intolerances        LABS:                        9.4    4.30  )-----------( 161      ( 31 Oct 2023 00:31 )             32.8     10-31    143  |  97  |  104<H>  ----------------------------<  240<H>  3.4<L>   |  36<H>  |  1.68<H>    Ca    9.2      31 Oct 2023 00:31  Phos  4.2     10-31  Mg     2.3     10-31    TPro  7.2  /  Alb  3.3  /  TBili  1.3<H>  /  DBili  x   /  AST  33  /  ALT  250<H>  /  AlkPhos  104  10-31    PT/INR - ( 31 Oct 2023 00:31 )   PT: 13.8 sec;   INR: 1.33 ratio         PTT - ( 31 Oct 2023 00:31 )  PTT:62.7 sec  Urinalysis Basic - ( 31 Oct 2023 00:31 )    Color: x / Appearance: x / SG: x / pH: x  Gluc: 240 mg/dL / Ketone: x  / Bili: x / Urobili: x   Blood: x / Protein: x / Nitrite: x   Leuk Esterase: x / RBC: x / WBC x   Sq Epi: x / Non Sq Epi: x / Bacteria: x        RADIOLOGY & ADDITIONAL TESTS: Reviewed.

## 2023-10-31 NOTE — PROGRESS NOTE ADULT - PROBLEM SELECTOR PLAN 9
Patient spent about 50% of his time today in his room resting/sleeping and the balance standing in the lounge and hallway, rocking back and forth while monitoring the milieu.  No observed peer interaction or program engagement.  Was slightly more open with writer during our daily check-in than our last one 2 days ago.  Apparent mood was decidedly less hostile today.  Affect remains generally blunted.  Continues to appear to be responding to internal stimuli at times.  Responses are still delayed and limited to 1-2 words.  Continues to decline meals selectively (breakfast today), vitals, shower opportunities, etc.  Continues to deny all significant MH issues and acuities as well as problems with sleep, energy, and appetite.  Has presented no behavioral management concerns so far today.   Davian Florence   11/09/2019 11/09/19 1338   Sleep/Rest/Relaxation   Day/Evening Time Hours napping;resting in bed   Number of hours napping 3 hours   Number of hours resting in bed 2 hours   Cognitive   Level Of Consciousness somnolent;alert   Arousal Level arouses to voice   Orientation situation   Follows Commands slow to respond   Speech clear;other (see comments)  (latent responses)   Best Language 0 - No aphasia   Mood/Behavior hypoactive (quiet, withdrawn);withdrawn;calm   Behavioral Health   Hallucinations appears responding   Thinking other (see comment)  (denied all MH issues)   Orientation situation, disoriented;place: oriented   Insight denial of illness;poor   Judgement impaired   Affect blunted, flat   Mood mood is calm   Physical Appearance/Attire disheveled;appears stated age;attire appropriate to age and situation   Suicidality other (see comments)  (denied SI)   1. Wish to be Dead (Recent) No   2. Non-Specific Active Suicidal Thoughts (Recent) No   Self Injury other (see comment)  (denied SIB urges)   Elopement   (no indicators)   Activity isolative;withdrawn;restless   Speech clear;coherent;other (see  "comments)  (increased response latency)   Psychomotor / Gait slow;balanced;steady   Coping/Psychosocial   Verbalized Emotional State other (see comments)  (\"fine\")   Psycho Education   Type of Intervention 1:1 intervention   Response participates with encouragement   Hours 0.5   Treatment Detail current MH status   Safety   Suicidality Status 15   Activities of Daily Living   Hygiene/Grooming shower;prompts  (offered and refused)   Dress scrubs (behavioral health);independent   Room Organization independent   Groups   Details no participation     " DVT PPx: Heparin Drip for full AC  Diet: Regular  Bowel Regimen: Last BM 4 days again, started Senna 2 at bedtime  Code: Full  Dispo: DICK per PT evaluation  Pharmacy:  PCP:   Communication: - Paroxetine - Last a1c 6.0  - Pt w/ hyperglycemia to 200's in s/o steroid use  - Continue insulin sliding scale, regular diet

## 2023-10-31 NOTE — PROGRESS NOTE ADULT - PROBLEM SELECTOR PLAN 6
- Pt w/ new atrial flutter during admission  - Continue heparin drip  - Continue Amiodarone 200 mg PO daily  - CTM Telemetry - Wound care consulted, recommend podiatry consultation  - Consult podiatry in the AM

## 2023-10-31 NOTE — PROGRESS NOTE ADULT - PROBLEM SELECTOR PLAN 2
- Pt w/ elevated pulmonary filling pressures on swan at [facility]   - Last values 10/26 milrinone 0.250 mcg/kg/min CVP 9, /31/54, PCWP 7, PA sat 61.9%, /71/91 ,, Tg CO/CI 7.7/3.0, FIO2 40%, PVR 6.1 wilkinson - Pt w/ elevated pulmonary filling pressures on swan at [facility]   - Last values 10/26 CVP 9, /31/54, PCWP 7, PA sat 61.9%, PVR 6.1 wilkinson, , /71/91, Tg CO/CI 7.7/3.0 on milrinone 0.250, FIO2 40%  - Thought to be due to prior PE (Group 4), OHS, MICHAEL (Group 3), autoimmune process. Less likely left-sided disease (Group 2)   - Dr. Del Castillo from Pulmonology following, appreciate recommendations  - Pending CT angio chest pending w/ densensitization as pt w/ contrast allergy on 11/1   - Pending RHC +/- vasoreactivity testing  - Bumex 2 mg IVP BID, consider diamox if continues to be alkalotic

## 2023-10-31 NOTE — PROGRESS NOTE ADULT - PROBLEM SELECTOR PLAN 7
- Pt w/ prior SARAH likely in s/o sepsis and cardiogenic shock  - Cr downtrending  - CTM Cr and UOP - Pt w/ new atrial flutter during admission  - Continue heparin drip  - Continue Amiodarone 200 mg PO daily  - CTM Telemetry

## 2023-10-31 NOTE — PROGRESS NOTE ADULT - PROBLEM SELECTOR PLAN 4
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, undergoing work-up for APLS  - +Cardiolipilin 10/28  - Continue Heparin drip, eventually transition to Warfarin

## 2023-10-31 NOTE — PATIENT PROFILE ADULT - FALL HARM RISK - HARM RISK INTERVENTIONS
Assistance with ambulation/Assistance OOB with selected safe patient handling equipment/Communicate Risk of Fall with Harm to all staff/Reinforce activity limits and safety measures with patient and family/Tailored Fall Risk Interventions/Visual Cue: Yellow wristband and red socks/Bed in lowest position, wheels locked, appropriate side rails in place/Call bell, personal items and telephone in reach/Instruct patient to call for assistance before getting out of bed or chair/Non-slip footwear when patient is out of bed/Loyal to call system/Physically safe environment - no spills, clutter or unnecessary equipment/Purposeful Proactive Rounding/Room/bathroom lighting operational, light cord in reach Assistance with ambulation/Assistance OOB with selected safe patient handling equipment/Communicate Risk of Fall with Harm to all staff/Discuss with provider need for PT consult/Monitor gait and stability/Provide patient with walking aids - walker, cane, crutches/Reinforce activity limits and safety measures with patient and family/Tailored Fall Risk Interventions/Visual Cue: Yellow wristband and red socks/Bed in lowest position, wheels locked, appropriate side rails in place/Call bell, personal items and telephone in reach/Instruct patient to call for assistance before getting out of bed or chair/Non-slip footwear when patient is out of bed/May to call system/Physically safe environment - no spills, clutter or unnecessary equipment/Purposeful Proactive Rounding/Room/bathroom lighting operational, light cord in reach

## 2023-10-31 NOTE — PROGRESS NOTE ADULT - SUBJECTIVE AND OBJECTIVE BOX
***************************************************************  Juan Carlos (Rl) [facility] PGY2  Internal Medicine   ***************************************************************    MITCHEL GUNN  38y  MRN: [id number]    Ms. 38-yo female with PMHx of b/l DVTs, recurrent PE on eliquis, HFpEF, morbid obesity, asthma, chronic hyperkalemia, recently diagnosed SLE (facial rash, photophobia, joint paints, started on prednisone 20 mg), admitted w/ PNA to [facility] on 10/18, course c/b SARAH, HAGMA, transaminitis, AHRF 2/2 cardiogenic shock 2/2 pHTN w/ RV failure.     While in the MICU at [facility], pHTN was thought to be due to PE, OHS, MICHAEL, autoimmune process. Echo 10/22 showing dilated RV w/ severely reduced RV function. Patient remained on HFNC, treated w/ Milrinone and Levophed for hemodynamic support with bumex diuresis. [place] placed demonstrated elevated pulmonary pressures (Last values 10/26 milrinone 0.250 mcg/kg/min CVP 9, /31/54, PCWP 7, PA sat 61.9%, /71/91 ,, Tg CO/CI 7.7/3.0, FIO2 40%, PVR 6.1 wilkinson). Sildenafil was started for pHTN. Pt was treated w/ empiric Zosyn from 10/19-10/26. Pt was initiated on Heparin drip for suspected APLS - genetic work-up pending. Patient was admitted to [facility] MICU on 10/30 and Milrinone/Pressors were weaned, transitioned to Bumex 2 mg IV BID c/b metabolic alkalosis continued on Diamox, O2 de-escalated to 4L NC. From a rheumatological standpoint, rheumatology was consulted for low C3/C4 high dsDNA on outpt labs, s/p stress dose hydrocortisone on 10/26, Solumedrol 1g 10/27-10/30, now on taper currently 40 q12h. Course also c/b aflutter w/ RVR on Amiodarone 200 mg daily. Dr. Del Castillo continues to follow for evaluation of pHTN, with CT angio chest pending w/ densensitization as pt w/ contrast allergy - plan 11/1, and plan for RHC in the coming days.     Patient seen at bedside, mentating well, denies SOB, remains on 4L NC.     MEDICATIONS  (STANDING):  aMIOdarone    Tablet 200 milliGRAM(s) Oral daily  budesonide 160 MICROgram(s)/formoterol 4.5 MICROgram(s) Inhaler 2 Puff(s) Inhalation two times a day  buMETAnide Injectable 2 milliGRAM(s) IV Push every 12 hours  heparin  Infusion. 1600 Unit(s)/Hr (16 mL/Hr) IV Continuous <Continuous>  insulin lispro (ADMELOG) corrective regimen sliding scale   SubCutaneous Before meals and at bedtime  methylPREDNISolone sodium succinate Injectable 40 milliGRAM(s) IV Push two times a day  montelukast 10 milliGRAM(s) Oral daily  PARoxetine 20 milliGRAM(s) Oral daily  polyethylene glycol 3350 17 Gram(s) Oral daily  sildenafil (REVATIO) 20 milliGRAM(s) Oral three times a day    MEDICATIONS  (PRN):  albuterol/ipratropium for Nebulization 3 milliLiter(s) Nebulizer every 6 hours PRN Shortness of Breath      Objective:    Vitals: Vital Signs Last 24 Hrs  T(C): 36.4 (10-31-23 @ 16:00), Max: 36.8 (10-31-23 @ 00:00)  T(F): 97.5 (10-31-23 @ 16:00), Max: 98.2 (10-31-23 @ 00:00)  HR: 75 (10-31-23 @ 17:00) (75 - 87)  BP: 105/58 (10-31-23 @ 16:00) (103/53 - 118/55)  BP(mean): 77 (10-31-23 @ 16:00) (72 - 89)  RR: 17 (10-31-23 @ 17:00) (15 - 26)  SpO2: 96% (10-31-23 @ 17:00) (91% - 100%)            I&O's Summary    30 Oct 2023 07:01  -  31 Oct 2023 07:00  --------------------------------------------------------  IN: 288 mL / OUT: 3475 mL / NET: -3187 mL    31 Oct 2023 07:01  -  31 Oct 2023 17:30  --------------------------------------------------------  IN: 160 mL / OUT: 2565 mL / NET: -2405 mL        PHYSICAL EXAM:  GENERAL: NAD  HEAD:  Atraumatic, Normocephalic  EYES: EOMI, conjunctiva and sclera clear  CHEST/LUNG: Clear to auscultation bilaterally; No rales, rhonchi, wheezing, or rubs  HEART: Regular rate and rhythm; No murmurs, rubs, or gallops  ABDOMEN: Soft, Nontender, Nondistended;   SKIN: No rashes or lesions. RLQ with 2+ edema to knees, LLQ w/ minimal edema.   NERVOUS SYSTEM:  Alert & Oriented X3, no focal deficits    LABS:  10-31    147<H>  |  99  |  100<H>  ----------------------------<  219<H>  3.7   |  39<H>  |  1.37<H>  10-31    143  |  97  |  104<H>  ----------------------------<  240<H>  3.4<L>   |  36<H>  |  1.68<H>  10-30    141  |  95<L>  |  106<H>  ----------------------------<  203<H>  3.5   |  35<H>  |  1.85<H>    Ca    9.4      31 Oct 2023 16:43  Ca    9.2      31 Oct 2023 00:31  Ca    9.2      30 Oct 2023 16:14  Phos  4.2     10-31  Mg     2.3     10-31    TPro  7.2  /  Alb  3.3  /  TBili  1.3<H>  /  DBili  x   /  AST  33  /  ALT  250<H>  /  AlkPhos  104  10-31  TPro  7.5  /  Alb  3.2<L>  /  TBili  1.5<H>  /  DBili  x   /  AST  34  /  ALT  282<H>  /  AlkPhos  105  10-30  TPro  7.3  /  Alb  3.1<L>  /  TBili  1.4  /  DBili  x   /  AST  33<H>  /  ALT  305<H>  /  AlkPhos  103  10-30      PT/INR - ( 31 Oct 2023 00:31 )   PT: 13.8 sec;   INR: 1.33 ratio         PTT - ( 31 Oct 2023 00:31 )  PTT:62.7 sec              Urinalysis Basic - ( 31 Oct 2023 16:43 )    Color: x / Appearance: x / SG: x / pH: x  Gluc: 219 mg/dL / Ketone: x  / Bili: x / Urobili: x   Blood: x / Protein: x / Nitrite: x   Leuk Esterase: x / RBC: x / WBC x   Sq Epi: x / Non Sq Epi: x / Bacteria: x                              9.4    4.30  )-----------( 161      ( 31 Oct 2023 00:31 )             32.8                         9.4    4.10  )-----------( 159      ( 30 Oct 2023 16:14 )             31.9                         9.2    4.12  )-----------( 158      ( 30 Oct 2023 09:30 )             32.0     CAPILLARY BLOOD GLUCOSE      POCT Blood Glucose.: 211 mg/dL (31 Oct 2023 16:38)  POCT Blood Glucose.: 207 mg/dL (31 Oct 2023 12:29)  POCT Blood Glucose.: 200 mg/dL (31 Oct 2023 08:03)  POCT Blood Glucose.: 247 mg/dL (30 Oct 2023 22:01)      RADIOLOGY & ADDITIONAL TESTS:    Imaging Personally Reviewed:  [x ] YES  [ ] NO    Consultants involved in case:   Consultant(s) Notes Reviewed:  [ x] YES  [ ] NO:   Care Discussed with Consultants/Other Providers [x ] YES  [ ] NO

## 2023-10-31 NOTE — PROGRESS NOTE ADULT - ASSESSMENT
39yo female with pmh of BMI > 40, CHF-diastolic, B/L LE DVT + PE on Eliquis, HTN, Asthma,eczema and Anxiety presents to the ED w/ 1 week of worsening SOB on Exertion. Admitted to [facility] where pt was found to have cardiogenic shock, hypoxic respiratory failure requiring HFNC ISO of severe pHTN with acute on chronic cor pulmonale, requiring inotropes, vasopressors, pulmonary vasodilators and IV diuretics. Also found to have newly diagnosed SLE and APLS treated with steroids. Her course c/b transaminitis and SARAH. Now transfered to [facility] for management of pulmonary HTN.     #Neuro   A&Ox3    #Respiratory  Acute on Chronic respiratory failure 2/2 to CHF and pulmonary Hypertension   - on NC currently   Bumex 2g qd   > Duonebs q6, Symbicort   > Montelukast 10mg daily     #Pulmonary Hypertension - Unclear primary / autoimmune etiology /vs Chronic PE   - Hemodynamics and Echo as Below   > C/w Sildenafil 20  > CT angio chest evaluation for CTEF  Plan for RHC     #CV - Admitted for cardiogenic shock - Acute on chronic heart failure with preserved ejection fraction (HFpEF)  On Milrinone .125 will wean as tolerated, as low concern for LV dysfunction.   ECHO 10/22 - Dilated RV with severely reduced RV function   - Strict I&Os, Daily Weights, q24 labs   - Bumex 2g IV qd     Hemodynamics   10/26 milrinone 0.250 mcg/kg/min CVP 9 /31/54 PCWP 7 PA sat 61.9% /71/91  Tg CO/CI  7.7/3.0 FIO2 40% PVR 6.1 wilkinson  10/25 milrinone 0.250 mcg/kg/min+vaso 0.04 u/min+Teagan 5 ppm CVP 10 PA 96/21/42 PCWP 7 PA sat 63% /68/83 HR 99 Tg CO/CI  7.7/3.0 FIO2 40%  10/24 milrinone 0.250 mcg/kg/min+vaso 0.04 u/min+phenylephrine 0.1 mcg/kg/min+Teagan 11 ppm CVP 8 OA 85/24/44 PCWP n/a PA sat 89.3% HR 95 /36/76  10/23 (mil 0.25, levo .08, Teagan 12 ppm): -140s, /72 (87), CVP 5, PA 87/33/52, PCW 7, PA sat 78.2%, Tg CO/CI 9.6/3.8,  dsc, PVR 4.69 WILKINSON    Aflutter with RVR   - s/p Amiodarone load   - Continue with amio 200PO    #GI - Regular diet     # - SARAH - BUN / CR 35/.8 on admission - ATN 2/2 CV shock     #Endocrine   - Moderate ISS    #Rheum  4 weeks ago was noted to have a high titer KATHIE and was referred to Rheumatologist Dr Maldonado. Treated with pred 20 mg outpatient.  + SLE serology- C3C4 extremely low, ds DNA v high  > Repeat APLS, Sjogren Scleraderma labs   > Stress dose Hydrocortisone 100q8 10/26  > s/p 1g Solumedrol 10/27-30  > s/p Solumedrol 60q6  plan for taper, currently 40q12   - Formal Rheum consult in the AM     #Hemeonc   - APLS + , hx of recurrent DVT / PE, takes Eliquis at Home. 6 years ago with bilateral DVT and associated PE. Has been taking eliquis since.   c/w Heparin ggt   - F/U - genetics hypercoagulable workup    #ID - treated for aspiration PNA (7d) - currently off Abx  - s/pZosyn 10/19-10/26  s/p Azitro 10/19-10/21  - Cx 10/22 NGTD     #Ethics   - FULL CODE  37yo female with pmh of BMI > 40, CHF-diastolic, B/L LE DVT + PE on Eliquis, HTN, Asthma,eczema and Anxiety presents to the ED w/ 1 week of worsening SOB on Exertion. Admitted to [facility] where pt was found to have cardiogenic shock, hypoxic respiratory failure requiring HFNC ISO of severe pHTN with acute on chronic cor pulmonale, requiring inotropes, vasopressors, pulmonary vasodilators and IV diuretics. Also found to have newly diagnosed SLE and APLS treated with steroids. Her course c/b transaminitis and SARAH. Now transfered to [facility] for management of pulmonary HTN.     #Neuro   A&Ox3    #Respiratory  Acute on Chronic respiratory failure 2/2 to CHF and pulmonary Hypertension   - on 4NC currently   Bumex 2g qd - Net negative goal 1L - 2L  Currently with metabolic alkosis, s/p diamox 2501x  w/ potassium repletion, will recheck gas and diures based on results  > Duonebs q6, Symbicort   > Montelukast 10mg daily     #Pulmonary Hypertension - Unclear primary / autoimmune etiology /vs Chronic PE. however went to [facility] for profound volume overload ISO Cor Pulmonale  - Hemodynamics and Echo as Below   > C/w Sildenafil 20  > CT angio chest evaluation for CTEPH  > Plan for RHC     #CV - Admitted for cardiogenic shock - Acute on chronic heart failure with preserved ejection fraction (HFpEF)  On Milrinone .125 will wean as tolerated, as low concern for LV dysfunction.   ECHO 10/22 - Dilated RV with severely reduced RV function   - Strict I&Os, Daily Weights, q24 labs   - Bumex 2g IV qd     Hemodynamics   10/26 milrinone 0.250 mcg/kg/min CVP 9 /31/54 PCWP 7 PA sat 61.9% /71/91  Tg CO/CI  7.7/3.0 FIO2 40% PVR 6.1 wilkinson  10/25 milrinone 0.250 mcg/kg/min+vaso 0.04 u/min+Teagan 5 ppm CVP 10 PA 96/21/42 PCWP 7 PA sat 63% /68/83 HR 99 Tg CO/CI  7.7/3.0 FIO2 40%  10/24 milrinone 0.250 mcg/kg/min+vaso 0.04 u/min+phenylephrine 0.1 mcg/kg/min+Teagan 11 ppm CVP 8 OA 85/24/44 PCWP n/a PA sat 89.3% HR 95 /36/76  10/23 (mil 0.25, levo .08, Teagan 12 ppm): -140s, /72 (87), CVP 5, PA 87/33/52, PCW 7, PA sat 78.2%, Tg CO/CI 9.6/3.8,  dsc, PVR 4.69 WILKINSON    Aflutter with RVR   - s/p Amiodarone load   - Continue with amio 200PO    #GI - Regular diet     # - SARAH - BUN / CR 35/.8 on admission - ATN 2/2 CV shock Improving   - Metabolic Alkaosis  w/ respiratory acidosis - Diamox 1x with goal bicarb 30-32, replete K to help with alkosis  -     #Endocrine   - Moderate ISS    #Rheum  4 weeks ago was noted to have a high titer KATHIE and was referred to Rheumatologist Dr Maldonado. Treated with pred 20 mg outpatient.  + SLE serology - C3C4 extremely low, ds DNA v high  > Repeat APLS, Sjogren Scleraderma labs   > Stress dose Hydrocortisone 100q8 10/26  > s/p 1g Solumedrol 10/27-30  > s/p Solumedrol 60q6  plan for taper, currently 40q12   f/u Rheum consults     #Hemeonc   - APLS + , hx of recurrent DVT / PE, takes Eliquis at Home. 6 years ago with bilateral DVT and associated PE. Has been taking eliquis since.   c/w Heparin ggt   - F/U - genetics hypercoagulable workup    #ID - treated for aspiration PNA (7d) - currently off Abx  - s/pZosyn 10/19-10/26  s/p Azitro 10/19-10/21  - Cx 10/22 NGTD     #Ethics   - FULL CODE  37yo female with pmh of BMI > 40, CHF-diastolic, B/L LE DVT + PE on Eliquis, HTN, Asthma,eczema and Anxiety presents to the ED w/ 1 week of worsening SOB on Exertion. Admitted to [facility] where pt was found to have cardiogenic shock, hypoxic respiratory failure requiring HFNC ISO of severe pHTN with acute on chronic cor pulmonale, requiring inotropes, vasopressors, pulmonary vasodilators and IV diuretics. Also found to have newly diagnosed SLE and APLS treated with steroids. Her course c/b transaminitis and SARAH. Now transfered to [facility] for management of pulmonary HTN.     #Neuro   A&Ox3    #Respiratory  Acute on Chronic respiratory failure 2/2 to CHF and pulmonary Hypertension   - on 4NC currently   Bumex 2g qd - Net negative goal 1L - 2L  Currently with metabolic alkosis, s/p diamox 2501x  w/ potassium repletion, will recheck gas and diures based on results  > Duonebs q6, Symbicort   > Montelukast 10mg daily     #Pulmonary Hypertension - Unclear primary / autoimmune etiology /vs Chronic PE. however went to [facility] for profound volume overload ISO Cor Pulmonale  - Hemodynamics and Echo as Below   > f/u with Dr. Del Castillo (Pulmonary HTN specialist)   > C/w Sildenafil 20  > CT angio chest evaluation for CTEPH - Patient had hives w/ prior CT, f/u with radiology for protocol.   > Plan for RHC     #CV - Admitted for cardiogenic shock - Acute on chronic heart failure with preserved ejection fraction (HFpEF)  On Milrinone .125 will wean as tolerated, as low concern for LV dysfunction.   ECHO 10/22 - Dilated RV with severely reduced RV function   - Strict I&Os, Daily Weights, q24 labs   - Bumex 2g IV qd     Hemodynamics   10/26 milrinone 0.250 mcg/kg/min CVP 9 /31/54 PCWP 7 PA sat 61.9% /71/91  Tg CO/CI  7.7/3.0 FIO2 40% PVR 6.1 charlton  10/25 milrinone 0.250 mcg/kg/min+vaso 0.04 u/min+Teagan 5 ppm CVP 10 PA 96/21/42 PCWP 7 PA sat 63% /68/83 HR 99 Tg CO/CI  7.7/3.0 FIO2 40%  10/24 milrinone 0.250 mcg/kg/min+vaso 0.04 u/min+phenylephrine 0.1 mcg/kg/min+Teagan 11 ppm CVP 8 OA 85/24/44 PCWP n/a PA sat 89.3% HR 95 /36/76  10/23 (mil 0.25, levo .08, Teagan 12 ppm): -140s, /72 (87), CVP 5, PA 87/33/52, PCW 7, PA sat 78.2%, Tg CO/CI 9.6/3.8,  dsc, PVR 4.69 CHARLTON    Aflutter with RVR   - s/p Amiodarone load   - Continue with amio 200PO    #GI - Regular diet     # - SARAH - BUN / CR 35/.8 on admission - ATN 2/2 CV shock Improving   - Metabolic Alkaosis  w/ respiratory acidosis - Diamox 1x with goal bicarb 30-32, replete K to help with alkosis  -     #Endocrine   - Moderate ISS    #Rheum  4 weeks ago was noted to have a high titer KATHIE and was referred to Rheumatologist Dr Maldonado. Treated with pred 20 mg outpatient.  + SLE serology - C3C4 extremely low, ds DNA v high  > Repeat APLS, Sjogren Scleraderma labs   > Stress dose Hydrocortisone 100q8 10/26  > s/p 1g Solumedrol 10/27-30  > s/p Solumedrol 60q6  plan for taper, currently 40q12   f/u Rheum consults     #Hemeonc   - APLS + , hx of recurrent DVT / PE, takes Eliquis at Home. 6 years ago with bilateral DVT and associated PE. Has been taking eliquis since.   c/w Heparin ggt   - F/U - genetics hypercoagulable workup    #ID - treated for aspiration PNA (7d) - currently off Abx  - s/pZosyn 10/19-10/26  s/p Azitro 10/19-10/21  - Cx 10/22 NGTD     #Ethics   - FULL CODE

## 2023-10-31 NOTE — PROGRESS NOTE ADULT - PROBLEM SELECTOR PLAN 11
DVT PPx: Heparin Drip for full AC  Diet: Regular  Bowel Regimen: Last BM 4 days again, started Senna 2 at bedtime  Code: Full  Dispo: [facility] per PT evaluation  Pharmacy:  PCP:   Communication:

## 2023-11-01 NOTE — PROGRESS NOTE ADULT - PROBLEM SELECTOR PLAN 7
- Pt w/ new atrial flutter during admission  - Continue heparin drip  - Continue Amiodarone 200 mg PO daily  - CTM Telemetry

## 2023-11-01 NOTE — PROGRESS NOTE ADULT - PROBLEM SELECTOR PLAN 1
- Suspected multifactorial w/ primary insult due to volume overload w/ pHTN, cor pulmonale on existing prior PNA, hx of PEs, morbid obesity, asthma.   - s/p empiric Zosyn from 10/19-10/26 at [facility]  - Not intubated, on HFNC, now weaned to 4L NC with aggressive diuresis, Sildenafil  - Continue aggressive diuresis with Bumex, consider Diamox if continues to be Alkalotic  - Continue Montelukast, Duonebs, Symbicort - Suspected multifactorial w/ primary insult due to volume overload w/ pHTN, cor pulmonale on existing prior PNA, hx of PEs, morbid obesity, asthma.   - s/p empiric Zosyn from 10/19-10/26 at [facility]  - Not intubated, on HFNC, now weaned to 4L NC with aggressive diuresis, Sildenafil  - Continue aggressive diuresis with Bumex, daily VBGs w/ Diamox if bicarb uptrending  - Continue Montelukast, Duonebs, Symbicort

## 2023-11-01 NOTE — CONSULT NOTE ADULT - SUBJECTIVE AND OBJECTIVE BOX
PULMONARY CONSULTATION NOTE    NAME: MITCHEL GUNN  MEDICAL RECORD NUMBER: [id number]    CHIEF COMPLAINT: Patient is a 38y old  Female who presents with a chief complaint of SOB - cardiogenic shock and Pulm HTN (2023 08:25)      HISTORY OF PRESENT ILLNESS:   37 y/o F with a h/o b/l DVTs, recurrent PE on eliquis, HFpEF, morbid obesity, asthma, chronic hyperkalemia, admitted to [facility] on 10/18 with complaints of weakness, worsening LE edema, cough, and rapid weight gain.Of note, was being worked up as an outpatient for possible SLE given facial rash, photophobia, and joint pains and had been started on prednisone 20 mg by her PCP x ~1 month prior to arrival at [place]. Outpatient  results from 1 week prior showing low C3/4 complement and elevated CRP. Pt was admitted to medicine for treatment of PNA. Hospital course complicated by progressive SARAH, HAGMA, transaminitis and acute hypoxemic respiratory failure secondary to cardiogenic shock in the face of severe pHTN with acute on chronic cor pulmonale.     Her pHTN was thought to be likely multifactorial due to PE/OHS/MICHAEL/autoimmune process. Patient cardiogenic shock and iniated on milrinone on 10/20. She had a [place] placement on 10/23 revealing elevated pulmonary pressures, and required levophed from 10/22- for pressure support, diuresis with bumex was also initiated on 10/22 and maintained, and is now s/p inshaled nitric oxide.. Patient shock state has improved. Currently on .125 of milrinone. Bumex DC'd with increasing cr. Patient persistently hypoxic, requiring HFNC. She was initiated on sildenafil on 10/26, now on 20 TID after conversation with  Dr. Del Castillo. . S/P emperic treatment for PNA with zosyn 10/19-. She was placed on a heparin gtt due to c/f APLS     (30 Oct 2023 16:04)      ====================BACKGROUND INFORMATION====================    FAMILY HISTORY: FAMILY HISTORY:  Family history of colon cancer (Mother)    Family history of stroke (Father)        PAST MEDICAL AND SURGICAL HISTORY: PAST MEDICAL & SURGICAL HISTORY:  Pulmonary embolism      DVT, lower extremity      CHF (congestive heart failure)      Asthma      Anxiety      Severe obesity (BMI >= 40)      Hypertension      No significant past surgical history          ALLERGIES:Allergies    ceftriaxone (Hives)  iodine (Hives)  shellfish (Hives)    Intolerances        HOME MEDICATIONS:     OUTPATIENT PULMONARY DOCTOR:     PFTs:    SOCIAL HISTORY:  TOBACCO USE:  ALCOHOL:  DRUGS:  MARITAL STATUS:  EMPLOYMENT:  EXPOSURES:  RECENT TRAVELS:  PETS:  CODE STATUS:    ====================REVIEW OF SYSTEMS======================  CONSTITUTIONAL:  CARDIOVASCULAR:  PULMONARY:  GASTROINTESTINAL:  [] ALL OTHER REVIEW OF SYSTEMS ARE NEGATIVE   [] UNABLE TO OBTAIN REVIEW OF SYSTEMS DUE TO ______________      ====================PHYSICAL EXAM==========================    VITALS: ICU Vital Signs Last 24 Hrs  T(C): 36.7 (2023 11:27), Max: 36.7 (2023 11:27)  T(F): 98.1 (2023 11:27), Max: 98.1 (2023 11:)  HR: 86 (2023 11:27) (70 - 86)  BP: 145/70 (2023 11:27) (105/58 - 145/70)  BP(mean): 77 (31 Oct 2023 16:00) (77 - 81)  ABP: --  ABP(mean): --  RR: 18 (2023 11:27) (16 - 18)  SpO2: 96% (2023 11:27) (93% - 98%)    O2 Parameters below as of 2023 11:27  Patient On (Oxygen Delivery Method): nasal cannula  O2 Flow (L/min): 4          INTAKE and OUTPUT: I&O's Summary    31 Oct 2023 07:01  -  2023 07:00  --------------------------------------------------------  IN: 160 mL / OUT: 5165 mL / NET: -5005 mL    2023 07:01  -  2023 12:57  --------------------------------------------------------  IN: 220 mL / OUT: 0 mL / NET: 220 mL        WEIGHT: Daily     Daily Weight in k.6 (2023 07:56)    GLUCOSE: CAPILLARY BLOOD GLUCOSE      POCT Blood Glucose.: 212 mg/dL (2023 11:40)      VENTILATOR SETTINGS:     Physical Exam  CONST:     NAD, obese, breathing comfortably  NECK:        Supple, no LAD; no palpable masses; no thyromegaly  RESP :        Normal respiratory effort; CTA bilaterally; no W/R/R  CHEST:       No TTP, no lines/ports; symmetric chest expansion  CARDIO:     RRR, normal S1 and S2, no M/R/G  VASC:         No JVD/AJR, 2+ peripheral edema, pulses 2+ B/L, Cap refill <2s  ABD:           Soft, NT/ND, norm bowel sounds  MSK:          No clubbing of digits; no joint swelling or TTP  EXT:           WWP, no reduction in body hair, no LE skin changes  PSYCH        A&O to person, place, and time; affect appropriate  NEURO:     Non-focal; no gross sensory deficits; moving all extremities   SKIN:          No rashes; no palpable lesions; axillae not dry      ====================MEDICATIONS==============================  MEDICATIONS  (STANDING):  aMIOdarone    Tablet 200 milliGRAM(s) Oral daily  budesonide 160 MICROgram(s)/formoterol 4.5 MICROgram(s) Inhaler 2 Puff(s) Inhalation two times a day  buMETAnide Injectable 2 milliGRAM(s) IV Push every 12 hours  chlorhexidine 2% Cloths 1 Application(s) Topical <User Schedule>  heparin  Infusion. 1600 Unit(s)/Hr (16 mL/Hr) IV Continuous <Continuous>  hydroxychloroquine 200 milliGRAM(s) Oral two times a day  insulin lispro (ADMELOG) corrective regimen sliding scale   SubCutaneous Before meals and at bedtime  methylPREDNISolone sodium succinate Injectable 40 milliGRAM(s) IV Push daily  montelukast 10 milliGRAM(s) Oral daily  PARoxetine 20 milliGRAM(s) Oral daily  polyethylene glycol 3350 17 Gram(s) Oral two times a day  senna 2 Tablet(s) Oral at bedtime  sildenafil (REVATIO) 20 milliGRAM(s) Oral three times a day      MEDICATIONS  (PRN):  albuterol/ipratropium for Nebulization 3 milliLiter(s) Nebulizer every 6 hours PRN Shortness of Breath      ====================LABORATORY RESULTS===================  CBC Full  -  ( 2023 06:03 )  WBC Count : 5.04 K/uL  RBC Count : 4.12 M/uL  Hemoglobin : 10.1 g/dL  Hematocrit : 36.5 %  Platelet Count - Automated : 189 K/uL  Mean Cell Volume : 88.6 fl  Mean Cell Hemoglobin : 24.5 pg  Mean Cell Hemoglobin Concentration : 27.7 gm/dL  Auto Neutrophil # : 4.54 K/uL  Auto Lymphocyte # : 0.13 K/uL  Auto Monocyte # : 0.34 K/uL  Auto Eosinophil # : 0.00 K/uL  Auto Basophil # : 0.00 K/uL  Auto Neutrophil % : 90.1 %  Auto Lymphocyte % : 2.6 %  Auto Monocyte % : 6.7 %  Auto Eosinophil % : 0.0 %  Auto Basophil % : 0.0 %                                        143  |  95<L>  |  98<H>  ----------------------------<  232<H>  3.7   |  36<H>  |  1.23    Ca    9.2      2023 06:03  Phos  3.4       Mg     2.1         TPro  7.1  /  Alb  3.1<L>  /  TBili  1.4<H>  /  DBili  x   /  AST  23  /  ALT  171<H>  /  AlkPhos  87          PT/INR - ( 31 Oct 2023 00:31 )   PT: 13.8 sec;   INR: 1.33 ratio         PTT - ( 31 Oct 2023 22:44 )  PTT:79.3 sec    Creatinine Trend: 1.23<--, 1.37<--, 1.68<--, 1.85<--, 1.91<--, 2.11<--      =============RADIOLOGY and ECHOCARDIOGRAPHY==================    CXR:      CT CHEST:      ECHOCARDIOGRAPHY:      POINT OF CARE ULTRASOUND:

## 2023-11-01 NOTE — PROGRESS NOTE ADULT - PROBLEM SELECTOR PLAN 9
- Last a1c 6.0  - Pt w/ hyperglycemia to 200's in s/o steroid use  - Continue insulin sliding scale, regular diet

## 2023-11-01 NOTE — PROGRESS NOTE ADULT - SUBJECTIVE AND OBJECTIVE BOX
***************************************************************  Juan Carlosmeredith (Ji-Cheng) [facility] PGY2  Internal Medicine   ***************************************************************    MITCHEL GUNN  38y  MRN: [id number]    Patient is a 38y old  Female who presents with a chief complaint of SOB - cardiogenic shock and Pulm HTN (31 Oct 2023 17:30)      Subjective: no events ON. Denies fever, CP, SOB, abn pain, N/V, dysuria. Tolerating diet.      MEDICATIONS  (STANDING):  aMIOdarone    Tablet 200 milliGRAM(s) Oral daily  budesonide 160 MICROgram(s)/formoterol 4.5 MICROgram(s) Inhaler 2 Puff(s) Inhalation two times a day  buMETAnide Injectable 2 milliGRAM(s) IV Push every 12 hours  heparin  Infusion. 1600 Unit(s)/Hr (16 mL/Hr) IV Continuous <Continuous>  hydroxychloroquine 200 milliGRAM(s) Oral two times a day  insulin lispro (ADMELOG) corrective regimen sliding scale   SubCutaneous Before meals and at bedtime  methylPREDNISolone sodium succinate Injectable 40 milliGRAM(s) IV Push daily  montelukast 10 milliGRAM(s) Oral daily  PARoxetine 20 milliGRAM(s) Oral daily  polyethylene glycol 3350 17 Gram(s) Oral two times a day  senna 2 Tablet(s) Oral at bedtime  sildenafil (REVATIO) 20 milliGRAM(s) Oral three times a day    MEDICATIONS  (PRN):  albuterol/ipratropium for Nebulization 3 milliLiter(s) Nebulizer every 6 hours PRN Shortness of Breath      Objective:    Vitals: Vital Signs Last 24 Hrs  T(C): 36.5 (11-01-23 @ 04:00), Max: 36.6 (10-31-23 @ 12:00)  T(F): 97.7 (11-01-23 @ 04:00), Max: 97.8 (10-31-23 @ 12:00)  HR: 83 (11-01-23 @ 04:00) (70 - 83)  BP: 116/63 (11-01-23 @ 04:00) (105/58 - 128/76)  BP(mean): 77 (10-31-23 @ 16:00) (75 - 81)  RR: 18 (11-01-23 @ 04:00) (16 - 26)  SpO2: 98% (11-01-23 @ 04:00) (92% - 98%)            I&O's Summary    31 Oct 2023 07:01  -  01 Nov 2023 07:00  --------------------------------------------------------  IN: 160 mL / OUT: 5165 mL / NET: -5005 mL        PHYSICAL EXAM:  GENERAL: NAD  HEAD:  Atraumatic, Normocephalic  EYES: EOMI, conjunctiva and sclera clear  CHEST/LUNG: Clear to auscultation bilaterally; No rales, rhonchi, wheezing, or rubs  HEART: Regular rate and rhythm; No murmurs, rubs, or gallops  ABDOMEN: Soft, Nontender, Nondistended;   SKIN: No rashes or lesions  NERVOUS SYSTEM:  Alert & Oriented X3, no focal deficits    LABS:  11-01    143  |  95<L>  |  98<H>  ----------------------------<  232<H>  3.7   |  36<H>  |  1.23  10-31    147<H>  |  99  |  100<H>  ----------------------------<  219<H>  3.7   |  39<H>  |  1.37<H>  10-31    143  |  97  |  104<H>  ----------------------------<  240<H>  3.4<L>   |  36<H>  |  1.68<H>    Ca    9.2      01 Nov 2023 06:03  Ca    9.4      31 Oct 2023 16:43  Ca    9.2      31 Oct 2023 00:31  Phos  3.4     11-01  Mg     2.1     11-01    TPro  7.1  /  Alb  3.1<L>  /  TBili  1.4<H>  /  DBili  x   /  AST  23  /  ALT  171<H>  /  AlkPhos  87  11-01  TPro  7.2  /  Alb  3.3  /  TBili  1.3<H>  /  DBili  x   /  AST  33  /  ALT  250<H>  /  AlkPhos  104  10-31  TPro  7.5  /  Alb  3.2<L>  /  TBili  1.5<H>  /  DBili  x   /  AST  34  /  ALT  282<H>  /  AlkPhos  105  10-30      PT/INR - ( 31 Oct 2023 00:31 )   PT: 13.8 sec;   INR: 1.33 ratio         PTT - ( 31 Oct 2023 22:44 )  PTT:79.3 sec              Urinalysis Basic - ( 01 Nov 2023 06:03 )    Color: x / Appearance: x / SG: x / pH: x  Gluc: 232 mg/dL / Ketone: x  / Bili: x / Urobili: x   Blood: x / Protein: x / Nitrite: x   Leuk Esterase: x / RBC: x / WBC x   Sq Epi: x / Non Sq Epi: x / Bacteria: x                              10.1   5.04  )-----------( 189      ( 01 Nov 2023 06:03 )             36.5                         10.0   3.87  )-----------( 167      ( 31 Oct 2023 22:44 )             35.3                         9.4    4.30  )-----------( 161      ( 31 Oct 2023 00:31 )             32.8     CAPILLARY BLOOD GLUCOSE      POCT Blood Glucose.: 225 mg/dL (01 Nov 2023 07:34)  POCT Blood Glucose.: 211 mg/dL (31 Oct 2023 21:31)  POCT Blood Glucose.: 211 mg/dL (31 Oct 2023 16:38)  POCT Blood Glucose.: 207 mg/dL (31 Oct 2023 12:29)      RADIOLOGY & ADDITIONAL TESTS:    Imaging Personally Reviewed:  [x ] YES  [ ] NO    Consultants involved in case:   Consultant(s) Notes Reviewed:  [ x] YES  [ ] NO:   Care Discussed with Consultants/Other Providers [x ] YES  [ ] NO         ***************************************************************  Juan Carlosmeerdith (Ji-Cheng) [facility] PGY2  Internal Medicine   ***************************************************************    MITCHEL GUNN  38y  MRN: [id number]    Patient is a 38y old  Female who presents with a chief complaint of SOB - cardiogenic shock and Pulm HTN (31 Oct 2023 17:30)      Subjective: NAEO. Comfortable on NC, no acute complaints.     MEDICATIONS  (STANDING):  aMIOdarone    Tablet 200 milliGRAM(s) Oral daily  budesonide 160 MICROgram(s)/formoterol 4.5 MICROgram(s) Inhaler 2 Puff(s) Inhalation two times a day  buMETAnide Injectable 2 milliGRAM(s) IV Push every 12 hours  heparin  Infusion. 1600 Unit(s)/Hr (16 mL/Hr) IV Continuous <Continuous>  hydroxychloroquine 200 milliGRAM(s) Oral two times a day  insulin lispro (ADMELOG) corrective regimen sliding scale   SubCutaneous Before meals and at bedtime  methylPREDNISolone sodium succinate Injectable 40 milliGRAM(s) IV Push daily  montelukast 10 milliGRAM(s) Oral daily  PARoxetine 20 milliGRAM(s) Oral daily  polyethylene glycol 3350 17 Gram(s) Oral two times a day  senna 2 Tablet(s) Oral at bedtime  sildenafil (REVATIO) 20 milliGRAM(s) Oral three times a day    MEDICATIONS  (PRN):  albuterol/ipratropium for Nebulization 3 milliLiter(s) Nebulizer every 6 hours PRN Shortness of Breath      Objective:    Vitals: Vital Signs Last 24 Hrs  T(C): 36.5 (11-01-23 @ 04:00), Max: 36.6 (10-31-23 @ 12:00)  T(F): 97.7 (11-01-23 @ 04:00), Max: 97.8 (10-31-23 @ 12:00)  HR: 83 (11-01-23 @ 04:00) (70 - 83)  BP: 116/63 (11-01-23 @ 04:00) (105/58 - 128/76)  BP(mean): 77 (10-31-23 @ 16:00) (75 - 81)  RR: 18 (11-01-23 @ 04:00) (16 - 26)  SpO2: 98% (11-01-23 @ 04:00) (92% - 98%)            I&O's Summary    31 Oct 2023 07:01  -  01 Nov 2023 07:00  --------------------------------------------------------  IN: 160 mL / OUT: 5165 mL / NET: -5005 mL        PHYSICAL EXAM:  GENERAL: NAD  HEAD:  Atraumatic, Normocephalic  EYES: EOMI, conjunctiva and sclera clear  CHEST/LUNG: Clear to auscultation bilaterally; No rales, rhonchi, wheezing, or rubs  HEART: Regular rate and rhythm; No murmurs, rubs, or gallops  ABDOMEN: Soft, Nontender, Nondistended;   SKIN: No rashes or lesions. No gross foot wounds however some redness in the heels. Edematous 1-2+ to the midshins  NERVOUS SYSTEM:  Alert & Oriented X3, no focal deficits    LABS:  11-01    143  |  95<L>  |  98<H>  ----------------------------<  232<H>  3.7   |  36<H>  |  1.23  10-31    147<H>  |  99  |  100<H>  ----------------------------<  219<H>  3.7   |  39<H>  |  1.37<H>  10-31    143  |  97  |  104<H>  ----------------------------<  240<H>  3.4<L>   |  36<H>  |  1.68<H>    Ca    9.2      01 Nov 2023 06:03  Ca    9.4      31 Oct 2023 16:43  Ca    9.2      31 Oct 2023 00:31  Phos  3.4     11-01  Mg     2.1     11-01    TPro  7.1  /  Alb  3.1<L>  /  TBili  1.4<H>  /  DBili  x   /  AST  23  /  ALT  171<H>  /  AlkPhos  87  11-01  TPro  7.2  /  Alb  3.3  /  TBili  1.3<H>  /  DBili  x   /  AST  33  /  ALT  250<H>  /  AlkPhos  104  10-31  TPro  7.5  /  Alb  3.2<L>  /  TBili  1.5<H>  /  DBili  x   /  AST  34  /  ALT  282<H>  /  AlkPhos  105  10-30      PT/INR - ( 31 Oct 2023 00:31 )   PT: 13.8 sec;   INR: 1.33 ratio         PTT - ( 31 Oct 2023 22:44 )  PTT:79.3 sec              Urinalysis Basic - ( 01 Nov 2023 06:03 )    Color: x / Appearance: x / SG: x / pH: x  Gluc: 232 mg/dL / Ketone: x  / Bili: x / Urobili: x   Blood: x / Protein: x / Nitrite: x   Leuk Esterase: x / RBC: x / WBC x   Sq Epi: x / Non Sq Epi: x / Bacteria: x                              10.1   5.04  )-----------( 189      ( 01 Nov 2023 06:03 )             36.5                         10.0   3.87  )-----------( 167      ( 31 Oct 2023 22:44 )             35.3                         9.4    4.30  )-----------( 161      ( 31 Oct 2023 00:31 )             32.8     CAPILLARY BLOOD GLUCOSE      POCT Blood Glucose.: 225 mg/dL (01 Nov 2023 07:34)  POCT Blood Glucose.: 211 mg/dL (31 Oct 2023 21:31)  POCT Blood Glucose.: 211 mg/dL (31 Oct 2023 16:38)  POCT Blood Glucose.: 207 mg/dL (31 Oct 2023 12:29)      RADIOLOGY & ADDITIONAL TESTS:    Imaging Personally Reviewed:  [x ] YES  [ ] NO    Consultants involved in case:   Consultant(s) Notes Reviewed:  [ x] YES  [ ] NO:   Care Discussed with Consultants/Other Providers [x ] YES  [ ] NO

## 2023-11-01 NOTE — CONSULT NOTE ADULT - ASSESSMENT
38F with PMH DVT/PE (on eliquis), ?SLE on chronic steroids, asthma, HFpEF, class 3 obesity who iniitally presented to [place] with weakness, edema, fluid overload. Transferred to [facility] due to concern for cardiogenic shock due to RV dysfunction and severe pulmonary hypertension. Started on sildenafil at [facility] as per Dr Del Castillo and transferred on milrinone now weaned off. Labs here concerning for APLS and lupus.     #Pulmonary hypertension  -Grouping still unknown at present. Could have group 1 in the setting of autoimmune disease, group 2 in the setting of HFpEF, group 3 in the setting of likely OHS/MICHAEL, and group 4 in the setting of known PE and APLS    Recommendations:  - going for CTPA today (with desensitization), will follow up  - will need RHC when optimized from volume status  - will discuss with Dr. Del Castillo whether to hold sildenafil prior to RHC  - c/w sildenafil for now  - c/w bumex 2 IV bid  - defer to rheum regarding steroid taper    Vince Lebron MD  PGY4 Fellow PCCM  TEAMS preferred  Pager: [facility] [phone]/[facility] [phone]

## 2023-11-01 NOTE — PROGRESS NOTE ADULT - ASSESSMENT
Ms. 38-yo female with PMHx of b/l DVTs, recurrent PE on eliquis, HFpEF w/ hx of cor pulmonale, morbid obesity, asthma, chronic hyperkalemia, recently diagnosed SLE (facial rash, photophobia, joint paints, started on prednisone 20 mg), admitted initially w/ PNA to [facility] on 10/18, course c/b SARAH, AHRF 2/2 cardiogenic shock 2/2 pHTN w/ RV failure s/p MICU stay at [facility] now off inotropes and pressors pending rheumatological work-up and CT Angio/RHC for pHTN.

## 2023-11-01 NOTE — PROGRESS NOTE ADULT - PROBLEM SELECTOR PLAN 2
- Pt w/ elevated pulmonary filling pressures on swan at [facility]   - Last values 10/26 CVP 9, /31/54, PCWP 7, PA sat 61.9%, PVR 6.1 wilkinson, , /71/91, Tg CO/CI 7.7/3.0 on milrinone 0.250, FIO2 40%  - Thought to be due to prior PE (Group 4), OHS, MICHAEL (Group 3), autoimmune process. Less likely left-sided disease (Group 2)   - Dr. Del Castillo from Pulmonology following, appreciate recommendations  - Pending CT angio chest pending w/ densensitization as pt w/ contrast allergy on 11/1   - Pending RHC +/- vasoreactivity testing  - Bumex 2 mg IVP BID, consider diamox if continues to be alkalotic - Pt w/ elevated pulmonary filling pressures on swan at [facility]   - Last values 10/26 CVP 9, /31/54, PCWP 7, PA sat 61.9%, PVR 6.1 wilkinson, , /71/91, Tg CO/CI 7.7/3.0 on milrinone 0.250, FIO2 40%  - Thought to be due to prior PE (Group 4), OHS, MICHAEL (Group 3), autoimmune process. Less likely left-sided disease (Group 2)   - Dr. Del Castillo from Pulmonology following, appreciate recommendations  - Pending CT angio chest pending w/ densensitization as pt w/ contrast allergy on 11/1 - will be done at 10 PM today w/ jean carlos protocol  - Pending RHC +/- vasoreactivity testing - per pulm requires further volume optimization before consultation  - Bumex 2 mg IVP BID, consider diamox if continues to be alkalotic

## 2023-11-01 NOTE — PROGRESS NOTE ADULT - PROBLEM SELECTOR PLAN 3
- Pt treated w/ prednisone 20 mg for facial rash, photophobia, joint pains, c/f SLE w/ low C3C4, KATHIE 1:2560, dsDNA 671, Sm 2.1, Ro >8, La>8, RF 65, CRP 45, , negative RNP/CCP/ ACL/ ANCA/panda/centrmere/scl70 per rheum eval at [facility]  - f/u KATHIE, Anti-ribonuclear protein, dsDNA, Myomarker Panel 3, Scleroderma Antibodies, Sjogren's  - s/p stress dose hydrocortisone on 10/26, Solumedrol 1g 10/27-10/30, now on taper   - Rheum consulted, appreciate recommendations  - Continue Solumedrol 40 q12h - Pt treated w/ prednisone 20 mg for facial rash, photophobia, joint pains, c/f SLE w/ low C3C4, KATHIE 1:2560, dsDNA 671, Sm 2.1, Ro >8, La>8, RF 65, CRP 45, , negative RNP/CCP/ ACL/ ANCA/panda/centrmere/scl70 per rheum eval at [facility]  - f/u KATHIE, Anti-ribonuclear protein, dsDNA, Myomarker Panel 3, Scleroderma Antibodies, Sjogren's  - s/p stress dose hydrocortisone on 10/26, Solumedrol 1g 10/27-10/30, now on taper   - Rheum consulted, appreciate recommendations  - Continue Solumedrol 40 QD, plaquenil 200 mg BID

## 2023-11-01 NOTE — PROGRESS NOTE ADULT - ATTENDING COMMENTS
38 year old female with a PMHx of morbid obesity, PE on eliquis (diagnosed in 2017, on eliquis at home), HFpEF, asthma, on PRN home O2, suspected SLE (being worked up by outpatient rheumatologist, on steroids x1 month prior to hospital presentation) who is transferred from the MICU to the general medicine floors on 10/31. She was originally admitted to [facility] on 10/18 with complaints of weakness, worsening LE edema, cough, and rapid weight gain.  Her evaluation there was also significant for atrial flutter, converted to normal sinus rhythm with amiodarone. CXR showed mild pulmonary edema and LLL consolidation. She was treated with a course of IV diuretics for presumed decompensated heart failure as well as empiric antibiotics for pneumonia. During her treatment course, she developed an SARAH. Lasix were held. She progressively became hypotensive, fluid resuscitation limited by her volume-overloaded status. She was transferred to the ICU at [facility] on 10/22. Ddx for hypotension included adrenal insufficiency in the setting of chronic outpatient steroids v cardiogenic shock and cor pulmonale. They started her on pressors, stress-dose steroids, and bumex drip. [place] cath reading consistent with elevated pulmonary artery pressures. TTE also showing severely decreased RV systolic function and enlarged right ventricle. She was treated with inhaled nitric oxide and started on sildenafil. Course was further complicated by shock liver with AST and ALT up to 3000s+/2000+, now improving.     The patient was transferred to [facility] for further management of pHTN. She arrived to [facility] on 10/30. Her pressors were titrated down and she was titrated off of high flow onto nasal canula O2 supplementation. She is now transferred to the [facility] for further care.     Pt this morning c/o bilateral frontal headaches. Otherwise, no shortness of breath, cough or chest discomfort. Long catheter was removed earlier this morning; pt has not yet urinated on her own.    On exam, the patient is morbidly obese, breathing comfortably on 4L of NC. She has lower extremity pitting edema, R>L 2+ edema.     #cor pulmonale   #hypoxic and hypercapenic respiratory failure   #metabolic acidosis   #SLE   #APLS   #steroid-induced hyperglycemia  #shock state s/p pressors (improved)- cardiogenic shock v adrenal insufficiency   #atrial flutter (s/p pharmacologic conversion to sinus rhythm with amiodarone)  #SARAH—improving   #shock liver—improving      -continue diuretics, bumex 2mg IV BID. Strict I/O  -plan for CTA chest in the AM, with pre-medication (hx of hives IV contrast)   -started on plaquenil for new diagnosis of SLE  -as per rheumatology, down titrate steroids from solumedrol 40mg IV BID to solumedrol 40mg IV daily  - start glargine 5 units qHS with insulin sliding scale. Continue to monitor FS as steroids as titrated down today  -continue heparin gtt. Eventual plan to transition to warfarin for APLS  -s/p diamox this AM for elevated Bicarb, will continue to monitor  -trial of void  -pulm and rheumatology consulted, appreciate evaluation   -will obtain cardiology consult as well    Rest of plan as per resident note above.

## 2023-11-01 NOTE — PROGRESS NOTE ADULT - PROBLEM SELECTOR PLAN 6
- Wound care consulted, recommend podiatry consultation  - Consult podiatry in the AM - Wound care consulted, recommend podiatry consultation  - Defer podiatry consult given heel redness.

## 2023-11-01 NOTE — CHART NOTE - NSCHARTNOTEFT_GEN_A_CORE
Discussed with nurse and CT scan - time slotted for 10 PM CT Angio scan, nurse will administer hydrocortisone 200 mg at 5 PM and 9 PM and Benadryl 50 mg at 9 PM. Nurse and CT scan aware.

## 2023-11-01 NOTE — PROGRESS NOTE ADULT - PROBLEM SELECTOR PLAN 5
- Pt w/ metabolic alkalosis likely 2/2 overdiuresis in s/o bumex  - Bicarb 30's over last few days, pH 7.40  - Repeat VBG in AM w/ plan for Diamox if Bicarb continues to increase - Pt w/ metabolic alkalosis likely 2/2 overdiuresis in s/o bumex  - Bicarb 30's over last few days, pH 7.40  - CTM w/ daily VBGs

## 2023-11-02 NOTE — PROGRESS NOTE ADULT - SUBJECTIVE AND OBJECTIVE BOX
PULMONARY PROGRESS NOTE    PATIENT INFORMATION:  NAME: MITCHEL GUNN:  MRN: [id number]    CHIEF COMPLAINT: Patient is a 38y old  Female who presents with a chief complaint of SOB - cardiogenic shock and Pulm HTN (02 Nov 2023 16:37)      [x] INITIAL CONSULT, H&P, FAMILY HISTORY and PAST MEDICAL AND SURGICAL HISTORY REVIEWED    OVERNIGHT EVENTS, CHANGES TO HPI, SUBJECTIVE:     ========================REVIEW OF SYSTEMS========================  [x] ROS negative except as per HPI    ========================MEDICATIONS=============================  MEDICATIONS  (STANDING):  aMIOdarone    Tablet 200 milliGRAM(s) Oral daily  budesonide 160 MICROgram(s)/formoterol 4.5 MICROgram(s) Inhaler 2 Puff(s) Inhalation two times a day  buMETAnide Injectable 1 milliGRAM(s) IV Push every 12 hours  chlorhexidine 2% Cloths 1 Application(s) Topical <User Schedule>  dextrose 5%. 1000 milliLiter(s) (50 mL/Hr) IV Continuous <Continuous>  dextrose 5%. 1000 milliLiter(s) (100 mL/Hr) IV Continuous <Continuous>  dextrose 50% Injectable 25 Gram(s) IV Push once  dextrose 50% Injectable 25 Gram(s) IV Push once  dextrose 50% Injectable 12.5 Gram(s) IV Push once  glucagon  Injectable 1 milliGRAM(s) IntraMuscular once  heparin  Infusion. 1600 Unit(s)/Hr (16 mL/Hr) IV Continuous <Continuous>  hydroxychloroquine 200 milliGRAM(s) Oral two times a day  insulin glargine Injectable (LANTUS) 5 Unit(s) SubCutaneous at bedtime  insulin lispro (ADMELOG) corrective regimen sliding scale   SubCutaneous three times a day before meals  insulin lispro (ADMELOG) corrective regimen sliding scale   SubCutaneous at bedtime  insulin lispro Injectable (ADMELOG) 2 Unit(s) SubCutaneous three times a day before meals  methylPREDNISolone sodium succinate Injectable 40 milliGRAM(s) IV Push daily  montelukast 10 milliGRAM(s) Oral daily  mupirocin 2% Nasal 1 Application(s) Both Nostrils two times a day  PARoxetine 20 milliGRAM(s) Oral daily  polyethylene glycol 3350 17 Gram(s) Oral two times a day  senna 2 Tablet(s) Oral at bedtime  sildenafil (REVATIO) 20 milliGRAM(s) Oral three times a day      MEDICATIONS  (PRN):  albuterol/ipratropium for Nebulization 3 milliLiter(s) Nebulizer every 6 hours PRN Shortness of Breath  bisacodyl Suppository 10 milliGRAM(s) Rectal daily PRN Constipation  dextrose Oral Gel 15 Gram(s) Oral once PRN Blood Glucose LESS THAN 70 milliGRAM(s)/deciliter      ========================PHYSICAL EXAM============================    VITALS: ICU Vital Signs Last 24 Hrs  T(C): 36.6 (02 Nov 2023 21:02), Max: 37.1 (02 Nov 2023 16:36)  T(F): 97.9 (02 Nov 2023 21:02), Max: 98.7 (02 Nov 2023 16:36)  HR: 88 (02 Nov 2023 21:02) (79 - 93)  BP: 111/69 (02 Nov 2023 21:02) (111/69 - 128/70)  BP(mean): --  ABP: --  ABP(mean): --  RR: 18 (02 Nov 2023 21:02) (18 - 18)  SpO2: 98% (02 Nov 2023 21:02) (94% - 98%)    O2 Parameters below as of 02 Nov 2023 21:02  Patient On (Oxygen Delivery Method): room air            INTAKE and OUTPUT: I&O's Summary    01 Nov 2023 07:01  -  02 Nov 2023 07:00  --------------------------------------------------------  IN: 612 mL / OUT: 1650 mL / NET: -1038 mL    02 Nov 2023 07:01  -  02 Nov 2023 22:47  --------------------------------------------------------  IN: 840 mL / OUT: 2050 mL / NET: -1210 mL        VENTILATOR SETTINGS:     Physical Exam  CONST:     NAD, obese, breathing comfortably  NECK:        Supple, no LAD; no palpable masses; no thyromegaly  RESP :        Normal respiratory effort; CTA bilaterally; no W/R/R  CHEST:       No TTP, no lines/ports; symmetric chest expansion  CARDIO:     RRR, normal S1 and S2, no M/R/G  VASC:         No JVD/AJR, 2+ peripheral edema, pulses 2+ B/L, Cap refill <2s  ABD:           Soft, NT/ND, norm bowel sounds  MSK:          No clubbing of digits; no joint swelling or TTP  EXT:           WWP, no reduction in body hair, no LE skin changes  PSYCH        A&O to person, place, and time; affect appropriate  NEURO:     Non-focal; no gross sensory deficits; moving all extremities   SKIN:          No rashes; no palpable lesions; axillae not dry        ========================LABORATORY RESULTS AND IMAGING=============                        10.3   5.34  )-----------( 193      ( 02 Nov 2023 06:26 )             36.8                                                    11-02    143  |  93<L>  |  85<H>  ----------------------------<  278<H>  3.6   |  35<H>  |  1.03    Ca    8.8      02 Nov 2023 14:11  Phos  3.4     11-02  Mg     2.0     11-02    TPro  6.7  /  Alb  3.2<L>  /  TBili  1.5<H>  /  DBili  x   /  AST  23  /  ALT  123<H>  /  AlkPhos  80  11-02          Creatinine Trend: 1.03<--, 1.04<--, 1.23<--, 1.37<--, 1.68<--, 1.85<--    [x] RADIOLOGY REVIEWED AND INTERPRETED BY ME

## 2023-11-02 NOTE — PROGRESS NOTE ADULT - ATTENDING COMMENTS
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    Pulmonary hypertension etiology is likely multifactorial due to WHO Group I (underlying connective tissue disease, SLE/APLA syndrome) + Group II (diastolic dysfunction with elevated filling pressures on echo) + Group III (due to likely MICHAEL/OHS with chronic hypoxemic and hypercapnic respiratory failure) + possible Group IV (due to CTEPH).     CTPA with no main, left or right, or lobar pulmonary embolism with dilated main pulmonary artery measuring 3.9 cm and RA/RV enlargement. There is trace left basilar atelectasis. Stable diffuse mosaic attenuation pattern. No pleural effusion or thoracic lymphadenopathy.    Please obtain V/Q scan to further evaluate for CTEPH. Remains on bumetanide 1 mg IV q12h with strict I/O's and close monitoring of kidney function and lytes. Would also give acetazolamide 500 mg IV given contraction alkalosis. Will require right heart cath once euvolemic. Needs PFTs and sleep study as outpatient for full workup of pulmonary hypertension.     Rheumatologic workup with elevated KATHIE (1:2560), high dsDNA (347), high RF (62), elevated SS-A & SS-B (>8), low C3 & C4, positive APL, positive ACL, and positive B2GP IgG. Steroids now tapered to methylprednisolone 40 mg daily. Also on hydroxychloroquine.    Remains on heparin gtt for DVT/PE. If APLA syndrome confirmed, then will need to discuss with hematology regarding switching apixaban to warfarin.    History of asthma, although currently without significant asthma symptomatology. Continue Symbicort inhaler twice daily.    Note that if she will require long term steroids (>20 mg of prednisone, or equivalent), then she will require PCP prophylaxis with Bactrim DS 1 tab MWF.

## 2023-11-02 NOTE — PROGRESS NOTE ADULT - PROBLEM SELECTOR PLAN 6
- Wound care consulted, recommend podiatry consultation  - Defer podiatry consult given heel redness. - Wound care consulted, recommend podiatry consultation  - Defer podiatry consult given heel redness

## 2023-11-02 NOTE — PROGRESS NOTE ADULT - PROBLEM SELECTOR PLAN 1
- Suspected multifactorial w/ primary insult due to volume overload w/ pHTN, cor pulmonale on existing prior PNA, hx of PEs, morbid obesity, asthma.   - s/p empiric Zosyn from 10/19-10/26 at [facility]  - Not intubated, on HFNC, now weaned to 4L NC with aggressive diuresis, Sildenafil  - Continue aggressive diuresis with Bumex, daily VBGs w/ Diamox if bicarb uptrending  - Continue Montelukast, Duonebs, Symbicort - Suspected multifactorial w/ primary insult due to volume overload w/ pHTN, cor pulmonale on existing prior PNA, hx of PEs, morbid obesity, asthma.   - s/p empiric Zosyn from 10/19-10/26 at [facility]  - Not intubated, on HFNC, now weaned to 4L NC with aggressive diuresis, Sildenafil  - Continue aggressive diuresis with Bumex, daily VBGs w/ Diamox if bicarb uptrending  - Continue Montelukast, Duonebs, Symbicort  - Now on 2L NC, continue to downtitrate O2 as tolerated

## 2023-11-02 NOTE — DIETITIAN INITIAL EVALUATION ADULT - ORAL INTAKE PTA/DIET HISTORY
Pt reports good appetite/PO intake PTA, was following a low sodium diet PTA.   Confirms allergy to shellfish.

## 2023-11-02 NOTE — PROGRESS NOTE ADULT - PROBLEM SELECTOR PLAN 8
- Pt w/ prior SARAH likely in s/o sepsis and cardiogenic shock  - Cr downtrending  - CTM Cr and UOP BUN/Cr max 63.9/3.21 on 10/22 at [facility]. pt w/ prior SARAH likely in s/o sepsis and cardiogenic shock  - Cr downtrending -> improved back to baseline with Cr. 10.3  - CTM Cr and UOP

## 2023-11-02 NOTE — DIETITIAN INITIAL EVALUATION ADULT - OTHER CALCULATIONS
fluids per team. Energy needs based on reported recent wt 290 lbs/131.5 kg, protein needs based on  lbs/49.8 kg with consideration for BMI >40, pressure injuries, and hx HF.

## 2023-11-02 NOTE — DIETITIAN INITIAL EVALUATION ADULT - PERTINENT MEDS FT
MEDICATIONS  (STANDING):  aMIOdarone    Tablet 200 milliGRAM(s) Oral daily  budesonide 160 MICROgram(s)/formoterol 4.5 MICROgram(s) Inhaler 2 Puff(s) Inhalation two times a day  buMETAnide Injectable 2 milliGRAM(s) IV Push every 12 hours  chlorhexidine 2% Cloths 1 Application(s) Topical <User Schedule>  dextrose 5%. 1000 milliLiter(s) (50 mL/Hr) IV Continuous <Continuous>  dextrose 5%. 1000 milliLiter(s) (100 mL/Hr) IV Continuous <Continuous>  dextrose 50% Injectable 25 Gram(s) IV Push once  dextrose 50% Injectable 25 Gram(s) IV Push once  dextrose 50% Injectable 12.5 Gram(s) IV Push once  glucagon  Injectable 1 milliGRAM(s) IntraMuscular once  heparin  Infusion. 1600 Unit(s)/Hr (16 mL/Hr) IV Continuous <Continuous>  hydroxychloroquine 200 milliGRAM(s) Oral two times a day  insulin glargine Injectable (LANTUS) 5 Unit(s) SubCutaneous at bedtime  insulin lispro (ADMELOG) corrective regimen sliding scale   SubCutaneous three times a day before meals  insulin lispro (ADMELOG) corrective regimen sliding scale   SubCutaneous at bedtime  insulin lispro Injectable (ADMELOG) 2 Unit(s) SubCutaneous three times a day before meals  methylPREDNISolone sodium succinate Injectable 40 milliGRAM(s) IV Push daily  montelukast 10 milliGRAM(s) Oral daily  mupirocin 2% Nasal 1 Application(s) Both Nostrils two times a day  PARoxetine 20 milliGRAM(s) Oral daily  polyethylene glycol 3350 17 Gram(s) Oral two times a day  senna 2 Tablet(s) Oral at bedtime  sildenafil (REVATIO) 20 milliGRAM(s) Oral three times a day    MEDICATIONS  (PRN):  albuterol/ipratropium for Nebulization 3 milliLiter(s) Nebulizer every 6 hours PRN Shortness of Breath  bisacodyl Suppository 10 milliGRAM(s) Rectal daily PRN Constipation  dextrose Oral Gel 15 Gram(s) Oral once PRN Blood Glucose LESS THAN 70 milliGRAM(s)/deciliter

## 2023-11-02 NOTE — PROGRESS NOTE ADULT - PROBLEM SELECTOR PLAN 11
DVT PPx: Heparin Drip for full AC  Diet: Regular  Bowel Regimen: Last BM 4 days again, started Senna 2 at bedtime  Code: Full  Dispo: DICK per PT evaluation  Pharmacy:  PCP:   Communication: DVT PPx: Heparin Drip for full AC  Diet: low Na and Carb consistent diet  Bowel Regimen: Last BM 4 days again, started Senna 2 at bedtime  Code: Full  Dispo: DICK per PT evaluation  Pharmacy:  PCP:   Communication:

## 2023-11-02 NOTE — PROGRESS NOTE ADULT - PROBLEM SELECTOR PLAN 3
- Pt treated w/ prednisone 20 mg for facial rash, photophobia, joint pains, c/f SLE w/ low C3C4, KATHIE 1:2560, dsDNA 671, Sm 2.1, Ro >8, La>8, RF 65, CRP 45, , negative RNP/CCP/ ACL/ ANCA/panda/[facility]/scl70 per rheum eval at [facility]  - f/u KATHIE, Anti-ribonuclear protein, dsDNA, Myomarker Panel 3, Scleroderma Antibodies, Sjogren's  - s/p stress dose hydrocortisone on 10/26, Solumedrol 1g 10/27-10/30, now on taper   - Rheum consulted, appreciate recommendations  - Continue Solumedrol 40 QD, plaquenil 200 mg BID

## 2023-11-02 NOTE — DIETITIAN INITIAL EVALUATION ADULT - PERTINENT LABORATORY DATA
11-02    143  |  93<L>  |  89<H>  ----------------------------<  208<H>  3.3<L>   |  40<H>  |  1.04    Ca    9.0      02 Nov 2023 06:31  Phos  3.4     11-02  Mg     2.0     11-02    TPro  6.7  /  Alb  3.2<L>  /  TBili  1.5<H>  /  DBili  x   /  AST  23  /  ALT  123<H>  /  AlkPhos  80  11-02    CAPILLARY BLOOD GLUCOSE  POCT Blood Glucose.: 212 mg/dL (02 Nov 2023 11:45)  POCT Blood Glucose.: 225 mg/dL (02 Nov 2023 08:00)  POCT Blood Glucose.: 252 mg/dL (01 Nov 2023 21:12)  POCT Blood Glucose.: 190 mg/dL (01 Nov 2023 17:31)    A1C with Estimated Average Glucose Result: 6.0 % (10-22-23 @ 16:00)

## 2023-11-02 NOTE — DIETITIAN INITIAL EVALUATION ADULT - OTHER INFO
Reports weighing 485 lbs 2 years ago at time of HF dx. Endorses intentional 195 lb wt loss to 290 lbs by following a low salt diet, denies changing dietary habits/portions, etc. Endorses regaining to 328 lbs in the past week and a half.  Dosing wt: 337.3 lbs (10-30)  Wt history per chart: 298.9 lbs .w(11-02), 309.9 lbs (11-01), 319.8 lbs (10-31), 378 lbs (07-22), 374 lbs (06/06/22). Wt loss in line with pt's report, and wt loss in-house likely in setting of diuresis. RD to continue to monitor weight trends as able/available.     Per chart, pt currently ordered for admelog before meals, lantus, admelog ISS, IV solu-medrol, and IV bumex in-house.

## 2023-11-02 NOTE — DIETITIAN INITIAL EVALUATION ADULT - NSFNSPHYEXAMSKINFT_GEN_A_CORE
Pt seen by wound care 10/31, noted with deep tissue injuries to bilateral buttocks/sacrum, bilateral heels, and R hip.

## 2023-11-02 NOTE — DIETITIAN INITIAL EVALUATION ADULT - NSFNSNUTRHOMESUPPLEMENTFT_GEN_A_CORE
Reports taking a women's multivitamin, vitamin B complex, Vitamin C, Vitamin D, and a joint vitamin PTA.

## 2023-11-02 NOTE — DIETITIAN INITIAL EVALUATION ADULT - REASON INDICATOR FOR ASSESSMENT
RD consult for Pressure Injury Stage 2 or >.  Source: pt, medical record. Chart reviewed, events noted.

## 2023-11-02 NOTE — DIETITIAN INITIAL EVALUATION ADULT - ADD RECOMMEND
1) Recommend Low Sodium, Consistent Carbohydrate diet in setting of IV steroids.   2) Recommend a multivitamin and Vitamin C, pending no medical contraindications, to aid in wound healing.  3) Monitor PO intake, GI tolerance, skin integrity, labs, weight, and bowel movement regularity.   4) Honor food preferences as feasible. Assist with meals PRN and encourage PO intake.  5) RD remains available upon request and will follow-up per protocol.  6) BMI >40 alert placed in chart

## 2023-11-02 NOTE — DIETITIAN INITIAL EVALUATION ADULT - PERSON TAUGHT/METHOD
Discussed importance of adequate protein-energy consumption to meet estimated nutrient needs. Encouraged intake of meals and oral nutrition supplements as tolerated. Encouraged intake of protein-rich foods first at mealtimes to help preserve lean muscle mass and aid in wound healing. Reviewed food choices that are high in protein.   Discussed elevated A1c and relationship between steroid medications and blood glucose readings. Encouraged limited intake of refined grains and concentrated sweets. Encouraged balanced meals and intake of nonstarchy vegetables with meals. Pt noted with good comprehension and made aware RD remains available for further questions/concerns./verbal instruction/patient instructed

## 2023-11-02 NOTE — PROGRESS NOTE ADULT - ASSESSMENT
Ms. 38-yo female with PMHx of b/l DVTs, recurrent PE on eliquis, HFpEF w/ hx of cor pulmonale, morbid obesity, asthma, chronic hyperkalemia, recently diagnosed SLE (facial rash, photophobia, joint paints, started on prednisone 20 mg), admitted initially w/ PNA to [facility] on 10/18, course c/b SARAH, AHRF 2/2 cardiogenic shock 2/2 pHTN w/ RV failure s/p MICU stay at [facility] now off inotropes and pressors pending rheumatological work-up and CT Angio/RHC for pHTN.

## 2023-11-02 NOTE — PROGRESS NOTE ADULT - ATTENDING COMMENTS
38 year old female with a PMHx of morbid obesity, PE on eliquis (diagnosed in 2017, on eliquis at home), HFpEF, asthma, on PRN home O2, suspected SLE (being worked up by outpatient rheumatologist, on steroids x1 month prior to hospital presentation) who is transferred from the MICU to the general medicine floors on 10/31. She was originally admitted to [facility] on 10/18 with complaints of weakness, worsening LE edema, cough, and rapid weight gain.  Her evaluation there was also significant for atrial flutter, converted to normal sinus rhythm with amiodarone. CXR showed mild pulmonary edema and LLL consolidation. She was treated with a course of IV diuretics for presumed decompensated heart failure as well as empiric antibiotics for pneumonia. During her treatment course, she developed an SARAH. Lasix were held. She progressively became hypotensive, fluid resuscitation limited by her volume-overloaded status. She was transferred to the ICU at [facility] on 10/22. Ddx for hypotension included adrenal insufficiency in the setting of chronic outpatient steroids v cardiogenic shock and cor pulmonale. They started her on pressors, stress-dose steroids, and bumex drip. [place] cath reading consistent with elevated pulmonary artery pressures. TTE also showing severely decreased RV systolic function and enlarged right ventricle. She was treated with inhaled nitric oxide and started on sildenafil. Course was further complicated by shock liver with AST and ALT up to 3000s+/2000+, improving.      The patient was transferred to [facility] on 10/30 for further management of pHTN. Her pressors were titrated down and she was titrated off of high flow and onto nasal canula O2 supplementation. She is now transferred to the [facility] for further care.      This morning, patient had no acute complaints. States her breathing is comfortable but unable to lay down completely supine as she develops shortness of breath. On exam, the patient appears comfortable, breathing well on 4L of nasal canula. On room air, SpO2 goes down to 90%; improved to 95%+ on 2L. The patient has crackles bilaterally. She continues to have pitting edema, 2-3+.       #cor pulmonale    #hypoxic and hypercapenic respiratory failure    #metabolic acidosis    #SLE    #APLS    #steroid-induced hyperglycemia   #shock state s/p pressors (improved)- cardiogenic shock v adrenal insufficiency    #atrial flutter (s/p pharmacologic conversion to sinus rhythm with amiodarone)   #SARAH—improving    #shock liver—improving       -will down-titrate diuretics from bumex 2mg IV BID to 1mg IV BID. Strict I/O   - obtain right heart cath    -CTA chest completed with final read pending. Preliminarily, no PE, groundglass opacities seen   -continue plaquenil for new diagnosis of SLE   - continue solumedrol 40mg IV daily   - continue glargine 5 units qHS, start ademlog 2 units qAC; continue ademlog insulin sliding scale.   -continue heparin gtt. Eventual plan to transition to warfarin for APLS   -monitor BMP BID and continue diamox for bicarb    -trial of void   -pulm and rheumatology consulted, appreciate evaluation       Rest of plan as per resident note above. Date of service: 11/2/23    38 year old female with a PMHx of morbid obesity, PE on eliquis (diagnosed in 2017, on eliquis at home), HFpEF, asthma, on PRN home O2, suspected SLE (being worked up by outpatient rheumatologist, on steroids x1 month prior to hospital presentation) who is transferred from the MICU to the general medicine floors on 10/31. She was originally admitted to [facility] on 10/18 with complaints of weakness, worsening LE edema, cough, and rapid weight gain.  Her evaluation there was also significant for atrial flutter, converted to normal sinus rhythm with amiodarone. CXR showed mild pulmonary edema and LLL consolidation. She was treated with a course of IV diuretics for presumed decompensated heart failure as well as empiric antibiotics for pneumonia. During her treatment course, she developed an SARAH. Lasix were held. She progressively became hypotensive, fluid resuscitation limited by her volume-overloaded status. She was transferred to the ICU at [facility] on 10/22. Ddx for hypotension included adrenal insufficiency in the setting of chronic outpatient steroids v cardiogenic shock and cor pulmonale. They started her on pressors, stress-dose steroids, and bumex drip. [place] cath reading consistent with elevated pulmonary artery pressures. TTE also showing severely decreased RV systolic function and enlarged right ventricle. She was treated with inhaled nitric oxide and started on sildenafil. Course was further complicated by shock liver with AST and ALT up to 3000s+/2000+, improving.      The patient was transferred to [facility] on 10/30 for further management of pHTN. Her pressors were titrated down and she was titrated off of high flow and onto nasal canula O2 supplementation. She is now transferred to the [facility] for further care.      This morning, patient had no acute complaints. States her breathing is comfortable but unable to lay down completely supine as she develops shortness of breath. On exam, the patient appears comfortable, breathing well on 4L of nasal canula. On room air, SpO2 goes down to 90%; improved to 95%+ on 2L. The patient has crackles bilaterally. She continues to have pitting edema, 2-3+.       #cor pulmonale    #hypoxic and hypercapenic respiratory failure    #metabolic acidosis    #SLE    #APLS    #steroid-induced hyperglycemia   #shock state s/p pressors (improved)- cardiogenic shock v adrenal insufficiency    #atrial flutter (s/p pharmacologic conversion to sinus rhythm with amiodarone)   #SARAH—improving    #shock liver—improving       -will down-titrate diuretics from bumex 2mg IV BID to 1mg IV BID. Strict I/O   - obtain right heart cath    -CTA chest completed with final read pending. Preliminarily, no PE, groundglass opacities seen   -continue plaquenil for new diagnosis of SLE   - continue solumedrol 40mg IV daily   - continue glargine 5 units qHS, start ademlog 2 units qAC; continue ademlog insulin sliding scale.   -continue heparin gtt. Eventual plan to transition to warfarin for APLS   -monitor BMP BID and continue diamox for bicarb    -trial of void   -pulm and rheumatology consulted, appreciate evaluation       Rest of plan as per resident note above.

## 2023-11-02 NOTE — PROGRESS NOTE ADULT - PROBLEM SELECTOR PLAN 2
- Pt w/ elevated pulmonary filling pressures on swan at [facility]   - Last values 10/26 CVP 9, /31/54, PCWP 7, PA sat 61.9%, PVR 6.1 wilkinson, , /71/91, Tg CO/CI 7.7/3.0 on milrinone 0.250, FIO2 40%  - Thought to be due to prior PE (Group 4), OHS, MICHAEL (Group 3), autoimmune process. Less likely left-sided disease (Group 2)   - Dr. Del Castillo from Pulmonology following, appreciate recommendations  - Pending CT angio chest pending w/ densensitization as pt w/ contrast allergy on 11/1 - will be done at 10 PM today w/ jean carlos protocol  - Pending RHC +/- vasoreactivity testing - per pulm requires further volume optimization before consultation  - Bumex 2 mg IVP BID, consider diamox if continues to be alkalotic - Pt w/ elevated pulmonary filling pressures on swan at [facility]   - Last values 10/26 CVP 9, /31/54, PCWP 7, PA sat 61.9%, PVR 6.1 wilkinson, , /71/91, Tg CO/CI 7.7/3.0 on milrinone 0.250, FIO2 40%  - Thought to be due to prior PE (Group 4), OHS, MICHAEL (Group 3), autoimmune process. Less likely left-sided disease (Group 2)   - Dr. Del Castillo from Pulmonology following, appreciate recommendations  - CTPE 11/1 - no PE, but unchanged diffuse pulm mosaic attenuation may be CTEPH   - Cards consulted 11/2 for RHC +/- vasoreactivity testing  - s/p Bumex 2mg BID (10/30-11/2). Decrease to Bumex 1 mg BID  - Diamox if continues to be alkalotic

## 2023-11-02 NOTE — DIETITIAN INITIAL EVALUATION ADULT - NSFNSADHERENCEPTAFT_GEN_A_CORE
Noted pt recent dx with SLE, on prednisone 20 mg at home. A1c 6% noted, likely in setting of steroid induced hyperglycemia.

## 2023-11-02 NOTE — PROGRESS NOTE ADULT - SUBJECTIVE AND OBJECTIVE BOX
Patient is a 38y old  Female who presents with a chief complaint of Heart failure    Per chart, Ms. 38-yo female with PMHx of b/l DVTs, recurrent PE on eliquis, HFpEF w/ hx of cor pulmonale, morbid obesity, asthma, chronic hyperkalemia, recently diagnosed SLE (facial rash, photophobia, joint paints, started on prednisone 20 mg), admitted initially w/ PNA to [facility] on 10/18, course c/b SARAH, AHRF 2/2 cardiogenic shock 2/2 pHTN w/ RV failure s/p MICU stay at [facility] now off inotropes and pressors pending rheumatological work-up and CT Angio/RHC for pHTN.  (02 Nov 2023 12:30)    INTERVAL HPI/OVERNIGHT EVENTS: Patient had CTPE completed. She reports intermittent headaches. She reports improved SOB, but still some orthopnea if laying flat for prolonged period. Was able to downtitrate O2 to 2L NC while conversing with patient, without desat. Pt reports improved swelling in legs. Denies any new symptoms.    FAMILY HISTORY:  Family history of colon cancer (Mother)  Family history of stroke (Father)    ceftriaxone (Hives)  iodine (Hives)  shellfish (Hives)    MEDS:  acetaZOLAMIDE    Tablet 250 milliGRAM(s) Oral once  albuterol/ipratropium for Nebulization 3 milliLiter(s) Nebulizer every 6 hours PRN  aMIOdarone    Tablet 200 milliGRAM(s) Oral daily  bisacodyl Suppository 10 milliGRAM(s) Rectal daily PRN  budesonide 160 MICROgram(s)/formoterol 4.5 MICROgram(s) Inhaler 2 Puff(s) Inhalation two times a day  buMETAnide Injectable 1 milliGRAM(s) IV Push every 12 hours  chlorhexidine 2% Cloths 1 Application(s) Topical <User Schedule>  dextrose 5%. 1000 milliLiter(s) IV Continuous <Continuous>  dextrose 5%. 1000 milliLiter(s) IV Continuous <Continuous>  dextrose 50% Injectable 25 Gram(s) IV Push once  dextrose 50% Injectable 25 Gram(s) IV Push once  dextrose 50% Injectable 12.5 Gram(s) IV Push once  dextrose Oral Gel 15 Gram(s) Oral once PRN  glucagon  Injectable 1 milliGRAM(s) IntraMuscular once  heparin  Infusion. 1600 Unit(s)/Hr IV Continuous <Continuous>  hydroxychloroquine 200 milliGRAM(s) Oral two times a day  insulin glargine Injectable (LANTUS) 5 Unit(s) SubCutaneous at bedtime  insulin lispro (ADMELOG) corrective regimen sliding scale   SubCutaneous three times a day before meals  insulin lispro (ADMELOG) corrective regimen sliding scale   SubCutaneous at bedtime  insulin lispro Injectable (ADMELOG) 2 Unit(s) SubCutaneous three times a day before meals  methylPREDNISolone sodium succinate Injectable 40 milliGRAM(s) IV Push daily  montelukast 10 milliGRAM(s) Oral daily  mupirocin 2% Nasal 1 Application(s) Both Nostrils two times a day  PARoxetine 20 milliGRAM(s) Oral daily  polyethylene glycol 3350 17 Gram(s) Oral two times a day  senna 2 Tablet(s) Oral at bedtime  sildenafil (REVATIO) 20 milliGRAM(s) Oral three times a day      T(C): 36.4 (11-02-23 @ 11:24), Max: 36.6 (11-01-23 @ 21:10)  HR: 79 (11-02-23 @ 11:24) (79 - 93)  BP: 116/70 (11-02-23 @ 11:24) (116/70 - 133/69)  RR: 18 (11-02-23 @ 11:24) (18 - 18)  SpO2: 94% (11-02-23 @ 11:24) (94% - 98%)    PHYSICAL EXAM:  GENERAL: NAD, well-developed, laying in bed on 4L NC  HEAD:  Atraumatic, Normocephalic  EYES: EOMI, conjunctiva and sclera clear  CHEST/LUNG: fine crackles on b/l lung bases; No rales, rhonchi, wheezing, or rubs  HEART: RRR; No murmurs, rubs, or gallops  ABDOMEN: Soft, Nontender, Nondistended;   SKIN: No rashes or lesions. No gross foot wounds however some redness in the heels. Edematous 2-3+ to the midshins and sacrum  NERVOUS SYSTEM:  Alert & Oriented X3, no focal deficits    Consultant(s) Notes Reviewed:  [x ] YES  [ ] NO  Care Discussed with Consultants/Other Providers [ x] YES  [ ] NO    LABS:             10.3   5.34  )-----------( 193      ( 02 Nov 2023 06:26 )             36.8       11-02    143  |  93<L>  |  85<H>  ----------------------------<  278<H>  3.6   |  35<H>  |  1.03    Ca    8.8      02 Nov 2023 14:11  Phos  3.4     11-02  Mg     2.0     11-02    TPro  6.7  /  Alb  3.2<L>  /  TBili  1.5<H>  /  DBili  x   /  AST  23  /  ALT  123<H>  /  AlkPhos  80  11-02    RADIOLOGY & ADDITIONAL TESTS:    CT Angio Chest PE Protocol w/ IV Cont (11.01.23 @ 22:26)  No central or lobar pulmonary embolism.  Unchanged diffuse pulmonary mosaic attenuation pattern with an enlarged   main pulmonary artery. Findings may represent chronic thromboembolic   pulmonary hypertension.    Xray Chest 1 View- PORTABLE-Routine (10.30.23 @ 15:42)  IMPRESSION:  Similar moderate pulmonary edema.  Left retrocardiac opacity may represent atelectasis and/or effusion.

## 2023-11-02 NOTE — DIETITIAN INITIAL EVALUATION ADULT - NSFNSGIIOFT_GEN_A_CORE
Denies nausea, vomiting, diarrhea. Reports constipation. Reports last BM was PTA. Pt currently ordered for bowel regimen (dulcolax suppository, miralax, senna).

## 2023-11-02 NOTE — PROGRESS NOTE ADULT - ASSESSMENT
38F with PMH DVT/PE (on eliquis), ?SLE on chronic steroids, asthma, HFpEF, class 3 obesity who iniitally presented to [place] with weakness, edema, fluid overload. Transferred to [facility] due to concern for cardiogenic shock due to RV dysfunction and severe pulmonary hypertension. Started on sildenafil at [facility] as per Dr Del Castillo and transferred on milrinone now weaned off. Labs here concerning for APLS and lupus.     #Pulmonary hypertension  -Grouping still unknown at present. Could have group 1 in the setting of autoimmune disease, group 2 in the setting of HFpEF, group 3 in the setting of likely OHS/MICHAEL, and group 4 in the setting of known PE and APLS  -CTPA negative for clots, mild mosaic attenuation possibly 2/2 PHTN    Recommendations:  - please order V/Q scan  - will need RHC when optimized from volume status  - will discuss with Dr. Del Castillo whether to hold sildenafil prior to RHC  - c/w sildenafil for now  - c/w bumex 2 IV bid  - defer to rheum regarding steroid taper  - please start BiPAP at night, can do 15/8 for hypercapnia  - please also give diamox today for alkalosis  - please check gas in the morning    Vince Lebron MD  PGY4 Fellow PCCM  TEAMS preferred  Pager: [phone]/[facility] [phone]

## 2023-11-02 NOTE — PROGRESS NOTE ADULT - PROBLEM SELECTOR PLAN 9
- Last a1c 6.0  - Pt w/ hyperglycemia to 200's in s/o steroid use  - Continue insulin sliding scale, regular diet - Last a1c 6.0  - Pt w/ hyperglycemia to 200's in s/o steroid use  - Lantus 5u qhs + admelog 2u TIDAC  - Continue insulin sliding scale  - fsg tidac + qhs  - Nutrition evaluated: low Na and Carb consistent diet

## 2023-11-02 NOTE — DIETITIAN INITIAL EVALUATION ADULT - REASON
Nutrition-focused physical examination deferred at this time - pt with intentional wt loss, reporting good PO intake in-house and PTA. No overt signs of fat/muscle wasting visually observed.

## 2023-11-02 NOTE — DIETITIAN INITIAL EVALUATION ADULT - REASON FOR ADMISSION
Heart failure    Per chart, Ms. 38-yo female with PMHx of b/l DVTs, recurrent PE on eliquis, HFpEF w/ hx of cor pulmonale, morbid obesity, asthma, chronic hyperkalemia, recently diagnosed SLE (facial rash, photophobia, joint paints, started on prednisone 20 mg), admitted initially w/ PNA to [facility] on 10/18, course c/b SARAH, AHRF 2/2 cardiogenic shock 2/2 pHTN w/ RV failure s/p MICU stay at [facility] now off inotropes and pressors pending rheumatological work-up and CT Angio/RHC for pHTN.

## 2023-11-02 NOTE — PROGRESS NOTE ADULT - PROBLEM SELECTOR PLAN 5
- Pt w/ metabolic alkalosis likely 2/2 overdiuresis in s/o bumex  - Bicarb 30's over last few days, pH 7.40  - CTM w/ daily VBGs

## 2023-11-03 NOTE — MEDICAL STUDENT PROGRESS NOTE(EDUCATION) - ASSESSMENT
Patient is a 38-yo female with PMHx of b/l DVTs, recurrent PE on eliquis, HFpEF w/ hx of cor pulmonale, morbid obesity, asthma, chronic hyperkalemia, recently diagnosed SLE (facial rash, photophobia, joint paints, started on prednisone 20 mg), admitted initially w/ PNA to  hospital on 10/18 before transfer to [facility] MICU 10/30 now downgraded to floors on 10/31 stable off pressors. Course c/b SARAH, AHRF 2/2 cardiogenic shock 2/2 pHTN w/ RV failure. Pending rheumatological work-up and CT Angio/RHC for pHTN

## 2023-11-03 NOTE — DISCHARGE NOTE PROVIDER - NSFOLLOWUPCLINICS_GEN_ALL_ED_FT
Cardiology at [facility]  Cardiology  [address] [place] [address]  Phone: [phone]  Fax:     [facility] Medicine Specialties at [place]  Internal Medicine  [address] [place] [address]  Phone: [phone]  Fax: [phone]    [facility] Rheumatology  Rheumatology  [address] [place] [id number]  Phone: [phone]  Fax:

## 2023-11-03 NOTE — MEDICAL STUDENT PROGRESS NOTE(EDUCATION) - PROBLEM 7
Discharge Planning Assessment    RN/SW discharge planner met with patient/ (and family member) to discuss reason for admission, current living situation, and potential needs at the time of discharge    Demographics/Insurance verified Yes/No    Current type of dwelling:    Living arrangements: spouse, and daughter May Roderick    Level of function/Support:     PCP: Argenis Mann MD    DME: Karen Garcia    Active with any community resources/agencies/skilled home care: Name: [facility]  Phone: [phone]  Fax: [phone]  [facility] (JERAMY Gomez [phone]).    Medication compliance issues:  no    Pharmacy/Financial issues that could impact healthcare:  [facility] on [email or web address] at the time of discharge: ambulance Atrial flutter

## 2023-11-03 NOTE — PROGRESS NOTE ADULT - PROBLEM SELECTOR PLAN 3
- Pt treated w/ prednisone 20 mg for facial rash, photophobia, joint pains, c/f SLE w/ low C3C4, KATHIE 1:2560, dsDNA 671, Sm 2.1, Ro >8, La>8, RF 65, CRP 45, , negative RNP/CCP/ ACL/ ANCA/panda/centrmere/scl70 per rheum eval at [facility]  - f/u KATHIE, Anti-ribonuclear protein, dsDNA, Myomarker Panel 3, Scleroderma Antibodies, Sjogren's  - s/p stress dose hydrocortisone on 10/26, Solumedrol 1g 10/27-10/30, now on taper   - Rheum consulted, appreciate recommendations  - Continue Solumedrol 40 QD, plaquenil 200 mg BID - Pt treated w/ prednisone 20 mg for facial rash, photophobia, joint pains, c/f SLE w/ low C3C4, KATHIE 1:2560, dsDNA 671, Sm 2.1, Ro >8, La>8, RF 65, CRP 45, , negative RNP/CCP/ ACL/ ANCA/panda/centrmere/scl70 per rheum eval at [facility]  - f/u KATHIE, Anti-ribonuclear protein, dsDNA, Myomarker Panel 3, Scleroderma Antibodies, Sjogren's  - s/p stress dose hydrocortisone on 10/26, Solumedrol 1g 10/27-10/30, now on taper   - Rheum consulted, appreciate recommendations  - Continue Solumedrol 40 QD, plaquenil 200 mg BID  - Pt w/ RUE rash, per rheum recommending dermatology c/s as rash persistent despite steroid regimen, requested skin biopsy. Will c/s in AM.

## 2023-11-03 NOTE — DISCHARGE NOTE PROVIDER - NSDCCPCAREPLAN_GEN_ALL_CORE_FT
PRINCIPAL DISCHARGE DIAGNOSIS  Diagnosis: Acute hypoxic respiratory failure  Assessment and Plan of Treatment: You came into the hospital with trouble breathing. We felt that this was due to pulmonary hypertension which is high blood pressures in your lungs. We performed testing which showed ... We also gave you a water pill to help remove fluid and remove the load off of your heart. Please follow-up with the lung doctor Dr. Del Castillo as well as a heart doctor when you are discharged within 2 weeks. Please also follow-up with your primary care doctor as well on discharge. If you begin having chest pain, shortness of breath, or leg swelling, please return to the hospital.      SECONDARY DISCHARGE DIAGNOSES  Diagnosis: Atrial flutter  Assessment and Plan of Treatment: You came into the hospital and was found to have an abnormal heart rhythm called atrial flutter.     PRINCIPAL DISCHARGE DIAGNOSIS  Diagnosis: Acute hypoxic respiratory failure  Assessment and Plan of Treatment: You came into the hospital with trouble breathing. We felt that this was due to pulmonary hypertension which is high blood pressures in your lungs. We performed testing which showed ... We also gave you a water pill to help remove fluid and remove the load off of your heart. Please follow-up with the lung doctor Dr. Del Castillo as well as a heart doctor when you are discharged within 2 weeks. Please also follow-up with your primary care doctor as well on discharge. If you begin having chest pain, shortness of breath, or leg swelling, please return to the hospital.      SECONDARY DISCHARGE DIAGNOSES  Diagnosis: Venous thromboembolism (VTE)  Assessment and Plan of Treatment: You have a history of recurrent blood clots. We were concerned that you may have an inherited clotting problem. We spoke with our rheumatologists who recommended starting Warfarin, which is a blood thinner. Please take the warfarin as prescribed. Please also follow-up with the rheumatologists in their clinic. If you begin having trouble breathing, leg swelling, please return to the hospital as it may mean you had another blood clot occur.    Diagnosis: Atrial flutter  Assessment and Plan of Treatment: You came into the hospital and was found to have an abnormal heart rhythm called atrial flutter. We started you on a medication called Amiodarone. Please take 1 200 mg pill of Amiodarone once a day. While you are on this medication, please see your primary care doctor for routine testing including thyroid function tests, lung function tests, and other bloodwork. If you begin having palpitations, trouble breathing, chest pain, please return to the hospital.    Diagnosis: Systemic lupus erythematosus  Assessment and Plan of Treatment: You have a history of newly diagnosed lupus. We spoke to the rheumatologists who recommended starting new medications. Please take new medications as instructed. Please also follow-up with the rheumatologists in their clinic.    Diagnosis: Multiple wounds  Assessment and Plan of Treatment: You have a history of back wounds. We consulted wound care who recommended continuing wound care. Please follow-up with wound care.

## 2023-11-03 NOTE — DISCHARGE NOTE PROVIDER - DETAILS OF MALNUTRITION DIAGNOSIS/DIAGNOSES
This patient has been assessed with a concern for Malnutrition and was treated during this hospitalization for the following Nutrition diagnosis/diagnoses:     -  11/02/2023: Morbid obesity (BMI > 40)

## 2023-11-03 NOTE — PROGRESS NOTE ADULT - PROBLEM SELECTOR PLAN 9
- Last a1c 6.0  - Pt w/ hyperglycemia to 200's in s/o steroid use  - Lantus 5u qhs + admelog 2u TIDAC  - Continue insulin sliding scale  - fsg tidac + qhs  - Nutrition evaluated: low Na and Carb consistent diet

## 2023-11-03 NOTE — MEDICAL STUDENT PROGRESS NOTE(EDUCATION) - PLAN 10
- Cr downtrending  - CTM Cr and UOP    Problem 11: Prophylaxis   - Heparin drip  - Low Na and carb consistent diet  - Last BM yesterday, continue senna and Miralax as needed

## 2023-11-03 NOTE — MEDICAL STUDENT PROGRESS NOTE(EDUCATION) - PLAN 3
- f/u KATHIE, Anti-ribonuclear protein, dsDNA, Myomarker Panel 3, Scleroderma Antibodies, Sjogren's  - s/p stress dose hydrocortisone on 10/26, Solumedrol 1g 10/27-10/30, now on taper to 40mg daily, awaiting further Rheumatology recommendations   - Continue Solumedrol 40mg daily, Plaquenil 200mg BID

## 2023-11-03 NOTE — DISCHARGE NOTE PROVIDER - CARE PROVIDERS DIRECT ADDRESSES
,[email or web address]

## 2023-11-03 NOTE — DISCHARGE NOTE PROVIDER - NSDCFUADDAPPT_GEN_ALL_CORE_FT
Follow-ups:  - Pulmonologist (Lung Doctor, Dr. Del Castillo) in <2 weeks  - Primary Care Doctor in <4 weeks. If you have trouble finding one, we have attached the number to [facility] [place].   - Cardiologist (Heart Doctor) in <2 weeks  - Rheumatologist (Your prior Rheumatologist or Dr. Hannah Kingsley) in <4 weeks

## 2023-11-03 NOTE — PROGRESS NOTE ADULT - ATTENDING COMMENTS
38 year old female with a PMHx of morbid obesity, PE on eliquis (diagnosed in 2017, on eliquis at home), HFpEF, asthma, on PRN home O2, suspected SLE (being worked up by outpatient rheumatologist, on steroids x1 month prior to hospital presentation) who is transferred from the MICU to the general medicine floors on 10/31. She was originally admitted to [facility] on 10/18 with complaints of weakness, worsening LE edema, cough, and rapid weight gain.  Her evaluation there was also significant for atrial flutter, converted to normal sinus rhythm with amiodarone. CXR showed mild pulmonary edema and LLL consolidation. She was treated with a course of IV diuretics for presumed decompensated heart failure as well as empiric antibiotics for pneumonia. During her treatment course, she developed an SARAH. Lasix were held. She progressively became hypotensive, fluid resuscitation limited by her volume-overloaded status. She was transferred to the ICU at [facility] on 10/22. Ddx for hypotension included adrenal insufficiency in the setting of chronic outpatient steroids v cardiogenic shock and cor pulmonale. They started her on pressors, stress-dose steroids, and bumex drip. [place] cath reading consistent with elevated pulmonary artery pressures. TTE also showing severely decreased RV systolic function and enlarged right ventricle. She was treated with inhaled nitric oxide and started on sildenafil. Course was further complicated by shock liver with AST and ALT up to 3000s+/2000+, improving.      The patient was transferred to [facility] on 10/30 for further management of pHTN. Her pressors were titrated down and she was titrated off of high flow and onto nasal canula O2 supplementation. She is now transferred to the [facility] for further care.      Overnight, the patient was unable to tolerate BiPAP d/t claustrophobia. Pt seen with house staff this morning. Pt feels her shortness of breath is improving, though she still experiences some orthopnea. C/o tremors in the bilateral upper extremities that started with initiation of steroids.     On exam, the patient is on 2L of NC, De-saturates to 89% on room air. No respiratory distress, crackles bilaterally. There is 2-3+ pitting edema in the lower extremities.    #cor pulmonale    #hypoxic and hypercapenic respiratory failure    #metabolic acidosis    #SLE    #APLS    #steroid-induced hyperglycemia   #shock state s/p pressors (improved)- cardiogenic shock v adrenal insufficiency    #atrial flutter (s/p pharmacologic conversion to sinus rhythm with amiodarone)   #SARAH—improving    #shock liver—improving       - will give another dose of diamox this morning.   - continue bumex 1mg IV BID. Strict I/O   - obtain right heart cath    -CTA chest and consistent with chronic thromboembolic disease  - will obtain VQ scan  -continue plaquenil for new diagnosis of SLE   - continue solumedrol 40mg IV daily   - increase glargine to 8 units qHS, continue ademlog 2 units qAC; continue ademlog insulin sliding scale.   -continue heparin gtt. Eventual plan to transition to warfarin for APLS   -monitor BMP BID and continue diamox for bicarb    -pulm and rheumatology consulted, appreciate evaluation       Rest of plan as per resident note above.

## 2023-11-03 NOTE — PROGRESS NOTE ADULT - PROBLEM SELECTOR PLAN 6
- Wound care consulted, recommend podiatry consultation  - Defer podiatry consult given heel redness

## 2023-11-03 NOTE — PROGRESS NOTE ADULT - ASSESSMENT
Ms. 38-yo female with PMHx of b/l DVTs, recurrent PE on eliquis, HFpEF w/ hx of cor pulmonale, morbid obesity, asthma, chronic hyperkalemia, recently diagnosed SLE (facial rash, photophobia, joint paints, started on prednisone 20 mg), admitted initially w/ PNA to [facility] on 10/18, course c/b SARAH, AHRF 2/2 cardiogenic shock 2/2 pHTN w/ RV failure s/p MICU stay at [facility] now off inotropes and pressors pending rheumatological work-up and CT Angio/RHC for pHTN.

## 2023-11-03 NOTE — MEDICAL STUDENT PROGRESS NOTE(EDUCATION) - PLAN 1
-s/p empiric Zosyn 10/19-20/26 at [facility]  - On 2L HFNC weaned from 4L HFNC  - Sildenafil 20mg TID  - V/Q scan scheduled Monday, possible CXR prior  - CTM daily VBG  - Continue Montelukast, Duoneb, Symbicort

## 2023-11-03 NOTE — PROGRESS NOTE ADULT - PROBLEM SELECTOR PLAN 2
- Pt w/ elevated pulmonary filling pressures on swan at [facility]   - Last values 10/26 CVP 9, /31/54, PCWP 7, PA sat 61.9%, PVR 6.1 wilkinson, , /71/91, Tg CO/CI 7.7/3.0 on milrinone 0.250, FIO2 40%  - Thought to be due to prior PE (Group 4), OHS, MICHAEL (Group 3), autoimmune process. Less likely left-sided disease (Group 2)   - Dr. Del Castillo from Pulmonology following, appreciate recommendations  - CTPE 11/1 - no PE, but unchanged diffuse pulm mosaic attenuation may be CTEPH   - Cards consulted 11/2 for RHC +/- vasoreactivity testing  - s/p Bumex 2mg BID (10/30-11/2). Decrease to Bumex 1 mg BID  - Diamox if continues to be alkalotic - Pt w/ elevated pulmonary filling pressures on swan at [facility]   - Last values 10/26 CVP 9, /31/54, PCWP 7, PA sat 61.9%, PVR 6.1 wilkinson, , /71/91, Tg CO/CI 7.7/3.0 on milrinone 0.250, FIO2 40%  - Thought to be due to prior PE (Group 4), OHS, MICHAEL (Group 3), autoimmune process. Less likely left-sided disease (Group 2)   - Dr. Del Castillo from Pulmonology following, appreciate recommendations  - CTPE 11/1 - no PE, but unchanged diffuse pulm mosaic attenuation may be CTEPH   - Cards consulted 11/2 for RHC +/- vasoreactivity testing - will attempt to reach out again   - s/p Bumex 2mg BID (10/30-11/2). Decrease to Bumex 1 mg BID  - Diamox if continues to be alkalotic  - Pt to undergo V/Q scan however cannot lay flat - plan for scan on 11/6 once more volume optimized

## 2023-11-03 NOTE — CHART NOTE - NSCHARTNOTEFT_GEN_A_CORE
HPI:  39 y/o F with a h/o b/l DVTs, recurrent PE on eliquis, HFpEF, morbid obesity, asthma, chronic hyperkalemia, admitted to [facility] on 10/18 with complaints of weakness, worsening LE edema, cough, and rapid weight gain.Of note, was being worked up as an outpatient for possible SLE given facial rash, photophobia, and joint pains and had been started on prednisone 20 mg by her PCP x ~1 month prior to arrival at [facility]. Outpatient  results from 1 week prior showing low C3/4 complement and elevated CRP. Pt was admitted to medicine for treatment of PNA. Hospital course complicated by progressive SARAH, HAGMA, transaminitis and acute hypoxemic respiratory failure secondary to cardiogenic shock in the face of severe pHTN with acute on chronic cor pulmonale.     Her pHTN was thought to be likely multifactorial due to PE/OHS/MICHAEL/autoimmune process. Patient cardiogenic shock and iniated on milrinone on 10/20. She had a [place] placement on 10/23 revealing elevated pulmonary pressures, and required levophed from 10/22-23 for pressure support, diuresis with bumex was also initiated on 10/22 and maintained, and is now s/p inshaled nitric oxide.. Patient shock state has improved. Currently on .125 of milrinone. Bumex DC'd with increasing cr. Patient persistently hypoxic, requiring HFNC. She was initiated on sildenafil on 10/26, now on 20 TID after conversation with  Dr. Del Castillo. . S/P emperic treatment for PNA with zosyn 10/19-26. She was placed on a heparin gtt due to c/f APLS    Presenting for a transfer from [facility]. At this time patient was taken off of milrinone.    Dermatology consulted for rash on R upper arm. The rash is asx. It has been coming and going for months, pt does not think rash flares i/s/o flare of her other SLE symptoms. Dermatology consulted to determine whether the rash is a cutaneous manifestation of SLE.     (30 Oct 2023 16:04)      PAST MEDICAL & SURGICAL HISTORY:  Pulmonary embolism      DVT, lower extremity      CHF (congestive heart failure)      Asthma      Anxiety      Severe obesity (BMI >= 40)      Hypertension      No significant past surgical history          REVIEW OF SYSTEMS:  General: no fevers/chills; no lethargy  Skin/Breast: see HPI  Ophthalmologic: no eye pain or changes in vision  ENT: no dysphagia or changes in hearing  Respiratory: no SOB or cough  Cardiovascular: no palpitations or chest pain  Gastrointestinal: no abdominal pain or blood in stool  Genitourinary: no dysuria or frequency  Musculoskeletal: no joint pains or weakness  Neurological: no weakness, numbness, or tingling    MEDICATIONS  (STANDING):  aMIOdarone    Tablet 200 milliGRAM(s) Oral daily  budesonide 160 MICROgram(s)/formoterol 4.5 MICROgram(s) Inhaler 2 Puff(s) Inhalation two times a day  buMETAnide Injectable 1 milliGRAM(s) IV Push every 12 hours  chlorhexidine 2% Cloths 1 Application(s) Topical <User Schedule>  dextrose 5%. 1000 milliLiter(s) (50 mL/Hr) IV Continuous <Continuous>  dextrose 5%. 1000 milliLiter(s) (100 mL/Hr) IV Continuous <Continuous>  dextrose 50% Injectable 25 Gram(s) IV Push once  dextrose 50% Injectable 25 Gram(s) IV Push once  dextrose 50% Injectable 12.5 Gram(s) IV Push once  glucagon  Injectable 1 milliGRAM(s) IntraMuscular once  heparin  Infusion. 1600 Unit(s)/Hr (16 mL/Hr) IV Continuous <Continuous>  hydroxychloroquine 200 milliGRAM(s) Oral two times a day  insulin glargine Injectable (LANTUS) 8 Unit(s) SubCutaneous at bedtime  insulin lispro (ADMELOG) corrective regimen sliding scale   SubCutaneous at bedtime  insulin lispro (ADMELOG) corrective regimen sliding scale   SubCutaneous three times a day before meals  insulin lispro Injectable (ADMELOG) 4 Unit(s) SubCutaneous three times a day before meals  methylPREDNISolone sodium succinate Injectable 40 milliGRAM(s) IV Push daily  montelukast 10 milliGRAM(s) Oral daily  mupirocin 2% Nasal 1 Application(s) Both Nostrils two times a day  PARoxetine 20 milliGRAM(s) Oral daily  polyethylene glycol 3350 17 Gram(s) Oral two times a day  senna 2 Tablet(s) Oral at bedtime  sildenafil (REVATIO) 20 milliGRAM(s) Oral three times a day    MEDICATIONS  (PRN):  albuterol/ipratropium for Nebulization 3 milliLiter(s) Nebulizer every 6 hours PRN Shortness of Breath  bisacodyl Suppository 10 milliGRAM(s) Rectal daily PRN Constipation  dextrose Oral Gel 15 Gram(s) Oral once PRN Blood Glucose LESS THAN 70 milliGRAM(s)/deciliter      Allergies    ceftriaxone (Hives)  iodine (Hives)  shellfish (Hives)    Intolerances        SOCIAL HISTORY:     hx smoking  non-smoker    FAMILY HISTORY:  Family history of colon cancer (Mother)    Family history of stroke (Father)        Vital Signs Last 24 Hrs  T(C): 36.6 (03 Nov 2023 11:16), Max: 36.7 (03 Nov 2023 04:00)  T(F): 97.8 (03 Nov 2023 11:16), Max: 98 (03 Nov 2023 04:00)  HR: 72 (03 Nov 2023 16:51) (72 - 88)  BP: 110/60 (03 Nov 2023 16:51) (100/60 - 116/69)  BP(mean): --  RR: 18 (03 Nov 2023 11:16) (18 - 18)  SpO2: 97% (03 Nov 2023 16:41) (92% - 98%)    Parameters below as of 03 Nov 2023 11:16  Patient On (Oxygen Delivery Method): nasal cannula  O2 Flow (L/min): 2      PHYSICAL EXAM:  The patient was AOx3, well nourished, and in NAD.  OP showed no ulcerations.  There was no visible LAD.  Conjunctiva were non-injected.  There was no clubbing or edema of extremities.   The scalp, hair, face, eyebrows, lips, OP, neck, chest, back, extremities x4, and nails were examined.  There was no hyperhidrosis or bromhidrosis.     Of note on skin exam:   - blanching net-like erythema on R upper arm w few admixed ecchymosis  - no epidermal changes  - no rashes elsewhere      LABS:                        10.3   5.99  )-----------( 181      ( 03 Nov 2023 06:12 )             36.4     11-03    145  |  96  |  75<H>  ----------------------------<  144<H>  3.5   |  39<H>  |  0.95    Ca    9.1      03 Nov 2023 06:12  Phos  2.8     11-03  Mg     1.9     11-03    TPro  6.3  /  Alb  3.0<L>  /  TBili  1.6<H>  /  DBili  x   /  AST  19  /  ALT  90<H>  /  AlkPhos  70  11-03    PTT - ( 03 Nov 2023 12:53 )  PTT:67.6 sec  Urinalysis Basic - ( 03 Nov 2023 06:12 )    Color: x / Appearance: x / SG: x / pH: x  Gluc: 144 mg/dL / Ketone: x  / Bili: x / Urobili: x   Blood: x / Protein: x / Nitrite: x   Leuk Esterase: x / RBC: x / WBC x   Sq Epi: x / Non Sq Epi: x / Bacteria: x        RADIOLOGY & ADDITIONAL STUDIES: reviewed; no pertinent findings    ASSESSMENT AND PLAN:  Rash on R upper arm may represent livedo reticularis  - can consider biopsy to confirm and to evaluate for cutaneous manifestations of rheumatologic disease if this will     The patient's chart was reviewed in addition to being seen and examined at bedside with the dermatology attending Dr. Kuo.      Danyel Payne MD  Resident Physician, PGY-3  [facility] Dermatology  Pager: [phone]  Office: [phone]

## 2023-11-03 NOTE — DISCHARGE NOTE PROVIDER - CARE PROVIDER_API CALL
Hannah Kingsley  Rheumatology  [address]  [place] [address]  Phone: [phone]  Fax: [phone]  Follow Up Time:     Pura Del Castillo  Pulmonary Disease  [address], [address]  [place] [address]  Phone: [phone]  Fax: [phone]  Follow Up Time:

## 2023-11-03 NOTE — PROGRESS NOTE ADULT - ASSESSMENT
38-year-old woman with a history of DVT, recurrent PE,  HFpEF and recent prednisone treatment for suspected SLE, was admitted to [facility] for pneumonia and right heart failure. She was started on steroid 1 g daily for 3 days for SLE flare. During her stay, she developed SARAH, shock liver, acute respiratory failure due to severe pulmonary hypertension. She was transferred to [facility] for pulmonary hypertension management with sildenafil.    # SLE    - Pt has alopecia, malar rash, photophobia, and polyarthritis, DVT/PE   - KATHIE 1:2560 homogenous dsDNA 671, Sm 2.1, Ro >8 , La>8 , - Low complement c3 76 and c4 4, lymphopenia  and thrombocytopenia plts 121; negative RNP/CCP/ ACL/ ANCA/panda/centromere/scl70/  - UA showed spot protein/creatinine ratio 0.3 g daily     # APS  - Lab revealed B2GP IgG >150 B2GP Ig A >150 and Lupus anticoagulant positive  -h/o recurrent DVT/PE, on Xarelto    # Severe Pulmonary HTN, likely multifactorial including Obesity, MICHAEL, chronic pulmonary embolism, HFpEF, and possible SLE?  - TTE 10/31: Estimated pulmonary artery systolic pressure is 53 mmHg ( was 45 in 7/23)   - Currently on sildenafil 20 mg TID    # Right upper extremity rash - livedo racemosa however is slightly scaly so may not be      Recommendations:   -Decrease Solumedrol to 30 mg IV daily  -cw Plaquenil 200 mg twice daily  -dermatology evaluation for rash  - Patient should be started on Coumadin before being discharged from the hospital.    Dr. Hannah Kingsley  Rheumatology Attending  [phone]  [email or web address]

## 2023-11-03 NOTE — PROGRESS NOTE ADULT - PROBLEM SELECTOR PLAN 1
- Suspected multifactorial w/ primary insult due to volume overload w/ pHTN, cor pulmonale on existing prior PNA, hx of PEs, morbid obesity, asthma.   - s/p empiric Zosyn from 10/19-10/26 at [facility]  - Not intubated, on HFNC, now weaned to 4L NC with aggressive diuresis, Sildenafil  - Continue aggressive diuresis with Bumex, daily VBGs w/ Diamox if bicarb uptrending  - Continue Montelukast, Duonebs, Symbicort  - Now on 2L NC, continue to downtitrate O2 as tolerated

## 2023-11-03 NOTE — MEDICAL STUDENT PROGRESS NOTE(EDUCATION) - SUBJECTIVE AND OBJECTIVE BOX
***************************************************************  Juan Carlos (Rl) [facility] PGY2  Internal Medicine   ***************************************************************    MITCHEL GUNN  38y  MRN: [id number]    Patient is a 38y old Female with PMHx of b/l DVT, recurrent PE, HFpEF, cor pulmonale, morbid obesity, asthma, chromic hyperkalemia, SLE who presents with a chief complaint of SOB - cardiogenic shock and Pulm HTN (03 Nov 2023 06:52). Initially presented to [facility] for workup and treatment of PNA. Transferred to [facility] MICU 10/30 for management of pHTN downgraded to floors on 10/31.       Subjective: Patient had difficulty tolerating Bipap overnight, felt more comfortable on nasal cannula. Headache is still present unchanged from prior. Distal extremity swelling is decreasing per patient. Patient says she is better able to tolerate laying supine in bed compared to prior. Denies CP, SOB, abn pain, N/V, dysuria. Tolerating diet. Had last bowel movement yesterday.       MEDICATIONS  (STANDING):  acetaminophen     Tablet .. 650 milliGRAM(s) Oral once  acetaZOLAMIDE    Tablet 250 milliGRAM(s) Oral once  aMIOdarone    Tablet 200 milliGRAM(s) Oral daily  budesonide 160 MICROgram(s)/formoterol 4.5 MICROgram(s) Inhaler 2 Puff(s) Inhalation two times a day  buMETAnide Injectable 1 milliGRAM(s) IV Push every 12 hours  chlorhexidine 2% Cloths 1 Application(s) Topical <User Schedule>  dextrose 5%. 1000 milliLiter(s) (50 mL/Hr) IV Continuous <Continuous>  dextrose 5%. 1000 milliLiter(s) (100 mL/Hr) IV Continuous <Continuous>  dextrose 50% Injectable 25 Gram(s) IV Push once  dextrose 50% Injectable 12.5 Gram(s) IV Push once  dextrose 50% Injectable 25 Gram(s) IV Push once  glucagon  Injectable 1 milliGRAM(s) IntraMuscular once  heparin  Infusion. 1600 Unit(s)/Hr (16 mL/Hr) IV Continuous <Continuous>  hydroxychloroquine 200 milliGRAM(s) Oral two times a day  insulin glargine Injectable (LANTUS) 5 Unit(s) SubCutaneous at bedtime  insulin lispro (ADMELOG) corrective regimen sliding scale   SubCutaneous three times a day before meals  insulin lispro (ADMELOG) corrective regimen sliding scale   SubCutaneous at bedtime  insulin lispro Injectable (ADMELOG) 2 Unit(s) SubCutaneous three times a day before meals  methylPREDNISolone sodium succinate Injectable 40 milliGRAM(s) IV Push daily  montelukast 10 milliGRAM(s) Oral daily  mupirocin 2% Nasal 1 Application(s) Both Nostrils two times a day  PARoxetine 20 milliGRAM(s) Oral daily  polyethylene glycol 3350 17 Gram(s) Oral two times a day  senna 2 Tablet(s) Oral at bedtime  sildenafil (REVATIO) 20 milliGRAM(s) Oral three times a day    MEDICATIONS  (PRN):  albuterol/ipratropium for Nebulization 3 milliLiter(s) Nebulizer every 6 hours PRN Shortness of Breath  bisacodyl Suppository 10 milliGRAM(s) Rectal daily PRN Constipation  dextrose Oral Gel 15 Gram(s) Oral once PRN Blood Glucose LESS THAN 70 milliGRAM(s)/deciliter      Objective:    Vitals: Vital Signs Last 24 Hrs  T(C): 36.6 (11-03-23 @ 11:16), Max: 37.1 (11-02-23 @ 16:36)  T(F): 97.8 (11-03-23 @ 11:16), Max: 98.7 (11-02-23 @ 16:36)  HR: 80 (11-03-23 @ 11:16) (80 - 88)  BP: 100/60 (11-03-23 @ 11:16) (100/60 - 116/69)  BP(mean): --  RR: 18 (11-03-23 @ 11:16) (18 - 18)  SpO2: 95% (11-03-23 @ 11:16) (92% - 98%)            I&O's Summary    02 Nov 2023 07:01  -  03 Nov 2023 07:00  --------------------------------------------------------  IN: 1509 mL / OUT: 3350 mL / NET: -1841 mL    03 Nov 2023 07:01  -  03 Nov 2023 13:44  --------------------------------------------------------  IN: 0 mL / OUT: 700 mL / NET: -700 mL        PHYSICAL EXAM:  GENERAL: NAD. Patient appears lethargic.   HEAD:  Atraumatic, Normocephalic  EYES: EOMI, conjunctiva and sclera clear  CHEST/LUNG: Mild crackles heard bilaterally on auscultation  HEART: Regular rate and rhythm; No murmurs, rubs, or gallops  ABDOMEN: Soft, Nontender, Nondistended;   SKIN: Bruises present on b/l lower extremities Pitting edema 3+ RLE up to knee. Pitting edema 2+ LLE up to mid-calf.   NERVOUS SYSTEM:  Alert & Oriented X3, no focal deficits    LABS:  11-03    145  |  96  |  75<H>  ----------------------------<  144<H>  3.5   |  39<H>  |  0.95  11-02    143  |  93<L>  |  85<H>  ----------------------------<  278<H>  3.6   |  35<H>  |  1.03  11-02    143  |  93<L>  |  89<H>  ----------------------------<  208<H>  3.3<L>   |  40<H>  |  1.04    Ca    9.1      03 Nov 2023 06:12  Ca    8.8      02 Nov 2023 14:11  Ca    9.0      02 Nov 2023 06:31  Phos  2.8     11-03  Mg     1.9     11-03    TPro  6.3  /  Alb  3.0<L>  /  TBili  1.6<H>  /  DBili  x   /  AST  19  /  ALT  90<H>  /  AlkPhos  70  11-03  TPro  6.7  /  Alb  3.2<L>  /  TBili  1.5<H>  /  DBili  x   /  AST  23  /  ALT  123<H>  /  AlkPhos  80  11-02  TPro  7.1  /  Alb  3.1<L>  /  TBili  1.4<H>  /  DBili  x   /  AST  23  /  ALT  171<H>  /  AlkPhos  87  11-01      PTT - ( 03 Nov 2023 12:53 )  PTT:67.6 sec              Urinalysis Basic - ( 03 Nov 2023 06:12 )    Color: x / Appearance: x / SG: x / pH: x  Gluc: 144 mg/dL / Ketone: x  / Bili: x / Urobili: x   Blood: x / Protein: x / Nitrite: x   Leuk Esterase: x / RBC: x / WBC x   Sq Epi: x / Non Sq Epi: x / Bacteria: x                              10.3   5.99  )-----------( 181      ( 03 Nov 2023 06:12 )             36.4                         10.3   5.34  )-----------( 193      ( 02 Nov 2023 06:26 )             36.8                         10.1   5.04  )-----------( 189      ( 01 Nov 2023 06:03 )             36.5     CAPILLARY BLOOD GLUCOSE      POCT Blood Glucose.: 334 mg/dL (03 Nov 2023 11:40)  POCT Blood Glucose.: 205 mg/dL (03 Nov 2023 07:57)  POCT Blood Glucose.: 201 mg/dL (02 Nov 2023 21:24)  POCT Blood Glucose.: 225 mg/dL (02 Nov 2023 17:10)      RADIOLOGY & ADDITIONAL TESTS:    Imaging Personally Reviewed:  [x ] YES  [ ] NO    Consultants involved in case:   Consultant(s) Notes Reviewed:  [ x] YES  [ ] NO:   Care Discussed with Consultants/Other Providers [x ] YES  [ ] NO

## 2023-11-03 NOTE — PROGRESS NOTE ADULT - PROBLEM SELECTOR PLAN 11
DVT PPx: Heparin Drip for full AC  Diet: low Na and Carb consistent diet  Bowel Regimen: Last BM 4 days again, started Senna 2 at bedtime  Code: Full  Dispo: [facility] per PT evaluation  Pharmacy:  PCP:   Communication:

## 2023-11-03 NOTE — DISCHARGE NOTE PROVIDER - HOSPITAL COURSE
37 y/o F with a h/o b/l DVTs, recurrent PE on eliquis, HFpEF, morbid obesity, asthma, chronic hyperkalemia, recent diagnosis of SLE, admitted to [facility] on 10/18 PNA c/b AHRF, SARAH, cardiogenic shock req. milrinone in the face of severe pHTN with acute on chronic cor pulmonale. Her pHTN was thought to be likely multifactorial due to PE/OHS/MICHAEL/autoimmune process. She had a [place] placement on 10/23 revealing elevated pulmonary pressures, and required levophed from 10/22-23 for pressure support, diuresis with bumex was also initiated on 10/22 and maintained, and is now s/p inshaled nitric oxide. Patient shock state improved and she was weaned off of milrinone/pressors, HFNC->NC, initiated on sildenafil on 10/26, s/p empiric treatment for PNA with zosyn 10/19-26. Transferred to [facility] for further pHTN w/u w/ Dr. Del Castillo. She was placed on a heparin gtt due to c/f APLS.    On admission patient was rapidly downgraded to MICU to floors and was aggressively diuresed with Bumex 2 IV BID -> 1 IV BID and then PO medication. Was premedicated for CTPE w/ unlikely c/f CTEPH. Pt was evaluated w/ V/Q scan and RHC with showed ... Rheumatology was consulted for SLE recommending Plaquenil and a final steroid regimen of ... Dermatology was consulted for RUE skin rash despite steroid regimen, skin biopsy performed showing ... Given pt's concern for hypercoagulability, pt was transitioned to Warfarin with follow-up to complete APLS work-up outpatient. Patient was initiated also on treatment for Aflutter with Amiodarone.     Medication Changes:  - Plaquenil 200 mg BID  - Warfarin   - Amiodarone 200 mg daily    Follow-ups:  - Pulmonologist (Dr. Del Castillo) in <2 weeks  - Primary Care Doctor in <4 weeks  - Cardiologist  - Outpatient PFTs, TFTs, Eye Exams while on Amiodarone 39 y/o F with a h/o b/l DVTs, recurrent PE on eliquis, HFpEF, morbid obesity, asthma, chronic hyperkalemia, recent diagnosis of SLE, admitted to [facility] on 10/18 PNA c/b AHRF, SARAH, cardiogenic shock req. milrinone in the face of severe pHTN with acute on chronic cor pulmonale. Her pHTN was thought to be likely multifactorial due to PE/OHS/MICHAEL/autoimmune process. She had a [place] placement on 10/23 revealing elevated pulmonary pressures, and required levophed from 10/22-23 for pressure support, diuresis with bumex was also initiated on 10/22 and maintained, and is now s/p inshaled nitric oxide. Patient shock state improved and she was weaned off of milrinone/pressors, HFNC->NC, initiated on sildenafil on 10/26, s/p empiric treatment for PNA with zosyn 10/19-26. Transferred to [facility] for further pHTN w/u w/ Dr. Del Castillo. She was placed on a heparin gtt due to c/f APLS.    On admission patient was rapidly downgraded to MICU to floors and was aggressively diuresed with Bumex 2 IV BID -> 1 IV BID and then PO medication. Was premedicated for CTPE w/ unlikely c/f CTEPH. Pt was evaluated w/ V/Q scan and RHC with showed ... Rheumatology was consulted for SLE recommending Plaquenil and a final steroid regimen of ... Dermatology was consulted for RUE skin rash despite steroid regimen, skin biopsy performed showing ... Given pt's concern for hypercoagulability, pt was transitioned to Warfarin with follow-up to complete APLS work-up outpatient. Patient was initiated also on treatment for Aflutter with Amiodarone.     Medication Changes:  - Plaquenil 200 mg BID  - Warfarin   - Amiodarone 200 mg daily    Follow-ups:  - Pulmonologist (Dr. Del Castillo) in <2 weeks  - Primary Care Doctor in <4 weeks  - Cardiologist in <2 weeks  - Outpatient PFTs, TFTs, Eye Exams while on Amiodarone 37 y/o F with a h/o b/l DVTs, recurrent PE on eliquis, HFpEF, morbid obesity, asthma, chronic hyperkalemia, recent diagnosis of SLE, admitted to [facility] on 10/18 PNA c/b AHRF, SARAH, cardiogenic shock req. milrinone in the face of severe pHTN with acute on chronic cor pulmonale. Her pHTN was thought to be likely multifactorial due to PE/OHS/MICHAEL/autoimmune process. She had a [place] placement on 10/23 revealing elevated pulmonary pressures, and required levophed from 10/22-23 for pressure support, diuresis with bumex was also initiated on 10/22 and maintained, and is now s/p inshaled nitric oxide. Patient shock state improved and she was weaned off of milrinone/pressors, HFNC->NC, initiated on sildenafil on 10/26, s/p empiric treatment for PNA with zosyn 10/19-26. Transferred to [facility] for further pHTN w/u w/ Dr. Del Castillo. She was placed on a heparin gtt due to c/f APLS.    On admission patient was rapidly downgraded to MICU to floors and was aggressively diuresed with Bumex 2 IV BID -> 1 IV BID and then PO medication. Was premedicated for CTPE w/ unlikely c/f CTEPH. Pt was evaluated w/ V/Q scan and RHC with showed ... Rheumatology was consulted for SLE recommending Plaquenil and a final steroid regimen of ... Dermatology was consulted for RUE skin rash despite steroid regimen, skin biopsy performed showing ... Given pt's concern for hypercoagulability, pt was transitioned to Warfarin per rheumatology recommendations. Patient was initiated also on treatment for Aflutter with Amiodarone.     Medication Changes:  - Plaquenil 200 mg BID  - Warfarin   - Amiodarone 200 mg daily    Follow-ups:  - Pulmonologist (Dr. Del Castillo) in <2 weeks  - Primary Care Doctor in <4 weeks  - Cardiologist in <2 weeks  - Rheumatologist in <4 weeks  - Outpatient PFTs, TFTs, Eye Exams while on Amiodarone

## 2023-11-03 NOTE — PROGRESS NOTE ADULT - SUBJECTIVE AND OBJECTIVE BOX
***************************************************************  Rangel (Ji-Cheng) [facility] PGY2  Internal Medicine   ***************************************************************    MITCHEL GUNN  38y  MRN: [id number]    Patient is a 38y old  Female who presents with a chief complaint of SOB - cardiogenic shock and Pulm HTN (02 Nov 2023 16:37)      Subjective: no events ON. Denies fever, CP, SOB, abn pain, N/V, dysuria. Tolerating diet.      MEDICATIONS  (STANDING):  aMIOdarone    Tablet 200 milliGRAM(s) Oral daily  budesonide 160 MICROgram(s)/formoterol 4.5 MICROgram(s) Inhaler 2 Puff(s) Inhalation two times a day  buMETAnide Injectable 1 milliGRAM(s) IV Push every 12 hours  chlorhexidine 2% Cloths 1 Application(s) Topical <User Schedule>  dextrose 5%. 1000 milliLiter(s) (50 mL/Hr) IV Continuous <Continuous>  dextrose 5%. 1000 milliLiter(s) (100 mL/Hr) IV Continuous <Continuous>  dextrose 50% Injectable 25 Gram(s) IV Push once  dextrose 50% Injectable 12.5 Gram(s) IV Push once  dextrose 50% Injectable 25 Gram(s) IV Push once  glucagon  Injectable 1 milliGRAM(s) IntraMuscular once  heparin  Infusion. 1600 Unit(s)/Hr (16 mL/Hr) IV Continuous <Continuous>  hydroxychloroquine 200 milliGRAM(s) Oral two times a day  insulin glargine Injectable (LANTUS) 5 Unit(s) SubCutaneous at bedtime  insulin lispro (ADMELOG) corrective regimen sliding scale   SubCutaneous three times a day before meals  insulin lispro (ADMELOG) corrective regimen sliding scale   SubCutaneous at bedtime  insulin lispro Injectable (ADMELOG) 2 Unit(s) SubCutaneous three times a day before meals  methylPREDNISolone sodium succinate Injectable 40 milliGRAM(s) IV Push daily  montelukast 10 milliGRAM(s) Oral daily  mupirocin 2% Nasal 1 Application(s) Both Nostrils two times a day  PARoxetine 20 milliGRAM(s) Oral daily  polyethylene glycol 3350 17 Gram(s) Oral two times a day  senna 2 Tablet(s) Oral at bedtime  sildenafil (REVATIO) 20 milliGRAM(s) Oral three times a day    MEDICATIONS  (PRN):  albuterol/ipratropium for Nebulization 3 milliLiter(s) Nebulizer every 6 hours PRN Shortness of Breath  bisacodyl Suppository 10 milliGRAM(s) Rectal daily PRN Constipation  dextrose Oral Gel 15 Gram(s) Oral once PRN Blood Glucose LESS THAN 70 milliGRAM(s)/deciliter      Objective:    Vitals: Vital Signs Last 24 Hrs  T(C): 36.7 (11-03-23 @ 04:00), Max: 37.1 (11-02-23 @ 16:36)  T(F): 98 (11-03-23 @ 04:00), Max: 98.7 (11-02-23 @ 16:36)  HR: 83 (11-03-23 @ 04:22) (79 - 88)  BP: 116/69 (11-03-23 @ 04:00) (111/69 - 116/70)  BP(mean): --  RR: 18 (11-03-23 @ 04:00) (18 - 18)  SpO2: 94% (11-03-23 @ 04:22) (94% - 98%)            I&O's Summary    01 Nov 2023 07:01  -  02 Nov 2023 07:00  --------------------------------------------------------  IN: 612 mL / OUT: 1650 mL / NET: -1038 mL    02 Nov 2023 07:01  -  03 Nov 2023 06:52  --------------------------------------------------------  IN: 1509 mL / OUT: 3350 mL / NET: -1841 mL        PHYSICAL EXAM:  GENERAL: NAD  HEAD:  Atraumatic, Normocephalic  EYES: EOMI, conjunctiva and sclera clear  CHEST/LUNG: Clear to auscultation bilaterally; No rales, rhonchi, wheezing, or rubs  HEART: Regular rate and rhythm; No murmurs, rubs, or gallops  ABDOMEN: Soft, Nontender, Nondistended;   SKIN: No rashes or lesions  NERVOUS SYSTEM:  Alert & Oriented X3, no focal deficits    LABS:  11-03    145  |  96  |  75<H>  ----------------------------<  144<H>  3.5   |  39<H>  |  0.95  11-02    143  |  93<L>  |  85<H>  ----------------------------<  278<H>  3.6   |  35<H>  |  1.03  11-02    143  |  93<L>  |  89<H>  ----------------------------<  208<H>  3.3<L>   |  40<H>  |  1.04    Ca    9.1      03 Nov 2023 06:12  Ca    8.8      02 Nov 2023 14:11  Ca    9.0      02 Nov 2023 06:31  Phos  2.8     11-03  Mg     1.9     11-03    TPro  6.3  /  Alb  3.0<L>  /  TBili  1.6<H>  /  DBili  x   /  AST  19  /  ALT  90<H>  /  AlkPhos  70  11-03  TPro  6.7  /  Alb  3.2<L>  /  TBili  1.5<H>  /  DBili  x   /  AST  23  /  ALT  123<H>  /  AlkPhos  80  11-02  TPro  7.1  /  Alb  3.1<L>  /  TBili  1.4<H>  /  DBili  x   /  AST  23  /  ALT  171<H>  /  AlkPhos  87  11-01      PTT - ( 03 Nov 2023 06:12 )  PTT:99.9 sec              Urinalysis Basic - ( 03 Nov 2023 06:12 )    Color: x / Appearance: x / SG: x / pH: x  Gluc: 144 mg/dL / Ketone: x  / Bili: x / Urobili: x   Blood: x / Protein: x / Nitrite: x   Leuk Esterase: x / RBC: x / WBC x   Sq Epi: x / Non Sq Epi: x / Bacteria: x                              10.3   5.99  )-----------( 181      ( 03 Nov 2023 06:12 )             36.4                         10.3   5.34  )-----------( 193      ( 02 Nov 2023 06:26 )             36.8                         10.1   5.04  )-----------( 189      ( 01 Nov 2023 06:03 )             36.5     CAPILLARY BLOOD GLUCOSE      POCT Blood Glucose.: 201 mg/dL (02 Nov 2023 21:24)  POCT Blood Glucose.: 225 mg/dL (02 Nov 2023 17:10)  POCT Blood Glucose.: 212 mg/dL (02 Nov 2023 11:45)  POCT Blood Glucose.: 225 mg/dL (02 Nov 2023 08:00)      RADIOLOGY & ADDITIONAL TESTS:    Imaging Personally Reviewed:  [x ] YES  [ ] NO    Consultants involved in case:   Consultant(s) Notes Reviewed:  [ x] YES  [ ] NO:   Care Discussed with Consultants/Other Providers [x ] YES  [ ] NO         ***************************************************************  Juan Carlos Gordon) [facility] PGY2  Internal Medicine   ***************************************************************    MITCHEL GUNN  38y  MRN: [id number]    Patient is a 38y old  Female who presents with a chief complaint of SOB - cardiogenic shock and Pulm HTN (02 Nov 2023 16:37)      Subjective: NAEO. Did not tolerate BIPAP overnight as reporting claustrophobia. Comfortable today w/ voice stronger.     MEDICATIONS  (STANDING):  aMIOdarone    Tablet 200 milliGRAM(s) Oral daily  budesonide 160 MICROgram(s)/formoterol 4.5 MICROgram(s) Inhaler 2 Puff(s) Inhalation two times a day  buMETAnide Injectable 1 milliGRAM(s) IV Push every 12 hours  chlorhexidine 2% Cloths 1 Application(s) Topical <User Schedule>  dextrose 5%. 1000 milliLiter(s) (50 mL/Hr) IV Continuous <Continuous>  dextrose 5%. 1000 milliLiter(s) (100 mL/Hr) IV Continuous <Continuous>  dextrose 50% Injectable 25 Gram(s) IV Push once  dextrose 50% Injectable 12.5 Gram(s) IV Push once  dextrose 50% Injectable 25 Gram(s) IV Push once  glucagon  Injectable 1 milliGRAM(s) IntraMuscular once  heparin  Infusion. 1600 Unit(s)/Hr (16 mL/Hr) IV Continuous <Continuous>  hydroxychloroquine 200 milliGRAM(s) Oral two times a day  insulin glargine Injectable (LANTUS) 5 Unit(s) SubCutaneous at bedtime  insulin lispro (ADMELOG) corrective regimen sliding scale   SubCutaneous three times a day before meals  insulin lispro (ADMELOG) corrective regimen sliding scale   SubCutaneous at bedtime  insulin lispro Injectable (ADMELOG) 2 Unit(s) SubCutaneous three times a day before meals  methylPREDNISolone sodium succinate Injectable 40 milliGRAM(s) IV Push daily  montelukast 10 milliGRAM(s) Oral daily  mupirocin 2% Nasal 1 Application(s) Both Nostrils two times a day  PARoxetine 20 milliGRAM(s) Oral daily  polyethylene glycol 3350 17 Gram(s) Oral two times a day  senna 2 Tablet(s) Oral at bedtime  sildenafil (REVATIO) 20 milliGRAM(s) Oral three times a day    MEDICATIONS  (PRN):  albuterol/ipratropium for Nebulization 3 milliLiter(s) Nebulizer every 6 hours PRN Shortness of Breath  bisacodyl Suppository 10 milliGRAM(s) Rectal daily PRN Constipation  dextrose Oral Gel 15 Gram(s) Oral once PRN Blood Glucose LESS THAN 70 milliGRAM(s)/deciliter      Objective:    Vitals: Vital Signs Last 24 Hrs  T(C): 36.7 (11-03-23 @ 04:00), Max: 37.1 (11-02-23 @ 16:36)  T(F): 98 (11-03-23 @ 04:00), Max: 98.7 (11-02-23 @ 16:36)  HR: 83 (11-03-23 @ 04:22) (79 - 88)  BP: 116/69 (11-03-23 @ 04:00) (111/69 - 116/70)  BP(mean): --  RR: 18 (11-03-23 @ 04:00) (18 - 18)  SpO2: 94% (11-03-23 @ 04:22) (94% - 98%)            I&O's Summary    01 Nov 2023 07:01  -  02 Nov 2023 07:00  --------------------------------------------------------  IN: 612 mL / OUT: 1650 mL / NET: -1038 mL    02 Nov 2023 07:01  -  03 Nov 2023 06:52  --------------------------------------------------------  IN: 1509 mL / OUT: 3350 mL / NET: -1841 mL        PHYSICAL EXAM:  GENERAL: NAD  HEAD:  Atraumatic, Normocephalic  EYES: EOMI, conjunctiva and sclera clear  CHEST/LUNG: Clear to auscultation bilaterally; No rales, rhonchi, wheezing, or rubs  HEART: Regular rate and rhythm; No murmurs, rubs, or gallops  ABDOMEN: Soft, Nontender, Nondistended;   SKIN: No rashes or lesions, 1+ edema to mid-shins  NERVOUS SYSTEM:  Alert & Oriented X3, no focal deficits    LABS:  11-03    145  |  96  |  75<H>  ----------------------------<  144<H>  3.5   |  39<H>  |  0.95  11-02    143  |  93<L>  |  85<H>  ----------------------------<  278<H>  3.6   |  35<H>  |  1.03  11-02    143  |  93<L>  |  89<H>  ----------------------------<  208<H>  3.3<L>   |  40<H>  |  1.04    Ca    9.1      03 Nov 2023 06:12  Ca    8.8      02 Nov 2023 14:11  Ca    9.0      02 Nov 2023 06:31  Phos  2.8     11-03  Mg     1.9     11-03    TPro  6.3  /  Alb  3.0<L>  /  TBili  1.6<H>  /  DBili  x   /  AST  19  /  ALT  90<H>  /  AlkPhos  70  11-03  TPro  6.7  /  Alb  3.2<L>  /  TBili  1.5<H>  /  DBili  x   /  AST  23  /  ALT  123<H>  /  AlkPhos  80  11-02  TPro  7.1  /  Alb  3.1<L>  /  TBili  1.4<H>  /  DBili  x   /  AST  23  /  ALT  171<H>  /  AlkPhos  87  11-01      PTT - ( 03 Nov 2023 06:12 )  PTT:99.9 sec              Urinalysis Basic - ( 03 Nov 2023 06:12 )    Color: x / Appearance: x / SG: x / pH: x  Gluc: 144 mg/dL / Ketone: x  / Bili: x / Urobili: x   Blood: x / Protein: x / Nitrite: x   Leuk Esterase: x / RBC: x / WBC x   Sq Epi: x / Non Sq Epi: x / Bacteria: x                              10.3   5.99  )-----------( 181      ( 03 Nov 2023 06:12 )             36.4                         10.3   5.34  )-----------( 193      ( 02 Nov 2023 06:26 )             36.8                         10.1   5.04  )-----------( 189      ( 01 Nov 2023 06:03 )             36.5     CAPILLARY BLOOD GLUCOSE      POCT Blood Glucose.: 201 mg/dL (02 Nov 2023 21:24)  POCT Blood Glucose.: 225 mg/dL (02 Nov 2023 17:10)  POCT Blood Glucose.: 212 mg/dL (02 Nov 2023 11:45)  POCT Blood Glucose.: 225 mg/dL (02 Nov 2023 08:00)      RADIOLOGY & ADDITIONAL TESTS:    Imaging Personally Reviewed:  [x ] YES  [ ] NO    Consultants involved in case:   Consultant(s) Notes Reviewed:  [ x] YES  [ ] NO:   Care Discussed with Consultants/Other Providers [x ] YES  [ ] NO

## 2023-11-03 NOTE — PROGRESS NOTE ADULT - PROBLEM SELECTOR PLAN 8
BUN/Cr max 63.9/3.21 on 10/22 at [facility]. pt w/ prior SARAH likely in s/o sepsis and cardiogenic shock  - Cr downtrending -> improved back to baseline with Cr. 10.3  - CTM Cr and UOP

## 2023-11-03 NOTE — PROGRESS NOTE ADULT - SUBJECTIVE AND OBJECTIVE BOX
INTERVAL HPI/OVERNIGHT EVENTS:  She is feeling better, still on IV diuresis. No joint pain, rash on face has cleared. Ongoing right upper extremity rash.    MEDICATIONS  (STANDING):  aMIOdarone    Tablet 200 milliGRAM(s) Oral daily  budesonide 160 MICROgram(s)/formoterol 4.5 MICROgram(s) Inhaler 2 Puff(s) Inhalation two times a day  buMETAnide Injectable 1 milliGRAM(s) IV Push every 12 hours  chlorhexidine 2% Cloths 1 Application(s) Topical <User Schedule>  glucagon  Injectable 1 milliGRAM(s) IntraMuscular once  heparin  Infusion. 1600 Unit(s)/Hr (16 mL/Hr) IV Continuous <Continuous>  hydroxychloroquine 200 milliGRAM(s) Oral two times a day  insulin glargine Injectable (LANTUS) 8 Unit(s) SubCutaneous at bedtime  insulin lispro (ADMELOG) corrective regimen sliding scale   SubCutaneous three times a day before meals  insulin lispro (ADMELOG) corrective regimen sliding scale   SubCutaneous at bedtime  insulin lispro Injectable (ADMELOG) 4 Unit(s) SubCutaneous three times a day before meals  methylPREDNISolone sodium succinate Injectable 40 milliGRAM(s) IV Push daily  montelukast 10 milliGRAM(s) Oral daily  mupirocin 2% Nasal 1 Application(s) Both Nostrils two times a day  PARoxetine 20 milliGRAM(s) Oral daily  polyethylene glycol 3350 17 Gram(s) Oral two times a day  senna 2 Tablet(s) Oral at bedtime  sildenafil (REVATIO) 20 milliGRAM(s) Oral three times a day    MEDICATIONS  (PRN):  albuterol/ipratropium for Nebulization 3 milliLiter(s) Nebulizer every 6 hours PRN Shortness of Breath  bisacodyl Suppository 10 milliGRAM(s) Rectal daily PRN Constipation  dextrose Oral Gel 15 Gram(s) Oral once PRN Blood Glucose LESS THAN 70 milliGRAM(s)/deciliter        Vital Signs Last 24 Hrs  T(C): 36.6 (03 Nov 2023 11:16), Max: 36.7 (03 Nov 2023 04:00)  T(F): 97.8 (03 Nov 2023 11:16), Max: 98 (03 Nov 2023 04:00)  HR: 72 (03 Nov 2023 16:51) (72 - 88)  BP: 110/60 (03 Nov 2023 16:51) (100/60 - 116/69)  BP(mean): --  RR: 18 (03 Nov 2023 11:16) (18 - 18)  SpO2: 97% (03 Nov 2023 16:41) (92% - 98%)    Parameters below as of 03 Nov 2023 11:16  Patient On (Oxygen Delivery Method): nasal cannula  O2 Flow (L/min): 2      PHYSICAL EXAMINATION:  Constitutional: nad, morbidly obese  HEENT: no oral ulcers  Pulmonary: reduced lung volumes  Cardio: rrr  Musculoskeletal: no synovitis  Skin: livedoid scaly rash on right upper extremity  Psychiatric: aaox3    LABS:                        10.3   5.99  )-----------( 181      ( 03 Nov 2023 06:12 )             36.4     11-03    145  |  96  |  75<H>  ----------------------------<  144<H>  3.5   |  39<H>  |  0.95    Ca    9.1      03 Nov 2023 06:12  Phos  2.8     11-03  Mg     1.9     11-03    TPro  6.3  /  Alb  3.0<L>  /  TBili  1.6<H>  /  DBili  x   /  AST  19  /  ALT  90<H>  /  AlkPhos  70  11-03    PTT - ( 03 Nov 2023 12:53 )  PTT:67.6 sec  Urinalysis Basic - ( 03 Nov 2023 06:12 )    Color: x / Appearance: x / SG: x / pH: x  Gluc: 144 mg/dL / Ketone: x  / Bili: x / Urobili: x   Blood: x / Protein: x / Nitrite: x   Leuk Esterase: x / RBC: x / WBC x   Sq Epi: x / Non Sq Epi: x / Bacteria: x          RADIOLOGY & ADDITIONAL TESTS:

## 2023-11-03 NOTE — DISCHARGE NOTE PROVIDER - NSDCMRMEDTOKEN_GEN_ALL_CORE_FT
furosemide 40 mg oral tablet: 1 tab(s) orally once a day  hydroxychloroquine 200 mg oral tablet: 1 tab(s) orally 2 times a day  insulin glargine 100 units/mL subcutaneous solution: 15 unit(s) subcutaneous once a day (at bedtime)  ipratropium-albuterol 0.5 mg-2.5 mg/3 mL inhalation solution: 3 milliliter(s) inhaled every 6 hours As needed Shortness of Breath and/or Wheezing  melatonin 3 mg oral tablet: 1 tab(s) orally once a day (at bedtime) As needed Insomnia  PARoxetine 20 mg oral tablet: 1 tab(s) orally once a day  polyethylene glycol 3350 oral powder for reconstitution: 17 gram(s) orally once a day As needed Constipation  potassium chloride 20 mEq oral tablet, extended release: 2 tab(s) orally once a day  sildenafil 20 mg oral tablet: 1 tab(s) orally 3 times a day  spironolactone 50 mg oral tablet: 1 tab(s) orally once a day  Toprol- mg oral tablet, extended release: 1 tab(s) orally once a day   amiodarone 200 mg oral tablet: 1 tab(s) orally once a day  furosemide 40 mg oral tablet: 1 tab(s) orally once a day  hydroxychloroquine 200 mg oral tablet: 1 tab(s) orally 2 times a day  insulin glargine 100 units/mL subcutaneous solution: 15 unit(s) subcutaneous once a day (at bedtime)  ipratropium-albuterol 0.5 mg-2.5 mg/3 mL inhalation solution: 3 milliliter(s) inhaled every 6 hours As needed Shortness of Breath and/or Wheezing  melatonin 3 mg oral tablet: 1 tab(s) orally once a day (at bedtime) As needed Insomnia  PARoxetine 20 mg oral tablet: 1 tab(s) orally once a day  polyethylene glycol 3350 oral powder for reconstitution: 17 gram(s) orally once a day As needed Constipation  sildenafil 20 mg oral tablet: 1 tab(s) orally 3 times a day  spironolactone 50 mg oral tablet: 1 tab(s) orally once a day  Toprol- mg oral tablet, extended release: 1 tab(s) orally once a day

## 2023-11-03 NOTE — MEDICAL STUDENT PROGRESS NOTE(EDUCATION) - PLAN 2
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

## 2023-11-04 NOTE — PROGRESS NOTE ADULT - PROBLEM SELECTOR PLAN 3
- Pt treated w/ prednisone 20 mg for facial rash, photophobia, joint pains, c/f SLE w/ low C3C4, KATHIE 1:2560, dsDNA 671, Sm 2.1, Ro >8, La>8, RF 65, CRP 45, , negative RNP/CCP/ ACL/ ANCA/panda/[facility]/scl70 per rheum eval at [facility]  - f/u KATHIE, Anti-ribonuclear protein, dsDNA, Myomarker Panel 3, Scleroderma Antibodies, Sjogren's  - s/p stress dose hydrocortisone on 10/26, Solumedrol 1g 10/27-10/30, now on taper   - Rheum consulted, appreciate recommendations  - Continue Solumedrol 40 QD, plaquenil 200 mg BID  - Pt w/ RUE rash, per rheum recommending dermatology c/s as rash persistent despite steroid regimen, requested skin biopsy. Will c/s in AM. - Pt treated w/ prednisone 20 mg for facial rash, photophobia, joint pains, c/f SLE w/ low C3C4, KATHIE 1:2560, dsDNA 671, Sm 2.1, Ro >8, La>8, RF 65, CRP 45, , negative RNP/CCP/ ACL/ ANCA/panda/[facility]/scl70 per rheum eval at [facility]  - f/u KATHIE, Anti-ribonuclear protein, dsDNA, Myomarker Panel 3, Scleroderma Antibodies, Sjogren's  - s/p stress dose hydrocortisone on 10/26, Solumedrol 1g 10/27-10/30, now on taper   - Rheum consulted, appreciate recommendations  - Continue Solumedrol 40 QD, plaquenil 200 mg BID  - Pt w/ RUE rash, per rheum recommending dermatology c/s as rash persistent despite steroid regimen, requested skin biopsy. Derm consulted.

## 2023-11-04 NOTE — PROGRESS NOTE ADULT - SUBJECTIVE AND OBJECTIVE BOX
***************************************************************  Rangel (Ji-Cheng) [facility] PGY2  Internal Medicine   ***************************************************************    MITCHEL GUNN  38y  MRN: [id number]    Patient is a 38y old  Female who presents with a chief complaint of SOB - cardiogenic shock and Pulm HTN (03 Nov 2023 19:47)      Subjective: no events ON. Denies fever, CP, SOB, abn pain, N/V, dysuria. Tolerating diet.      MEDICATIONS  (STANDING):  aMIOdarone    Tablet 200 milliGRAM(s) Oral daily  budesonide 160 MICROgram(s)/formoterol 4.5 MICROgram(s) Inhaler 2 Puff(s) Inhalation two times a day  buMETAnide Injectable 1 milliGRAM(s) IV Push every 12 hours  chlorhexidine 2% Cloths 1 Application(s) Topical <User Schedule>  dextrose 5%. 1000 milliLiter(s) (100 mL/Hr) IV Continuous <Continuous>  dextrose 5%. 1000 milliLiter(s) (50 mL/Hr) IV Continuous <Continuous>  dextrose 50% Injectable 25 Gram(s) IV Push once  dextrose 50% Injectable 12.5 Gram(s) IV Push once  dextrose 50% Injectable 25 Gram(s) IV Push once  glucagon  Injectable 1 milliGRAM(s) IntraMuscular once  heparin  Infusion. 1600 Unit(s)/Hr (16 mL/Hr) IV Continuous <Continuous>  hydroxychloroquine 200 milliGRAM(s) Oral two times a day  insulin glargine Injectable (LANTUS) 8 Unit(s) SubCutaneous at bedtime  insulin lispro (ADMELOG) corrective regimen sliding scale   SubCutaneous three times a day before meals  insulin lispro (ADMELOG) corrective regimen sliding scale   SubCutaneous at bedtime  insulin lispro Injectable (ADMELOG) 4 Unit(s) SubCutaneous three times a day before meals  methylPREDNISolone sodium succinate Injectable 40 milliGRAM(s) IV Push daily  montelukast 10 milliGRAM(s) Oral daily  mupirocin 2% Nasal 1 Application(s) Both Nostrils two times a day  PARoxetine 20 milliGRAM(s) Oral daily  polyethylene glycol 3350 17 Gram(s) Oral two times a day  senna 2 Tablet(s) Oral at bedtime  sildenafil (REVATIO) 20 milliGRAM(s) Oral three times a day    MEDICATIONS  (PRN):  albuterol/ipratropium for Nebulization 3 milliLiter(s) Nebulizer every 6 hours PRN Shortness of Breath  bisacodyl Suppository 10 milliGRAM(s) Rectal daily PRN Constipation  dextrose Oral Gel 15 Gram(s) Oral once PRN Blood Glucose LESS THAN 70 milliGRAM(s)/deciliter  heparin   Injectable 53481 Unit(s) IV Push every 6 hours PRN For aPTT less than 40  heparin   Injectable 5000 Unit(s) IV Push every 6 hours PRN For aPTT between 40 - 57      Objective:    Vitals: Vital Signs Last 24 Hrs  T(C): 36.1 (11-04-23 @ 04:17), Max: 36.6 (11-03-23 @ 11:16)  T(F): 97 (11-04-23 @ 04:17), Max: 97.9 (11-03-23 @ 21:00)  HR: 64 (11-04-23 @ 04:17) (64 - 87)  BP: 124/67 (11-04-23 @ 04:17) (100/60 - 136/74)  BP(mean): --  RR: 18 (11-04-23 @ 04:17) (18 - 18)  SpO2: 99% (11-04-23 @ 04:17) (92% - 99%)            I&O's Summary    03 Nov 2023 07:01  -  04 Nov 2023 07:00  --------------------------------------------------------  IN: 962 mL / OUT: 2700 mL / NET: -1738 mL        PHYSICAL EXAM:  GENERAL: NAD  HEAD:  Atraumatic, Normocephalic  EYES: EOMI, conjunctiva and sclera clear  CHEST/LUNG: Clear to auscultation bilaterally; No rales, rhonchi, wheezing, or rubs  HEART: Regular rate and rhythm; No murmurs, rubs, or gallops  ABDOMEN: Soft, Nontender, Nondistended;   SKIN: No rashes or lesions  NERVOUS SYSTEM:  Alert & Oriented X3, no focal deficits    LABS:  11-04    144  |  96  |  66<H>  ----------------------------<  123<H>  5.0   |  41<H>  |  0.78  11-03    145  |  96  |  75<H>  ----------------------------<  144<H>  3.5   |  39<H>  |  0.95  11-02    143  |  93<L>  |  85<H>  ----------------------------<  278<H>  3.6   |  35<H>  |  1.03    Ca    9.3      04 Nov 2023 05:56  Ca    9.1      03 Nov 2023 06:12  Ca    8.8      02 Nov 2023 14:11  Phos  2.8     11-04  Mg     1.9     11-04    TPro  6.4  /  Alb  3.0<L>  /  TBili  1.7<H>  /  DBili  x   /  AST  34  /  ALT  67<H>  /  AlkPhos  67  11-04  TPro  6.3  /  Alb  3.0<L>  /  TBili  1.6<H>  /  DBili  x   /  AST  19  /  ALT  90<H>  /  AlkPhos  70  11-03  TPro  6.7  /  Alb  3.2<L>  /  TBili  1.5<H>  /  DBili  x   /  AST  23  /  ALT  123<H>  /  AlkPhos  80  11-02      PTT - ( 04 Nov 2023 04:24 )  PTT:56.8 sec              Urinalysis Basic - ( 04 Nov 2023 05:56 )    Color: x / Appearance: x / SG: x / pH: x  Gluc: 123 mg/dL / Ketone: x  / Bili: x / Urobili: x   Blood: x / Protein: x / Nitrite: x   Leuk Esterase: x / RBC: x / WBC x   Sq Epi: x / Non Sq Epi: x / Bacteria: x                              10.4   5.01  )-----------( 176      ( 04 Nov 2023 06:00 )             37.2                         10.3   5.99  )-----------( 181      ( 03 Nov 2023 06:12 )             36.4                         10.3   5.34  )-----------( 193      ( 02 Nov 2023 06:26 )             36.8     CAPILLARY BLOOD GLUCOSE      POCT Blood Glucose.: 209 mg/dL (03 Nov 2023 21:51)  POCT Blood Glucose.: 187 mg/dL (03 Nov 2023 17:14)  POCT Blood Glucose.: 334 mg/dL (03 Nov 2023 11:40)  POCT Blood Glucose.: 205 mg/dL (03 Nov 2023 07:57)      RADIOLOGY & ADDITIONAL TESTS:    Imaging Personally Reviewed:  [x ] YES  [ ] NO    Consultants involved in case:   Consultant(s) Notes Reviewed:  [ x] YES  [ ] NO:   Care Discussed with Consultants/Other Providers [x ] YES  [ ] NO         ***************************************************************  Juan Carlos Gordon) [facility] PGY2  Internal Medicine   ***************************************************************    MITCHEL GUNN  38y  MRN: [id number]    Patient is a 38y old  Female who presents with a chief complaint of SOB - cardiogenic shock and Pulm HTN (03 Nov 2023 19:47)      Subjective: NAEO. No acute complaints, significant urine output in purwick.     MEDICATIONS  (STANDING):  aMIOdarone    Tablet 200 milliGRAM(s) Oral daily  budesonide 160 MICROgram(s)/formoterol 4.5 MICROgram(s) Inhaler 2 Puff(s) Inhalation two times a day  buMETAnide Injectable 1 milliGRAM(s) IV Push every 12 hours  chlorhexidine 2% Cloths 1 Application(s) Topical <User Schedule>  dextrose 5%. 1000 milliLiter(s) (100 mL/Hr) IV Continuous <Continuous>  dextrose 5%. 1000 milliLiter(s) (50 mL/Hr) IV Continuous <Continuous>  dextrose 50% Injectable 25 Gram(s) IV Push once  dextrose 50% Injectable 12.5 Gram(s) IV Push once  dextrose 50% Injectable 25 Gram(s) IV Push once  glucagon  Injectable 1 milliGRAM(s) IntraMuscular once  heparin  Infusion. 1600 Unit(s)/Hr (16 mL/Hr) IV Continuous <Continuous>  hydroxychloroquine 200 milliGRAM(s) Oral two times a day  insulin glargine Injectable (LANTUS) 8 Unit(s) SubCutaneous at bedtime  insulin lispro (ADMELOG) corrective regimen sliding scale   SubCutaneous three times a day before meals  insulin lispro (ADMELOG) corrective regimen sliding scale   SubCutaneous at bedtime  insulin lispro Injectable (ADMELOG) 4 Unit(s) SubCutaneous three times a day before meals  methylPREDNISolone sodium succinate Injectable 40 milliGRAM(s) IV Push daily  montelukast 10 milliGRAM(s) Oral daily  mupirocin 2% Nasal 1 Application(s) Both Nostrils two times a day  PARoxetine 20 milliGRAM(s) Oral daily  polyethylene glycol 3350 17 Gram(s) Oral two times a day  senna 2 Tablet(s) Oral at bedtime  sildenafil (REVATIO) 20 milliGRAM(s) Oral three times a day    MEDICATIONS  (PRN):  albuterol/ipratropium for Nebulization 3 milliLiter(s) Nebulizer every 6 hours PRN Shortness of Breath  bisacodyl Suppository 10 milliGRAM(s) Rectal daily PRN Constipation  dextrose Oral Gel 15 Gram(s) Oral once PRN Blood Glucose LESS THAN 70 milliGRAM(s)/deciliter  heparin   Injectable 19177 Unit(s) IV Push every 6 hours PRN For aPTT less than 40  heparin   Injectable 5000 Unit(s) IV Push every 6 hours PRN For aPTT between 40 - 57      Objective:    Vitals: Vital Signs Last 24 Hrs  T(C): 36.1 (11-04-23 @ 04:17), Max: 36.6 (11-03-23 @ 11:16)  T(F): 97 (11-04-23 @ 04:17), Max: 97.9 (11-03-23 @ 21:00)  HR: 64 (11-04-23 @ 04:17) (64 - 87)  BP: 124/67 (11-04-23 @ 04:17) (100/60 - 136/74)  BP(mean): --  RR: 18 (11-04-23 @ 04:17) (18 - 18)  SpO2: 99% (11-04-23 @ 04:17) (92% - 99%)            I&O's Summary    03 Nov 2023 07:01  -  04 Nov 2023 07:00  --------------------------------------------------------  IN: 962 mL / OUT: 2700 mL / NET: -1738 mL        PHYSICAL EXAM:  GENERAL: NAD  HEAD:  Atraumatic, Normocephalic  EYES: EOMI, conjunctiva and sclera clear  CHEST/LUNG: Clear to auscultation bilaterally; No rales, rhonchi, wheezing, or rubs  HEART: Regular rate and rhythm; No murmurs, rubs, or gallops  ABDOMEN: Soft, Nontender, Nondistended;   SKIN: No rashes or lesions  NERVOUS SYSTEM:  Alert & Oriented X3, no focal deficits    LABS:  11-04    144  |  96  |  66<H>  ----------------------------<  123<H>  5.0   |  41<H>  |  0.78  11-03    145  |  96  |  75<H>  ----------------------------<  144<H>  3.5   |  39<H>  |  0.95  11-02    143  |  93<L>  |  85<H>  ----------------------------<  278<H>  3.6   |  35<H>  |  1.03    Ca    9.3      04 Nov 2023 05:56  Ca    9.1      03 Nov 2023 06:12  Ca    8.8      02 Nov 2023 14:11  Phos  2.8     11-04  Mg     1.9     11-04    TPro  6.4  /  Alb  3.0<L>  /  TBili  1.7<H>  /  DBili  x   /  AST  34  /  ALT  67<H>  /  AlkPhos  67  11-04  TPro  6.3  /  Alb  3.0<L>  /  TBili  1.6<H>  /  DBili  x   /  AST  19  /  ALT  90<H>  /  AlkPhos  70  11-03  TPro  6.7  /  Alb  3.2<L>  /  TBili  1.5<H>  /  DBili  x   /  AST  23  /  ALT  123<H>  /  AlkPhos  80  11-02      PTT - ( 04 Nov 2023 04:24 )  PTT:56.8 sec              Urinalysis Basic - ( 04 Nov 2023 05:56 )    Color: x / Appearance: x / SG: x / pH: x  Gluc: 123 mg/dL / Ketone: x  / Bili: x / Urobili: x   Blood: x / Protein: x / Nitrite: x   Leuk Esterase: x / RBC: x / WBC x   Sq Epi: x / Non Sq Epi: x / Bacteria: x                              10.4   5.01  )-----------( 176      ( 04 Nov 2023 06:00 )             37.2                         10.3   5.99  )-----------( 181      ( 03 Nov 2023 06:12 )             36.4                         10.3   5.34  )-----------( 193      ( 02 Nov 2023 06:26 )             36.8     CAPILLARY BLOOD GLUCOSE      POCT Blood Glucose.: 209 mg/dL (03 Nov 2023 21:51)  POCT Blood Glucose.: 187 mg/dL (03 Nov 2023 17:14)  POCT Blood Glucose.: 334 mg/dL (03 Nov 2023 11:40)  POCT Blood Glucose.: 205 mg/dL (03 Nov 2023 07:57)      RADIOLOGY & ADDITIONAL TESTS:    Imaging Personally Reviewed:  [x ] YES  [ ] NO    Consultants involved in case:   Consultant(s) Notes Reviewed:  [ x] YES  [ ] NO:   Care Discussed with Consultants/Other Providers [x ] YES  [ ] NO

## 2023-11-04 NOTE — PROGRESS NOTE ADULT - ASSESSMENT
Ms. 38-yo female with PMHx of b/l DVTs, recurrent PE on eliquis, HFpEF w/ hx of cor pulmonale, morbid obesity, asthma, chronic hyperkalemia, recently diagnosed SLE (facial rash, photophobia, joint paints, started on prednisone 20 mg), admitted initially w/ PNA to [facility] on 10/18, course c/b SARAH, AHRF 2/2 cardiogenic shock 2/2 pHTN w/ RV failure s/p MICU stay at [facility] now off inotropes and pressors pending rheumatological work-up and CT Angio/RHC for pHTN.

## 2023-11-04 NOTE — PROGRESS NOTE ADULT - PROBLEM SELECTOR PLAN 1
- Suspected multifactorial w/ primary insult due to volume overload w/ pHTN, cor pulmonale on existing prior PNA, hx of PEs, morbid obesity, asthma.   - s/p empiric Zosyn from 10/19-10/26 at [facility]  - Not intubated, on HFNC, now weaned to 4L NC with aggressive diuresis, Sildenafil  - Continue aggressive diuresis with Bumex, daily VBGs w/ Diamox if bicarb uptrending  - Continue Montelukast, Duonebs, Symbicort  - Now on 2L NC, continue to downtitrate O2 as tolerated

## 2023-11-04 NOTE — PROGRESS NOTE ADULT - PROBLEM SELECTOR PLAN 2
- Pt w/ elevated pulmonary filling pressures on swan at [facility]   - Last values 10/26 CVP 9, /31/54, PCWP 7, PA sat 61.9%, PVR 6.1 wilkinson, , /71/91, Tg CO/CI 7.7/3.0 on milrinone 0.250, FIO2 40%  - Thought to be due to prior PE (Group 4), OHS, MICHAEL (Group 3), autoimmune process. Less likely left-sided disease (Group 2)   - Dr. Del Castillo from Pulmonology following, appreciate recommendations  - CTPE 11/1 - no PE, but unchanged diffuse pulm mosaic attenuation may be CTEPH   - Cards consulted 11/2 for RHC +/- vasoreactivity testing - will attempt to reach out again   - s/p Bumex 2mg BID (10/30-11/2). Decrease to Bumex 1 mg BID  - Diamox if continues to be alkalotic  - Pt to undergo V/Q scan however cannot lay flat - plan for scan on 11/6 once more volume optimized - Pt w/ elevated pulmonary filling pressures on swan at [facility]   - Last values 10/26 CVP 9, /31/54, PCWP 7, PA sat 61.9%, PVR 6.1 wilkinson, , /71/91, Tg CO/CI 7.7/3.0 on milrinone 0.250, FIO2 40%  - Thought to be due to prior PE (Group 4), OHS, MICHAEL (Group 3), autoimmune process. Less likely left-sided disease (Group 2)   - Dr. Del Castillo from Pulmonology following, appreciate recommendations  - CTPE 11/1 - no PE, but unchanged diffuse pulm mosaic attenuation may be CTEPH   - Cards consulted 11/2 for RHC +/- vasoreactivity testing - will attempt to reach out again   - s/p Bumex 2mg BID (10/30-11/2). Decrease to Bumex 1 mg BID  - Diamox if continues to be alkalotic  - Pt to undergo V/Q scan however cannot lay flat - plan for scan on 11/6 once more volume optimized, will discuss w/ pulm re: utility of exam given CT scan

## 2023-11-04 NOTE — PROGRESS NOTE ADULT - ATTENDING COMMENTS
38 year old female with a PMHx of morbid obesity, PE on eliquis (diagnosed in 2017, on eliquis at home), HFpEF, asthma, on PRN home O2, suspected SLE (being worked up by outpatient rheumatologist, on steroids x1 month prior to hospital presentation) who is transferred from the MICU to the general medicine floors on 10/31. She was originally admitted to [facility] on 10/18 with complaints of weakness, worsening LE edema, cough, and rapid weight gain.  Her evaluation there was also significant for atrial flutter, converted to normal sinus rhythm with amiodarone. CXR showed mild pulmonary edema and LLL consolidation. She was treated with a course of IV diuretics for presumed decompensated heart failure as well as empiric antibiotics for pneumonia. During her treatment course, she developed an SARAH. Lasix were held. She progressively became hypotensive, fluid resuscitation limited by her volume-overloaded status. She was transferred to the ICU at [facility] on 10/22. Ddx for hypotension included adrenal insufficiency in the setting of chronic outpatient steroids v cardiogenic shock and cor pulmonale. They started her on pressors, stress-dose steroids, and bumex drip. [place] cath reading consistent with elevated pulmonary artery pressures. TTE also showing severely decreased RV systolic function and enlarged right ventricle. She was treated with inhaled nitric oxide and started on sildenafil. Course was further complicated by shock liver with AST and ALT up to 3000s+/2000+, improving.      The patient was transferred to [facility] on 10/30 for further management of pHTN. Her pressors were titrated down and she was titrated off of high flow and onto nasal canula O2 supplementation. She is now transferred to the [facility] for further care.      Overnight, the patient was unable to tolerate BiPAP d/t claustrophobia. Pt seen with house staff this morning. Pt feels her shortness of breath is improving, though she still experiences some orthopnea. C/o tremors in the bilateral upper extremities that started with initiation of steroids.     On exam, the patient is on 2L of NC, De-saturates to 89% on room air. No respiratory distress, crackles bilaterally. There is 2-3+ pitting edema in the lower extremities. RUE with nonspecific lacy rash in a discrete distribution, non blanching and ecchymoses.    #cor pulmonale    #hypoxic and hypercapenic respiratory failure    #metabolic acidosis    #SLE    #APLS    #steroid-induced hyperglycemia   #shock state s/p pressors (improved)- cardiogenic shock v adrenal insufficiency    #atrial flutter (s/p pharmacologic conversion to sinus rhythm with amiodarone)   #SARAH—improving    #shock liver—improving       - continue bumex 1mg IV BID. Strict I/O   - planned for right heart cath  mon  - CTA chest and consistent with chronic thromboembolic disease  - unclear necessity of VQ scan as pt unable to lay flat and already known CTEPH  - continue plaquenil for new diagnosis of SLE   - continue solumedrol 40mg IV daily   - continue heparin gtt. Eventual plan to transition to warfarin for APLS   -pulm and rheumatology consulted, appreciate evaluation       Rest of plan as per resident note above.    [facility] Division of Hospital Medicine  Nedra Bravo MD  Available on TEAMS 8a-8p

## 2023-11-04 NOTE — PROGRESS NOTE ADULT - PROBLEM SELECTOR PLAN 8
BUN/Cr max 63.9/3.21 on 10/22 at [facility]. pt w/ prior SARAH likely in s/o sepsis and cardiogenic shock  - Cr downtrending -> improved back to baseline with Cr. 10.3  - CTM Cr and UOP

## 2023-11-04 NOTE — PROVIDER CONTACT NOTE (CRITICAL VALUE NOTIFICATION) - ACTION/TREATMENT ORDERED:
Follow heparin nomogram. Hold for 1 hour and restart heparin rate at 15ml/hr. Continue to monitor and maintain safety.

## 2023-11-05 NOTE — PROVIDER CONTACT NOTE (CRITICAL VALUE NOTIFICATION) - ASSESSMENT
A&Ox4. VSS. No s/s of bleed or c/o distress. Pt on heparin drip
Pt is resting in bed on 2LNC. No s/s of repiratory distress. VSS. AOx4
VSS. Pt is asymptomatic.
Pt tolerated BiPAP overnight; HC03 51
Pt A&Ox4, able to make needs known. Pt asymptomatic. VSS 2 liters o2. No s/s of bleeding. Pt denies cp, denies SOB, denies pain.
VSS. Pt is asymptomatic

## 2023-11-05 NOTE — PROGRESS NOTE ADULT - ASSESSMENT
Ms. 38-yo female with PMHx of b/l DVTs, recurrent PE on eliquis, HFpEF w/ hx of cor pulmonale, morbid obesity, asthma, chronic hyperkalemia, recently diagnosed SLE (facial rash, photophobia, joint paints, started on prednisone 20 mg), admitted initially w/ PNA to [facility] on 10/18, course c/b SARAH, AHRF 2/2 cardiogenic shock 2/2 pHTN w/ RV failure s/p MICU stay at [facility] now off inotropes and pressors pending rheumatological work-up and CT Angio/RHC for pHTN.  Ms. 38-yo female with PMHx of b/l DVTs, recurrent PE on eliquis, HFpEF w/ hx of cor pulmonale, morbid obesity, asthma, chronic hyperkalemia, recently diagnosed SLE (facial rash, photophobia, joint paints, started on prednisone 20 mg), admitted initially w/ PNA to [facility] on 10/18, course c/b SARAH, AHRF 2/2 cardiogenic shock 2/2 pHTN w/ RV failure s/p MICU stay at [facility] now off inotropes and pressors pending rheumatological work-up for newly diagnosed SLE C/b RUE rash req. derm evaluation, and V/Q scan, RHC for pHTN.

## 2023-11-05 NOTE — PROVIDER CONTACT NOTE (CRITICAL VALUE NOTIFICATION) - ACTION/TREATMENT ORDERED:
MD Tejada aware. Will continue to monitor and maintain pt safety. Bicarb appears to be lower than last result

## 2023-11-05 NOTE — PROVIDER CONTACT NOTE (CRITICAL VALUE NOTIFICATION) - RECOMMENDATIONS
Nick, Deana Resident notified
MD Tejada made aware.
notify Teams MD Khadra Goss
Ahmed Deana Resident notified
Deana Romero MD notified
MD granados.

## 2023-11-05 NOTE — PROVIDER CONTACT NOTE (CRITICAL VALUE NOTIFICATION) - PERSON GIVING RESULT:
Jeremy Krishna
Derick Doan,  lab
Jeremy Krishna
Alcides Galan
barb wong - adrián
Quintin Martinez Lab

## 2023-11-05 NOTE — PROGRESS NOTE ADULT - PROBLEM SELECTOR PLAN 11
DVT PPx: Heparin Drip for full AC  Diet: low Na and Carb consistent diet  Bowel Regimen: Last BM 4 days again, started Senna 2 at bedtime  Code: Full  Dispo: DICK per PT evaluation  Pharmacy:  PCP:   Communication: DVT PPx: Heparin Drip for full AC  Diet: low Na and Carb consistent diet  Bowel Regimen: Miralax BID, Senna  Code: Full  Dispo: DICK   Pharmacy:  PCP:   Communication:

## 2023-11-05 NOTE — PROVIDER CONTACT NOTE (CRITICAL VALUE NOTIFICATION) - BACKGROUND
38 year old F admitted for pulmonary hypertension and heart failure
Dx: Cardiogenic Shock  Hx: HTN, HF
38 year old female admitted for heart failure. PMH HTN, anxiety, asthma, CHF, DVT, PE.
Admitted to [facility] where pt was found to have cardiogenic shock, hypoxic respiratory failure requiring HFNC ISO of severe pHTN with acute on chronic cor pulmonale, requiring inotropes, vasopressors, pulmonary vasodilators and IV diuretics
admitting diagnosis heart failure
39yo female with pmh of BMI > 40, CHF-diastolic, B/L LE DVT + PE on Eliquis, HTN, Asthma,eczema and Anxiety presents to the ED w/ 1 week of worsening SOB on Exertion.

## 2023-11-05 NOTE — CONSULT NOTE ADULT - ATTENDING COMMENTS
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    Pulmonary hypertension etiology is likely multifactorial due to WHO Group I (underlying connective tissue disease, SLE/APLA syndrome) + Group II (diastolic dysfunction with elevated filling pressures on echo) + Group III (due to likely MICHAEL/OHS with chronic hypoxemic and hypercapnic respiratory failure) + Group IV (due to CTEPH).     Pending CTPA today for further evaluation. Previously in July with evidence of mosaic attenuation, likely due to pulmonary hypertension, but also had patchy opacities. Will need to rule out interstitial lung disease. May require V/Q scan to further evaluate for CTEPH. Remains on bumetanide 2 mg IV q12h with strict I/O's and close monitoring of kidney function and lytes. Consider acetazolamide if bicarbonate rises again. Will require right heart cath once euvolemic. Needs PFTs and sleep study as outpatient for full workup of pulmonary hypertension.     Rheumatologic workup with elevated KATHIE (1:2560), high dsDNA (347), high RF (62), elevated SS-A & SS-B (>8), low C3 & C4, positive APL, positive ACL, and positive B2GP IgG. Steroids now tapered to methylprednisolone 40 mg daily. Also on hydroxychloroquine.    Remains on heparin gtt for DVT/PE. If APLA syndrome confirmed, then will need to discuss with hematology regarding switching apixaban to warfarin.    History of asthma, although currently without significant asthma symptomatology. Remains on Symbicort inhaler twice daily.    Note that if she will require long term steroids (>20 mg of prednisone, or equivalent), then she will require PCP prophylaxis with Bactrim DS 1 tab MWF.
Patient seen and examined. Agree with above.
38F with morbid obesity BMI 61 with severe pulmonary hypertension and right heart failure.  Recent diagnosis of SLE based on rash, arthritis, KATHIE, dsDNA, Sm, low complements.   Also with h/o APS based on recurrent prior thrombotic events and + B2GP1 IgG and + B2GP1 IgA?  Pulmonary hypertension likely secondary to MICHAEL, chronic thromboembolic disease, overall less likely that SLE is main  of PAH    Plan:  no indication for high dose steroids - can decrease to Solumedrol 40mg daily for now  coumadin is recommended AC for APS (superior to DOAC)  will follow      Dr. Hannah Kingsley  Rheumatology Attending  [phone]  [email or web address]

## 2023-11-05 NOTE — PROGRESS NOTE ADULT - PROBLEM SELECTOR PLAN 9
- Last a1c 6.0  - Pt w/ hyperglycemia to 200's in s/o steroid use  - Lantus 5u qhs + admelog 2u TIDAC  - Continue insulin sliding scale  - fsg tidac + qhs  - Nutrition evaluated: low Na and Carb consistent diet - Last a1c 6.0  - Pt w/ hyperglycemia to 200's in s/o steroid use  - Continue basal + bolus + insulin sliding scale

## 2023-11-05 NOTE — PROGRESS NOTE ADULT - PROBLEM SELECTOR PLAN 2
- Pt w/ elevated pulmonary filling pressures on swan at [facility]   - Last values 10/26 CVP 9, /31/54, PCWP 7, PA sat 61.9%, PVR 6.1 wilkinson, , /71/91, Tg CO/CI 7.7/3.0 on milrinone 0.250, FIO2 40%  - Thought to be due to prior PE (Group 4), OHS, MICHAEL (Group 3), autoimmune process. Less likely left-sided disease (Group 2)   - Dr. Del Castillo from Pulmonology following, appreciate recommendations  - CTPE 11/1 - no PE, but unchanged diffuse pulm mosaic attenuation may be CTEPH   - Cards consulted 11/2 for RHC +/- vasoreactivity testing - will attempt to reach out again   - s/p Bumex 2mg BID (10/30-11/2). Decrease to Bumex 1 mg BID  - Diamox if continues to be alkalotic  - Pt to undergo V/Q scan however cannot lay flat - plan for scan on 11/6 once more volume optimized, will discuss w/ pulm re: utility of exam given CT scan - Pt w/ elevated pulmonary filling pressures on swan at [facility]   - Last values 10/26 CVP 9, /31/54, PCWP 7, PA sat 61.9%, PVR 6.1 wilkinson, , /71/91, Tg CO/CI 7.7/3.0 on milrinone 0.250, FIO2 40%. CTPE 11/1 - no PE, but unchanged diffuse pulm mosaic attenuation may be CTEPH   - Thought to be due to prior PE (Group 4), OHS, MICHAEL (Group 3), autoimmune process. Less likely left-sided disease (Group 2)   - On Bumex 1 mg IVP BID  - Dr. Del Castillo from Pulmonology following and Pulm c/s team, appreciate recommendations             - V/Q scan, RHC, will need sildenafil held before RHC.  - Discussed w/ Cardiology on 11/2 re: RHC, recommended reaching out to interventionalist on call for study since pulmonology is directing case - was unable to reach. Pt previously seen by [facility] ([phone]). Will reach out to arrange RHC, otherwise will need to reach out to house interventionalist during the week for study.  - Pt likely will need more aggressive diuresis to lie flat for V/Q scan and ultimately RHC - will f/u w/ pulm team re: diuresis during the week

## 2023-11-05 NOTE — PROGRESS NOTE ADULT - PROBLEM SELECTOR PLAN 5
- Pt w/ metabolic alkalosis likely 2/2 overdiuresis in s/o bumex  - Bicarb 30's over last few days, pH 7.40  - CTM w/ daily VBGs - Pt w/ metabolic alkalosis likely 2/2 overdiuresis in s/o bumex  - Bicarb 30's over last few days, pH 7.40  - CTM w/ daily VBGs  - Pulmonary following, appreciate recommendations - daily reassessment for diamox administration

## 2023-11-05 NOTE — PROGRESS NOTE ADULT - PROBLEM SELECTOR PLAN 1
- Suspected multifactorial w/ primary insult due to volume overload w/ pHTN, cor pulmonale on existing prior PNA, hx of PEs, morbid obesity, asthma.   - s/p empiric Zosyn from 10/19-10/26 at [facility]  - Not intubated, on HFNC, now weaned to 4L NC with aggressive diuresis, Sildenafil  - Continue aggressive diuresis with Bumex, daily VBGs w/ Diamox if bicarb uptrending  - Continue Montelukast, Duonebs, Symbicort  - Now on 2L NC, continue to downtitrate O2 as tolerated - Suspected multifactorial w/ primary insult due to volume overload w/ pHTN, cor pulmonale on existing prior PNA, hx of PEs, morbid obesity, asthma.   - s/p empiric Zosyn from 10/19-10/26 at [facility]  - Not intubated, on HFNC, now weaned to NC with aggressive diuresis, wean O2 as tolerated  - Continue Sildenafil, will need to hold per Dr. Del Castillo prior to RHC  - Continue aggressive diuresis with Bumex, daily VBGs  - Continue Montelukast, Duonebs, Symbicort

## 2023-11-05 NOTE — PROGRESS NOTE ADULT - ATTENDING COMMENTS
38 year old female with a PMHx of morbid obesity, PE on eliquis (diagnosed in 2017, on eliquis at home), HFpEF, asthma, on PRN home O2, suspected SLE (being worked up by outpatient rheumatologist, on steroids x1 month prior to hospital presentation) who is transferred from the MICU to the general medicine floors on 10/31. She was originally admitted to [facility] on 10/18 with complaints of weakness, worsening LE edema, cough, and rapid weight gain.  Her evaluation there was also significant for atrial flutter, converted to normal sinus rhythm with amiodarone. CXR showed mild pulmonary edema and LLL consolidation. She was treated with a course of IV diuretics for presumed decompensated heart failure as well as empiric antibiotics for pneumonia. During her treatment course, she developed an SARAH. Lasix were held. She progressively became hypotensive, fluid resuscitation limited by her volume-overloaded status. She was transferred to the ICU at [facility] on 10/22. Ddx for hypotension included adrenal insufficiency in the setting of chronic outpatient steroids v cardiogenic shock and cor pulmonale. They started her on pressors, stress-dose steroids, and bumex drip. Marathon cath reading consistent with elevated pulmonary artery pressures. TTE also showing severely decreased RV systolic function and enlarged right ventricle. She was treated with inhaled nitric oxide and started on sildenafil. Course was further complicated by shock liver with AST and ALT up to 3000s+/2000+, improving.      The patient was transferred to [facility] on 10/30 for further management of pHTN. Her pressors were titrated down and she was titrated off of high flow and onto nasal canula O2 supplementation. She is now transferred to the [facility] for further care.      Overnight, the patient was unable to tolerate BiPAP d/t claustrophobia. Pt seen with house staff this morning. Pt feels her shortness of breath is improving, though she still experiences some orthopnea. C/o tremors in the bilateral upper extremities that started with initiation of steroids.     On exam, the patient is on 2L of NC, De-saturates to 89% on room air. No respiratory distress, crackles bilaterally. There is 2-3+ pitting edema in the lower extremities. RUE with nonspecific lacy rash in a discrete distribution, non blanching and ecchymoses.    #cor pulmonale    #hypoxic and hypercapenic respiratory failure    #metabolic acidosis    #SLE    #APLS    #steroid-induced hyperglycemia   #shock state s/p pressors (improved)- cardiogenic shock v adrenal insufficiency    #atrial flutter (s/p pharmacologic conversion to sinus rhythm with amiodarone)   #SARAH—improving    #shock liver—improving       - continue bumex 1mg IV BID. Strict I/O   - planned for right heart cath at some point this admission, can call cards consult  - CTA chest and consistent with chronic thromboembolic disease  - f/u VQ scan  - continue plaquenil for new diagnosis of SLE   - continue solumedrol 40mg IV daily, wean per pulm recs  - continue heparin gtt. Eventual plan to transition to warfarin for APLS if no inpt procedures planned  - pulm and rheumatology consulted, appreciate evaluation       Rest of plan as per resident note above.  D/W pt and parents at bedside    [facility] Division of Hospital Medicine  Nedra Bravo MD  Available on TEAMS 8a-8p .

## 2023-11-05 NOTE — PROGRESS NOTE ADULT - SUBJECTIVE AND OBJECTIVE BOX
***************************************************************  Rangel (Ji-Cheng) [facility] PGY2  Internal Medicine   ***************************************************************    MITCHEL GUNN  38y  MRN: [id number]    Patient is a 38y old  Female who presents with a chief complaint of SOB - cardiogenic shock and Pulm HTN (04 Nov 2023 07:10)      Subjective: no events ON. Denies fever, CP, SOB, abn pain, N/V, dysuria. Tolerating diet.      MEDICATIONS  (STANDING):  aMIOdarone    Tablet 200 milliGRAM(s) Oral daily  budesonide 160 MICROgram(s)/formoterol 4.5 MICROgram(s) Inhaler 2 Puff(s) Inhalation two times a day  buMETAnide Injectable 1 milliGRAM(s) IV Push every 12 hours  chlorhexidine 2% Cloths 1 Application(s) Topical <User Schedule>  dextrose 5%. 1000 milliLiter(s) (100 mL/Hr) IV Continuous <Continuous>  dextrose 5%. 1000 milliLiter(s) (50 mL/Hr) IV Continuous <Continuous>  dextrose 50% Injectable 25 Gram(s) IV Push once  dextrose 50% Injectable 12.5 Gram(s) IV Push once  dextrose 50% Injectable 25 Gram(s) IV Push once  glucagon  Injectable 1 milliGRAM(s) IntraMuscular once  heparin  Infusion. 1600 Unit(s)/Hr (16 mL/Hr) IV Continuous <Continuous>  hydroxychloroquine 200 milliGRAM(s) Oral two times a day  insulin glargine Injectable (LANTUS) 8 Unit(s) SubCutaneous at bedtime  insulin lispro (ADMELOG) corrective regimen sliding scale   SubCutaneous three times a day before meals  insulin lispro (ADMELOG) corrective regimen sliding scale   SubCutaneous at bedtime  insulin lispro Injectable (ADMELOG) 4 Unit(s) SubCutaneous three times a day before meals  methylPREDNISolone sodium succinate Injectable 40 milliGRAM(s) IV Push daily  montelukast 10 milliGRAM(s) Oral daily  mupirocin 2% Nasal 1 Application(s) Both Nostrils two times a day  PARoxetine 20 milliGRAM(s) Oral daily  polyethylene glycol 3350 17 Gram(s) Oral two times a day  senna 2 Tablet(s) Oral at bedtime  sildenafil (REVATIO) 20 milliGRAM(s) Oral three times a day    MEDICATIONS  (PRN):  acetaminophen     Tablet .. 650 milliGRAM(s) Oral every 6 hours PRN Temp greater or equal to 38C (100.4F), Mild Pain (1 - 3)  albuterol/ipratropium for Nebulization 3 milliLiter(s) Nebulizer every 6 hours PRN Shortness of Breath  bisacodyl Suppository 10 milliGRAM(s) Rectal daily PRN Constipation  dextrose Oral Gel 15 Gram(s) Oral once PRN Blood Glucose LESS THAN 70 milliGRAM(s)/deciliter  heparin   Injectable 59705 Unit(s) IV Push every 6 hours PRN For aPTT less than 40  heparin   Injectable 5000 Unit(s) IV Push every 6 hours PRN For aPTT between 40 - 57      Objective:    Vitals: Vital Signs Last 24 Hrs  T(C): 36.6 (11-05-23 @ 04:41), Max: 36.6 (11-05-23 @ 04:41)  T(F): 97.8 (11-05-23 @ 04:41), Max: 97.8 (11-05-23 @ 04:41)  HR: 83 (11-05-23 @ 04:41) (68 - 89)  BP: 125/66 (11-05-23 @ 04:41) (107/68 - 138/80)  BP(mean): --  RR: 18 (11-05-23 @ 04:41) (18 - 18)  SpO2: 96% (11-05-23 @ 04:41) (80% - 98%)            I&O's Summary    03 Nov 2023 07:01  -  04 Nov 2023 07:00  --------------------------------------------------------  IN: 1259 mL / OUT: 2700 mL / NET: -1441 mL    04 Nov 2023 08:01  -  05 Nov 2023 06:59  --------------------------------------------------------  IN: 473 mL / OUT: 2025 mL / NET: -1552 mL        PHYSICAL EXAM:  GENERAL: NAD  HEAD:  Atraumatic, Normocephalic  EYES: EOMI, conjunctiva and sclera clear  CHEST/LUNG: Clear to auscultation bilaterally; No rales, rhonchi, wheezing, or rubs  HEART: Regular rate and rhythm; No murmurs, rubs, or gallops  ABDOMEN: Soft, Nontender, Nondistended;   SKIN: No rashes or lesions  NERVOUS SYSTEM:  Alert & Oriented X3, no focal deficits    LABS:  11-04    144  |  96  |  66<H>  ----------------------------<  123<H>  5.0   |  41<H>  |  0.78  11-03    145  |  96  |  75<H>  ----------------------------<  144<H>  3.5   |  39<H>  |  0.95  11-02    143  |  93<L>  |  85<H>  ----------------------------<  278<H>  3.6   |  35<H>  |  1.03    Ca    9.3      04 Nov 2023 05:56  Ca    9.1      03 Nov 2023 06:12  Ca    8.8      02 Nov 2023 14:11  Phos  2.8     11-04  Mg     1.9     11-04    TPro  6.4  /  Alb  3.0<L>  /  TBili  1.7<H>  /  DBili  x   /  AST  34  /  ALT  67<H>  /  AlkPhos  67  11-04  TPro  6.3  /  Alb  3.0<L>  /  TBili  1.6<H>  /  DBili  x   /  AST  19  /  ALT  90<H>  /  AlkPhos  70  11-03      PTT - ( 04 Nov 2023 21:04 )  PTT:50.8 sec              Urinalysis Basic - ( 04 Nov 2023 05:56 )    Color: x / Appearance: x / SG: x / pH: x  Gluc: 123 mg/dL / Ketone: x  / Bili: x / Urobili: x   Blood: x / Protein: x / Nitrite: x   Leuk Esterase: x / RBC: x / WBC x   Sq Epi: x / Non Sq Epi: x / Bacteria: x                              10.4   5.01  )-----------( 176      ( 04 Nov 2023 06:00 )             37.2                         10.3   5.99  )-----------( 181      ( 03 Nov 2023 06:12 )             36.4     CAPILLARY BLOOD GLUCOSE      POCT Blood Glucose.: 189 mg/dL (04 Nov 2023 21:20)  POCT Blood Glucose.: 185 mg/dL (04 Nov 2023 17:14)  POCT Blood Glucose.: 198 mg/dL (04 Nov 2023 10:45)  POCT Blood Glucose.: 181 mg/dL (04 Nov 2023 07:35)      RADIOLOGY & ADDITIONAL TESTS:    Imaging Personally Reviewed:  [x ] YES  [ ] NO    Consultants involved in case:   Consultant(s) Notes Reviewed:  [ x] YES  [ ] NO:   Care Discussed with Consultants/Other Providers [x ] YES  [ ] NO         ***************************************************************  Juan Carlos vEan) [facility] PGY2  Internal Medicine   ***************************************************************    MITCHEL GUNN  38y  MRN: [id number]    Patient is a 38y old  Female who presents with a chief complaint of SOB - cardiogenic shock and Pulm HTN (04 Nov 2023 07:10)      Subjective: NAEO. Cannot lie flat without getting SOB after 5-10 seconds. Urinating well w/ purwick container full of urine. No other complaints.      MEDICATIONS  (STANDING):  aMIOdarone    Tablet 200 milliGRAM(s) Oral daily  budesonide 160 MICROgram(s)/formoterol 4.5 MICROgram(s) Inhaler 2 Puff(s) Inhalation two times a day  buMETAnide Injectable 1 milliGRAM(s) IV Push every 12 hours  chlorhexidine 2% Cloths 1 Application(s) Topical <User Schedule>  dextrose 5%. 1000 milliLiter(s) (100 mL/Hr) IV Continuous <Continuous>  dextrose 5%. 1000 milliLiter(s) (50 mL/Hr) IV Continuous <Continuous>  dextrose 50% Injectable 25 Gram(s) IV Push once  dextrose 50% Injectable 12.5 Gram(s) IV Push once  dextrose 50% Injectable 25 Gram(s) IV Push once  glucagon  Injectable 1 milliGRAM(s) IntraMuscular once  heparin  Infusion. 1600 Unit(s)/Hr (16 mL/Hr) IV Continuous <Continuous>  hydroxychloroquine 200 milliGRAM(s) Oral two times a day  insulin glargine Injectable (LANTUS) 8 Unit(s) SubCutaneous at bedtime  insulin lispro (ADMELOG) corrective regimen sliding scale   SubCutaneous three times a day before meals  insulin lispro (ADMELOG) corrective regimen sliding scale   SubCutaneous at bedtime  insulin lispro Injectable (ADMELOG) 4 Unit(s) SubCutaneous three times a day before meals  methylPREDNISolone sodium succinate Injectable 40 milliGRAM(s) IV Push daily  montelukast 10 milliGRAM(s) Oral daily  mupirocin 2% Nasal 1 Application(s) Both Nostrils two times a day  PARoxetine 20 milliGRAM(s) Oral daily  polyethylene glycol 3350 17 Gram(s) Oral two times a day  senna 2 Tablet(s) Oral at bedtime  sildenafil (REVATIO) 20 milliGRAM(s) Oral three times a day    MEDICATIONS  (PRN):  acetaminophen     Tablet .. 650 milliGRAM(s) Oral every 6 hours PRN Temp greater or equal to 38C (100.4F), Mild Pain (1 - 3)  albuterol/ipratropium for Nebulization 3 milliLiter(s) Nebulizer every 6 hours PRN Shortness of Breath  bisacodyl Suppository 10 milliGRAM(s) Rectal daily PRN Constipation  dextrose Oral Gel 15 Gram(s) Oral once PRN Blood Glucose LESS THAN 70 milliGRAM(s)/deciliter  heparin   Injectable 83866 Unit(s) IV Push every 6 hours PRN For aPTT less than 40  heparin   Injectable 5000 Unit(s) IV Push every 6 hours PRN For aPTT between 40 - 57      Objective:    Vitals: Vital Signs Last 24 Hrs  T(C): 36.6 (11-05-23 @ 04:41), Max: 36.6 (11-05-23 @ 04:41)  T(F): 97.8 (11-05-23 @ 04:41), Max: 97.8 (11-05-23 @ 04:41)  HR: 83 (11-05-23 @ 04:41) (68 - 89)  BP: 125/66 (11-05-23 @ 04:41) (107/68 - 138/80)  BP(mean): --  RR: 18 (11-05-23 @ 04:41) (18 - 18)  SpO2: 96% (11-05-23 @ 04:41) (80% - 98%)            I&O's Summary    03 Nov 2023 07:01  -  04 Nov 2023 07:00  --------------------------------------------------------  IN: 1259 mL / OUT: 2700 mL / NET: -1441 mL    04 Nov 2023 08:01  -  05 Nov 2023 06:59  --------------------------------------------------------  IN: 473 mL / OUT: 2025 mL / NET: -1552 mL        PHYSICAL EXAM:  GENERAL: NAD  HEAD:  Atraumatic, Normocephalic  EYES: EOMI, conjunctiva and sclera clear  CHEST/LUNG: Clear to auscultation bilaterally; No rales, rhonchi, wheezing, or rubs  HEART: Regular rate and rhythm; No murmurs, rubs, or gallops  ABDOMEN: Soft, Nontender, Nondistended;   SKIN: No rashes or lesions. Trace edema on LLE, 1+ to mid-shins on RLE  NERVOUS SYSTEM:  Alert & Oriented X3, no focal deficits    LABS:  11-04    144  |  96  |  66<H>  ----------------------------<  123<H>  5.0   |  41<H>  |  0.78  11-03    145  |  96  |  75<H>  ----------------------------<  144<H>  3.5   |  39<H>  |  0.95  11-02    143  |  93<L>  |  85<H>  ----------------------------<  278<H>  3.6   |  35<H>  |  1.03    Ca    9.3      04 Nov 2023 05:56  Ca    9.1      03 Nov 2023 06:12  Ca    8.8      02 Nov 2023 14:11  Phos  2.8     11-04  Mg     1.9     11-04    TPro  6.4  /  Alb  3.0<L>  /  TBili  1.7<H>  /  DBili  x   /  AST  34  /  ALT  67<H>  /  AlkPhos  67  11-04  TPro  6.3  /  Alb  3.0<L>  /  TBili  1.6<H>  /  DBili  x   /  AST  19  /  ALT  90<H>  /  AlkPhos  70  11-03      PTT - ( 04 Nov 2023 21:04 )  PTT:50.8 sec              Urinalysis Basic - ( 04 Nov 2023 05:56 )    Color: x / Appearance: x / SG: x / pH: x  Gluc: 123 mg/dL / Ketone: x  / Bili: x / Urobili: x   Blood: x / Protein: x / Nitrite: x   Leuk Esterase: x / RBC: x / WBC x   Sq Epi: x / Non Sq Epi: x / Bacteria: x                              10.4   5.01  )-----------( 176      ( 04 Nov 2023 06:00 )             37.2                         10.3   5.99  )-----------( 181      ( 03 Nov 2023 06:12 )             36.4     CAPILLARY BLOOD GLUCOSE      POCT Blood Glucose.: 189 mg/dL (04 Nov 2023 21:20)  POCT Blood Glucose.: 185 mg/dL (04 Nov 2023 17:14)  POCT Blood Glucose.: 198 mg/dL (04 Nov 2023 10:45)  POCT Blood Glucose.: 181 mg/dL (04 Nov 2023 07:35)      RADIOLOGY & ADDITIONAL TESTS:    Imaging Personally Reviewed:  [x ] YES  [ ] NO    Consultants involved in case:   Consultant(s) Notes Reviewed:  [ x] YES  [ ] NO:   Care Discussed with Consultants/Other Providers [x ] YES  [ ] NO

## 2023-11-05 NOTE — PROGRESS NOTE ADULT - PROBLEM SELECTOR PLAN 4
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

## 2023-11-05 NOTE — PROGRESS NOTE ADULT - PROBLEM SELECTOR PLAN 8
Please consider ordering a Lipid Panel, last done in 2014.   BUN/Cr max 63.9/3.21 on 10/22 at [facility]. pt w/ prior SARAH likely in s/o sepsis and cardiogenic shock  - Cr downtrending -> improved back to baseline with Cr. 10.3  - CTM Cr and UOP

## 2023-11-05 NOTE — PROVIDER CONTACT NOTE (CRITICAL VALUE NOTIFICATION) - NAME OF MD/NP/PA/DO NOTIFIED:
Teams  MD Khadra Goss
Nick, Deana Resident
Terrell Vazquez Team 6 MD
[facility] Resident
Deana Romero MD
Markie Vazquez

## 2023-11-05 NOTE — PROGRESS NOTE ADULT - PROBLEM SELECTOR PLAN 3
- Pt treated w/ prednisone 20 mg for facial rash, photophobia, joint pains, c/f SLE w/ low C3C4, KATHIE 1:2560, dsDNA 671, Sm 2.1, Ro >8, La>8, RF 65, CRP 45, , negative RNP/CCP/ ACL/ ANCA/panda/[facility]/scl70 per rheum eval at [facility]  - f/u KATHIE, Anti-ribonuclear protein, dsDNA, Myomarker Panel 3, Scleroderma Antibodies, Sjogren's  - s/p stress dose hydrocortisone on 10/26, Solumedrol 1g 10/27-10/30, now on taper   - Rheum consulted, appreciate recommendations  - Continue Solumedrol 40 QD, plaquenil 200 mg BID  - Pt w/ RUE rash, per rheum recommending dermatology c/s as rash persistent despite steroid regimen, requested skin biopsy. Derm consulted. - Pt treated w/ prednisone 20 mg for facial rash, photophobia, joint pains, c/f SLE w/ low C3C4, KATHIE 1:2560, dsDNA 671, Sm 2.1, Ro >8, La>8, RF 65, CRP 45, , negative RNP/CCP/ ACL/ ANCA/panda/[facility]/scl70 per rheum eval at [facility]  - s/p stress dose hydrocortisone on 10/26, Solumedrol 1g 10/27-10/30, tapered now  - f/u KATHIE, Anti-ribonuclear protein, dsDNA, Myomarker Panel 3, Scleroderma Antibodies, Sjogren's  - Rheum consulted, appreciate recommendations                - Solumedrol 30 QD, plaquenil 200 mg BID                - Pt w/ RUE rash, per rheum recommending dermatology c/s as rash persistent despite steroid regimen, requested skin biopsy. Derm consulted, will see on 11/6

## 2023-11-05 NOTE — CONSULT NOTE ADULT - ASSESSMENT
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     At this time:   - For rash: defer biopsy given suspected diagnosis; biopsy unlikely to . Continue with management of SLE per rheumatology recommendations  - For scalp: start fluocinonide 0.05% solution to affected areas on scalp once daily. Use for 2 weeks at a time, 1 week break before resuming. Side effects of prolonged use include atrophy, skin thinning, steroid acne.    - Can follow-up with dermatology as an outpatient     Thank you for this consult. Case discussed, seen, and reviewed with attending dermatologist Dr. 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 Hair loss likely telogen effluvium, itch and scale likely related to seborrheic dermatitis.    At this time:   - For rash: defer biopsy given suspected diagnosis; biopsy unlikely to . Continue with management of SLE per rheumatology recommendations  - Given unilateral nature of rash and history of several VTEs, recommend RUE Doppler to r/o DVT as underlying etiology of unilateral livedo rash  - For itch on scalp: start fluocinonide 0.05% solution to affected areas on scalp once daily. Use for 2 weeks at a time, 1 week break before resuming. Side effects of prolonged use include atrophy, skin thinning, steroid acne.   - Can follow-up with dermatology as an outpatient     Thank you for this consult. Case discussed, seen, and reviewed with attending dermatologist Dr. Ayaan Aranda MD  Resident Physician, PGY-2   [facility] Dermatology   Pager: [phone]

## 2023-11-05 NOTE — CONSULT NOTE ADULT - SUBJECTIVE AND OBJECTIVE BOX
HPI:  37 y/o F with a h/o b/l DVTs, recurrent PE on eliquis, HFpEF, morbid obesity, asthma, chronic hyperkalemia, admitted to [place] on 10/18 with complaints of weakness, worsening LE edema, cough, and rapid weight gain.Of note, was being worked up as an outpatient for possible SLE given facial rash, photophobia, and joint pains and had been started on prednisone 20 mg by her PCP x ~1 month prior to arrival at [facility]. Outpatient  results from 1 week prior showing low C3/4 complement and elevated CRP. Pt was admitted to medicine for treatment of PNA. Hospital course complicated by progressive SARAH, HAGMA, transaminitis and acute hypoxemic respiratory failure secondary to cardiogenic shock in the face of severe pHTN with acute on chronic cor pulmonale.     Her pHTN was thought to be likely multifactorial due to PE/OHS/MICHAEL/autoimmune process. Patient cardiogenic shock and iniated on milrinone on 10/20. She had a [place] placement on 10/23 revealing elevated pulmonary pressures, and required levophed from 10/22-23 for pressure support, diuresis with bumex was also initiated on 10/22 and maintained, and is now s/p inshaled nitric oxide.. Patient shock state has improved. Currently on .125 of milrinone. Bumex DC'd with increasing cr. Patient persistently hypoxic, requiring HFNC. She was initiated on sildenafil on 10/26, now on 20 TID after conversation with  Dr. Del Castillo. . S/P emperic treatment for PNA with zosyn 10/19-26. She was placed on a heparin gtt due to c/f APLS    Presenting for a transfer from [facility]. At this time patient was taken off of milrinone.    Dermatology consulted for rash on R upper arm. The rash is asx. It has been coming and going for months, pt does not think rash flares i/s/o flare of her other SLE symptoms. Pt notes that rash is not bothersome to her, denies clear triggers. Has not been treated  Dermatology consulted to determine whether the rash is a cutaneous manifestation of SLE.     Pt also notes that over past few months she has noticed increased hair shedding and focal areas of hair loss on scalp; reports that provider who initially diagnosed SLE diagnosed with alopecia areata as well, no treatments have been tried. Denies itch, redness, or flaking of scalp.    PAST MEDICAL & SURGICAL HISTORY:  Pulmonary embolism  DVT, lower extremity  CHF (congestive heart failure)  Asthma  Anxiety  Severe obesity (BMI >= 40)  Hypertension  No significant past surgical history      REVIEW OF SYSTEMS  Negative except as noted above in HPI    MEDICATIONS  (STANDING):  aMIOdarone    Tablet 200 milliGRAM(s) Oral daily  budesonide 160 MICROgram(s)/formoterol 4.5 MICROgram(s) Inhaler 2 Puff(s) Inhalation two times a day  buMETAnide Injectable 1 milliGRAM(s) IV Push every 12 hours  chlorhexidine 2% Cloths 1 Application(s) Topical <User Schedule>  dextrose 5%. 1000 milliLiter(s) (100 mL/Hr) IV Continuous <Continuous>  dextrose 5%. 1000 milliLiter(s) (50 mL/Hr) IV Continuous <Continuous>  dextrose 50% Injectable 25 Gram(s) IV Push once  dextrose 50% Injectable 12.5 Gram(s) IV Push once  dextrose 50% Injectable 25 Gram(s) IV Push once  glucagon  Injectable 1 milliGRAM(s) IntraMuscular once  heparin  Infusion. 1600 Unit(s)/Hr (16 mL/Hr) IV Continuous <Continuous>  hydroxychloroquine 200 milliGRAM(s) Oral two times a day  insulin glargine Injectable (LANTUS) 8 Unit(s) SubCutaneous at bedtime  insulin lispro (ADMELOG) corrective regimen sliding scale   SubCutaneous three times a day before meals  insulin lispro (ADMELOG) corrective regimen sliding scale   SubCutaneous at bedtime  insulin lispro Injectable (ADMELOG) 4 Unit(s) SubCutaneous three times a day before meals  methylPREDNISolone sodium succinate Injectable 30 milliGRAM(s) IV Push daily  montelukast 10 milliGRAM(s) Oral daily  mupirocin 2% Nasal 1 Application(s) Both Nostrils two times a day  PARoxetine 20 milliGRAM(s) Oral daily  polyethylene glycol 3350 17 Gram(s) Oral two times a day  senna 2 Tablet(s) Oral at bedtime  sildenafil (REVATIO) 20 milliGRAM(s) Oral three times a day    MEDICATIONS  (PRN):  acetaminophen     Tablet .. 650 milliGRAM(s) Oral every 6 hours PRN Temp greater or equal to 38C (100.4F), Mild Pain (1 - 3)  albuterol/ipratropium for Nebulization 3 milliLiter(s) Nebulizer every 6 hours PRN Shortness of Breath  bisacodyl Suppository 10 milliGRAM(s) Rectal daily PRN Constipation  dextrose Oral Gel 15 Gram(s) Oral once PRN Blood Glucose LESS THAN 70 milliGRAM(s)/deciliter  heparin   Injectable 01191 Unit(s) IV Push every 6 hours PRN For aPTT less than 40  heparin   Injectable 5000 Unit(s) IV Push every 6 hours PRN For aPTT between 40 - 57      Allergies    ceftriaxone (Hives)  iodine (Hives)  shellfish (Hives)    Intolerances        SOCIAL HISTORY:    FAMILY HISTORY:  Family history of colon cancer (Mother)    Family history of stroke (Father)        Vital Signs Last 24 Hrs  T(C): 36.4 (05 Nov 2023 11:21), Max: 36.6 (05 Nov 2023 04:41)  T(F): 97.5 (05 Nov 2023 11:21), Max: 97.8 (05 Nov 2023 04:41)  HR: 81 (05 Nov 2023 13:15) (70 - 90)  BP: 109/72 (05 Nov 2023 13:15) (109/70 - 125/66)  BP(mean): --  RR: 18 (05 Nov 2023 11:21) (18 - 18)  SpO2: 95% (05 Nov 2023 11:21) (94% - 98%)    Parameters below as of 05 Nov 2023 11:21  Patient On (Oxygen Delivery Method): nasal cannula  O2 Flow (L/min): 2      PHYSICAL EXAM:   The patient was alert and oriented and in no apparent distress.  There was no visible lymphadenopathy.  Conjunctiva were non injected  There was no clubbing or edema of extremities.    Of note on skin exam:  Scattered patches of alopecia on scalp  R upper arm with blanching net-like erythema on R upper arm, scattered purple ecchymoses on R upper arm and R forearm  - no epidermal changes  - no rashes elsewhere    LABS:                        9.9    6.63  )-----------( 152      ( 05 Nov 2023 06:44 )             34.1     11-05    141  |  93<L>  |  68<H>  ----------------------------<  134<H>  3.4<L>   |  41<H>  |  0.93    Ca    9.3      05 Nov 2023 06:43  Phos  2.5     11-05  Mg     1.8     11-05    TPro  6.0  /  Alb  2.9<L>  /  TBili  1.6<H>  /  DBili  x   /  AST  19  /  ALT  52<H>  /  AlkPhos  67  11-05    PTT - ( 05 Nov 2023 14:13 )  PTT:100.1 sec  Urinalysis Basic - ( 05 Nov 2023 06:43 )    Color: x / Appearance: x / SG: x / pH: x  Gluc: 134 mg/dL / Ketone: x  / Bili: x / Urobili: x   Blood: x / Protein: x / Nitrite: x   Leuk Esterase: x / RBC: x / WBC x   Sq Epi: x / Non Sq Epi: x / Bacteria: x   HPI:  39 y/o F with a h/o b/l DVTs, recurrent PE on eliquis, HFpEF, morbid obesity, asthma, chronic hyperkalemia, admitted to [facility] on 10/18 with complaints of weakness, worsening LE edema, cough, and rapid weight gain.Of note, was being worked up as an outpatient for possible SLE given facial rash, photophobia, and joint pains and had been started on prednisone 20 mg by her PCP x ~1 month prior to arrival at [place]. Outpatient  results from 1 week prior showing low C3/4 complement and elevated CRP. Pt was admitted to medicine for treatment of PNA. Hospital course complicated by progressive SARAH, HAGMA, transaminitis and acute hypoxemic respiratory failure secondary to cardiogenic shock in the face of severe pHTN with acute on chronic cor pulmonale.     Her pHTN was thought to be likely multifactorial due to PE/OHS/MICHAEL/autoimmune process. Patient cardiogenic shock and iniated on milrinone on 10/20. She had a [place] placement on 10/23 revealing elevated pulmonary pressures, and required levophed from 10/22-23 for pressure support, diuresis with bumex was also initiated on 10/22 and maintained, and is now s/p inshaled nitric oxide.. Patient shock state has improved. Currently on .125 of milrinone. Bumex DC'd with increasing cr. Patient persistently hypoxic, requiring HFNC. She was initiated on sildenafil on 10/26, now on 20 TID after conversation with  Dr. Del Castillo. . S/P emperic treatment for PNA with zosyn 10/19-26. She was placed on a heparin gtt due to c/f APLS    Presenting for a transfer from [facility]. At this time patient was taken off of milrinone.    Dermatology consulted for rash on R upper arm. The rash is asx. It has been coming and going for months, pt does not think rash flares i/s/o flare of her other SLE symptoms. Pt notes that rash is not bothersome to her, denies clear triggers. Has not been treated  Dermatology consulted to determine whether the rash is a cutaneous manifestation of SLE.     Pt also notes that over past few months she has noticed increased hair shedding and focal areas of hair loss on scalp; reports that provider who initially diagnosed SLE diagnosed with alopecia areata as well, no treatments have been tried. Denies flaking of scalp; does have intermittent itch. Endorses losing hair in clumps, especially over past few weeks-months.     PAST MEDICAL & SURGICAL HISTORY:  Pulmonary embolism  DVT, lower extremity  CHF (congestive heart failure)  Asthma  Anxiety  Severe obesity (BMI >= 40)  Hypertension  No significant past surgical history      REVIEW OF SYSTEMS  Negative except as noted above in HPI    MEDICATIONS  (STANDING):  aMIOdarone    Tablet 200 milliGRAM(s) Oral daily  budesonide 160 MICROgram(s)/formoterol 4.5 MICROgram(s) Inhaler 2 Puff(s) Inhalation two times a day  buMETAnide Injectable 1 milliGRAM(s) IV Push every 12 hours  chlorhexidine 2% Cloths 1 Application(s) Topical <User Schedule>  dextrose 5%. 1000 milliLiter(s) (100 mL/Hr) IV Continuous <Continuous>  dextrose 5%. 1000 milliLiter(s) (50 mL/Hr) IV Continuous <Continuous>  dextrose 50% Injectable 25 Gram(s) IV Push once  dextrose 50% Injectable 12.5 Gram(s) IV Push once  dextrose 50% Injectable 25 Gram(s) IV Push once  glucagon  Injectable 1 milliGRAM(s) IntraMuscular once  heparin  Infusion. 1600 Unit(s)/Hr (16 mL/Hr) IV Continuous <Continuous>  hydroxychloroquine 200 milliGRAM(s) Oral two times a day  insulin glargine Injectable (LANTUS) 8 Unit(s) SubCutaneous at bedtime  insulin lispro (ADMELOG) corrective regimen sliding scale   SubCutaneous three times a day before meals  insulin lispro (ADMELOG) corrective regimen sliding scale   SubCutaneous at bedtime  insulin lispro Injectable (ADMELOG) 4 Unit(s) SubCutaneous three times a day before meals  methylPREDNISolone sodium succinate Injectable 30 milliGRAM(s) IV Push daily  montelukast 10 milliGRAM(s) Oral daily  mupirocin 2% Nasal 1 Application(s) Both Nostrils two times a day  PARoxetine 20 milliGRAM(s) Oral daily  polyethylene glycol 3350 17 Gram(s) Oral two times a day  senna 2 Tablet(s) Oral at bedtime  sildenafil (REVATIO) 20 milliGRAM(s) Oral three times a day    MEDICATIONS  (PRN):  acetaminophen     Tablet .. 650 milliGRAM(s) Oral every 6 hours PRN Temp greater or equal to 38C (100.4F), Mild Pain (1 - 3)  albuterol/ipratropium for Nebulization 3 milliLiter(s) Nebulizer every 6 hours PRN Shortness of Breath  bisacodyl Suppository 10 milliGRAM(s) Rectal daily PRN Constipation  dextrose Oral Gel 15 Gram(s) Oral once PRN Blood Glucose LESS THAN 70 milliGRAM(s)/deciliter  heparin   Injectable 57118 Unit(s) IV Push every 6 hours PRN For aPTT less than 40  heparin   Injectable 5000 Unit(s) IV Push every 6 hours PRN For aPTT between 40 - 57      Allergies    ceftriaxone (Hives)  iodine (Hives)  shellfish (Hives)    Intolerances        SOCIAL HISTORY:    FAMILY HISTORY:  Family history of colon cancer (Mother)    Family history of stroke (Father)        Vital Signs Last 24 Hrs  T(C): 36.4 (05 Nov 2023 11:21), Max: 36.6 (05 Nov 2023 04:41)  T(F): 97.5 (05 Nov 2023 11:21), Max: 97.8 (05 Nov 2023 04:41)  HR: 81 (05 Nov 2023 13:15) (70 - 90)  BP: 109/72 (05 Nov 2023 13:15) (109/70 - 125/66)  BP(mean): --  RR: 18 (05 Nov 2023 11:21) (18 - 18)  SpO2: 95% (05 Nov 2023 11:21) (94% - 98%)    Parameters below as of 05 Nov 2023 11:21  Patient On (Oxygen Delivery Method): nasal cannula  O2 Flow (L/min): 2      PHYSICAL EXAM:   The patient was alert and oriented and in no apparent distress.  There was no visible lymphadenopathy.  Conjunctiva were non injected  There was no clubbing or edema of extremities.    Of note on skin exam:  Positive hair pull test, large clump of hair loss when hair tie removed  Scattered white thick scale throughout scalp  R upper arm with blanching net-like erythema on R upper arm, scattered purple ecchymoses on R upper arm and R forearm  - no epidermal changes  - no rashes elsewhere    LABS:                        9.9    6.63  )-----------( 152      ( 05 Nov 2023 06:44 )             34.1     11-05    141  |  93<L>  |  68<H>  ----------------------------<  134<H>  3.4<L>   |  41<H>  |  0.93    Ca    9.3      05 Nov 2023 06:43  Phos  2.5     11-05  Mg     1.8     11-05    TPro  6.0  /  Alb  2.9<L>  /  TBili  1.6<H>  /  DBili  x   /  AST  19  /  ALT  52<H>  /  AlkPhos  67  11-05    PTT - ( 05 Nov 2023 14:13 )  PTT:100.1 sec  Urinalysis Basic - ( 05 Nov 2023 06:43 )    Color: x / Appearance: x / SG: x / pH: x  Gluc: 134 mg/dL / Ketone: x  / Bili: x / Urobili: x   Blood: x / Protein: x / Nitrite: x   Leuk Esterase: x / RBC: x / WBC x   Sq Epi: x / Non Sq Epi: x / Bacteria: x

## 2023-11-06 NOTE — PROGRESS NOTE ADULT - TIME BILLING
Medical management as above, reviewing chart and coordinating care with primary team/staff, as well as reviewing vitals, radiology, medication list, recent labs, and prior records.    Does not include teaching time.
chart and data review, clinical assessment, and coordination of care. This excludes any time spent on separate procedures or teaching.

## 2023-11-06 NOTE — CHART NOTE - NSCHARTNOTEFT_GEN_A_CORE
38 year old F PMH morbid obesity, dCHF, b/l DVT, PE on Eliquis outpatient, asthma, anxiety originally transferred from [facility] for cardiogenic shock, further evaluation of pulmonary hypertension. Transferred back to MICU this afternoon post RRT for hypotension. Patient rapidly deteriorating in severe shock state. Intubated, Flolan/Nitric Oxide for pulmonary hypertension. Max dose vasopressors--Levophed 5mcg, Phenylephrine 8.5mcg, Vaso 0.1 units, Epi 2mcg along with NaHCO3 gtt and D10 for persistent hypoglycemia for support.  Hyperkalemia managed with high dose albuterol, insulin/d50, and lokelma. Despite above aggressive measures, patient persistently acidemic, hypotensive with MAP in 50s, hyperkalemic with severe RV dsyfxn on bedside POCUS. Shock team called for possible ECMO/circulatory support. Not a candidate at this time. Nephrology consulted for CRRT, however, patient with hemodynamic instability and rapidly escalating pressor requirements.  Per nephrology discussion with family, currently would not want dialysis however if patient improves/stabilizes further, they would be ok with starting dialysis (CRRT).       Goals of care discussion held at bedside with Dr. Fishman, myself, patient' s brother/HCP, and patient's parents.  Would like to continue medical management, but want patient to be DNR at this time. Emotional support provided. Family  currently at bedside.    Naomy Waters NP

## 2023-11-06 NOTE — PROGRESS NOTE ADULT - SUBJECTIVE AND OBJECTIVE BOX
INTERVAL HPI/OVERNIGHT EVENTS:  Pt seen and examined at bedside.   Pt vomited overnight so bipap removed. Also given zofran and metoclopramide.  Pt also complaining of left hip pain, R  hip XR ordered and administered IV tylenol, oxy 5 and then morphine 2 mg.      VITAL SIGNS:  T(F): 97.8 (11-06-23 @ 04:18)  HR: 84 (11-06-23 @ 04:40)  BP: 114/69 (11-06-23 @ 04:18)  RR: 18 (11-06-23 @ 04:18)  SpO2: 96% (11-06-23 @ 04:40)  Wt(kg): --    PHYSICAL EXAM:    GENERAL: NAD  HEAD:  Atraumatic, Normocephalic  EYES: EOMI, conjunctiva and sclera clear  CHEST/LUNG: Clear to auscultation bilaterally; No rales, rhonchi, wheezing, or rubs  HEART: Regular rate and rhythm; No murmurs, rubs, or gallops  ABDOMEN: Soft, Nontender, Nondistended;   SKIN: No rashes or lesions. Trace edema on LLE, 1+ to mid-shins on RLE  NERVOUS SYSTEM:  Alert & Oriented X3, no focal deficits    MEDICATIONS  (STANDING):  aMIOdarone    Tablet 200 milliGRAM(s) Oral daily  budesonide 160 MICROgram(s)/formoterol 4.5 MICROgram(s) Inhaler 2 Puff(s) Inhalation two times a day  buMETAnide Injectable 1 milliGRAM(s) IV Push every 12 hours  chlorhexidine 2% Cloths 1 Application(s) Topical <User Schedule>  dextrose 5%. 1000 milliLiter(s) (100 mL/Hr) IV Continuous <Continuous>  dextrose 5%. 1000 milliLiter(s) (50 mL/Hr) IV Continuous <Continuous>  dextrose 50% Injectable 25 Gram(s) IV Push once  dextrose 50% Injectable 12.5 Gram(s) IV Push once  dextrose 50% Injectable 25 Gram(s) IV Push once  fluocinonide 0.05% Solution 1 Application(s) Topical daily  glucagon  Injectable 1 milliGRAM(s) IntraMuscular once  heparin  Infusion. 1600 Unit(s)/Hr (16 mL/Hr) IV Continuous <Continuous>  hydroxychloroquine 200 milliGRAM(s) Oral two times a day  insulin glargine Injectable (LANTUS) 8 Unit(s) SubCutaneous at bedtime  insulin lispro (ADMELOG) corrective regimen sliding scale   SubCutaneous three times a day before meals  insulin lispro (ADMELOG) corrective regimen sliding scale   SubCutaneous at bedtime  insulin lispro Injectable (ADMELOG) 4 Unit(s) SubCutaneous three times a day before meals  methylPREDNISolone sodium succinate Injectable 30 milliGRAM(s) IV Push daily  montelukast 10 milliGRAM(s) Oral daily  mupirocin 2% Nasal 1 Application(s) Both Nostrils two times a day  PARoxetine 20 milliGRAM(s) Oral daily  polyethylene glycol 3350 17 Gram(s) Oral two times a day  senna 2 Tablet(s) Oral at bedtime  sildenafil (REVATIO) 20 milliGRAM(s) Oral three times a day    MEDICATIONS  (PRN):  acetaminophen     Tablet .. 650 milliGRAM(s) Oral every 6 hours PRN Temp greater or equal to 38C (100.4F), Mild Pain (1 - 3)  albuterol/ipratropium for Nebulization 3 milliLiter(s) Nebulizer every 6 hours PRN Shortness of Breath  bisacodyl Suppository 10 milliGRAM(s) Rectal daily PRN Constipation  dextrose Oral Gel 15 Gram(s) Oral once PRN Blood Glucose LESS THAN 70 milliGRAM(s)/deciliter  heparin   Injectable 44450 Unit(s) IV Push every 6 hours PRN For aPTT less than 40  heparin   Injectable 5000 Unit(s) IV Push every 6 hours PRN For aPTT between 40 - 57      Allergies    ceftriaxone (Hives)  iodine (Hives)  shellfish (Hives)    Intolerances        LABS:                        8.2    17.27 )-----------( 236      ( 06 Nov 2023 06:12 )             27.4     11-05    141  |  93<L>  |  68<H>  ----------------------------<  134<H>  3.4<L>   |  41<H>  |  0.93    Ca    9.3      05 Nov 2023 06:43  Phos  2.5     11-05  Mg     1.8     11-05    TPro  6.0  /  Alb  2.9<L>  /  TBili  1.6<H>  /  DBili  x   /  AST  19  /  ALT  52<H>  /  AlkPhos  67  11-05    PTT - ( 06 Nov 2023 06:12 )  PTT:59.7 sec  Urinalysis Basic - ( 05 Nov 2023 06:43 )    Color: x / Appearance: x / SG: x / pH: x  Gluc: 134 mg/dL / Ketone: x  / Bili: x / Urobili: x   Blood: x / Protein: x / Nitrite: x   Leuk Esterase: x / RBC: x / WBC x   Sq Epi: x / Non Sq Epi: x / Bacteria: x        RADIOLOGY & ADDITIONAL TESTS:  Reviewed      ******************  Authored By: Aby Weathers MD PGY2  Internal Medicine  Pager: [phone] [facility]// [phone] [facility]  MS Teams Preferred  ******************   INTERVAL HPI/OVERNIGHT EVENTS:  Pt seen and examined at bedside.   Pt vomited overnight so bipap removed. Also given zofran and metoclopramide.  Pt also complaining of left hip pain, R  hip XR ordered and administered IV tylenol, oxy 5 and then morphine 2 mg.    Patient lethargic and complaining of feeling unwell during examination.     VITAL SIGNS:  T(F): 97.8 (11-06-23 @ 04:18)  HR: 84 (11-06-23 @ 04:40)  BP: 114/69 (11-06-23 @ 04:18)  RR: 18 (11-06-23 @ 04:18)  SpO2: 96% (11-06-23 @ 04:40)  Wt(kg): --    PHYSICAL EXAM:    GENERAL: lethargic, diaphoretic  HEAD:  Atraumatic, Normocephalic  EYES: EOMI, conjunctiva and sclera clear  CHEST/LUNG: Clear to auscultation bilaterally; No rales, rhonchi, wheezing, or rubs  HEART: Regular rate and rhythm; No murmurs, rubs, or gallops  ABDOMEN: Soft, Nontender, Nondistended;   SKIN: No rashes or lesions. Trace edema on LLE, 1+ to mid-shins on RLE  NERVOUS SYSTEM:  Alert & Oriented X3, no focal deficits    MEDICATIONS  (STANDING):  aMIOdarone    Tablet 200 milliGRAM(s) Oral daily  budesonide 160 MICROgram(s)/formoterol 4.5 MICROgram(s) Inhaler 2 Puff(s) Inhalation two times a day  buMETAnide Injectable 1 milliGRAM(s) IV Push every 12 hours  chlorhexidine 2% Cloths 1 Application(s) Topical <User Schedule>  dextrose 5%. 1000 milliLiter(s) (100 mL/Hr) IV Continuous <Continuous>  dextrose 5%. 1000 milliLiter(s) (50 mL/Hr) IV Continuous <Continuous>  dextrose 50% Injectable 25 Gram(s) IV Push once  dextrose 50% Injectable 12.5 Gram(s) IV Push once  dextrose 50% Injectable 25 Gram(s) IV Push once  fluocinonide 0.05% Solution 1 Application(s) Topical daily  glucagon  Injectable 1 milliGRAM(s) IntraMuscular once  heparin  Infusion. 1600 Unit(s)/Hr (16 mL/Hr) IV Continuous <Continuous>  hydroxychloroquine 200 milliGRAM(s) Oral two times a day  insulin glargine Injectable (LANTUS) 8 Unit(s) SubCutaneous at bedtime  insulin lispro (ADMELOG) corrective regimen sliding scale   SubCutaneous three times a day before meals  insulin lispro (ADMELOG) corrective regimen sliding scale   SubCutaneous at bedtime  insulin lispro Injectable (ADMELOG) 4 Unit(s) SubCutaneous three times a day before meals  methylPREDNISolone sodium succinate Injectable 30 milliGRAM(s) IV Push daily  montelukast 10 milliGRAM(s) Oral daily  mupirocin 2% Nasal 1 Application(s) Both Nostrils two times a day  PARoxetine 20 milliGRAM(s) Oral daily  polyethylene glycol 3350 17 Gram(s) Oral two times a day  senna 2 Tablet(s) Oral at bedtime  sildenafil (REVATIO) 20 milliGRAM(s) Oral three times a day    MEDICATIONS  (PRN):  acetaminophen     Tablet .. 650 milliGRAM(s) Oral every 6 hours PRN Temp greater or equal to 38C (100.4F), Mild Pain (1 - 3)  albuterol/ipratropium for Nebulization 3 milliLiter(s) Nebulizer every 6 hours PRN Shortness of Breath  bisacodyl Suppository 10 milliGRAM(s) Rectal daily PRN Constipation  dextrose Oral Gel 15 Gram(s) Oral once PRN Blood Glucose LESS THAN 70 milliGRAM(s)/deciliter  heparin   Injectable 18959 Unit(s) IV Push every 6 hours PRN For aPTT less than 40  heparin   Injectable 5000 Unit(s) IV Push every 6 hours PRN For aPTT between 40 - 57      Allergies    ceftriaxone (Hives)  iodine (Hives)  shellfish (Hives)    Intolerances        LABS:                        8.2    17.27 )-----------( 236      ( 06 Nov 2023 06:12 )             27.4     11-05    141  |  93<L>  |  68<H>  ----------------------------<  134<H>  3.4<L>   |  41<H>  |  0.93    Ca    9.3      05 Nov 2023 06:43  Phos  2.5     11-05  Mg     1.8     11-05    TPro  6.0  /  Alb  2.9<L>  /  TBili  1.6<H>  /  DBili  x   /  AST  19  /  ALT  52<H>  /  AlkPhos  67  11-05    PTT - ( 06 Nov 2023 06:12 )  PTT:59.7 sec  Urinalysis Basic - ( 05 Nov 2023 06:43 )    Color: x / Appearance: x / SG: x / pH: x  Gluc: 134 mg/dL / Ketone: x  / Bili: x / Urobili: x   Blood: x / Protein: x / Nitrite: x   Leuk Esterase: x / RBC: x / WBC x   Sq Epi: x / Non Sq Epi: x / Bacteria: x        RADIOLOGY & ADDITIONAL TESTS:  Reviewed      ******************  Authored By: Aby Weathers MD PGY2  Internal Medicine  Pager: [phone] [facility]// [phone] [facility]  MS Teams Preferred  ******************

## 2023-11-06 NOTE — PROGRESS NOTE ADULT - PROBLEM SELECTOR PLAN 3
- Pt treated w/ prednisone 20 mg for facial rash, photophobia, joint pains, c/f SLE w/ low C3C4, KATHIE 1:2560, dsDNA 671, Sm 2.1, Ro >8, La>8, RF 65, CRP 45, , negative RNP/CCP/ ACL/ ANCA/panda/[facility]/scl70 per rheum eval at [facility]  - s/p stress dose hydrocortisone on 10/26, Solumedrol 1g 10/27-10/30, tapered now  - f/u KATHIE, Anti-ribonuclear protein, dsDNA, Myomarker Panel 3, Scleroderma Antibodies, Sjogren's  - Rheum consulted, appreciate recommendations                - Solumedrol 30 QD, plaquenil 200 mg BID                - Pt w/ RUE rash, per rheum recommending dermatology c/s as rash persistent despite steroid regimen, requested skin biopsy. Derm consulted, will see on 11/6

## 2023-11-06 NOTE — CONSULT NOTE ADULT - PROBLEM SELECTOR RECOMMENDATION 9
Pt with acute renal failure likely from ATN in setting of septic shock/hemorrhagic shock. Pt with SCr of 0.84 on admission to [facility] on 10/18, increased to 3.21 on 10/22, improved to 0.78 on 11/4, now increased to 1.74 today. Intubated and on 4 pressors currently, with NO and epoprostenol infusions but MAPs still ~68.    Spoke to family, currently would not want dialysis however if patient improves/stabilizes further, they would be ok with starting dialysis (CRRT). we discussed at length the procedure and indications. Would recommend getting a CT surgery consult for ECMO.

## 2023-11-06 NOTE — PROGRESS NOTE ADULT - PROBLEM SELECTOR PLAN 1
- Suspected multifactorial w/ primary insult due to volume overload w/ pHTN, cor pulmonale on existing prior PNA, hx of PEs, morbid obesity, asthma.   - s/p empiric Zosyn from 10/19-10/26 at [facility]  - Not intubated, on HFNC, now weaned to NC with aggressive diuresis, wean O2 as tolerated  - Continue Sildenafil, will need to hold per Dr. Del Castillo prior to RHC  - Continue aggressive diuresis with Bumex, daily VBGs  - Continue Montelukast, Duonebs, Symbicort

## 2023-11-06 NOTE — PROGRESS NOTE ADULT - SUBJECTIVE AND OBJECTIVE BOX
PULMONARY PROGRESS NOTE    PATIENT INFORMATION:  NAME: MITCHEL GUNN:  MRN: [id number]    CHIEF COMPLAINT: Patient is a 38y old  Female who presents with a chief complaint of SOB - cardiogenic shock and Pulm HTN (06 Nov 2023 12:39)      [x] INITIAL CONSULT, H&P, FAMILY HISTORY and PAST MEDICAL AND SURGICAL HISTORY REVIEWED    OVERNIGHT EVENTS, CHANGES TO HPI, SUBJECTIVE:   Patient hypotensive this morning.  RRT called.  On exam patient is somnolent but rousable, team unable to obtain blood pressure reading.  Pulses present MICU team consulted, RRT team to start pressors and transferred to ICU    ========================REVIEW OF SYSTEMS========================  [x] ROS negative except as per HPI    ========================MEDICATIONS=============================  MEDICATIONS  (STANDING):  acetaminophen   IVPB .. 1000 milliGRAM(s) IV Intermittent once  aMIOdarone    Tablet 200 milliGRAM(s) Oral daily  budesonide 160 MICROgram(s)/formoterol 4.5 MICROgram(s) Inhaler 2 Puff(s) Inhalation two times a day  calcium gluconate IVPB 2 Gram(s) IV Intermittent once  chlorhexidine 2% Cloths 1 Application(s) Topical <User Schedule>  dextrose 5%. 1000 milliLiter(s) (50 mL/Hr) IV Continuous <Continuous>  dextrose 5%. 1000 milliLiter(s) (100 mL/Hr) IV Continuous <Continuous>  dextrose 50% Injectable 25 Gram(s) IV Push once  dextrose 50% Injectable 12.5 Gram(s) IV Push once  dextrose 50% Injectable 25 Gram(s) IV Push once  dextrose 50% Injectable 50 milliLiter(s) IV Push once  EPINEPHrine    Infusion 0.3 MICROgram(s)/kG/Min (86.1 mL/Hr) IV Continuous <Continuous>  epoprostenol Infusion 1 NANOGram(s)/kG/Min (1.84 mL/Hr) IV Continuous <Continuous>  etomidate Injectable 20 milliGRAM(s) IV Push once  fentaNYL    Injectable 100 MICROGram(s) IV Push once  fentaNYL    Injectable 100 MICROGram(s) IV Push once  fentaNYL    Injectable 100 MICROGram(s) IV Push once  fentaNYL   Infusion... 0.5 MICROgram(s)/kG/Hr (3.83 mL/Hr) IV Continuous <Continuous>  fluocinonide 0.05% Solution 1 Application(s) Topical daily  glucagon  Injectable 1 milliGRAM(s) IntraMuscular once  hydrocortisone sodium succinate Injectable 50 milliGRAM(s) IV Push every 6 hours  hydroxychloroquine 200 milliGRAM(s) Oral two times a day  insulin glargine Injectable (LANTUS) 8 Unit(s) SubCutaneous at bedtime  insulin lispro (ADMELOG) corrective regimen sliding scale   SubCutaneous three times a day before meals  insulin lispro (ADMELOG) corrective regimen sliding scale   SubCutaneous at bedtime  insulin lispro Injectable (ADMELOG) 4 Unit(s) SubCutaneous three times a day before meals  insulin regular  human recombinant. 10 Unit(s) IV Push once  insulin regular  human recombinant. 10 Unit(s) IV Push once  midazolam Injectable 4 milliGRAM(s) IV Push once  montelukast 10 milliGRAM(s) Oral daily  mupirocin 2% Nasal 1 Application(s) Both Nostrils two times a day  norepinephrine Infusion 1 MICROgram(s)/kG/Min (143 mL/Hr) IV Continuous <Continuous>  pantoprazole  Injectable 40 milliGRAM(s) IV Push every 12 hours  PARoxetine 20 milliGRAM(s) Oral daily  phenylephrine    Infusion 2 MICROgram(s)/kG/Min (57.4 mL/Hr) IV Continuous <Continuous>  piperacillin/tazobactam IVPB.- 3.375 Gram(s) IV Intermittent once  piperacillin/tazobactam IVPB.. 3.375 Gram(s) IV Intermittent every 8 hours  polyethylene glycol 3350 17 Gram(s) Oral two times a day  senna 2 Tablet(s) Oral at bedtime  sildenafil (REVATIO) 20 milliGRAM(s) Oral three times a day  sodium bicarbonate  Infusion 0.098 mEq/kG/Hr (100 mL/Hr) IV Continuous <Continuous>  sodium bicarbonate  Injectable 50 milliEquivalent(s) IV Push once  sodium bicarbonate  Injectable 50 milliEquivalent(s) IV Push once  sodium bicarbonate  Injectable 50 milliEquivalent(s) IV Push once  sodium bicarbonate  Injectable 50 milliEquivalent(s) IV Push once  sodium bicarbonate  Injectable 50 milliEquivalent(s) IV Push once  sodium zirconium cyclosilicate 10 Gram(s) Oral two times a day  vasopressin Infusion 0.1 Unit(s)/Min (15 mL/Hr) IV Continuous <Continuous>      MEDICATIONS  (PRN):  acetaminophen     Tablet .. 650 milliGRAM(s) Oral every 6 hours PRN Temp greater or equal to 38C (100.4F), Mild Pain (1 - 3)  albuterol/ipratropium for Nebulization 3 milliLiter(s) Nebulizer every 6 hours PRN Shortness of Breath  bisacodyl Suppository 10 milliGRAM(s) Rectal daily PRN Constipation  dextrose Oral Gel 15 Gram(s) Oral once PRN Blood Glucose LESS THAN 70 milliGRAM(s)/deciliter  fentaNYL    Injectable 50 MICROGram(s) IV Push every 3 hours PRN Moderate Pain (4 - 6)  ondansetron Injectable 4 milliGRAM(s) IV Push every 8 hours PRN Nausea and/or Vomiting      ========================PHYSICAL EXAM============================    VITALS: ICU Vital Signs Last 24 Hrs  T(C): 36.5 (06 Nov 2023 08:20), Max: 36.9 (05 Nov 2023 20:37)  T(F): 97.7 (06 Nov 2023 08:20), Max: 98.4 (05 Nov 2023 20:37)  HR: 143 (06 Nov 2023 13:23) (84 - 143)  BP: 93/51 (06 Nov 2023 12:45) (79/64 - 116/71)  BP(mean): 60 (06 Nov 2023 12:45) (60 - 60)  ABP: --  ABP(mean): --  RR: 25 (06 Nov 2023 13:00) (18 - 38)  SpO2: 97% (06 Nov 2023 13:23) (96% - 100%)    O2 Parameters below as of 06 Nov 2023 08:20  Patient On (Oxygen Delivery Method): nasal cannula  O2 Flow (L/min): 2          INTAKE and OUTPUT: I&O's Summary    05 Nov 2023 07:01  -  06 Nov 2023 07:00  --------------------------------------------------------  IN: 580 mL / OUT: 620 mL / NET: -40 mL    06 Nov 2023 07:01  -  06 Nov 2023 17:26  --------------------------------------------------------  IN: 107.4 mL / OUT: 0 mL / NET: 107.4 mL        VENTILATOR SETTINGS: Mode: AC/ CMV (Assist Control/ Continuous Mandatory Ventilation)  RR (machine): 12  TV (machine): 360  FiO2: 100  PEEP: 5  ITime: 1  MAP: 7  PIP: 14      Physical Exam   full exam not performed as patient was in extremis and rapid response team working on patient.  However patient was overall somnolent but would open eyes and answer questions.  She has an erythematous lacy rash on right arm.  Visibly still with significant lower extremity edema.  Scattered ecchymoses    ========================LABORATORY RESULTS AND IMAGING=============                        5.3    39.49 )-----------( 192      ( 06 Nov 2023 15:49 )             19.4                                                    11-06    146<H>  |  89<L>  |  75<H>  ----------------------------<  124<H>  7.0<HH>   |  16<L>  |  1.68<H>    Ca    8.4      06 Nov 2023 15:49  Phos  13.1     11-06  Mg     2.5     11-06    TPro  4.0<L>  /  Alb  2.0<L>  /  TBili  1.9<H>  /  DBili  x   /  AST  483<H>  /  ALT  506<H>  /  AlkPhos  71  11-06      ABG - ( 06 Nov 2023 16:45 )  pH, Arterial: 7.08  pH, Blood: x     /  pCO2: 57    /  pO2: 355   / HCO3: 17    / Base Excess: -11.9 /  SaO2: 99.9                Creatinine Trend: 1.68<--, 1.63<--, 0.93<--, 0.78<--, 0.95<--, 1.03<--    [x] RADIOLOGY REVIEWED AND INTERPRETED BY ME

## 2023-11-06 NOTE — CONSULT NOTE ADULT - PROBLEM SELECTOR RECOMMENDATION 3
HAGMA in setting of renal failure and high lactic acidosis. Currently on sodium bicarb infusion and receiving bicarb pushes. Family wants to hold off on dialysis at this time given low BP despite 4 pressors. Cont with bicarb pushes and monitor BMP and VBG. CT surgery consult for ECMO.     Discussed with primary team.     If you have any questions, please feel free to contact me  Isidoro Ratliff  Nephrology Fellow  [phone]; Prefer [employer] TEAMS  (After 5pm or on weekends please page the on-call fellow).

## 2023-11-06 NOTE — PROGRESS NOTE ADULT - PROBLEM SELECTOR PLAN 11
DVT PPx: Heparin Drip for full AC  Diet: low Na and Carb consistent diet  Bowel Regimen: Miralax BID, Senna  Code: Full  Dispo: [facility]   Pharmacy:  PCP:   Communication:

## 2023-11-06 NOTE — CONSULT NOTE ADULT - ASSESSMENT
38 y.o F w/ h/o b/l DVTs, recurrent PE on eliquis, HFpEF, was admitted to [facility] on 10/18 for weakness, LE edema, cough, and rapid weight gain now intubated, on pressors and with acute renal failure.

## 2023-11-06 NOTE — PROGRESS NOTE ADULT - ATTENDING COMMENTS
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 RRT called this AM for shock and new leukocytosis.    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   #SLE  #APLS  - Agree with empiric abx and septic workup  - Agree with stress dose steroids x1 given recent high dose steroids  - May need vasopressor assisted diuresis  - Needs eventual V/Q scan and RHC for further elucidation of pHTN  - Needs PFTs and sleep study as outpatient for full workup of pulmonary hypertension  - c/w full AC  -c/w Rosalind Justice MD  Pulmonary & Critical Care

## 2023-11-06 NOTE — CHART NOTE - NSCHARTNOTEFT_GEN_A_CORE
Called to assess pt for vomiting and R hip pain. Assessed pt at bedside. Pt AOx3, stated she feels she vomited due to something she had eaten. Pt w/ non-bloody non-bilious vomiting, stating the vomit looked like her dinner. Pt stated she started having R hip pain earlier during the day after re-positioning herself on the bed. Pt denies trauma to her hip including fall or hitting her hip against the bed rail. Pt tenderness to palpation of the right hip. Pt able to lift RLE w/ significant amount of pain. Ordered R hip x-ray and IV tylenol. Ordered EKG prior to ordering IV Zofran.

## 2023-11-06 NOTE — PROGRESS NOTE ADULT - SUBJECTIVE AND OBJECTIVE BOX
INTERVAL HPI/OVERNIGHT EVENTS:  She is feeling better, still on IV diuresis. No joint pain, rash on face has cleared. Ongoing right upper extremity rash.    MEDICATIONS  (STANDING):  aMIOdarone    Tablet 200 milliGRAM(s) Oral daily  budesonide 160 MICROgram(s)/formoterol 4.5 MICROgram(s) Inhaler 2 Puff(s) Inhalation two times a day  buMETAnide Injectable 1 milliGRAM(s) IV Push every 12 hours  chlorhexidine 2% Cloths 1 Application(s) Topical <User Schedule>  glucagon  Injectable 1 milliGRAM(s) IntraMuscular once  heparin  Infusion. 1600 Unit(s)/Hr (16 mL/Hr) IV Continuous <Continuous>  hydroxychloroquine 200 milliGRAM(s) Oral two times a day  insulin glargine Injectable (LANTUS) 8 Unit(s) SubCutaneous at bedtime  insulin lispro (ADMELOG) corrective regimen sliding scale   SubCutaneous three times a day before meals  insulin lispro (ADMELOG) corrective regimen sliding scale   SubCutaneous at bedtime  insulin lispro Injectable (ADMELOG) 4 Unit(s) SubCutaneous three times a day before meals  methylPREDNISolone sodium succinate Injectable 40 milliGRAM(s) IV Push daily  montelukast 10 milliGRAM(s) Oral daily  mupirocin 2% Nasal 1 Application(s) Both Nostrils two times a day  PARoxetine 20 milliGRAM(s) Oral daily  polyethylene glycol 3350 17 Gram(s) Oral two times a day  senna 2 Tablet(s) Oral at bedtime  sildenafil (REVATIO) 20 milliGRAM(s) Oral three times a day    MEDICATIONS  (PRN):  albuterol/ipratropium for Nebulization 3 milliLiter(s) Nebulizer every 6 hours PRN Shortness of Breath  bisacodyl Suppository 10 milliGRAM(s) Rectal daily PRN Constipation  dextrose Oral Gel 15 Gram(s) Oral once PRN Blood Glucose LESS THAN 70 milliGRAM(s)/deciliter        Vital Signs Last 24 Hrs  T(C): 36.6 (03 Nov 2023 11:16), Max: 36.7 (03 Nov 2023 04:00)  T(F): 97.8 (03 Nov 2023 11:16), Max: 98 (03 Nov 2023 04:00)  HR: 72 (03 Nov 2023 16:51) (72 - 88)  BP: 110/60 (03 Nov 2023 16:51) (100/60 - 116/69)  BP(mean): --  RR: 18 (03 Nov 2023 11:16) (18 - 18)  SpO2: 97% (03 Nov 2023 16:41) (92% - 98%)    Parameters below as of 03 Nov 2023 11:16  Patient On (Oxygen Delivery Method): nasal cannula  O2 Flow (L/min): 2      PHYSICAL EXAMINATION:  Constitutional: nad, morbidly obese  HEENT: no oral ulcers  Pulmonary: reduced lung volumes  Cardio: rrr  Musculoskeletal: no synovitis  Skin: livedoid scaly rash on right upper extremity  Psychiatric: aaox3    LABS:                        10.3   5.99  )-----------( 181      ( 03 Nov 2023 06:12 )             36.4     11-03    145  |  96  |  75<H>  ----------------------------<  144<H>  3.5   |  39<H>  |  0.95    Ca    9.1      03 Nov 2023 06:12  Phos  2.8     11-03  Mg     1.9     11-03    TPro  6.3  /  Alb  3.0<L>  /  TBili  1.6<H>  /  DBili  x   /  AST  19  /  ALT  90<H>  /  AlkPhos  70  11-03    PTT - ( 03 Nov 2023 12:53 )  PTT:67.6 sec  Urinalysis Basic - ( 03 Nov 2023 06:12 )    Color: x / Appearance: x / SG: x / pH: x  Gluc: 144 mg/dL / Ketone: x  / Bili: x / Urobili: x   Blood: x / Protein: x / Nitrite: x   Leuk Esterase: x / RBC: x / WBC x   Sq Epi: x / Non Sq Epi: x / Bacteria: x          RADIOLOGY & ADDITIONAL TESTS:   ***consult has been received. note is in progress and incomplete without attending attestation***    Ayaka Curiel MD  PGY-4  Rheumatology Fellow  Reachable on TEAMS  [phone]    MITCHEL GUNN  [phone]    INTERVAL HISTORY:  Patient was transferred back to MICU for AMS/hypovolemic shock   Hemoglobin dropped to ~ 5 g/dl, currently on 2 pressors and on RBC transfusion     MEDICATIONS  (STANDING):  albuterol/ipratropium for Nebulization 3 milliLiter(s) Nebulizer every 4 hours  aMIOdarone    Tablet 200 milliGRAM(s) Oral daily  budesonide 160 MICROgram(s)/formoterol 4.5 MICROgram(s) Inhaler 2 Puff(s) Inhalation two times a day  chlorhexidine 0.12% Liquid 15 milliLiter(s) Oral Mucosa every 12 hours  chlorhexidine 4% Liquid 1 Application(s) Topical <User Schedule>  dextrose 10%. 1000 milliLiter(s) (20 mL/Hr) IV Continuous <Continuous>  dextrose 5%. 1000 milliLiter(s) (50 mL/Hr) IV Continuous <Continuous>  dextrose 5%. 1000 milliLiter(s) (100 mL/Hr) IV Continuous <Continuous>  dextrose 50% Injectable 25 Gram(s) IV Push once  dextrose 50% Injectable 12.5 Gram(s) IV Push once  dextrose 50% Injectable 25 Gram(s) IV Push once  EPINEPHrine    Infusion 0.3 MICROgram(s)/kG/Min (86.1 mL/Hr) IV Continuous <Continuous>  epoprostenol Infusion 1 NANOGram(s)/kG/Min (1.84 mL/Hr) IV Continuous <Continuous>  fentaNYL   Infusion... 0.5 MICROgram(s)/kG/Hr (3.83 mL/Hr) IV Continuous <Continuous>  fluocinonide 0.05% Solution 1 Application(s) Topical daily  hydrocortisone sodium succinate Injectable 50 milliGRAM(s) IV Push every 6 hours  hydroxychloroquine 200 milliGRAM(s) Oral two times a day  montelukast 10 milliGRAM(s) Oral daily  norepinephrine Infusion 1 MICROgram(s)/kG/Min (143 mL/Hr) IV Continuous <Continuous>  pantoprazole  Injectable 40 milliGRAM(s) IV Push every 12 hours  PARoxetine 20 milliGRAM(s) Oral daily  phenylephrine    Infusion 2 MICROgram(s)/kG/Min (57.4 mL/Hr) IV Continuous <Continuous>  piperacillin/tazobactam IVPB.. 3.375 Gram(s) IV Intermittent every 8 hours  polyethylene glycol 3350 17 Gram(s) Oral two times a day  senna 2 Tablet(s) Oral at bedtime  sildenafil (REVATIO) 20 milliGRAM(s) Oral three times a day  sodium bicarbonate  Infusion 0.098 mEq/kG/Hr (100 mL/Hr) IV Continuous <Continuous>  sodium zirconium cyclosilicate 10 Gram(s) Oral two times a day  sodium zirconium cyclosilicate 10 Gram(s) Oral once  vancomycin  IVPB 1000 milliGRAM(s) IV Intermittent once  vasopressin Infusion 0.1 Unit(s)/Min (15 mL/Hr) IV Continuous <Continuous>    MEDICATIONS  (PRN):  dextrose Oral Gel 15 Gram(s) Oral once PRN Blood Glucose LESS THAN 70 milliGRAM(s)/deciliter  fentaNYL    Injectable 50 MICROGram(s) IV Push every 15 minutes PRN gtt titration  sodium chloride 0.9% lock flush 10 milliLiter(s) IV Push every 1 hour PRN Pre/post blood products, medications, blood draw, and to maintain line patency      Allergies    ceftriaxone (Hives)  iodine (Hives)  shellfish (Hives)    Intolerances        PERTINENT MEDICATION HISTORY:      Social History:  No hx of smoking, alcohol use. Occupation - advertising operations  Sister with Rheumatoid Arthritis   Father with stroke at 50, Mother diagnosed with Colon Cx 55 (30 Oct 2023 16:04)      PAST MEDICAL & SURGICAL HISTORY:  Pulmonary embolism      DVT, lower extremity      CHF (congestive heart failure)      Asthma      Anxiety      Severe obesity (BMI >= 40)      Hypertension      No significant past surgical history          OCCUPATION:  [employer] HISTORY:    FAMILY HISTORY:  Family history of colon cancer (Mother)    Family history of stroke (Father)        Vital Signs Last 24 Hrs  T(C): 35.5 (06 Nov 2023 12:30), Max: 36.9 (05 Nov 2023 20:37)  T(F): 95.9 (06 Nov 2023 12:30), Max: 98.4 (05 Nov 2023 20:37)  HR: 105 (06 Nov 2023 19:45) (40 - 149)  BP: 144/103 (06 Nov 2023 15:00) (44/23 - 178/127)  BP(mean): 116 (06 Nov 2023 15:00) (29 - 146)  RR: 18 (06 Nov 2023 19:45) (18 - 43)  SpO2: 64% (06 Nov 2023 19:30) (62% - 100%)    Parameters below as of 06 Nov 2023 12:30  Patient On (Oxygen Delivery Method): nasal cannula  O2 Flow (L/min): 6      Physical Exam:  General: Intubated   HEENT: EOMI, MMM  MSK: no swelling/warmth/erythema of the joints of the UE/LE  Neuro: AAOx3  Skin: Maculopapular rash on the right upper and forearm     LABS:                        9.0    55.21 )-----------( 152      ( 06 Nov 2023 19:17 )             29.7     11-06    143  |  92<L>  |  69<H>  ----------------------------<  48<LL>  7.2<HH>   |  16<L>  |  1.74<H>    Ca    8.2<L>      06 Nov 2023 19:17  Phos  13.7     11-06  Mg     2.5     11-06    TPro  4.5<L>  /  Alb  2.2<L>  /  TBili  2.4<H>  /  DBili  x   /  AST  3471<H>  /  ALT  3459<H>  /  AlkPhos  119  11-06    PT/INR - ( 06 Nov 2023 19:17 )   PT: 26.5 sec;   INR: 2.59 ratio         PTT - ( 06 Nov 2023 19:17 )  PTT:69.7 sec  Urinalysis Basic - ( 06 Nov 2023 19:17 )    Color: x / Appearance: x / SG: x / pH: x  Gluc: 48 mg/dL / Ketone: x  / Bili: x / Urobili: x   Blood: x / Protein: x / Nitrite: x   Leuk Esterase: x / RBC: x / WBC x   Sq Epi: x / Non Sq Epi: x / Bacteria: x            Rheumatology Work Up    Anti Nuclear Factor Titer: 1:1280 *!* [Antinuclear AB (KATHIE), IFA Method] (10-30-23 @ 16:14)  Scleroderma Antibodies: 0.2 AI [Fluorescent Bead Immunoassay                       Scl 70  Antibodies, IgG                      <1.0 AI (negative)                      > or =1.0 AI (positive)                      Reference values apply to all ages.] (10-30-23 @ 16:14)  Anti SS-A Antibody: >8.0 AI *H* (10-30-23 @ 16:14)  Anti SS-B Antibody: >8.0 AI *H* [Fluorescent Bead Immunoassay   Reference Ranges for SS-A AND SS-B:   <1.0 AI (negative)   > or =1.0 AI (positive)   Reference values apply  to all ages.] (10-30-23 @ 16:14)      < from: CT Angio Chest PE Protocol w/ IV Cont (11.01.23 @ 22:26) >  No central or lobar pulmonary embolism.    Unchanged diffuse pulmonary mosaic attenuation pattern with an enlarged   main pulmonary artery. Findings may represent chronic thromboembolic   pulmonary hypertension.    < end of copied text >  \        RADIOLOGY & ADDITIONAL STUDIES:     Ayaka Curiel MD  PGY-4  Rheumatology Fellow  Reachable on TEAMS  [phone]    MITCHEL GUNN  [phone]    INTERVAL HISTORY:  Patient was transferred back to MICU for AMS/hypovolemic shock   Hemoglobin dropped to ~ 5 g/dl, currently on pressor support and on RBC transfusion, started on stress dose steroids    MEDICATIONS  (STANDING):  albuterol/ipratropium for Nebulization 3 milliLiter(s) Nebulizer every 4 hours  aMIOdarone    Tablet 200 milliGRAM(s) Oral daily  budesonide 160 MICROgram(s)/formoterol 4.5 MICROgram(s) Inhaler 2 Puff(s) Inhalation two times a day  chlorhexidine 0.12% Liquid 15 milliLiter(s) Oral Mucosa every 12 hours  chlorhexidine 4% Liquid 1 Application(s) Topical <User Schedule>  dextrose 10%. 1000 milliLiter(s) (20 mL/Hr) IV Continuous <Continuous>  dextrose 5%. 1000 milliLiter(s) (50 mL/Hr) IV Continuous <Continuous>  dextrose 5%. 1000 milliLiter(s) (100 mL/Hr) IV Continuous <Continuous>  dextrose 50% Injectable 25 Gram(s) IV Push once  dextrose 50% Injectable 12.5 Gram(s) IV Push once  dextrose 50% Injectable 25 Gram(s) IV Push once  EPINEPHrine    Infusion 0.3 MICROgram(s)/kG/Min (86.1 mL/Hr) IV Continuous <Continuous>  epoprostenol Infusion 1 NANOGram(s)/kG/Min (1.84 mL/Hr) IV Continuous <Continuous>  fentaNYL   Infusion... 0.5 MICROgram(s)/kG/Hr (3.83 mL/Hr) IV Continuous <Continuous>  fluocinonide 0.05% Solution 1 Application(s) Topical daily  hydrocortisone sodium succinate Injectable 50 milliGRAM(s) IV Push every 6 hours  hydroxychloroquine 200 milliGRAM(s) Oral two times a day  montelukast 10 milliGRAM(s) Oral daily  norepinephrine Infusion 1 MICROgram(s)/kG/Min (143 mL/Hr) IV Continuous <Continuous>  pantoprazole  Injectable 40 milliGRAM(s) IV Push every 12 hours  PARoxetine 20 milliGRAM(s) Oral daily  phenylephrine    Infusion 2 MICROgram(s)/kG/Min (57.4 mL/Hr) IV Continuous <Continuous>  piperacillin/tazobactam IVPB.. 3.375 Gram(s) IV Intermittent every 8 hours  polyethylene glycol 3350 17 Gram(s) Oral two times a day  senna 2 Tablet(s) Oral at bedtime  sildenafil (REVATIO) 20 milliGRAM(s) Oral three times a day  sodium bicarbonate  Infusion 0.098 mEq/kG/Hr (100 mL/Hr) IV Continuous <Continuous>  sodium zirconium cyclosilicate 10 Gram(s) Oral two times a day  sodium zirconium cyclosilicate 10 Gram(s) Oral once  vancomycin  IVPB 1000 milliGRAM(s) IV Intermittent once  vasopressin Infusion 0.1 Unit(s)/Min (15 mL/Hr) IV Continuous <Continuous>    MEDICATIONS  (PRN):  dextrose Oral Gel 15 Gram(s) Oral once PRN Blood Glucose LESS THAN 70 milliGRAM(s)/deciliter  fentaNYL    Injectable 50 MICROGram(s) IV Push every 15 minutes PRN gtt titration  sodium chloride 0.9% lock flush 10 milliLiter(s) IV Push every 1 hour PRN Pre/post blood products, medications, blood draw, and to maintain line patency      Allergies    ceftriaxone (Hives)  iodine (Hives)  shellfish (Hives)    Intolerances    PERTINENT MEDICATION HISTORY:      Social History:  No hx of smoking, alcohol use. Occupation - advertising operations  Sister with Rheumatoid Arthritis   Father with stroke at 50, Mother diagnosed with Colon Cx 55 (30 Oct 2023 16:04)      PAST MEDICAL & SURGICAL HISTORY:  Pulmonary embolism      DVT, lower extremity      CHF (congestive heart failure)      Asthma      Anxiety      Severe obesity (BMI >= 40)      Hypertension      No significant past surgical history          OCCUPATION:  [employer] HISTORY:    FAMILY HISTORY:  Family history of colon cancer (Mother)    Family history of stroke (Father)        Vital Signs Last 24 Hrs  T(C): 35.5 (06 Nov 2023 12:30), Max: 36.9 (05 Nov 2023 20:37)  T(F): 95.9 (06 Nov 2023 12:30), Max: 98.4 (05 Nov 2023 20:37)  HR: 105 (06 Nov 2023 19:45) (40 - 149)  BP: 144/103 (06 Nov 2023 15:00) (44/23 - 178/127)  BP(mean): 116 (06 Nov 2023 15:00) (29 - 146)  RR: 18 (06 Nov 2023 19:45) (18 - 43)  SpO2: 64% (06 Nov 2023 19:30) (62% - 100%)    Parameters below as of 06 Nov 2023 12:30  Patient On (Oxygen Delivery Method): nasal cannula  O2 Flow (L/min): 6      Physical Exam:  General: Intubated   HEENT: EOMI, MMM  MSK: no swelling/warmth/erythema of the joints of the UE/LE  Neuro: AAOx3  Skin: Maculopapular blanching rash on the right upper and forearm, cyanosis of fingers and toes 2/2 pressors likely     LABS:                        9.0    55.21 )-----------( 152      ( 06 Nov 2023 19:17 )             29.7     11-06    143  |  92<L>  |  69<H>  ----------------------------<  48<LL>  7.2<HH>   |  16<L>  |  1.74<H>    Ca    8.2<L>      06 Nov 2023 19:17  Phos  13.7     11-06  Mg     2.5     11-06    TPro  4.5<L>  /  Alb  2.2<L>  /  TBili  2.4<H>  /  DBili  x   /  AST  3471<H>  /  ALT  3459<H>  /  AlkPhos  119  11-06    PT/INR - ( 06 Nov 2023 19:17 )   PT: 26.5 sec;   INR: 2.59 ratio         PTT - ( 06 Nov 2023 19:17 )  PTT:69.7 sec  Urinalysis Basic - ( 06 Nov 2023 19:17 )    Color: x / Appearance: x / SG: x / pH: x  Gluc: 48 mg/dL / Ketone: x  / Bili: x / Urobili: x   Blood: x / Protein: x / Nitrite: x   Leuk Esterase: x / RBC: x / WBC x   Sq Epi: x / Non Sq Epi: x / Bacteria: x            Rheumatology Work Up    Anti Nuclear Factor Titer: 1:1280 *!* [Antinuclear AB (KATHIE), IFA Method] (10-30-23 @ 16:14)  Scleroderma Antibodies: 0.2 AI [Fluorescent Bead Immunoassay                       Scl 70  Antibodies, IgG                      <1.0 AI (negative)                      > or =1.0 AI (positive)                      Reference values apply to all ages.] (10-30-23 @ 16:14)  Anti SS-A Antibody: >8.0 AI *H* (10-30-23 @ 16:14)  Anti SS-B Antibody: >8.0 AI *H* [Fluorescent Bead Immunoassay   Reference Ranges for SS-A AND SS-B:   <1.0 AI (negative)   > or =1.0 AI (positive)   Reference values apply  to all ages.] (10-30-23 @ 16:14)      < from: CT Angio Chest PE Protocol w/ IV Cont (11.01.23 @ 22:26) >  No central or lobar pulmonary embolism.    Unchanged diffuse pulmonary mosaic attenuation pattern with an enlarged   main pulmonary artery. Findings may represent chronic thromboembolic   pulmonary hypertension.    < end of copied text >  \        RADIOLOGY & ADDITIONAL STUDIES:

## 2023-11-06 NOTE — PROGRESS NOTE ADULT - SUBJECTIVE AND OBJECTIVE BOX
INTERVAL HPI/OVERNIGHT EVENTS:  rapid was called for hypotension and worsening mental status; Patient reintubated; leukocytosis to 39 from 22 this AM     MEDICATIONS  (STANDING):  albuterol    0.083%. 2.5 milliGRAM(s) Nebulizer once  albuterol    0.083%. 2.5 milliGRAM(s) Nebulizer once  albuterol    0.083%. 2.5 milliGRAM(s) Nebulizer once  albuterol/ipratropium for Nebulization 3 milliLiter(s) Nebulizer every 4 hours  aMIOdarone    Tablet 200 milliGRAM(s) Oral daily  budesonide 160 MICROgram(s)/formoterol 4.5 MICROgram(s) Inhaler 2 Puff(s) Inhalation two times a day  calcium chloride Injectable 1 milliGRAM(s) IV Push once  chlorhexidine 0.12% Liquid 15 milliLiter(s) Oral Mucosa every 12 hours  chlorhexidine 2% Cloths 1 Application(s) Topical <User Schedule>  chlorhexidine 4% Liquid 1 Application(s) Topical <User Schedule>  dextrose 10%. 1000 milliLiter(s) (20 mL/Hr) IV Continuous <Continuous>  dextrose 5%. 1000 milliLiter(s) (100 mL/Hr) IV Continuous <Continuous>  dextrose 5%. 1000 milliLiter(s) (50 mL/Hr) IV Continuous <Continuous>  dextrose 50% Injectable 25 Gram(s) IV Push once  dextrose 50% Injectable 12.5 Gram(s) IV Push once  dextrose 50% Injectable 25 Gram(s) IV Push once  dextrose 50% Injectable 50 milliLiter(s) IV Push once  dextrose 50% Injectable 50 milliLiter(s) IV Push once  EPINEPHrine    Infusion 0.3 MICROgram(s)/kG/Min (86.1 mL/Hr) IV Continuous <Continuous>  epoprostenol Infusion 1 NANOGram(s)/kG/Min (1.84 mL/Hr) IV Continuous <Continuous>  fentaNYL   Infusion... 0.5 MICROgram(s)/kG/Hr (3.83 mL/Hr) IV Continuous <Continuous>  fluocinonide 0.05% Solution 1 Application(s) Topical daily  hydrocortisone sodium succinate Injectable 50 milliGRAM(s) IV Push every 6 hours  hydroxychloroquine 200 milliGRAM(s) Oral two times a day  montelukast 10 milliGRAM(s) Oral daily  norepinephrine Infusion 1 MICROgram(s)/kG/Min (143 mL/Hr) IV Continuous <Continuous>  pantoprazole  Injectable 40 milliGRAM(s) IV Push every 12 hours  PARoxetine 20 milliGRAM(s) Oral daily  phenylephrine    Infusion 2 MICROgram(s)/kG/Min (57.4 mL/Hr) IV Continuous <Continuous>  piperacillin/tazobactam IVPB.. 3.375 Gram(s) IV Intermittent every 8 hours  polyethylene glycol 3350 17 Gram(s) Oral two times a day  senna 2 Tablet(s) Oral at bedtime  sildenafil (REVATIO) 20 milliGRAM(s) Oral three times a day  sodium bicarbonate  Infusion 0.098 mEq/kG/Hr (100 mL/Hr) IV Continuous <Continuous>  sodium bicarbonate  Injectable 50 milliEquivalent(s) IV Push once  sodium zirconium cyclosilicate 10 Gram(s) Oral two times a day  vancomycin  IVPB 1000 milliGRAM(s) IV Intermittent once  vasopressin Infusion 0.1 Unit(s)/Min (15 mL/Hr) IV Continuous <Continuous>    MEDICATIONS  (PRN):  dextrose Oral Gel 15 Gram(s) Oral once PRN Blood Glucose LESS THAN 70 milliGRAM(s)/deciliter  fentaNYL    Injectable 50 MICROGram(s) IV Push every 15 minutes PRN gtt titration  sodium chloride 0.9% lock flush 10 milliLiter(s) IV Push every 1 hour PRN Pre/post blood products, medications, blood draw, and to maintain line patency      Allergies    ceftriaxone (Hives)  iodine (Hives)  shellfish (Hives)    Intolerances        REVIEW OF SYSTEMS  Unable to assess       Vital Signs Last 24 Hrs  T(C): 36.5 (06 Nov 2023 08:20), Max: 36.9 (05 Nov 2023 20:37)  T(F): 97.7 (06 Nov 2023 08:20), Max: 98.4 (05 Nov 2023 20:37)  HR: 89 (06 Nov 2023 17:45) (40 - 149)  BP: 144/103 (06 Nov 2023 15:00) (44/23 - 178/127)  BP(mean): 116 (06 Nov 2023 15:00) (29 - 146)  RR: 23 (06 Nov 2023 17:45) (18 - 43)  SpO2: 72% (06 Nov 2023 17:45) (63% - 100%)    Parameters below as of 06 Nov 2023 08:20  Patient On (Oxygen Delivery Method): nasal cannula  O2 Flow (L/min): 2      PHYSICAL EXAM:   The patient was intubated   There was no visible lymphadenopathy.    Of note on skin exam:   R upper arm with blanching mildly net-like erythema on R upper arm, scattered purple ecchymoses on R upper arm and R forearm  - no epidermal changes  - no rashes elsewhere        LABS:                        5.3    39.49 )-----------( 192      ( 06 Nov 2023 15:49 )             19.4     11-06    146<H>  |  89<L>  |  75<H>  ----------------------------<  124<H>  7.0<HH>   |  16<L>  |  1.68<H>    Ca    8.4      06 Nov 2023 15:49  Phos  13.1     11-06  Mg     2.5     11-06    TPro  4.0<L>  /  Alb  2.0<L>  /  TBili  1.9<H>  /  DBili  x   /  AST  483<H>  /  ALT  506<H>  /  AlkPhos  71  11-06    PT/INR - ( 06 Nov 2023 15:49 )   PT: 17.2 sec;   INR: 1.66 ratio         PTT - ( 06 Nov 2023 15:49 )  PTT:105.7 sec  Urinalysis Basic - ( 06 Nov 2023 15:49 )    Color: x / Appearance: x / SG: x / pH: x  Gluc: 124 mg/dL / Ketone: x  / Bili: x / Urobili: x   Blood: x / Protein: x / Nitrite: x   Leuk Esterase: x / RBC: x / WBC x   Sq Epi: x / Non Sq Epi: x / Bacteria: x        RADIOLOGY & ADDITIONAL TESTS:

## 2023-11-06 NOTE — PROGRESS NOTE ADULT - PROBLEM SELECTOR PLAN 8
BUN/Cr max 63.9/3.21 on 10/22 at [facility]. pt w/ prior SARAH likely in s/o sepsis and cardiogenic shock  - Cr downtrending -> improved back to baseline with Cr. 10.3  - CTM Cr and UOP

## 2023-11-06 NOTE — PROGRESS NOTE ADULT - ATTENDING COMMENTS
38 year old female with a PMHx of morbid obesity, PE on eliquis (diagnosed in 2017, on eliquis at home), HFpEF, asthma, on PRN home O2, suspected SLE (being worked up by outpatient rheumatologist, on steroids x1 month prior to hospital presentation) who is transferred from the MICU to the general medicine floors on 10/31. She was originally admitted to [facility] on 10/18 with complaints of weakness, worsening LE edema, cough, and rapid weight gain.  Her evaluation there was also significant for atrial flutter, converted to normal sinus rhythm with amiodarone. CXR showed mild pulmonary edema and LLL consolidation. She was treated with a course of IV diuretics for presumed decompensated heart failure as well as empiric antibiotics for pneumonia. During her treatment course, she developed an SARAH. Lasix were held. She progressively became hypotensive, fluid resuscitation limited by her volume-overloaded status. She was transferred to the ICU at [facility] on 10/22. Ddx for hypotension included adrenal insufficiency in the setting of chronic outpatient steroids v cardiogenic shock and cor pulmonale. They started her on pressors, stress-dose steroids, and bumex drip. Gregory cath reading consistent with elevated pulmonary artery pressures. TTE also showing severely decreased RV systolic function and enlarged right ventricle. She was treated with inhaled nitric oxide and started on sildenafil. Course was further complicated by shock liver with AST and ALT up to 3000s+/2000+, improving.      The patient was transferred to [facility] on 10/30 for further management of pHTN. Her pressors were titrated down and she was titrated off of high flow and onto nasal canula O2 supplementation. She is now transferred to the [facility] for further care.  She was diuresed with bumex 1mg BID. CTPE shows evidence of chronic thromboembolic disease. The patient was pending repeat right heart cath and VQ scan for further evaluation.     This morning, team was called to bedside as unable to get blood pressure readings via cuff. Earlier this morning, SBP down to the 70s. The patient was lethargic, answering some questions. She was initially given fluid bolus (full bolus was not completed) and transitioned to levophed, stabilized with 0.2 mcg, empiric zosyn. Mental status improved.     #Shock--- likely multiple factorial. Concern for new infection (ddx pneumonia in the setting of nausea/vomiting yesterday, versus left hip joint infection) +/- adrenal crisis. Ddx also includes cardiogenic shock secondary to cor pulmonale.   #cor pulmonale    #hypoxic and hypercapenic respiratory failure    #metabolic acidosis    #SLE    #APLS    #steroid-induced hyperglycemia   #shock state s/p pressors (improved)- cardiogenic shock v adrenal insufficiency    #atrial flutter (s/p pharmacologic conversion to sinus rhythm with amiodarone)   #SARAH—improving    #shock liver—improving       - started on levophed gtt, empiric zosyn  - Pt is transferred to the MICU.  - spoke to the patient's mother via phone to provide an update    Rest of plan as per resident note.  Critical care time: 40 minutes.

## 2023-11-06 NOTE — PROGRESS NOTE ADULT - PROVIDER SPECIALTY LIST ADULT
Pulmonology
MICU
Pulmonology
Rheumatology
Rheumatology
Dermatology
Internal Medicine

## 2023-11-06 NOTE — PROGRESS NOTE ADULT - REASON FOR ADMISSION
SOB - cardiogenic shock and Pulm HTN

## 2023-11-06 NOTE — PROGRESS NOTE ADULT - PROBLEM SELECTOR PLAN 9
- Last a1c 6.0  - Pt w/ hyperglycemia to 200's in s/o steroid use  - Continue basal + bolus + insulin sliding scale

## 2023-11-06 NOTE — RAPID RESPONSE TEAM SUMMARY - NSSITUATIONBACKGROUNDRRT_GEN_ALL_CORE
37 y/o F with a h/o b/l DVTs, recurrent PE on eliquis, HFpEF, morbid obesity, asthma, chronic hyperkalemia, admitted to [facility] on 10/18 with complaints of weakness, worsening LE edema, cough, and rapid weight gain.Of note, was being worked up as an outpatient for possible SLE given facial rash, photophobia, and joint pains and had been started on prednisone 20 mg by her PCP x ~1 month prior to arrival at [place]. Outpatient  results from 1 week prior showing low C3/4 complement and elevated CRP. Pt was admitted to medicine for treatment of PNA. Hospital course complicated by progressive SARAH, HAGMA, transaminitis and acute hypoxemic respiratory failure secondary to cardiogenic shock in the face of severe pHTN with acute on chronic cor pulmonale.  Admitted to MICU 10/30 for pressor assisted diuresis. In [facility] MICU - patient was taken off milrinone. BP stable off pressors. Continued with bumex 2g BID. 3L output overnigght was downgraded to floors. RRT called for hypotension to SBP 70s. At bedside patient is tachycardic with NC 2L SPO2 > 92%. Patient lethargic and slow to respond , not at baseline mental status according to primary team at bedside. Difficult to obtain repeat BP and patient with weak peripheral pulses. Pulm at bedside agreed for stress-dose steroids x1. Manual BP obtained , SBP 60s. Levophed gtt started and uptitrated for MAP > 65. Patient was more awake and alert and back to baseline mental status after uptitration of levophed, MAP > 65. Undifferentiated shock likely cardiogenic from severe pHTN , would benefit from RHC, also with new leukocytosis concerning for sepsis. Of note Hgb dropped 1 unit this AM and heparin gtt was held , no ovbious external signs of bleeding but should be worked up. At conclusion of RRT patient was protecting airway and MAP > 65. Transferred to MICU

## 2023-11-06 NOTE — PROGRESS NOTE ADULT - PROBLEM SELECTOR PROBLEM 3
Systemic lupus erythematosus

## 2023-11-06 NOTE — PROGRESS NOTE ADULT - ASSESSMENT
Pt is a 38-yo female with PMHx of b/l DVTs, recurrent PE on eliquis, HFpEF w/ hx of cor pulmonale, morbid obesity, asthma, chronic hyperkalemia, recently diagnosed SLE (facial rash, photophobia, joint paints, started on prednisone 20 mg), admitted initially w/ PNA to [facility] on 10/18, course c/b SARAH, AHRF 2/2 cardiogenic shock 2/2 pHTN w/ RV failure s/p MICU stay at [facility] now off inotropes and pressors pending rheumatological work-up for newly diagnosed SLE C/b RUE rash req. derm evaluation, and V/Q scan, RHC for pHTN.  Pt is a 38-yo female with PMHx of b/l DVTs, recurrent PE on eliquis, HFpEF w/ hx of cor pulmonale, morbid obesity, asthma, chronic hyperkalemia, recently diagnosed SLE (facial rash, photophobia, joint paints, started on prednisone 20 mg), admitted initially w/ PNA to [facility] on 10/18, course c/b SARAH, AHRF 2/2 cardiogenic shock 2/2 pHTN w/ RV failure s/p MICU stay at [facility] now off inotropes and pressors pending rheumatological work-up for newly diagnosed SLE C/b RUE rash req. derm evaluation, and V/Q scan, RHC for pHTN. RRT called on 11/6 for hypotension (60s/40s) and lethargy. Started on pressor support, and infectious workup ordered. Pt transferred to the MICU on 11/6.

## 2023-11-06 NOTE — CONSULT NOTE ADULT - SUBJECTIVE AND OBJECTIVE BOX
[facility] DIVISION OF KIDNEY DISEASES AND HYPERTENSION -- [phone]  -- INITIAL CONSULT NOTE  --------------------------------------------------------------------------------  HPI:  38 y.o F w/ h/o b/l DVTs, recurrent PE on eliquis, HFpEF, was admitted to [facility] on 10/18 for weakness, LE edema, cough, and rapid weight gain. Was getting worked up outpatient for possible SLE given  facial rash, photophobia, and joint pains and had been started on prednisone 20 mg. Low C3/C4 and elevated CRP. Hospital course c/b SARAH, HAGMA, transaminitis, acute hypoxemic respiratory failure secondary to cardiogenic shock from pHTN and acute on chronic cor pulmonale. pHTN thought to be from PE/OHS/MICHAEL/autoimmune process. Was place on milrinone, bumex, NO and transferred to [facility]. Patient was started on sildenafil and milrinone stopped 10/30. Patient transferred to medicine floors but returned 11/6 due to inability to tolerate Bipap, recent echo with severe RV dysfunction, CTA with chronic thromboembolic disease.     Nephrology consulted for further management of her acute renal failure and oliguric statu. Patient seen at bedside.       PAST HISTORY  --------------------------------------------------------------------------------  PAST MEDICAL & SURGICAL HISTORY:  Pulmonary embolism      DVT, lower extremity      CHF (congestive heart failure)      Asthma      Anxiety      Severe obesity (BMI >= 40)      Hypertension      No significant past surgical history        FAMILY HISTORY:  Family history of colon cancer (Mother)    Family history of stroke (Father)      PAST SOCIAL HISTORY:    ALLERGIES & MEDICATIONS  --------------------------------------------------------------------------------  Allergies    ceftriaxone (Hives)  iodine (Hives)  shellfish (Hives)    Intolerances      Standing Inpatient Medications  albuterol/ipratropium for Nebulization 3 milliLiter(s) Nebulizer every 4 hours  aMIOdarone    Tablet 200 milliGRAM(s) Oral daily  budesonide 160 MICROgram(s)/formoterol 4.5 MICROgram(s) Inhaler 2 Puff(s) Inhalation two times a day  chlorhexidine 0.12% Liquid 15 milliLiter(s) Oral Mucosa every 12 hours  chlorhexidine 4% Liquid 1 Application(s) Topical <User Schedule>  dextrose 10%. 1000 milliLiter(s) IV Continuous <Continuous>  dextrose 5%. 1000 milliLiter(s) IV Continuous <Continuous>  dextrose 5%. 1000 milliLiter(s) IV Continuous <Continuous>  dextrose 50% Injectable 25 Gram(s) IV Push once  dextrose 50% Injectable 12.5 Gram(s) IV Push once  dextrose 50% Injectable 25 Gram(s) IV Push once  EPINEPHrine    Infusion 0.3 MICROgram(s)/kG/Min IV Continuous <Continuous>  epoprostenol Infusion 1 NANOGram(s)/kG/Min IV Continuous <Continuous>  fentaNYL   Infusion... 0.5 MICROgram(s)/kG/Hr IV Continuous <Continuous>  fluocinonide 0.05% Solution 1 Application(s) Topical daily  hydrocortisone sodium succinate Injectable 50 milliGRAM(s) IV Push every 6 hours  hydroxychloroquine 200 milliGRAM(s) Oral two times a day  montelukast 10 milliGRAM(s) Oral daily  norepinephrine Infusion 1 MICROgram(s)/kG/Min IV Continuous <Continuous>  pantoprazole  Injectable 40 milliGRAM(s) IV Push every 12 hours  PARoxetine 20 milliGRAM(s) Oral daily  phenylephrine    Infusion 2 MICROgram(s)/kG/Min IV Continuous <Continuous>  piperacillin/tazobactam IVPB.. 3.375 Gram(s) IV Intermittent every 8 hours  polyethylene glycol 3350 17 Gram(s) Oral two times a day  senna 2 Tablet(s) Oral at bedtime  sildenafil (REVATIO) 20 milliGRAM(s) Oral three times a day  sodium bicarbonate  Infusion 0.098 mEq/kG/Hr IV Continuous <Continuous>  sodium zirconium cyclosilicate 10 Gram(s) Oral two times a day  sodium zirconium cyclosilicate 10 Gram(s) Oral once  vancomycin  IVPB 1000 milliGRAM(s) IV Intermittent once  vasopressin Infusion 0.1 Unit(s)/Min IV Continuous <Continuous>    PRN Inpatient Medications  dextrose Oral Gel 15 Gram(s) Oral once PRN  fentaNYL    Injectable 50 MICROGram(s) IV Push every 15 minutes PRN  sodium chloride 0.9% lock flush 10 milliLiter(s) IV Push every 1 hour PRN      REVIEW OF SYSTEMS  --------------------------------------------------------------------------------  Gen: No fevers/chills  Head/Eyes/Ears: No HA  Respiratory: No dyspnea, cough  CV: No chest pain  GI: No abdominal pain, diarrhea  : No dysuria, hematuria  MSK: No  edema  Skin: No rashes  Heme: No easy bruising or bleeding    All other systems were reviewed and are negative, except as noted.    VITALS/PHYSICAL EXAM  --------------------------------------------------------------------------------  T(C): 35.5 (11-06-23 @ 12:30), Max: 36.9 (11-05-23 @ 20:37)  HR: 105 (11-06-23 @ 19:45) (40 - 149)  BP: 144/103 (11-06-23 @ 15:00) (44/23 - 178/127)  RR: 18 (11-06-23 @ 19:45) (18 - 43)  SpO2: 64% (11-06-23 @ 19:30) (62% - 100%)  Wt(kg): --  Height (cm): 20 (11-06-23 @ 19:26)  Weight (kg): 145 (11-06-23 @ 12:30)  BMI (kg/m2): 3625 (11-06-23 @ 19:26)  BSA (m2): 0.52 (11-06-23 @ 19:26)      11-05-23 @ 07:01  -  11-06-23 @ 07:00  --------------------------------------------------------  IN: 580 mL / OUT: 620 mL / NET: -40 mL    11-06-23 @ 07:01  -  11-06-23 @ 19:58  --------------------------------------------------------  IN: 107.4 mL / OUT: 0 mL / NET: 107.4 mL        Physical Exam:  	Gen: NAD  	HEENT: Anicteric  	Pulm: CTA B/L  	CV: S1S2+  	Abd: Soft, +BS            Transplant site: RLQ non tender, well healed surgical scar.  	Ext: No LE edema B/L  	Neuro: Awake          : Long+ with clear urine in the bag  	Skin: Warm and dry  	Dialysis access:     LABS/STUDIES  --------------------------------------------------------------------------------              9.0    55.21 >-----------<  152      [11-06-23 @ 19:17]              29.7     143  |  92  |  69  ----------------------------<  48      [11-06-23 @ 19:17]  7.2   |  16  |  1.74        Ca     8.2     [11-06-23 @ 19:17]      Mg     2.5     [11-06-23 @ 19:17]      Phos  13.7     [11-06-23 @ 19:17]    TPro  4.5  /  Alb  2.2  /  TBili  2.4  /  DBili  x   /  AST  x   /  ALT  x   /  AlkPhos  119  [11-06-23 @ 19:17]    PT/INR: PT 26.5 , INR 2.59       [11-06-23 @ 19:17]  PTT: 69.7       [11-06-23 @ 19:17]      Creatinine Trend:  SCr 1.74 [11-06 @ 19:17]  SCr 1.68 [11-06 @ 15:49]  SCr 1.63 [11-06 @ 15:03]  SCr 0.93 [11-05 @ 06:43]  SCr 0.78 [11-04 @ 05:56]    Urinalysis - [11-06-23 @ 19:17]      Color  / Appearance  / SG  / pH       Gluc 48 / Ketone   / Bili  / Urobili        Blood  / Protein  / Leuk Est  / Nitrite       RBC  / WBC  / Hyaline  / Gran  / Sq Epi  / Non Sq Epi  / Bacteria       HBsAg Nonreact      [10-23-23 @ 23:05]  HCV 0.15, Nonreact      [10-23-23 @ 23:05]    KATHIE: titer 1:160, pattern Homogeneous      [10-30-23 @ 16:14]  dsDNA 251      [10-30-23 @ 16:14]  C3 Complement 18      [10-30-23 @ 16:14]  C4 Complement 2      [10-30-23 @ 16:14]  Rheumatoid Factor 55      [10-30-23 @ 16:14]  ANCA: cANCA Negative, pANCA Negative, atypical ANCA Indeterminate Method interference due to KATHIE Fluorescence      [10-22-23 @ 18:10]    Tacrolimus  Cyclosporine  Sirolimus  Mycophenolate  BK PCR  CMV PCR  Parvo PCR  EBV PCR [facility] DIVISION OF KIDNEY DISEASES AND HYPERTENSION -- [phone]  -- INITIAL CONSULT NOTE  --------------------------------------------------------------------------------  HPI:  38 y.o F w/ h/o b/l DVTs, recurrent PE on eliquis, HFpEF, was admitted to [place] on 10/18 for weakness, LE edema, cough, and rapid weight gain. Was getting worked up outpatient for possible SLE given  facial rash, photophobia, and joint pains and had been started on prednisone 20 mg. Low C3/C4 and elevated CRP. Hospital course c/b SARAH, HAGMA, transaminitis, acute hypoxemic respiratory failure secondary to cardiogenic shock from pHTN and acute on chronic cor pulmonale. pHTN thought to be from PE/OHS/MICHAEL/autoimmune process. Was place on milrinone, bumex, NO and transferred to [facility]. Patient was started on sildenafil and milrinone stopped 10/30. Patient transferred to medicine floors but returned 11/6 due to inability to tolerate Bipap, recent echo with severe RV dysfunction, CTA with chronic thromboembolic disease.     Since being in MICU, patient is anuric with rapid rise in SCr, and worsening electrolyte and metabolic acidosis. Nephrology consulted for further management of her acute renal failure and oliguric statu. Patient seen at bedside with family present.       PAST HISTORY  --------------------------------------------------------------------------------  PAST MEDICAL & SURGICAL HISTORY:  Pulmonary embolism      DVT, lower extremity      CHF (congestive heart failure)      Asthma      Anxiety      Severe obesity (BMI >= 40)      Hypertension      No significant past surgical history        FAMILY HISTORY:  Family history of colon cancer (Mother)    Family history of stroke (Father)      PAST SOCIAL HISTORY:    ALLERGIES & MEDICATIONS  --------------------------------------------------------------------------------  Allergies    ceftriaxone (Hives)  iodine (Hives)  shellfish (Hives)    Intolerances      Standing Inpatient Medications  albuterol/ipratropium for Nebulization 3 milliLiter(s) Nebulizer every 4 hours  aMIOdarone    Tablet 200 milliGRAM(s) Oral daily  budesonide 160 MICROgram(s)/formoterol 4.5 MICROgram(s) Inhaler 2 Puff(s) Inhalation two times a day  chlorhexidine 0.12% Liquid 15 milliLiter(s) Oral Mucosa every 12 hours  chlorhexidine 4% Liquid 1 Application(s) Topical <User Schedule>  dextrose 10%. 1000 milliLiter(s) IV Continuous <Continuous>  dextrose 5%. 1000 milliLiter(s) IV Continuous <Continuous>  dextrose 5%. 1000 milliLiter(s) IV Continuous <Continuous>  dextrose 50% Injectable 25 Gram(s) IV Push once  dextrose 50% Injectable 12.5 Gram(s) IV Push once  dextrose 50% Injectable 25 Gram(s) IV Push once  EPINEPHrine    Infusion 0.3 MICROgram(s)/kG/Min IV Continuous <Continuous>  epoprostenol Infusion 1 NANOGram(s)/kG/Min IV Continuous <Continuous>  fentaNYL   Infusion... 0.5 MICROgram(s)/kG/Hr IV Continuous <Continuous>  fluocinonide 0.05% Solution 1 Application(s) Topical daily  hydrocortisone sodium succinate Injectable 50 milliGRAM(s) IV Push every 6 hours  hydroxychloroquine 200 milliGRAM(s) Oral two times a day  montelukast 10 milliGRAM(s) Oral daily  norepinephrine Infusion 1 MICROgram(s)/kG/Min IV Continuous <Continuous>  pantoprazole  Injectable 40 milliGRAM(s) IV Push every 12 hours  PARoxetine 20 milliGRAM(s) Oral daily  phenylephrine    Infusion 2 MICROgram(s)/kG/Min IV Continuous <Continuous>  piperacillin/tazobactam IVPB.. 3.375 Gram(s) IV Intermittent every 8 hours  polyethylene glycol 3350 17 Gram(s) Oral two times a day  senna 2 Tablet(s) Oral at bedtime  sildenafil (REVATIO) 20 milliGRAM(s) Oral three times a day  sodium bicarbonate  Infusion 0.098 mEq/kG/Hr IV Continuous <Continuous>  sodium zirconium cyclosilicate 10 Gram(s) Oral two times a day  sodium zirconium cyclosilicate 10 Gram(s) Oral once  vancomycin  IVPB 1000 milliGRAM(s) IV Intermittent once  vasopressin Infusion 0.1 Unit(s)/Min IV Continuous <Continuous>    PRN Inpatient Medications  dextrose Oral Gel 15 Gram(s) Oral once PRN  fentaNYL    Injectable 50 MICROGram(s) IV Push every 15 minutes PRN  sodium chloride 0.9% lock flush 10 milliLiter(s) IV Push every 1 hour PRN      REVIEW OF SYSTEMS  --------------------------------------------------------------------------------  unable to be obtained.     VITALS/PHYSICAL EXAM  --------------------------------------------------------------------------------  T(C): 35.5 (11-06-23 @ 12:30), Max: 36.9 (11-05-23 @ 20:37)  HR: 105 (11-06-23 @ 19:45) (40 - 149)  BP: 144/103 (11-06-23 @ 15:00) (44/23 - 178/127)  RR: 18 (11-06-23 @ 19:45) (18 - 43)  SpO2: 64% (11-06-23 @ 19:30) (62% - 100%)  Wt(kg): --  Height (cm): 20 (11-06-23 @ 19:26)  Weight (kg): 145 (11-06-23 @ 12:30)  BMI (kg/m2): 3625 (11-06-23 @ 19:26)  BSA (m2): 0.52 (11-06-23 @ 19:26)      11-05-23 @ 07:01  -  11-06-23 @ 07:00  --------------------------------------------------------  IN: 580 mL / OUT: 620 mL / NET: -40 mL    11-06-23 @ 07:01  -  11-06-23 @ 19:58  --------------------------------------------------------  IN: 107.4 mL / OUT: 0 mL / NET: 107.4 mL        Physical Exam:  	Gen: morbidly obese; intubated and sedated  	HEENT: intubated   	Pulm: difficult to auscultate given body habitus  	CV: difficult to auscultate   	Abd: soft; obese abdomen      	Ext: +2 LE edema   	Neuro: sedated           : Mercedes+   	Skin: Warm and dry; +cold, mottled   	Dialysis access:     LABS/STUDIES  --------------------------------------------------------------------------------              9.0    55.21 >-----------<  152      [11-06-23 @ 19:17]              29.7     143  |  92  |  69  ----------------------------<  48      [11-06-23 @ 19:17]  7.2   |  16  |  1.74        Ca     8.2     [11-06-23 @ 19:17]      Mg     2.5     [11-06-23 @ 19:17]      Phos  13.7     [11-06-23 @ 19:17]    TPro  4.5  /  Alb  2.2  /  TBili  2.4  /  DBili  x   /  AST  x   /  ALT  x   /  AlkPhos  119  [11-06-23 @ 19:17]    PT/INR: PT 26.5 , INR 2.59       [11-06-23 @ 19:17]  PTT: 69.7       [11-06-23 @ 19:17]      Creatinine Trend:  SCr 1.74 [11-06 @ 19:17]  SCr 1.68 [11-06 @ 15:49]  SCr 1.63 [11-06 @ 15:03]  SCr 0.93 [11-05 @ 06:43]  SCr 0.78 [11-04 @ 05:56]    Urinalysis - [11-06-23 @ 19:17]      Color  / Appearance  / SG  / pH       Gluc 48 / Ketone   / Bili  / Urobili        Blood  / Protein  / Leuk Est  / Nitrite       RBC  / WBC  / Hyaline  / Gran  / Sq Epi  / Non Sq Epi  / Bacteria       HBsAg Nonreact      [10-23-23 @ 23:05]  HCV 0.15, Nonreact      [10-23-23 @ 23:05]    KATHIE: titer 1:160, pattern Homogeneous      [10-30-23 @ 16:14]  dsDNA 251      [10-30-23 @ 16:14]  C3 Complement 18      [10-30-23 @ 16:14]  C4 Complement 2      [10-30-23 @ 16:14]  Rheumatoid Factor 55      [10-30-23 @ 16:14]  ANCA: cANCA Negative, pANCA Negative, atypical ANCA Indeterminate Method interference due to KATHIE Fluorescence      [10-22-23 @ 18:10]    Tacrolimus  Cyclosporine  Sirolimus  Mycophenolate  BK PCR  CMV PCR  Parvo PCR  EBV PCR

## 2023-11-06 NOTE — PROGRESS NOTE ADULT - PROBLEM/PLAN-2
abnormal lab result
DISPLAY PLAN FREE TEXT

## 2023-11-06 NOTE — CHART NOTE - NSCHARTNOTEFT_GEN_A_CORE
:  [place], [place]    INDICATION:  shock    PROCEDURE:  [ x] LIMITED ECHO  [ ] LIMITED CHEST  [ ] LIMITED RETROPERITONEAL  [x ] LIMITED ABDOMINAL  [ ] LIMITED DVT  [ ] NEEDLE GUIDANCE VASCULAR  [ ] NEEDLE GUIDANCE THORACENTESIS  [ ] NEEDLE GUIDANCE PARACENTESIS  [ ] NEEDLE GUIDANCE PERICARDIOCENTESIS  [ ] OTHER    FINDINGS:  limited echo views.  Moderate to severely reduced LV and RV systolic function on PSL, PSS, AP4 views. RV>LV. Intraventricular septal bounce. Following administration of bicarb and initiation of pressor including epi gtt, there was improvement in biventricular function with moderately reduced LV function, still with severe RV systolic function but qualitatively improved from previously. Poor subcostal window, IVC does appear collapsed.    No obvious free intraabdominal fluid seen within hepatorenal, splenorenal recesses or around bladder, note limited views.    INTERPRETATION:  Reduced biventricular systolic function  Collapsed IVC c/w bleed    Images uploaded to [employer]    Time spent on the procedure was separate from the critical care time spent for patient care.

## 2023-11-06 NOTE — PROGRESS NOTE ADULT - ASSESSMENT
38F with PMH DVT/PE (on eliquis), ?SLE on chronic steroids, asthma, HFpEF, class 3 obesity who iniitally presented to [place] with weakness, edema, fluid overload. Transferred to [facility] due to concern for cardiogenic shock due to RV dysfunction and severe pulmonary hypertension. Started on sildenafil at [facility] as per Dr Del Castillo and transferred on milrinone now weaned off. Labs here concerning for APLS and lupus.     #Pulmonary hypertension  -Grouping still unknown at present. Could have group 1 in the setting of autoimmune disease, group 2 in the setting of HFpEF, group 3 in the setting of likely OHS/MICHAEL, and group 4 in the setting of known PE and APLS  -CTPA negative for clots, mild mosaic attenuation possibly 2/2 PHTN    Recommendations:  - please order V/Q scan  - will need RHC when optimized from volume status  - will discuss with Dr. Del Castillo whether to hold sildenafil prior to RHC  - c/w sildenafil for now  - c/w bumex 2 IV bid  - defer to rheum regarding steroid taper  - please start BiPAP at night, can do 15/8 for hypercapnia  - please also give diamox today for alkalosis  - please check gas in the morning    Vince Lebron MD  PGY4 Fellow PCCM  TEAMS preferred  Pager: BEN [phone]/[facility] [phone]  38F with PMH DVT/PE (on eliquis), ?SLE on chronic steroids, asthma, HFpEF, class 3 obesity who iniitally presented to [place] with weakness, edema, fluid overload. Transferred to [facility] due to concern for cardiogenic shock due to RV dysfunction and severe pulmonary hypertension. Started on sildenafil at [facility] as per Dr Del Castillo and transferred on milrinone now weaned off. Labs here concerning for APLS and lupus.     #Pulmonary hypertension  -Grouping still unknown at present. Could have group 1 in the setting of autoimmune disease, group 2 in the setting of HFpEF, group 3 in the setting of likely OHS/MICHAEL, and group 4 in the setting of known PE and APLS  -CTPA negative for clots, mild mosaic attenuation possibly 2/2 PHTN     patient with significant decompensation this morning.  Necessitated transfer to ICU for pressor assisted diuresis.  Per discussions with MICU team and Dr. Del Castillo patient will be initiated on IV Flolan, diuretics, pressors. patient was also febrile and had new leukocytosis this morning concerning for new infection.  Patient was given a stress dose steroids given that patient is currently undergoing steroid wean.  will defer further management to MICU team.  Pulmonary will follow  if patient recovers enough to leave the ICU.  Overall it seems that the plan will be to treat for pulmonary hypertension and will continue work-up if/when patient recovers.

## 2023-11-06 NOTE — PROGRESS NOTE ADULT - PROBLEM SELECTOR PLAN 2
- Pt w/ elevated pulmonary filling pressures on swan at [facility]   - Last values 10/26 CVP 9, /31/54, PCWP 7, PA sat 61.9%, PVR 6.1 wilkinson, , /71/91, Tg CO/CI 7.7/3.0 on milrinone 0.250, FIO2 40%. CTPE 11/1 - no PE, but unchanged diffuse pulm mosaic attenuation may be CTEPH   - Thought to be due to prior PE (Group 4), OHS, MICHAEL (Group 3), autoimmune process. Less likely left-sided disease (Group 2)   - On Bumex 1 mg IVP BID  - Dr. Del Castillo from Pulmonology following and Pulm c/s team, appreciate recommendations             - V/Q scan, RHC, will need sildenafil held before RHC.  - Discussed w/ Cardiology on 11/2 re: RHC, recommended reaching out to interventionalist on call for study since pulmonology is directing case - was unable to reach. Pt previously seen by [facility] ([phone]). Will reach out to arrange RHC, otherwise will need to reach out to house interventionalist during the week for study.  - Pt likely will need more aggressive diuresis to lie flat for V/Q scan and ultimately RHC - will f/u w/ pulm team re: diuresis during the week

## 2023-11-06 NOTE — PROGRESS NOTE ADULT - NUTRITIONAL ASSESSMENT
This patient has been assessed with a concern for Malnutrition and has been determined to have a diagnosis/diagnoses of Morbid obesity (BMI > 40).    This patient is being managed with:   Diet Regular-  Consistent Carbohydrate {No Snacks} (CSTCHO)  Low Sodium  Entered: Nov 2 2023  4:20PM  

## 2023-11-06 NOTE — PROGRESS NOTE ADULT - ASSESSMENT
38-year-old woman with a history of DVT, recurrent PE,  HFpEF and recent prednisone treatment for suspected SLE, was admitted to [facility] for pneumonia and right heart failure. She was started on steroid 1 g daily for 3 days for SLE flare. During her stay, she developed SARAH, shock liver, acute respiratory failure due to severe pulmonary hypertension. She was transferred to [facility] for pulmonary hypertension management with sildenafil.    # Hypovolemic shock, likely due to bleeding   - Hg went down to 5 g    # SLE    - Pt has alopecia, malar rash, photophobia, and polyarthritis, DVT/PE   - KATHIE 1:2560 homogenous dsDNA 671, Sm 2.1, Ro >8 , La>8 , - Low complement c3 76 and c4 4, lymphopenia  and thrombocytopenia plts 121; negative RNP/CCP/ ACL/ ANCA/panda/centromere/scl70/  - UA showed spot protein/creatinine ratio 0.3 g daily     # APS  - Lab revealed B2GP IgG >150 B2GP Ig A >150 and Lupus anticoagulant positive  -h/o recurrent DVT/PE, on Xarelto    # Severe Pulmonary HTN, likely multifactorial including Obesity, MICHAEL, chronic pulmonary embolism, HFpEF, and possible SLE?  - TTE 10/31: Estimated pulmonary artery systolic pressure is 53 mmHg ( was 45 in 7/23)   - Currently on sildenafil 20 mg TID  - Pending for V/Q scan and Right heart cath     # Right upper extremity rash - livedo racemosa however is slightly scaly so may not be      Recommendations:   - Please continue with Solumedrol 40 mg IV daily  - cw Plaquenil 200 mg twice daily  - Patient should be started on Coumadin before being discharged from the hospital.    D/w Dr. Pino Curiel MD  PGY-4  Rheumatology Fellow  Reachable on TEAMS  [phone]  38-year-old woman with a history of DVT, recurrent PE,  HFpEF and recent prednisone treatment for suspected SLE, was admitted to [facility] for pneumonia and right heart failure. She was started on steroid 1 g daily for 3 days for SLE flare. During her stay, she developed SARAH, shock liver, acute respiratory failure due to severe pulmonary hypertension. She was transferred to [facility] for pulmonary hypertension management with sildenafil.    # Hypovolemic shock, likely due to bleeding   - Hg went down to 5 g    # SLE    - Pt has alopecia, malar rash, photophobia, and polyarthritis, DVT/PE   - KATHIE 1:2560 homogenous dsDNA 671, Sm 2.1, Ro >8 , La>8 , - Low complement c3 76 and c4 4, lymphopenia  and thrombocytopenia plts 121; negative RNP/CCP/ ACL/ ANCA/panda/centromere/scl70/  - UA showed spot protein/creatinine ratio 0.3 g daily     # APS  - Lab revealed B2GP IgG >150 B2GP Ig A >150 and Lupus anticoagulant positive  -h/o recurrent DVT/PE, on Xarelto    # Severe Pulmonary HTN, likely multifactorial including Obesity, MICHAEL, chronic pulmonary embolism, HFpEF, and possible SLE?  - TTE 10/31: Estimated pulmonary artery systolic pressure is 53 mmHg ( was 45 in 7/23)   - Currently on sildenafil 20 mg TID  - Pending for V/Q scan and Right heart cath     # Right upper extremity rash - livedo racemosa however is slightly scaly so may not be  - Dermatology follow up     Recommendations:   - Hold Solumedrol at 40 mg for now while the patient is on Hydrocortisone at 150 mg per day, can switch back to Solumedrol 30 mg IV once the patient is no longer need to be on Hydrocortisone.  - C/w Plaquenil 200 mg twice daily  - Patient should be started on Coumadin before being discharged from the hospital.    D/w Dr. Pino Curiel MD  PGY-4  Rheumatology Fellow  Reachable on TEAMS  [phone]  38-year-old woman with a history of DVT, recurrent PE,  HFpEF and recent prednisone treatment for suspected SLE, was admitted to [facility] for pneumonia and right heart failure. She was started on steroid 1 g daily for 3 days for SLE flare. During her stay, she developed SARAH, shock liver, acute respiratory failure due to severe pulmonary hypertension. She was transferred to [facility] for pulmonary hypertension management with sildenafil.    # Hypovolemic shock, likely due to bleeding   - Hg went down to 5 g    # SLE    - Pt has alopecia, malar rash, photophobia, and polyarthritis, DVT/PE   - KATHIE 1:2560 homogenous dsDNA 671, Sm 2.1, Ro >8 , La>8 , - Low complement c3 76 and c4 4, lymphopenia  and thrombocytopenia plts 121; negative RNP/CCP/ ACL/ ANCA/panda/centromere/scl70/  - UA showed spot protein/creatinine ratio 0.3 g daily     # APS  - Lab revealed B2GP IgG >150 B2GP Ig A >150 and Lupus anticoagulant positive  -h/o recurrent DVT/PE, on Xarelto    # Severe Pulmonary HTN, likely multifactorial including Obesity, MICHAEL, chronic pulmonary embolism, HFpEF, and possible SLE?  - TTE 10/31: Estimated pulmonary artery systolic pressure is 53 mmHg ( was 45 in 7/23)   - Currently on sildenafil 20 mg TID  - Pending for V/Q scan and Right heart cath when more stable     # Right upper extremity rash - livedo racemosa however is slightly scaly so may not be  - Dermatology follow up     Recommendations:   - Hold Solumedrol at 40 mg for now, agree with stress dose steroids per ICU team - Hydrocortisone at 150 mg per day, can switch back to Solumedrol 30 mg IV once the patient is no longer need to be on Hydrocortisone.  - C/w Plaquenil 200 mg twice daily  - Patient should be started on Coumadin before being discharged from the hospital.    D/w Dr. Pino Curiel MD  PGY-4  Rheumatology Fellow  Reachable on TEAMS  [phone]

## 2023-11-06 NOTE — CHART NOTE - NSCHARTNOTEFT_GEN_A_CORE
MICU Accept Note  39 y/o F with a h/o b/l DVTs, recurrent PE on eliquis, HFpEF, morbid obesity, asthma, chronic hyperkalemia, admitted to [place] on 10/18 with complaints of weakness, worsening LE edema, cough, and rapid weight gain.Of note, was being worked up as an outpatient for possible SLE given facial rash, photophobia, and joint pains and had been started on prednisone 20 mg by her PCP x ~1 month prior to arrival at [facility]. Outpatient  results from 1 week prior showing low C3/4 complement and elevated CRP. Pt was admitted to medicine for treatment of PNA. Hospital course complicated by progressive SARAH, HAGMA, transaminitis and acute hypoxemic respiratory failure secondary to cardiogenic shock in the face of severe pHTN with acute on chronic cor pulmonale.     Her pHTN was thought to be likely multifactorial due to PE/OHS/MICHAEL/autoimmune process. Patient cardiogenic shock and iniated on milrinone on 10/20. She had a [place] placement on 10/23 revealing elevated pulmonary pressures, and required levophed from 10/22-23 for pressure support, diuresis with bumex was also initiated on 10/22 and maintained, and is now s/p inshaled nitric oxide.. Patient shock state has improved. Currently on .125 of milrinone. Bumex DC'd with increasing cr. Patient persistently hypoxic, requiring HFNC. She was initiated on sildenafil on 10/26, now on 20 TID after conversation with  Dr. Del Castillo. . S/P emperic treatment for PNA with zosyn 10/19-26. She was placed on a heparin gtt due to c/f APLS    Presenting for a [place] [facility] At this time patient was taken off of milrinone. Admitted to MICU 10/30. In [facility] MICU - patient was taken off milrinone. BP stable off pressors. Continued with bumex 2g BID. 3L output overnight. Currently on 4L NC.   Rheumatology was consulted for new diagnosis of SLE vs APLS.     She was transferred to the floors 10/31. While on floors unable to tolerate BiPAP d/t claustrophobia, had worsening shortness of breath, tremors that worsened with initiation of steroids. She was diuresed with bumex 1mg BID, pending RHC, CTA chest showed chronic thromboembolic disease. She was initiated on plaquenil for new diagnosis of SLE.     on 11/6 a rapid was called for hypotension and worsening mental status. Patient was placed on levophed and transferred to MICU for further management.       INTERVAL HISTORY:    PAST MEDICAL & SURGICAL HISTORY:  Pulmonary embolism      DVT, lower extremity      CHF (congestive heart failure)      Asthma      Anxiety      Severe obesity (BMI >= 40)      Hypertension      No significant past surgical history          FAMILY HISTORY:  Family history of colon cancer (Mother)    Family history of stroke (Father)        Social History:  No hx of smoking, alcohol use. Occupation - advertising operations  Sister with Rheumatoid Arthritis   Father with stroke at 50, Mother diagnosed with Colon Cx 55 (30 Oct 2023 16:04)      HOME MEDICATIONS:  Home Medications:  amiodarone 200 mg oral tablet: 1 tab(s) orally once a day (05 Nov 2023 13:53)  hydroxychloroquine 200 mg oral tablet: 1 tab(s) orally 2 times a day (05 Nov 2023 08:21)  insulin glargine 100 units/mL subcutaneous solution: 15 unit(s) subcutaneous once a day (at bedtime) (30 Oct 2023 15:50)  ipratropium-albuterol 0.5 mg-2.5 mg/3 mL inhalation solution: 3 milliliter(s) inhaled every 6 hours As needed Shortness of Breath and/or Wheezing (30 Oct 2023 15:50)  melatonin 3 mg oral tablet: 1 tab(s) orally once a day (at bedtime) As needed Insomnia (30 Oct 2023 15:50)  PARoxetine 20 mg oral tablet: 1 tab(s) orally once a day (05 Nov 2023 08:03)  polyethylene glycol 3350 oral powder for reconstitution: 17 gram(s) orally once a day As needed Constipation (30 Oct 2023 15:50)  sildenafil 20 mg oral tablet: 1 tab(s) orally 3 times a day (30 Oct 2023 15:50)  spironolactone 50 mg oral tablet: 1 tab(s) orally once a day (30 Oct 2023 15:50)  Toprol- mg oral tablet, extended release: 1 tab(s) orally once a day (30 Oct 2023 15:50)      Allergies    ceftriaxone (Hives)  iodine (Hives)  shellfish (Hives)    Intolerances        REVIEW OF SYSTEMS:  + nausea, shortness of breath      OBJECTIVE:  ICU Vital Signs Last 24 Hrs  T(C): 36.5 (06 Nov 2023 08:20), Max: 36.9 (05 Nov 2023 20:37)  T(F): 97.7 (06 Nov 2023 08:20), Max: 98.4 (05 Nov 2023 20:37)  HR: 136 (06 Nov 2023 13:00) (81 - 136)  BP: 93/51 (06 Nov 2023 12:45) (79/64 - 119/68)  BP(mean): 60 (06 Nov 2023 12:45) (60 - 60)  ABP: --  ABP(mean): --  RR: 25 (06 Nov 2023 13:00) (18 - 38)  SpO2: 100% (06 Nov 2023 13:00) (91% - 100%)    O2 Parameters below as of 06 Nov 2023 08:20  Patient On (Oxygen Delivery Method): nasal cannula  O2 Flow (L/min): 2            11-05 @ 07:01  -  11-06 @ 07:00  --------------------------------------------------------  IN: 580 mL / OUT: 620 mL / NET: -40 mL    11-06 @ 07:01  -  11-06 @ 13:14  --------------------------------------------------------  IN: 107.4 mL / OUT: 0 mL / NET: 107.4 mL      CAPILLARY BLOOD GLUCOSE      POCT Blood Glucose.: 253 mg/dL (06 Nov 2023 11:40)      PHYSICAL EXAM:  GENERAL: NAD  HEAD:  Atraumatic, Normocephalic  EYES: EOMI, conjunctiva and sclera clear  CHEST/LUNG: Clear to auscultation bilaterally; No rales, rhonchi, wheezing, or rubs  HEART: Regular rate and rhythm; No murmurs, rubs, or gallops  ABDOMEN: Soft, Nontender, Nondistended;   SKIN: No rashes or lesions. Trace edema on LLE, 1+ to mid-shins on RLE  NERVOUS SYSTEM:  Alert & Oriented X3, no focal deficits      LINES:     HOSPITAL MEDICATIONS:  MEDICATIONS  (STANDING):  acetaminophen   IVPB .. 1000 milliGRAM(s) IV Intermittent once  aMIOdarone    Tablet 200 milliGRAM(s) Oral daily  budesonide 160 MICROgram(s)/formoterol 4.5 MICROgram(s) Inhaler 2 Puff(s) Inhalation two times a day  buMETAnide Injectable 1 milliGRAM(s) IV Push every 12 hours  chlorhexidine 2% Cloths 1 Application(s) Topical <User Schedule>  dextrose 5%. 1000 milliLiter(s) (100 mL/Hr) IV Continuous <Continuous>  dextrose 5%. 1000 milliLiter(s) (50 mL/Hr) IV Continuous <Continuous>  dextrose 50% Injectable 25 Gram(s) IV Push once  dextrose 50% Injectable 12.5 Gram(s) IV Push once  dextrose 50% Injectable 25 Gram(s) IV Push once  fluocinonide 0.05% Solution 1 Application(s) Topical daily  glucagon  Injectable 1 milliGRAM(s) IntraMuscular once  heparin  Infusion. 1600 Unit(s)/Hr (16 mL/Hr) IV Continuous <Continuous>  hydrocortisone sodium succinate Injectable 50 milliGRAM(s) IV Push once  hydrocortisone sodium succinate Injectable 50 milliGRAM(s) IV Push once  hydroxychloroquine 200 milliGRAM(s) Oral two times a day  insulin glargine Injectable (LANTUS) 8 Unit(s) SubCutaneous at bedtime  insulin lispro (ADMELOG) corrective regimen sliding scale   SubCutaneous three times a day before meals  insulin lispro (ADMELOG) corrective regimen sliding scale   SubCutaneous at bedtime  insulin lispro Injectable (ADMELOG) 4 Unit(s) SubCutaneous three times a day before meals  methylPREDNISolone sodium succinate Injectable 30 milliGRAM(s) IV Push daily  montelukast 10 milliGRAM(s) Oral daily  mupirocin 2% Nasal 1 Application(s) Both Nostrils two times a day  norepinephrine Infusion 0.05 MICROgram(s)/kG/Min (14.3 mL/Hr) IV Continuous <Continuous>  PARoxetine 20 milliGRAM(s) Oral daily  piperacillin/tazobactam IVPB.- 3.375 Gram(s) IV Intermittent once  piperacillin/tazobactam IVPB.. 3.375 Gram(s) IV Intermittent every 8 hours  polyethylene glycol 3350 17 Gram(s) Oral two times a day  senna 2 Tablet(s) Oral at bedtime  sildenafil (REVATIO) 20 milliGRAM(s) Oral three times a day    MEDICATIONS  (PRN):  acetaminophen     Tablet .. 650 milliGRAM(s) Oral every 6 hours PRN Temp greater or equal to 38C (100.4F), Mild Pain (1 - 3)  albuterol/ipratropium for Nebulization 3 milliLiter(s) Nebulizer every 6 hours PRN Shortness of Breath  bisacodyl Suppository 10 milliGRAM(s) Rectal daily PRN Constipation  dextrose Oral Gel 15 Gram(s) Oral once PRN Blood Glucose LESS THAN 70 milliGRAM(s)/deciliter  heparin   Injectable 64471 Unit(s) IV Push every 6 hours PRN For aPTT less than 40  heparin   Injectable 5000 Unit(s) IV Push every 6 hours PRN For aPTT between 40 - 57  ondansetron Injectable 4 milliGRAM(s) IV Push every 8 hours PRN Nausea and/or Vomiting      LABS:                        8.2    17.27 )-----------( 236      ( 06 Nov 2023 06:12 )             27.4     11-05    141  |  93<L>  |  68<H>  ----------------------------<  134<H>  3.4<L>   |  41<H>  |  0.93    Ca    9.3      05 Nov 2023 06:43  Phos  2.5     11-05  Mg     1.8     11-05    TPro  6.0  /  Alb  2.9<L>  /  TBili  1.6<H>  /  DBili  x   /  AST  19  /  ALT  52<H>  /  AlkPhos  67  11-05    PTT - ( 06 Nov 2023 06:12 )  PTT:59.7 sec      Venous: 11-06-23 @ 06:05 FiO2: -- Oxygen Sat% 98.4    Urinalysis Basic - ( 05 Nov 2023 06:43 )    Color: x / Appearance: x / SG: x / pH: x  Gluc: 134 mg/dL / Ketone: x  / Bili: x / Urobili: x   Blood: x / Protein: x / Nitrite: x   Leuk Esterase: x / RBC: x / WBC x   Sq Epi: x / Non Sq Epi: x / Bacteria: x          CAPILLARY BLOOD GLUCOSE      POCT Blood Glucose.: 253 mg/dL (06 Nov 2023 11:40)  POCT Blood Glucose.: 260 mg/dL (06 Nov 2023 11:25)  POCT Blood Glucose.: 273 mg/dL (06 Nov 2023 07:42)  POCT Blood Glucose.: 249 mg/dL (05 Nov 2023 21:10)  POCT Blood Glucose.: 184 mg/dL (05 Nov 2023 17:11)      RADIOLOGY & ADDITIONAL TESTS:    39yo female with pmh of BMI > 40, CHF-diastolic, B/L LE DVT + PE on Eliquis, HTN, Asthma,eczema and Anxiety presents to the ED w/ 1 week of worsening SOB on Exertion. Admitted to [facility] where pt was found to have cardiogenic shock, hypoxic respiratory failure requiring HFNC ISO of severe pHTN with acute on chronic cor pulmonale, requiring inotropes, vasopressors, pulmonary vasodilators and IV diuretics. Also found to have newly diagnosed SLE and APLS treated with steroids. Her course c/b transaminitis and SARAH. Now transfered to [facility] for management of pulmonary HTN.     #Neuro   A&Ox3    #Respiratory  Acute on Chronic respiratory failure 2/2 to CHF and pulmonary Hypertension   - on 4NC currently   Bumex 2g qd - Net negative goal 1L - 2L  Currently with metabolic alkosis, s/p diamox 2501x  w/ potassium repletion, will recheck gas and diures based on results  > Duonebs q6, Symbicort   > Montelukast 10mg daily     #Pulmonary Hypertension - Unclear primary / autoimmune etiology /vs Chronic PE. however went to [facility] for profound volume overload ISO Cor Pulmonale  - Hemodynamics and Echo as Below   > f/u with Dr. Del Castillo (Pulmonary HTN specialist)   > C/w Sildenafil 20  > CT angio chest evaluation for CTEPH - Patient had hives w/ prior CT, f/u with radiology for protocol.   > Plan for RHC     #CV - Admitted for cardiogenic shock - Acute on chronic heart failure with preserved ejection fraction (HFpEF)  On Milrinone .125 will wean as tolerated, as low concern for LV dysfunction.   ECHO 10/22 - Dilated RV with severely reduced RV function   - Strict I&Os, Daily Weights, q24 labs   - Bumex 2g IV qd     Hemodynamics   10/26 milrinone 0.250 mcg/kg/min CVP 9 /31/54 PCWP 7 PA sat 61.9% /71/91  Tg CO/CI  7.7/3.0 FIO2 40% PVR 6.1 charlton  10/25 milrinone 0.250 mcg/kg/min+vaso 0.04 u/min+Teagan 5 ppm CVP 10 PA 96/21/42 PCWP 7 PA sat 63% /68/83 HR 99 Tg CO/CI  7.7/3.0 FIO2 40%  10/24 milrinone 0.250 mcg/kg/min+vaso 0.04 u/min+phenylephrine 0.1 mcg/kg/min+Teagan 11 ppm CVP 8 OA 85/24/44 PCWP n/a PA sat 89.3% HR 95 /36/76  10/23 (mil 0.25, levo .08, Teagan 12 ppm): -140s, /72 (87), CVP 5, PA 87/33/52, PCW 7, PA sat 78.2%, Tg CO/CI 9.6/3.8,  dsc, PVR 4.69 CHARLTON    Aflutter with RVR   - s/p Amiodarone load   - Continue with amio 200PO    #GI - Regular diet     # - SARAH - BUN / CR 35/.8 on admission - ATN 2/2 CV shock Improving   - Metabolic Alkaosis  w/ respiratory acidosis - Diamox 1x with goal bicarb 30-32, replete K to help with alkosis  -     #Endocrine   - Moderate ISS    #Rheum  4 weeks ago was noted to have a high titer KATHIE and was referred to Rheumatologist Dr Maldonado. Treated with pred 20 mg outpatient.  + SLE serology - C3C4 extremely low, ds DNA v high  > Repeat APLS, Sjogren Scleraderma labs   > Stress dose Hydrocortisone 100q8 10/26  > s/p 1g Solumedrol 10/27-30  > s/p Solumedrol 60q6  plan for taper, currently 40q12   f/u Rheum consults     #Hemeonc   - APLS + , hx of recurrent DVT / PE, takes Eliquis at Home. 6 years ago with bilateral DVT and associated PE. Has been taking eliquis since.   c/w Heparin ggt   - F/U - genetics hypercoagulable workup    #ID - treated for aspiration PNA (7d) - currently off Abx  - s/pZosyn 10/19-10/26  s/p Azitro 10/19-10/21  - Cx 10/22 NGTD     #Ethics   - FULL CODE MICU Accept Note  37 y/o F with a h/o b/l DVTs, recurrent PE on eliquis, HFpEF, morbid obesity, asthma, chronic hyperkalemia, admitted to [place] on 10/18 with complaints of weakness, worsening LE edema, cough, and rapid weight gain.Of note, was being worked up as an outpatient for possible SLE given facial rash, photophobia, and joint pains and had been started on prednisone 20 mg by her PCP x ~1 month prior to arrival at [place]. Outpatient  results from 1 week prior showing low C3/4 complement and elevated CRP. Pt was admitted to medicine for treatment of PNA. Hospital course complicated by progressive SARAH, HAGMA, transaminitis and acute hypoxemic respiratory failure secondary to cardiogenic shock in the face of severe pHTN with acute on chronic cor pulmonale.     Her pHTN was thought to be likely multifactorial due to PE/OHS/MICHAEL/autoimmune process. Patient cardiogenic shock and iniated on milrinone on 10/20. She had a [place] placement on 10/23 revealing elevated pulmonary pressures, and required levophed from 10/22-23 for pressure support, diuresis with bumex was also initiated on 10/22 and maintained, and is now s/p inshaled nitric oxide.. Patient shock state has improved. Currently on .125 of milrinone. Bumex DC'd with increasing cr. Patient persistently hypoxic, requiring HFNC. She was initiated on sildenafil on 10/26, now on 20 TID after conversation with  Dr. Del Castillo. . S/P emperic treatment for PNA with zosyn 10/19-26. She was placed on a heparin gtt due to c/f APLS    Presenting for a transfer from [facility]. At this time patient was taken off of milrinone. Admitted to MICU 10/30. In [facility] MICU - patient was taken off milrinone. BP stable off pressors. Continued with bumex 2g BID. 3L output overnight. Currently on 4L NC.   Rheumatology was consulted for new diagnosis of SLE vs APLS.     She was transferred to the floors 10/31. While on floors unable to tolerate BiPAP d/t claustrophobia, had worsening shortness of breath, tremors that worsened with initiation of steroids. She was diuresed with bumex 1mg BID, pending RHC, CTA chest showed chronic thromboembolic disease. She was initiated on plaquenil for new diagnosis of SLE.     on 11/6 a rapid was called for hypotension and worsening mental status. Patient was placed on levophed and transferred to MICU for further management.       INTERVAL HISTORY:    PAST MEDICAL & SURGICAL HISTORY:  Pulmonary embolism      DVT, lower extremity      CHF (congestive heart failure)      Asthma      Anxiety      Severe obesity (BMI >= 40)      Hypertension      No significant past surgical history          FAMILY HISTORY:  Family history of colon cancer (Mother)    Family history of stroke (Father)        Social History:  No hx of smoking, alcohol use. Occupation - advertising operations  Sister with Rheumatoid Arthritis   Father with stroke at 50, Mother diagnosed with Colon Cx 55 (30 Oct 2023 16:04)      HOME MEDICATIONS:  Home Medications:  amiodarone 200 mg oral tablet: 1 tab(s) orally once a day (05 Nov 2023 13:53)  hydroxychloroquine 200 mg oral tablet: 1 tab(s) orally 2 times a day (05 Nov 2023 08:21)  insulin glargine 100 units/mL subcutaneous solution: 15 unit(s) subcutaneous once a day (at bedtime) (30 Oct 2023 15:50)  ipratropium-albuterol 0.5 mg-2.5 mg/3 mL inhalation solution: 3 milliliter(s) inhaled every 6 hours As needed Shortness of Breath and/or Wheezing (30 Oct 2023 15:50)  melatonin 3 mg oral tablet: 1 tab(s) orally once a day (at bedtime) As needed Insomnia (30 Oct 2023 15:50)  PARoxetine 20 mg oral tablet: 1 tab(s) orally once a day (05 Nov 2023 08:03)  polyethylene glycol 3350 oral powder for reconstitution: 17 gram(s) orally once a day As needed Constipation (30 Oct 2023 15:50)  sildenafil 20 mg oral tablet: 1 tab(s) orally 3 times a day (30 Oct 2023 15:50)  spironolactone 50 mg oral tablet: 1 tab(s) orally once a day (30 Oct 2023 15:50)  Toprol- mg oral tablet, extended release: 1 tab(s) orally once a day (30 Oct 2023 15:50)      Allergies    ceftriaxone (Hives)  iodine (Hives)  shellfish (Hives)    Intolerances        REVIEW OF SYSTEMS:  + nausea, shortness of breath      OBJECTIVE:  ICU Vital Signs Last 24 Hrs  T(C): 36.5 (06 Nov 2023 08:20), Max: 36.9 (05 Nov 2023 20:37)  T(F): 97.7 (06 Nov 2023 08:20), Max: 98.4 (05 Nov 2023 20:37)  HR: 136 (06 Nov 2023 13:00) (81 - 136)  BP: 93/51 (06 Nov 2023 12:45) (79/64 - 119/68)  BP(mean): 60 (06 Nov 2023 12:45) (60 - 60)  ABP: --  ABP(mean): --  RR: 25 (06 Nov 2023 13:00) (18 - 38)  SpO2: 100% (06 Nov 2023 13:00) (91% - 100%)    O2 Parameters below as of 06 Nov 2023 08:20  Patient On (Oxygen Delivery Method): nasal cannula  O2 Flow (L/min): 2            11-05 @ 07:01  -  11-06 @ 07:00  --------------------------------------------------------  IN: 580 mL / OUT: 620 mL / NET: -40 mL    11-06 @ 07:01  -  11-06 @ 13:14  --------------------------------------------------------  IN: 107.4 mL / OUT: 0 mL / NET: 107.4 mL      CAPILLARY BLOOD GLUCOSE      POCT Blood Glucose.: 253 mg/dL (06 Nov 2023 11:40)      PHYSICAL EXAM:  GENERAL: NAD  HEAD:  Atraumatic, Normocephalic  EYES: EOMI, conjunctiva and sclera clear  CHEST/LUNG: Clear to auscultation bilaterally; No rales, rhonchi, wheezing, or rubs  HEART: Regular rate and rhythm; No murmurs, rubs, or gallops  ABDOMEN: Soft, Nontender, Nondistended;   SKIN: No rashes or lesions. Trace edema on LLE, 1+ to mid-shins on RLE  NERVOUS SYSTEM:  Alert & Oriented X3, no focal deficits      LINES:     HOSPITAL MEDICATIONS:  MEDICATIONS  (STANDING):  acetaminophen   IVPB .. 1000 milliGRAM(s) IV Intermittent once  aMIOdarone    Tablet 200 milliGRAM(s) Oral daily  budesonide 160 MICROgram(s)/formoterol 4.5 MICROgram(s) Inhaler 2 Puff(s) Inhalation two times a day  buMETAnide Injectable 1 milliGRAM(s) IV Push every 12 hours  chlorhexidine 2% Cloths 1 Application(s) Topical <User Schedule>  dextrose 5%. 1000 milliLiter(s) (100 mL/Hr) IV Continuous <Continuous>  dextrose 5%. 1000 milliLiter(s) (50 mL/Hr) IV Continuous <Continuous>  dextrose 50% Injectable 25 Gram(s) IV Push once  dextrose 50% Injectable 12.5 Gram(s) IV Push once  dextrose 50% Injectable 25 Gram(s) IV Push once  fluocinonide 0.05% Solution 1 Application(s) Topical daily  glucagon  Injectable 1 milliGRAM(s) IntraMuscular once  heparin  Infusion. 1600 Unit(s)/Hr (16 mL/Hr) IV Continuous <Continuous>  hydrocortisone sodium succinate Injectable 50 milliGRAM(s) IV Push once  hydrocortisone sodium succinate Injectable 50 milliGRAM(s) IV Push once  hydroxychloroquine 200 milliGRAM(s) Oral two times a day  insulin glargine Injectable (LANTUS) 8 Unit(s) SubCutaneous at bedtime  insulin lispro (ADMELOG) corrective regimen sliding scale   SubCutaneous three times a day before meals  insulin lispro (ADMELOG) corrective regimen sliding scale   SubCutaneous at bedtime  insulin lispro Injectable (ADMELOG) 4 Unit(s) SubCutaneous three times a day before meals  methylPREDNISolone sodium succinate Injectable 30 milliGRAM(s) IV Push daily  montelukast 10 milliGRAM(s) Oral daily  mupirocin 2% Nasal 1 Application(s) Both Nostrils two times a day  norepinephrine Infusion 0.05 MICROgram(s)/kG/Min (14.3 mL/Hr) IV Continuous <Continuous>  PARoxetine 20 milliGRAM(s) Oral daily  piperacillin/tazobactam IVPB.- 3.375 Gram(s) IV Intermittent once  piperacillin/tazobactam IVPB.. 3.375 Gram(s) IV Intermittent every 8 hours  polyethylene glycol 3350 17 Gram(s) Oral two times a day  senna 2 Tablet(s) Oral at bedtime  sildenafil (REVATIO) 20 milliGRAM(s) Oral three times a day    MEDICATIONS  (PRN):  acetaminophen     Tablet .. 650 milliGRAM(s) Oral every 6 hours PRN Temp greater or equal to 38C (100.4F), Mild Pain (1 - 3)  albuterol/ipratropium for Nebulization 3 milliLiter(s) Nebulizer every 6 hours PRN Shortness of Breath  bisacodyl Suppository 10 milliGRAM(s) Rectal daily PRN Constipation  dextrose Oral Gel 15 Gram(s) Oral once PRN Blood Glucose LESS THAN 70 milliGRAM(s)/deciliter  heparin   Injectable 87508 Unit(s) IV Push every 6 hours PRN For aPTT less than 40  heparin   Injectable 5000 Unit(s) IV Push every 6 hours PRN For aPTT between 40 - 57  ondansetron Injectable 4 milliGRAM(s) IV Push every 8 hours PRN Nausea and/or Vomiting      LABS:                        8.2    17.27 )-----------( 236      ( 06 Nov 2023 06:12 )             27.4     11-05    141  |  93<L>  |  68<H>  ----------------------------<  134<H>  3.4<L>   |  41<H>  |  0.93    Ca    9.3      05 Nov 2023 06:43  Phos  2.5     11-05  Mg     1.8     11-05    TPro  6.0  /  Alb  2.9<L>  /  TBili  1.6<H>  /  DBili  x   /  AST  19  /  ALT  52<H>  /  AlkPhos  67  11-05    PTT - ( 06 Nov 2023 06:12 )  PTT:59.7 sec      Venous: 11-06-23 @ 06:05 FiO2: -- Oxygen Sat% 98.4    Urinalysis Basic - ( 05 Nov 2023 06:43 )    Color: x / Appearance: x / SG: x / pH: x  Gluc: 134 mg/dL / Ketone: x  / Bili: x / Urobili: x   Blood: x / Protein: x / Nitrite: x   Leuk Esterase: x / RBC: x / WBC x   Sq Epi: x / Non Sq Epi: x / Bacteria: x          CAPILLARY BLOOD GLUCOSE      POCT Blood Glucose.: 253 mg/dL (06 Nov 2023 11:40)  POCT Blood Glucose.: 260 mg/dL (06 Nov 2023 11:25)  POCT Blood Glucose.: 273 mg/dL (06 Nov 2023 07:42)  POCT Blood Glucose.: 249 mg/dL (05 Nov 2023 21:10)  POCT Blood Glucose.: 184 mg/dL (05 Nov 2023 17:11)      RADIOLOGY & ADDITIONAL TESTS:    37yo female with pmh of BMI > 40, CHF-diastolic, B/L LE DVT + PE on Eliquis, HTN, Asthma,eczema and Anxiety presents to the ED w/ 1 week of worsening SOB on Exertion. Admitted to [facility] where pt was found to have cardiogenic shock, hypoxic respiratory failure requiring HFNC ISO of severe pHTN with acute on chronic cor pulmonale, requiring inotropes, vasopressors, pulmonary vasodilators and IV diuretics. Also found to have newly diagnosed SLE and APLS treated with steroids. Her course c/b transaminitis and SARAH. Now transfered to [facility] for management of pulmonary HTN.     ===========Neuro==================  A&Ox0  Intubated  Sedated    ===========Respiratory==================  Acute on Chronic respiratory failure 2/2 to CHF and pulmonary Hypertension '    #Intubated  Volume Control   Peep 5 FiO2 100 RR 15  F/up abg    #Pulmonary Hypertension - Unclear primary / autoimmune etiology /vs Chronic PE. however went to [facility] for profound volume overload ISO Cor Pulmonale  - Thought to be secondary to SLE  - Hemodynamics and Echo as Below   > f/u with Dr. Del Castillo (Pulmonary HTN specialist)   > C/w Sildenafil 20  > CT angio chest evaluation for CTEPH - shows chronic changes 2/2 to pulm HTN  - 11/6 flolan initiated    ========CV =========  Admitted for cardiogenic shock - Acute on chronic heart failure with preserved ejection fraction (HFpEF) at OSH  - s/p milrinone gtt  - currently on bumex gtt at 4mg/jr  - Strict I&Os, Daily Weights, q24 labs     #shock  - currently on epi, vasopressin, levophed  - ctm and titrate down as tolerated    #Aflutter with RVR   - s/p Amiodarone load   - Continue with amio 200PO    ======GI========  #Bleed  - Protonix 40 BID until confirmed pt not with GI bleed    #OGT   - f/up cxr   - will keep NPO iso of potential beled      ======Renal==============  #mixed metabolic/respiratory acidosis  - lactate >16  - may be iso of hypoperfusion  - bicarb <10  - c/w bicarb gtt, s/p 4 pushes of bicarb        ======Endocrine=======   #adrenal insufficiency  - stress dose steroids 11/6 due to hypotension  - may be iso of infection or bleed    ======Rheum==============  4 weeks ago was noted to have a high titer KATHIE and was referred to Rheumatologist Dr Maldonado. Treated with pred 20 mg outpatient.  + SLE serology - C3C4 extremely low, ds DNA v high  > Repeat APLS, Sjogren Scleraderma labs   > Stress dose Hydrocortisone 100q8 10/26  > s/p 1g Solumedrol 10/27-30  - was on solumedrol taper, stress dose restarted on 11/6 due to c/f adrenal insufficiency  f/u Rheum consults     ============Hemeonc========================   - APLS + , hx of recurrent DVT / PE, takes Eliquis at Home. 6 years ago with bilateral DVT and associated PE. Has been taking eliquis since.   c/w Heparin ggt   - F/U - genetics hypercoagulable workup    ==========ID================  treated for aspiration PNA (7d) - currently off Abx  - s/pZosyn 10/19-10/26  s/p Azitro 10/19-10/21  - Cx 10/22 NGTD   - restarted zosyn on 11/6  - blood urine and sputum cultures pending      ====Ethics=======   - FULL CODE MICU Accept Note  39 y/o F with a h/o b/l DVTs, recurrent PE on eliquis, HFpEF, morbid obesity, asthma, chronic hyperkalemia, admitted to [facility] on 10/18 with complaints of weakness, worsening LE edema, cough, and rapid weight gain.Of note, was being worked up as an outpatient for possible SLE given facial rash, photophobia, and joint pains and had been started on prednisone 20 mg by her PCP x ~1 month prior to arrival at [facility]. Outpatient  results from 1 week prior showing low C3/4 complement and elevated CRP. Pt was admitted to medicine for treatment of PNA. Hospital course complicated by progressive SARAH, HAGMA, transaminitis and acute hypoxemic respiratory failure secondary to cardiogenic shock in the face of severe pHTN with acute on chronic cor pulmonale.     Her pHTN was thought to be likely multifactorial due to PE/OHS/MICHAEL/autoimmune process. Patient cardiogenic shock and iniated on milrinone on 10/20. She had a Ashby placement on 10/23 revealing elevated pulmonary pressures, and required levophed from 10/22-23 for pressure support, diuresis with bumex was also initiated on 10/22 and maintained, and is now s/p inshaled nitric oxide.. Patient shock state has improved. Currently on .125 of milrinone. Bumex DC'd with increasing cr. Patient persistently hypoxic, requiring HFNC. She was initiated on sildenafil on 10/26, now on 20 TID after conversation with  Dr. Del Castillo. . S/P emperic treatment for PNA with zosyn 10/19-26. She was placed on a heparin gtt due to c/f APLS    Presenting for a transfer from [facility]. At this time patient was taken off of milrinone. Admitted to MICU 10/30. In [facility] MICU - patient was taken off milrinone. BP stable off pressors. Continued with bumex 2g BID. 3L output overnight. Currently on 4L NC.   Rheumatology was consulted for new diagnosis of SLE vs APLS.     She was transferred to the floors 10/31. While on floors unable to tolerate BiPAP d/t claustrophobia, had worsening shortness of breath, tremors that worsened with initiation of steroids. She was diuresed with bumex 1mg BID, pending RHC, CTA chest showed chronic thromboembolic disease. She was initiated on plaquenil for new diagnosis of SLE.     on 11/6 a rapid was called for hypotension and worsening mental status. Patient was placed on levophed and transferred to MICU for further management.       INTERVAL HISTORY:    PAST MEDICAL & SURGICAL HISTORY:  Pulmonary embolism      DVT, lower extremity      CHF (congestive heart failure)      Asthma      Anxiety      Severe obesity (BMI >= 40)      Hypertension      No significant past surgical history          FAMILY HISTORY:  Family history of colon cancer (Mother)    Family history of stroke (Father)        Social History:  No hx of smoking, alcohol use. Occupation - advertising operations  Sister with Rheumatoid Arthritis   Father with stroke at 50, Mother diagnosed with Colon Cx 55 (30 Oct 2023 16:04)      HOME MEDICATIONS:  Home Medications:  amiodarone 200 mg oral tablet: 1 tab(s) orally once a day (05 Nov 2023 13:53)  hydroxychloroquine 200 mg oral tablet: 1 tab(s) orally 2 times a day (05 Nov 2023 08:21)  insulin glargine 100 units/mL subcutaneous solution: 15 unit(s) subcutaneous once a day (at bedtime) (30 Oct 2023 15:50)  ipratropium-albuterol 0.5 mg-2.5 mg/3 mL inhalation solution: 3 milliliter(s) inhaled every 6 hours As needed Shortness of Breath and/or Wheezing (30 Oct 2023 15:50)  melatonin 3 mg oral tablet: 1 tab(s) orally once a day (at bedtime) As needed Insomnia (30 Oct 2023 15:50)  PARoxetine 20 mg oral tablet: 1 tab(s) orally once a day (05 Nov 2023 08:03)  polyethylene glycol 3350 oral powder for reconstitution: 17 gram(s) orally once a day As needed Constipation (30 Oct 2023 15:50)  sildenafil 20 mg oral tablet: 1 tab(s) orally 3 times a day (30 Oct 2023 15:50)  spironolactone 50 mg oral tablet: 1 tab(s) orally once a day (30 Oct 2023 15:50)  Toprol- mg oral tablet, extended release: 1 tab(s) orally once a day (30 Oct 2023 15:50)      Allergies    ceftriaxone (Hives)  iodine (Hives)  shellfish (Hives)    Intolerances        REVIEW OF SYSTEMS:  + nausea, shortness of breath      OBJECTIVE:  ICU Vital Signs Last 24 Hrs  T(C): 36.5 (06 Nov 2023 08:20), Max: 36.9 (05 Nov 2023 20:37)  T(F): 97.7 (06 Nov 2023 08:20), Max: 98.4 (05 Nov 2023 20:37)  HR: 136 (06 Nov 2023 13:00) (81 - 136)  BP: 93/51 (06 Nov 2023 12:45) (79/64 - 119/68)  BP(mean): 60 (06 Nov 2023 12:45) (60 - 60)  ABP: --  ABP(mean): --  RR: 25 (06 Nov 2023 13:00) (18 - 38)  SpO2: 100% (06 Nov 2023 13:00) (91% - 100%)    O2 Parameters below as of 06 Nov 2023 08:20  Patient On (Oxygen Delivery Method): nasal cannula  O2 Flow (L/min): 2            11-05 @ 07:01  -  11-06 @ 07:00  --------------------------------------------------------  IN: 580 mL / OUT: 620 mL / NET: -40 mL    11-06 @ 07:01  -  11-06 @ 13:14  --------------------------------------------------------  IN: 107.4 mL / OUT: 0 mL / NET: 107.4 mL      CAPILLARY BLOOD GLUCOSE      POCT Blood Glucose.: 253 mg/dL (06 Nov 2023 11:40)      PHYSICAL EXAM:  GENERAL: NAD  HEAD:  Atraumatic, Normocephalic  EYES: EOMI, conjunctiva and sclera clear  CHEST/LUNG: Clear to auscultation bilaterally; No rales, rhonchi, wheezing, or rubs  HEART: Regular rate and rhythm; No murmurs, rubs, or gallops  ABDOMEN: Soft, Nontender, Nondistended;   SKIN: No rashes or lesions. Trace edema on LLE, 1+ to mid-shins on RLE  NERVOUS SYSTEM:  Alert & Oriented X3, no focal deficits      LINES:     HOSPITAL MEDICATIONS:  MEDICATIONS  (STANDING):  acetaminophen   IVPB .. 1000 milliGRAM(s) IV Intermittent once  aMIOdarone    Tablet 200 milliGRAM(s) Oral daily  budesonide 160 MICROgram(s)/formoterol 4.5 MICROgram(s) Inhaler 2 Puff(s) Inhalation two times a day  buMETAnide Injectable 1 milliGRAM(s) IV Push every 12 hours  chlorhexidine 2% Cloths 1 Application(s) Topical <User Schedule>  dextrose 5%. 1000 milliLiter(s) (100 mL/Hr) IV Continuous <Continuous>  dextrose 5%. 1000 milliLiter(s) (50 mL/Hr) IV Continuous <Continuous>  dextrose 50% Injectable 25 Gram(s) IV Push once  dextrose 50% Injectable 12.5 Gram(s) IV Push once  dextrose 50% Injectable 25 Gram(s) IV Push once  fluocinonide 0.05% Solution 1 Application(s) Topical daily  glucagon  Injectable 1 milliGRAM(s) IntraMuscular once  heparin  Infusion. 1600 Unit(s)/Hr (16 mL/Hr) IV Continuous <Continuous>  hydrocortisone sodium succinate Injectable 50 milliGRAM(s) IV Push once  hydrocortisone sodium succinate Injectable 50 milliGRAM(s) IV Push once  hydroxychloroquine 200 milliGRAM(s) Oral two times a day  insulin glargine Injectable (LANTUS) 8 Unit(s) SubCutaneous at bedtime  insulin lispro (ADMELOG) corrective regimen sliding scale   SubCutaneous three times a day before meals  insulin lispro (ADMELOG) corrective regimen sliding scale   SubCutaneous at bedtime  insulin lispro Injectable (ADMELOG) 4 Unit(s) SubCutaneous three times a day before meals  methylPREDNISolone sodium succinate Injectable 30 milliGRAM(s) IV Push daily  montelukast 10 milliGRAM(s) Oral daily  mupirocin 2% Nasal 1 Application(s) Both Nostrils two times a day  norepinephrine Infusion 0.05 MICROgram(s)/kG/Min (14.3 mL/Hr) IV Continuous <Continuous>  PARoxetine 20 milliGRAM(s) Oral daily  piperacillin/tazobactam IVPB.- 3.375 Gram(s) IV Intermittent once  piperacillin/tazobactam IVPB.. 3.375 Gram(s) IV Intermittent every 8 hours  polyethylene glycol 3350 17 Gram(s) Oral two times a day  senna 2 Tablet(s) Oral at bedtime  sildenafil (REVATIO) 20 milliGRAM(s) Oral three times a day    MEDICATIONS  (PRN):  acetaminophen     Tablet .. 650 milliGRAM(s) Oral every 6 hours PRN Temp greater or equal to 38C (100.4F), Mild Pain (1 - 3)  albuterol/ipratropium for Nebulization 3 milliLiter(s) Nebulizer every 6 hours PRN Shortness of Breath  bisacodyl Suppository 10 milliGRAM(s) Rectal daily PRN Constipation  dextrose Oral Gel 15 Gram(s) Oral once PRN Blood Glucose LESS THAN 70 milliGRAM(s)/deciliter  heparin   Injectable 61915 Unit(s) IV Push every 6 hours PRN For aPTT less than 40  heparin   Injectable 5000 Unit(s) IV Push every 6 hours PRN For aPTT between 40 - 57  ondansetron Injectable 4 milliGRAM(s) IV Push every 8 hours PRN Nausea and/or Vomiting      LABS:                        8.2    17.27 )-----------( 236      ( 06 Nov 2023 06:12 )             27.4     11-05    141  |  93<L>  |  68<H>  ----------------------------<  134<H>  3.4<L>   |  41<H>  |  0.93    Ca    9.3      05 Nov 2023 06:43  Phos  2.5     11-05  Mg     1.8     11-05    TPro  6.0  /  Alb  2.9<L>  /  TBili  1.6<H>  /  DBili  x   /  AST  19  /  ALT  52<H>  /  AlkPhos  67  11-05    PTT - ( 06 Nov 2023 06:12 )  PTT:59.7 sec      Venous: 11-06-23 @ 06:05 FiO2: -- Oxygen Sat% 98.4    Urinalysis Basic - ( 05 Nov 2023 06:43 )    Color: x / Appearance: x / SG: x / pH: x  Gluc: 134 mg/dL / Ketone: x  / Bili: x / Urobili: x   Blood: x / Protein: x / Nitrite: x   Leuk Esterase: x / RBC: x / WBC x   Sq Epi: x / Non Sq Epi: x / Bacteria: x          CAPILLARY BLOOD GLUCOSE      POCT Blood Glucose.: 253 mg/dL (06 Nov 2023 11:40)  POCT Blood Glucose.: 260 mg/dL (06 Nov 2023 11:25)  POCT Blood Glucose.: 273 mg/dL (06 Nov 2023 07:42)  POCT Blood Glucose.: 249 mg/dL (05 Nov 2023 21:10)  POCT Blood Glucose.: 184 mg/dL (05 Nov 2023 17:11)      RADIOLOGY & ADDITIONAL TESTS:    37yo female with pmh of BMI > 40, CHF-diastolic, B/L LE DVT + PE on Eliquis, HTN, Asthma,eczema and Anxiety presents to the ED w/ 1 week of worsening SOB on Exertion. Admitted to [facility] where pt was found to have cardiogenic shock, hypoxic respiratory failure requiring HFNC ISO of severe pHTN with acute on chronic cor pulmonale, requiring inotropes, vasopressors, pulmonary vasodilators and IV diuretics. Also found to have newly diagnosed SLE and APLS treated with steroids. Her course c/b transaminitis and SARAH. Now transfered to [facility] for management of pulmonary HTN.     ===========Neuro==================  A&Ox0  Intubated  Sedated    ===========Respiratory==================  Acute on Chronic respiratory failure 2/2 to CHF and pulmonary Hypertension '    #Intubated  Volume Control   Peep 5 FiO2 100 RR 15  F/up abg    #Pulmonary Hypertension - Unclear primary / autoimmune etiology /vs Chronic PE. however went to [facility] for profound volume overload ISO Cor Pulmonale  - Thought to be secondary to SLE  - Hemodynamics and Echo as Below   > f/u with Dr. Del Castillo (Pulmonary HTN specialist)   > C/w Sildenafil 20  > CT angio chest evaluation for CTEPH - shows chronic changes 2/2 to pulm HTN  - 11/6 flolan initiated    ========CV =========  Admitted for cardiogenic shock - Acute on chronic heart failure with preserved ejection fraction (HFpEF) at OSH  - s/p milrinone gtt  - currently on bumex gtt at 4mg/jr  - Strict I&Os, Daily Weights, q24 labs     #shock  - currently on epi, vasopressin, levophed  - ctm and titrate down as tolerated    #Aflutter with RVR   - s/p Amiodarone load   - Continue with amio 200PO    ======GI========  #Bleed  - Protonix 40 BID until confirmed pt not with GI bleed    #OGT   - f/up cxr   - will keep NPO iso of potential beled      ======Renal==============  #mixed metabolic/respiratory acidosis  - lactate >16  - may be iso of hypoperfusion  - bicarb <10  - c/w bicarb gtt, s/p 4 pushes of bicarb        ======Endocrine=======   #adrenal insufficiency  - stress dose steroids 11/6 due to hypotension  - may be iso of infection or bleed    ======Rheum==============  4 weeks ago was noted to have a high titer KATHIE and was referred to Rheumatologist Dr Maldonado. Treated with pred 20 mg outpatient.  + SLE serology - C3C4 extremely low, ds DNA v high  > Repeat APLS, Sjogren Scleraderma labs   > Stress dose Hydrocortisone 100q8 10/26  > s/p 1g Solumedrol 10/27-30  - was on solumedrol taper, stress dose restarted on 11/6 due to c/f adrenal insufficiency  f/u Rheum consults     ============Hemeonc========================   - APLS + , hx of recurrent DVT / PE, takes Eliquis at Home. 6 years ago with bilateral DVT and associated PE. Has been taking eliquis since.   c/w Heparin ggt   - F/U - genetics hypercoagulable workup    ==========ID================  treated for aspiration PNA (7d) - currently off Abx  - s/pZosyn 10/19-10/26  s/p Azitro 10/19-10/21  - Cx 10/22 NGTD   - restarted zosyn on 11/6  - blood urine and sputum cultures pending      ====Ethics=======   - FULL CODE      Attestation Statements:    Attestation Statements:  I have personally seen and examined the patient.  I fully participated in the care of this patient.  I have made amendments to the documentation where necessary, and agree with the history, physical exam, and plan as documented by the Resident.     39 y/o morbidly obese F w/chornic hypoxemic respiratory failure, hypercoaguable disorder likely APLS (PE/DVT) on therapeutic AC, SLE, HFrEF, MICHAEL and pulmonary hypertension, initially admitted to OSH for acute on chronic respiratory failure and acute on chronic RV failure secondary to pulmonary hypertension transferred to [facility] for management of pulmonary hypertension. Patient initially improved and was undergoing workup of pulmonary hypertension when she was noted to be hypotensive. Patient was transferred to MICU and quickly deteriorated developing AMS and acute on chronic respiratory failure with hypoxemia. Patient was intubated and went on to develop profound refractory shock requiring multiple pressors as well as multi-organ failure, persistent severe lactic acidosis, hyperkalemia, and arrythmia. Initial bedside POCUS showed poor LV and RV contractility with LV hypertrophy and poor filling, as well as RV overload with septal flattening concerning for worsening pulmonary hypertension as etiology of hypotension. BP continued to worsen despite initiation of inotroping supprot and multiple pressors and initiation of flolan. Repeat echo showed improvement in biventricular contractility and decrease in septal flattening, however repeat blood work came back with hgb < 6 down from > 9 leading to concern for acute blood loss anemia as driving force for hypotension. Patient was transfused multiple units of PRBCs with some improvement in BP, however patient continues to require multiple pressors, bicarb gtt, amio, and medical management of hyperkalemia. Patient is too unstable for initiation of CRRT. SARAH likely ATN. Shock liver.     - Sedation as needed  - Continue mechanical ventilation  - Continue pressor support goal MAP >= 65  - Amio as needed for arrythmia  - Continue flolan  - Hold therapeutic AC  - Trend CBC, transfuse PRN for hypotension/hgb < 8  - Bicarb gtt  - Medical management of hyperkalemia, renal consult if patient stabilizes  - Full code  - Guarded prognosis    Patient is critically ill, requiring critical care services.     Attending: I have personally and independently provided 75 minutes of critical care services.  This excludes any time spent on separate procedures or teaching.

## 2023-11-06 NOTE — PROGRESS NOTE ADULT - ASSESSMENT
___________________________  ASSESSMENT AND PLAN  # livedo reticularis/racemosa i/s/o known SLE and hypercoagulability. Hair loss likely telogen effluvium, itch and scale likely related to seborrheic dermatitis.    At this time:   - For rash: defer biopsy given suspected diagnosis; biopsy unlikely to . Continue with management of SLE per rheumatology recommendations  - Given unilateral nature of rash and history of several VTEs, recommend RUE Doppler to r/o DVT as underlying etiology of unilateral livedo rash  - For itch on scalp: c/w fluocinonide 0.05% solution to affected areas on scalp once daily. Use for 2 weeks at a time, 1 week break before resuming. Side effects of prolonged use include atrophy, skin thinning, steroid acne.   - Can follow-up with dermatology as an outpatient       The patient's chart was reviewed in addition to being seen and examined at bedside with the dermatology attending Dr. Nelson.  Recommendations were communicated with the primary team.  Please page [phone] with a 10-digit call-back number for further related questions.    Darcy Hale MD  Resident Physician, PGY-3  [facility] Dermatology  Pager: [phone]  Office: [phone]

## 2023-11-06 NOTE — PROCEDURE NOTE - NSINDICATIONS_GEN_A_CORE
arterial puncture to obtain ABG's/blood sampling/critical patient/monitoring purposes
critical illness/emergency venous access/volume resuscitation
shock
airway protection/respiratory failure

## 2023-11-06 NOTE — PROGRESS NOTE ADULT - PROBLEM SELECTOR PLAN 5
- Pt w/ metabolic alkalosis likely 2/2 overdiuresis in s/o bumex  - Bicarb 30's over last few days, pH 7.40  - CTM w/ daily VBGs  - Pulmonary following, appreciate recommendations - daily reassessment for diamox administration

## 2023-11-06 NOTE — CONSULT NOTE ADULT - REASON FOR ADMISSION
SOB - cardiogenic shock and Pulm HTN

## 2023-11-06 NOTE — PROGRESS NOTE ADULT - ATTENDING SUPERVISION STATEMENT
Fellow
Fellow
Resident
Fellow
Resident

## 2023-11-06 NOTE — PROGRESS NOTE ADULT - PROBLEM SELECTOR PLAN 4
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, undergoing work-up for APLS  - +Cardiolipilin 10/28, B2GP IgG >150 B2GP Ig A >150 and Lupus anticoagulant positive  - Continue Heparin drip,   - On DC Transition to Warfarin per Rheumatology Recommendations

## 2023-11-06 NOTE — PROGRESS NOTE ADULT - ATTENDING COMMENTS
Pt is current critically ill, defer acute management to ICU team including stress dose steroids. When pt more hemodynamically stable will attempt to taper down steroids and change A/C given high titer triple + APLS serologies.

## 2023-11-06 NOTE — CONSULT NOTE ADULT - PROBLEM SELECTOR RECOMMENDATION 2
Elevated K in setting of renal failure. Please give insulin/dextrose and lokelma 10g. Repeat Potassium levels.

## 2023-11-06 NOTE — PROCEDURE NOTE - NSPROCDETAILS_GEN_ALL_CORE
lumen(s) aspirated and flushed/sterile dressing applied
guidewire recovered/lumen(s) aspirated and flushed/sterile dressing applied/sterile technique, catheter placed/ultrasound guidance with use of sterile gel and probe cove
patient pre-oxygenated, tube inserted, placement confirmed
location identified, draped/prepped, sterile technique used, needle inserted/introduced/positive blood return obtained via catheter/connected to a pressurized flush line/sutured in place/hemostasis with direct pressure, dressing applied/Seldinger technique/all materials/supplies accounted for at end of procedure

## 2023-11-07 NOTE — CHART NOTE - NSCHARTNOTESELECT_GEN_ALL_CORE
MAR ACCEPT/Event Note
Shock Consult/Event Note
CT Angio Dens
Dermatology/Event Note
Event Note
Event Note
Goals of Care/Event Note
MICU Transfer/Transfer Note
MICU pocus note
POCUS/Event Note
Patient Expiration/Event Note

## 2023-11-07 NOTE — DISCHARGE NOTE FOR THE EXPIRED PATIENT - HOSPITAL COURSE
37 y/o F PMH b/l DVTs, recurrent PE on eliquis, HFpEF, morbid obesity, asthma, chronic hyperkalemia, admitted to [place] on 10/18 with complaints of weakness, worsening LE edema, cough, and rapid weight gain. Of note, patient was being worked up prior to admit for possible SLE given facial rash, photophobia, and joint pains.  Had been started on prednisone 20 mg by her PCP x ~1 month prior to arrival at [place]. Outpatient  results from 1 week prior showing low C3/4 complement and elevated CRP. Pt was admitted to medicine for treatment of PNA. Hospital course complicated by progressive SARAH, HAGMA, transaminitis and acute hypoxemic respiratory failure secondary to cardiogenic shock in the face of severe pHTN with acute on chronic cor pulmonale. Her pHTN was thought to be multifactorial due to PE/OHS/MICHAEL/autoimmune process. Developed cardiogenic shock and initiated on Milrinone on 10/20. She had a [place] placement on 10/23 revealing elevated pulmonary pressures, and required levophed from 10/22-23 for pressure support, diuresis with bumex was also initiated on 10/22 and maintained, and is now s/p inhaled nitric oxide. Patient persistently hypoxic, requiring HFNC. She was initiated on sildenafil on 10/26. S/P emperic treatment for PNA with zosyn 10/19-26. She was placed on a heparin gtt due to c/f APLS. Presenting for a transfer from [facility]. At this time patient was taken off of milrinone.     Patient transferred to [facility] MICU on 10/30 for further workup of pulmonary hypertension by Dr. Del Castillo. Upon arrival, patient without LV systolic dysfunction. Milrinone discontinued. Hemodynamically stable. Oxygenation improving with diuresis; tolerating nasal cannula.  Rheumatology was consulted for new diagnosis of SLE vs APLS.  She was transferred to the medicine service 10/31. Her course was complicated by worsening shortness of breath requiring diuresis and non-invasive ventilation.  Pt unable to tolerate BIPAP support. CT Chest 11/1 showing chronic thromboembolic pulmonary hypertension.  Right heart cath pending for pulm htn workup. Started on Heparin gtt for chronic thromboembolism/concern for APLS. She was initiated on plaquenil for new diagnosis of SLE. Course further complicated by new Aflutter requiring Amiodarone and SARAH.     A rapid response was called on 11/6 for altered mental status/hypotension. Pt with systolic BP in the 60s.  Noted to have acute drop in hemoglobin at that time. Resuscitated with IV bolus, Levophed, and stress dose steroids. Patient transferred to MICU where she was intubated for hypoxemic respiratory failure. Emergent central and arterial lines placed.  Low dose Fentanyl for pain control.  Noted to have severe RV dysfunction on bedside POCUS. Requiring multiple pressors to maintain MAP of 65.  Pt on Levophed at 5mcg, Phenylephrine at 8.5mcg, Epinephrine at 2mcg, and Vasopressin 0.1 units.  Bicarbonate gtt for persistent acidemia.  Multiple IVP doses bicarbonate throughout evening.  Hyperkalemia management with high dose albuterol, insulin/d50, lokelma, calcium as needed. Diuresis with Diuril/Bumex attempted to offload RV.  Nephrology consulted for CRRT. However, patient unstable with high dose pressor requirements. Family opted to see if patient would start to improve before proceeding with CRRT as it seemed unlikely that patient would tolerated CRRT well given rapidly escalating perssor doses. Worsening hypoglycemia in the setting of progressive multiorgan failure.  Multiple amps of d50 given, started on d10.  Patient made DNR by parents and brother/HCP, Edgar.      Shock consult placed at approximately 2030 on 11/6/23 given patient's persistently high vasopressor requirements, elevated lactate, and signs of poor organ perfusion including persistently altered mental status, cool extremities, and low urine output.  After discussion among members of shock team, it was felt that patient's condition had progressed such that the risks of pursuing mechanical circulatory support would likely outweigh any benefits that such therapy could provide.     Despite above aggressive measures, patient continued to decline. Episodes of bradycardia with prolonged pauses noted on telemetry.  Family updated by Dr Fishman.  Given continued decline with grim prognosis, family opted for comfort care.  Pressors, Bicarb, D10 discontinued. Mother at bedside/d/w Edgar over phone.  Asystole at 408 AM on 11/7. Emotional support provided. Family does not wish to pursue autopsy.        37 y/o F PMH b/l DVTs, recurrent PE on eliquis, HFpEF, morbid obesity, asthma, chronic hyperkalemia, admitted to [place] on 10/18 with complaints of weakness, worsening LE edema, cough, and rapid weight gain. Of note, patient was being worked up prior to admit for possible SLE given facial rash, photophobia, and joint pains.  Had been started on prednisone 20 mg by her PCP x ~1 month prior to arrival at [place]. Outpatient  results from 1 week prior showing low C3/4 complement and elevated CRP. Pt was admitted to medicine for treatment of PNA. Hospital course complicated by progressive SARAH, HAGMA, transaminitis and acute hypoxemic respiratory failure secondary to cardiogenic shock in the face of severe pHTN with acute on chronic cor pulmonale. Her pHTN was thought to be multifactorial due to PE/OHS/MICHAEL/autoimmune process. Developed cardiogenic shock and initiated on Milrinone on 10/20. She had a [place] placement on 10/23 revealing elevated pulmonary pressures, and required levophed from 10/22-23 for pressure support, diuresis with bumex was also initiated on 10/22 and maintained, and is now s/p inhaled nitric oxide. Patient persistently hypoxic, requiring HFNC. She was initiated on sildenafil on 10/26. S/P emperic treatment for PNA with zosyn 10/19-26. She was placed on a heparin gtt due to c/f APLS. Presenting for a transfer from [facility]. At this time patient was taken off of milrinone.     Patient transferred to [facility] MICU on 10/30 for further workup of pulmonary hypertension by Dr. Del Castillo. Upon arrival, patient without LV systolic dysfunction. Milrinone discontinued. Hemodynamically stable. Oxygenation improving with diuresis; tolerating nasal cannula.  Rheumatology was consulted for new diagnosis of SLE vs APLS.  She was transferred to the medicine service 10/31. Her course was complicated by worsening shortness of breath requiring diuresis and non-invasive ventilation.  Pt unable to tolerate BIPAP support. CT Chest 11/1 showing chronic thromboembolic pulmonary hypertension.  Right heart cath pending for pulm htn workup. Started on Heparin gtt for chronic thromboembolism/concern for APLS. She was initiated on plaquenil for new diagnosis of SLE. Course further complicated by new Aflutter requiring Amiodarone and SARAH.     A rapid response was called on 11/6 for altered mental status/hypotension. Pt with systolic BP in the 60s.  Noted to have acute drop in hemoglobin at that time. Resuscitated with IV bolus, Levophed, and stress dose steroids. Patient transferred to MICU where she was intubated for hypoxemic respiratory failure. Emergent central and arterial lines placed.  Low dose Fentanyl for pain control.  Noted to have severe RV dysfunction on bedside POCUS. Requiring multiple pressors to maintain MAP of 65.  Pt on Levophed at 5mcg, Phenylephrine at 8.5mcg, Epinephrine at 2mcg, and Vasopressin 0.1 units.  Bicarbonate gtt for persistent acidemia.  Multiple IVP doses bicarbonate throughout evening.  Hyperkalemia management with high dose albuterol, insulin/d50, lokelma, calcium as needed. Concern for acute bleed given drop in hemoglobin. Transfused 2 units PRBC with improvement in Hgb. Noted to have vaginal bleeding.  Hgb dropped again to 5--additional 2 units PRBC, 1 unit Plt, 1 FFP. Diuresis with Diuril/Bumex attempted to offload RV.  Nephrology consulted for CRRT. However, patient unstable with high dose pressor requirements. Family opted to see if patient would start to improve before proceeding with CRRT as it seemed unlikely that patient would tolerated CRRT well given rapidly escalating perssor doses. Worsening hypoglycemia in the setting of progressive multiorgan failure.  Multiple amps of d50 given, started on d10.  Patient made DNR by parents and brother/HCP, Edgar.      Shock consult placed at approximately 2030 on 11/6/23 given patient's persistently high vasopressor requirements, elevated lactate, and signs of poor organ perfusion including persistently altered mental status, cool extremities, and low urine output.  After discussion among members of shock team, it was felt that patient's condition had progressed such that the risks of pursuing mechanical circulatory support would likely outweigh any benefits that such therapy could provide.     Despite above aggressive measures, patient continued to decline. Episodes of bradycardia with prolonged pauses noted on telemetry.  Family updated by Dr Fishman.  Given continued decline with grim prognosis, family opted for comfort care.  Pressors, Bicarb, D10 discontinued. Mother at bedside/d/w Edgar over phone.  Asystole at 408 AM on 11/7. Emotional support provided. Family does not wish to pursue autopsy.

## 2023-11-07 NOTE — CHART NOTE - NSCHARTNOTEFT_GEN_A_CORE
Shock consult placed at approximately 2030 on 11/6/23 given patient's persistently high vasopressor requirements, elevated lactate, and signs of poor organ perfusion including persistently altered mental status, cool extremities, and low urine output.  After discussion with eICU, conference call arranged for further discussion for consideration of mechanical support.  After discussion among members of shock team, it was felt that patient's condition had progressed such that the risks of pursuing mechanical circulatory support would likely outweigh any benefits that such therapy could provide.  After confirming that members of the shock team were in agreement, conference call was ended.   The outcome of this discussion was then conveyed to patient's family, who voiced understanding.      Monico Fishman PGY6  [id number]

## 2023-11-07 NOTE — CHART NOTE - NSCHARTNOTEFT_GEN_A_CORE
Patient intubated, on Flolan and Nitric Oxide.  Maximum dose pressors--Levo, Gucci, Vaso, Epi.  Bicarbonate gtt for acidemia. D10 for hypoglycemia.     Despite aggressive measures, patient continuing to decompensate. Episodes of bradycardia with pauses and resultant drops in blood pressure.    Family updated regarding worsening status. Given no improvement in patient status despite aggressive measures, family opted to pursue comfort care.    Levo, Gucci, Vaso, Epi, Sodium Bicarbonate, and D10 discontinued.    Fent as needed for pain control.    Mother at bedside throughout.  Pt's brother, Edgar Epperson, is patient's HCP.  Agrees with pursuing comfort care at this time.    Patient  at 408 AM.    Edgar informed via telephone.  No autopsy. Emotional support provided.    Naomy Waters, TATA

## 2023-11-11 LAB
CULTURE RESULTS: SIGNIFICANT CHANGE UP
SPECIMEN SOURCE: SIGNIFICANT CHANGE UP

## 2023-11-18 LAB
ANTI PM-SCL-100 PLUS: <20 UNITS — SIGNIFICANT CHANGE UP
ANTI PM-SCL-100 PLUS: <20 UNITS — SIGNIFICANT CHANGE UP
ANTI-SAE 1 IGG: <20 UNITS — SIGNIFICANT CHANGE UP
ANTI-SAE 1 IGG: <20 UNITS — SIGNIFICANT CHANGE UP
ANTI-SS-A 52 KD AB, IGG PLUS: >200 UNITS — HIGH
ANTI-SS-A 52 KD AB, IGG PLUS: >200 UNITS — HIGH
ANTI-U1-RNP AB PLUS: <20 UNITS — SIGNIFICANT CHANGE UP
ANTI-U1-RNP AB PLUS: <20 UNITS — SIGNIFICANT CHANGE UP
EJ MYOMARKER3 PLUS: NEGATIVE — SIGNIFICANT CHANGE UP
EJ MYOMARKER3 PLUS: NEGATIVE — SIGNIFICANT CHANGE UP
ENA JO1 AB SER IA-ACNC: <20 UNITS — SIGNIFICANT CHANGE UP
ENA JO1 AB SER IA-ACNC: <20 UNITS — SIGNIFICANT CHANGE UP
FIBRILLARIN (U3 RNP) PLUS: NEGATIVE — SIGNIFICANT CHANGE UP
FIBRILLARIN (U3 RNP) PLUS: NEGATIVE — SIGNIFICANT CHANGE UP
KU MYOMARKER3 PLUS: NEGATIVE — SIGNIFICANT CHANGE UP
KU MYOMARKER3 PLUS: NEGATIVE — SIGNIFICANT CHANGE UP
MDA5 (P140)(CADM-140) PLUS: <20 UNITS — SIGNIFICANT CHANGE UP
MDA5 (P140)(CADM-140) PLUS: <20 UNITS — SIGNIFICANT CHANGE UP
MI-2 PLUS: NEGATIVE — SIGNIFICANT CHANGE UP
MI-2 PLUS: NEGATIVE — SIGNIFICANT CHANGE UP
NXP-2 (P140) MYOPLUS: <20 UNITS — SIGNIFICANT CHANGE UP
NXP-2 (P140) MYOPLUS: <20 UNITS — SIGNIFICANT CHANGE UP
OJ MYOMARKER3 PLUS: NEGATIVE — SIGNIFICANT CHANGE UP
OJ MYOMARKER3 PLUS: NEGATIVE — SIGNIFICANT CHANGE UP
PL-12 PLUS: NEGATIVE — SIGNIFICANT CHANGE UP
PL-12 PLUS: NEGATIVE — SIGNIFICANT CHANGE UP
PL-7 PLUS: NEGATIVE — SIGNIFICANT CHANGE UP
PL-7 PLUS: NEGATIVE — SIGNIFICANT CHANGE UP
SRP MYOMARKER3 PLUS: NEGATIVE — SIGNIFICANT CHANGE UP
SRP MYOMARKER3 PLUS: NEGATIVE — SIGNIFICANT CHANGE UP
TIF GAMMA (P155/140) PLUS: <20 UNITS — SIGNIFICANT CHANGE UP
TIF GAMMA (P155/140) PLUS: <20 UNITS — SIGNIFICANT CHANGE UP
U2 SNRNP PLUS: NEGATIVE — SIGNIFICANT CHANGE UP
U2 SNRNP PLUS: NEGATIVE — SIGNIFICANT CHANGE UP

## 2024-02-12 NOTE — DISCHARGE NOTE PROVIDER - EXTENDED VTE YES NO FOR MLM ENOXAPARIN
Plan:  Labs reviewed in epic and discussed with patient.  Current medications reviewed.  Refills given as warranted.  Recommend going to Dr. Izquierdo's office for help with weight loss.  Your heart strength has returned back to normal.   You do have some blockages and you could have some chest pain.  If the pain gets more frequent or more intense then we can put you on nitroglycerin to help with the chest pain.  No cardiac testing at this time.   Risks and benefits of being on two blood thinners.  Recommend continuing with both blood thinners unless you have bleeding in the future.  Recommend seeing endocrinologist to help manage your diabetes.  Call [phone] to schedule a Lexiscan Stress Test   - test will look for blockages and the blood flow through the heart  - A small IV will be placed into your arm to administer a radioisotope (myoview) to visualize your heart..   - You will then have a waiting period to allow the tracer to be absorbed by the heart muscle. After the waiting period, we will take pictures of the blood flow to your heart muscle with the nuclear camera.   - Following your pictures, we will perform your stress test. We can do your stress test by having you walk on the treadmill or by giving you a medication (Lexiscan) which makes your body think it is exercising.   - We will monitor you before, during, and after your stress test to make sure you are safe and comfortable.  Stress Test instructions  - Allow 3-4 hours for the entire test to be complete.   - Nothing to eat or drink except water after midnight. If you are scheduled in the afternoon, you are permitted to eat a light breakfast such as a bowl of cereal or toast.   - Do not drink or eat anything containing caffeine 24 hours prior to test. This includes coffee, tea, soda, and chocolate- not even decaffeinated or caffeine free products.   - take your regular medications as prescribed the morning of procedures (except betablockers).   - If 
,

## 2024-03-25 NOTE — PROGRESS NOTE ADULT - PROBLEM SELECTOR PLAN 1
Type 2 diabetes mellitus treated without insulin - Continue hemodynamic monitoring via PAC.   - Hemodynamic goals: CVP < 12, SVO2 > 65%, lactic acid < 2.2, CI > 2.2, iCa > 1.3  - Monitor perfusion markers daily (lactate, MVO2 from distal port of PA catheter, LFTs)  - Inotrope: would wean milrinone to 0.125 mcg/kg/min and repeat MVO2, CMP, and lactate 2 hours later.  - Vasopressorsweaned to OFF this am  - Teagan weaned to OFF this am  - Diuretics: recommend bumex 2mg IV BID  - Strict I&Os  - No advanced therapy options due to BMI of 60. Would not be a candidate for tMCS since she has no exit strategy. - Continue hemodynamic monitoring via PAC.   - Hemodynamic goals: CVP < 12, SVO2 > 65%, lactic acid < 2.2, CI > 2.2, iCa > 1.3  - Monitor perfusion markers daily (lactate, MVO2 from distal port of PA catheter, LFTs)  - Inotrope: If she is not being transferred to [facility], we can try weaning milrinone off, but otherwise would continue at 0.125 mcg/kg/min and monitor MVO2, CMP, and lactate.  - Vasopressors weaned off 10/26  - Teagan weaned off 10/26  - Diuretics: would continue Bumex 2 mg IV daily to keep net even  - Strict I&Os  - No advanced therapy options due to BMI of 60. Would not be a candidate for tMCS since she has no exit strategy. - Continue hemodynamic monitoring via PAC.   - Hemodynamic goals: CVP < 12, SVO2 > 65%, lactic acid < 2.2, CI > 2.2, iCa > 1.3  - Monitor perfusion markers daily (lactate, MVO2 from distal port of PA catheter, LFTs)  - Inotrope: If she is not being transferred to [facility], we can try weaning milrinone off, but otherwise would continue at 0.125 mcg/kg/min and monitor MVO2, CMP, and lactate.  - Vasopressors weaned off 10/26  - Teagan weaned off 10/26  - Diuretics: would continue Bumex 2 mg IV daily to keep slightly negative (at least -500 ml in 24hrs).   - Strict I&Os  - No advanced therapy options due to BMI of 60. Would not be a candidate for tMCS since she has no exit strategy.

## 2024-11-08 NOTE — PATIENT PROFILE ADULT - HAVE YOU RECENTLY LOST WEIGHT WITHOUT TRYING?
Purpose: Writer met with pt to conduct virtual intake.      Intervention: This writer conducted abbreviated intake and introduced TR program. Writer reviewed [facility] Welcome Letter and Written Notice of Beneficiary Protections. Pt provided verbal consent. Writer established overview of services and introduced Welcome Workshop.       Plan: Complete staffing brief and present case to staff. Pt was informed on follow up steps. Writer encouraged pt to call the  for any questions or concerns. Pt reported interest in therapy,  and Welcome Workshop.   No (0)

## 2024-12-31 NOTE — DISCHARGE NOTE PROVIDER - NSDCCPTREATMENT_GEN_ALL_CORE_FT
abnormal PRINCIPAL PROCEDURE  Procedure: CT angiogram chest w contrast  Findings and Treatment: FINDINGS:  PULMONARY EMBOLISM: Limited evaluation for segmental and subsegmental   pulmonary embolism. No main, left or right, or lobar pulmonary embolism.   Dilated main pulmonary artery measuring 3.9 cm.  AIRWAYS, LUNGS and PLEURA: Patent central airways. Trace left basilar   atelectasis.Stable diffuse mosaic attenuation pattern. No pleural   effusion.  MEDIASTINUM AND GLENN: No lymphadenopathy.  HEART AND VESSELS: There is right atrial and right ventricular   enlargement.. Small circumferential pericardial effusion is decreased in   size since July 19, 2023. The ascending aorta measures 4 cm at the level   of the main pulmonary artery. Aortic valve calcification.  VISUALIZED UPPER ABDOMEN: Stable splenomegaly measuring 16 cm in   craniocaudal dimension. Subcentimeter calculi in right renal cortex.  CHEST WALL AND BONES: No aggressive osseous lesion.  LOWER NECK: Within normal limits.  IMPRESSION:  No central or lobar pulmonary embolism.  Unchanged diffuse pulmonary mosaic attenuation pattern with an enlarged   main pulmonary artery. Findings may represent chronic thromboembolic   pulmonary hypertension.

## 2025-05-07 NOTE — DISCHARGE NOTE NURSING/CASE MANAGEMENT/SOCIAL WORK - NSDCVIVACCINE_GEN_ALL_CORE_FT
HCC coding opportunities       Chart reviewed, no opportunity found: CHART REVIEWED, NO OPPORTUNITY FOUND  [facility] Review      Patients Insurance     Medicare Insurance: [facility] [employer] Advantage          
No Vaccines Administered.

## 2025-05-09 NOTE — ED ADULT TRIAGE NOTE - ADDITIONAL SAFETY/BANDS...
No care due was identified.  [facility] Embedded Care Due Messages. Reference number: [id number].   5/09/2025 9:20:42 AM CDT   Additional Safety/Bands:

## 2025-06-26 NOTE — PATIENT PROFILE ADULT - TOBACCO USE
89-year-old female with past medical history of CAD status post multiple stents and prior MI presents with fatigue.  She reports symptoms began yesterday with generalized fatigue and tiredness.  Denies any other additional symptoms of fever, chills, nausea, chest pain. She reports dyspnea with exertion. Denies leg swelling. Patient with new onset complaints of left lower rib pain as well but denies fall/trauma.  ED course reviewed. trop noted to be 20 and 13. pro bnp [id number] noted to have elevated LFTs. cxr no acute pathology noted. patient sent to cdu due to concern for new onset chf. will obtain TTE, cards eval, and frequent re-evaluations. Never smoker